# Patient Record
Sex: MALE
[De-identification: names, ages, dates, MRNs, and addresses within clinical notes are randomized per-mention and may not be internally consistent; named-entity substitution may affect disease eponyms.]

---

## 2017-03-22 ENCOUNTER — APPOINTMENT (OUTPATIENT)
Dept: ELECTROPHYSIOLOGY | Facility: CLINIC | Age: 77
End: 2017-03-22

## 2017-06-19 ENCOUNTER — APPOINTMENT (OUTPATIENT)
Dept: ELECTROPHYSIOLOGY | Facility: CLINIC | Age: 77
End: 2017-06-19

## 2017-06-19 VITALS — DIASTOLIC BLOOD PRESSURE: 70 MMHG | SYSTOLIC BLOOD PRESSURE: 154 MMHG

## 2017-09-25 ENCOUNTER — APPOINTMENT (OUTPATIENT)
Dept: ELECTROPHYSIOLOGY | Facility: CLINIC | Age: 77
End: 2017-09-25
Payer: MEDICARE

## 2017-09-25 PROCEDURE — 93284 PRGRMG EVAL IMPLANTABLE DFB: CPT

## 2017-09-25 RX ORDER — METOPROLOL TARTRATE 50 MG/1
50 TABLET, FILM COATED ORAL
Refills: 0 | Status: DISCONTINUED | COMMUNITY
End: 2017-09-25

## 2017-09-25 RX ORDER — FINASTERIDE 5 MG/1
5 TABLET, FILM COATED ORAL DAILY
Refills: 0 | Status: ACTIVE | COMMUNITY

## 2017-09-25 RX ORDER — METOPROLOL SUCCINATE 50 MG/1
50 TABLET, EXTENDED RELEASE ORAL
Refills: 0 | Status: ACTIVE | COMMUNITY

## 2017-11-10 VITALS
SYSTOLIC BLOOD PRESSURE: 105 MMHG | HEART RATE: 52 BPM | OXYGEN SATURATION: 96 % | TEMPERATURE: 98 F | RESPIRATION RATE: 19 BRPM | DIASTOLIC BLOOD PRESSURE: 58 MMHG

## 2017-11-10 RX ORDER — CHLORHEXIDINE GLUCONATE 213 G/1000ML
1 SOLUTION TOPICAL ONCE
Qty: 0 | Refills: 0 | Status: DISCONTINUED | OUTPATIENT
Start: 2017-11-13 | End: 2017-11-13
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
## 2017-11-10 NOTE — H&P ADULT - FAMILY HISTORY
Family history of MI (myocardial infarction)     Sibling  Still living? No  Family history of MI (myocardial infarction), Age at diagnosis: Age Unknown
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
## 2017-11-13 ENCOUNTER — OUTPATIENT (OUTPATIENT)
Dept: OUTPATIENT SERVICES | Facility: HOSPITAL | Age: 77
LOS: 1 days | Discharge: NURSING, REHAB OR LONG-TERM CARE | End: 2017-11-13
Payer: MEDICARE

## 2017-11-13 DIAGNOSIS — Z95.810 PRESENCE OF AUTOMATIC (IMPLANTABLE) CARDIAC DEFIBRILLATOR: Chronic | ICD-10-CM

## 2017-11-13 DIAGNOSIS — Z98.89 OTHER SPECIFIED POSTPROCEDURAL STATES: Chronic | ICD-10-CM

## 2017-11-13 LAB
ALBUMIN SERPL ELPH-MCNC: 4.1 G/DL — SIGNIFICANT CHANGE UP (ref 3.3–5)
ALP SERPL-CCNC: 80 U/L — SIGNIFICANT CHANGE UP (ref 40–120)
ALT FLD-CCNC: 19 U/L — SIGNIFICANT CHANGE UP (ref 10–45)
ANION GAP SERPL CALC-SCNC: 13 MMOL/L — SIGNIFICANT CHANGE UP (ref 5–17)
APTT BLD: 30.7 SEC — SIGNIFICANT CHANGE UP (ref 27.5–37.4)
AST SERPL-CCNC: 18 U/L — SIGNIFICANT CHANGE UP (ref 10–40)
BASOPHILS NFR BLD AUTO: 0.4 % — SIGNIFICANT CHANGE UP (ref 0–2)
BILIRUB DIRECT SERPL-MCNC: <0.2 MG/DL — SIGNIFICANT CHANGE UP (ref 0–0.2)
BILIRUB INDIRECT FLD-MCNC: >0.4 MG/DL — SIGNIFICANT CHANGE UP (ref 0.2–1)
BILIRUB SERPL-MCNC: 0.6 MG/DL — SIGNIFICANT CHANGE UP (ref 0.2–1.2)
BUN SERPL-MCNC: 17 MG/DL — SIGNIFICANT CHANGE UP (ref 7–23)
CALCIUM SERPL-MCNC: 10.6 MG/DL — HIGH (ref 8.4–10.5)
CHLORIDE SERPL-SCNC: 97 MMOL/L — SIGNIFICANT CHANGE UP (ref 96–108)
CHOLEST SERPL-MCNC: 201 MG/DL — HIGH (ref 10–199)
CK MB CFR SERPL CALC: 2.7 NG/ML — SIGNIFICANT CHANGE UP (ref 0–6.7)
CK SERPL-CCNC: 91 U/L — SIGNIFICANT CHANGE UP (ref 30–200)
CO2 SERPL-SCNC: 25 MMOL/L — SIGNIFICANT CHANGE UP (ref 22–31)
CREAT SERPL-MCNC: 1.21 MG/DL — SIGNIFICANT CHANGE UP (ref 0.5–1.3)
CRP SERPL-MCNC: 0.2 MG/DL — SIGNIFICANT CHANGE UP (ref 0–0.4)
EOSINOPHIL NFR BLD AUTO: 0.8 % — SIGNIFICANT CHANGE UP (ref 0–6)
GLUCOSE SERPL-MCNC: 146 MG/DL — HIGH (ref 70–99)
HBA1C BLD-MCNC: 5.8 % — HIGH (ref 4–5.6)
HCT VFR BLD CALC: 45.4 % — SIGNIFICANT CHANGE UP (ref 39–50)
HDLC SERPL-MCNC: 34 MG/DL — LOW (ref 40–125)
HGB BLD-MCNC: 16.1 G/DL — SIGNIFICANT CHANGE UP (ref 13–17)
INR BLD: 1.07 — SIGNIFICANT CHANGE UP (ref 0.88–1.16)
LIPID PNL WITH DIRECT LDL SERPL: 143 MG/DL — HIGH
LYMPHOCYTES # BLD AUTO: 33.9 % — SIGNIFICANT CHANGE UP (ref 13–44)
MCHC RBC-ENTMCNC: 32.5 PG — SIGNIFICANT CHANGE UP (ref 27–34)
MCHC RBC-ENTMCNC: 35.5 G/DL — SIGNIFICANT CHANGE UP (ref 32–36)
MCV RBC AUTO: 91.7 FL — SIGNIFICANT CHANGE UP (ref 80–100)
MONOCYTES NFR BLD AUTO: 9.8 % — SIGNIFICANT CHANGE UP (ref 2–14)
NEUTROPHILS NFR BLD AUTO: 55.1 % — SIGNIFICANT CHANGE UP (ref 43–77)
PLATELET # BLD AUTO: 319 K/UL — SIGNIFICANT CHANGE UP (ref 150–400)
POTASSIUM SERPL-MCNC: 4.4 MMOL/L — SIGNIFICANT CHANGE UP (ref 3.5–5.3)
POTASSIUM SERPL-SCNC: 4.4 MMOL/L — SIGNIFICANT CHANGE UP (ref 3.5–5.3)
PROT SERPL-MCNC: 7.6 G/DL — SIGNIFICANT CHANGE UP (ref 6–8.3)
PROTHROM AB SERPL-ACNC: 11.9 SEC — SIGNIFICANT CHANGE UP (ref 9.8–12.7)
RBC # BLD: 4.95 M/UL — SIGNIFICANT CHANGE UP (ref 4.2–5.8)
RBC # FLD: 13.2 % — SIGNIFICANT CHANGE UP (ref 10.3–16.9)
SODIUM SERPL-SCNC: 135 MMOL/L — SIGNIFICANT CHANGE UP (ref 135–145)
TOTAL CHOLESTEROL/HDL RATIO MEASUREMENT: 5.9 RATIO — SIGNIFICANT CHANGE UP (ref 3.4–9.6)
TRIGL SERPL-MCNC: 118 MG/DL — SIGNIFICANT CHANGE UP (ref 10–149)
WBC # BLD: 9.9 K/UL — SIGNIFICANT CHANGE UP (ref 3.8–10.5)
WBC # FLD AUTO: 9.9 K/UL — SIGNIFICANT CHANGE UP (ref 3.8–10.5)

## 2017-11-13 PROCEDURE — 82553 CREATINE MB FRACTION: CPT

## 2017-11-13 PROCEDURE — 93005 ELECTROCARDIOGRAM TRACING: CPT

## 2017-11-13 PROCEDURE — 93459 L HRT ART/GRFT ANGIO: CPT

## 2017-11-13 PROCEDURE — 93010 ELECTROCARDIOGRAM REPORT: CPT

## 2017-11-13 PROCEDURE — C1887: CPT

## 2017-11-13 PROCEDURE — C1894: CPT

## 2017-11-13 PROCEDURE — 85025 COMPLETE CBC W/AUTO DIFF WBC: CPT

## 2017-11-13 PROCEDURE — 80076 HEPATIC FUNCTION PANEL: CPT

## 2017-11-13 PROCEDURE — 85610 PROTHROMBIN TIME: CPT

## 2017-11-13 PROCEDURE — C1769: CPT

## 2017-11-13 PROCEDURE — C1889: CPT

## 2017-11-13 PROCEDURE — 86140 C-REACTIVE PROTEIN: CPT

## 2017-11-13 PROCEDURE — 82550 ASSAY OF CK (CPK): CPT

## 2017-11-13 PROCEDURE — 83036 HEMOGLOBIN GLYCOSYLATED A1C: CPT

## 2017-11-13 PROCEDURE — 85730 THROMBOPLASTIN TIME PARTIAL: CPT

## 2017-11-13 PROCEDURE — 80061 LIPID PANEL: CPT

## 2017-11-13 PROCEDURE — 80048 BASIC METABOLIC PNL TOTAL CA: CPT

## 2017-11-13 PROCEDURE — 93459 L HRT ART/GRFT ANGIO: CPT | Mod: 26

## 2017-11-13 RX ORDER — CLOPIDOGREL BISULFATE 75 MG/1
600 TABLET, FILM COATED ORAL ONCE
Qty: 0 | Refills: 0 | Status: COMPLETED | OUTPATIENT
Start: 2017-11-13 | End: 2017-11-13

## 2017-11-13 RX ORDER — SODIUM CHLORIDE 9 MG/ML
1000 INJECTION, SOLUTION INTRAVENOUS
Qty: 0 | Refills: 0 | Status: DISCONTINUED | OUTPATIENT
Start: 2017-11-13 | End: 2017-11-13

## 2017-11-13 RX ORDER — ATROPINE SULFATE 0.1 MG/ML
0.6 SYRINGE (ML) INJECTION ONCE
Qty: 0 | Refills: 0 | Status: COMPLETED | OUTPATIENT
Start: 2017-11-13 | End: 2017-11-13

## 2017-11-13 RX ADMIN — CLOPIDOGREL BISULFATE 600 MILLIGRAM(S): 75 TABLET, FILM COATED ORAL at 12:26

## 2017-11-13 RX ADMIN — Medication 0.6 MILLIGRAM(S): at 14:00
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
## 2018-01-08 ENCOUNTER — APPOINTMENT (OUTPATIENT)
Dept: ELECTROPHYSIOLOGY | Facility: CLINIC | Age: 78
End: 2018-01-08

## 2018-04-17 ENCOUNTER — APPOINTMENT (OUTPATIENT)
Dept: ELECTROPHYSIOLOGY | Facility: CLINIC | Age: 78
End: 2018-04-17

## 2018-04-30 ENCOUNTER — APPOINTMENT (OUTPATIENT)
Dept: ELECTROPHYSIOLOGY | Facility: CLINIC | Age: 78
End: 2018-04-30
Payer: MEDICARE

## 2018-04-30 PROCEDURE — 93284 PRGRMG EVAL IMPLANTABLE DFB: CPT

## 2018-04-30 RX ORDER — HYDROCHLOROTHIAZIDE 12.5 MG/1
12.5 TABLET ORAL DAILY
Refills: 0 | Status: DISCONTINUED | COMMUNITY
End: 2018-04-30

## 2018-04-30 RX ORDER — FUROSEMIDE 40 MG/1
40 TABLET ORAL
Refills: 0 | Status: ACTIVE | COMMUNITY

## 2018-04-30 RX ORDER — ISOSORBIDE MONONITRATE 60 MG/1
60 TABLET, EXTENDED RELEASE ORAL
Refills: 0 | Status: ACTIVE | COMMUNITY

## 2018-08-06 ENCOUNTER — APPOINTMENT (OUTPATIENT)
Dept: ELECTROPHYSIOLOGY | Facility: CLINIC | Age: 78
End: 2018-08-06
Payer: MEDICARE

## 2018-08-06 DIAGNOSIS — I50.22 CHRONIC SYSTOLIC (CONGESTIVE) HEART FAILURE: ICD-10-CM

## 2018-08-06 PROCEDURE — 93284 PRGRMG EVAL IMPLANTABLE DFB: CPT

## 2018-10-26 ENCOUNTER — INPATIENT (INPATIENT)
Facility: HOSPITAL | Age: 78
LOS: 1 days | Discharge: HOME | End: 2018-10-28
Attending: HOSPITALIST | Admitting: HOSPITALIST
Payer: MEDICARE

## 2018-10-26 VITALS
RESPIRATION RATE: 16 BRPM | OXYGEN SATURATION: 100 % | TEMPERATURE: 98 F | DIASTOLIC BLOOD PRESSURE: 80 MMHG | HEART RATE: 93 BPM | SYSTOLIC BLOOD PRESSURE: 156 MMHG

## 2018-10-26 DIAGNOSIS — Z29.9 ENCOUNTER FOR PROPHYLACTIC MEASURES, UNSPECIFIED: ICD-10-CM

## 2018-10-26 DIAGNOSIS — N18.3 CHRONIC KIDNEY DISEASE, STAGE 3 (MODERATE): ICD-10-CM

## 2018-10-26 DIAGNOSIS — Z95.810 PRESENCE OF AUTOMATIC (IMPLANTABLE) CARDIAC DEFIBRILLATOR: Chronic | ICD-10-CM

## 2018-10-26 DIAGNOSIS — M54.5 LOW BACK PAIN: ICD-10-CM

## 2018-10-26 DIAGNOSIS — I10 ESSENTIAL (PRIMARY) HYPERTENSION: ICD-10-CM

## 2018-10-26 DIAGNOSIS — I50.9 HEART FAILURE, UNSPECIFIED: ICD-10-CM

## 2018-10-26 DIAGNOSIS — N39.0 URINARY TRACT INFECTION, SITE NOT SPECIFIED: ICD-10-CM

## 2018-10-26 DIAGNOSIS — E83.52 HYPERCALCEMIA: ICD-10-CM

## 2018-10-26 DIAGNOSIS — Z98.89 OTHER SPECIFIED POSTPROCEDURAL STATES: Chronic | ICD-10-CM

## 2018-10-26 DIAGNOSIS — N21.0 CALCULUS IN BLADDER: ICD-10-CM

## 2018-10-26 DIAGNOSIS — N40.0 BENIGN PROSTATIC HYPERPLASIA WITHOUT LOWER URINARY TRACT SYMPTOMS: ICD-10-CM

## 2018-10-26 LAB
ALBUMIN SERPL ELPH-MCNC: 3.2 G/DL — LOW (ref 3.3–5)
ALP SERPL-CCNC: 78 U/L — SIGNIFICANT CHANGE UP (ref 40–120)
ALT FLD-CCNC: 13 U/L — SIGNIFICANT CHANGE UP (ref 4–41)
APPEARANCE UR: SIGNIFICANT CHANGE UP
APTT BLD: 29.9 SEC — SIGNIFICANT CHANGE UP (ref 27.5–37.4)
AST SERPL-CCNC: 14 U/L — SIGNIFICANT CHANGE UP (ref 4–40)
BACTERIA # UR AUTO: HIGH
BASE EXCESS BLDV CALC-SCNC: 4.3 MMOL/L — SIGNIFICANT CHANGE UP
BASOPHILS # BLD AUTO: 0.03 K/UL — SIGNIFICANT CHANGE UP (ref 0–0.2)
BASOPHILS NFR BLD AUTO: 0.3 % — SIGNIFICANT CHANGE UP (ref 0–2)
BILIRUB SERPL-MCNC: 0.5 MG/DL — SIGNIFICANT CHANGE UP (ref 0.2–1.2)
BILIRUB UR-MCNC: NEGATIVE — SIGNIFICANT CHANGE UP
BLOOD GAS VENOUS - CREATININE: 0.88 MG/DL — SIGNIFICANT CHANGE UP (ref 0.5–1.3)
BLOOD UR QL VISUAL: HIGH
BUN SERPL-MCNC: 16 MG/DL — SIGNIFICANT CHANGE UP (ref 7–23)
CALCIUM SERPL-MCNC: 10.6 MG/DL — HIGH (ref 8.4–10.5)
CHLORIDE BLDV-SCNC: 103 MMOL/L — SIGNIFICANT CHANGE UP (ref 96–108)
CHLORIDE SERPL-SCNC: 98 MMOL/L — SIGNIFICANT CHANGE UP (ref 98–107)
CO2 SERPL-SCNC: 25 MMOL/L — SIGNIFICANT CHANGE UP (ref 22–31)
COLOR SPEC: YELLOW — SIGNIFICANT CHANGE UP
CREAT SERPL-MCNC: 1.11 MG/DL — SIGNIFICANT CHANGE UP (ref 0.5–1.3)
EOSINOPHIL # BLD AUTO: 0.03 K/UL — SIGNIFICANT CHANGE UP (ref 0–0.5)
EOSINOPHIL NFR BLD AUTO: 0.3 % — SIGNIFICANT CHANGE UP (ref 0–6)
GAS PNL BLDV: 133 MMOL/L — LOW (ref 136–146)
GLUCOSE BLDV-MCNC: 131 — HIGH (ref 70–99)
GLUCOSE SERPL-MCNC: 128 MG/DL — HIGH (ref 70–99)
GLUCOSE UR-MCNC: NEGATIVE — SIGNIFICANT CHANGE UP
HCO3 BLDV-SCNC: 26 MMOL/L — SIGNIFICANT CHANGE UP (ref 20–27)
HCT VFR BLD CALC: 41.6 % — SIGNIFICANT CHANGE UP (ref 39–50)
HCT VFR BLDV CALC: 44.1 % — SIGNIFICANT CHANGE UP (ref 39–51)
HGB BLD-MCNC: 13.9 G/DL — SIGNIFICANT CHANGE UP (ref 13–17)
HGB BLDV-MCNC: 14.4 G/DL — SIGNIFICANT CHANGE UP (ref 13–17)
HYALINE CASTS # UR AUTO: NEGATIVE — SIGNIFICANT CHANGE UP
IMM GRANULOCYTES # BLD AUTO: 0.05 # — SIGNIFICANT CHANGE UP
IMM GRANULOCYTES NFR BLD AUTO: 0.5 % — SIGNIFICANT CHANGE UP (ref 0–1.5)
INR BLD: 1.2 — HIGH (ref 0.88–1.17)
KETONES UR-MCNC: NEGATIVE — SIGNIFICANT CHANGE UP
LACTATE BLDV-MCNC: 1.1 MMOL/L — SIGNIFICANT CHANGE UP (ref 0.5–2)
LEUKOCYTE ESTERASE UR-ACNC: SIGNIFICANT CHANGE UP
LYMPHOCYTES # BLD AUTO: 18.6 % — SIGNIFICANT CHANGE UP (ref 13–44)
LYMPHOCYTES # BLD AUTO: 2.01 K/UL — SIGNIFICANT CHANGE UP (ref 1–3.3)
MCHC RBC-ENTMCNC: 31.3 PG — SIGNIFICANT CHANGE UP (ref 27–34)
MCHC RBC-ENTMCNC: 33.4 % — SIGNIFICANT CHANGE UP (ref 32–36)
MCV RBC AUTO: 93.7 FL — SIGNIFICANT CHANGE UP (ref 80–100)
MONOCYTES # BLD AUTO: 0.98 K/UL — HIGH (ref 0–0.9)
MONOCYTES NFR BLD AUTO: 9.1 % — SIGNIFICANT CHANGE UP (ref 2–14)
NEUTROPHILS # BLD AUTO: 7.7 K/UL — HIGH (ref 1.8–7.4)
NEUTROPHILS NFR BLD AUTO: 71.2 % — SIGNIFICANT CHANGE UP (ref 43–77)
NITRITE UR-MCNC: NEGATIVE — SIGNIFICANT CHANGE UP
NRBC # FLD: 0 — SIGNIFICANT CHANGE UP
PCO2 BLDV: 48 MMHG — SIGNIFICANT CHANGE UP (ref 41–51)
PH BLDV: 7.4 PH — SIGNIFICANT CHANGE UP (ref 7.32–7.43)
PH UR: 7 — SIGNIFICANT CHANGE UP (ref 5–8)
PLATELET # BLD AUTO: 499 K/UL — HIGH (ref 150–400)
PMV BLD: 9.2 FL — SIGNIFICANT CHANGE UP (ref 7–13)
PO2 BLDV: 22 MMHG — LOW (ref 35–40)
POTASSIUM BLDV-SCNC: 4.6 MMOL/L — HIGH (ref 3.4–4.5)
POTASSIUM SERPL-MCNC: 4.5 MMOL/L — SIGNIFICANT CHANGE UP (ref 3.5–5.3)
POTASSIUM SERPL-SCNC: 4.5 MMOL/L — SIGNIFICANT CHANGE UP (ref 3.5–5.3)
PROT SERPL-MCNC: 7.5 G/DL — SIGNIFICANT CHANGE UP (ref 6–8.3)
PROT UR-MCNC: 50 — SIGNIFICANT CHANGE UP
PROTHROM AB SERPL-ACNC: 13.4 SEC — HIGH (ref 9.8–13.1)
RBC # BLD: 4.44 M/UL — SIGNIFICANT CHANGE UP (ref 4.2–5.8)
RBC # FLD: 12.3 % — SIGNIFICANT CHANGE UP (ref 10.3–14.5)
RBC CASTS # UR COMP ASSIST: >50 — HIGH (ref 0–?)
SAO2 % BLDV: 31.6 % — LOW (ref 60–85)
SODIUM SERPL-SCNC: 135 MMOL/L — SIGNIFICANT CHANGE UP (ref 135–145)
SP GR SPEC: 1.02 — SIGNIFICANT CHANGE UP (ref 1–1.04)
SQUAMOUS # UR AUTO: SIGNIFICANT CHANGE UP
UROBILINOGEN FLD QL: SIGNIFICANT CHANGE UP
WBC # BLD: 10.8 K/UL — HIGH (ref 3.8–10.5)
WBC # FLD AUTO: 10.8 K/UL — HIGH (ref 3.8–10.5)
WBC UR QL: >50 — HIGH (ref 0–?)

## 2018-10-26 PROCEDURE — 74177 CT ABD & PELVIS W/CONTRAST: CPT | Mod: 26

## 2018-10-26 PROCEDURE — 99223 1ST HOSP IP/OBS HIGH 75: CPT

## 2018-10-26 RX ORDER — TAMSULOSIN HYDROCHLORIDE 0.4 MG/1
0.4 CAPSULE ORAL AT BEDTIME
Qty: 0 | Refills: 0 | Status: DISCONTINUED | OUTPATIENT
Start: 2018-10-26 | End: 2018-10-28

## 2018-10-26 RX ORDER — ENOXAPARIN SODIUM 100 MG/ML
40 INJECTION SUBCUTANEOUS DAILY
Qty: 0 | Refills: 0 | Status: DISCONTINUED | OUTPATIENT
Start: 2018-10-26 | End: 2018-10-27

## 2018-10-26 RX ORDER — FENTANYL CITRATE 50 UG/ML
25 INJECTION INTRAVENOUS ONCE
Qty: 0 | Refills: 0 | Status: DISCONTINUED | OUTPATIENT
Start: 2018-10-26 | End: 2018-10-26

## 2018-10-26 RX ORDER — ACETAMINOPHEN 500 MG
650 TABLET ORAL EVERY 6 HOURS
Qty: 0 | Refills: 0 | Status: DISCONTINUED | OUTPATIENT
Start: 2018-10-26 | End: 2018-10-28

## 2018-10-26 RX ORDER — METOPROLOL TARTRATE 50 MG
50 TABLET ORAL DAILY
Qty: 0 | Refills: 0 | Status: DISCONTINUED | OUTPATIENT
Start: 2018-10-27 | End: 2018-10-28

## 2018-10-26 RX ORDER — SENNA PLUS 8.6 MG/1
2 TABLET ORAL AT BEDTIME
Qty: 0 | Refills: 0 | Status: DISCONTINUED | OUTPATIENT
Start: 2018-10-26 | End: 2018-10-28

## 2018-10-26 RX ORDER — LIDOCAINE 4 G/100G
1 CREAM TOPICAL DAILY
Qty: 0 | Refills: 0 | Status: DISCONTINUED | OUTPATIENT
Start: 2018-10-26 | End: 2018-10-28

## 2018-10-26 RX ORDER — ZOLPIDEM TARTRATE 10 MG/1
1 TABLET ORAL
Qty: 0 | Refills: 0 | COMMUNITY

## 2018-10-26 RX ORDER — FUROSEMIDE 40 MG
40 TABLET ORAL DAILY
Qty: 0 | Refills: 0 | Status: DISCONTINUED | OUTPATIENT
Start: 2018-10-27 | End: 2018-10-27

## 2018-10-26 RX ORDER — ISOSORBIDE MONONITRATE 60 MG/1
60 TABLET, EXTENDED RELEASE ORAL DAILY
Qty: 0 | Refills: 0 | Status: DISCONTINUED | OUTPATIENT
Start: 2018-10-26 | End: 2018-10-28

## 2018-10-26 RX ORDER — LISINOPRIL 2.5 MG/1
5 TABLET ORAL DAILY
Qty: 0 | Refills: 0 | Status: DISCONTINUED | OUTPATIENT
Start: 2018-10-27 | End: 2018-10-27

## 2018-10-26 RX ORDER — TRAMADOL HYDROCHLORIDE 50 MG/1
50 TABLET ORAL EVERY 6 HOURS
Qty: 0 | Refills: 0 | Status: DISCONTINUED | OUTPATIENT
Start: 2018-10-26 | End: 2018-10-28

## 2018-10-26 RX ORDER — ACETAMINOPHEN 500 MG
1000 TABLET ORAL ONCE
Qty: 0 | Refills: 0 | Status: COMPLETED | OUTPATIENT
Start: 2018-10-26 | End: 2018-10-26

## 2018-10-26 RX ORDER — CEFTRIAXONE 500 MG/1
1 INJECTION, POWDER, FOR SOLUTION INTRAMUSCULAR; INTRAVENOUS ONCE
Qty: 0 | Refills: 0 | Status: COMPLETED | OUTPATIENT
Start: 2018-10-26 | End: 2018-10-26

## 2018-10-26 RX ORDER — ASPIRIN/CALCIUM CARB/MAGNESIUM 324 MG
81 TABLET ORAL DAILY
Qty: 0 | Refills: 0 | Status: DISCONTINUED | OUTPATIENT
Start: 2018-10-26 | End: 2018-10-28

## 2018-10-26 RX ORDER — METOPROLOL TARTRATE 50 MG
25 TABLET ORAL ONCE
Qty: 0 | Refills: 0 | Status: COMPLETED | OUTPATIENT
Start: 2018-10-26 | End: 2018-10-26

## 2018-10-26 RX ORDER — TRAMADOL HYDROCHLORIDE 50 MG/1
25 TABLET ORAL ONCE
Qty: 0 | Refills: 0 | Status: DISCONTINUED | OUTPATIENT
Start: 2018-10-26 | End: 2018-10-26

## 2018-10-26 RX ORDER — ATORVASTATIN CALCIUM 80 MG/1
80 TABLET, FILM COATED ORAL AT BEDTIME
Qty: 0 | Refills: 0 | Status: DISCONTINUED | OUTPATIENT
Start: 2018-10-26 | End: 2018-10-28

## 2018-10-26 RX ORDER — CEFTRIAXONE 500 MG/1
1 INJECTION, POWDER, FOR SOLUTION INTRAMUSCULAR; INTRAVENOUS EVERY 24 HOURS
Qty: 0 | Refills: 0 | Status: DISCONTINUED | OUTPATIENT
Start: 2018-10-27 | End: 2018-10-27

## 2018-10-26 RX ORDER — ESCITALOPRAM OXALATE 10 MG/1
10 TABLET, FILM COATED ORAL DAILY
Qty: 0 | Refills: 0 | Status: DISCONTINUED | OUTPATIENT
Start: 2018-10-26 | End: 2018-10-28

## 2018-10-26 RX ADMIN — CEFTRIAXONE 100 GRAM(S): 500 INJECTION, POWDER, FOR SOLUTION INTRAMUSCULAR; INTRAVENOUS at 14:56

## 2018-10-26 RX ADMIN — ATORVASTATIN CALCIUM 80 MILLIGRAM(S): 80 TABLET, FILM COATED ORAL at 21:23

## 2018-10-26 RX ADMIN — TAMSULOSIN HYDROCHLORIDE 0.4 MILLIGRAM(S): 0.4 CAPSULE ORAL at 22:24

## 2018-10-26 RX ADMIN — FENTANYL CITRATE 25 MICROGRAM(S): 50 INJECTION INTRAVENOUS at 17:24

## 2018-10-26 RX ADMIN — TRAMADOL HYDROCHLORIDE 25 MILLIGRAM(S): 50 TABLET ORAL at 21:23

## 2018-10-26 RX ADMIN — LIDOCAINE 1 PATCH: 4 CREAM TOPICAL at 22:22

## 2018-10-26 RX ADMIN — FENTANYL CITRATE 25 MICROGRAM(S): 50 INJECTION INTRAVENOUS at 17:39

## 2018-10-26 RX ADMIN — Medication 25 MILLIGRAM(S): at 21:24

## 2018-10-26 RX ADMIN — Medication 400 MILLIGRAM(S): at 14:43

## 2018-10-26 RX ADMIN — TRAMADOL HYDROCHLORIDE 25 MILLIGRAM(S): 50 TABLET ORAL at 22:15

## 2018-10-26 RX ADMIN — ESCITALOPRAM OXALATE 10 MILLIGRAM(S): 10 TABLET, FILM COATED ORAL at 21:24

## 2018-10-26 NOTE — H&P ADULT - NSHPREVIEWOFSYSTEMS_GEN_ALL_CORE
REVIEW OF SYSTEMS:  Constitutional: [C ] negative [ ] fevers [ ] chills [ ] weight loss [ ] weight gain  HEENT: [ C] negative [ ] dry eyes [ ] eye irritation [ ] postnasal drip [ ] nasal congestion  CV: [C ] negative  [ ] chest pain [ ] orthopnea [ ] palpitations [ ] murmur  Resp: [ C] negative [ ] cough [ ] shortness of breath [ ] dyspnea [ ] wheezing [ ] sputum [ ] hemoptysis  GI: [C ] negative [ ] nausea [ ] vomiting [ ] diarrhea [ ] constipation [ ] abd pain [ ] dysphagia   : [ ] negative [ ] dysuria [X ] nocturia [ X] hematuria [ ] increased urinary frequency  Musculoskeletal: [ ] negative [ x] back pain [ ] myalgias [ ] arthralgias [ ] fracture  Skin: [x ] negative [ ] rash [ ] itch  Neurological: [ x] negative [ ] headache [ ] dizziness [ ] syncope [ ] weakness [ ] numbness  Psychiatric: [x ] negative [ ] anxiety [ ] depression  Endocrine: [x ] negative [ ] diabetes [ ] thyroid problem  Hematologic/Lymphatic: [x ] negative [ ] anemia [ ] bleeding problem  Allergic/Immunologic: [x ] negative [ ] itchy eyes [ ] nasal discharge [ ] hives [ ] angioedema  [ ] All other systems negative  [ ] Unable to assess ROS because ________ Constitutional: No weakness, fevers, chills, or weight loss  Eyes: No visual changes, double vision, or eye pain  Ears, Nose, Mouth, Throat: No runny nose, sinus pain, ear pain, tinnitus, sore throat, dysphagia, or odynophagia  Cardiovascular: No chest pain, palpitations, or LE edema  Respiratory: No cough, wheezing, hemoptysis, or shortness of breath  Gastrointestinal: No abdominal pain, dysphagia, anorexia, nausea/vomiting, diarrhea/constipation, hematemesis, BRBPR, or melena  Genitourinary: +Nocturia. +Hematuria. No dysuria.  Musculoskeletal: +Back pain. No joint pain, joint swelling, or decreased ROM.  Skin: No pruritus, rashes, lesions, or wounds  Neurologic:  No seizures, headache, paresthesias, numbness, or limb weakness  Psychiatric: No depression, anxiety, difficulty concentrating, anhedonia, or lack of energy  Endocrine: No heat/cold intolerance, mood swings, sweats, polydipsia, or polyuria  Hematologic/lymphatic: No purpura, petechia, or prolonged or excessive bleeding after dental extraction / injury  Allergic/Immunologic: No anaphylaxis or allergic response to materials, foods, animals    Positives and pertinent negatives noted and all other systems negative.
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
## 2018-10-26 NOTE — H&P ADULT - PROBLEM SELECTOR PLAN 7
Likely due to CHF/HTN  -bmp qd -Continue home medications furosemide, isosorbid, lisinopril, metoprolol succ 50 (given 25mg tonight given missed dose in AM) -Continue home medications furosemide, isosorbide, lisinopril, metoprolol succ 50 (given 25mg tonight given missed dose in AM)

## 2018-10-26 NOTE — ED ADULT NURSE REASSESSMENT NOTE - NS ED NURSE REASSESS COMMENT FT1
No acute distress at present. Respirations are even and unlabored on room air. Pt. is admitted to Medicine, awaiting bed assignment. Report given to [facility] CHEYENNE Contreras pt. is being transported to [facility] by ED tech.

## 2018-10-26 NOTE — H&P ADULT - PROBLEM SELECTOR PLAN 2
Likely cause of hematuria and spikes of back pain.  -plan as above Likely cause of hematuria and spikes of back pain. Hematuria resolving.  -plan as above  -monitor H/H

## 2018-10-26 NOTE — H&P ADULT - PROBLEM SELECTOR PLAN 8
DVT: enoxaparin  Diet: DASH  Dispo: infection/pain control Likely due to CHF/HTN  -bmp qd Likely due to CHF/HTN  -bmp qd  -avoid nephrotoxins  -renally dose meds

## 2018-10-26 NOTE — H&P ADULT - NSHPSOCIALHISTORY_GEN_ALL_CORE
tobacco quit in 1989 w/ 20 pack year. No alcohol use. no illicit drugs. Lives with wife. Tobacco quit in 1989 w/ 20 pack year. No alcohol use. No illicit drug use. Lives with wife.
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
## 2018-10-26 NOTE — H&P ADULT - PROBLEM SELECTOR PLAN 4
Spikes of back pain likely due to bladder calculus/ UTI, however PE more consistent with musckuloskeletal pain. No signs of fracture/ infiltration on CT scan. Failed ibuprofen/cyclobenzaprine OP.  -will trial acetaminophen q6h mod, tramadol 25mg severe (will increase if not affective), lidocaine patch  -monitor for improvement  -PT eval Spikes of back pain likely due to bladder calculus/ UTI, however PE more consistent with musckuloskeletal pain. No signs of fracture/ infiltration on CT scan. Failed ibuprofen/cyclobenzaprine OP.  -will trial acetaminophen q6h mod, tramadol 50mg severe (will increase if not affective), lidocaine patch  -monitor for improvement  -PT eval BPH likely etiology of bladder stone  -will start patient on tamsulosin 0.4 mg qhs

## 2018-10-26 NOTE — ED PROVIDER NOTE - PROGRESS NOTE DETAILS
Attending Note (Mojgan): signed out pending admission for pyleo and ct abd/pelvis r/o sbo.  ct without sbo.  continue iv antibiotics and admission. matt kirk: pt accepted by hospitalist, CT showing bladder stones, inpatient team states will reach out to uro- pt stable, text paged MAR.

## 2018-10-26 NOTE — H&P ADULT - PROBLEM SELECTOR PLAN 5
Ischemic cardiomyopathy w/ EF of 27% in 2016. No sign of acute HF.   -continue home medications furosemide, isosorbid, ASA, lisinopril, rosuvastatin, metoprolol succ 50 (given 25mg tonight given missed dose in AM)  -daily weights  -I+Os Spikes of back pain likely due to bladder calculus/ UTI, however PE more consistent with musckuloskeletal pain. No signs of fracture/ infiltration on CT scan. Failed ibuprofen/cyclobenzaprine OP.  -will trial acetaminophen q6h mod, tramadol 50mg severe (will increase if not affective), lidocaine patch  -monitor for improvement  -PT eval Spikes of back pain likely due to bladder calculus/ UTI, however PE more consistent with musculoskeletal pain given positive straight leg raise findings. No signs of fracture/ infiltration on CT scan. Failed ibuprofen/cyclobenzaprine OP.  -will trial acetaminophen q6h for moderate, tramadol 50mg q6h for severe (will increase if not affective), lidocaine patch  -monitor for improvement  -PT eval
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
## 2018-10-26 NOTE — ED ADULT NURSE NOTE - CHIEF COMPLAINT QUOTE
pt monica from urgent care, sent over for diffuse lower back pain x 3 weeks, developed hematuria 3 days ago, no fevers, no sob, no cp. back pain causes trouble with ambulation, no bowel or bladder incontinence, no recent falls or trauma. pt on plavix

## 2018-10-26 NOTE — H&P ADULT - NSHPLABSRESULTS_GEN_ALL_CORE
LABS:                        13.9   10.80 )-----------( 499      ( 26 Oct 2018 13:28 )             41.6     10-26    135  |  98  |  16  ----------------------------<  128<H>  4.5   |  25  |  1.11    Ca    10.6<H>      26 Oct 2018 13:28    TPro  7.5  /  Alb  3.2<L>  /  TBili  0.5  /  DBili  x   /  AST  14  /  ALT  13  /  AlkPhos  78  10-26    PT/INR - ( 26 Oct 2018 13:28 )   PT: 13.4 SEC;   INR: 1.20          PTT - ( 26 Oct 2018 13:28 )  PTT:29.9 SEC      Urinalysis Basic - ( 26 Oct 2018 13:28 )    Color: YELLOW / Appearance: Lt TURBID / S.017 / pH: 7.0  Gluc: NEGATIVE / Ketone: NEGATIVE  / Bili: NEGATIVE / Urobili: TRACE   Blood: MODERATE / Protein: 50 / Nitrite: NEGATIVE   Leuk Esterase: LARGE / RBC: >50 / WBC >50   Sq Epi: OCC / Non Sq Epi: x / Bacteria: MODERATE    < from: CT Abdomen and Pelvis w/ Oral Cont and w/ IV Cont (10.26.18 @ 15:40) >    FINDINGS:    LOWER CHEST: AICD. Median sternotomy.    LIVER: Within normal limits.  BILE DUCTS: Normal caliber.  GALLBLADDER: Contracted.  SPLEEN: Scarring from prior infarct.  PANCREAS: Within normal limits.  ADRENALS: Within normal limits.  KIDNEYS/URETERS: Within normal limits.    BLADDER: Multiple bladder calculi measuring up to 2.4 cm.  REPRODUCTIVE ORGANS: Prostate within normal limits.    BOWEL: No bowel obstruction. Sigmoid anastomosis. Scattered   diverticulosis without diverticulitis. The appendix is normal.     PERITONEUM: No ascites.  VESSELS:  Atherosclerotic changes.  RETROPERITONEUM: No lymphadenopathy.    ABDOMINAL WALL: Within normal limits.  BONES: Degenerative changes.    IMPRESSION:   No acute pathology.    Other chronic findings as above.    < end of copied text > LABS:                        13.9   10.80 )-----------( 499      ( 26 Oct 2018 13:28 )             41.6     10-26    135  |  98  |  16  ----------------------------<  128<H>  4.5   |  25  |  1.11    Ca    10.6<H>      26 Oct 2018 13:28    TPro  7.5  /  Alb  3.2<L>  /  TBili  0.5  /  DBili  x   /  AST  14  /  ALT  13  /  AlkPhos  78  10-26    PT/INR - ( 26 Oct 2018 13:28 )   PT: 13.4 SEC;   INR: 1.20          PTT - ( 26 Oct 2018 13:28 )  PTT:29.9 SEC    Urinalysis Basic - ( 26 Oct 2018 13:28 )    Color: YELLOW / Appearance: Lt TURBID / S.017 / pH: 7.0  Gluc: NEGATIVE / Ketone: NEGATIVE  / Bili: NEGATIVE / Urobili: TRACE   Blood: MODERATE / Protein: 50 / Nitrite: NEGATIVE   Leuk Esterase: LARGE / RBC: >50 / WBC >50   Sq Epi: OCC / Non Sq Epi: x / Bacteria: MODERATE    Imaging personally reviewed.   < from: CT Abdomen and Pelvis w/ Oral Cont and w/ IV Cont (10.26.18 @ 15:40) >    FINDINGS:    LOWER CHEST: AICD. Median sternotomy.    LIVER: Within normal limits.  BILE DUCTS: Normal caliber.  GALLBLADDER: Contracted.  SPLEEN: Scarring from prior infarct.  PANCREAS: Within normal limits.  ADRENALS: Within normal limits.  KIDNEYS/URETERS: Within normal limits.    BLADDER: Multiple bladder calculi measuring up to 2.4 cm.  REPRODUCTIVE ORGANS: Prostate within normal limits.    BOWEL: No bowel obstruction. Sigmoid anastomosis. Scattered   diverticulosis without diverticulitis. The appendix is normal.     PERITONEUM: No ascites.  VESSELS:  Atherosclerotic changes.  RETROPERITONEUM: No lymphadenopathy.    ABDOMINAL WALL: Within normal limits.  BONES: Degenerative changes.    IMPRESSION:   No acute pathology.    Other chronic findings as above.    < end of copied text >

## 2018-10-26 NOTE — H&P ADULT - PROBLEM SELECTOR PLAN 3
BPH likely etiology of bladder stone  -will start patient on tamsulosin 0.4 mg qhs Patient with several years of hypercal with calculi and musckuloskel pain, protein gap, however no anemia, lytic lesions seen on CT, weight loss. Could consider MM w/u.   -will send ionized Ca and PTH in AM for w/u of primary Hyperparathyroid. assistive person Patient with several years of hypercalcemia with calculi and musculoskeletal pain, protein gap, however no anemia, lytic lesions seen on CT, weight loss. Could consider MM w/u.   -will send ionized Ca and PTH in AM for w/u of primary Hyperparathyroid.
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
## 2018-10-26 NOTE — H&P ADULT - PROBLEM SELECTOR PLAN 1
Patient found to have mild leukocytosis w/ +UA w/ bladder calculus and BPH.  -f/u bcx, ucx  -continue on ceftriaxone 1mg qd 1/5 days adjust with culture data  -discussed with urology resident on call no intervention w/ 2.4cm calculus and to f/u w/ urology OP [phone] Patient found to have mild leukocytosis w/ +UA w/ bladder calculus and BPH. Unlikely pyelonephritis  -f/u bcx, ucx  -continue on ceftriaxone 1mg qd 1/5 days adjust with culture data  -discussed with urology resident on call no intervention w/ 2.4cm calculus and to f/u w/ urology OP [id number] Patient found to have mild leukocytosis w/ +UA w/ bladder calculus and BPH. Unlikely pyelonephritis  -f/u bcx, ucx  -continue on ceftriaxone 1mg qd, adjust with culture data  -discussed with urology resident on call no intervention w/ 2.4cm calculus and to f/u w/ urology OP [id number]

## 2018-10-26 NOTE — H&P ADULT - PROBLEM SELECTOR PLAN 9
DVT: enoxaparin  Diet: DASH  Dispo: infection/pain control DVT: HSQ given elevation in Cr on AM labs  Diet: DASH  Dispo: infection/pain control DVT: HSQ given elevation in Cr on AM labs, Hgb currently stable despite hematuria, which is resolving  Diet: DASH  Dispo: infection/pain control

## 2018-10-26 NOTE — H&P ADULT - NSHPPHYSICALEXAM_GEN_ALL_CORE
Vital Signs Last 24 Hrs  T(C): 36.7 (26 Oct 2018 19:53), Max: 36.8 (26 Oct 2018 12:23)  T(F): 98.1 (26 Oct 2018 19:53), Max: 98.2 (26 Oct 2018 12:23)  HR: 107 (26 Oct 2018 19:53) (90 - 107)  BP: 118/84 (26 Oct 2018 19:53) (118/84 - 180/75)  BP(mean): --  RR: 18 (26 Oct 2018 19:53) (16 - 18)  SpO2: 100% (26 Oct 2018 19:53) (99% - 100%)  CAPILLARY BLOOD GLUCOSE        I&O's Summary      PHYSICAL EXAM:  GENERAL: appears uncomfortable, obese  HEAD:  Atraumatic, Normocephalic  ENT: poor dentition  EYES: EOMI, PERRLA, conjunctiva and sclera clear  NECK: Supple, No JVD  CHEST/LUNG: Clear to auscultation bilaterally; No wheeze  HEART: tachycardic to 100s, regular rhythm; No murmurs, rubs, or gallops  ABDOMEN: TTP suprapubic without rebound, large surgical abdominal scar, lower back pain central more than flanks. Soft, Nondistended; Bowel sounds present  EXTREMITIES:  2+ Peripheral Pulses, No clubbing, cyanosis, or edema  PSYCH: AAOx3  NEUROLOGY: non-focal  SKIN: No rashes or lesions Vital Signs Last 24 Hrs  T(C): 36.7 (26 Oct 2018 19:53), Max: 36.8 (26 Oct 2018 12:23)  T(F): 98.1 (26 Oct 2018 19:53), Max: 98.2 (26 Oct 2018 12:23)  HR: 107 (26 Oct 2018 19:53) (90 - 107)  BP: 118/84 (26 Oct 2018 19:53) (118/84 - 180/75)  BP(mean): --  RR: 18 (26 Oct 2018 19:53) (16 - 18)  SpO2: 100% (26 Oct 2018 19:53) (99% - 100%)  CAPILLARY BLOOD GLUCOSE      PHYSICAL EXAM:  GENERAL: appears uncomfortable, obese  HEAD: Atraumatic, Normocephalic  Mouth: poor dentition, moist MM, no oropharyngeal exudates  EYES: EOMI, PERRL, conjunctiva and sclera clear  NECK: Supple, Full ROM, No JVD  CHEST/LUNG: Good inspiratory effort; Clear to auscultation bilaterally; No wheeze  HEART: tachycardic to 100s, regular rhythm; Normal S1 and S2; No murmurs, rubs, or gallops; No LE edema b/l.  ABDOMEN: TTP in suprapubic area without rebound, large surgical abdominal scar, lower back pain central more than flanks. Soft, Nondistended; Bowel sounds present  MUSCULOSKELETAL: Positive straight leg raise test at 30 degrees b/l. No joint pain or swelling.   EXTREMITIES:  2+ Peripheral Pulses, No clubbing or cyanosis.  NEUROLOGY: AAOx3, 5/5 motor strength and intact tactile sensation in all extremities, non-focal  SKIN: No rashes or lesions

## 2018-10-26 NOTE — ED ADULT NURSE NOTE - NSIMPLEMENTINTERV_GEN_ALL_ED
Implemented All Universal Safety Interventions:  [place] to call system. Call bell, personal items and telephone within reach. Instruct patient to call for assistance. Room bathroom lighting operational. Non-slip footwear when patient is off stretcher. Physically safe environment: no spills, clutter or unnecessary equipment. Stretcher in lowest position, wheels locked, appropriate side rails in place.

## 2018-10-26 NOTE — H&P ADULT - PROBLEM SELECTOR PLAN 6
-Continue home medications furosemide, isosorbid, lisinopril, metoprolol succ 50 (given 25mg tonight given missed dose in AM) Ischemic cardiomyopathy w/ EF of 27% in 2016. No sign of acute HF.   -continue home medications furosemide, isosorbid, ASA, lisinopril, rosuvastatin, metoprolol succ 50 (given 25mg tonight given missed dose in AM)  -daily weights  -I+Os

## 2018-10-26 NOTE — ED ADULT NURSE NOTE - OBJECTIVE STATEMENT
pt received to room #22 w c/o RLQ pain and blood in inure. urine noted to be foul smelling and cloudy. states he has not had a normal BM in approx a week. 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 denies CP, sob. IV placed, labs drawn and sent, wife at bedside, will cont to monitor.

## 2018-10-27 ENCOUNTER — TRANSCRIPTION ENCOUNTER (OUTPATIENT)
Age: 78
End: 2018-10-27

## 2018-10-27 DIAGNOSIS — R31.9 HEMATURIA, UNSPECIFIED: ICD-10-CM

## 2018-10-27 LAB
ALBUMIN SERPL ELPH-MCNC: 3.3 G/DL — SIGNIFICANT CHANGE UP (ref 3.3–5)
ALP SERPL-CCNC: 78 U/L — SIGNIFICANT CHANGE UP (ref 40–120)
ALT FLD-CCNC: 10 U/L — SIGNIFICANT CHANGE UP (ref 4–41)
AST SERPL-CCNC: 14 U/L — SIGNIFICANT CHANGE UP (ref 4–40)
BACTERIA UR CULT: SIGNIFICANT CHANGE UP
BASOPHILS # BLD AUTO: 0.05 K/UL — SIGNIFICANT CHANGE UP (ref 0–0.2)
BASOPHILS NFR BLD AUTO: 0.4 % — SIGNIFICANT CHANGE UP (ref 0–2)
BILIRUB SERPL-MCNC: 0.5 MG/DL — SIGNIFICANT CHANGE UP (ref 0.2–1.2)
BUN SERPL-MCNC: 22 MG/DL — SIGNIFICANT CHANGE UP (ref 7–23)
CA-I BLD-SCNC: 1.32 MMOL/L — HIGH (ref 1.03–1.23)
CALCIUM SERPL-MCNC: 10.5 MG/DL — SIGNIFICANT CHANGE UP (ref 8.4–10.5)
CHLORIDE SERPL-SCNC: 98 MMOL/L — SIGNIFICANT CHANGE UP (ref 98–107)
CO2 SERPL-SCNC: 22 MMOL/L — SIGNIFICANT CHANGE UP (ref 22–31)
CREAT SERPL-MCNC: 1.31 MG/DL — HIGH (ref 0.5–1.3)
EOSINOPHIL # BLD AUTO: 0.01 K/UL — SIGNIFICANT CHANGE UP (ref 0–0.5)
EOSINOPHIL NFR BLD AUTO: 0.1 % — SIGNIFICANT CHANGE UP (ref 0–6)
GLUCOSE SERPL-MCNC: 146 MG/DL — HIGH (ref 70–99)
HCT VFR BLD CALC: 39.5 % — SIGNIFICANT CHANGE UP (ref 39–50)
HGB BLD-MCNC: 13.5 G/DL — SIGNIFICANT CHANGE UP (ref 13–17)
IMM GRANULOCYTES # BLD AUTO: 0.04 # — SIGNIFICANT CHANGE UP
IMM GRANULOCYTES NFR BLD AUTO: 0.3 % — SIGNIFICANT CHANGE UP (ref 0–1.5)
LYMPHOCYTES # BLD AUTO: 2.39 K/UL — SIGNIFICANT CHANGE UP (ref 1–3.3)
LYMPHOCYTES # BLD AUTO: 20.4 % — SIGNIFICANT CHANGE UP (ref 13–44)
MAGNESIUM SERPL-MCNC: 2.1 MG/DL — SIGNIFICANT CHANGE UP (ref 1.6–2.6)
MCHC RBC-ENTMCNC: 31.7 PG — SIGNIFICANT CHANGE UP (ref 27–34)
MCHC RBC-ENTMCNC: 34.2 % — SIGNIFICANT CHANGE UP (ref 32–36)
MCV RBC AUTO: 92.7 FL — SIGNIFICANT CHANGE UP (ref 80–100)
MONOCYTES # BLD AUTO: 1.26 K/UL — HIGH (ref 0–0.9)
MONOCYTES NFR BLD AUTO: 10.7 % — SIGNIFICANT CHANGE UP (ref 2–14)
NEUTROPHILS # BLD AUTO: 7.98 K/UL — HIGH (ref 1.8–7.4)
NEUTROPHILS NFR BLD AUTO: 68.1 % — SIGNIFICANT CHANGE UP (ref 43–77)
NRBC # FLD: 0 — SIGNIFICANT CHANGE UP
PHOSPHATE SERPL-MCNC: 3 MG/DL — SIGNIFICANT CHANGE UP (ref 2.5–4.5)
PLATELET # BLD AUTO: 477 K/UL — HIGH (ref 150–400)
PMV BLD: 9.3 FL — SIGNIFICANT CHANGE UP (ref 7–13)
POTASSIUM SERPL-MCNC: 5.4 MMOL/L — HIGH (ref 3.5–5.3)
POTASSIUM SERPL-SCNC: 5.4 MMOL/L — HIGH (ref 3.5–5.3)
PROT SERPL-MCNC: 6.4 G/DL — SIGNIFICANT CHANGE UP (ref 6–8.3)
PTH-INTACT SERPL-MCNC: 86.88 PG/ML — HIGH (ref 15–65)
RBC # BLD: 4.26 M/UL — SIGNIFICANT CHANGE UP (ref 4.2–5.8)
RBC # FLD: 12.3 % — SIGNIFICANT CHANGE UP (ref 10.3–14.5)
SODIUM SERPL-SCNC: 133 MMOL/L — LOW (ref 135–145)
SPECIMEN SOURCE: SIGNIFICANT CHANGE UP
WBC # BLD: 11.73 K/UL — HIGH (ref 3.8–10.5)
WBC # FLD AUTO: 11.73 K/UL — HIGH (ref 3.8–10.5)

## 2018-10-27 PROCEDURE — 99233 SBSQ HOSP IP/OBS HIGH 50: CPT | Mod: GC

## 2018-10-27 RX ORDER — HEPARIN SODIUM 5000 [USP'U]/ML
5000 INJECTION INTRAVENOUS; SUBCUTANEOUS EVERY 8 HOURS
Qty: 0 | Refills: 0 | Status: DISCONTINUED | OUTPATIENT
Start: 2018-10-27 | End: 2018-10-28

## 2018-10-27 RX ORDER — INFLUENZA VIRUS VACCINE 15; 15; 15; 15 UG/.5ML; UG/.5ML; UG/.5ML; UG/.5ML
0.5 SUSPENSION INTRAMUSCULAR ONCE
Qty: 0 | Refills: 0 | Status: DISCONTINUED | OUTPATIENT
Start: 2018-10-27 | End: 2018-10-28

## 2018-10-27 RX ORDER — LISINOPRIL 2.5 MG/1
5 TABLET ORAL DAILY
Qty: 0 | Refills: 0 | Status: DISCONTINUED | OUTPATIENT
Start: 2018-10-27 | End: 2018-10-28

## 2018-10-27 RX ORDER — SODIUM CHLORIDE 9 MG/ML
1000 INJECTION INTRAMUSCULAR; INTRAVENOUS; SUBCUTANEOUS
Qty: 0 | Refills: 0 | Status: DISCONTINUED | OUTPATIENT
Start: 2018-10-27 | End: 2018-10-28

## 2018-10-27 RX ADMIN — ISOSORBIDE MONONITRATE 60 MILLIGRAM(S): 60 TABLET, EXTENDED RELEASE ORAL at 13:07

## 2018-10-27 RX ADMIN — ESCITALOPRAM OXALATE 10 MILLIGRAM(S): 10 TABLET, FILM COATED ORAL at 13:08

## 2018-10-27 RX ADMIN — HEPARIN SODIUM 5000 UNIT(S): 5000 INJECTION INTRAVENOUS; SUBCUTANEOUS at 21:42

## 2018-10-27 RX ADMIN — Medication 40 MILLIGRAM(S): at 06:50

## 2018-10-27 RX ADMIN — ATORVASTATIN CALCIUM 80 MILLIGRAM(S): 80 TABLET, FILM COATED ORAL at 21:42

## 2018-10-27 RX ADMIN — Medication 50 MILLIGRAM(S): at 06:51

## 2018-10-27 RX ADMIN — Medication 81 MILLIGRAM(S): at 13:08

## 2018-10-27 RX ADMIN — TAMSULOSIN HYDROCHLORIDE 0.4 MILLIGRAM(S): 0.4 CAPSULE ORAL at 21:34

## 2018-10-27 RX ADMIN — LIDOCAINE 1 PATCH: 4 CREAM TOPICAL at 07:21

## 2018-10-27 RX ADMIN — SODIUM CHLORIDE 100 MILLILITER(S): 9 INJECTION INTRAMUSCULAR; INTRAVENOUS; SUBCUTANEOUS at 15:40

## 2018-10-27 RX ADMIN — Medication 1 TABLET(S): at 16:13

## 2018-10-27 RX ADMIN — LIDOCAINE 1 PATCH: 4 CREAM TOPICAL at 13:09

## 2018-10-27 RX ADMIN — CEFTRIAXONE 100 GRAM(S): 500 INJECTION, POWDER, FOR SOLUTION INTRAMUSCULAR; INTRAVENOUS at 06:50

## 2018-10-27 RX ADMIN — HEPARIN SODIUM 5000 UNIT(S): 5000 INJECTION INTRAVENOUS; SUBCUTANEOUS at 13:08

## 2018-10-27 RX ADMIN — LISINOPRIL 5 MILLIGRAM(S): 2.5 TABLET ORAL at 06:51

## 2018-10-27 RX ADMIN — LIDOCAINE 1 PATCH: 4 CREAM TOPICAL at 12:49

## 2018-10-27 NOTE — PROGRESS NOTE ADULT - PROBLEM SELECTOR PLAN 4
- BPH likely etiology of bladder stone  -will start patient on tamsulosin 0.4 mg qhs - BPH likely etiology of bladder stone  -c/w patient on tamsulosin 0.4 mg qhs

## 2018-10-27 NOTE — PROGRESS NOTE ADULT - PROBLEM SELECTOR PLAN 3
- Patient with long hx of hypercalcemia c/b calculi and musckuloskel pain, protein gap, however no anemia,   - No lytic lesions seen on CT  - Ionized calcium elevated, PTH elevated   - Will consider further workup for MM given above symptoms - Unknown etiology at this time   - TTP in lumbosacral region, paraspinal, no midline tenderness  - c/w acetaminophen q6h moderate pain, tramadol 50mg severe, lidocaine patch  -monitor for improvement  - No imaging at this time given no reyna midline tenderness, no urinary or bowel incontinence, no fever or chills. Will recommend outpatient followup within 4 weeks if pain does not improve on tylenol or ibuprofen - Unknown etiology at this time   - TTP in lumbosacral region, paraspinal, no midline tenderness  - c/w acetaminophen q6h moderate pain, tramadol 50mg severe, lidocaine patch  -monitor for improvement  - No imaging at this time given no reyna midline tenderness, no urinary or bowel incontinence, no fever or chills. Will recommend outpatient followup within 4 weeks if pain does not improve on tylenol or ibuprofen  - Conservative management at this point.

## 2018-10-27 NOTE — PHYSICAL THERAPY INITIAL EVALUATION ADULT - PERTINENT HX OF CURRENT PROBLEM, REHAB EVAL
Pt. is a 77 year old male admitted to [facility] secondary to UTI. PMH: CVA, CHF, CAD, MI, HTN.

## 2018-10-27 NOTE — DISCHARGE NOTE ADULT - PATIENT PORTAL LINK FT
You can access the [facility], offered by [facility], by registering with the following website: [email or web address]

## 2018-10-27 NOTE — PROGRESS NOTE ADULT - PROBLEM SELECTOR PLAN 5
- likely due to bladder calculus/ UTI, however given degenerative changes on CT may be more chronic picture   - Failed ibuprofen/cyclobenzaprine OP.  - will trial acetaminophen q6h moderate pain, tramadol 50mg severe (will increase if not affective), lidocaine patch  -monitor for improvement  -PT eval Ischemic cardiomyopathy w/ EF of 27% in 2016. No sign of acute decompensated HF.   -continue home medications furosemide, isosorbid, ASA, lisinopril, rosuvastatin, metoprolol succ 50 (given 25mg tonight given missed dose in AM)  -daily weights  -I+Os Ischemic cardiomyopathy w/ EF of 27% in 2016. No sign of acute decompensated HF.   -continue home medications furosemide, isosorbid, ASA, lisinopril, rosuvastatin, metoprolol succ 50 (given 25mg tonight given missed dose in AM)  -daily weights  -I+Os  - On IVF NS at 100cc/hr for bladder calculi, monitor for signs of overload

## 2018-10-27 NOTE — PROGRESS NOTE ADULT - PROBLEM SELECTOR PLAN 2
- Likely causing hematuria and spikes of back pain. Hx of BPH.  -CT abdomen/pelvis with bladder calculi up to 2.4cm, no other acute pathology  - plan as above - Ionized calcium elevated, corrected calcium 11.1, PTH elevated consistent with primary hyperparathyroidism.  - Patient with long hx of hypercalcemia c/b calculi and musculoskeletal pain, protein gap, however no anemia,   - No lytic lesions seen on CT
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
## 2018-10-27 NOTE — PHYSICAL THERAPY INITIAL EVALUATION ADULT - PATIENT PROFILE REVIEW, REHAB EVAL
Pt. profile reviewed, consulted with CHEYENNE FLORES prior to initial PT evaluation and tx, as per RN, Pt. is OK to participate in skilled therapy session, current activity orders; increase as tolerated/yes

## 2018-10-27 NOTE — DISCHARGE NOTE ADULT - SECONDARY DIAGNOSIS.
Acute low back pain, unspecified back pain laterality, with sciatica presence unspecified Hypercalcemia

## 2018-10-27 NOTE — PROGRESS NOTE ADULT - PROBLEM SELECTOR PLAN 1
- Patient found to have mild leukocytosis w/ +UA w/ bladder calculus and BPH. - Unlikely pyelonephritis given no fever and clinical status  - f/u bcx, ucx  -continue on ceftriaxone 1mg qd 1/5 days adjust with culture data  -discussed with urology - no intervention w/ 2.4cm calculus and to f/u w/ urology as outpatient [phone]  - CT abdomen/pelvis with bladder calculi up to 2.4cm, no other acute pathology likely 2/2 UTI vs. bladder calculi  Patient found to have mild leukocytosis w/ +UA w/ bladder calculus and BPH. - Unlikely pyelonephritis given no fever and clinical status  - f/u bcx, ucx  -continue on ceftriaxone 1mg qd 1/5 days adjust with culture data  -discussed with urology - no intervention w/ 2.4cm calculus and to f/u w/ urology as outpatient [phone]  - CT abdomen/pelvis with bladder calculi up to 2.4cm, no other acute pathology likely 2/2 UTI vs. bladder calculi  - c/w IVF NS at 100cc/hr, monitor fluid status given hx of HRrEF 27%  - Patient found to have mild leukocytosis w/ +UA w/ bladder calculus and BPH. - Unlikely pyelonephritis given no fever and clinical status  - f/u bcx, ucx  -continue on ceftriaxone 1mg qd 1/5 days adjust with culture data  -discussed with urology - no intervention w/ 2.4cm calculus and to f/u w/ urology as outpatient [phone]  - CT abdomen/pelvis with bladder calculi up to 2.4cm, no other acute pathology
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
## 2018-10-27 NOTE — DISCHARGE NOTE ADULT - CARE PLAN
Principal Discharge DX:	Urinary tract infection with hematuria, site unspecified  Goal:	Follow up with your primary care doctor in 1-2 weeks of discharge  Assessment and plan of treatment:	You were found to have a urinary tract infection. Treated with an antibiotic called Amoxicillin. You will take this medication for 7 days total.  Secondary Diagnosis:	Acute low back pain, unspecified back pain laterality, with sciatica presence unspecified  Goal:	Follow up with your primary care doctor in 1-2 weeks of discharge  Assessment and plan of treatment:	You were evaluated for lower back pain. Given the new onset of the back pain, we would recommend taking tylenol or ibuprofen for pain and inflammation. If the back pain continues, you can follow up with your primary care physician.  Secondary Diagnosis:	Hypercalcemia  Goal:	Follow up with your primary care doctor in 1-2 weeks of discharge  Assessment and plan of treatment:	You were found to have high calcium in your blood. We determined that you have primary hyperparathyroidism, which is when the parathyroid gland releases too much hormone and causes your body to retain calcium. You can follow up with your primary care doctor for further workup. Principal Discharge DX:	Urinary tract infection with hematuria, site unspecified  Goal:	Follow up with your primary care doctor in 1-2 weeks of discharge  Assessment and plan of treatment:	You were found to have a urinary tract infection. Treated with an antibiotic called Amoxicillin. You will take this medication for 7 days total - you received 3 days of the antibiotics already, you have 4 more days to complete it (last day Nov 1)  Secondary Diagnosis:	Acute low back pain, unspecified back pain laterality, with sciatica presence unspecified  Goal:	Follow up with your primary care doctor in 1-2 weeks of discharge  Assessment and plan of treatment:	You were evaluated for lower back pain. Given the new onset of the back pain, we would recommend taking tylenol or ibuprofen for pain and inflammation. If the back pain continues, you can follow up with your primary care physician.  Secondary Diagnosis:	Hypercalcemia  Goal:	Follow up with your primary care doctor in 1-2 weeks of discharge  Assessment and plan of treatment:	You were found to have high calcium in your blood. We determined that you have primary hyperparathyroidism, which is when the parathyroid gland releases too much hormone and causes your body to retain calcium. Your throid/parathyroid ultrasound was unrevealing. You still need a scan called Sestamibi scan of the parathyroid gland which can you can schedule an appointment for as outpatient. Please talk to your primary care doctor to obtain a referral. Once your scan is completed, please follow up with an ENT doctor for appointment (you may make this appointment ahead of time, number is listed below). In the meantime, please avoid foods high in calcium such as milk based products. Drink sufficient water during the day. Avoid staying in bed for prolonged periods at a time and stay active.

## 2018-10-27 NOTE — PHYSICAL THERAPY INITIAL EVALUATION ADULT - DISCHARGE DISPOSITION, PT EVAL
home w/ home PT/anticipated D/C to home with home PT services to address current functional limitations to optimize safety within the home environment with a rolling walker

## 2018-10-27 NOTE — DISCHARGE NOTE ADULT - ADDITIONAL INSTRUCTIONS
Please follow up with your PMD within 7-10 days of discharge to ensure your infection is cleared and to repeat your calcium levels and to check your vitamin d levels.   Please obtain a Sestamibi nuclear medicine parathyroid gland scan. Your primary care doctor Dr. Poly Valle can refer you for this.   Please follow up with ENT for surgical evaluation. You may Call [phone] and schedule an appointment with Trinity Martinez Kamdar, or Lucio

## 2018-10-27 NOTE — DISCHARGE NOTE ADULT - OTHER SIGNIFICANT FINDINGS
CT ABDOMEN/PELVIS:  FINDINGS:    LOWER CHEST: AICD. Median sternotomy.    LIVER: Within normal limits.  BILE DUCTS: Normal caliber.  GALLBLADDER: Contracted.  SPLEEN: Scarring from prior infarct.  PANCREAS: Within normal limits.  ADRENALS: Within normal limits.  KIDNEYS/URETERS: Within normal limits.    BLADDER: Multiple bladder calculi measuring up to 2.4 cm.  REPRODUCTIVE ORGANS: Prostate within normal limits.    BOWEL: No bowel obstruction. Sigmoid anastomosis. Scattered   diverticulosis without diverticulitis. The appendix is normal.     PERITONEUM: No ascites.  VESSELS:  Atherosclerotic changes.  RETROPERITONEUM: No lymphadenopathy.    ABDOMINAL WALL: Within normal limits.  BONES: Degenerative changes.    IMPRESSION:   No acute pathology.    Other chronic findings as above. CT ABDOMEN/PELVIS:  FINDINGS:    LOWER CHEST: AICD. Median sternotomy.    LIVER: Within normal limits.  BILE DUCTS: Normal caliber.  GALLBLADDER: Contracted.  SPLEEN: Scarring from prior infarct.  PANCREAS: Within normal limits.  ADRENALS: Within normal limits.  KIDNEYS/URETERS: Within normal limits.    BLADDER: Multiple bladder calculi measuring up to 2.4 cm.  REPRODUCTIVE ORGANS: Prostate within normal limits.    BOWEL: No bowel obstruction. Sigmoid anastomosis. Scattered   diverticulosis without diverticulitis. The appendix is normal.     PERITONEUM: No ascites.  VESSELS:  Atherosclerotic changes.  RETROPERITONEUM: No lymphadenopathy.    ABDOMINAL WALL: Within normal limits.  < from: US Thyroid + Parathyroid (10.28.18 @ 12:21) >  FINDINGS:    Right Lobe: 3.9 x 1.8 x 2.1 cm. The visualized thyroid gland is   unremarkable. No nodule is shown posterior to the thyroid gland.    Left Lobe: 3.3 x 1.1 x  2.3 cm. The visualized thyroid gland is   unremarkable. No nodule is shown posterior to the thyroid gland.    Isthmus: 3 mm. The isthmus is sonographically unremarkable.    Cervical Lymph Nodes: No enlarged or abnormal morphology cervical nodes.    IMPRESSION:     1.  Normal thyroid ultrasound.  2.  If there is concern to evaluate for a parathyroid adenoma, either a   nuclear medicine scintigraphy scan or alternatively a CT neck without and   with using a parathyroid protocol could be helpful.    < end of copied text >  BONES: Degenerative changes.    IMPRESSION:   No acute pathology.    Other chronic findings as above.

## 2018-10-27 NOTE — DISCHARGE NOTE ADULT - MEDICATION SUMMARY - MEDICATIONS TO TAKE
I will START or STAY ON the medications listed below when I get home from the hospital:    Aspir 81 oral delayed release tablet  -- 1 tab(s) by mouth once a day  -- Indication: For CHF (congestive heart failure), NYHA class III    acetaminophen 325 mg oral tablet  -- 2 tab(s) by mouth every 6 hours, As needed, Moderate Pain (4 - 6)  -- Indication: For pain management    lisinopril 5 mg oral tablet  -- 1 tab(s) by mouth once a day  -- Indication: For Hypertension    tamsulosin 0.4 mg oral capsule  -- 1 cap(s) by mouth once a day (at bedtime)  -- Indication: For BPH (benign prostatic hyperplasia)    Imdur 60 mg oral tablet, extended release  -- 1 tab(s) by mouth once a day (in the morning)  -- Indication: For Hypertension    Lexapro 10 mg oral tablet  -- 1 tab(s) by mouth once a day  -- Indication: For depression    Crestor 40 mg oral tablet  -- 1 tab(s) by mouth once a day (at bedtime)  -- Indication: For Hyperlipidemia    Toprol-XL 50 mg oral tablet, extended release  -- 1 tab(s) by mouth once a day  -- Indication: For CHF (congestive heart failure), NYHA class III    Lasix 40 mg oral tablet  -- 1 tab(s) by mouth once a day  -- Indication: For CHF (congestive heart failure), NYHA class III    Flexeril 5 mg oral tablet  -- 1 tab(s) by mouth 2 times a day  -- Indication: For muscle relaxant    amoxicillin-clavulanate 500 mg-125 mg oral tablet  -- 1 tab(s) by mouth every 12 hours  -- Indication: For Urinary tract infection

## 2018-10-27 NOTE — PROGRESS NOTE ADULT - SUBJECTIVE AND OBJECTIVE BOX
=========================================  CONTACT TRACEY Wu M.D., PGY-1  Pager: NS- [phone], [facility]- [phone]    Mon-Fri: pager covered by day team 7am-7pm;   ***Academic conferences 12pm-1pm- page ONLY if URGENT or if Consultant  /Soumya: see chart, primary physician assigned available 7am-12pm  Sat/Camilo Cross Coverage 12pm-7pm: NS- page [phone] for Team1-4, [facility]- pager forwarded to covering Resident  For Night coverage 7pm-7am: NS- page [phone] Team1-3, page [phone] Team4 & Care Model; [facility]- page [phone] for Red, [phone] for Blue  =========================================    Patient is a 77y old  Male who presents with a chief complaint of dysuria, back pain (26 Oct 2018 20:30)      SUBJECTIVE / OVERNIGHT EVENTS:  Patient seen and examined at bedside.    Other Review of Systems Negative.    MEDICATIONS  (STANDING):  aspirin enteric coated 81 milliGRAM(s) Oral daily  atorvastatin 80 milliGRAM(s) Oral at bedtime  cefTRIAXone   IVPB 1 Gram(s) IV Intermittent every 24 hours  enoxaparin Injectable 40 milliGRAM(s) SubCutaneous daily  escitalopram 10 milliGRAM(s) Oral daily  furosemide    Tablet 40 milliGRAM(s) Oral daily  influenza   Vaccine 0.5 milliLiter(s) IntraMuscular once  isosorbide   mononitrate ER Tablet (IMDUR) 60 milliGRAM(s) Oral daily  lidocaine   Patch 1 Patch Transdermal daily  lisinopril 5 milliGRAM(s) Oral daily  metoprolol succinate ER 50 milliGRAM(s) Oral daily  tamsulosin 0.4 milliGRAM(s) Oral at bedtime    MEDICATIONS  (PRN):  acetaminophen   Tablet .. 650 milliGRAM(s) Oral every 6 hours PRN Moderate Pain (4 - 6)  senna 2 Tablet(s) Oral at bedtime PRN Constipation  traMADol 50 milliGRAM(s) Oral every 6 hours PRN Severe Pain (7 - 10)      OBJECTIVE:    Vital Signs Last 24 Hrs  T(C): 36.9 (27 Oct 2018 05:42), Max: 37.1 (26 Oct 2018 22:37)  T(F): 98.5 (27 Oct 2018 05:42), Max: 98.8 (26 Oct 2018 22:37)  HR: 94 (27 Oct 2018 05:42) (81 - 107)  BP: 124/62 (27 Oct 2018 05:42) (118/84 - 185/81)  BP(mean): --  RR: 16 (27 Oct 2018 05:42) (16 - 18)  SpO2: 96% (27 Oct 2018 05:42) (96% - 100%)    CAPILLARY BLOOD GLUCOSE        I&O's Summary      PHYSICAL EXAM:  GENERAL: NAD, well-developed  HEAD:  Atraumatic, Normocephalic  EYES: EOMI, PERRLA, conjunctiva and sclera clear  NECK: Supple, No JVD  CHEST/LUNG: Clear to auscultation bilaterally; No wheeze  HEART: Regular rate and rhythm; No murmurs, rubs, or gallops  ABDOMEN: Soft, Nontender, Nondistended; Bowel sounds present  EXTREMITIES:  2+ Peripheral Pulses, No clubbing, cyanosis, or edema  PSYCH: AAOx3  NEUROLOGY: non-focal  SKIN: No rashes or lesions    LABS:                        13.5   11.73 )-----------( 477      ( 27 Oct 2018 06:00 )             39.5     Auto Eosinophil # 0.01  / Auto Eosinophil % 0.1   / Auto Neutrophil # 7.98  / Auto Neutrophil % 68.1  / BANDS % x                            13.9   10.80 )-----------( 499      ( 26 Oct 2018 13:28 )             41.6     Auto Eosinophil # 0.03  / Auto Eosinophil % 0.3   / Auto Neutrophil # 7.70  / Auto Neutrophil % 71.2  / BANDS % x        10-27    133<L>  |  98  |  22  ----------------------------<  146<H>  5.4<H>   |  22  |  1.31<H>  10-    135  |  98  |  16  ----------------------------<  128<H>  4.5   |  25  |  1.11    Ca    10.5      27 Oct 2018 06:00  Mg     2.1     10-27  Phos  3.0     10-27  TPro  6.4  /  Alb  3.3  /  TBili  0.5  /  DBili  x   /  AST  14  /  ALT  10  /  AlkPhos  78  10-27  TPro  7.5  /  Alb  3.2<L>  /  TBili  0.5  /  DBili  x   /  AST  14  /  ALT  13  /  AlkPhos  78  10-26    PT/INR - ( 26 Oct 2018 13:28 )   PT: 13.4 SEC;   INR: 1.20          PTT - ( 26 Oct 2018 13:28 )  PTT:29.9 SEC      Urinalysis Basic - ( 26 Oct 2018 13:28 )    Color: YELLOW / Appearance: Lt TURBID / S.017 / pH: 7.0  Gluc: NEGATIVE / Ketone: NEGATIVE  / Bili: NEGATIVE / Urobili: TRACE   Blood: MODERATE / Protein: 50 / Nitrite: NEGATIVE   Leuk Esterase: LARGE / RBC: >50 / WBC >50   Sq Epi: OCC / Non Sq Epi: x / Bacteria: MODERATE      VBG: ( 14:17 ) - VBG - pH: 7.40  | pCO2: 48    | pO2: 22    | Lactate: 1.1          Care Discussed with Consultants/Other Providers:    RADIOLOGY & ADDITIONAL TESTS:   CT ABDOMEN/PELVIS w/ oral and IV Contrast:    FINDINGS:    LOWER CHEST: AICD. Median sternotomy.    LIVER: Within normal limits.  BILE DUCTS: Normal caliber.  GALLBLADDER: Contracted.  SPLEEN: Scarring from prior infarct.  PANCREAS: Within normal limits.  ADRENALS: Within normal limits.  KIDNEYS/URETERS: Within normal limits.    BLADDER: Multiple bladder calculi measuring up to 2.4 cm.  REPRODUCTIVE ORGANS: Prostate within normal limits.    BOWEL: No bowel obstruction. Sigmoid anastomosis. Scattered   diverticulosis without diverticulitis. The appendix is normal.     PERITONEUM: No ascites.  VESSELS:  Atherosclerotic changes.  RETROPERITONEUM: No lymphadenopathy.    ABDOMINAL WALL: Within normal limits.  BONES: Degenerative changes.    IMPRESSION:   No acute pathology.    Other chronic findings as above. =========================================  CONTACT TRACEY Wu M.D., PGY-1  Pager: NS- [phone], [facility]- [phone]    Mon-Fri: pager covered by day team 7am-7pm;   ***Academic conferences 12pm-1pm- page ONLY if URGENT or if Consultant  /Soumya: see chart, primary physician assigned available 7am-12pm  Sat/Camiol Cross Coverage 12pm-7pm: NS- page [phone] for Team1-4, [facility]- pager forwarded to covering Resident  For Night coverage 7pm-7am: NS- page [phone] Team1-3, page [phone] Team4 & Care Model; [facility]- page [phone] for Red, [phone] for Blue  =========================================    Patient is a 77y old  Male who presents with a chief complaint of dysuria, back pain (26 Oct 2018 20:30)      SUBJECTIVE / OVERNIGHT EVENTS:  Patient seen and examined at bedside. Complaining of back pain with movement and LLQ pain. No fever, chills, n/v/d.     Other Review of Systems Negative.    MEDICATIONS  (STANDING):  aspirin enteric coated 81 milliGRAM(s) Oral daily  atorvastatin 80 milliGRAM(s) Oral at bedtime  cefTRIAXone   IVPB 1 Gram(s) IV Intermittent every 24 hours  enoxaparin Injectable 40 milliGRAM(s) SubCutaneous daily  escitalopram 10 milliGRAM(s) Oral daily  furosemide    Tablet 40 milliGRAM(s) Oral daily  influenza   Vaccine 0.5 milliLiter(s) IntraMuscular once  isosorbide   mononitrate ER Tablet (IMDUR) 60 milliGRAM(s) Oral daily  lidocaine   Patch 1 Patch Transdermal daily  lisinopril 5 milliGRAM(s) Oral daily  metoprolol succinate ER 50 milliGRAM(s) Oral daily  tamsulosin 0.4 milliGRAM(s) Oral at bedtime    MEDICATIONS  (PRN):  acetaminophen   Tablet .. 650 milliGRAM(s) Oral every 6 hours PRN Moderate Pain (4 - 6)  senna 2 Tablet(s) Oral at bedtime PRN Constipation  traMADol 50 milliGRAM(s) Oral every 6 hours PRN Severe Pain (7 - 10)      OBJECTIVE:    Vital Signs Last 24 Hrs  T(C): 36.9 (27 Oct 2018 05:42), Max: 37.1 (26 Oct 2018 22:37)  T(F): 98.5 (27 Oct 2018 05:42), Max: 98.8 (26 Oct 2018 22:37)  HR: 94 (27 Oct 2018 05:42) (81 - 107)  BP: 124/62 (27 Oct 2018 05:42) (118/84 - 185/81)  BP(mean): --  RR: 16 (27 Oct 2018 05:42) (16 - 18)  SpO2: 96% (27 Oct 2018 05:42) (96% - 100%)    CAPILLARY BLOOD GLUCOSE        I&O's Summary      PHYSICAL EXAM:  GENERAL: NAD, well-developed  HEAD:  Atraumatic, Normocephalic  EYES: EOMI, PERRLA, conjunctiva and sclera clear  NECK: Supple, No JVD  CHEST/LUNG: Clear to auscultation bilaterally; No wheeze  HEART: Regular rate and rhythm; No murmurs, rubs, or gallops  ABDOMEN: Soft, mild TTP in LLQ, no rebound or guarding, Nondistended; Bowel sounds present  Back: No midline tenderness. Moderate paraspinal tenderness at lumbosacral region. No CVA tenderness.   EXTREMITIES:  2+ Peripheral Pulses, No clubbing, cyanosis, or edema  PSYCH: AAOx3  NEUROLOGY: non-focal  SKIN: No rashes or lesions    LABS:                        13.5   11.73 )-----------( 477      ( 27 Oct 2018 06:00 )             39.5     Auto Eosinophil # 0.01  / Auto Eosinophil % 0.1   / Auto Neutrophil # 7.98  / Auto Neutrophil % 68.1  / BANDS % x                            13.9   10.80 )-----------( 499      ( 26 Oct 2018 13:28 )             41.6     Auto Eosinophil # 0.03  / Auto Eosinophil % 0.3   / Auto Neutrophil # 7.70  / Auto Neutrophil % 71.2  / BANDS % x        10-27    133<L>  |  98  |  22  ----------------------------<  146<H>  5.4<H>   |  22  |  1.31<H>  10-26    135  |  98  |  16  ----------------------------<  128<H>  4.5   |  25  |  1.11    Ca    10.5      27 Oct 2018 06:00  Mg     2.1     10-27  Phos  3.0     10-27  TPro  6.4  /  Alb  3.3  /  TBili  0.5  /  DBili  x   /  AST  14  /  ALT  10  /  AlkPhos  78  10-27  TPro  7.5  /  Alb  3.2<L>  /  TBili  0.5  /  DBili  x   /  AST  14  /  ALT  13  /  AlkPhos  78  10-26    PT/INR - ( 26 Oct 2018 13:28 )   PT: 13.4 SEC;   INR: 1.20          PTT - ( 26 Oct 2018 13:28 )  PTT:29.9 SEC      Urinalysis Basic - ( 26 Oct 2018 13:28 )    Color: YELLOW / Appearance: Lt TURBID / S.017 / pH: 7.0  Gluc: NEGATIVE / Ketone: NEGATIVE  / Bili: NEGATIVE / Urobili: TRACE   Blood: MODERATE / Protein: 50 / Nitrite: NEGATIVE   Leuk Esterase: LARGE / RBC: >50 / WBC >50   Sq Epi: OCC / Non Sq Epi: x / Bacteria: MODERATE      VBG: ( 14:17 ) - VBG - pH: 7.40  | pCO2: 48    | pO2: 22    | Lactate: 1.1          Care Discussed with Consultants/Other Providers:    RADIOLOGY & ADDITIONAL TESTS:   CT ABDOMEN/PELVIS w/ oral and IV Contrast:    FINDINGS:    LOWER CHEST: AICD. Median sternotomy.    LIVER: Within normal limits.  BILE DUCTS: Normal caliber.  GALLBLADDER: Contracted.  SPLEEN: Scarring from prior infarct.  PANCREAS: Within normal limits.  ADRENALS: Within normal limits.  KIDNEYS/URETERS: Within normal limits.    BLADDER: Multiple bladder calculi measuring up to 2.4 cm.  REPRODUCTIVE ORGANS: Prostate within normal limits.    BOWEL: No bowel obstruction. Sigmoid anastomosis. Scattered   diverticulosis without diverticulitis. The appendix is normal.     PERITONEUM: No ascites.  VESSELS:  Atherosclerotic changes.  RETROPERITONEUM: No lymphadenopathy.    ABDOMINAL WALL: Within normal limits.  BONES: Degenerative changes.    IMPRESSION:   No acute pathology.    Other chronic findings as above.

## 2018-10-27 NOTE — PROGRESS NOTE ADULT - PROBLEM SELECTOR PLAN 7
-Continue home medications furosemide, isosorbid, lisinopril, metoprolol succ 50 (given 25mg tonight given missed dose in AM) Likely due to CHF/HTN  -bmp qd

## 2018-10-27 NOTE — PROGRESS NOTE ADULT - PROBLEM SELECTOR PLAN 6
Ischemic cardiomyopathy w/ EF of 27% in 2016. No sign of acute decompensated HF.   -continue home medications furosemide, isosorbid, ASA, lisinopril, rosuvastatin, metoprolol succ 50 (given 25mg tonight given missed dose in AM)  -daily weights  -I+Os -Continue home medications furosemide, isosorbid, lisinopril, metoprolol succ 50 (given 25mg tonight given missed dose in AM)

## 2018-10-28 VITALS
SYSTOLIC BLOOD PRESSURE: 150 MMHG | RESPIRATION RATE: 18 BRPM | DIASTOLIC BLOOD PRESSURE: 79 MMHG | HEART RATE: 86 BPM | OXYGEN SATURATION: 97 % | TEMPERATURE: 98 F

## 2018-10-28 LAB
ALBUMIN SERPL ELPH-MCNC: 3.1 G/DL — LOW (ref 3.3–5)
ALP SERPL-CCNC: 70 U/L — SIGNIFICANT CHANGE UP (ref 40–120)
ALT FLD-CCNC: 10 U/L — SIGNIFICANT CHANGE UP (ref 4–41)
AST SERPL-CCNC: 10 U/L — SIGNIFICANT CHANGE UP (ref 4–40)
BASOPHILS # BLD AUTO: 0.04 K/UL — SIGNIFICANT CHANGE UP (ref 0–0.2)
BASOPHILS NFR BLD AUTO: 0.4 % — SIGNIFICANT CHANGE UP (ref 0–2)
BILIRUB SERPL-MCNC: 0.4 MG/DL — SIGNIFICANT CHANGE UP (ref 0.2–1.2)
BUN SERPL-MCNC: 26 MG/DL — HIGH (ref 7–23)
CALCIUM SERPL-MCNC: 10.1 MG/DL — SIGNIFICANT CHANGE UP (ref 8.4–10.5)
CHLORIDE SERPL-SCNC: 98 MMOL/L — SIGNIFICANT CHANGE UP (ref 98–107)
CO2 SERPL-SCNC: 23 MMOL/L — SIGNIFICANT CHANGE UP (ref 22–31)
CREAT SERPL-MCNC: 1.21 MG/DL — SIGNIFICANT CHANGE UP (ref 0.5–1.3)
EOSINOPHIL # BLD AUTO: 0.03 K/UL — SIGNIFICANT CHANGE UP (ref 0–0.5)
EOSINOPHIL NFR BLD AUTO: 0.3 % — SIGNIFICANT CHANGE UP (ref 0–6)
GLUCOSE SERPL-MCNC: 130 MG/DL — HIGH (ref 70–99)
HCT VFR BLD CALC: 35.6 % — LOW (ref 39–50)
HGB BLD-MCNC: 12.2 G/DL — LOW (ref 13–17)
IMM GRANULOCYTES # BLD AUTO: 0.02 # — SIGNIFICANT CHANGE UP
IMM GRANULOCYTES NFR BLD AUTO: 0.2 % — SIGNIFICANT CHANGE UP (ref 0–1.5)
LYMPHOCYTES # BLD AUTO: 3.72 K/UL — HIGH (ref 1–3.3)
LYMPHOCYTES # BLD AUTO: 40.4 % — SIGNIFICANT CHANGE UP (ref 13–44)
MAGNESIUM SERPL-MCNC: 2.1 MG/DL — SIGNIFICANT CHANGE UP (ref 1.6–2.6)
MCHC RBC-ENTMCNC: 31.2 PG — SIGNIFICANT CHANGE UP (ref 27–34)
MCHC RBC-ENTMCNC: 34.3 % — SIGNIFICANT CHANGE UP (ref 32–36)
MCV RBC AUTO: 91 FL — SIGNIFICANT CHANGE UP (ref 80–100)
MONOCYTES # BLD AUTO: 1.04 K/UL — HIGH (ref 0–0.9)
MONOCYTES NFR BLD AUTO: 11.3 % — SIGNIFICANT CHANGE UP (ref 2–14)
NEUTROPHILS # BLD AUTO: 4.35 K/UL — SIGNIFICANT CHANGE UP (ref 1.8–7.4)
NEUTROPHILS NFR BLD AUTO: 47.4 % — SIGNIFICANT CHANGE UP (ref 43–77)
NRBC # FLD: 0 — SIGNIFICANT CHANGE UP
PHOSPHATE SERPL-MCNC: 3.1 MG/DL — SIGNIFICANT CHANGE UP (ref 2.5–4.5)
PLATELET # BLD AUTO: 446 K/UL — HIGH (ref 150–400)
PMV BLD: 9.3 FL — SIGNIFICANT CHANGE UP (ref 7–13)
POTASSIUM SERPL-MCNC: 4.3 MMOL/L — SIGNIFICANT CHANGE UP (ref 3.5–5.3)
POTASSIUM SERPL-SCNC: 4.3 MMOL/L — SIGNIFICANT CHANGE UP (ref 3.5–5.3)
PROT SERPL-MCNC: 6.6 G/DL — SIGNIFICANT CHANGE UP (ref 6–8.3)
RBC # BLD: 3.91 M/UL — LOW (ref 4.2–5.8)
RBC # FLD: 12.5 % — SIGNIFICANT CHANGE UP (ref 10.3–14.5)
SODIUM SERPL-SCNC: 134 MMOL/L — LOW (ref 135–145)
WBC # BLD: 9.2 K/UL — SIGNIFICANT CHANGE UP (ref 3.8–10.5)
WBC # FLD AUTO: 9.2 K/UL — SIGNIFICANT CHANGE UP (ref 3.8–10.5)

## 2018-10-28 PROCEDURE — 76536 US EXAM OF HEAD AND NECK: CPT | Mod: 26

## 2018-10-28 PROCEDURE — 99239 HOSP IP/OBS DSCHRG MGMT >30: CPT

## 2018-10-28 RX ORDER — TAMSULOSIN HYDROCHLORIDE 0.4 MG/1
1 CAPSULE ORAL
Qty: 30 | Refills: 0
Start: 2018-10-28 | End: 2018-11-26

## 2018-10-28 RX ORDER — ACETAMINOPHEN 500 MG
2 TABLET ORAL
Qty: 0 | Refills: 0 | DISCHARGE
Start: 2018-10-28

## 2018-10-28 RX ORDER — IBUPROFEN 200 MG
1 TABLET ORAL
Qty: 0 | Refills: 0 | COMMUNITY

## 2018-10-28 RX ORDER — TAMSULOSIN HYDROCHLORIDE 0.4 MG/1
1 CAPSULE ORAL
Qty: 30 | Refills: 0 | OUTPATIENT
Start: 2018-10-28 | End: 2018-11-26

## 2018-10-28 RX ADMIN — ESCITALOPRAM OXALATE 10 MILLIGRAM(S): 10 TABLET, FILM COATED ORAL at 13:02

## 2018-10-28 RX ADMIN — LISINOPRIL 5 MILLIGRAM(S): 2.5 TABLET ORAL at 06:07

## 2018-10-28 RX ADMIN — Medication 50 MILLIGRAM(S): at 06:07

## 2018-10-28 RX ADMIN — TRAMADOL HYDROCHLORIDE 50 MILLIGRAM(S): 50 TABLET ORAL at 00:49

## 2018-10-28 RX ADMIN — Medication 1 TABLET(S): at 16:47

## 2018-10-28 RX ADMIN — TRAMADOL HYDROCHLORIDE 50 MILLIGRAM(S): 50 TABLET ORAL at 13:02

## 2018-10-28 RX ADMIN — ISOSORBIDE MONONITRATE 60 MILLIGRAM(S): 60 TABLET, EXTENDED RELEASE ORAL at 13:02

## 2018-10-28 RX ADMIN — LIDOCAINE 1 PATCH: 4 CREAM TOPICAL at 01:10

## 2018-10-28 RX ADMIN — HEPARIN SODIUM 5000 UNIT(S): 5000 INJECTION INTRAVENOUS; SUBCUTANEOUS at 15:04

## 2018-10-28 RX ADMIN — TRAMADOL HYDROCHLORIDE 50 MILLIGRAM(S): 50 TABLET ORAL at 01:20

## 2018-10-28 RX ADMIN — Medication 1 TABLET(S): at 06:06

## 2018-10-28 RX ADMIN — Medication 81 MILLIGRAM(S): at 13:02

## 2018-10-28 RX ADMIN — HEPARIN SODIUM 5000 UNIT(S): 5000 INJECTION INTRAVENOUS; SUBCUTANEOUS at 06:07

## 2018-10-28 RX ADMIN — TRAMADOL HYDROCHLORIDE 50 MILLIGRAM(S): 50 TABLET ORAL at 13:32

## 2018-10-28 RX ADMIN — LIDOCAINE 1 PATCH: 4 CREAM TOPICAL at 13:02

## 2018-10-28 NOTE — PROGRESS NOTE ADULT - PROBLEM SELECTOR PLAN 1
likely 2/2 UTI vs. bladder calculi  - c/w IVF NS at 100cc/hr, monitor fluid status given hx of HRrEF 27%  - Patient found to have mild leukocytosis w/ +UA w/ bladder calculus and BPH. - Unlikely pyelonephritis given no fever and clinical status  - f/u bcx, ucx  -continue on augmentin   - Urology consult appreciated, no intervention w/ 2.4cm calculus and to f/u w/ urology as outpatient [phone]  - CT abdomen/pelvis with bladder calculi up to 2.4cm, no other acute pathology

## 2018-10-28 NOTE — PROGRESS NOTE ADULT - SUBJECTIVE AND OBJECTIVE BOX
Earline Castro, PGY3  Pager [phone] /[phone]    Patient is a 77y old  Male who presents with a chief complaint of dysuria, back pain (27 Oct 2018 15:18)    SUBJECTIVE / OVERNIGHT EVENTS:  Pt seen and examined at bedside. He feels really well and states his pain is resolved and he has no urinary symptoms, nausea, vomiting or abdominal pain.     MEDICATIONS  (STANDING):  amoxicillin  500 milliGRAM(s)/clavulanate 1 Tablet(s) Oral every 12 hours  aspirin enteric coated 81 milliGRAM(s) Oral daily  atorvastatin 80 milliGRAM(s) Oral at bedtime  escitalopram 10 milliGRAM(s) Oral daily  heparin  Injectable 5000 Unit(s) SubCutaneous every 8 hours  influenza   Vaccine 0.5 milliLiter(s) IntraMuscular once  isosorbide   mononitrate ER Tablet (IMDUR) 60 milliGRAM(s) Oral daily  lidocaine   Patch 1 Patch Transdermal daily  lisinopril 5 milliGRAM(s) Oral daily  metoprolol succinate ER 50 milliGRAM(s) Oral daily  tamsulosin 0.4 milliGRAM(s) Oral at bedtime    MEDICATIONS  (PRN):  acetaminophen   Tablet .. 650 milliGRAM(s) Oral every 6 hours PRN Moderate Pain (4 - 6)  senna 2 Tablet(s) Oral at bedtime PRN Constipation  traMADol 50 milliGRAM(s) Oral every 6 hours PRN Severe Pain (7 - 10)      CAPILLARY BLOOD GLUCOSE        I&O's Summary    27 Oct 2018 07:01  -  28 Oct 2018 07:00  --------------------------------------------------------  IN: 0 mL / OUT: 500 mL / NET: -500 mL        PHYSICAL EXAM:  Vital Signs Last 24 Hrs  T(C): 36.7 (10-28-18 @ 13:01), Max: 37.2 (10-28-18 @ 06:00)  T(F): 98.1 (10-28-18 @ 13:01), Max: 98.9 (10-28-18 @ 06:00)  HR: 88 (10-28-18 @ 13:01) (68 - 88)  BP: 161/77 (10-28-18 @ 13:01) (111/55 - 161/77)  BP(mean): --  RR: 18 (10-28-18 @ 13:01) (17 - 20)  SpO2: 98% (10-28-18 @ 13:01) (96% - 100%)    GENERAL: NAD  HEENT: Head atraumatic, normocephalic, EOMI, PERRL, conjunctiva and sclera clear, neck supple, no JVD, no erythema or exudates in the oropharynx   CHEST/LUNG: Clear to auscultation bilaterally, no crackles, rhonchi or wheezing   HEART: Regular rate and rhythm, no murmurs, rubs, or gallops  ABDOMEN: Soft, nontender, nondistended, normal bowel sounds   EXTREMITIES:  2+ Peripheral pulses, no clubbing, cyanosis, or edema  PSYCH: AAOx3  NEUROLOGY: No focal deficits   SKIN: No rashes or lesions    LABS:                        12.2   9.20  )-----------( 446      ( 28 Oct 2018 05:40 )             35.6     10-28    134<L>  |  98  |  26<H>  ----------------------------<  130<H>  4.3   |  23  |  1.21    Ca    10.1      28 Oct 2018 05:40  Phos  3.1     10-28  Mg     2.1     10-28    TPro  6.6  /  Alb  3.1<L>  /  TBili  0.4  /  DBili  x   /  AST  10  /  ALT  10  /  AlkPhos  70  10-28

## 2018-10-28 NOTE — PROGRESS NOTE ADULT - PROBLEM SELECTOR PLAN 3
- Unknown etiology at this time   - TTP in lumbosacral region, paraspinal, no midline tenderness  - c/w acetaminophen q6h moderate pain, tramadol 50mg severe, lidocaine patch  -monitor for improvement  - No imaging at this time given no reyna midline tenderness, no urinary or bowel incontinence, no fever or chills. Will recommend outpatient followup within 4 weeks if pain does not improve on tylenol or ibuprofen  - Conservative management at this point.
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
## 2018-10-28 NOTE — PROGRESS NOTE ADULT - PROBLEM SELECTOR PLAN 5
Ischemic cardiomyopathy w/ EF of 27% in 2016. No sign of acute decompensated HF.   -continue home medications furosemide, isosorbid, ASA, lisinopril, rosuvastatin, metoprolol succ 50 (given 25mg tonight given missed dose in AM)  -daily weights  -I+Os

## 2018-10-28 NOTE — PROGRESS NOTE ADULT - PROBLEM SELECTOR PLAN 2
- Ionized calcium elevated, corrected calcium 11.1, PTH elevated consistent with primary hyperparathyroidism.  - Patient with long hx of hypercalcemia c/b calculi and musculoskeletal pain, protein gap, however no anemia,   - No lytic lesions seen on CT  - ENT consulted for surgical eval, US parathyroid and sestamibi scan ordered as per request
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
## 2018-10-30 ENCOUNTER — INBOUND DOCUMENT (OUTPATIENT)
Age: 78
End: 2018-10-30

## 2018-11-08 ENCOUNTER — APPOINTMENT (OUTPATIENT)
Dept: ELECTROPHYSIOLOGY | Facility: CLINIC | Age: 78
End: 2018-11-08

## 2019-02-04 ENCOUNTER — APPOINTMENT (OUTPATIENT)
Dept: ELECTROPHYSIOLOGY | Facility: CLINIC | Age: 79
End: 2019-02-04

## 2021-01-01 ENCOUNTER — RESULT REVIEW (OUTPATIENT)
Age: 81
End: 2021-01-01

## 2021-01-01 ENCOUNTER — LABORATORY RESULT (OUTPATIENT)
Age: 81
End: 2021-01-01

## 2021-01-01 ENCOUNTER — NON-APPOINTMENT (OUTPATIENT)
Age: 81
End: 2021-01-01

## 2021-01-01 ENCOUNTER — TRANSCRIPTION ENCOUNTER (OUTPATIENT)
Age: 81
End: 2021-01-01

## 2021-01-01 ENCOUNTER — APPOINTMENT (OUTPATIENT)
Dept: INFUSION THERAPY | Facility: HOSPITAL | Age: 81
End: 2021-01-01

## 2021-01-01 ENCOUNTER — APPOINTMENT (OUTPATIENT)
Dept: HEMATOLOGY ONCOLOGY | Facility: CLINIC | Age: 81
End: 2021-01-01
Payer: MEDICARE

## 2021-01-01 ENCOUNTER — RX RENEWAL (OUTPATIENT)
Age: 81
End: 2021-01-01

## 2021-01-01 ENCOUNTER — APPOINTMENT (OUTPATIENT)
Dept: NEPHROLOGY | Facility: CLINIC | Age: 81
End: 2021-01-01

## 2021-01-01 ENCOUNTER — OUTPATIENT (OUTPATIENT)
Dept: OUTPATIENT SERVICES | Facility: HOSPITAL | Age: 81
LOS: 1 days | Discharge: HOME | End: 2021-01-01

## 2021-01-01 ENCOUNTER — OUTPATIENT (OUTPATIENT)
Dept: OUTPATIENT SERVICES | Facility: HOSPITAL | Age: 81
LOS: 1 days | End: 2021-01-01

## 2021-01-01 ENCOUNTER — APPOINTMENT (OUTPATIENT)
Dept: HEMATOLOGY ONCOLOGY | Facility: CLINIC | Age: 81
End: 2021-01-01

## 2021-01-01 ENCOUNTER — OUTPATIENT (OUTPATIENT)
Dept: OUTPATIENT SERVICES | Facility: HOSPITAL | Age: 81
LOS: 1 days | End: 2021-01-01
Payer: MEDICARE

## 2021-01-01 ENCOUNTER — INPATIENT (INPATIENT)
Facility: HOSPITAL | Age: 81
LOS: 48 days | Discharge: HOME HEALTH | DRG: 834 | End: 2021-07-30
Attending: INTERNAL MEDICINE | Admitting: INTERNAL MEDICINE
Payer: MEDICARE

## 2021-01-01 ENCOUNTER — APPOINTMENT (OUTPATIENT)
Dept: NEPHROLOGY | Facility: CLINIC | Age: 81
End: 2021-01-01
Payer: MEDICARE

## 2021-01-01 ENCOUNTER — INPATIENT (INPATIENT)
Facility: HOSPITAL | Age: 81
LOS: 6 days | Discharge: HOME | DRG: 291 | End: 2021-09-03
Attending: INTERNAL MEDICINE | Admitting: HOSPITALIST
Payer: MEDICARE

## 2021-01-01 VITALS
HEART RATE: 60 BPM | WEIGHT: 157.85 LBS | TEMPERATURE: 96.7 F | DIASTOLIC BLOOD PRESSURE: 64 MMHG | BODY MASS INDEX: 26.27 KG/M2 | RESPIRATION RATE: 16 BRPM | SYSTOLIC BLOOD PRESSURE: 156 MMHG | OXYGEN SATURATION: 99 %

## 2021-01-01 VITALS
WEIGHT: 153.22 LBS | DIASTOLIC BLOOD PRESSURE: 81 MMHG | HEIGHT: 65 IN | BODY MASS INDEX: 25.53 KG/M2 | SYSTOLIC BLOOD PRESSURE: 187 MMHG | RESPIRATION RATE: 16 BRPM | TEMPERATURE: 97.1 F

## 2021-01-01 VITALS
OXYGEN SATURATION: 98 % | SYSTOLIC BLOOD PRESSURE: 164 MMHG | HEIGHT: 66 IN | RESPIRATION RATE: 24 BRPM | DIASTOLIC BLOOD PRESSURE: 89 MMHG | HEART RATE: 100 BPM | WEIGHT: 164.91 LBS | TEMPERATURE: 98 F

## 2021-01-01 VITALS
TEMPERATURE: 97.7 F | DIASTOLIC BLOOD PRESSURE: 58 MMHG | HEART RATE: 83 BPM | SYSTOLIC BLOOD PRESSURE: 130 MMHG | OXYGEN SATURATION: 100 % | BODY MASS INDEX: 26.3 KG/M2 | RESPIRATION RATE: 16 BRPM | HEIGHT: 65 IN | WEIGHT: 157.85 LBS

## 2021-01-01 VITALS
HEIGHT: 66 IN | DIASTOLIC BLOOD PRESSURE: 65 MMHG | TEMPERATURE: 98 F | RESPIRATION RATE: 22 BRPM | WEIGHT: 163.36 LBS | SYSTOLIC BLOOD PRESSURE: 140 MMHG | HEART RATE: 90 BPM | OXYGEN SATURATION: 97 %

## 2021-01-01 VITALS
RESPIRATION RATE: 18 BRPM | TEMPERATURE: 97 F | OXYGEN SATURATION: 96 % | SYSTOLIC BLOOD PRESSURE: 151 MMHG | DIASTOLIC BLOOD PRESSURE: 65 MMHG | HEART RATE: 71 BPM

## 2021-01-01 VITALS
SYSTOLIC BLOOD PRESSURE: 110 MMHG | RESPIRATION RATE: 18 BRPM | OXYGEN SATURATION: 98 % | DIASTOLIC BLOOD PRESSURE: 55 MMHG | HEART RATE: 72 BPM | TEMPERATURE: 98 F

## 2021-01-01 VITALS
DIASTOLIC BLOOD PRESSURE: 66 MMHG | BODY MASS INDEX: 25.99 KG/M2 | HEIGHT: 65 IN | SYSTOLIC BLOOD PRESSURE: 146 MMHG | TEMPERATURE: 97.3 F | OXYGEN SATURATION: 96 % | HEART RATE: 70 BPM | WEIGHT: 156 LBS

## 2021-01-01 VITALS
HEART RATE: 59 BPM | RESPIRATION RATE: 16 BRPM | DIASTOLIC BLOOD PRESSURE: 76 MMHG | WEIGHT: 152.12 LBS | SYSTOLIC BLOOD PRESSURE: 134 MMHG | BODY MASS INDEX: 25.31 KG/M2 | OXYGEN SATURATION: 99 % | TEMPERATURE: 97.6 F

## 2021-01-01 DIAGNOSIS — K81.9 CHOLECYSTITIS, UNSPECIFIED: ICD-10-CM

## 2021-01-01 DIAGNOSIS — Z29.9 ENCOUNTER FOR PROPHYLACTIC MEASURES, UNSPECIFIED: ICD-10-CM

## 2021-01-01 DIAGNOSIS — D69.6 THROMBOCYTOPENIA, UNSPECIFIED: ICD-10-CM

## 2021-01-01 DIAGNOSIS — I50.9 HEART FAILURE, UNSPECIFIED: ICD-10-CM

## 2021-01-01 DIAGNOSIS — R11.2 NAUSEA WITH VOMITING, UNSPECIFIED: ICD-10-CM

## 2021-01-01 DIAGNOSIS — E83.52 HYPERCALCEMIA: ICD-10-CM

## 2021-01-01 DIAGNOSIS — R53.81 OTHER MALAISE: ICD-10-CM

## 2021-01-01 DIAGNOSIS — R73.03 PREDIABETES: ICD-10-CM

## 2021-01-01 DIAGNOSIS — E87.1 HYPO-OSMOLALITY AND HYPONATREMIA: ICD-10-CM

## 2021-01-01 DIAGNOSIS — C95.00 ACUTE LEUKEMIA OF UNSPECIFIED CELL TYPE NOT HAVING ACHIEVED REMISSION: ICD-10-CM

## 2021-01-01 DIAGNOSIS — N18.9 CHRONIC KIDNEY DISEASE, UNSPECIFIED: ICD-10-CM

## 2021-01-01 DIAGNOSIS — C91.00 ACUTE LYMPHOBLASTIC LEUKEMIA NOT HAVING ACHIEVED REMISSION: ICD-10-CM

## 2021-01-01 DIAGNOSIS — E88.3 TUMOR LYSIS SYNDROME: ICD-10-CM

## 2021-01-01 DIAGNOSIS — Z95.810 PRESENCE OF AUTOMATIC (IMPLANTABLE) CARDIAC DEFIBRILLATOR: Chronic | ICD-10-CM

## 2021-01-01 DIAGNOSIS — R09.89 OTHER SPECIFIED SYMPTOMS AND SIGNS INVOLVING THE CIRCULATORY AND RESPIRATORY SYSTEMS: ICD-10-CM

## 2021-01-01 DIAGNOSIS — Z98.89 OTHER SPECIFIED POSTPROCEDURAL STATES: Chronic | ICD-10-CM

## 2021-01-01 DIAGNOSIS — I10 ESSENTIAL (PRIMARY) HYPERTENSION: ICD-10-CM

## 2021-01-01 DIAGNOSIS — N40.0 BENIGN PROSTATIC HYPERPLASIA WITHOUT LOWER URINARY TRACT SYMPTOMS: ICD-10-CM

## 2021-01-01 DIAGNOSIS — Z51.11 ENCOUNTER FOR ANTINEOPLASTIC CHEMOTHERAPY: ICD-10-CM

## 2021-01-01 DIAGNOSIS — E78.5 HYPERLIPIDEMIA, UNSPECIFIED: ICD-10-CM

## 2021-01-01 DIAGNOSIS — M79.89 OTHER SPECIFIED SOFT TISSUE DISORDERS: ICD-10-CM

## 2021-01-01 DIAGNOSIS — B99.9 UNSPECIFIED INFECTIOUS DISEASE: ICD-10-CM

## 2021-01-01 DIAGNOSIS — D64.9 ANEMIA, UNSPECIFIED: ICD-10-CM

## 2021-01-01 DIAGNOSIS — N17.9 ACUTE KIDNEY FAILURE, UNSPECIFIED: ICD-10-CM

## 2021-01-01 DIAGNOSIS — I25.10 ATHEROSCLEROTIC HEART DISEASE OF NATIVE CORONARY ARTERY WITHOUT ANGINA PECTORIS: ICD-10-CM

## 2021-01-01 DIAGNOSIS — C92.00 ACUTE MYELOBLASTIC LEUKEMIA, NOT HAVING ACHIEVED REMISSION: ICD-10-CM

## 2021-01-01 DIAGNOSIS — C90.00 MULTIPLE MYELOMA NOT HAVING ACHIEVED REMISSION: ICD-10-CM

## 2021-01-01 DIAGNOSIS — Z51.5 ENCOUNTER FOR PALLIATIVE CARE: ICD-10-CM

## 2021-01-01 DIAGNOSIS — D72.829 ELEVATED WHITE BLOOD CELL COUNT, UNSPECIFIED: ICD-10-CM

## 2021-01-01 DIAGNOSIS — R41.82 ALTERED MENTAL STATUS, UNSPECIFIED: ICD-10-CM

## 2021-01-01 DIAGNOSIS — E11.9 TYPE 2 DIABETES MELLITUS WITHOUT COMPLICATIONS: ICD-10-CM

## 2021-01-01 DIAGNOSIS — E87.5 HYPERKALEMIA: ICD-10-CM

## 2021-01-01 DIAGNOSIS — N18.4 CHRONIC KIDNEY DISEASE, STAGE 4 (SEVERE): ICD-10-CM

## 2021-01-01 DIAGNOSIS — R06.00 DYSPNEA, UNSPECIFIED: ICD-10-CM

## 2021-01-01 DIAGNOSIS — E21.3 HYPERPARATHYROIDISM, UNSPECIFIED: ICD-10-CM

## 2021-01-01 DIAGNOSIS — R10.9 UNSPECIFIED ABDOMINAL PAIN: ICD-10-CM

## 2021-01-01 LAB
-  AMPICILLIN/SULBACTAM: SIGNIFICANT CHANGE UP
-  AMPICILLIN/SULBACTAM: SIGNIFICANT CHANGE UP
-  CEFAZOLIN: SIGNIFICANT CHANGE UP
-  CEFAZOLIN: SIGNIFICANT CHANGE UP
-  CLINDAMYCIN: SIGNIFICANT CHANGE UP
-  CLINDAMYCIN: SIGNIFICANT CHANGE UP
-  COAGULASE NEGATIVE STAPHYLOCOCCUS: SIGNIFICANT CHANGE UP
-  ERYTHROMYCIN: SIGNIFICANT CHANGE UP
-  ERYTHROMYCIN: SIGNIFICANT CHANGE UP
-  GENTAMICIN: SIGNIFICANT CHANGE UP
-  GENTAMICIN: SIGNIFICANT CHANGE UP
-  OXACILLIN: SIGNIFICANT CHANGE UP
-  OXACILLIN: SIGNIFICANT CHANGE UP
-  PENICILLIN: SIGNIFICANT CHANGE UP
-  RIFAMPIN: SIGNIFICANT CHANGE UP
-  RIFAMPIN: SIGNIFICANT CHANGE UP
-  TETRACYCLINE: SIGNIFICANT CHANGE UP
-  TETRACYCLINE: SIGNIFICANT CHANGE UP
-  TRIMETHOPRIM/SULFAMETHOXAZOLE: SIGNIFICANT CHANGE UP
-  TRIMETHOPRIM/SULFAMETHOXAZOLE: SIGNIFICANT CHANGE UP
-  VANCOMYCIN: SIGNIFICANT CHANGE UP
-  VANCOMYCIN: SIGNIFICANT CHANGE UP
24R-OH-CALCIDIOL SERPL-MCNC: 21.6 NG/ML — LOW (ref 30–80)
A1C WITH ESTIMATED AVERAGE GLUCOSE RESULT: 6.4 % — HIGH (ref 4–5.6)
ACANTHOCYTES BLD QL SMEAR: SLIGHT — SIGNIFICANT CHANGE UP
ALBUMIN SERPL ELPH-MCNC: 2.3 G/DL — LOW (ref 3.3–5)
ALBUMIN SERPL ELPH-MCNC: 2.3 G/DL — LOW (ref 3.3–5)
ALBUMIN SERPL ELPH-MCNC: 2.4 G/DL — LOW (ref 3.3–5)
ALBUMIN SERPL ELPH-MCNC: 2.5 G/DL — LOW (ref 3.3–5)
ALBUMIN SERPL ELPH-MCNC: 2.6 G/DL — LOW (ref 3.3–5)
ALBUMIN SERPL ELPH-MCNC: 2.7 G/DL — LOW (ref 3.3–5)
ALBUMIN SERPL ELPH-MCNC: 2.8 G/DL — LOW (ref 3.3–5)
ALBUMIN SERPL ELPH-MCNC: 2.9 G/DL — LOW (ref 3.3–5)
ALBUMIN SERPL ELPH-MCNC: 3 G/DL — LOW (ref 3.3–5)
ALBUMIN SERPL ELPH-MCNC: 3.1 G/DL — LOW (ref 3.3–5)
ALBUMIN SERPL ELPH-MCNC: 3.2 G/DL — LOW (ref 3.3–5)
ALBUMIN SERPL ELPH-MCNC: 3.5 G/DL
ALBUMIN SERPL ELPH-MCNC: 3.5 G/DL
ALP BLD-CCNC: 108 U/L
ALP SERPL-CCNC: 101 U/L — SIGNIFICANT CHANGE UP (ref 40–120)
ALP SERPL-CCNC: 101 U/L — SIGNIFICANT CHANGE UP (ref 40–120)
ALP SERPL-CCNC: 103 U/L — SIGNIFICANT CHANGE UP (ref 40–120)
ALP SERPL-CCNC: 105 U/L — SIGNIFICANT CHANGE UP (ref 40–120)
ALP SERPL-CCNC: 129 U/L — HIGH (ref 40–120)
ALP SERPL-CCNC: 169 U/L — HIGH (ref 40–120)
ALP SERPL-CCNC: 256 U/L — HIGH (ref 40–120)
ALP SERPL-CCNC: 54 U/L — SIGNIFICANT CHANGE UP (ref 40–120)
ALP SERPL-CCNC: 55 U/L — SIGNIFICANT CHANGE UP (ref 40–120)
ALP SERPL-CCNC: 57 U/L — SIGNIFICANT CHANGE UP (ref 40–120)
ALP SERPL-CCNC: 58 U/L — SIGNIFICANT CHANGE UP (ref 40–120)
ALP SERPL-CCNC: 59 U/L — SIGNIFICANT CHANGE UP (ref 40–120)
ALP SERPL-CCNC: 61 U/L — SIGNIFICANT CHANGE UP (ref 40–120)
ALP SERPL-CCNC: 62 U/L — SIGNIFICANT CHANGE UP (ref 40–120)
ALP SERPL-CCNC: 63 U/L — SIGNIFICANT CHANGE UP (ref 40–120)
ALP SERPL-CCNC: 69 U/L — SIGNIFICANT CHANGE UP (ref 40–120)
ALP SERPL-CCNC: 71 U/L — SIGNIFICANT CHANGE UP (ref 40–120)
ALP SERPL-CCNC: 73 U/L — SIGNIFICANT CHANGE UP (ref 40–120)
ALP SERPL-CCNC: 74 U/L — SIGNIFICANT CHANGE UP (ref 40–120)
ALP SERPL-CCNC: 78 U/L — SIGNIFICANT CHANGE UP (ref 40–120)
ALP SERPL-CCNC: 79 U/L — SIGNIFICANT CHANGE UP (ref 40–120)
ALP SERPL-CCNC: 79 U/L — SIGNIFICANT CHANGE UP (ref 40–120)
ALP SERPL-CCNC: 80 U/L — SIGNIFICANT CHANGE UP (ref 40–120)
ALP SERPL-CCNC: 81 U/L — SIGNIFICANT CHANGE UP (ref 40–120)
ALP SERPL-CCNC: 82 U/L — SIGNIFICANT CHANGE UP (ref 40–120)
ALP SERPL-CCNC: 83 U/L — SIGNIFICANT CHANGE UP (ref 40–120)
ALP SERPL-CCNC: 84 U/L — SIGNIFICANT CHANGE UP (ref 40–120)
ALP SERPL-CCNC: 85 U/L — SIGNIFICANT CHANGE UP (ref 40–120)
ALP SERPL-CCNC: 86 U/L — SIGNIFICANT CHANGE UP (ref 40–120)
ALP SERPL-CCNC: 87 U/L — SIGNIFICANT CHANGE UP (ref 40–120)
ALP SERPL-CCNC: 88 U/L — SIGNIFICANT CHANGE UP (ref 40–120)
ALP SERPL-CCNC: 89 U/L — SIGNIFICANT CHANGE UP (ref 40–120)
ALP SERPL-CCNC: 90 U/L — SIGNIFICANT CHANGE UP (ref 40–120)
ALP SERPL-CCNC: 90 U/L — SIGNIFICANT CHANGE UP (ref 40–120)
ALP SERPL-CCNC: 91 U/L — SIGNIFICANT CHANGE UP (ref 40–120)
ALP SERPL-CCNC: 92 U/L — SIGNIFICANT CHANGE UP (ref 40–120)
ALP SERPL-CCNC: 93 U/L — SIGNIFICANT CHANGE UP (ref 40–120)
ALP SERPL-CCNC: 94 U/L — SIGNIFICANT CHANGE UP (ref 40–120)
ALP SERPL-CCNC: 95 U/L — SIGNIFICANT CHANGE UP (ref 40–120)
ALP SERPL-CCNC: 96 U/L — SIGNIFICANT CHANGE UP (ref 40–120)
ALP SERPL-CCNC: 96 U/L — SIGNIFICANT CHANGE UP (ref 40–120)
ALP SERPL-CCNC: 98 U/L — SIGNIFICANT CHANGE UP (ref 40–120)
ALP SERPL-CCNC: 98 U/L — SIGNIFICANT CHANGE UP (ref 40–120)
ALT FLD-CCNC: 10 U/L — SIGNIFICANT CHANGE UP (ref 10–45)
ALT FLD-CCNC: 10 U/L — SIGNIFICANT CHANGE UP (ref 10–45)
ALT FLD-CCNC: 13 U/L — SIGNIFICANT CHANGE UP (ref 10–45)
ALT FLD-CCNC: 16 U/L — SIGNIFICANT CHANGE UP (ref 10–45)
ALT FLD-CCNC: 19 U/L — SIGNIFICANT CHANGE UP (ref 10–45)
ALT FLD-CCNC: 20 U/L — SIGNIFICANT CHANGE UP (ref 10–45)
ALT FLD-CCNC: 23 U/L — SIGNIFICANT CHANGE UP (ref 10–45)
ALT FLD-CCNC: 29 U/L — SIGNIFICANT CHANGE UP (ref 10–45)
ALT FLD-CCNC: 32 U/L — SIGNIFICANT CHANGE UP (ref 10–45)
ALT FLD-CCNC: 35 U/L — SIGNIFICANT CHANGE UP (ref 10–45)
ALT FLD-CCNC: 5 U/L — LOW (ref 10–45)
ALT FLD-CCNC: 6 U/L — LOW (ref 10–45)
ALT FLD-CCNC: 7 U/L — LOW (ref 10–45)
ALT FLD-CCNC: 8 U/L — LOW (ref 10–45)
ALT FLD-CCNC: 9 U/L — LOW (ref 10–45)
ALT FLD-CCNC: <5 U/L — LOW (ref 10–45)
ALT SERPL-CCNC: 8 U/L
ANA PAT FLD IF-IMP: ABNORMAL
ANA PAT FLD IF-IMP: ABNORMAL
ANA SER IF-ACNC: ABNORMAL
ANA TITR SER: ABNORMAL
ANION GAP SERPL CALC-SCNC: 10 MMOL/L
ANION GAP SERPL CALC-SCNC: 10 MMOL/L — SIGNIFICANT CHANGE UP (ref 5–17)
ANION GAP SERPL CALC-SCNC: 11 MMOL/L — SIGNIFICANT CHANGE UP (ref 5–17)
ANION GAP SERPL CALC-SCNC: 12 MMOL/L — SIGNIFICANT CHANGE UP (ref 5–17)
ANION GAP SERPL CALC-SCNC: 13 MMOL/L — SIGNIFICANT CHANGE UP (ref 5–17)
ANION GAP SERPL CALC-SCNC: 14 MMOL/L — SIGNIFICANT CHANGE UP (ref 5–17)
ANION GAP SERPL CALC-SCNC: 15 MMOL/L — SIGNIFICANT CHANGE UP (ref 5–17)
ANION GAP SERPL CALC-SCNC: 16 MMOL/L — SIGNIFICANT CHANGE UP (ref 5–17)
ANION GAP SERPL CALC-SCNC: 16 MMOL/L — SIGNIFICANT CHANGE UP (ref 5–17)
ANION GAP SERPL CALC-SCNC: 18 MMOL/L — HIGH (ref 5–17)
ANION GAP SERPL CALC-SCNC: 18 MMOL/L — HIGH (ref 5–17)
ANION GAP SERPL CALC-SCNC: 8 MMOL/L — SIGNIFICANT CHANGE UP (ref 5–17)
ANION GAP SERPL CALC-SCNC: 8 MMOL/L — SIGNIFICANT CHANGE UP (ref 5–17)
ANION GAP SERPL CALC-SCNC: 9 MMOL/L
ANION GAP SERPL CALC-SCNC: 9 MMOL/L — SIGNIFICANT CHANGE UP (ref 5–17)
ANISOCYTOSIS BLD QL: SIGNIFICANT CHANGE UP
ANISOCYTOSIS BLD QL: SLIGHT — SIGNIFICANT CHANGE UP
APPEARANCE UR: ABNORMAL
APPEARANCE UR: CLEAR — SIGNIFICANT CHANGE UP
APPEARANCE: CLEAR
APTT BLD: 16.6 SEC — LOW (ref 27.5–35.5)
APTT BLD: 18.3 SEC — LOW (ref 27.5–35.5)
APTT BLD: 26.9 SEC — LOW (ref 27.5–35.5)
AST SERPL-CCNC: 10 U/L — SIGNIFICANT CHANGE UP (ref 10–40)
AST SERPL-CCNC: 11 U/L — SIGNIFICANT CHANGE UP (ref 10–40)
AST SERPL-CCNC: 12 U/L
AST SERPL-CCNC: 12 U/L — SIGNIFICANT CHANGE UP (ref 10–40)
AST SERPL-CCNC: 12 U/L — SIGNIFICANT CHANGE UP (ref 10–40)
AST SERPL-CCNC: 13 U/L — SIGNIFICANT CHANGE UP (ref 10–40)
AST SERPL-CCNC: 16 U/L — SIGNIFICANT CHANGE UP (ref 10–40)
AST SERPL-CCNC: 17 U/L — SIGNIFICANT CHANGE UP (ref 10–40)
AST SERPL-CCNC: 17 U/L — SIGNIFICANT CHANGE UP (ref 10–40)
AST SERPL-CCNC: 18 U/L — SIGNIFICANT CHANGE UP (ref 10–40)
AST SERPL-CCNC: 18 U/L — SIGNIFICANT CHANGE UP (ref 10–40)
AST SERPL-CCNC: 19 U/L — SIGNIFICANT CHANGE UP (ref 10–40)
AST SERPL-CCNC: 25 U/L — SIGNIFICANT CHANGE UP (ref 10–40)
AST SERPL-CCNC: 29 U/L — SIGNIFICANT CHANGE UP (ref 10–40)
AST SERPL-CCNC: 38 U/L — SIGNIFICANT CHANGE UP (ref 10–40)
AST SERPL-CCNC: 5 U/L — LOW (ref 10–40)
AST SERPL-CCNC: 5 U/L — LOW (ref 10–40)
AST SERPL-CCNC: 6 U/L — LOW (ref 10–40)
AST SERPL-CCNC: 6 U/L — LOW (ref 10–40)
AST SERPL-CCNC: 7 U/L — LOW (ref 10–40)
AST SERPL-CCNC: 71 U/L — HIGH (ref 10–40)
AST SERPL-CCNC: 8 U/L — LOW (ref 10–40)
AST SERPL-CCNC: 9 U/L — LOW (ref 10–40)
AUER BODIES BLD QL SMEAR: PRESENT — SIGNIFICANT CHANGE UP
AUTO DIFF PNL BLD: NEGATIVE — SIGNIFICANT CHANGE UP
B19V DNA FLD QL NAA+PROBE: SIGNIFICANT CHANGE UP IU/ML
BACTERIA # UR AUTO: NEGATIVE — SIGNIFICANT CHANGE UP
BACTERIA: NEGATIVE
BASE EXCESS BLDV CALC-SCNC: 0.2 MMOL/L — SIGNIFICANT CHANGE UP (ref -2–2)
BASE EXCESS BLDV CALC-SCNC: 0.4 MMOL/L — SIGNIFICANT CHANGE UP (ref -2–2)
BASOPHILS # BLD AUTO: 0 K/UL — SIGNIFICANT CHANGE UP (ref 0–0.2)
BASOPHILS # BLD AUTO: 0.03 K/UL — SIGNIFICANT CHANGE UP (ref 0–0.2)
BASOPHILS # BLD AUTO: 0.04 K/UL — SIGNIFICANT CHANGE UP (ref 0–0.2)
BASOPHILS # BLD AUTO: 0.04 K/UL — SIGNIFICANT CHANGE UP (ref 0–0.2)
BASOPHILS # BLD AUTO: 0.05 K/UL
BASOPHILS # BLD AUTO: 0.05 K/UL — SIGNIFICANT CHANGE UP (ref 0–0.2)
BASOPHILS # BLD AUTO: 0.06 K/UL — SIGNIFICANT CHANGE UP (ref 0–0.2)
BASOPHILS # BLD AUTO: 0.06 K/UL — SIGNIFICANT CHANGE UP (ref 0–0.2)
BASOPHILS # BLD AUTO: 0.07 K/UL — SIGNIFICANT CHANGE UP (ref 0–0.2)
BASOPHILS # BLD AUTO: 0.07 K/UL — SIGNIFICANT CHANGE UP (ref 0–0.2)
BASOPHILS # BLD AUTO: 0.08 K/UL — SIGNIFICANT CHANGE UP (ref 0–0.2)
BASOPHILS # BLD AUTO: 0.15 K/UL — SIGNIFICANT CHANGE UP (ref 0–0.2)
BASOPHILS # BLD AUTO: 0.17 K/UL — SIGNIFICANT CHANGE UP (ref 0–0.2)
BASOPHILS NFR BLD AUTO: 0 % — SIGNIFICANT CHANGE UP (ref 0–2)
BASOPHILS NFR BLD AUTO: 0.5 % — SIGNIFICANT CHANGE UP (ref 0–2)
BASOPHILS NFR BLD AUTO: 0.6 % — SIGNIFICANT CHANGE UP (ref 0–2)
BASOPHILS NFR BLD AUTO: 0.8 % — SIGNIFICANT CHANGE UP (ref 0–2)
BASOPHILS NFR BLD AUTO: 0.9 % — SIGNIFICANT CHANGE UP (ref 0–2)
BASOPHILS NFR BLD AUTO: 0.9 % — SIGNIFICANT CHANGE UP (ref 0–2)
BASOPHILS NFR BLD AUTO: 1 %
BASOPHILS NFR BLD AUTO: 1 % — SIGNIFICANT CHANGE UP (ref 0–2)
BASOPHILS NFR BLD AUTO: 1.1 % — SIGNIFICANT CHANGE UP (ref 0–2)
BASOPHILS NFR BLD AUTO: 1.3 % — SIGNIFICANT CHANGE UP (ref 0–2)
BASOPHILS NFR BLD AUTO: 1.3 % — SIGNIFICANT CHANGE UP (ref 0–2)
BASOPHILS NFR BLD AUTO: 2 % — SIGNIFICANT CHANGE UP (ref 0–2)
BASOPHILS NFR BLD AUTO: 2.7 % — HIGH (ref 0–2)
BILIRUB SERPL-MCNC: 0.2 MG/DL
BILIRUB SERPL-MCNC: 0.6 MG/DL — SIGNIFICANT CHANGE UP (ref 0.2–1.2)
BILIRUB SERPL-MCNC: 0.7 MG/DL — SIGNIFICANT CHANGE UP (ref 0.2–1.2)
BILIRUB SERPL-MCNC: 0.8 MG/DL — SIGNIFICANT CHANGE UP (ref 0.2–1.2)
BILIRUB SERPL-MCNC: 0.9 MG/DL — SIGNIFICANT CHANGE UP (ref 0.2–1.2)
BILIRUB SERPL-MCNC: 1 MG/DL — SIGNIFICANT CHANGE UP (ref 0.2–1.2)
BILIRUB SERPL-MCNC: 1.1 MG/DL — SIGNIFICANT CHANGE UP (ref 0.2–1.2)
BILIRUB SERPL-MCNC: 1.2 MG/DL — SIGNIFICANT CHANGE UP (ref 0.2–1.2)
BILIRUB SERPL-MCNC: 1.3 MG/DL — HIGH (ref 0.2–1.2)
BILIRUB SERPL-MCNC: 1.4 MG/DL — HIGH (ref 0.2–1.2)
BILIRUB SERPL-MCNC: 1.5 MG/DL — HIGH (ref 0.2–1.2)
BILIRUB SERPL-MCNC: 3 MG/DL — HIGH (ref 0.2–1.2)
BILIRUB UR-MCNC: NEGATIVE — SIGNIFICANT CHANGE UP
BILIRUB UR-MCNC: NEGATIVE — SIGNIFICANT CHANGE UP
BILIRUBIN URINE: NEGATIVE
BLASTS # FLD: 0.9 % — HIGH (ref 0–0)
BLASTS # FLD: 1.2 % — HIGH (ref 0–0)
BLASTS # FLD: 10.7 % — HIGH (ref 0–0)
BLASTS # FLD: 11.3 % — HIGH (ref 0–0)
BLASTS # FLD: 12.9 % — HIGH (ref 0–0)
BLASTS # FLD: 17.7 % — HIGH (ref 0–0)
BLASTS # FLD: 2.5 % — HIGH (ref 0–0)
BLASTS # FLD: 22.8 % — HIGH (ref 0–0)
BLASTS # FLD: 4.2 % — HIGH (ref 0–0)
BLASTS # FLD: 5.3 % — HIGH (ref 0–0)
BLASTS # FLD: 5.4 % — HIGH (ref 0–0)
BLASTS # FLD: 5.4 % — HIGH (ref 0–0)
BLASTS # FLD: 8.6 % — HIGH (ref 0–0)
BLASTS # FLD: 9.6 % — HIGH (ref 0–0)
BLD GP AB SCN SERPL QL: NEGATIVE — SIGNIFICANT CHANGE UP
BLOOD URINE: NEGATIVE
BLUEBERRY IGG RAST: (no result)
BROCCOLI IGG RAST: SIGNIFICANT CHANGE UP
BUN SERPL-MCNC: 17 MG/DL — SIGNIFICANT CHANGE UP (ref 7–23)
BUN SERPL-MCNC: 19 MG/DL — SIGNIFICANT CHANGE UP (ref 7–23)
BUN SERPL-MCNC: 21 MG/DL — SIGNIFICANT CHANGE UP (ref 7–23)
BUN SERPL-MCNC: 22 MG/DL — SIGNIFICANT CHANGE UP (ref 7–23)
BUN SERPL-MCNC: 22 MG/DL — SIGNIFICANT CHANGE UP (ref 7–23)
BUN SERPL-MCNC: 24 MG/DL — HIGH (ref 7–23)
BUN SERPL-MCNC: 25 MG/DL — HIGH (ref 7–23)
BUN SERPL-MCNC: 26 MG/DL
BUN SERPL-MCNC: 26 MG/DL — HIGH (ref 7–23)
BUN SERPL-MCNC: 26 MG/DL — HIGH (ref 7–23)
BUN SERPL-MCNC: 27 MG/DL — HIGH (ref 7–23)
BUN SERPL-MCNC: 28 MG/DL — HIGH (ref 7–23)
BUN SERPL-MCNC: 28 MG/DL — HIGH (ref 7–23)
BUN SERPL-MCNC: 29 MG/DL — HIGH (ref 7–23)
BUN SERPL-MCNC: 30 MG/DL — HIGH (ref 7–23)
BUN SERPL-MCNC: 30 MG/DL — HIGH (ref 7–23)
BUN SERPL-MCNC: 31 MG/DL
BUN SERPL-MCNC: 31 MG/DL — HIGH (ref 7–23)
BUN SERPL-MCNC: 31 MG/DL — HIGH (ref 7–23)
BUN SERPL-MCNC: 32 MG/DL — HIGH (ref 7–23)
BUN SERPL-MCNC: 33 MG/DL — HIGH (ref 7–23)
BUN SERPL-MCNC: 34 MG/DL — HIGH (ref 7–23)
BUN SERPL-MCNC: 34 MG/DL — HIGH (ref 7–23)
BUN SERPL-MCNC: 35 MG/DL — HIGH (ref 7–23)
BUN SERPL-MCNC: 36 MG/DL — HIGH (ref 7–23)
BUN SERPL-MCNC: 37 MG/DL — HIGH (ref 7–23)
BUN SERPL-MCNC: 38 MG/DL — HIGH (ref 7–23)
BUN SERPL-MCNC: 39 MG/DL — HIGH (ref 7–23)
BUN SERPL-MCNC: 40 MG/DL — HIGH (ref 7–23)
BUN SERPL-MCNC: 40 MG/DL — HIGH (ref 7–23)
BUN SERPL-MCNC: 41 MG/DL — HIGH (ref 7–23)
BUN SERPL-MCNC: 42 MG/DL — HIGH (ref 7–23)
BUN SERPL-MCNC: 43 MG/DL — HIGH (ref 7–23)
BUN SERPL-MCNC: 44 MG/DL — HIGH (ref 7–23)
BUN SERPL-MCNC: 45 MG/DL — HIGH (ref 7–23)
BUN SERPL-MCNC: 48 MG/DL — HIGH (ref 7–23)
BUN SERPL-MCNC: 49 MG/DL — HIGH (ref 7–23)
BUN SERPL-MCNC: 52 MG/DL — HIGH (ref 7–23)
BUN SERPL-MCNC: 53 MG/DL — HIGH (ref 7–23)
BUN SERPL-MCNC: 54 MG/DL — HIGH (ref 7–23)
BUN SERPL-MCNC: 55 MG/DL — HIGH (ref 7–23)
BUN SERPL-MCNC: 55 MG/DL — HIGH (ref 7–23)
BUN SERPL-MCNC: 58 MG/DL — HIGH (ref 7–23)
BUN SERPL-MCNC: 58 MG/DL — HIGH (ref 7–23)
BURR CELLS BLD QL SMEAR: PRESENT — SIGNIFICANT CHANGE UP
C-ANCA SER-ACNC: NEGATIVE — SIGNIFICANT CHANGE UP
C3 SERPL-MCNC: 137 MG/DL — SIGNIFICANT CHANGE UP (ref 81–157)
C4 SERPL-MCNC: 37 MG/DL — SIGNIFICANT CHANGE UP (ref 13–39)
CA-I BLD-SCNC: 1.08 MMOL/L — LOW (ref 1.12–1.3)
CA-I BLD-SCNC: 1.1 MMOL/L — LOW (ref 1.12–1.3)
CA-I BLD-SCNC: 1.11 MMOL/L — LOW (ref 1.12–1.3)
CA-I BLD-SCNC: 1.11 MMOL/L — LOW (ref 1.12–1.3)
CA-I BLD-SCNC: 1.12 MMOL/L — SIGNIFICANT CHANGE UP (ref 1.12–1.3)
CA-I BLD-SCNC: 1.12 MMOL/L — SIGNIFICANT CHANGE UP (ref 1.12–1.3)
CA-I BLD-SCNC: 1.13 MMOL/L — SIGNIFICANT CHANGE UP (ref 1.12–1.3)
CA-I BLD-SCNC: 1.13 MMOL/L — SIGNIFICANT CHANGE UP (ref 1.12–1.3)
CA-I BLD-SCNC: 1.14 MMOL/L — SIGNIFICANT CHANGE UP (ref 1.12–1.3)
CA-I BLD-SCNC: 1.14 MMOL/L — SIGNIFICANT CHANGE UP (ref 1.12–1.3)
CA-I BLD-SCNC: 1.17 MMOL/L — SIGNIFICANT CHANGE UP (ref 1.12–1.3)
CA-I BLD-SCNC: 1.17 MMOL/L — SIGNIFICANT CHANGE UP (ref 1.12–1.3)
CA-I BLD-SCNC: 1.18 MMOL/L — SIGNIFICANT CHANGE UP (ref 1.12–1.3)
CA-I BLD-SCNC: 1.19 MMOL/L — SIGNIFICANT CHANGE UP (ref 1.12–1.3)
CA-I BLD-SCNC: 1.2 MMOL/L — SIGNIFICANT CHANGE UP (ref 1.12–1.3)
CA-I BLD-SCNC: 1.22 MMOL/L — SIGNIFICANT CHANGE UP (ref 1.12–1.3)
CA-I BLD-SCNC: 1.22 MMOL/L — SIGNIFICANT CHANGE UP (ref 1.12–1.3)
CA-I BLD-SCNC: 1.23 MMOL/L — SIGNIFICANT CHANGE UP (ref 1.12–1.3)
CA-I BLD-SCNC: 1.24 MMOL/L — SIGNIFICANT CHANGE UP (ref 1.12–1.3)
CA-I BLD-SCNC: 1.25 MMOL/L — SIGNIFICANT CHANGE UP (ref 1.12–1.3)
CA-I BLD-SCNC: 1.26 MMOL/L — SIGNIFICANT CHANGE UP (ref 1.12–1.3)
CA-I BLD-SCNC: 1.27 MMOL/L — SIGNIFICANT CHANGE UP (ref 1.12–1.3)
CA-I BLD-SCNC: 1.28 MMOL/L — SIGNIFICANT CHANGE UP (ref 1.12–1.3)
CA-I BLD-SCNC: 1.3 MMOL/L — SIGNIFICANT CHANGE UP (ref 1.12–1.3)
CA-I BLD-SCNC: 1.31 MMOL/L — HIGH (ref 1.12–1.3)
CA-I BLD-SCNC: 1.31 MMOL/L — HIGH (ref 1.12–1.3)
CA-I BLD-SCNC: 1.32 MMOL/L — HIGH (ref 1.12–1.3)
CA-I BLD-SCNC: 1.34 MMOL/L — HIGH (ref 1.12–1.3)
CA-I BLD-SCNC: 1.34 MMOL/L — HIGH (ref 1.12–1.3)
CA-I BLD-SCNC: 1.36 MMOL/L — HIGH (ref 1.12–1.3)
CA-I BLD-SCNC: 1.37 MMOL/L — HIGH (ref 1.12–1.3)
CA-I BLD-SCNC: 1.39 MMOL/L — HIGH (ref 1.12–1.3)
CA-I BLD-SCNC: 1.41 MMOL/L — HIGH (ref 1.12–1.3)
CA-I BLD-SCNC: 1.46 MMOL/L — HIGH (ref 1.12–1.3)
CA-I BLD-SCNC: SIGNIFICANT CHANGE UP MMOL/L (ref 1.12–1.3)
CA-I SERPL-SCNC: 1.37 MMOL/L — HIGH (ref 1.15–1.33)
CA-I SERPL-SCNC: 1.37 MMOL/L — HIGH (ref 1.15–1.33)
CALCIUM SERPL-MCNC: 10 MG/DL — SIGNIFICANT CHANGE UP (ref 8.4–10.5)
CALCIUM SERPL-MCNC: 10 MG/DL — SIGNIFICANT CHANGE UP (ref 8.4–10.5)
CALCIUM SERPL-MCNC: 10.1 MG/DL — SIGNIFICANT CHANGE UP (ref 8.4–10.5)
CALCIUM SERPL-MCNC: 10.1 MG/DL — SIGNIFICANT CHANGE UP (ref 8.4–10.5)
CALCIUM SERPL-MCNC: 10.2 MG/DL — SIGNIFICANT CHANGE UP (ref 8.4–10.5)
CALCIUM SERPL-MCNC: 10.2 MG/DL — SIGNIFICANT CHANGE UP (ref 8.4–10.5)
CALCIUM SERPL-MCNC: 10.3 MG/DL
CALCIUM SERPL-MCNC: 10.3 MG/DL — SIGNIFICANT CHANGE UP (ref 8.4–10.5)
CALCIUM SERPL-MCNC: 10.4 MG/DL
CALCIUM SERPL-MCNC: 10.6 MG/DL — HIGH (ref 8.4–10.5)
CALCIUM SERPL-MCNC: 7.9 MG/DL — LOW (ref 8.4–10.5)
CALCIUM SERPL-MCNC: 8 MG/DL — LOW (ref 8.4–10.5)
CALCIUM SERPL-MCNC: 8 MG/DL — LOW (ref 8.4–10.5)
CALCIUM SERPL-MCNC: 8.1 MG/DL — LOW (ref 8.4–10.5)
CALCIUM SERPL-MCNC: 8.2 MG/DL — LOW (ref 8.4–10.5)
CALCIUM SERPL-MCNC: 8.3 MG/DL — LOW (ref 8.4–10.5)
CALCIUM SERPL-MCNC: 8.3 MG/DL — LOW (ref 8.4–10.5)
CALCIUM SERPL-MCNC: 8.6 MG/DL — SIGNIFICANT CHANGE UP (ref 8.4–10.5)
CALCIUM SERPL-MCNC: 8.7 MG/DL — SIGNIFICANT CHANGE UP (ref 8.4–10.5)
CALCIUM SERPL-MCNC: 8.8 MG/DL — SIGNIFICANT CHANGE UP (ref 8.4–10.5)
CALCIUM SERPL-MCNC: 8.9 MG/DL — SIGNIFICANT CHANGE UP (ref 8.4–10.5)
CALCIUM SERPL-MCNC: 9 MG/DL — SIGNIFICANT CHANGE UP (ref 8.4–10.5)
CALCIUM SERPL-MCNC: 9 MG/DL — SIGNIFICANT CHANGE UP (ref 8.4–10.5)
CALCIUM SERPL-MCNC: 9.1 MG/DL — SIGNIFICANT CHANGE UP (ref 8.4–10.5)
CALCIUM SERPL-MCNC: 9.2 MG/DL — SIGNIFICANT CHANGE UP (ref 8.4–10.5)
CALCIUM SERPL-MCNC: 9.2 MG/DL — SIGNIFICANT CHANGE UP (ref 8.4–10.5)
CALCIUM SERPL-MCNC: 9.3 MG/DL — SIGNIFICANT CHANGE UP (ref 8.4–10.5)
CALCIUM SERPL-MCNC: 9.3 MG/DL — SIGNIFICANT CHANGE UP (ref 8.4–10.5)
CALCIUM SERPL-MCNC: 9.4 MG/DL — SIGNIFICANT CHANGE UP (ref 8.4–10.5)
CALCIUM SERPL-MCNC: 9.4 MG/DL — SIGNIFICANT CHANGE UP (ref 8.4–10.5)
CALCIUM SERPL-MCNC: 9.5 MG/DL — SIGNIFICANT CHANGE UP (ref 8.4–10.5)
CALCIUM SERPL-MCNC: 9.6 MG/DL — SIGNIFICANT CHANGE UP (ref 8.4–10.5)
CALCIUM SERPL-MCNC: 9.7 MG/DL — SIGNIFICANT CHANGE UP (ref 8.4–10.5)
CALCIUM SERPL-MCNC: 9.8 MG/DL — SIGNIFICANT CHANGE UP (ref 8.4–10.5)
CALCIUM SERPL-MCNC: 9.9 MG/DL — SIGNIFICANT CHANGE UP (ref 8.4–10.5)
CARDAMON IGE RAST: SIGNIFICANT CHANGE UP
CARP IGE RAST: SIGNIFICANT CHANGE UP
CARROT IGG RAST: SIGNIFICANT CHANGE UP
CAULIFLOWER IGG RAST: SIGNIFICANT CHANGE UP
CHLORIDE BLDV-SCNC: 107 MMOL/L — SIGNIFICANT CHANGE UP (ref 96–108)
CHLORIDE BLDV-SCNC: 108 MMOL/L — SIGNIFICANT CHANGE UP (ref 96–108)
CHLORIDE SERPL-SCNC: 100 MMOL/L — SIGNIFICANT CHANGE UP (ref 96–108)
CHLORIDE SERPL-SCNC: 101 MMOL/L — SIGNIFICANT CHANGE UP (ref 96–108)
CHLORIDE SERPL-SCNC: 102 MMOL/L — SIGNIFICANT CHANGE UP (ref 96–108)
CHLORIDE SERPL-SCNC: 103 MMOL/L — SIGNIFICANT CHANGE UP (ref 96–108)
CHLORIDE SERPL-SCNC: 104 MMOL/L
CHLORIDE SERPL-SCNC: 104 MMOL/L — SIGNIFICANT CHANGE UP (ref 96–108)
CHLORIDE SERPL-SCNC: 105 MMOL/L
CHLORIDE SERPL-SCNC: 105 MMOL/L — SIGNIFICANT CHANGE UP (ref 96–108)
CHLORIDE SERPL-SCNC: 106 MMOL/L — SIGNIFICANT CHANGE UP (ref 96–108)
CHLORIDE SERPL-SCNC: 107 MMOL/L — SIGNIFICANT CHANGE UP (ref 96–108)
CHLORIDE SERPL-SCNC: 109 MMOL/L — HIGH (ref 96–108)
CHLORIDE SERPL-SCNC: 97 MMOL/L — SIGNIFICANT CHANGE UP (ref 96–108)
CHLORIDE SERPL-SCNC: 98 MMOL/L — SIGNIFICANT CHANGE UP (ref 96–108)
CHLORIDE SERPL-SCNC: 99 MMOL/L — SIGNIFICANT CHANGE UP (ref 96–108)
CHLORIDE UR-SCNC: 81 MMOL/L — SIGNIFICANT CHANGE UP
CHROM ANALY INTERPHASE BLD FISH-IMP: SIGNIFICANT CHANGE UP
CHROM ANALY INTERPHASE BLD FISH-IMP: SIGNIFICANT CHANGE UP
CHROM ANALY OVERALL INTERP SPEC-IMP: SIGNIFICANT CHANGE UP
CK MB CFR SERPL CALC: 2.3 NG/ML — SIGNIFICANT CHANGE UP (ref 0–6.7)
CK SERPL-CCNC: 32 U/L — SIGNIFICANT CHANGE UP (ref 30–200)
CO2 BLDV-SCNC: 27 MMOL/L — HIGH (ref 22–26)
CO2 BLDV-SCNC: 28 MMOL/L — HIGH (ref 22–26)
CO2 SERPL-SCNC: 16 MMOL/L — LOW (ref 22–31)
CO2 SERPL-SCNC: 16 MMOL/L — LOW (ref 22–31)
CO2 SERPL-SCNC: 17 MMOL/L — LOW (ref 22–31)
CO2 SERPL-SCNC: 18 MMOL/L — LOW (ref 22–31)
CO2 SERPL-SCNC: 19 MMOL/L — LOW (ref 22–31)
CO2 SERPL-SCNC: 20 MMOL/L — LOW (ref 22–31)
CO2 SERPL-SCNC: 21 MMOL/L — LOW (ref 22–31)
CO2 SERPL-SCNC: 22 MMOL/L — SIGNIFICANT CHANGE UP (ref 22–31)
CO2 SERPL-SCNC: 23 MMOL/L
CO2 SERPL-SCNC: 23 MMOL/L — SIGNIFICANT CHANGE UP (ref 22–31)
CO2 SERPL-SCNC: 24 MMOL/L — SIGNIFICANT CHANGE UP (ref 22–31)
CO2 SERPL-SCNC: 25 MMOL/L
CO2 SERPL-SCNC: 25 MMOL/L — SIGNIFICANT CHANGE UP (ref 22–31)
COLOR SPEC: COLORLESS — SIGNIFICANT CHANGE UP
COLOR SPEC: SIGNIFICANT CHANGE UP
COLOR: YELLOW
COVID-19 SPIKE DOMAIN AB INTERP: POSITIVE
COVID-19 SPIKE DOMAIN ANTIBODY RESULT: >250 U/ML — HIGH
CREAT ?TM UR-MCNC: 29 MG/DL — SIGNIFICANT CHANGE UP
CREAT ?TM UR-MCNC: 31 MG/DL — SIGNIFICANT CHANGE UP
CREAT ?TM UR-MCNC: 78 MG/DL — SIGNIFICANT CHANGE UP
CREAT SERPL-MCNC: 1.73 MG/DL — HIGH (ref 0.5–1.3)
CREAT SERPL-MCNC: 1.81 MG/DL — HIGH (ref 0.5–1.3)
CREAT SERPL-MCNC: 1.84 MG/DL — HIGH (ref 0.5–1.3)
CREAT SERPL-MCNC: 1.86 MG/DL — HIGH (ref 0.5–1.3)
CREAT SERPL-MCNC: 1.87 MG/DL — HIGH (ref 0.5–1.3)
CREAT SERPL-MCNC: 1.88 MG/DL — HIGH (ref 0.5–1.3)
CREAT SERPL-MCNC: 1.88 MG/DL — HIGH (ref 0.5–1.3)
CREAT SERPL-MCNC: 1.9 MG/DL — HIGH (ref 0.5–1.3)
CREAT SERPL-MCNC: 1.92 MG/DL — HIGH (ref 0.5–1.3)
CREAT SERPL-MCNC: 1.95 MG/DL — HIGH (ref 0.5–1.3)
CREAT SERPL-MCNC: 1.98 MG/DL — HIGH (ref 0.5–1.3)
CREAT SERPL-MCNC: 2.03 MG/DL — HIGH (ref 0.5–1.3)
CREAT SERPL-MCNC: 2.03 MG/DL — HIGH (ref 0.5–1.3)
CREAT SERPL-MCNC: 2.04 MG/DL — HIGH (ref 0.5–1.3)
CREAT SERPL-MCNC: 2.06 MG/DL — HIGH (ref 0.5–1.3)
CREAT SERPL-MCNC: 2.07 MG/DL
CREAT SERPL-MCNC: 2.07 MG/DL — HIGH (ref 0.5–1.3)
CREAT SERPL-MCNC: 2.12 MG/DL — HIGH (ref 0.5–1.3)
CREAT SERPL-MCNC: 2.12 MG/DL — HIGH (ref 0.5–1.3)
CREAT SERPL-MCNC: 2.14 MG/DL — HIGH (ref 0.5–1.3)
CREAT SERPL-MCNC: 2.15 MG/DL — HIGH (ref 0.5–1.3)
CREAT SERPL-MCNC: 2.16 MG/DL — HIGH (ref 0.5–1.3)
CREAT SERPL-MCNC: 2.18 MG/DL — HIGH (ref 0.5–1.3)
CREAT SERPL-MCNC: 2.19 MG/DL — HIGH (ref 0.5–1.3)
CREAT SERPL-MCNC: 2.23 MG/DL — HIGH (ref 0.5–1.3)
CREAT SERPL-MCNC: 2.24 MG/DL — HIGH (ref 0.5–1.3)
CREAT SERPL-MCNC: 2.24 MG/DL — HIGH (ref 0.5–1.3)
CREAT SERPL-MCNC: 2.25 MG/DL — HIGH (ref 0.5–1.3)
CREAT SERPL-MCNC: 2.28 MG/DL — HIGH (ref 0.5–1.3)
CREAT SERPL-MCNC: 2.29 MG/DL — HIGH (ref 0.5–1.3)
CREAT SERPL-MCNC: 2.29 MG/DL — HIGH (ref 0.5–1.3)
CREAT SERPL-MCNC: 2.32 MG/DL — HIGH (ref 0.5–1.3)
CREAT SERPL-MCNC: 2.33 MG/DL — HIGH (ref 0.5–1.3)
CREAT SERPL-MCNC: 2.36 MG/DL — HIGH (ref 0.5–1.3)
CREAT SERPL-MCNC: 2.37 MG/DL — HIGH (ref 0.5–1.3)
CREAT SERPL-MCNC: 2.37 MG/DL — HIGH (ref 0.5–1.3)
CREAT SERPL-MCNC: 2.42 MG/DL — HIGH (ref 0.5–1.3)
CREAT SERPL-MCNC: 2.42 MG/DL — HIGH (ref 0.5–1.3)
CREAT SERPL-MCNC: 2.43 MG/DL — HIGH (ref 0.5–1.3)
CREAT SERPL-MCNC: 2.45 MG/DL
CREAT SERPL-MCNC: 2.5 MG/DL — HIGH (ref 0.5–1.3)
CREAT SERPL-MCNC: 2.53 MG/DL — HIGH (ref 0.5–1.3)
CREAT SERPL-MCNC: 2.62 MG/DL — HIGH (ref 0.5–1.3)
CREAT SERPL-MCNC: 2.64 MG/DL — HIGH (ref 0.5–1.3)
CREAT SERPL-MCNC: 2.66 MG/DL — HIGH (ref 0.5–1.3)
CREAT SERPL-MCNC: 2.66 MG/DL — HIGH (ref 0.5–1.3)
CREAT SERPL-MCNC: 2.69 MG/DL — HIGH (ref 0.5–1.3)
CREAT SERPL-MCNC: 2.73 MG/DL — HIGH (ref 0.5–1.3)
CREAT SERPL-MCNC: 2.77 MG/DL — HIGH (ref 0.5–1.3)
CREAT SERPL-MCNC: 2.77 MG/DL — HIGH (ref 0.5–1.3)
CREAT SERPL-MCNC: 2.84 MG/DL — HIGH (ref 0.5–1.3)
CREAT SERPL-MCNC: 2.95 MG/DL — HIGH (ref 0.5–1.3)
CREAT SERPL-MCNC: 3 MG/DL — HIGH (ref 0.5–1.3)
CREAT SERPL-MCNC: 3.01 MG/DL — HIGH (ref 0.5–1.3)
CREAT SERPL-MCNC: 3.19 MG/DL — HIGH (ref 0.5–1.3)
CREAT SERPL-MCNC: 3.19 MG/DL — HIGH (ref 0.5–1.3)
CREAT SERPL-MCNC: 3.24 MG/DL — HIGH (ref 0.5–1.3)
CREAT SERPL-MCNC: 3.24 MG/DL — HIGH (ref 0.5–1.3)
CREAT SERPL-MCNC: 3.27 MG/DL — HIGH (ref 0.5–1.3)
CREAT SERPL-MCNC: 3.32 MG/DL — HIGH (ref 0.5–1.3)
CREAT SERPL-MCNC: 3.39 MG/DL — HIGH (ref 0.5–1.3)
CREAT SERPL-MCNC: 3.45 MG/DL — HIGH (ref 0.5–1.3)
CREAT SERPL-MCNC: 3.62 MG/DL — HIGH (ref 0.5–1.3)
CULTURE RESULTS: SIGNIFICANT CHANGE UP
D DIMER BLD IA.RAPID-MCNC: 2752 NG/ML DDU — HIGH
DACRYOCYTES BLD QL SMEAR: SLIGHT — SIGNIFICANT CHANGE UP
DEPRECATED KAPPA LC FREE/LAMBDA SER: 1.11 RATIO
DIFF PNL FLD: ABNORMAL
DIFF PNL FLD: NEGATIVE — SIGNIFICANT CHANGE UP
ELLIPTOCYTES BLD QL SMEAR: SIGNIFICANT CHANGE UP
ELLIPTOCYTES BLD QL SMEAR: SIGNIFICANT CHANGE UP
ELLIPTOCYTES BLD QL SMEAR: SLIGHT — SIGNIFICANT CHANGE UP
EOSINOPHIL # BLD AUTO: 0 K/UL — SIGNIFICANT CHANGE UP (ref 0–0.5)
EOSINOPHIL # BLD AUTO: 0.01 K/UL — SIGNIFICANT CHANGE UP (ref 0–0.5)
EOSINOPHIL # BLD AUTO: 0.02 K/UL — SIGNIFICANT CHANGE UP (ref 0–0.5)
EOSINOPHIL # BLD AUTO: 0.03 K/UL — SIGNIFICANT CHANGE UP (ref 0–0.5)
EOSINOPHIL # BLD AUTO: 0.05 K/UL — SIGNIFICANT CHANGE UP (ref 0–0.5)
EOSINOPHIL # BLD AUTO: 0.05 K/UL — SIGNIFICANT CHANGE UP (ref 0–0.5)
EOSINOPHIL # BLD AUTO: 0.1 K/UL — SIGNIFICANT CHANGE UP (ref 0–0.5)
EOSINOPHIL # BLD AUTO: 0.1 K/UL — SIGNIFICANT CHANGE UP (ref 0–0.5)
EOSINOPHIL # BLD AUTO: 0.11 K/UL — SIGNIFICANT CHANGE UP (ref 0–0.5)
EOSINOPHIL # BLD AUTO: 0.12 K/UL — SIGNIFICANT CHANGE UP (ref 0–0.5)
EOSINOPHIL # BLD AUTO: 0.15 K/UL
EOSINOPHIL # BLD AUTO: 0.15 K/UL — SIGNIFICANT CHANGE UP (ref 0–0.5)
EOSINOPHIL # BLD AUTO: 0.17 K/UL — SIGNIFICANT CHANGE UP (ref 0–0.5)
EOSINOPHIL # BLD AUTO: 0.18 K/UL — SIGNIFICANT CHANGE UP (ref 0–0.5)
EOSINOPHIL # BLD AUTO: 0.2 K/UL — SIGNIFICANT CHANGE UP (ref 0–0.5)
EOSINOPHIL # BLD AUTO: 0.2 K/UL — SIGNIFICANT CHANGE UP (ref 0–0.5)
EOSINOPHIL # BLD AUTO: 0.23 K/UL — SIGNIFICANT CHANGE UP (ref 0–0.5)
EOSINOPHIL # BLD AUTO: 0.25 K/UL — SIGNIFICANT CHANGE UP (ref 0–0.5)
EOSINOPHIL # BLD AUTO: 0.3 K/UL — SIGNIFICANT CHANGE UP (ref 0–0.5)
EOSINOPHIL # BLD AUTO: 0.31 K/UL — SIGNIFICANT CHANGE UP (ref 0–0.5)
EOSINOPHIL # BLD AUTO: 0.42 K/UL — SIGNIFICANT CHANGE UP (ref 0–0.5)
EOSINOPHIL # BLD AUTO: 0.49 K/UL — SIGNIFICANT CHANGE UP (ref 0–0.5)
EOSINOPHIL NFR BLD AUTO: 0 % — SIGNIFICANT CHANGE UP (ref 0–6)
EOSINOPHIL NFR BLD AUTO: 0.2 % — SIGNIFICANT CHANGE UP (ref 0–6)
EOSINOPHIL NFR BLD AUTO: 0.8 % — SIGNIFICANT CHANGE UP (ref 0–6)
EOSINOPHIL NFR BLD AUTO: 0.9 % — SIGNIFICANT CHANGE UP (ref 0–6)
EOSINOPHIL NFR BLD AUTO: 1.2 % — SIGNIFICANT CHANGE UP (ref 0–6)
EOSINOPHIL NFR BLD AUTO: 1.3 % — SIGNIFICANT CHANGE UP (ref 0–6)
EOSINOPHIL NFR BLD AUTO: 1.3 % — SIGNIFICANT CHANGE UP (ref 0–6)
EOSINOPHIL NFR BLD AUTO: 1.4 % — SIGNIFICANT CHANGE UP (ref 0–6)
EOSINOPHIL NFR BLD AUTO: 2 % — SIGNIFICANT CHANGE UP (ref 0–6)
EOSINOPHIL NFR BLD AUTO: 2.5 % — SIGNIFICANT CHANGE UP (ref 0–6)
EOSINOPHIL NFR BLD AUTO: 2.8 % — SIGNIFICANT CHANGE UP (ref 0–6)
EOSINOPHIL NFR BLD AUTO: 3 % — SIGNIFICANT CHANGE UP (ref 0–6)
EOSINOPHIL NFR BLD AUTO: 3.1 %
EOSINOPHIL NFR BLD AUTO: 3.2 % — SIGNIFICANT CHANGE UP (ref 0–6)
EOSINOPHIL NFR BLD AUTO: 3.5 % — SIGNIFICANT CHANGE UP (ref 0–6)
EOSINOPHIL NFR BLD AUTO: 3.6 % — SIGNIFICANT CHANGE UP (ref 0–6)
EOSINOPHIL NFR BLD AUTO: 4 % — SIGNIFICANT CHANGE UP (ref 0–6)
EOSINOPHIL NFR BLD AUTO: 4.2 % — SIGNIFICANT CHANGE UP (ref 0–6)
EOSINOPHIL NFR BLD AUTO: 4.3 % — SIGNIFICANT CHANGE UP (ref 0–6)
EOSINOPHIL NFR BLD AUTO: 5 % — SIGNIFICANT CHANGE UP (ref 0–6)
EOSINOPHIL NFR BLD AUTO: 5.5 % — SIGNIFICANT CHANGE UP (ref 0–6)
EOSINOPHIL NFR BLD AUTO: 6.9 % — HIGH (ref 0–6)
EOSINOPHIL NFR BLD AUTO: 7.7 % — HIGH (ref 0–6)
EPI CELLS # UR: 4 /HPF — SIGNIFICANT CHANGE UP
ESTIMATED AVERAGE GLUCOSE: 137 MG/DL — HIGH (ref 68–114)
FERRITIN SERPL-MCNC: 1625 NG/ML — HIGH (ref 30–400)
FERRITIN SERPL-MCNC: 827 NG/ML — HIGH (ref 30–400)
FIBRINOGEN PPP-MCNC: 446 MG/DL — SIGNIFICANT CHANGE UP (ref 290–520)
FIBRINOGEN PPP-MCNC: 631 MG/DL — HIGH (ref 290–520)
FIBRINOGEN PPP-MCNC: 816 MG/DL — HIGH (ref 290–520)
G6PD RBC-CCNC: 22.6 U/G HGB — HIGH (ref 7–20.5)
G6PD RBC-CCNC: 29.9 U/G HGB — HIGH (ref 7–20.5)
GAS PNL BLDA: SIGNIFICANT CHANGE UP
GAS PNL BLDV: 138 MMOL/L — SIGNIFICANT CHANGE UP (ref 136–145)
GAS PNL BLDV: 139 MMOL/L — SIGNIFICANT CHANGE UP (ref 136–145)
GAS PNL BLDV: SIGNIFICANT CHANGE UP
GBM IGG SER-ACNC: 2 — SIGNIFICANT CHANGE UP (ref 0–20)
GIANT PLATELETS BLD QL SMEAR: PRESENT — SIGNIFICANT CHANGE UP
GIANT PLATELETS BLD QL SMEAR: PRESENT — SIGNIFICANT CHANGE UP
GLUCOSE BLDC GLUCOMTR-MCNC: 100 MG/DL — HIGH (ref 70–99)
GLUCOSE BLDC GLUCOMTR-MCNC: 102 MG/DL — HIGH (ref 70–99)
GLUCOSE BLDC GLUCOMTR-MCNC: 103 MG/DL — HIGH (ref 70–99)
GLUCOSE BLDC GLUCOMTR-MCNC: 103 MG/DL — HIGH (ref 70–99)
GLUCOSE BLDC GLUCOMTR-MCNC: 105 MG/DL — HIGH (ref 70–99)
GLUCOSE BLDC GLUCOMTR-MCNC: 105 MG/DL — HIGH (ref 70–99)
GLUCOSE BLDC GLUCOMTR-MCNC: 106 MG/DL — HIGH (ref 70–99)
GLUCOSE BLDC GLUCOMTR-MCNC: 107 MG/DL — HIGH (ref 70–99)
GLUCOSE BLDC GLUCOMTR-MCNC: 107 MG/DL — HIGH (ref 70–99)
GLUCOSE BLDC GLUCOMTR-MCNC: 108 MG/DL — HIGH (ref 70–99)
GLUCOSE BLDC GLUCOMTR-MCNC: 108 MG/DL — HIGH (ref 70–99)
GLUCOSE BLDC GLUCOMTR-MCNC: 109 MG/DL — HIGH (ref 70–99)
GLUCOSE BLDC GLUCOMTR-MCNC: 110 MG/DL — HIGH (ref 70–99)
GLUCOSE BLDC GLUCOMTR-MCNC: 111 MG/DL — HIGH (ref 70–99)
GLUCOSE BLDC GLUCOMTR-MCNC: 113 MG/DL — HIGH (ref 70–99)
GLUCOSE BLDC GLUCOMTR-MCNC: 114 MG/DL — HIGH (ref 70–99)
GLUCOSE BLDC GLUCOMTR-MCNC: 115 MG/DL — HIGH (ref 70–99)
GLUCOSE BLDC GLUCOMTR-MCNC: 116 MG/DL — HIGH (ref 70–99)
GLUCOSE BLDC GLUCOMTR-MCNC: 117 MG/DL — HIGH (ref 70–99)
GLUCOSE BLDC GLUCOMTR-MCNC: 118 MG/DL — HIGH (ref 70–99)
GLUCOSE BLDC GLUCOMTR-MCNC: 119 MG/DL — HIGH (ref 70–99)
GLUCOSE BLDC GLUCOMTR-MCNC: 120 MG/DL — HIGH (ref 70–99)
GLUCOSE BLDC GLUCOMTR-MCNC: 121 MG/DL — HIGH (ref 70–99)
GLUCOSE BLDC GLUCOMTR-MCNC: 122 MG/DL — HIGH (ref 70–99)
GLUCOSE BLDC GLUCOMTR-MCNC: 123 MG/DL — HIGH (ref 70–99)
GLUCOSE BLDC GLUCOMTR-MCNC: 124 MG/DL — HIGH (ref 70–99)
GLUCOSE BLDC GLUCOMTR-MCNC: 126 MG/DL — HIGH (ref 70–99)
GLUCOSE BLDC GLUCOMTR-MCNC: 127 MG/DL — HIGH (ref 70–99)
GLUCOSE BLDC GLUCOMTR-MCNC: 128 MG/DL — HIGH (ref 70–99)
GLUCOSE BLDC GLUCOMTR-MCNC: 129 MG/DL — HIGH (ref 70–99)
GLUCOSE BLDC GLUCOMTR-MCNC: 130 MG/DL — HIGH (ref 70–99)
GLUCOSE BLDC GLUCOMTR-MCNC: 131 MG/DL — HIGH (ref 70–99)
GLUCOSE BLDC GLUCOMTR-MCNC: 132 MG/DL — HIGH (ref 70–99)
GLUCOSE BLDC GLUCOMTR-MCNC: 132 MG/DL — HIGH (ref 70–99)
GLUCOSE BLDC GLUCOMTR-MCNC: 135 MG/DL — HIGH (ref 70–99)
GLUCOSE BLDC GLUCOMTR-MCNC: 135 MG/DL — HIGH (ref 70–99)
GLUCOSE BLDC GLUCOMTR-MCNC: 136 MG/DL — HIGH (ref 70–99)
GLUCOSE BLDC GLUCOMTR-MCNC: 137 MG/DL — HIGH (ref 70–99)
GLUCOSE BLDC GLUCOMTR-MCNC: 137 MG/DL — HIGH (ref 70–99)
GLUCOSE BLDC GLUCOMTR-MCNC: 138 MG/DL — HIGH (ref 70–99)
GLUCOSE BLDC GLUCOMTR-MCNC: 139 MG/DL — HIGH (ref 70–99)
GLUCOSE BLDC GLUCOMTR-MCNC: 140 MG/DL — HIGH (ref 70–99)
GLUCOSE BLDC GLUCOMTR-MCNC: 140 MG/DL — HIGH (ref 70–99)
GLUCOSE BLDC GLUCOMTR-MCNC: 142 MG/DL — HIGH (ref 70–99)
GLUCOSE BLDC GLUCOMTR-MCNC: 143 MG/DL — HIGH (ref 70–99)
GLUCOSE BLDC GLUCOMTR-MCNC: 144 MG/DL — HIGH (ref 70–99)
GLUCOSE BLDC GLUCOMTR-MCNC: 145 MG/DL — HIGH (ref 70–99)
GLUCOSE BLDC GLUCOMTR-MCNC: 148 MG/DL — HIGH (ref 70–99)
GLUCOSE BLDC GLUCOMTR-MCNC: 149 MG/DL — HIGH (ref 70–99)
GLUCOSE BLDC GLUCOMTR-MCNC: 152 MG/DL — HIGH (ref 70–99)
GLUCOSE BLDC GLUCOMTR-MCNC: 155 MG/DL — HIGH (ref 70–99)
GLUCOSE BLDC GLUCOMTR-MCNC: 156 MG/DL — HIGH (ref 70–99)
GLUCOSE BLDC GLUCOMTR-MCNC: 162 MG/DL — HIGH (ref 70–99)
GLUCOSE BLDC GLUCOMTR-MCNC: 164 MG/DL — HIGH (ref 70–99)
GLUCOSE BLDC GLUCOMTR-MCNC: 168 MG/DL — HIGH (ref 70–99)
GLUCOSE BLDC GLUCOMTR-MCNC: 170 MG/DL — HIGH (ref 70–99)
GLUCOSE BLDC GLUCOMTR-MCNC: 173 MG/DL — HIGH (ref 70–99)
GLUCOSE BLDC GLUCOMTR-MCNC: 175 MG/DL — HIGH (ref 70–99)
GLUCOSE BLDC GLUCOMTR-MCNC: 177 MG/DL — HIGH (ref 70–99)
GLUCOSE BLDC GLUCOMTR-MCNC: 95 MG/DL — SIGNIFICANT CHANGE UP (ref 70–99)
GLUCOSE BLDV-MCNC: 114 MG/DL — HIGH (ref 70–99)
GLUCOSE BLDV-MCNC: 90 MG/DL — SIGNIFICANT CHANGE UP (ref 70–99)
GLUCOSE QUALITATIVE U: NEGATIVE
GLUCOSE SERPL-MCNC: 100 MG/DL — HIGH (ref 70–99)
GLUCOSE SERPL-MCNC: 102 MG/DL — HIGH (ref 70–99)
GLUCOSE SERPL-MCNC: 102 MG/DL — HIGH (ref 70–99)
GLUCOSE SERPL-MCNC: 104 MG/DL — HIGH (ref 70–99)
GLUCOSE SERPL-MCNC: 105 MG/DL — HIGH (ref 70–99)
GLUCOSE SERPL-MCNC: 106 MG/DL — HIGH (ref 70–99)
GLUCOSE SERPL-MCNC: 107 MG/DL — HIGH (ref 70–99)
GLUCOSE SERPL-MCNC: 108 MG/DL — HIGH (ref 70–99)
GLUCOSE SERPL-MCNC: 108 MG/DL — HIGH (ref 70–99)
GLUCOSE SERPL-MCNC: 110 MG/DL — HIGH (ref 70–99)
GLUCOSE SERPL-MCNC: 111 MG/DL — HIGH (ref 70–99)
GLUCOSE SERPL-MCNC: 112 MG/DL — HIGH (ref 70–99)
GLUCOSE SERPL-MCNC: 113 MG/DL — HIGH (ref 70–99)
GLUCOSE SERPL-MCNC: 115 MG/DL — HIGH (ref 70–99)
GLUCOSE SERPL-MCNC: 116 MG/DL — HIGH (ref 70–99)
GLUCOSE SERPL-MCNC: 118 MG/DL — HIGH (ref 70–99)
GLUCOSE SERPL-MCNC: 119 MG/DL — HIGH (ref 70–99)
GLUCOSE SERPL-MCNC: 121 MG/DL — HIGH (ref 70–99)
GLUCOSE SERPL-MCNC: 121 MG/DL — HIGH (ref 70–99)
GLUCOSE SERPL-MCNC: 122 MG/DL — HIGH (ref 70–99)
GLUCOSE SERPL-MCNC: 122 MG/DL — HIGH (ref 70–99)
GLUCOSE SERPL-MCNC: 123 MG/DL — HIGH (ref 70–99)
GLUCOSE SERPL-MCNC: 123 MG/DL — HIGH (ref 70–99)
GLUCOSE SERPL-MCNC: 125 MG/DL — HIGH (ref 70–99)
GLUCOSE SERPL-MCNC: 126 MG/DL — HIGH (ref 70–99)
GLUCOSE SERPL-MCNC: 127 MG/DL — HIGH (ref 70–99)
GLUCOSE SERPL-MCNC: 130 MG/DL — HIGH (ref 70–99)
GLUCOSE SERPL-MCNC: 132 MG/DL — HIGH (ref 70–99)
GLUCOSE SERPL-MCNC: 136 MG/DL — HIGH (ref 70–99)
GLUCOSE SERPL-MCNC: 138 MG/DL — HIGH (ref 70–99)
GLUCOSE SERPL-MCNC: 139 MG/DL — HIGH (ref 70–99)
GLUCOSE SERPL-MCNC: 139 MG/DL — HIGH (ref 70–99)
GLUCOSE SERPL-MCNC: 140 MG/DL — HIGH (ref 70–99)
GLUCOSE SERPL-MCNC: 141 MG/DL — HIGH (ref 70–99)
GLUCOSE SERPL-MCNC: 141 MG/DL — HIGH (ref 70–99)
GLUCOSE SERPL-MCNC: 146 MG/DL — HIGH (ref 70–99)
GLUCOSE SERPL-MCNC: 149 MG/DL — HIGH (ref 70–99)
GLUCOSE SERPL-MCNC: 150 MG/DL — HIGH (ref 70–99)
GLUCOSE SERPL-MCNC: 153 MG/DL — HIGH (ref 70–99)
GLUCOSE SERPL-MCNC: 165 MG/DL — HIGH (ref 70–99)
GLUCOSE SERPL-MCNC: 174 MG/DL — HIGH (ref 70–99)
GLUCOSE SERPL-MCNC: 68 MG/DL
GLUCOSE SERPL-MCNC: 87 MG/DL — SIGNIFICANT CHANGE UP (ref 70–99)
GLUCOSE SERPL-MCNC: 88 MG/DL — SIGNIFICANT CHANGE UP (ref 70–99)
GLUCOSE SERPL-MCNC: 89 MG/DL — SIGNIFICANT CHANGE UP (ref 70–99)
GLUCOSE SERPL-MCNC: 91 MG/DL — SIGNIFICANT CHANGE UP (ref 70–99)
GLUCOSE SERPL-MCNC: 91 MG/DL — SIGNIFICANT CHANGE UP (ref 70–99)
GLUCOSE SERPL-MCNC: 92 MG/DL — SIGNIFICANT CHANGE UP (ref 70–99)
GLUCOSE SERPL-MCNC: 93 MG/DL
GLUCOSE SERPL-MCNC: 93 MG/DL — SIGNIFICANT CHANGE UP (ref 70–99)
GLUCOSE SERPL-MCNC: 93 MG/DL — SIGNIFICANT CHANGE UP (ref 70–99)
GLUCOSE SERPL-MCNC: 95 MG/DL — SIGNIFICANT CHANGE UP (ref 70–99)
GLUCOSE SERPL-MCNC: 96 MG/DL — SIGNIFICANT CHANGE UP (ref 70–99)
GLUCOSE SERPL-MCNC: 97 MG/DL — SIGNIFICANT CHANGE UP (ref 70–99)
GLUCOSE SERPL-MCNC: 98 MG/DL — SIGNIFICANT CHANGE UP (ref 70–99)
GLUCOSE SERPL-MCNC: 99 MG/DL — SIGNIFICANT CHANGE UP (ref 70–99)
GLUCOSE SERPL-MCNC: 99 MG/DL — SIGNIFICANT CHANGE UP (ref 70–99)
GLUCOSE UR QL: NEGATIVE — SIGNIFICANT CHANGE UP
GLUCOSE UR QL: NEGATIVE — SIGNIFICANT CHANGE UP
GRAM STN FLD: SIGNIFICANT CHANGE UP
GRAM STN FLD: SIGNIFICANT CHANGE UP
GRAN CASTS # UR COMP ASSIST: ABNORMAL /LPF
HAPTOGLOB SERPL-MCNC: 212 MG/DL — HIGH (ref 34–200)
HAPTOGLOB SERPL-MCNC: 227 MG/DL — HIGH (ref 34–200)
HAPTOGLOB SERPL-MCNC: 227 MG/DL — HIGH (ref 34–200)
HAPTOGLOB SERPL-MCNC: 229 MG/DL — HIGH (ref 34–200)
HAPTOGLOB SERPL-MCNC: 239 MG/DL — HIGH (ref 34–200)
HAPTOGLOB SERPL-MCNC: 241 MG/DL — HIGH (ref 34–200)
HAPTOGLOB SERPL-MCNC: 241 MG/DL — HIGH (ref 34–200)
HAPTOGLOB SERPL-MCNC: 250 MG/DL — HIGH (ref 34–200)
HAPTOGLOB SERPL-MCNC: 254 MG/DL — HIGH (ref 34–200)
HAPTOGLOB SERPL-MCNC: 261 MG/DL — HIGH (ref 34–200)
HAPTOGLOB SERPL-MCNC: 263 MG/DL — HIGH (ref 34–200)
HAPTOGLOB SERPL-MCNC: 267 MG/DL — HIGH (ref 34–200)
HAV IGM SER-ACNC: SIGNIFICANT CHANGE UP
HAV IGM SER-ACNC: SIGNIFICANT CHANGE UP
HBV CORE AB SER-ACNC: SIGNIFICANT CHANGE UP
HBV CORE IGM SER-ACNC: SIGNIFICANT CHANGE UP
HBV SURFACE AG SER-ACNC: SIGNIFICANT CHANGE UP
HBV SURFACE AG SER-ACNC: SIGNIFICANT CHANGE UP
HCO3 BLDV-SCNC: 26 MMOL/L — SIGNIFICANT CHANGE UP (ref 22–29)
HCO3 BLDV-SCNC: 26 MMOL/L — SIGNIFICANT CHANGE UP (ref 22–29)
HCT VFR BLD CALC: 18.8 % — CRITICAL LOW (ref 39–50)
HCT VFR BLD CALC: 19.7 % — CRITICAL LOW (ref 39–50)
HCT VFR BLD CALC: 19.7 % — CRITICAL LOW (ref 39–50)
HCT VFR BLD CALC: 20.1 % — CRITICAL LOW (ref 39–50)
HCT VFR BLD CALC: 20.2 % — CRITICAL LOW (ref 39–50)
HCT VFR BLD CALC: 20.4 % — CRITICAL LOW (ref 39–50)
HCT VFR BLD CALC: 20.5 % — CRITICAL LOW (ref 39–50)
HCT VFR BLD CALC: 20.6 % — CRITICAL LOW (ref 39–50)
HCT VFR BLD CALC: 20.9 % — CRITICAL LOW (ref 39–50)
HCT VFR BLD CALC: 21 % — CRITICAL LOW (ref 39–50)
HCT VFR BLD CALC: 21 % — CRITICAL LOW (ref 39–50)
HCT VFR BLD CALC: 21.2 % — LOW (ref 39–50)
HCT VFR BLD CALC: 21.4 % — LOW (ref 39–50)
HCT VFR BLD CALC: 21.6 % — LOW (ref 39–50)
HCT VFR BLD CALC: 21.6 % — LOW (ref 39–50)
HCT VFR BLD CALC: 21.7 % — LOW (ref 39–50)
HCT VFR BLD CALC: 21.8 % — LOW (ref 39–50)
HCT VFR BLD CALC: 22 % — LOW (ref 39–50)
HCT VFR BLD CALC: 22 % — LOW (ref 39–50)
HCT VFR BLD CALC: 22.1 % — LOW (ref 39–50)
HCT VFR BLD CALC: 22.1 % — LOW (ref 39–50)
HCT VFR BLD CALC: 22.2 % — LOW (ref 39–50)
HCT VFR BLD CALC: 22.3 % — LOW (ref 39–50)
HCT VFR BLD CALC: 22.4 % — LOW (ref 39–50)
HCT VFR BLD CALC: 22.5 % — LOW (ref 39–50)
HCT VFR BLD CALC: 22.6 % — LOW (ref 39–50)
HCT VFR BLD CALC: 22.7 % — LOW (ref 39–50)
HCT VFR BLD CALC: 22.8 % — LOW (ref 39–50)
HCT VFR BLD CALC: 22.9 % — LOW (ref 39–50)
HCT VFR BLD CALC: 23 % — LOW (ref 39–50)
HCT VFR BLD CALC: 23.1 % — LOW (ref 39–50)
HCT VFR BLD CALC: 23.2 % — LOW (ref 39–50)
HCT VFR BLD CALC: 23.3 % — LOW (ref 39–50)
HCT VFR BLD CALC: 23.3 % — LOW (ref 39–50)
HCT VFR BLD CALC: 23.6 % — LOW (ref 39–50)
HCT VFR BLD CALC: 23.7 % — LOW (ref 39–50)
HCT VFR BLD CALC: 23.8 % — LOW (ref 39–50)
HCT VFR BLD CALC: 24 % — LOW (ref 39–50)
HCT VFR BLD CALC: 24.2 % — LOW (ref 39–50)
HCT VFR BLD CALC: 24.3 % — LOW (ref 39–50)
HCT VFR BLD CALC: 24.5 % — LOW (ref 39–50)
HCT VFR BLD CALC: 24.5 % — LOW (ref 39–50)
HCT VFR BLD CALC: 24.6 % — LOW (ref 39–50)
HCT VFR BLD CALC: 24.7 % — LOW (ref 39–50)
HCT VFR BLD CALC: 24.8 % — LOW (ref 39–50)
HCT VFR BLD CALC: 24.9 % — LOW (ref 39–50)
HCT VFR BLD CALC: 25 % — LOW (ref 39–50)
HCT VFR BLD CALC: 25.1 % — LOW (ref 39–50)
HCT VFR BLD CALC: 25.2 % — LOW (ref 39–50)
HCT VFR BLD CALC: 25.3 % — LOW (ref 39–50)
HCT VFR BLD CALC: 25.4 % — LOW (ref 39–50)
HCT VFR BLD CALC: 25.5 % — LOW (ref 39–50)
HCT VFR BLD CALC: 25.5 % — LOW (ref 39–50)
HCT VFR BLD CALC: 25.8 % — LOW (ref 39–50)
HCT VFR BLD CALC: 25.9 % — LOW (ref 39–50)
HCT VFR BLD CALC: 26.2 % — LOW (ref 39–50)
HCT VFR BLD CALC: 26.3 % — LOW (ref 39–50)
HCT VFR BLD CALC: 26.5 % — LOW (ref 39–50)
HCT VFR BLD CALC: 26.6 % — LOW (ref 39–50)
HCT VFR BLD CALC: 26.7 % — LOW (ref 39–50)
HCT VFR BLD CALC: 26.9 % — LOW (ref 39–50)
HCT VFR BLD CALC: 27 % — LOW (ref 39–50)
HCT VFR BLD CALC: 27.2 % — LOW (ref 39–50)
HCT VFR BLD CALC: 27.3 % — LOW (ref 39–50)
HCT VFR BLD CALC: 27.4 % — LOW (ref 39–50)
HCT VFR BLD CALC: 27.7 % — LOW (ref 39–50)
HCT VFR BLD CALC: 27.7 % — LOW (ref 39–50)
HCT VFR BLD CALC: 28.1 % — LOW (ref 39–50)
HCT VFR BLD CALC: 28.2 % — LOW (ref 39–50)
HCT VFR BLD CALC: 28.3 % — LOW (ref 39–50)
HCT VFR BLD CALC: 29.1 % — LOW (ref 39–50)
HCT VFR BLD CALC: 29.4 % — LOW (ref 39–50)
HCT VFR BLD CALC: 29.6 % — LOW (ref 39–50)
HCT VFR BLD CALC: 30.5 % — LOW (ref 39–50)
HCT VFR BLD CALC: 30.5 % — LOW (ref 39–50)
HCT VFR BLD CALC: 30.7 % — LOW (ref 39–50)
HCT VFR BLD CALC: 31 % — LOW (ref 39–50)
HCT VFR BLD CALC: 31.2 % — LOW (ref 39–50)
HCT VFR BLD CALC: 31.9 % — LOW (ref 39–50)
HCT VFR BLD CALC: 32.3 %
HCT VFR BLDA CALC: 22 % — LOW (ref 39–51)
HCT VFR BLDA CALC: 25 % — LOW (ref 39–51)
HCV AB S/CO SERPL IA: 0.17 S/CO — SIGNIFICANT CHANGE UP (ref 0–0.99)
HCV AB S/CO SERPL IA: 0.17 S/CO — SIGNIFICANT CHANGE UP (ref 0–0.99)
HCV AB SERPL-IMP: SIGNIFICANT CHANGE UP
HCV AB SERPL-IMP: SIGNIFICANT CHANGE UP
HEMATOPATHOLOGY REPORT: SIGNIFICANT CHANGE UP
HGB BLD CALC-MCNC: 7.3 G/DL — LOW (ref 12.6–17.4)
HGB BLD CALC-MCNC: 8.4 G/DL — LOW (ref 12.6–17.4)
HGB BLD-MCNC: 10.2 G/DL — LOW (ref 13–17)
HGB BLD-MCNC: 10.4 G/DL
HGB BLD-MCNC: 10.6 G/DL — LOW (ref 13–17)
HGB BLD-MCNC: 10.7 G/DL — LOW (ref 13–17)
HGB BLD-MCNC: 5.7 G/DL — CRITICAL LOW (ref 13–17)
HGB BLD-MCNC: 6.2 G/DL — CRITICAL LOW (ref 13–17)
HGB BLD-MCNC: 6.6 G/DL — CRITICAL LOW (ref 13–17)
HGB BLD-MCNC: 6.7 G/DL — CRITICAL LOW (ref 13–17)
HGB BLD-MCNC: 6.8 G/DL — CRITICAL LOW (ref 13–17)
HGB BLD-MCNC: 6.8 G/DL — CRITICAL LOW (ref 13–17)
HGB BLD-MCNC: 6.9 G/DL — CRITICAL LOW (ref 13–17)
HGB BLD-MCNC: 7 G/DL — CRITICAL LOW (ref 13–17)
HGB BLD-MCNC: 7.1 G/DL — LOW (ref 13–17)
HGB BLD-MCNC: 7.2 G/DL — LOW (ref 13–17)
HGB BLD-MCNC: 7.3 G/DL — LOW (ref 13–17)
HGB BLD-MCNC: 7.3 G/DL — LOW (ref 13–17)
HGB BLD-MCNC: 7.4 G/DL — LOW (ref 13–17)
HGB BLD-MCNC: 7.5 G/DL — LOW (ref 13–17)
HGB BLD-MCNC: 7.6 G/DL — LOW (ref 13–17)
HGB BLD-MCNC: 7.7 G/DL — LOW (ref 13–17)
HGB BLD-MCNC: 7.8 G/DL — LOW (ref 13–17)
HGB BLD-MCNC: 7.9 G/DL — LOW (ref 13–17)
HGB BLD-MCNC: 8 G/DL — LOW (ref 13–17)
HGB BLD-MCNC: 8.1 G/DL — LOW (ref 13–17)
HGB BLD-MCNC: 8.2 G/DL — LOW (ref 13–17)
HGB BLD-MCNC: 8.3 G/DL — LOW (ref 13–17)
HGB BLD-MCNC: 8.3 G/DL — LOW (ref 13–17)
HGB BLD-MCNC: 8.4 G/DL — LOW (ref 13–17)
HGB BLD-MCNC: 8.5 G/DL — LOW (ref 13–17)
HGB BLD-MCNC: 8.6 G/DL — LOW (ref 13–17)
HGB BLD-MCNC: 8.6 G/DL — LOW (ref 13–17)
HGB BLD-MCNC: 8.7 G/DL — LOW (ref 13–17)
HGB BLD-MCNC: 8.7 G/DL — LOW (ref 13–17)
HGB BLD-MCNC: 8.9 G/DL — LOW (ref 13–17)
HGB BLD-MCNC: 8.9 G/DL — LOW (ref 13–17)
HGB BLD-MCNC: 9 G/DL — LOW (ref 13–17)
HGB BLD-MCNC: 9 G/DL — LOW (ref 13–17)
HGB BLD-MCNC: 9.2 G/DL — LOW (ref 13–17)
HGB BLD-MCNC: 9.3 G/DL — LOW (ref 13–17)
HGB BLD-MCNC: 9.3 G/DL — LOW (ref 13–17)
HGB BLD-MCNC: 9.4 G/DL — LOW (ref 13–17)
HGB BLD-MCNC: 9.4 G/DL — LOW (ref 13–17)
HGB BLD-MCNC: 9.5 G/DL — LOW (ref 13–17)
HGB BLD-MCNC: 9.6 G/DL — LOW (ref 13–17)
HGB BLD-MCNC: 9.8 G/DL — LOW (ref 13–17)
HIV 1+2 AB+HIV1 P24 AG SERPL QL IA: SIGNIFICANT CHANGE UP
HLX FLT3 FINAL REPORT: SIGNIFICANT CHANGE UP
HLX FLT3 FINAL REPORT: SIGNIFICANT CHANGE UP
HYALINE CASTS # UR AUTO: 7 /LPF — HIGH (ref 0–2)
HYALINE CASTS: 1 /LPF
HYPOCHROMIA BLD QL: SLIGHT — SIGNIFICANT CHANGE UP
IMM GRANULOCYTES NFR BLD AUTO: 0.2 % — SIGNIFICANT CHANGE UP (ref 0–1.5)
IMM GRANULOCYTES NFR BLD AUTO: 0.3 % — SIGNIFICANT CHANGE UP (ref 0–1.5)
IMM GRANULOCYTES NFR BLD AUTO: 0.4 %
IMM GRANULOCYTES NFR BLD AUTO: 0.4 % — SIGNIFICANT CHANGE UP (ref 0–1.5)
IMM GRANULOCYTES NFR BLD AUTO: 0.5 % — SIGNIFICANT CHANGE UP (ref 0–1.5)
IMM GRANULOCYTES NFR BLD AUTO: 0.5 % — SIGNIFICANT CHANGE UP (ref 0–1.5)
IMM GRANULOCYTES NFR BLD AUTO: 0.7 % — SIGNIFICANT CHANGE UP (ref 0–1.5)
IMM GRANULOCYTES NFR BLD AUTO: 0.8 % — SIGNIFICANT CHANGE UP (ref 0–1.5)
IMM GRANULOCYTES NFR BLD AUTO: 1 % — SIGNIFICANT CHANGE UP (ref 0–1.5)
IMM GRANULOCYTES NFR BLD AUTO: 1 % — SIGNIFICANT CHANGE UP (ref 0–1.5)
IMM GRANULOCYTES NFR BLD AUTO: 1.2 % — SIGNIFICANT CHANGE UP (ref 0–1.5)
IMM GRANULOCYTES NFR BLD AUTO: 1.8 % — HIGH (ref 0–1.5)
IMM GRANULOCYTES NFR BLD AUTO: 2.2 % — HIGH (ref 0–1.5)
IMM GRANULOCYTES NFR BLD AUTO: 2.5 % — HIGH (ref 0–1.5)
IMM GRANULOCYTES NFR BLD AUTO: 4.2 % — HIGH (ref 0–1.5)
IMM GRANULOCYTES NFR BLD AUTO: 7 % — HIGH (ref 0–1.5)
IMM GRANULOCYTES NFR BLD AUTO: 9.8 % — HIGH (ref 0–1.5)
IMM GRANULOCYTES NFR BLD AUTO: SIGNIFICANT CHANGE UP % (ref 0–1.5)
INR BLD: 1.18 RATIO — HIGH (ref 0.88–1.16)
INR BLD: 1.23 RATIO — HIGH (ref 0.88–1.16)
INR BLD: 1.26 RATIO — HIGH (ref 0.88–1.16)
INR BLD: 1.28 RATIO — HIGH (ref 0.88–1.16)
INR BLD: 1.3 RATIO — HIGH (ref 0.88–1.16)
INR BLD: 1.3 RATIO — HIGH (ref 0.88–1.16)
INR BLD: 1.31 RATIO — HIGH (ref 0.88–1.16)
INR BLD: 1.33 RATIO — HIGH (ref 0.88–1.16)
INR BLD: 1.37 RATIO — HIGH (ref 0.88–1.16)
INR BLD: 1.37 RATIO — HIGH (ref 0.88–1.16)
INTERPRETATION SERPL IFE-IMP: SIGNIFICANT CHANGE UP
IRON SATN MFR SERPL: 155 UG/DL — SIGNIFICANT CHANGE UP (ref 45–165)
IRON SATN MFR SERPL: 34 % — SIGNIFICANT CHANGE UP (ref 16–55)
IRON SATN MFR SERPL: 57 UG/DL — SIGNIFICANT CHANGE UP (ref 45–165)
IRON SATN MFR SERPL: 65 % — HIGH (ref 16–55)
KAPPA LC CSF-MCNC: 10.43 MG/DL
KAPPA LC SERPL-MCNC: 11.59 MG/DL
KETONES UR-MCNC: NEGATIVE — SIGNIFICANT CHANGE UP
KETONES UR-MCNC: NEGATIVE — SIGNIFICANT CHANGE UP
KETONES URINE: NEGATIVE
LACTATE BLDV-MCNC: 1 MMOL/L — SIGNIFICANT CHANGE UP (ref 0.7–2)
LACTATE BLDV-MCNC: 1.1 MMOL/L — SIGNIFICANT CHANGE UP (ref 0.7–2)
LDH SERPL L TO P-CCNC: 105 U/L — SIGNIFICANT CHANGE UP (ref 50–242)
LDH SERPL L TO P-CCNC: 108 U/L — SIGNIFICANT CHANGE UP (ref 50–242)
LDH SERPL L TO P-CCNC: 109 U/L — SIGNIFICANT CHANGE UP (ref 50–242)
LDH SERPL L TO P-CCNC: 109 U/L — SIGNIFICANT CHANGE UP (ref 50–242)
LDH SERPL L TO P-CCNC: 111 U/L — SIGNIFICANT CHANGE UP (ref 50–242)
LDH SERPL L TO P-CCNC: 111 U/L — SIGNIFICANT CHANGE UP (ref 50–242)
LDH SERPL L TO P-CCNC: 113 U/L — SIGNIFICANT CHANGE UP (ref 50–242)
LDH SERPL L TO P-CCNC: 114 U/L — SIGNIFICANT CHANGE UP (ref 50–242)
LDH SERPL L TO P-CCNC: 115 U/L — SIGNIFICANT CHANGE UP (ref 50–242)
LDH SERPL L TO P-CCNC: 116 U/L — SIGNIFICANT CHANGE UP (ref 50–242)
LDH SERPL L TO P-CCNC: 116 U/L — SIGNIFICANT CHANGE UP (ref 50–242)
LDH SERPL L TO P-CCNC: 117 U/L — SIGNIFICANT CHANGE UP (ref 50–242)
LDH SERPL L TO P-CCNC: 118 U/L — SIGNIFICANT CHANGE UP (ref 50–242)
LDH SERPL L TO P-CCNC: 119 U/L — SIGNIFICANT CHANGE UP (ref 50–242)
LDH SERPL L TO P-CCNC: 120 U/L — SIGNIFICANT CHANGE UP (ref 50–242)
LDH SERPL L TO P-CCNC: 122 U/L — SIGNIFICANT CHANGE UP (ref 50–242)
LDH SERPL L TO P-CCNC: 122 U/L — SIGNIFICANT CHANGE UP (ref 50–242)
LDH SERPL L TO P-CCNC: 124 U/L — SIGNIFICANT CHANGE UP (ref 50–242)
LDH SERPL L TO P-CCNC: 124 U/L — SIGNIFICANT CHANGE UP (ref 50–242)
LDH SERPL L TO P-CCNC: 126 U/L — SIGNIFICANT CHANGE UP (ref 50–242)
LDH SERPL L TO P-CCNC: 133 U/L — SIGNIFICANT CHANGE UP (ref 50–242)
LDH SERPL L TO P-CCNC: 141 U/L — SIGNIFICANT CHANGE UP (ref 50–242)
LDH SERPL L TO P-CCNC: 141 U/L — SIGNIFICANT CHANGE UP (ref 50–242)
LDH SERPL L TO P-CCNC: 143 U/L — SIGNIFICANT CHANGE UP (ref 50–242)
LDH SERPL L TO P-CCNC: 148 U/L — SIGNIFICANT CHANGE UP (ref 50–242)
LDH SERPL L TO P-CCNC: 151 U/L — SIGNIFICANT CHANGE UP (ref 50–242)
LDH SERPL L TO P-CCNC: 164 U/L — SIGNIFICANT CHANGE UP (ref 50–242)
LDH SERPL L TO P-CCNC: 165 U/L — SIGNIFICANT CHANGE UP (ref 50–242)
LDH SERPL L TO P-CCNC: 165 U/L — SIGNIFICANT CHANGE UP (ref 50–242)
LDH SERPL L TO P-CCNC: 174 U/L — SIGNIFICANT CHANGE UP (ref 50–242)
LDH SERPL L TO P-CCNC: 180 U/L — SIGNIFICANT CHANGE UP (ref 50–242)
LDH SERPL L TO P-CCNC: 181 U/L — SIGNIFICANT CHANGE UP (ref 50–242)
LDH SERPL L TO P-CCNC: 206 U/L — SIGNIFICANT CHANGE UP (ref 50–242)
LDH SERPL L TO P-CCNC: 209 U/L — SIGNIFICANT CHANGE UP (ref 50–242)
LDH SERPL L TO P-CCNC: 210 U/L — SIGNIFICANT CHANGE UP (ref 50–242)
LDH SERPL L TO P-CCNC: 220 U/L — SIGNIFICANT CHANGE UP (ref 50–242)
LDH SERPL L TO P-CCNC: 230 U/L — SIGNIFICANT CHANGE UP (ref 50–242)
LDH SERPL L TO P-CCNC: 255 U/L — HIGH (ref 50–242)
LDH SERPL L TO P-CCNC: 258 U/L — HIGH (ref 50–242)
LDH SERPL L TO P-CCNC: 259 U/L — HIGH (ref 50–242)
LDH SERPL L TO P-CCNC: 260 U/L — HIGH (ref 50–242)
LDH SERPL L TO P-CCNC: 263 U/L — HIGH (ref 50–242)
LDH SERPL L TO P-CCNC: 272 U/L — HIGH (ref 50–242)
LDH SERPL L TO P-CCNC: 274 U/L — HIGH (ref 50–242)
LDH SERPL L TO P-CCNC: 283 U/L — HIGH (ref 50–242)
LDH SERPL L TO P-CCNC: 289 U/L — HIGH (ref 50–242)
LDH SERPL L TO P-CCNC: 305 U/L — HIGH (ref 50–242)
LDH SERPL L TO P-CCNC: 315 U/L — HIGH (ref 50–242)
LDH SERPL L TO P-CCNC: 323 U/L — HIGH (ref 50–242)
LDH SERPL L TO P-CCNC: 369 U/L — HIGH (ref 50–242)
LDH SERPL L TO P-CCNC: 383 U/L — HIGH (ref 50–242)
LDH SERPL L TO P-CCNC: 384 U/L — HIGH (ref 50–242)
LDH SERPL L TO P-CCNC: 389 U/L — HIGH (ref 50–242)
LDH SERPL L TO P-CCNC: 414 U/L — HIGH (ref 50–242)
LDH SERPL L TO P-CCNC: 417 U/L — HIGH (ref 50–242)
LDH SERPL L TO P-CCNC: 421 U/L — HIGH (ref 50–242)
LDH SERPL L TO P-CCNC: 448 U/L — HIGH (ref 50–242)
LDH SERPL L TO P-CCNC: 567 U/L — HIGH (ref 50–242)
LEUKOCYTE ESTERASE UR-ACNC: NEGATIVE — SIGNIFICANT CHANGE UP
LEUKOCYTE ESTERASE UR-ACNC: NEGATIVE — SIGNIFICANT CHANGE UP
LEUKOCYTE ESTERASE URINE: NEGATIVE
LIDOCAIN IGE QN: 22 U/L — SIGNIFICANT CHANGE UP (ref 7–60)
LYMPHOCYTES # BLD AUTO: 0.28 K/UL — LOW (ref 1–3.3)
LYMPHOCYTES # BLD AUTO: 0.35 K/UL — LOW (ref 1–3.3)
LYMPHOCYTES # BLD AUTO: 0.35 K/UL — LOW (ref 1–3.3)
LYMPHOCYTES # BLD AUTO: 0.38 K/UL — LOW (ref 1–3.3)
LYMPHOCYTES # BLD AUTO: 0.38 K/UL — LOW (ref 1–3.3)
LYMPHOCYTES # BLD AUTO: 0.42 K/UL — LOW (ref 1–3.3)
LYMPHOCYTES # BLD AUTO: 0.46 K/UL — LOW (ref 1–3.3)
LYMPHOCYTES # BLD AUTO: 0.47 K/UL — LOW (ref 1–3.3)
LYMPHOCYTES # BLD AUTO: 0.5 K/UL — LOW (ref 1–3.3)
LYMPHOCYTES # BLD AUTO: 0.52 K/UL — LOW (ref 1–3.3)
LYMPHOCYTES # BLD AUTO: 0.53 K/UL — LOW (ref 1–3.3)
LYMPHOCYTES # BLD AUTO: 0.57 K/UL — LOW (ref 1–3.3)
LYMPHOCYTES # BLD AUTO: 0.58 K/UL — LOW (ref 1–3.3)
LYMPHOCYTES # BLD AUTO: 0.6 K/UL — LOW (ref 1–3.3)
LYMPHOCYTES # BLD AUTO: 0.63 K/UL — LOW (ref 1–3.3)
LYMPHOCYTES # BLD AUTO: 0.71 K/UL — LOW (ref 1–3.3)
LYMPHOCYTES # BLD AUTO: 0.72 K/UL — LOW (ref 1–3.3)
LYMPHOCYTES # BLD AUTO: 0.78 K/UL — LOW (ref 1–3.3)
LYMPHOCYTES # BLD AUTO: 0.78 K/UL — LOW (ref 1–3.3)
LYMPHOCYTES # BLD AUTO: 0.79 K/UL — LOW (ref 1–3.3)
LYMPHOCYTES # BLD AUTO: 0.79 K/UL — LOW (ref 1–3.3)
LYMPHOCYTES # BLD AUTO: 0.8 K/UL — LOW (ref 1–3.3)
LYMPHOCYTES # BLD AUTO: 0.8 K/UL — LOW (ref 1–3.3)
LYMPHOCYTES # BLD AUTO: 0.84 K/UL — LOW (ref 1–3.3)
LYMPHOCYTES # BLD AUTO: 0.84 K/UL — LOW (ref 1–3.3)
LYMPHOCYTES # BLD AUTO: 0.86 K/UL — LOW (ref 1–3.3)
LYMPHOCYTES # BLD AUTO: 0.86 K/UL — LOW (ref 1–3.3)
LYMPHOCYTES # BLD AUTO: 0.87 K/UL — LOW (ref 1–3.3)
LYMPHOCYTES # BLD AUTO: 0.9 K/UL — LOW (ref 1–3.3)
LYMPHOCYTES # BLD AUTO: 0.91 K/UL — LOW (ref 1–3.3)
LYMPHOCYTES # BLD AUTO: 0.92 K/UL — LOW (ref 1–3.3)
LYMPHOCYTES # BLD AUTO: 0.92 K/UL — LOW (ref 1–3.3)
LYMPHOCYTES # BLD AUTO: 0.94 K/UL — LOW (ref 1–3.3)
LYMPHOCYTES # BLD AUTO: 0.95 K/UL — LOW (ref 1–3.3)
LYMPHOCYTES # BLD AUTO: 0.97 K/UL — LOW (ref 1–3.3)
LYMPHOCYTES # BLD AUTO: 0.99 K/UL — LOW (ref 1–3.3)
LYMPHOCYTES # BLD AUTO: 1 K/UL — SIGNIFICANT CHANGE UP (ref 1–3.3)
LYMPHOCYTES # BLD AUTO: 1 K/UL — SIGNIFICANT CHANGE UP (ref 1–3.3)
LYMPHOCYTES # BLD AUTO: 1.01 K/UL — SIGNIFICANT CHANGE UP (ref 1–3.3)
LYMPHOCYTES # BLD AUTO: 1.01 K/UL — SIGNIFICANT CHANGE UP (ref 1–3.3)
LYMPHOCYTES # BLD AUTO: 1.04 K/UL — SIGNIFICANT CHANGE UP (ref 1–3.3)
LYMPHOCYTES # BLD AUTO: 1.07 K/UL — SIGNIFICANT CHANGE UP (ref 1–3.3)
LYMPHOCYTES # BLD AUTO: 1.13 K/UL — SIGNIFICANT CHANGE UP (ref 1–3.3)
LYMPHOCYTES # BLD AUTO: 1.16 K/UL — SIGNIFICANT CHANGE UP (ref 1–3.3)
LYMPHOCYTES # BLD AUTO: 1.2 K/UL — SIGNIFICANT CHANGE UP (ref 1–3.3)
LYMPHOCYTES # BLD AUTO: 1.24 K/UL — SIGNIFICANT CHANGE UP (ref 1–3.3)
LYMPHOCYTES # BLD AUTO: 1.26 K/UL — SIGNIFICANT CHANGE UP (ref 1–3.3)
LYMPHOCYTES # BLD AUTO: 1.26 K/UL — SIGNIFICANT CHANGE UP (ref 1–3.3)
LYMPHOCYTES # BLD AUTO: 1.27 K/UL — SIGNIFICANT CHANGE UP (ref 1–3.3)
LYMPHOCYTES # BLD AUTO: 1.35 K/UL — SIGNIFICANT CHANGE UP (ref 1–3.3)
LYMPHOCYTES # BLD AUTO: 1.36 K/UL — SIGNIFICANT CHANGE UP (ref 1–3.3)
LYMPHOCYTES # BLD AUTO: 1.39 K/UL — SIGNIFICANT CHANGE UP (ref 1–3.3)
LYMPHOCYTES # BLD AUTO: 1.41 K/UL — SIGNIFICANT CHANGE UP (ref 1–3.3)
LYMPHOCYTES # BLD AUTO: 1.45 K/UL
LYMPHOCYTES # BLD AUTO: 1.46 K/UL — SIGNIFICANT CHANGE UP (ref 1–3.3)
LYMPHOCYTES # BLD AUTO: 1.51 K/UL — SIGNIFICANT CHANGE UP (ref 1–3.3)
LYMPHOCYTES # BLD AUTO: 1.55 K/UL — SIGNIFICANT CHANGE UP (ref 1–3.3)
LYMPHOCYTES # BLD AUTO: 1.63 K/UL — SIGNIFICANT CHANGE UP (ref 1–3.3)
LYMPHOCYTES # BLD AUTO: 1.7 K/UL — SIGNIFICANT CHANGE UP (ref 1–3.3)
LYMPHOCYTES # BLD AUTO: 1.79 K/UL — SIGNIFICANT CHANGE UP (ref 1–3.3)
LYMPHOCYTES # BLD AUTO: 10.4 % — LOW (ref 13–44)
LYMPHOCYTES # BLD AUTO: 10.7 % — LOW (ref 13–44)
LYMPHOCYTES # BLD AUTO: 12.4 % — LOW (ref 13–44)
LYMPHOCYTES # BLD AUTO: 12.9 % — LOW (ref 13–44)
LYMPHOCYTES # BLD AUTO: 13 % — SIGNIFICANT CHANGE UP (ref 13–44)
LYMPHOCYTES # BLD AUTO: 14.5 % — SIGNIFICANT CHANGE UP (ref 13–44)
LYMPHOCYTES # BLD AUTO: 16.2 % — SIGNIFICANT CHANGE UP (ref 13–44)
LYMPHOCYTES # BLD AUTO: 16.2 % — SIGNIFICANT CHANGE UP (ref 13–44)
LYMPHOCYTES # BLD AUTO: 17.9 % — SIGNIFICANT CHANGE UP (ref 13–44)
LYMPHOCYTES # BLD AUTO: 17.9 % — SIGNIFICANT CHANGE UP (ref 13–44)
LYMPHOCYTES # BLD AUTO: 19.3 % — SIGNIFICANT CHANGE UP (ref 13–44)
LYMPHOCYTES # BLD AUTO: 19.3 % — SIGNIFICANT CHANGE UP (ref 13–44)
LYMPHOCYTES # BLD AUTO: 19.5 % — SIGNIFICANT CHANGE UP (ref 13–44)
LYMPHOCYTES # BLD AUTO: 2.03 K/UL — SIGNIFICANT CHANGE UP (ref 1–3.3)
LYMPHOCYTES # BLD AUTO: 2.13 K/UL — SIGNIFICANT CHANGE UP (ref 1–3.3)
LYMPHOCYTES # BLD AUTO: 2.8 K/UL — SIGNIFICANT CHANGE UP (ref 1–3.3)
LYMPHOCYTES # BLD AUTO: 2.97 K/UL — SIGNIFICANT CHANGE UP (ref 1–3.3)
LYMPHOCYTES # BLD AUTO: 2.99 K/UL — SIGNIFICANT CHANGE UP (ref 1–3.3)
LYMPHOCYTES # BLD AUTO: 20 % — SIGNIFICANT CHANGE UP (ref 13–44)
LYMPHOCYTES # BLD AUTO: 20.1 % — SIGNIFICANT CHANGE UP (ref 13–44)
LYMPHOCYTES # BLD AUTO: 21 % — SIGNIFICANT CHANGE UP (ref 13–44)
LYMPHOCYTES # BLD AUTO: 21.3 % — SIGNIFICANT CHANGE UP (ref 13–44)
LYMPHOCYTES # BLD AUTO: 22.6 % — SIGNIFICANT CHANGE UP (ref 13–44)
LYMPHOCYTES # BLD AUTO: 22.9 % — SIGNIFICANT CHANGE UP (ref 13–44)
LYMPHOCYTES # BLD AUTO: 23.7 % — SIGNIFICANT CHANGE UP (ref 13–44)
LYMPHOCYTES # BLD AUTO: 23.9 % — SIGNIFICANT CHANGE UP (ref 13–44)
LYMPHOCYTES # BLD AUTO: 26.3 % — SIGNIFICANT CHANGE UP (ref 13–44)
LYMPHOCYTES # BLD AUTO: 26.8 % — SIGNIFICANT CHANGE UP (ref 13–44)
LYMPHOCYTES # BLD AUTO: 27.5 % — SIGNIFICANT CHANGE UP (ref 13–44)
LYMPHOCYTES # BLD AUTO: 28.7 % — SIGNIFICANT CHANGE UP (ref 13–44)
LYMPHOCYTES # BLD AUTO: 28.9 % — SIGNIFICANT CHANGE UP (ref 13–44)
LYMPHOCYTES # BLD AUTO: 29.5 % — SIGNIFICANT CHANGE UP (ref 13–44)
LYMPHOCYTES # BLD AUTO: 3.49 K/UL — HIGH (ref 1–3.3)
LYMPHOCYTES # BLD AUTO: 32.2 % — SIGNIFICANT CHANGE UP (ref 13–44)
LYMPHOCYTES # BLD AUTO: 32.6 % — SIGNIFICANT CHANGE UP (ref 13–44)
LYMPHOCYTES # BLD AUTO: 33.3 % — SIGNIFICANT CHANGE UP (ref 13–44)
LYMPHOCYTES # BLD AUTO: 35.1 % — SIGNIFICANT CHANGE UP (ref 13–44)
LYMPHOCYTES # BLD AUTO: 35.4 % — SIGNIFICANT CHANGE UP (ref 13–44)
LYMPHOCYTES # BLD AUTO: 35.7 % — SIGNIFICANT CHANGE UP (ref 13–44)
LYMPHOCYTES # BLD AUTO: 36.8 % — SIGNIFICANT CHANGE UP (ref 13–44)
LYMPHOCYTES # BLD AUTO: 4.11 K/UL — HIGH (ref 1–3.3)
LYMPHOCYTES # BLD AUTO: 4.44 K/UL — HIGH (ref 1–3.3)
LYMPHOCYTES # BLD AUTO: 43 % — SIGNIFICANT CHANGE UP (ref 13–44)
LYMPHOCYTES # BLD AUTO: 44 % — SIGNIFICANT CHANGE UP (ref 13–44)
LYMPHOCYTES # BLD AUTO: 48.2 % — HIGH (ref 13–44)
LYMPHOCYTES # BLD AUTO: 5.39 K/UL — HIGH (ref 1–3.3)
LYMPHOCYTES # BLD AUTO: 50 % — HIGH (ref 13–44)
LYMPHOCYTES # BLD AUTO: 55.2 % — HIGH (ref 13–44)
LYMPHOCYTES # BLD AUTO: 55.7 % — HIGH (ref 13–44)
LYMPHOCYTES # BLD AUTO: 56.8 % — HIGH (ref 13–44)
LYMPHOCYTES # BLD AUTO: 56.8 % — HIGH (ref 13–44)
LYMPHOCYTES # BLD AUTO: 58 % — HIGH (ref 13–44)
LYMPHOCYTES # BLD AUTO: 6.83 K/UL — HIGH (ref 1–3.3)
LYMPHOCYTES # BLD AUTO: 61.6 % — HIGH (ref 13–44)
LYMPHOCYTES # BLD AUTO: 63.7 % — HIGH (ref 13–44)
LYMPHOCYTES # BLD AUTO: 64.2 % — HIGH (ref 13–44)
LYMPHOCYTES # BLD AUTO: 66.1 % — HIGH (ref 13–44)
LYMPHOCYTES # BLD AUTO: 66.2 % — HIGH (ref 13–44)
LYMPHOCYTES # BLD AUTO: 66.4 % — HIGH (ref 13–44)
LYMPHOCYTES # BLD AUTO: 69.3 % — HIGH (ref 13–44)
LYMPHOCYTES # BLD AUTO: 7 % — LOW (ref 13–44)
LYMPHOCYTES # BLD AUTO: 7.7 % — LOW (ref 13–44)
LYMPHOCYTES # BLD AUTO: 70.4 % — HIGH (ref 13–44)
LYMPHOCYTES # BLD AUTO: 70.5 % — HIGH (ref 13–44)
LYMPHOCYTES # BLD AUTO: 73.4 % — HIGH (ref 13–44)
LYMPHOCYTES # BLD AUTO: 74 % — HIGH (ref 13–44)
LYMPHOCYTES # BLD AUTO: 74.8 % — HIGH (ref 13–44)
LYMPHOCYTES # BLD AUTO: 74.8 % — HIGH (ref 13–44)
LYMPHOCYTES # BLD AUTO: 75.7 % — HIGH (ref 13–44)
LYMPHOCYTES # BLD AUTO: 76.1 % — HIGH (ref 13–44)
LYMPHOCYTES # BLD AUTO: 76.5 % — HIGH (ref 13–44)
LYMPHOCYTES # BLD AUTO: 76.8 % — HIGH (ref 13–44)
LYMPHOCYTES # BLD AUTO: 77 % — HIGH (ref 13–44)
LYMPHOCYTES # BLD AUTO: 78 % — HIGH (ref 13–44)
LYMPHOCYTES # BLD AUTO: 78.1 % — HIGH (ref 13–44)
LYMPHOCYTES # BLD AUTO: 79.1 % — HIGH (ref 13–44)
LYMPHOCYTES # BLD AUTO: 81.3 % — HIGH (ref 13–44)
LYMPHOCYTES # BLD AUTO: 82.5 % — HIGH (ref 13–44)
LYMPHOCYTES # BLD AUTO: 82.6 % — HIGH (ref 13–44)
LYMPHOCYTES # BLD AUTO: 90 % — HIGH (ref 13–44)
LYMPHOCYTES # BLD AUTO: 96 % — HIGH (ref 13–44)
LYMPHOCYTES # BLD AUTO: 97 % — HIGH (ref 13–44)
LYMPHOCYTES # BLD AUTO: 97.3 % — HIGH (ref 13–44)
LYMPHOCYTES NFR BLD AUTO: 30 %
M PROTEIN SPEC IFE-MCNC: NORMAL
MACROCYTES BLD QL: SLIGHT — SIGNIFICANT CHANGE UP
MAGNESIUM SERPL-MCNC: 1.6 MG/DL — SIGNIFICANT CHANGE UP (ref 1.6–2.6)
MAGNESIUM SERPL-MCNC: 1.7 MG/DL — SIGNIFICANT CHANGE UP (ref 1.6–2.6)
MAGNESIUM SERPL-MCNC: 1.8 MG/DL — SIGNIFICANT CHANGE UP (ref 1.6–2.6)
MAGNESIUM SERPL-MCNC: 1.9 MG/DL — SIGNIFICANT CHANGE UP (ref 1.6–2.6)
MAGNESIUM SERPL-MCNC: 2 MG/DL — SIGNIFICANT CHANGE UP (ref 1.6–2.6)
MAGNESIUM SERPL-MCNC: 2.1 MG/DL — SIGNIFICANT CHANGE UP (ref 1.6–2.6)
MAGNESIUM SERPL-MCNC: 2.2 MG/DL — SIGNIFICANT CHANGE UP (ref 1.6–2.6)
MAGNESIUM SERPL-MCNC: 2.4 MG/DL — SIGNIFICANT CHANGE UP (ref 1.6–2.6)
MAN DIFF?: NORMAL
MANUAL DIF COMMENT BLD-IMP: SIGNIFICANT CHANGE UP
MANUAL SMEAR VERIFICATION: SIGNIFICANT CHANGE UP
MCHC RBC-ENTMCNC: 28.3 PG — SIGNIFICANT CHANGE UP (ref 27–34)
MCHC RBC-ENTMCNC: 28.3 PG — SIGNIFICANT CHANGE UP (ref 27–34)
MCHC RBC-ENTMCNC: 28.4 PG — SIGNIFICANT CHANGE UP (ref 27–34)
MCHC RBC-ENTMCNC: 28.5 PG — SIGNIFICANT CHANGE UP (ref 27–34)
MCHC RBC-ENTMCNC: 28.6 PG — SIGNIFICANT CHANGE UP (ref 27–34)
MCHC RBC-ENTMCNC: 28.7 PG — SIGNIFICANT CHANGE UP (ref 27–34)
MCHC RBC-ENTMCNC: 28.8 PG — SIGNIFICANT CHANGE UP (ref 27–34)
MCHC RBC-ENTMCNC: 28.9 PG — SIGNIFICANT CHANGE UP (ref 27–34)
MCHC RBC-ENTMCNC: 28.9 PG — SIGNIFICANT CHANGE UP (ref 27–34)
MCHC RBC-ENTMCNC: 29 PG — SIGNIFICANT CHANGE UP (ref 27–34)
MCHC RBC-ENTMCNC: 29.1 PG — SIGNIFICANT CHANGE UP (ref 27–34)
MCHC RBC-ENTMCNC: 29.2 PG — SIGNIFICANT CHANGE UP (ref 27–34)
MCHC RBC-ENTMCNC: 29.3 PG — SIGNIFICANT CHANGE UP (ref 27–34)
MCHC RBC-ENTMCNC: 29.4 PG — SIGNIFICANT CHANGE UP (ref 27–34)
MCHC RBC-ENTMCNC: 29.5 GM/DL — LOW (ref 32–36)
MCHC RBC-ENTMCNC: 29.5 GM/DL — LOW (ref 32–36)
MCHC RBC-ENTMCNC: 29.5 PG — SIGNIFICANT CHANGE UP (ref 27–34)
MCHC RBC-ENTMCNC: 29.6 PG — SIGNIFICANT CHANGE UP (ref 27–34)
MCHC RBC-ENTMCNC: 29.7 PG — SIGNIFICANT CHANGE UP (ref 27–34)
MCHC RBC-ENTMCNC: 29.8 PG — SIGNIFICANT CHANGE UP (ref 27–34)
MCHC RBC-ENTMCNC: 29.9 PG — SIGNIFICANT CHANGE UP (ref 27–34)
MCHC RBC-ENTMCNC: 30 PG — SIGNIFICANT CHANGE UP (ref 27–34)
MCHC RBC-ENTMCNC: 30.1 GM/DL — LOW (ref 32–36)
MCHC RBC-ENTMCNC: 30.1 PG — SIGNIFICANT CHANGE UP (ref 27–34)
MCHC RBC-ENTMCNC: 30.2 GM/DL — LOW (ref 32–36)
MCHC RBC-ENTMCNC: 30.3 GM/DL — LOW (ref 32–36)
MCHC RBC-ENTMCNC: 30.3 GM/DL — LOW (ref 32–36)
MCHC RBC-ENTMCNC: 30.4 GM/DL — LOW (ref 32–36)
MCHC RBC-ENTMCNC: 30.4 PG — SIGNIFICANT CHANGE UP (ref 27–34)
MCHC RBC-ENTMCNC: 30.5 PG — SIGNIFICANT CHANGE UP (ref 27–34)
MCHC RBC-ENTMCNC: 30.5 PG — SIGNIFICANT CHANGE UP (ref 27–34)
MCHC RBC-ENTMCNC: 30.6 PG — SIGNIFICANT CHANGE UP (ref 27–34)
MCHC RBC-ENTMCNC: 30.9 GM/DL — LOW (ref 32–36)
MCHC RBC-ENTMCNC: 31 GM/DL — LOW (ref 32–36)
MCHC RBC-ENTMCNC: 31 PG — SIGNIFICANT CHANGE UP (ref 27–34)
MCHC RBC-ENTMCNC: 31 PG — SIGNIFICANT CHANGE UP (ref 27–34)
MCHC RBC-ENTMCNC: 31.1 GM/DL — LOW (ref 32–36)
MCHC RBC-ENTMCNC: 31.2 PG — SIGNIFICANT CHANGE UP (ref 27–34)
MCHC RBC-ENTMCNC: 31.4 GM/DL — LOW (ref 32–36)
MCHC RBC-ENTMCNC: 31.5 GM/DL — LOW (ref 32–36)
MCHC RBC-ENTMCNC: 31.6 GM/DL — LOW (ref 32–36)
MCHC RBC-ENTMCNC: 31.7 GM/DL — LOW (ref 32–36)
MCHC RBC-ENTMCNC: 31.7 GM/DL — LOW (ref 32–36)
MCHC RBC-ENTMCNC: 31.8 GM/DL — LOW (ref 32–36)
MCHC RBC-ENTMCNC: 31.8 GM/DL — LOW (ref 32–36)
MCHC RBC-ENTMCNC: 31.9 GM/DL — LOW (ref 32–36)
MCHC RBC-ENTMCNC: 31.9 PG — SIGNIFICANT CHANGE UP (ref 27–34)
MCHC RBC-ENTMCNC: 32 GM/DL — SIGNIFICANT CHANGE UP (ref 32–36)
MCHC RBC-ENTMCNC: 32.1 GM/DL — SIGNIFICANT CHANGE UP (ref 32–36)
MCHC RBC-ENTMCNC: 32.2 GM/DL
MCHC RBC-ENTMCNC: 32.2 GM/DL — SIGNIFICANT CHANGE UP (ref 32–36)
MCHC RBC-ENTMCNC: 32.3 GM/DL — SIGNIFICANT CHANGE UP (ref 32–36)
MCHC RBC-ENTMCNC: 32.4 GM/DL — SIGNIFICANT CHANGE UP (ref 32–36)
MCHC RBC-ENTMCNC: 32.5 GM/DL — SIGNIFICANT CHANGE UP (ref 32–36)
MCHC RBC-ENTMCNC: 32.5 GM/DL — SIGNIFICANT CHANGE UP (ref 32–36)
MCHC RBC-ENTMCNC: 32.6 G/DL — SIGNIFICANT CHANGE UP (ref 32–36)
MCHC RBC-ENTMCNC: 32.6 GM/DL — SIGNIFICANT CHANGE UP (ref 32–36)
MCHC RBC-ENTMCNC: 32.6 GM/DL — SIGNIFICANT CHANGE UP (ref 32–36)
MCHC RBC-ENTMCNC: 32.6 PG — SIGNIFICANT CHANGE UP (ref 27–34)
MCHC RBC-ENTMCNC: 32.7 G/DL — SIGNIFICANT CHANGE UP (ref 32–36)
MCHC RBC-ENTMCNC: 32.7 GM/DL — SIGNIFICANT CHANGE UP (ref 32–36)
MCHC RBC-ENTMCNC: 32.8 G/DL — SIGNIFICANT CHANGE UP (ref 32–36)
MCHC RBC-ENTMCNC: 32.8 GM/DL — SIGNIFICANT CHANGE UP (ref 32–36)
MCHC RBC-ENTMCNC: 32.8 GM/DL — SIGNIFICANT CHANGE UP (ref 32–36)
MCHC RBC-ENTMCNC: 32.8 PG — SIGNIFICANT CHANGE UP (ref 27–34)
MCHC RBC-ENTMCNC: 32.8 PG — SIGNIFICANT CHANGE UP (ref 27–34)
MCHC RBC-ENTMCNC: 32.9 GM/DL — SIGNIFICANT CHANGE UP (ref 32–36)
MCHC RBC-ENTMCNC: 33 G/DL — SIGNIFICANT CHANGE UP (ref 32–36)
MCHC RBC-ENTMCNC: 33 GM/DL — SIGNIFICANT CHANGE UP (ref 32–36)
MCHC RBC-ENTMCNC: 33 PG — SIGNIFICANT CHANGE UP (ref 27–34)
MCHC RBC-ENTMCNC: 33.1 G/DL — SIGNIFICANT CHANGE UP (ref 32–36)
MCHC RBC-ENTMCNC: 33.1 GM/DL — SIGNIFICANT CHANGE UP (ref 32–36)
MCHC RBC-ENTMCNC: 33.1 PG — SIGNIFICANT CHANGE UP (ref 27–34)
MCHC RBC-ENTMCNC: 33.2 G/DL — SIGNIFICANT CHANGE UP (ref 32–36)
MCHC RBC-ENTMCNC: 33.2 GM/DL — SIGNIFICANT CHANGE UP (ref 32–36)
MCHC RBC-ENTMCNC: 33.2 PG
MCHC RBC-ENTMCNC: 33.3 G/DL — SIGNIFICANT CHANGE UP (ref 32–36)
MCHC RBC-ENTMCNC: 33.3 GM/DL — SIGNIFICANT CHANGE UP (ref 32–36)
MCHC RBC-ENTMCNC: 33.4 G/DL — SIGNIFICANT CHANGE UP (ref 32–36)
MCHC RBC-ENTMCNC: 33.5 GM/DL — SIGNIFICANT CHANGE UP (ref 32–36)
MCHC RBC-ENTMCNC: 33.6 GM/DL — SIGNIFICANT CHANGE UP (ref 32–36)
MCHC RBC-ENTMCNC: 33.7 G/DL — SIGNIFICANT CHANGE UP (ref 32–36)
MCHC RBC-ENTMCNC: 33.7 PG — SIGNIFICANT CHANGE UP (ref 27–34)
MCHC RBC-ENTMCNC: 33.7 PG — SIGNIFICANT CHANGE UP (ref 27–34)
MCHC RBC-ENTMCNC: 33.8 G/DL — SIGNIFICANT CHANGE UP (ref 32–36)
MCHC RBC-ENTMCNC: 33.8 G/DL — SIGNIFICANT CHANGE UP (ref 32–36)
MCHC RBC-ENTMCNC: 33.8 GM/DL — SIGNIFICANT CHANGE UP (ref 32–36)
MCHC RBC-ENTMCNC: 33.9 G/DL — SIGNIFICANT CHANGE UP (ref 32–36)
MCHC RBC-ENTMCNC: 33.9 GM/DL — SIGNIFICANT CHANGE UP (ref 32–36)
MCHC RBC-ENTMCNC: 33.9 PG — SIGNIFICANT CHANGE UP (ref 27–34)
MCHC RBC-ENTMCNC: 33.9 PG — SIGNIFICANT CHANGE UP (ref 27–34)
MCHC RBC-ENTMCNC: 34 GM/DL — SIGNIFICANT CHANGE UP (ref 32–36)
MCHC RBC-ENTMCNC: 34 GM/DL — SIGNIFICANT CHANGE UP (ref 32–36)
MCHC RBC-ENTMCNC: 34.1 GM/DL — SIGNIFICANT CHANGE UP (ref 32–36)
MCHC RBC-ENTMCNC: 34.1 GM/DL — SIGNIFICANT CHANGE UP (ref 32–36)
MCHC RBC-ENTMCNC: 34.2 GM/DL — SIGNIFICANT CHANGE UP (ref 32–36)
MCHC RBC-ENTMCNC: 34.3 G/DL — SIGNIFICANT CHANGE UP (ref 32–36)
MCHC RBC-ENTMCNC: 34.3 GM/DL — SIGNIFICANT CHANGE UP (ref 32–36)
MCHC RBC-ENTMCNC: 34.3 GM/DL — SIGNIFICANT CHANGE UP (ref 32–36)
MCHC RBC-ENTMCNC: 34.4 PG — HIGH (ref 27–34)
MCHC RBC-ENTMCNC: 34.5 G/DL — SIGNIFICANT CHANGE UP (ref 32–36)
MCHC RBC-ENTMCNC: 34.6 G/DL — SIGNIFICANT CHANGE UP (ref 32–36)
MCHC RBC-ENTMCNC: 34.7 G/DL — SIGNIFICANT CHANGE UP (ref 32–36)
MCHC RBC-ENTMCNC: 34.7 G/DL — SIGNIFICANT CHANGE UP (ref 32–36)
MCHC RBC-ENTMCNC: 34.7 PG — HIGH (ref 27–34)
MCHC RBC-ENTMCNC: 34.9 G/DL — SIGNIFICANT CHANGE UP (ref 32–36)
MCHC RBC-ENTMCNC: 34.9 G/DL — SIGNIFICANT CHANGE UP (ref 32–36)
MCHC RBC-ENTMCNC: 35.2 PG — HIGH (ref 27–34)
MCHC RBC-ENTMCNC: 35.3 G/DL — SIGNIFICANT CHANGE UP (ref 32–36)
MCHC RBC-ENTMCNC: 35.3 PG — HIGH (ref 27–34)
MCHC RBC-ENTMCNC: 35.6 PG — HIGH (ref 27–34)
MCV RBC AUTO: 100.4 FL — HIGH (ref 80–100)
MCV RBC AUTO: 100.4 FL — HIGH (ref 80–100)
MCV RBC AUTO: 101.3 FL — HIGH (ref 80–100)
MCV RBC AUTO: 101.8 FL — HIGH (ref 80–100)
MCV RBC AUTO: 102.6 FL — HIGH (ref 80–100)
MCV RBC AUTO: 102.7 FL — HIGH (ref 80–100)
MCV RBC AUTO: 103.2 FL
MCV RBC AUTO: 105.2 FL — HIGH (ref 80–100)
MCV RBC AUTO: 84.4 FL — SIGNIFICANT CHANGE UP (ref 80–100)
MCV RBC AUTO: 84.6 FL — SIGNIFICANT CHANGE UP (ref 80–100)
MCV RBC AUTO: 84.7 FL — SIGNIFICANT CHANGE UP (ref 80–100)
MCV RBC AUTO: 85.2 FL — SIGNIFICANT CHANGE UP (ref 80–100)
MCV RBC AUTO: 85.3 FL — SIGNIFICANT CHANGE UP (ref 80–100)
MCV RBC AUTO: 85.4 FL — SIGNIFICANT CHANGE UP (ref 80–100)
MCV RBC AUTO: 85.9 FL — SIGNIFICANT CHANGE UP (ref 80–100)
MCV RBC AUTO: 86.1 FL — SIGNIFICANT CHANGE UP (ref 80–100)
MCV RBC AUTO: 86.2 FL — SIGNIFICANT CHANGE UP (ref 80–100)
MCV RBC AUTO: 86.2 FL — SIGNIFICANT CHANGE UP (ref 80–100)
MCV RBC AUTO: 86.6 FL — SIGNIFICANT CHANGE UP (ref 80–100)
MCV RBC AUTO: 86.6 FL — SIGNIFICANT CHANGE UP (ref 80–100)
MCV RBC AUTO: 86.8 FL — SIGNIFICANT CHANGE UP (ref 80–100)
MCV RBC AUTO: 86.9 FL — SIGNIFICANT CHANGE UP (ref 80–100)
MCV RBC AUTO: 87 FL — SIGNIFICANT CHANGE UP (ref 80–100)
MCV RBC AUTO: 87.1 FL — SIGNIFICANT CHANGE UP (ref 80–100)
MCV RBC AUTO: 87.2 FL — SIGNIFICANT CHANGE UP (ref 80–100)
MCV RBC AUTO: 87.3 FL — SIGNIFICANT CHANGE UP (ref 80–100)
MCV RBC AUTO: 87.5 FL — SIGNIFICANT CHANGE UP (ref 80–100)
MCV RBC AUTO: 87.7 FL — SIGNIFICANT CHANGE UP (ref 80–100)
MCV RBC AUTO: 87.8 FL — SIGNIFICANT CHANGE UP (ref 80–100)
MCV RBC AUTO: 87.9 FL — SIGNIFICANT CHANGE UP (ref 80–100)
MCV RBC AUTO: 88 FL — SIGNIFICANT CHANGE UP (ref 80–100)
MCV RBC AUTO: 88.1 FL — SIGNIFICANT CHANGE UP (ref 80–100)
MCV RBC AUTO: 88.1 FL — SIGNIFICANT CHANGE UP (ref 80–100)
MCV RBC AUTO: 88.3 FL — SIGNIFICANT CHANGE UP (ref 80–100)
MCV RBC AUTO: 88.4 FL — SIGNIFICANT CHANGE UP (ref 80–100)
MCV RBC AUTO: 88.5 FL — SIGNIFICANT CHANGE UP (ref 80–100)
MCV RBC AUTO: 88.5 FL — SIGNIFICANT CHANGE UP (ref 80–100)
MCV RBC AUTO: 88.8 FL — SIGNIFICANT CHANGE UP (ref 80–100)
MCV RBC AUTO: 89.1 FL — SIGNIFICANT CHANGE UP (ref 80–100)
MCV RBC AUTO: 89.1 FL — SIGNIFICANT CHANGE UP (ref 80–100)
MCV RBC AUTO: 89.3 FL — SIGNIFICANT CHANGE UP (ref 80–100)
MCV RBC AUTO: 89.4 FL — SIGNIFICANT CHANGE UP (ref 80–100)
MCV RBC AUTO: 89.6 FL — SIGNIFICANT CHANGE UP (ref 80–100)
MCV RBC AUTO: 89.8 FL — SIGNIFICANT CHANGE UP (ref 80–100)
MCV RBC AUTO: 89.8 FL — SIGNIFICANT CHANGE UP (ref 80–100)
MCV RBC AUTO: 90 FL — SIGNIFICANT CHANGE UP (ref 80–100)
MCV RBC AUTO: 90.3 FL — SIGNIFICANT CHANGE UP (ref 80–100)
MCV RBC AUTO: 90.4 FL — SIGNIFICANT CHANGE UP (ref 80–100)
MCV RBC AUTO: 90.8 FL — SIGNIFICANT CHANGE UP (ref 80–100)
MCV RBC AUTO: 91.5 FL — SIGNIFICANT CHANGE UP (ref 80–100)
MCV RBC AUTO: 91.8 FL — SIGNIFICANT CHANGE UP (ref 80–100)
MCV RBC AUTO: 92.5 FL — SIGNIFICANT CHANGE UP (ref 80–100)
MCV RBC AUTO: 92.8 FL — SIGNIFICANT CHANGE UP (ref 80–100)
MCV RBC AUTO: 93 FL — SIGNIFICANT CHANGE UP (ref 80–100)
MCV RBC AUTO: 93.2 FL — SIGNIFICANT CHANGE UP (ref 80–100)
MCV RBC AUTO: 93.4 FL — SIGNIFICANT CHANGE UP (ref 80–100)
MCV RBC AUTO: 93.5 FL — SIGNIFICANT CHANGE UP (ref 80–100)
MCV RBC AUTO: 93.6 FL — SIGNIFICANT CHANGE UP (ref 80–100)
MCV RBC AUTO: 93.8 FL — SIGNIFICANT CHANGE UP (ref 80–100)
MCV RBC AUTO: 94.3 FL — SIGNIFICANT CHANGE UP (ref 80–100)
MCV RBC AUTO: 94.3 FL — SIGNIFICANT CHANGE UP (ref 80–100)
MCV RBC AUTO: 94.5 FL — SIGNIFICANT CHANGE UP (ref 80–100)
MCV RBC AUTO: 94.7 FL — SIGNIFICANT CHANGE UP (ref 80–100)
MCV RBC AUTO: 94.8 FL — SIGNIFICANT CHANGE UP (ref 80–100)
MCV RBC AUTO: 95 FL — SIGNIFICANT CHANGE UP (ref 80–100)
MCV RBC AUTO: 95.3 FL — SIGNIFICANT CHANGE UP (ref 80–100)
MCV RBC AUTO: 95.4 FL — SIGNIFICANT CHANGE UP (ref 80–100)
MCV RBC AUTO: 95.7 FL — SIGNIFICANT CHANGE UP (ref 80–100)
MCV RBC AUTO: 95.8 FL — SIGNIFICANT CHANGE UP (ref 80–100)
MCV RBC AUTO: 96.6 FL — SIGNIFICANT CHANGE UP (ref 80–100)
MCV RBC AUTO: 97.8 FL — SIGNIFICANT CHANGE UP (ref 80–100)
MCV RBC AUTO: 98.1 FL — SIGNIFICANT CHANGE UP (ref 80–100)
MCV RBC AUTO: 98.1 FL — SIGNIFICANT CHANGE UP (ref 80–100)
MCV RBC AUTO: 98.3 FL — SIGNIFICANT CHANGE UP (ref 80–100)
MCV RBC AUTO: 98.7 FL — SIGNIFICANT CHANGE UP (ref 80–100)
MCV RBC AUTO: 98.7 FL — SIGNIFICANT CHANGE UP (ref 80–100)
MCV RBC AUTO: 98.9 FL — SIGNIFICANT CHANGE UP (ref 80–100)
MCV RBC AUTO: 99.7 FL — SIGNIFICANT CHANGE UP (ref 80–100)
METAMYELOCYTES # FLD: 0.9 % — HIGH (ref 0–0)
METAMYELOCYTES # FLD: 1.7 % — HIGH (ref 0–0)
METAMYELOCYTES # FLD: 3.5 % — HIGH (ref 0–0)
METHOD TYPE: SIGNIFICANT CHANGE UP
MICROCYTES BLD QL: SLIGHT — SIGNIFICANT CHANGE UP
MICROSCOPIC-UA: NORMAL
MONOCYTES # BLD AUTO: 0 K/UL — SIGNIFICANT CHANGE UP (ref 0–0.9)
MONOCYTES # BLD AUTO: 0.01 K/UL — SIGNIFICANT CHANGE UP (ref 0–0.9)
MONOCYTES # BLD AUTO: 0.01 K/UL — SIGNIFICANT CHANGE UP (ref 0–0.9)
MONOCYTES # BLD AUTO: 0.02 K/UL — SIGNIFICANT CHANGE UP (ref 0–0.9)
MONOCYTES # BLD AUTO: 0.03 K/UL — SIGNIFICANT CHANGE UP (ref 0–0.9)
MONOCYTES # BLD AUTO: 0.04 K/UL — SIGNIFICANT CHANGE UP (ref 0–0.9)
MONOCYTES # BLD AUTO: 0.05 K/UL — SIGNIFICANT CHANGE UP (ref 0–0.9)
MONOCYTES # BLD AUTO: 0.07 K/UL — SIGNIFICANT CHANGE UP (ref 0–0.9)
MONOCYTES # BLD AUTO: 0.09 K/UL — SIGNIFICANT CHANGE UP (ref 0–0.9)
MONOCYTES # BLD AUTO: 0.09 K/UL — SIGNIFICANT CHANGE UP (ref 0–0.9)
MONOCYTES # BLD AUTO: 0.1 K/UL — SIGNIFICANT CHANGE UP (ref 0–0.9)
MONOCYTES # BLD AUTO: 0.11 K/UL — SIGNIFICANT CHANGE UP (ref 0–0.9)
MONOCYTES # BLD AUTO: 0.13 K/UL — SIGNIFICANT CHANGE UP (ref 0–0.9)
MONOCYTES # BLD AUTO: 0.14 K/UL — SIGNIFICANT CHANGE UP (ref 0–0.9)
MONOCYTES # BLD AUTO: 0.15 K/UL — SIGNIFICANT CHANGE UP (ref 0–0.9)
MONOCYTES # BLD AUTO: 0.15 K/UL — SIGNIFICANT CHANGE UP (ref 0–0.9)
MONOCYTES # BLD AUTO: 0.16 K/UL — SIGNIFICANT CHANGE UP (ref 0–0.9)
MONOCYTES # BLD AUTO: 0.2 K/UL — SIGNIFICANT CHANGE UP (ref 0–0.9)
MONOCYTES # BLD AUTO: 0.22 K/UL — SIGNIFICANT CHANGE UP (ref 0–0.9)
MONOCYTES # BLD AUTO: 0.24 K/UL — SIGNIFICANT CHANGE UP (ref 0–0.9)
MONOCYTES # BLD AUTO: 0.25 K/UL — SIGNIFICANT CHANGE UP (ref 0–0.9)
MONOCYTES # BLD AUTO: 0.26 K/UL
MONOCYTES # BLD AUTO: 0.26 K/UL — SIGNIFICANT CHANGE UP (ref 0–0.9)
MONOCYTES # BLD AUTO: 0.3 K/UL — SIGNIFICANT CHANGE UP (ref 0–0.9)
MONOCYTES # BLD AUTO: 0.3 K/UL — SIGNIFICANT CHANGE UP (ref 0–0.9)
MONOCYTES # BLD AUTO: 0.32 K/UL — SIGNIFICANT CHANGE UP (ref 0–0.9)
MONOCYTES # BLD AUTO: 0.33 K/UL — SIGNIFICANT CHANGE UP (ref 0–0.9)
MONOCYTES # BLD AUTO: 0.33 K/UL — SIGNIFICANT CHANGE UP (ref 0–0.9)
MONOCYTES # BLD AUTO: 0.35 K/UL — SIGNIFICANT CHANGE UP (ref 0–0.9)
MONOCYTES # BLD AUTO: 0.37 K/UL — SIGNIFICANT CHANGE UP (ref 0–0.9)
MONOCYTES # BLD AUTO: 0.39 K/UL — SIGNIFICANT CHANGE UP (ref 0–0.9)
MONOCYTES # BLD AUTO: 0.46 K/UL — SIGNIFICANT CHANGE UP (ref 0–0.9)
MONOCYTES # BLD AUTO: 0.46 K/UL — SIGNIFICANT CHANGE UP (ref 0–0.9)
MONOCYTES # BLD AUTO: 0.48 K/UL — SIGNIFICANT CHANGE UP (ref 0–0.9)
MONOCYTES # BLD AUTO: 0.48 K/UL — SIGNIFICANT CHANGE UP (ref 0–0.9)
MONOCYTES # BLD AUTO: 0.51 K/UL — SIGNIFICANT CHANGE UP (ref 0–0.9)
MONOCYTES # BLD AUTO: 0.56 K/UL — SIGNIFICANT CHANGE UP (ref 0–0.9)
MONOCYTES # BLD AUTO: 0.59 K/UL — SIGNIFICANT CHANGE UP (ref 0–0.9)
MONOCYTES # BLD AUTO: 0.62 K/UL — SIGNIFICANT CHANGE UP (ref 0–0.9)
MONOCYTES # BLD AUTO: 0.76 K/UL — SIGNIFICANT CHANGE UP (ref 0–0.9)
MONOCYTES # BLD AUTO: 1.46 K/UL — HIGH (ref 0–0.9)
MONOCYTES # BLD AUTO: 2.04 K/UL — HIGH (ref 0–0.9)
MONOCYTES # BLD AUTO: 3.23 K/UL — HIGH (ref 0–0.9)
MONOCYTES # BLD AUTO: 3.73 K/UL — HIGH (ref 0–0.9)
MONOCYTES # BLD AUTO: 4.48 K/UL — HIGH (ref 0–0.9)
MONOCYTES # BLD AUTO: 4.67 K/UL — HIGH (ref 0–0.9)
MONOCYTES # BLD AUTO: 4.79 K/UL — HIGH (ref 0–0.9)
MONOCYTES # BLD AUTO: 4.89 K/UL — HIGH (ref 0–0.9)
MONOCYTES # BLD AUTO: 6.09 K/UL — HIGH (ref 0–0.9)
MONOCYTES # BLD AUTO: 7.43 K/UL — HIGH (ref 0–0.9)
MONOCYTES # BLD AUTO: 7.99 K/UL — HIGH (ref 0–0.9)
MONOCYTES NFR BLD AUTO: 0 % — LOW (ref 2–14)
MONOCYTES NFR BLD AUTO: 0.9 % — LOW (ref 2–14)
MONOCYTES NFR BLD AUTO: 1 % — LOW (ref 2–14)
MONOCYTES NFR BLD AUTO: 1 % — LOW (ref 2–14)
MONOCYTES NFR BLD AUTO: 10 % — SIGNIFICANT CHANGE UP (ref 2–14)
MONOCYTES NFR BLD AUTO: 10.1 % — SIGNIFICANT CHANGE UP (ref 2–14)
MONOCYTES NFR BLD AUTO: 10.6 % — SIGNIFICANT CHANGE UP (ref 2–14)
MONOCYTES NFR BLD AUTO: 11 % — SIGNIFICANT CHANGE UP (ref 2–14)
MONOCYTES NFR BLD AUTO: 11.8 % — SIGNIFICANT CHANGE UP (ref 2–14)
MONOCYTES NFR BLD AUTO: 11.9 % — SIGNIFICANT CHANGE UP (ref 2–14)
MONOCYTES NFR BLD AUTO: 12.1 % — SIGNIFICANT CHANGE UP (ref 2–14)
MONOCYTES NFR BLD AUTO: 12.3 % — SIGNIFICANT CHANGE UP (ref 2–14)
MONOCYTES NFR BLD AUTO: 12.4 % — SIGNIFICANT CHANGE UP (ref 2–14)
MONOCYTES NFR BLD AUTO: 13 % — SIGNIFICANT CHANGE UP (ref 2–14)
MONOCYTES NFR BLD AUTO: 13 % — SIGNIFICANT CHANGE UP (ref 2–14)
MONOCYTES NFR BLD AUTO: 13.4 % — SIGNIFICANT CHANGE UP (ref 2–14)
MONOCYTES NFR BLD AUTO: 13.4 % — SIGNIFICANT CHANGE UP (ref 2–14)
MONOCYTES NFR BLD AUTO: 17.2 % — HIGH (ref 2–14)
MONOCYTES NFR BLD AUTO: 19 % — HIGH (ref 2–14)
MONOCYTES NFR BLD AUTO: 2 % — SIGNIFICANT CHANGE UP (ref 2–14)
MONOCYTES NFR BLD AUTO: 2.3 % — SIGNIFICANT CHANGE UP (ref 2–14)
MONOCYTES NFR BLD AUTO: 2.6 % — SIGNIFICANT CHANGE UP (ref 2–14)
MONOCYTES NFR BLD AUTO: 2.6 % — SIGNIFICANT CHANGE UP (ref 2–14)
MONOCYTES NFR BLD AUTO: 2.7 % — SIGNIFICANT CHANGE UP (ref 2–14)
MONOCYTES NFR BLD AUTO: 21 % — HIGH (ref 2–14)
MONOCYTES NFR BLD AUTO: 22.1 % — HIGH (ref 2–14)
MONOCYTES NFR BLD AUTO: 22.8 % — HIGH (ref 2–14)
MONOCYTES NFR BLD AUTO: 23.9 % — HIGH (ref 2–14)
MONOCYTES NFR BLD AUTO: 24.3 % — HIGH (ref 2–14)
MONOCYTES NFR BLD AUTO: 24.4 % — HIGH (ref 2–14)
MONOCYTES NFR BLD AUTO: 3.2 % — SIGNIFICANT CHANGE UP (ref 2–14)
MONOCYTES NFR BLD AUTO: 3.5 % — SIGNIFICANT CHANGE UP (ref 2–14)
MONOCYTES NFR BLD AUTO: 3.5 % — SIGNIFICANT CHANGE UP (ref 2–14)
MONOCYTES NFR BLD AUTO: 3.7 % — SIGNIFICANT CHANGE UP (ref 2–14)
MONOCYTES NFR BLD AUTO: 32.1 % — HIGH (ref 2–14)
MONOCYTES NFR BLD AUTO: 34.8 % — HIGH (ref 2–14)
MONOCYTES NFR BLD AUTO: 4 % — SIGNIFICANT CHANGE UP (ref 2–14)
MONOCYTES NFR BLD AUTO: 4.3 % — SIGNIFICANT CHANGE UP (ref 2–14)
MONOCYTES NFR BLD AUTO: 4.4 % — SIGNIFICANT CHANGE UP (ref 2–14)
MONOCYTES NFR BLD AUTO: 4.5 % — SIGNIFICANT CHANGE UP (ref 2–14)
MONOCYTES NFR BLD AUTO: 4.9 % — SIGNIFICANT CHANGE UP (ref 2–14)
MONOCYTES NFR BLD AUTO: 5.1 % — SIGNIFICANT CHANGE UP (ref 2–14)
MONOCYTES NFR BLD AUTO: 5.3 % — SIGNIFICANT CHANGE UP (ref 2–14)
MONOCYTES NFR BLD AUTO: 5.4 %
MONOCYTES NFR BLD AUTO: 5.7 % — SIGNIFICANT CHANGE UP (ref 2–14)
MONOCYTES NFR BLD AUTO: 5.9 % — SIGNIFICANT CHANGE UP (ref 2–14)
MONOCYTES NFR BLD AUTO: 6 % — SIGNIFICANT CHANGE UP (ref 2–14)
MONOCYTES NFR BLD AUTO: 6 % — SIGNIFICANT CHANGE UP (ref 2–14)
MONOCYTES NFR BLD AUTO: 6.1 % — SIGNIFICANT CHANGE UP (ref 2–14)
MONOCYTES NFR BLD AUTO: 6.1 % — SIGNIFICANT CHANGE UP (ref 2–14)
MONOCYTES NFR BLD AUTO: 6.2 % — SIGNIFICANT CHANGE UP (ref 2–14)
MONOCYTES NFR BLD AUTO: 6.3 % — SIGNIFICANT CHANGE UP (ref 2–14)
MONOCYTES NFR BLD AUTO: 6.5 % — SIGNIFICANT CHANGE UP (ref 2–14)
MONOCYTES NFR BLD AUTO: 6.8 % — SIGNIFICANT CHANGE UP (ref 2–14)
MONOCYTES NFR BLD AUTO: 7.2 % — SIGNIFICANT CHANGE UP (ref 2–14)
MONOCYTES NFR BLD AUTO: 7.6 % — SIGNIFICANT CHANGE UP (ref 2–14)
MONOCYTES NFR BLD AUTO: 8 % — SIGNIFICANT CHANGE UP (ref 2–14)
MONOCYTES NFR BLD AUTO: 8 % — SIGNIFICANT CHANGE UP (ref 2–14)
MONOCYTES NFR BLD AUTO: 8.5 % — SIGNIFICANT CHANGE UP (ref 2–14)
MONOCYTES NFR BLD AUTO: 8.5 % — SIGNIFICANT CHANGE UP (ref 2–14)
MONOCYTES NFR BLD AUTO: 8.7 % — SIGNIFICANT CHANGE UP (ref 2–14)
MONOCYTES NFR BLD AUTO: 8.9 % — SIGNIFICANT CHANGE UP (ref 2–14)
MONOCYTES NFR BLD AUTO: 9 % — SIGNIFICANT CHANGE UP (ref 2–14)
MONOCYTES NFR BLD AUTO: 9.3 % — SIGNIFICANT CHANGE UP (ref 2–14)
MONOCYTES NFR BLD AUTO: 9.7 % — SIGNIFICANT CHANGE UP (ref 2–14)
MONOCYTES NFR BLD AUTO: 9.8 % — SIGNIFICANT CHANGE UP (ref 2–14)
MPO AB + PR3 PNL SER: NORMAL
MPO AB + PR3 PNL SER: SIGNIFICANT CHANGE UP
MYELOCYTES NFR BLD: 0.9 % — HIGH (ref 0–0)
MYELOCYTES NFR BLD: 1.7 % — HIGH (ref 0–0)
MYELOCYTES NFR BLD: 1.8 % — HIGH (ref 0–0)
MYELOCYTES NFR BLD: 3.4 % — HIGH (ref 0–0)
NEUTROPHILS # BLD AUTO: 0.01 K/UL — LOW (ref 1.8–7.4)
NEUTROPHILS # BLD AUTO: 0.02 K/UL — LOW (ref 1.8–7.4)
NEUTROPHILS # BLD AUTO: 0.04 K/UL — LOW (ref 1.8–7.4)
NEUTROPHILS # BLD AUTO: 0.04 K/UL — LOW (ref 1.8–7.4)
NEUTROPHILS # BLD AUTO: 0.09 K/UL — LOW (ref 1.8–7.4)
NEUTROPHILS # BLD AUTO: 0.11 K/UL — LOW (ref 1.8–7.4)
NEUTROPHILS # BLD AUTO: 0.11 K/UL — LOW (ref 1.8–7.4)
NEUTROPHILS # BLD AUTO: 0.13 K/UL — LOW (ref 1.8–7.4)
NEUTROPHILS # BLD AUTO: 0.14 K/UL — LOW (ref 1.8–7.4)
NEUTROPHILS # BLD AUTO: 0.16 K/UL — LOW (ref 1.8–7.4)
NEUTROPHILS # BLD AUTO: 0.16 K/UL — LOW (ref 1.8–7.4)
NEUTROPHILS # BLD AUTO: 0.17 K/UL — LOW (ref 1.8–7.4)
NEUTROPHILS # BLD AUTO: 0.18 K/UL — LOW (ref 1.8–7.4)
NEUTROPHILS # BLD AUTO: 0.18 K/UL — LOW (ref 1.8–7.4)
NEUTROPHILS # BLD AUTO: 0.19 K/UL — LOW (ref 1.8–7.4)
NEUTROPHILS # BLD AUTO: 0.2 K/UL — LOW (ref 1.8–7.4)
NEUTROPHILS # BLD AUTO: 0.21 K/UL — LOW (ref 1.8–7.4)
NEUTROPHILS # BLD AUTO: 0.21 K/UL — LOW (ref 1.8–7.4)
NEUTROPHILS # BLD AUTO: 0.22 K/UL — LOW (ref 1.8–7.4)
NEUTROPHILS # BLD AUTO: 0.23 K/UL — LOW (ref 1.8–7.4)
NEUTROPHILS # BLD AUTO: 0.25 K/UL — LOW (ref 1.8–7.4)
NEUTROPHILS # BLD AUTO: 0.25 K/UL — LOW (ref 1.8–7.4)
NEUTROPHILS # BLD AUTO: 0.32 K/UL — LOW (ref 1.8–7.4)
NEUTROPHILS # BLD AUTO: 0.37 K/UL — LOW (ref 1.8–7.4)
NEUTROPHILS # BLD AUTO: 0.39 K/UL — LOW (ref 1.8–7.4)
NEUTROPHILS # BLD AUTO: 0.4 K/UL — LOW (ref 1.8–7.4)
NEUTROPHILS # BLD AUTO: 0.41 K/UL — LOW (ref 1.8–7.4)
NEUTROPHILS # BLD AUTO: 0.42 K/UL — LOW (ref 1.8–7.4)
NEUTROPHILS # BLD AUTO: 0.48 K/UL — LOW (ref 1.8–7.4)
NEUTROPHILS # BLD AUTO: 0.56 K/UL — LOW (ref 1.8–7.4)
NEUTROPHILS # BLD AUTO: 0.63 K/UL — LOW (ref 1.8–7.4)
NEUTROPHILS # BLD AUTO: 0.68 K/UL — LOW (ref 1.8–7.4)
NEUTROPHILS # BLD AUTO: 1.04 K/UL — LOW (ref 1.8–7.4)
NEUTROPHILS # BLD AUTO: 1.12 K/UL — LOW (ref 1.8–7.4)
NEUTROPHILS # BLD AUTO: 1.16 K/UL — LOW (ref 1.8–7.4)
NEUTROPHILS # BLD AUTO: 1.5 K/UL — LOW (ref 1.8–7.4)
NEUTROPHILS # BLD AUTO: 1.88 K/UL — SIGNIFICANT CHANGE UP (ref 1.8–7.4)
NEUTROPHILS # BLD AUTO: 1.9 K/UL — SIGNIFICANT CHANGE UP (ref 1.8–7.4)
NEUTROPHILS # BLD AUTO: 1.91 K/UL — SIGNIFICANT CHANGE UP (ref 1.8–7.4)
NEUTROPHILS # BLD AUTO: 1.95 K/UL — SIGNIFICANT CHANGE UP (ref 1.8–7.4)
NEUTROPHILS # BLD AUTO: 10.1 K/UL — HIGH (ref 1.8–7.4)
NEUTROPHILS # BLD AUTO: 10.15 K/UL — HIGH (ref 1.8–7.4)
NEUTROPHILS # BLD AUTO: 11.97 K/UL — HIGH (ref 1.8–7.4)
NEUTROPHILS # BLD AUTO: 2.27 K/UL — SIGNIFICANT CHANGE UP (ref 1.8–7.4)
NEUTROPHILS # BLD AUTO: 2.36 K/UL — SIGNIFICANT CHANGE UP (ref 1.8–7.4)
NEUTROPHILS # BLD AUTO: 2.44 K/UL — SIGNIFICANT CHANGE UP (ref 1.8–7.4)
NEUTROPHILS # BLD AUTO: 2.52 K/UL — SIGNIFICANT CHANGE UP (ref 1.8–7.4)
NEUTROPHILS # BLD AUTO: 2.7 K/UL — SIGNIFICANT CHANGE UP (ref 1.8–7.4)
NEUTROPHILS # BLD AUTO: 2.75 K/UL — SIGNIFICANT CHANGE UP (ref 1.8–7.4)
NEUTROPHILS # BLD AUTO: 2.81 K/UL — SIGNIFICANT CHANGE UP (ref 1.8–7.4)
NEUTROPHILS # BLD AUTO: 2.83 K/UL — SIGNIFICANT CHANGE UP (ref 1.8–7.4)
NEUTROPHILS # BLD AUTO: 2.85 K/UL — SIGNIFICANT CHANGE UP (ref 1.8–7.4)
NEUTROPHILS # BLD AUTO: 2.9 K/UL
NEUTROPHILS # BLD AUTO: 2.97 K/UL — SIGNIFICANT CHANGE UP (ref 1.8–7.4)
NEUTROPHILS # BLD AUTO: 3.08 K/UL — SIGNIFICANT CHANGE UP (ref 1.8–7.4)
NEUTROPHILS # BLD AUTO: 3.2 K/UL — SIGNIFICANT CHANGE UP (ref 1.8–7.4)
NEUTROPHILS # BLD AUTO: 3.25 K/UL — SIGNIFICANT CHANGE UP (ref 1.8–7.4)
NEUTROPHILS # BLD AUTO: 3.46 K/UL — SIGNIFICANT CHANGE UP (ref 1.8–7.4)
NEUTROPHILS # BLD AUTO: 3.6 K/UL — SIGNIFICANT CHANGE UP (ref 1.8–7.4)
NEUTROPHILS # BLD AUTO: 3.66 K/UL — SIGNIFICANT CHANGE UP (ref 1.8–7.4)
NEUTROPHILS # BLD AUTO: 3.76 K/UL — SIGNIFICANT CHANGE UP (ref 1.8–7.4)
NEUTROPHILS # BLD AUTO: 4 K/UL — SIGNIFICANT CHANGE UP (ref 1.8–7.4)
NEUTROPHILS # BLD AUTO: 4.91 K/UL — SIGNIFICANT CHANGE UP (ref 1.8–7.4)
NEUTROPHILS # BLD AUTO: 5.04 K/UL — SIGNIFICANT CHANGE UP (ref 1.8–7.4)
NEUTROPHILS # BLD AUTO: 6.1 K/UL — SIGNIFICANT CHANGE UP (ref 1.8–7.4)
NEUTROPHILS # BLD AUTO: 7.54 K/UL — HIGH (ref 1.8–7.4)
NEUTROPHILS # BLD AUTO: 7.73 K/UL — HIGH (ref 1.8–7.4)
NEUTROPHILS # BLD AUTO: 7.86 K/UL — HIGH (ref 1.8–7.4)
NEUTROPHILS # BLD AUTO: 8.2 K/UL — HIGH (ref 1.8–7.4)
NEUTROPHILS # BLD AUTO: 8.71 K/UL — HIGH (ref 1.8–7.4)
NEUTROPHILS # BLD AUTO: 9.31 K/UL — HIGH (ref 1.8–7.4)
NEUTROPHILS # BLD AUTO: 9.48 K/UL — HIGH (ref 1.8–7.4)
NEUTROPHILS NFR BLD AUTO: 10.4 % — LOW (ref 43–77)
NEUTROPHILS NFR BLD AUTO: 11.2 % — LOW (ref 43–77)
NEUTROPHILS NFR BLD AUTO: 12.2 % — LOW (ref 43–77)
NEUTROPHILS NFR BLD AUTO: 12.4 % — LOW (ref 43–77)
NEUTROPHILS NFR BLD AUTO: 15.9 % — LOW (ref 43–77)
NEUTROPHILS NFR BLD AUTO: 16 % — LOW (ref 43–77)
NEUTROPHILS NFR BLD AUTO: 17.1 % — LOW (ref 43–77)
NEUTROPHILS NFR BLD AUTO: 17.5 % — LOW (ref 43–77)
NEUTROPHILS NFR BLD AUTO: 18 % — LOW (ref 43–77)
NEUTROPHILS NFR BLD AUTO: 18 % — LOW (ref 43–77)
NEUTROPHILS NFR BLD AUTO: 18.3 % — LOW (ref 43–77)
NEUTROPHILS NFR BLD AUTO: 18.8 % — LOW (ref 43–77)
NEUTROPHILS NFR BLD AUTO: 19 % — LOW (ref 43–77)
NEUTROPHILS NFR BLD AUTO: 2 % — LOW (ref 43–77)
NEUTROPHILS NFR BLD AUTO: 2.7 % — LOW (ref 43–77)
NEUTROPHILS NFR BLD AUTO: 21.6 % — LOW (ref 43–77)
NEUTROPHILS NFR BLD AUTO: 24.6 % — LOW (ref 43–77)
NEUTROPHILS NFR BLD AUTO: 24.6 % — LOW (ref 43–77)
NEUTROPHILS NFR BLD AUTO: 27.3 % — LOW (ref 43–77)
NEUTROPHILS NFR BLD AUTO: 29.3 % — LOW (ref 43–77)
NEUTROPHILS NFR BLD AUTO: 30.7 % — LOW (ref 43–77)
NEUTROPHILS NFR BLD AUTO: 32.1 % — LOW (ref 43–77)
NEUTROPHILS NFR BLD AUTO: 32.8 % — LOW (ref 43–77)
NEUTROPHILS NFR BLD AUTO: 33.8 % — LOW (ref 43–77)
NEUTROPHILS NFR BLD AUTO: 34 % — LOW (ref 43–77)
NEUTROPHILS NFR BLD AUTO: 34.2 % — LOW (ref 43–77)
NEUTROPHILS NFR BLD AUTO: 34.5 % — LOW (ref 43–77)
NEUTROPHILS NFR BLD AUTO: 34.8 % — LOW (ref 43–77)
NEUTROPHILS NFR BLD AUTO: 35.4 % — LOW (ref 43–77)
NEUTROPHILS NFR BLD AUTO: 35.7 % — LOW (ref 43–77)
NEUTROPHILS NFR BLD AUTO: 36.8 % — LOW (ref 43–77)
NEUTROPHILS NFR BLD AUTO: 36.9 % — LOW (ref 43–77)
NEUTROPHILS NFR BLD AUTO: 39.5 % — LOW (ref 43–77)
NEUTROPHILS NFR BLD AUTO: 40 % — LOW (ref 43–77)
NEUTROPHILS NFR BLD AUTO: 45 % — SIGNIFICANT CHANGE UP (ref 43–77)
NEUTROPHILS NFR BLD AUTO: 46.5 % — SIGNIFICANT CHANGE UP (ref 43–77)
NEUTROPHILS NFR BLD AUTO: 49.1 % — SIGNIFICANT CHANGE UP (ref 43–77)
NEUTROPHILS NFR BLD AUTO: 49.2 % — SIGNIFICANT CHANGE UP (ref 43–77)
NEUTROPHILS NFR BLD AUTO: 49.6 % — SIGNIFICANT CHANGE UP (ref 43–77)
NEUTROPHILS NFR BLD AUTO: 50.1 % — SIGNIFICANT CHANGE UP (ref 43–77)
NEUTROPHILS NFR BLD AUTO: 50.9 % — SIGNIFICANT CHANGE UP (ref 43–77)
NEUTROPHILS NFR BLD AUTO: 51.2 % — SIGNIFICANT CHANGE UP (ref 43–77)
NEUTROPHILS NFR BLD AUTO: 51.4 % — SIGNIFICANT CHANGE UP (ref 43–77)
NEUTROPHILS NFR BLD AUTO: 52 % — SIGNIFICANT CHANGE UP (ref 43–77)
NEUTROPHILS NFR BLD AUTO: 52.4 % — SIGNIFICANT CHANGE UP (ref 43–77)
NEUTROPHILS NFR BLD AUTO: 55.2 % — SIGNIFICANT CHANGE UP (ref 43–77)
NEUTROPHILS NFR BLD AUTO: 56.6 % — SIGNIFICANT CHANGE UP (ref 43–77)
NEUTROPHILS NFR BLD AUTO: 60 % — SIGNIFICANT CHANGE UP (ref 43–77)
NEUTROPHILS NFR BLD AUTO: 60.1 %
NEUTROPHILS NFR BLD AUTO: 60.2 % — SIGNIFICANT CHANGE UP (ref 43–77)
NEUTROPHILS NFR BLD AUTO: 60.6 % — SIGNIFICANT CHANGE UP (ref 43–77)
NEUTROPHILS NFR BLD AUTO: 60.8 % — SIGNIFICANT CHANGE UP (ref 43–77)
NEUTROPHILS NFR BLD AUTO: 61 % — SIGNIFICANT CHANGE UP (ref 43–77)
NEUTROPHILS NFR BLD AUTO: 61.7 % — SIGNIFICANT CHANGE UP (ref 43–77)
NEUTROPHILS NFR BLD AUTO: 63 % — SIGNIFICANT CHANGE UP (ref 43–77)
NEUTROPHILS NFR BLD AUTO: 63.5 % — SIGNIFICANT CHANGE UP (ref 43–77)
NEUTROPHILS NFR BLD AUTO: 64.4 % — SIGNIFICANT CHANGE UP (ref 43–77)
NEUTROPHILS NFR BLD AUTO: 65.1 % — SIGNIFICANT CHANGE UP (ref 43–77)
NEUTROPHILS NFR BLD AUTO: 65.5 % — SIGNIFICANT CHANGE UP (ref 43–77)
NEUTROPHILS NFR BLD AUTO: 65.6 % — SIGNIFICANT CHANGE UP (ref 43–77)
NEUTROPHILS NFR BLD AUTO: 66.7 % — SIGNIFICANT CHANGE UP (ref 43–77)
NEUTROPHILS NFR BLD AUTO: 66.8 % — SIGNIFICANT CHANGE UP (ref 43–77)
NEUTROPHILS NFR BLD AUTO: 68.1 % — SIGNIFICANT CHANGE UP (ref 43–77)
NEUTROPHILS NFR BLD AUTO: 68.7 % — SIGNIFICANT CHANGE UP (ref 43–77)
NEUTROPHILS NFR BLD AUTO: 69.7 % — SIGNIFICANT CHANGE UP (ref 43–77)
NEUTROPHILS NFR BLD AUTO: 7 % — LOW (ref 43–77)
NEUTROPHILS NFR BLD AUTO: 70.5 % — SIGNIFICANT CHANGE UP (ref 43–77)
NEUTROPHILS NFR BLD AUTO: 79 % — HIGH (ref 43–77)
NEUTROPHILS NFR BLD AUTO: 8.7 % — LOW (ref 43–77)
NEUTROPHILS NFR BLD AUTO: 82.9 % — HIGH (ref 43–77)
NEUTROPHILS NFR BLD AUTO: 85 % — HIGH (ref 43–77)
NEUTROPHILS NFR BLD AUTO: 9.8 % — LOW (ref 43–77)
NEUTS BAND # BLD: 0.8 % — SIGNIFICANT CHANGE UP (ref 0–8)
NEUTS BAND # BLD: 0.9 % — SIGNIFICANT CHANGE UP (ref 0–8)
NEUTS BAND # BLD: 1 % — SIGNIFICANT CHANGE UP (ref 0–8)
NITRITE UR-MCNC: NEGATIVE — SIGNIFICANT CHANGE UP
NITRITE UR-MCNC: NEGATIVE — SIGNIFICANT CHANGE UP
NITRITE URINE: NEGATIVE
NRBC # BLD: 0 /100 WBCS — SIGNIFICANT CHANGE UP (ref 0–0)
NRBC # BLD: 0 /100 — SIGNIFICANT CHANGE UP (ref 0–0)
NRBC # BLD: 1 /100 WBCS — HIGH (ref 0–0)
NRBC # BLD: 1 /100 — HIGH (ref 0–0)
NRBC # BLD: 10 /100 WBCS — HIGH (ref 0–0)
NRBC # BLD: 12 /100 WBCS — HIGH (ref 0–0)
NRBC # BLD: 12 /100 — HIGH (ref 0–0)
NRBC # BLD: 12 /100 — HIGH (ref 0–0)
NRBC # BLD: 13 /100 WBCS — HIGH (ref 0–0)
NRBC # BLD: 2 /100 WBCS — HIGH (ref 0–0)
NRBC # BLD: 2 /100 — HIGH (ref 0–0)
NRBC # BLD: 3 /100 WBCS — HIGH (ref 0–0)
NRBC # BLD: 3 /100 — HIGH (ref 0–0)
NRBC # BLD: 4 /100 — HIGH (ref 0–0)
NRBC # BLD: 4 /100 — HIGH (ref 0–0)
NRBC # BLD: 5 /100 — HIGH (ref 0–0)
NRBC # BLD: 5 /100 — HIGH (ref 0–0)
NRBC # BLD: 6 /100 WBCS — HIGH (ref 0–0)
NRBC # BLD: 6 /100 — HIGH (ref 0–0)
NRBC # BLD: 9 /100 WBCS — HIGH (ref 0–0)
NRBC # BLD: SIGNIFICANT CHANGE UP /100 WBCS (ref 0–0)
NT-PROBNP SERPL-SCNC: HIGH PG/ML (ref 0–300)
ONKOSIGHT MYELOID SEQUENCE: (no result)
ORGANISM # SPEC MICROSCOPIC CNT: SIGNIFICANT CHANGE UP
OVALOCYTES BLD QL SMEAR: SLIGHT — SIGNIFICANT CHANGE UP
OVALOCYTES BLD QL SMEAR: SLIGHT — SIGNIFICANT CHANGE UP
P-ANCA SER-ACNC: NEGATIVE — SIGNIFICANT CHANGE UP
PCO2 BLDV: 46 MMHG — SIGNIFICANT CHANGE UP (ref 42–55)
PCO2 BLDV: 50 MMHG — SIGNIFICANT CHANGE UP (ref 42–55)
PH BLDV: 7.33 — SIGNIFICANT CHANGE UP (ref 7.32–7.43)
PH BLDV: 7.36 — SIGNIFICANT CHANGE UP (ref 7.32–7.43)
PH UR: 6 — SIGNIFICANT CHANGE UP (ref 5–8)
PH UR: 7 — SIGNIFICANT CHANGE UP (ref 5–8)
PH URINE: 7
PHOSPHATE SERPL-MCNC: 2.6 MG/DL — SIGNIFICANT CHANGE UP (ref 2.5–4.5)
PHOSPHATE SERPL-MCNC: 2.8 MG/DL — SIGNIFICANT CHANGE UP (ref 2.5–4.5)
PHOSPHATE SERPL-MCNC: 2.9 MG/DL — SIGNIFICANT CHANGE UP (ref 2.5–4.5)
PHOSPHATE SERPL-MCNC: 3 MG/DL — SIGNIFICANT CHANGE UP (ref 2.5–4.5)
PHOSPHATE SERPL-MCNC: 3.1 MG/DL — SIGNIFICANT CHANGE UP (ref 2.5–4.5)
PHOSPHATE SERPL-MCNC: 3.2 MG/DL — SIGNIFICANT CHANGE UP (ref 2.5–4.5)
PHOSPHATE SERPL-MCNC: 3.3 MG/DL
PHOSPHATE SERPL-MCNC: 3.3 MG/DL — SIGNIFICANT CHANGE UP (ref 2.5–4.5)
PHOSPHATE SERPL-MCNC: 3.4 MG/DL — SIGNIFICANT CHANGE UP (ref 2.5–4.5)
PHOSPHATE SERPL-MCNC: 3.5 MG/DL — SIGNIFICANT CHANGE UP (ref 2.5–4.5)
PHOSPHATE SERPL-MCNC: 3.6 MG/DL — SIGNIFICANT CHANGE UP (ref 2.5–4.5)
PHOSPHATE SERPL-MCNC: 3.7 MG/DL — SIGNIFICANT CHANGE UP (ref 2.5–4.5)
PHOSPHATE SERPL-MCNC: 3.9 MG/DL — SIGNIFICANT CHANGE UP (ref 2.5–4.5)
PHOSPHATE SERPL-MCNC: 4 MG/DL — SIGNIFICANT CHANGE UP (ref 2.5–4.5)
PHOSPHATE SERPL-MCNC: 4 MG/DL — SIGNIFICANT CHANGE UP (ref 2.5–4.5)
PHOSPHATE SERPL-MCNC: 4.1 MG/DL — SIGNIFICANT CHANGE UP (ref 2.5–4.5)
PHOSPHATE SERPL-MCNC: 4.2 MG/DL — SIGNIFICANT CHANGE UP (ref 2.5–4.5)
PHOSPHATE SERPL-MCNC: 4.3 MG/DL — SIGNIFICANT CHANGE UP (ref 2.5–4.5)
PHOSPHATE SERPL-MCNC: 4.3 MG/DL — SIGNIFICANT CHANGE UP (ref 2.5–4.5)
PHOSPHATE SERPL-MCNC: 4.5 MG/DL — SIGNIFICANT CHANGE UP (ref 2.5–4.5)
PHOSPHATE SERPL-MCNC: 4.5 MG/DL — SIGNIFICANT CHANGE UP (ref 2.5–4.5)
PHOSPHATE SERPL-MCNC: 4.6 MG/DL — HIGH (ref 2.5–4.5)
PHOSPHATE SERPL-MCNC: 5 MG/DL — HIGH (ref 2.5–4.5)
PHOSPHOLIPASE A2 RECEPTOR ELISA: <1.8 RU/ML — SIGNIFICANT CHANGE UP (ref 0–19.9)
PLAT MORPH BLD: NORMAL — SIGNIFICANT CHANGE UP
PLATELET # BLD AUTO: 10 K/UL — CRITICAL LOW (ref 150–400)
PLATELET # BLD AUTO: 11 K/UL — CRITICAL LOW (ref 150–400)
PLATELET # BLD AUTO: 12 K/UL — CRITICAL LOW (ref 150–400)
PLATELET # BLD AUTO: 121 K/UL — LOW (ref 150–400)
PLATELET # BLD AUTO: 14 K/UL — CRITICAL LOW (ref 150–400)
PLATELET # BLD AUTO: 15 K/UL — CRITICAL LOW (ref 150–400)
PLATELET # BLD AUTO: 16 K/UL — CRITICAL LOW (ref 150–400)
PLATELET # BLD AUTO: 164 K/UL — SIGNIFICANT CHANGE UP (ref 150–400)
PLATELET # BLD AUTO: 17 K/UL — CRITICAL LOW (ref 150–400)
PLATELET # BLD AUTO: 17 K/UL — CRITICAL LOW (ref 150–400)
PLATELET # BLD AUTO: 18 K/UL — CRITICAL LOW (ref 150–400)
PLATELET # BLD AUTO: 18 K/UL — CRITICAL LOW (ref 150–400)
PLATELET # BLD AUTO: 19 K/UL — CRITICAL LOW (ref 150–400)
PLATELET # BLD AUTO: 19 K/UL — CRITICAL LOW (ref 150–400)
PLATELET # BLD AUTO: 20 K/UL — CRITICAL LOW (ref 150–400)
PLATELET # BLD AUTO: 201 K/UL — SIGNIFICANT CHANGE UP (ref 150–400)
PLATELET # BLD AUTO: 21 K/UL — LOW (ref 150–400)
PLATELET # BLD AUTO: 21 K/UL — LOW (ref 150–400)
PLATELET # BLD AUTO: 212 K/UL — SIGNIFICANT CHANGE UP (ref 150–400)
PLATELET # BLD AUTO: 219 K/UL — SIGNIFICANT CHANGE UP (ref 150–400)
PLATELET # BLD AUTO: 22 K/UL — LOW (ref 150–400)
PLATELET # BLD AUTO: 232 K/UL — SIGNIFICANT CHANGE UP (ref 150–400)
PLATELET # BLD AUTO: 232 K/UL — SIGNIFICANT CHANGE UP (ref 150–400)
PLATELET # BLD AUTO: 24 K/UL — LOW (ref 150–400)
PLATELET # BLD AUTO: 249 K/UL — SIGNIFICANT CHANGE UP (ref 150–400)
PLATELET # BLD AUTO: 251 K/UL — SIGNIFICANT CHANGE UP (ref 150–400)
PLATELET # BLD AUTO: 255 K/UL — SIGNIFICANT CHANGE UP (ref 150–400)
PLATELET # BLD AUTO: 26 K/UL — LOW (ref 150–400)
PLATELET # BLD AUTO: 266 K/UL
PLATELET # BLD AUTO: 267 K/UL — SIGNIFICANT CHANGE UP (ref 150–400)
PLATELET # BLD AUTO: 27 K/UL — LOW (ref 150–400)
PLATELET # BLD AUTO: 27 K/UL — LOW (ref 150–400)
PLATELET # BLD AUTO: 284 K/UL — SIGNIFICANT CHANGE UP (ref 150–400)
PLATELET # BLD AUTO: 29 K/UL — LOW (ref 150–400)
PLATELET # BLD AUTO: 30 K/UL — LOW (ref 150–400)
PLATELET # BLD AUTO: 31 K/UL — LOW (ref 150–400)
PLATELET # BLD AUTO: 321 K/UL — SIGNIFICANT CHANGE UP (ref 150–400)
PLATELET # BLD AUTO: 325 K/UL — SIGNIFICANT CHANGE UP (ref 150–400)
PLATELET # BLD AUTO: 329 K/UL — SIGNIFICANT CHANGE UP (ref 150–400)
PLATELET # BLD AUTO: 331 K/UL — SIGNIFICANT CHANGE UP (ref 150–400)
PLATELET # BLD AUTO: 335 K/UL — SIGNIFICANT CHANGE UP (ref 150–400)
PLATELET # BLD AUTO: 336 K/UL — SIGNIFICANT CHANGE UP (ref 150–400)
PLATELET # BLD AUTO: 344 K/UL — SIGNIFICANT CHANGE UP (ref 150–400)
PLATELET # BLD AUTO: 349 K/UL — SIGNIFICANT CHANGE UP (ref 150–400)
PLATELET # BLD AUTO: 366 K/UL — SIGNIFICANT CHANGE UP (ref 150–400)
PLATELET # BLD AUTO: 37 K/UL — LOW (ref 150–400)
PLATELET # BLD AUTO: 375 K/UL — SIGNIFICANT CHANGE UP (ref 150–400)
PLATELET # BLD AUTO: 379 K/UL — SIGNIFICANT CHANGE UP (ref 150–400)
PLATELET # BLD AUTO: 388 K/UL — SIGNIFICANT CHANGE UP (ref 150–400)
PLATELET # BLD AUTO: 428 K/UL — HIGH (ref 150–400)
PLATELET # BLD AUTO: 43 K/UL — LOW (ref 150–400)
PLATELET # BLD AUTO: 43 K/UL — LOW (ref 150–400)
PLATELET # BLD AUTO: 47 K/UL — LOW (ref 150–400)
PLATELET # BLD AUTO: 60 K/UL — LOW (ref 150–400)
PLATELET # BLD AUTO: 61 K/UL — LOW (ref 150–400)
PLATELET # BLD AUTO: 7 K/UL — CRITICAL LOW (ref 150–400)
PLATELET # BLD AUTO: 75 K/UL — LOW (ref 150–400)
PLATELET # BLD AUTO: 8 K/UL — CRITICAL LOW (ref 150–400)
PLATELET # BLD AUTO: 9 K/UL — CRITICAL LOW (ref 150–400)
PLATELET COUNT - ESTIMATE: ABNORMAL
PMLR FINAL DIAGNOSIS: SIGNIFICANT CHANGE UP
PMLR SPECIMEN TYPE: SIGNIFICANT CHANGE UP
PO2 BLDV: 37 MMHG — SIGNIFICANT CHANGE UP (ref 25–45)
PO2 BLDV: 42 MMHG — SIGNIFICANT CHANGE UP (ref 25–45)
POIKILOCYTOSIS BLD QL AUTO: SIGNIFICANT CHANGE UP
POIKILOCYTOSIS BLD QL AUTO: SLIGHT — SIGNIFICANT CHANGE UP
POLYCHROMASIA BLD QL SMEAR: SLIGHT — SIGNIFICANT CHANGE UP
POTASSIUM BLDV-SCNC: 4.2 MMOL/L — SIGNIFICANT CHANGE UP (ref 3.5–5.1)
POTASSIUM BLDV-SCNC: 4.3 MMOL/L — SIGNIFICANT CHANGE UP (ref 3.5–5.1)
POTASSIUM SERPL-MCNC: 3.3 MMOL/L — LOW (ref 3.5–5.3)
POTASSIUM SERPL-MCNC: 3.5 MMOL/L — SIGNIFICANT CHANGE UP (ref 3.5–5.3)
POTASSIUM SERPL-MCNC: 3.6 MMOL/L — SIGNIFICANT CHANGE UP (ref 3.5–5.3)
POTASSIUM SERPL-MCNC: 3.7 MMOL/L — SIGNIFICANT CHANGE UP (ref 3.5–5.3)
POTASSIUM SERPL-MCNC: 3.8 MMOL/L — SIGNIFICANT CHANGE UP (ref 3.5–5.3)
POTASSIUM SERPL-MCNC: 3.9 MMOL/L — SIGNIFICANT CHANGE UP (ref 3.5–5.3)
POTASSIUM SERPL-MCNC: 4 MMOL/L — SIGNIFICANT CHANGE UP (ref 3.5–5.3)
POTASSIUM SERPL-MCNC: 4.1 MMOL/L — SIGNIFICANT CHANGE UP (ref 3.5–5.3)
POTASSIUM SERPL-MCNC: 4.2 MMOL/L — SIGNIFICANT CHANGE UP (ref 3.5–5.3)
POTASSIUM SERPL-MCNC: 4.3 MMOL/L — SIGNIFICANT CHANGE UP (ref 3.5–5.3)
POTASSIUM SERPL-MCNC: 4.4 MMOL/L — SIGNIFICANT CHANGE UP (ref 3.5–5.3)
POTASSIUM SERPL-MCNC: 4.4 MMOL/L — SIGNIFICANT CHANGE UP (ref 3.5–5.3)
POTASSIUM SERPL-MCNC: 4.5 MMOL/L — SIGNIFICANT CHANGE UP (ref 3.5–5.3)
POTASSIUM SERPL-MCNC: 4.6 MMOL/L — SIGNIFICANT CHANGE UP (ref 3.5–5.3)
POTASSIUM SERPL-MCNC: 4.7 MMOL/L — SIGNIFICANT CHANGE UP (ref 3.5–5.3)
POTASSIUM SERPL-MCNC: 4.8 MMOL/L — SIGNIFICANT CHANGE UP (ref 3.5–5.3)
POTASSIUM SERPL-MCNC: 4.8 MMOL/L — SIGNIFICANT CHANGE UP (ref 3.5–5.3)
POTASSIUM SERPL-MCNC: 5.4 MMOL/L — HIGH (ref 3.5–5.3)
POTASSIUM SERPL-SCNC: 3.3 MMOL/L — LOW (ref 3.5–5.3)
POTASSIUM SERPL-SCNC: 3.5 MMOL/L — SIGNIFICANT CHANGE UP (ref 3.5–5.3)
POTASSIUM SERPL-SCNC: 3.6 MMOL/L — SIGNIFICANT CHANGE UP (ref 3.5–5.3)
POTASSIUM SERPL-SCNC: 3.7 MMOL/L — SIGNIFICANT CHANGE UP (ref 3.5–5.3)
POTASSIUM SERPL-SCNC: 3.8 MMOL/L — SIGNIFICANT CHANGE UP (ref 3.5–5.3)
POTASSIUM SERPL-SCNC: 3.9 MMOL/L — SIGNIFICANT CHANGE UP (ref 3.5–5.3)
POTASSIUM SERPL-SCNC: 4 MMOL/L — SIGNIFICANT CHANGE UP (ref 3.5–5.3)
POTASSIUM SERPL-SCNC: 4.1 MMOL/L — SIGNIFICANT CHANGE UP (ref 3.5–5.3)
POTASSIUM SERPL-SCNC: 4.2 MMOL/L — SIGNIFICANT CHANGE UP (ref 3.5–5.3)
POTASSIUM SERPL-SCNC: 4.3 MMOL/L — SIGNIFICANT CHANGE UP (ref 3.5–5.3)
POTASSIUM SERPL-SCNC: 4.4 MMOL/L — SIGNIFICANT CHANGE UP (ref 3.5–5.3)
POTASSIUM SERPL-SCNC: 4.4 MMOL/L — SIGNIFICANT CHANGE UP (ref 3.5–5.3)
POTASSIUM SERPL-SCNC: 4.5 MMOL/L — SIGNIFICANT CHANGE UP (ref 3.5–5.3)
POTASSIUM SERPL-SCNC: 4.6 MMOL/L — SIGNIFICANT CHANGE UP (ref 3.5–5.3)
POTASSIUM SERPL-SCNC: 4.7 MMOL/L — SIGNIFICANT CHANGE UP (ref 3.5–5.3)
POTASSIUM SERPL-SCNC: 4.8 MMOL/L — SIGNIFICANT CHANGE UP (ref 3.5–5.3)
POTASSIUM SERPL-SCNC: 4.8 MMOL/L — SIGNIFICANT CHANGE UP (ref 3.5–5.3)
POTASSIUM SERPL-SCNC: 5.4 MMOL/L — HIGH (ref 3.5–5.3)
POTASSIUM SERPL-SCNC: 5.7 MMOL/L
POTASSIUM SERPL-SCNC: 5.8 MMOL/L
PROCALCITONIN SERPL-MCNC: 1.13 NG/ML — HIGH (ref 0.02–0.1)
PROMYELOCYTES # FLD: 0.9 % — HIGH (ref 0–0)
PROMYELOCYTES # FLD: 1.8 % — HIGH (ref 0–0)
PROMYELOCYTES # FLD: 7 % — HIGH (ref 0–0)
PROT ?TM UR-MCNC: 8 MG/DL — SIGNIFICANT CHANGE UP (ref 0–12)
PROT SERPL-MCNC: 6.1 G/DL — SIGNIFICANT CHANGE UP (ref 6–8.3)
PROT SERPL-MCNC: 6.3 G/DL — SIGNIFICANT CHANGE UP (ref 6–8.3)
PROT SERPL-MCNC: 6.4 G/DL
PROT SERPL-MCNC: 6.4 G/DL — SIGNIFICANT CHANGE UP (ref 6–8.3)
PROT SERPL-MCNC: 6.5 G/DL — SIGNIFICANT CHANGE UP (ref 6–8.3)
PROT SERPL-MCNC: 6.6 G/DL — SIGNIFICANT CHANGE UP (ref 6–8.3)
PROT SERPL-MCNC: 6.7 G/DL — SIGNIFICANT CHANGE UP (ref 6–8.3)
PROT SERPL-MCNC: 6.8 G/DL — SIGNIFICANT CHANGE UP (ref 6–8.3)
PROT SERPL-MCNC: 6.9 G/DL — SIGNIFICANT CHANGE UP (ref 6–8.3)
PROT SERPL-MCNC: 7 G/DL — SIGNIFICANT CHANGE UP (ref 6–8.3)
PROT SERPL-MCNC: 7.1 G/DL — SIGNIFICANT CHANGE UP (ref 6–8.3)
PROT SERPL-MCNC: 7.1 G/DL — SIGNIFICANT CHANGE UP (ref 6–8.3)
PROT SERPL-MCNC: 7.2 G/DL — SIGNIFICANT CHANGE UP (ref 6–8.3)
PROT SERPL-MCNC: 7.2 G/DL — SIGNIFICANT CHANGE UP (ref 6–8.3)
PROT UR-MCNC: ABNORMAL
PROT UR-MCNC: NEGATIVE — SIGNIFICANT CHANGE UP
PROT/CREAT UR-RTO: 0.3 RATIO — HIGH (ref 0–0.2)
PROTEIN URINE: ABNORMAL
PROTHROM AB SERPL-ACNC: 14.1 SEC — HIGH (ref 10.6–13.6)
PROTHROM AB SERPL-ACNC: 14.6 SEC — HIGH (ref 10.6–13.6)
PROTHROM AB SERPL-ACNC: 14.9 SEC — HIGH (ref 10.6–13.6)
PROTHROM AB SERPL-ACNC: 15.2 SEC — HIGH (ref 10.6–13.6)
PROTHROM AB SERPL-ACNC: 15.4 SEC — HIGH (ref 10.6–13.6)
PROTHROM AB SERPL-ACNC: 15.4 SEC — HIGH (ref 10.6–13.6)
PROTHROM AB SERPL-ACNC: 15.5 SEC — HIGH (ref 10.6–13.6)
PROTHROM AB SERPL-ACNC: 15.7 SEC — HIGH (ref 10.6–13.6)
PROTHROM AB SERPL-ACNC: 16.2 SEC — HIGH (ref 10.6–13.6)
PROTHROM AB SERPL-ACNC: 16.2 SEC — HIGH (ref 10.6–13.6)
PTH-INTACT FLD-MCNC: 84 PG/ML — HIGH (ref 15–65)
RAPID RVP RESULT: SIGNIFICANT CHANGE UP
RBC # BLD: 1.9 M/UL — LOW (ref 4.2–5.8)
RBC # BLD: 2.11 M/UL — LOW (ref 4.2–5.8)
RBC # BLD: 2.23 M/UL — LOW (ref 4.2–5.8)
RBC # BLD: 2.31 M/UL — LOW (ref 4.2–5.8)
RBC # BLD: 2.31 M/UL — LOW (ref 4.2–5.8)
RBC # BLD: 2.32 M/UL — LOW (ref 4.2–5.8)
RBC # BLD: 2.36 M/UL — LOW (ref 4.2–5.8)
RBC # BLD: 2.36 M/UL — LOW (ref 4.2–5.8)
RBC # BLD: 2.38 M/UL — LOW (ref 4.2–5.8)
RBC # BLD: 2.39 M/UL — LOW (ref 4.2–5.8)
RBC # BLD: 2.41 M/UL — LOW (ref 4.2–5.8)
RBC # BLD: 2.42 M/UL — LOW (ref 4.2–5.8)
RBC # BLD: 2.43 M/UL — LOW (ref 4.2–5.8)
RBC # BLD: 2.43 M/UL — LOW (ref 4.2–5.8)
RBC # BLD: 2.44 M/UL — LOW (ref 4.2–5.8)
RBC # BLD: 2.46 M/UL — LOW (ref 4.2–5.8)
RBC # BLD: 2.46 M/UL — LOW (ref 4.2–5.8)
RBC # BLD: 2.47 M/UL — LOW (ref 4.2–5.8)
RBC # BLD: 2.48 M/UL — LOW (ref 4.2–5.8)
RBC # BLD: 2.49 M/UL — LOW (ref 4.2–5.8)
RBC # BLD: 2.5 M/UL — LOW (ref 4.2–5.8)
RBC # BLD: 2.52 M/UL — LOW (ref 4.2–5.8)
RBC # BLD: 2.55 M/UL — LOW (ref 4.2–5.8)
RBC # BLD: 2.55 M/UL — LOW (ref 4.2–5.8)
RBC # BLD: 2.56 M/UL — LOW (ref 4.2–5.8)
RBC # BLD: 2.56 M/UL — LOW (ref 4.2–5.8)
RBC # BLD: 2.57 M/UL — LOW (ref 4.2–5.8)
RBC # BLD: 2.58 M/UL — LOW (ref 4.2–5.8)
RBC # BLD: 2.58 M/UL — LOW (ref 4.2–5.8)
RBC # BLD: 2.6 M/UL — LOW (ref 4.2–5.8)
RBC # BLD: 2.61 M/UL — LOW (ref 4.2–5.8)
RBC # BLD: 2.62 M/UL — LOW (ref 4.2–5.8)
RBC # BLD: 2.63 M/UL — LOW (ref 4.2–5.8)
RBC # BLD: 2.64 M/UL — LOW (ref 4.2–5.8)
RBC # BLD: 2.64 M/UL — LOW (ref 4.2–5.8)
RBC # BLD: 2.65 M/UL — LOW (ref 4.2–5.8)
RBC # BLD: 2.65 M/UL — LOW (ref 4.2–5.8)
RBC # BLD: 2.66 M/UL — LOW (ref 4.2–5.8)
RBC # BLD: 2.67 M/UL — LOW (ref 4.2–5.8)
RBC # BLD: 2.68 M/UL — LOW (ref 4.2–5.8)
RBC # BLD: 2.69 M/UL — LOW (ref 4.2–5.8)
RBC # BLD: 2.7 M/UL — LOW (ref 4.2–5.8)
RBC # BLD: 2.72 M/UL — LOW (ref 4.2–5.8)
RBC # BLD: 2.73 M/UL — LOW (ref 4.2–5.8)
RBC # BLD: 2.75 M/UL — LOW (ref 4.2–5.8)
RBC # BLD: 2.76 M/UL — LOW (ref 4.2–5.8)
RBC # BLD: 2.78 M/UL — LOW (ref 4.2–5.8)
RBC # BLD: 2.81 M/UL — LOW (ref 4.2–5.8)
RBC # BLD: 2.81 M/UL — LOW (ref 4.2–5.8)
RBC # BLD: 2.82 M/UL — LOW (ref 4.2–5.8)
RBC # BLD: 2.82 M/UL — LOW (ref 4.2–5.8)
RBC # BLD: 2.88 M/UL — LOW (ref 4.2–5.8)
RBC # BLD: 2.89 M/UL — LOW (ref 4.2–5.8)
RBC # BLD: 2.9 M/UL — LOW (ref 4.2–5.8)
RBC # BLD: 2.91 M/UL — LOW (ref 4.2–5.8)
RBC # BLD: 2.91 M/UL — LOW (ref 4.2–5.8)
RBC # BLD: 2.93 M/UL — LOW (ref 4.2–5.8)
RBC # BLD: 2.93 M/UL — LOW (ref 4.2–5.8)
RBC # BLD: 2.96 M/UL — LOW (ref 4.2–5.8)
RBC # BLD: 2.97 M/UL — LOW (ref 4.2–5.8)
RBC # BLD: 2.97 M/UL — LOW (ref 4.2–5.8)
RBC # BLD: 2.98 M/UL — LOW (ref 4.2–5.8)
RBC # BLD: 3.1 M/UL — LOW (ref 4.2–5.8)
RBC # BLD: 3.11 M/UL — LOW (ref 4.2–5.8)
RBC # BLD: 3.12 M/UL — LOW (ref 4.2–5.8)
RBC # BLD: 3.13 M/UL
RBC # BLD: 3.15 M/UL — LOW (ref 4.2–5.8)
RBC # BLD: 3.16 M/UL — LOW (ref 4.2–5.8)
RBC # BLD: 3.16 M/UL — LOW (ref 4.2–5.8)
RBC # BLD: 3.19 M/UL — LOW (ref 4.2–5.8)
RBC # FLD: 13.5 % — SIGNIFICANT CHANGE UP (ref 10.3–14.5)
RBC # FLD: 13.7 % — SIGNIFICANT CHANGE UP (ref 10.3–14.5)
RBC # FLD: 13.7 % — SIGNIFICANT CHANGE UP (ref 10.3–14.5)
RBC # FLD: 13.8 % — SIGNIFICANT CHANGE UP (ref 10.3–14.5)
RBC # FLD: 13.9 % — SIGNIFICANT CHANGE UP (ref 10.3–14.5)
RBC # FLD: 14 % — SIGNIFICANT CHANGE UP (ref 10.3–14.5)
RBC # FLD: 14.1 % — SIGNIFICANT CHANGE UP (ref 10.3–14.5)
RBC # FLD: 14.2 % — SIGNIFICANT CHANGE UP (ref 10.3–14.5)
RBC # FLD: 14.3 % — SIGNIFICANT CHANGE UP (ref 10.3–14.5)
RBC # FLD: 14.4 % — SIGNIFICANT CHANGE UP (ref 10.3–14.5)
RBC # FLD: 14.5 % — SIGNIFICANT CHANGE UP (ref 10.3–14.5)
RBC # FLD: 14.6 % — HIGH (ref 10.3–14.5)
RBC # FLD: 14.7 % — HIGH (ref 10.3–14.5)
RBC # FLD: 14.8 % — HIGH (ref 10.3–14.5)
RBC # FLD: 15.2 % — HIGH (ref 10.3–14.5)
RBC # FLD: 15.2 % — HIGH (ref 10.3–14.5)
RBC # FLD: 15.5 % — HIGH (ref 10.3–14.5)
RBC # FLD: 15.9 % — HIGH (ref 10.3–14.5)
RBC # FLD: 16 % — HIGH (ref 10.3–14.5)
RBC # FLD: 16 % — HIGH (ref 10.3–14.5)
RBC # FLD: 16.1 % — HIGH (ref 10.3–14.5)
RBC # FLD: 16.1 % — HIGH (ref 10.3–14.5)
RBC # FLD: 16.2 % — HIGH (ref 10.3–14.5)
RBC # FLD: 16.3 % — HIGH (ref 10.3–14.5)
RBC # FLD: 16.5 % — HIGH (ref 10.3–14.5)
RBC # FLD: 16.5 % — HIGH (ref 10.3–14.5)
RBC # FLD: 16.6 % — HIGH (ref 10.3–14.5)
RBC # FLD: 16.9 % — HIGH (ref 10.3–14.5)
RBC # FLD: 17 % — HIGH (ref 10.3–14.5)
RBC # FLD: 17.1 % — HIGH (ref 10.3–14.5)
RBC # FLD: 17.1 % — HIGH (ref 10.3–14.5)
RBC # FLD: 17.4 %
RBC # FLD: 17.7 % — HIGH (ref 10.3–14.5)
RBC # FLD: 17.8 % — HIGH (ref 10.3–14.5)
RBC # FLD: 18 % — HIGH (ref 10.3–14.5)
RBC # FLD: 18.1 % — HIGH (ref 10.3–14.5)
RBC # FLD: 18.2 % — HIGH (ref 10.3–14.5)
RBC # FLD: 18.3 % — HIGH (ref 10.3–14.5)
RBC # FLD: 18.3 % — HIGH (ref 10.3–14.5)
RBC # FLD: 18.4 % — HIGH (ref 10.3–14.5)
RBC # FLD: 18.4 % — HIGH (ref 10.3–14.5)
RBC # FLD: 18.8 % — HIGH (ref 10.3–14.5)
RBC # FLD: 18.8 % — HIGH (ref 10.3–14.5)
RBC # FLD: 18.9 % — HIGH (ref 10.3–14.5)
RBC # FLD: 19 % — HIGH (ref 10.3–14.5)
RBC # FLD: 19 % — HIGH (ref 10.3–14.5)
RBC # FLD: 19.3 % — HIGH (ref 10.3–14.5)
RBC # FLD: 19.3 % — HIGH (ref 10.3–14.5)
RBC # FLD: 19.4 % — HIGH (ref 10.3–14.5)
RBC # FLD: 19.5 % — HIGH (ref 10.3–14.5)
RBC # FLD: 19.6 % — HIGH (ref 10.3–14.5)
RBC # FLD: 19.7 % — HIGH (ref 10.3–14.5)
RBC # FLD: 19.7 % — HIGH (ref 10.3–14.5)
RBC # FLD: 19.8 % — HIGH (ref 10.3–14.5)
RBC # FLD: 19.9 % — HIGH (ref 10.3–14.5)
RBC # FLD: 20 % — HIGH (ref 10.3–14.5)
RBC # FLD: 20.1 % — HIGH (ref 10.3–14.5)
RBC # FLD: 20.1 % — HIGH (ref 10.3–14.5)
RBC # FLD: 20.2 % — HIGH (ref 10.3–14.5)
RBC # FLD: 20.2 % — HIGH (ref 10.3–14.5)
RBC # FLD: 20.3 % — HIGH (ref 10.3–14.5)
RBC # FLD: 20.4 % — HIGH (ref 10.3–14.5)
RBC # FLD: 20.5 % — HIGH (ref 10.3–14.5)
RBC # FLD: 20.5 % — HIGH (ref 10.3–14.5)
RBC # FLD: 20.6 % — HIGH (ref 10.3–14.5)
RBC # FLD: 20.7 % — HIGH (ref 10.3–14.5)
RBC BLD AUTO: ABNORMAL
RBC BLD AUTO: SIGNIFICANT CHANGE UP
RBC CASTS # UR COMP ASSIST: 1 /HPF — SIGNIFICANT CHANGE UP (ref 0–4)
RED BLOOD CELLS URINE: 0 /HPF
RH IG SCN BLD-IMP: POSITIVE — SIGNIFICANT CHANGE UP
SAO2 % BLDV: 68.8 % — SIGNIFICANT CHANGE UP (ref 67–88)
SAO2 % BLDV: 73.3 % — SIGNIFICANT CHANGE UP (ref 67–88)
SARS-COV-2 IGG+IGM SERPL QL IA: >250 U/ML — HIGH
SARS-COV-2 IGG+IGM SERPL QL IA: POSITIVE
SARS-COV-2 RNA SPEC QL NAA+PROBE: SIGNIFICANT CHANGE UP
SCHISTOCYTES BLD QL AUTO: SLIGHT — SIGNIFICANT CHANGE UP
SMUDGE CELLS # BLD: PRESENT — SIGNIFICANT CHANGE UP
SODIUM SERPL-SCNC: 128 MMOL/L — LOW (ref 135–145)
SODIUM SERPL-SCNC: 128 MMOL/L — LOW (ref 135–145)
SODIUM SERPL-SCNC: 129 MMOL/L — LOW (ref 135–145)
SODIUM SERPL-SCNC: 129 MMOL/L — LOW (ref 135–145)
SODIUM SERPL-SCNC: 130 MMOL/L — LOW (ref 135–145)
SODIUM SERPL-SCNC: 131 MMOL/L — LOW (ref 135–145)
SODIUM SERPL-SCNC: 132 MMOL/L — LOW (ref 135–145)
SODIUM SERPL-SCNC: 133 MMOL/L — LOW (ref 135–145)
SODIUM SERPL-SCNC: 134 MMOL/L — LOW (ref 135–145)
SODIUM SERPL-SCNC: 135 MMOL/L — SIGNIFICANT CHANGE UP (ref 135–145)
SODIUM SERPL-SCNC: 136 MMOL/L — SIGNIFICANT CHANGE UP (ref 135–145)
SODIUM SERPL-SCNC: 137 MMOL/L — SIGNIFICANT CHANGE UP (ref 135–145)
SODIUM SERPL-SCNC: 138 MMOL/L
SODIUM SERPL-SCNC: 138 MMOL/L
SODIUM SERPL-SCNC: 138 MMOL/L — SIGNIFICANT CHANGE UP (ref 135–145)
SODIUM SERPL-SCNC: 139 MMOL/L — SIGNIFICANT CHANGE UP (ref 135–145)
SODIUM SERPL-SCNC: 146 MMOL/L — HIGH (ref 135–145)
SODIUM UR-SCNC: 95 MMOL/L — SIGNIFICANT CHANGE UP
SODIUM UR-SCNC: 96 MMOL/L — SIGNIFICANT CHANGE UP
SP GR SPEC: 1.01 — SIGNIFICANT CHANGE UP (ref 1.01–1.02)
SP GR SPEC: 1.01 — SIGNIFICANT CHANGE UP (ref 1.01–1.02)
SPECIFIC GRAVITY URINE: 1.02
SPECIMEN SOURCE: SIGNIFICANT CHANGE UP
SPHEROCYTES BLD QL SMEAR: SLIGHT — SIGNIFICANT CHANGE UP
SQUAMOUS EPITHELIAL CELLS: 3 /HPF
TIBC SERPL-MCNC: 170 UG/DL — LOW (ref 220–430)
TIBC SERPL-MCNC: 237 UG/DL — SIGNIFICANT CHANGE UP (ref 220–430)
TM INTERPRETATION: SIGNIFICANT CHANGE UP
TROPONIN T, HIGH SENSITIVITY RESULT: 103 NG/L — HIGH (ref 0–51)
TROPONIN T, HIGH SENSITIVITY RESULT: 104 NG/L — HIGH (ref 0–51)
TROPONIN T, HIGH SENSITIVITY RESULT: 70 NG/L — HIGH (ref 0–51)
TROPONIN T, HIGH SENSITIVITY RESULT: 80 NG/L — HIGH (ref 0–51)
TROPONIN T, HIGH SENSITIVITY RESULT: 82 NG/L — HIGH (ref 0–51)
TROPONIN T, HIGH SENSITIVITY RESULT: 93 NG/L — HIGH (ref 0–51)
UIBC SERPL-MCNC: 113 UG/DL — SIGNIFICANT CHANGE UP (ref 110–370)
UIBC SERPL-MCNC: 82 UG/DL — LOW (ref 110–370)
URATE SERPL-MCNC: 13.4 MG/DL — HIGH (ref 3.4–8.8)
URATE SERPL-MCNC: 4 MG/DL — SIGNIFICANT CHANGE UP (ref 3.4–8.8)
URATE SERPL-MCNC: 4 MG/DL — SIGNIFICANT CHANGE UP (ref 3.4–8.8)
URATE SERPL-MCNC: 4.2 MG/DL — SIGNIFICANT CHANGE UP (ref 3.4–8.8)
URATE SERPL-MCNC: 4.2 MG/DL — SIGNIFICANT CHANGE UP (ref 3.4–8.8)
URATE SERPL-MCNC: 4.3 MG/DL — SIGNIFICANT CHANGE UP (ref 3.4–8.8)
URATE SERPL-MCNC: 4.6 MG/DL — SIGNIFICANT CHANGE UP (ref 3.4–8.8)
URATE SERPL-MCNC: 4.7 MG/DL — SIGNIFICANT CHANGE UP (ref 3.4–8.8)
URATE SERPL-MCNC: 4.7 MG/DL — SIGNIFICANT CHANGE UP (ref 3.4–8.8)
URATE SERPL-MCNC: 4.8 MG/DL — SIGNIFICANT CHANGE UP (ref 3.4–8.8)
URATE SERPL-MCNC: 4.9 MG/DL — SIGNIFICANT CHANGE UP (ref 3.4–8.8)
URATE SERPL-MCNC: 5 MG/DL — SIGNIFICANT CHANGE UP (ref 3.4–8.8)
URATE SERPL-MCNC: 5.2 MG/DL — SIGNIFICANT CHANGE UP (ref 3.4–8.8)
URATE SERPL-MCNC: 5.3 MG/DL — SIGNIFICANT CHANGE UP (ref 3.4–8.8)
URATE SERPL-MCNC: 5.5 MG/DL — SIGNIFICANT CHANGE UP (ref 3.4–8.8)
URATE SERPL-MCNC: 5.6 MG/DL — SIGNIFICANT CHANGE UP (ref 3.4–8.8)
URATE SERPL-MCNC: 5.7 MG/DL — SIGNIFICANT CHANGE UP (ref 3.4–8.8)
URATE SERPL-MCNC: 5.7 MG/DL — SIGNIFICANT CHANGE UP (ref 3.4–8.8)
URATE SERPL-MCNC: 6 MG/DL — SIGNIFICANT CHANGE UP (ref 3.4–8.8)
URATE SERPL-MCNC: 6.2 MG/DL — SIGNIFICANT CHANGE UP (ref 3.4–8.8)
URATE SERPL-MCNC: 6.2 MG/DL — SIGNIFICANT CHANGE UP (ref 3.4–8.8)
URATE SERPL-MCNC: 6.3 MG/DL — SIGNIFICANT CHANGE UP (ref 3.4–8.8)
URATE SERPL-MCNC: 6.4 MG/DL — SIGNIFICANT CHANGE UP (ref 3.4–8.8)
URATE SERPL-MCNC: 6.5 MG/DL — SIGNIFICANT CHANGE UP (ref 3.4–8.8)
URATE SERPL-MCNC: 7.5 MG/DL — SIGNIFICANT CHANGE UP (ref 3.4–8.8)
URATE SERPL-MCNC: 8.8 MG/DL — SIGNIFICANT CHANGE UP (ref 3.4–8.8)
URATE UR-MCNC: 10.5 MG/DL — SIGNIFICANT CHANGE UP
URATE UR-MCNC: 38.1 MG/DL — SIGNIFICANT CHANGE UP
UROBILINOGEN FLD QL: NEGATIVE — SIGNIFICANT CHANGE UP
UROBILINOGEN FLD QL: NEGATIVE — SIGNIFICANT CHANGE UP
UROBILINOGEN URINE: NORMAL
UUN UR-MCNC: 310 MG/DL — SIGNIFICANT CHANGE UP
UUN UR-MCNC: 341 MG/DL — SIGNIFICANT CHANGE UP
VANCOMYCIN TROUGH SERPL-MCNC: 10.1 UG/ML — SIGNIFICANT CHANGE UP (ref 10–20)
VANCOMYCIN TROUGH SERPL-MCNC: 11 UG/ML — SIGNIFICANT CHANGE UP (ref 10–20)
VANCOMYCIN TROUGH SERPL-MCNC: 14.3 UG/ML — SIGNIFICANT CHANGE UP (ref 10–20)
VANCOMYCIN TROUGH SERPL-MCNC: 20 UG/ML — SIGNIFICANT CHANGE UP (ref 10–20)
VANCOMYCIN TROUGH SERPL-MCNC: 8.9 UG/ML — LOW (ref 10–20)
VARIANT LYMPHS # BLD: 0.9 % — SIGNIFICANT CHANGE UP (ref 0–6)
VARIANT LYMPHS # BLD: 1.7 % — SIGNIFICANT CHANGE UP (ref 0–6)
VARIANT LYMPHS # BLD: 1.8 % — SIGNIFICANT CHANGE UP (ref 0–6)
VARIANT LYMPHS # BLD: 10 % — HIGH (ref 0–6)
VARIANT LYMPHS # BLD: 2.5 % — SIGNIFICANT CHANGE UP (ref 0–6)
VARIANT LYMPHS # BLD: 2.7 % — SIGNIFICANT CHANGE UP (ref 0–6)
VARIANT LYMPHS # BLD: 6.3 % — HIGH (ref 0–6)
VIT D25+D1,25 OH+D1,25 PNL SERPL-MCNC: 15.5 PG/ML — LOW (ref 19.9–79.3)
WBC # BLD: 0.46 K/UL — CRITICAL LOW (ref 3.8–10.5)
WBC # BLD: 0.46 K/UL — CRITICAL LOW (ref 3.8–10.5)
WBC # BLD: 0.57 K/UL — CRITICAL LOW (ref 3.8–10.5)
WBC # BLD: 0.57 K/UL — CRITICAL LOW (ref 3.8–10.5)
WBC # BLD: 0.58 K/UL — CRITICAL LOW (ref 3.8–10.5)
WBC # BLD: 0.6 K/UL — CRITICAL LOW (ref 3.8–10.5)
WBC # BLD: 0.64 K/UL — CRITICAL LOW (ref 3.8–10.5)
WBC # BLD: 0.66 K/UL — CRITICAL LOW (ref 3.8–10.5)
WBC # BLD: 0.67 K/UL — CRITICAL LOW (ref 3.8–10.5)
WBC # BLD: 0.68 K/UL — CRITICAL LOW (ref 3.8–10.5)
WBC # BLD: 0.8 K/UL — CRITICAL LOW (ref 3.8–10.5)
WBC # BLD: 0.87 K/UL — CRITICAL LOW (ref 3.8–10.5)
WBC # BLD: 0.89 K/UL — CRITICAL LOW (ref 3.8–10.5)
WBC # BLD: 1.01 K/UL — CRITICAL LOW (ref 3.8–10.5)
WBC # BLD: 1.03 K/UL — CRITICAL LOW (ref 3.8–10.5)
WBC # BLD: 1.04 K/UL — LOW (ref 3.8–10.5)
WBC # BLD: 1.05 K/UL — CRITICAL LOW (ref 3.8–10.5)
WBC # BLD: 1.07 K/UL — LOW (ref 3.8–10.5)
WBC # BLD: 1.12 K/UL — LOW (ref 3.8–10.5)
WBC # BLD: 1.15 K/UL — LOW (ref 3.8–10.5)
WBC # BLD: 1.16 K/UL — LOW (ref 3.8–10.5)
WBC # BLD: 1.18 K/UL — LOW (ref 3.8–10.5)
WBC # BLD: 1.2 K/UL — LOW (ref 3.8–10.5)
WBC # BLD: 1.21 K/UL — LOW (ref 3.8–10.5)
WBC # BLD: 1.22 K/UL — LOW (ref 3.8–10.5)
WBC # BLD: 1.22 K/UL — LOW (ref 3.8–10.5)
WBC # BLD: 1.23 K/UL — LOW (ref 3.8–10.5)
WBC # BLD: 1.23 K/UL — LOW (ref 3.8–10.5)
WBC # BLD: 1.29 K/UL — LOW (ref 3.8–10.5)
WBC # BLD: 1.33 K/UL — LOW (ref 3.8–10.5)
WBC # BLD: 1.34 K/UL — LOW (ref 3.8–10.5)
WBC # BLD: 1.64 K/UL — LOW (ref 3.8–10.5)
WBC # BLD: 1.69 K/UL — LOW (ref 3.8–10.5)
WBC # BLD: 1.91 K/UL — LOW (ref 3.8–10.5)
WBC # BLD: 1.95 K/UL — LOW (ref 3.8–10.5)
WBC # BLD: 1.99 K/UL — LOW (ref 3.8–10.5)
WBC # BLD: 11.8 K/UL — HIGH (ref 3.8–10.5)
WBC # BLD: 14.61 K/UL — HIGH (ref 3.8–10.5)
WBC # BLD: 15.68 K/UL — HIGH (ref 3.8–10.5)
WBC # BLD: 18.01 K/UL — HIGH (ref 3.8–10.5)
WBC # BLD: 18.44 K/UL — HIGH (ref 3.8–10.5)
WBC # BLD: 18.61 K/UL — HIGH (ref 3.8–10.5)
WBC # BLD: 18.61 K/UL — HIGH (ref 3.8–10.5)
WBC # BLD: 18.96 K/UL — HIGH (ref 3.8–10.5)
WBC # BLD: 18.98 K/UL — HIGH (ref 3.8–10.5)
WBC # BLD: 19.03 K/UL — HIGH (ref 3.8–10.5)
WBC # BLD: 19.65 K/UL — HIGH (ref 3.8–10.5)
WBC # BLD: 2.21 K/UL — LOW (ref 3.8–10.5)
WBC # BLD: 2.88 K/UL — LOW (ref 3.8–10.5)
WBC # BLD: 20.46 K/UL — HIGH (ref 3.8–10.5)
WBC # BLD: 20.49 K/UL — HIGH (ref 3.8–10.5)
WBC # BLD: 20.58 K/UL — HIGH (ref 3.8–10.5)
WBC # BLD: 20.99 K/UL — HIGH (ref 3.8–10.5)
WBC # BLD: 21.68 K/UL — HIGH (ref 3.8–10.5)
WBC # BLD: 22.97 K/UL — HIGH (ref 3.8–10.5)
WBC # BLD: 22.97 K/UL — HIGH (ref 3.8–10.5)
WBC # BLD: 24.51 K/UL — HIGH (ref 3.8–10.5)
WBC # BLD: 26.43 K/UL — HIGH (ref 3.8–10.5)
WBC # BLD: 27.04 K/UL — HIGH (ref 3.8–10.5)
WBC # BLD: 28.32 K/UL — HIGH (ref 3.8–10.5)
WBC # BLD: 29.74 K/UL — HIGH (ref 3.8–10.5)
WBC # BLD: 3.01 K/UL — LOW (ref 3.8–10.5)
WBC # BLD: 3.09 K/UL — LOW (ref 3.8–10.5)
WBC # BLD: 3.4 K/UL — LOW (ref 3.8–10.5)
WBC # BLD: 3.42 K/UL — LOW (ref 3.8–10.5)
WBC # BLD: 3.67 K/UL — LOW (ref 3.8–10.5)
WBC # BLD: 3.89 K/UL — SIGNIFICANT CHANGE UP (ref 3.8–10.5)
WBC # BLD: 3.93 K/UL — SIGNIFICANT CHANGE UP (ref 3.8–10.5)
WBC # BLD: 31.78 K/UL — HIGH (ref 3.8–10.5)
WBC # BLD: 35.39 K/UL — HIGH (ref 3.8–10.5)
WBC # BLD: 4 K/UL — SIGNIFICANT CHANGE UP (ref 3.8–10.5)
WBC # BLD: 4 K/UL — SIGNIFICANT CHANGE UP (ref 3.8–10.5)
WBC # BLD: 4.07 K/UL — SIGNIFICANT CHANGE UP (ref 3.8–10.5)
WBC # BLD: 4.16 K/UL — SIGNIFICANT CHANGE UP (ref 3.8–10.5)
WBC # BLD: 4.27 K/UL — SIGNIFICANT CHANGE UP (ref 3.8–10.5)
WBC # BLD: 4.44 K/UL — SIGNIFICANT CHANGE UP (ref 3.8–10.5)
WBC # BLD: 4.53 K/UL — SIGNIFICANT CHANGE UP (ref 3.8–10.5)
WBC # BLD: 4.62 K/UL — SIGNIFICANT CHANGE UP (ref 3.8–10.5)
WBC # BLD: 4.8 K/UL — SIGNIFICANT CHANGE UP (ref 3.8–10.5)
WBC # BLD: 4.94 K/UL — SIGNIFICANT CHANGE UP (ref 3.8–10.5)
WBC # BLD: 5.19 K/UL — SIGNIFICANT CHANGE UP (ref 3.8–10.5)
WBC # BLD: 5.26 K/UL — SIGNIFICANT CHANGE UP (ref 3.8–10.5)
WBC # BLD: 5.36 K/UL — SIGNIFICANT CHANGE UP (ref 3.8–10.5)
WBC # BLD: 5.49 K/UL — SIGNIFICANT CHANGE UP (ref 3.8–10.5)
WBC # BLD: 6.06 K/UL — SIGNIFICANT CHANGE UP (ref 3.8–10.5)
WBC # BLD: 6.15 K/UL — SIGNIFICANT CHANGE UP (ref 3.8–10.5)
WBC # BLD: 6.35 K/UL — SIGNIFICANT CHANGE UP (ref 3.8–10.5)
WBC # BLD: 6.38 K/UL — SIGNIFICANT CHANGE UP (ref 3.8–10.5)
WBC # BLD: 6.38 K/UL — SIGNIFICANT CHANGE UP (ref 3.8–10.5)
WBC # BLD: 7.05 K/UL — SIGNIFICANT CHANGE UP (ref 3.8–10.5)
WBC # BLD: 7.72 K/UL — SIGNIFICANT CHANGE UP (ref 3.8–10.5)
WBC # FLD AUTO: 0.46 K/UL — CRITICAL LOW (ref 3.8–10.5)
WBC # FLD AUTO: 0.46 K/UL — CRITICAL LOW (ref 3.8–10.5)
WBC # FLD AUTO: 0.57 K/UL — CRITICAL LOW (ref 3.8–10.5)
WBC # FLD AUTO: 0.57 K/UL — CRITICAL LOW (ref 3.8–10.5)
WBC # FLD AUTO: 0.58 K/UL — CRITICAL LOW (ref 3.8–10.5)
WBC # FLD AUTO: 0.6 K/UL — CRITICAL LOW (ref 3.8–10.5)
WBC # FLD AUTO: 0.64 K/UL — CRITICAL LOW (ref 3.8–10.5)
WBC # FLD AUTO: 0.66 K/UL — CRITICAL LOW (ref 3.8–10.5)
WBC # FLD AUTO: 0.67 K/UL — CRITICAL LOW (ref 3.8–10.5)
WBC # FLD AUTO: 0.68 K/UL — CRITICAL LOW (ref 3.8–10.5)
WBC # FLD AUTO: 0.8 K/UL — CRITICAL LOW (ref 3.8–10.5)
WBC # FLD AUTO: 0.87 K/UL — CRITICAL LOW (ref 3.8–10.5)
WBC # FLD AUTO: 0.89 K/UL — CRITICAL LOW (ref 3.8–10.5)
WBC # FLD AUTO: 1.01 K/UL — CRITICAL LOW (ref 3.8–10.5)
WBC # FLD AUTO: 1.03 K/UL — CRITICAL LOW (ref 3.8–10.5)
WBC # FLD AUTO: 1.04 K/UL — LOW (ref 3.8–10.5)
WBC # FLD AUTO: 1.05 K/UL — CRITICAL LOW (ref 3.8–10.5)
WBC # FLD AUTO: 1.07 K/UL — LOW (ref 3.8–10.5)
WBC # FLD AUTO: 1.12 K/UL — LOW (ref 3.8–10.5)
WBC # FLD AUTO: 1.15 K/UL — LOW (ref 3.8–10.5)
WBC # FLD AUTO: 1.16 K/UL — LOW (ref 3.8–10.5)
WBC # FLD AUTO: 1.18 K/UL — LOW (ref 3.8–10.5)
WBC # FLD AUTO: 1.2 K/UL — LOW (ref 3.8–10.5)
WBC # FLD AUTO: 1.21 K/UL — LOW (ref 3.8–10.5)
WBC # FLD AUTO: 1.22 K/UL — LOW (ref 3.8–10.5)
WBC # FLD AUTO: 1.22 K/UL — LOW (ref 3.8–10.5)
WBC # FLD AUTO: 1.23 K/UL — LOW (ref 3.8–10.5)
WBC # FLD AUTO: 1.23 K/UL — LOW (ref 3.8–10.5)
WBC # FLD AUTO: 1.29 K/UL — LOW (ref 3.8–10.5)
WBC # FLD AUTO: 1.33 K/UL — LOW (ref 3.8–10.5)
WBC # FLD AUTO: 1.34 K/UL — LOW (ref 3.8–10.5)
WBC # FLD AUTO: 1.64 K/UL — LOW (ref 3.8–10.5)
WBC # FLD AUTO: 1.69 K/UL — LOW (ref 3.8–10.5)
WBC # FLD AUTO: 1.91 K/UL — LOW (ref 3.8–10.5)
WBC # FLD AUTO: 1.95 K/UL — LOW (ref 3.8–10.5)
WBC # FLD AUTO: 1.99 K/UL — LOW (ref 3.8–10.5)
WBC # FLD AUTO: 11.8 K/UL — HIGH (ref 3.8–10.5)
WBC # FLD AUTO: 14.61 K/UL — HIGH (ref 3.8–10.5)
WBC # FLD AUTO: 15.68 K/UL — HIGH (ref 3.8–10.5)
WBC # FLD AUTO: 18.01 K/UL — HIGH (ref 3.8–10.5)
WBC # FLD AUTO: 18.44 K/UL — HIGH (ref 3.8–10.5)
WBC # FLD AUTO: 18.61 K/UL — HIGH (ref 3.8–10.5)
WBC # FLD AUTO: 18.61 K/UL — HIGH (ref 3.8–10.5)
WBC # FLD AUTO: 18.96 K/UL — HIGH (ref 3.8–10.5)
WBC # FLD AUTO: 18.98 K/UL — HIGH (ref 3.8–10.5)
WBC # FLD AUTO: 19.03 K/UL — HIGH (ref 3.8–10.5)
WBC # FLD AUTO: 19.65 K/UL — HIGH (ref 3.8–10.5)
WBC # FLD AUTO: 2.21 K/UL — LOW (ref 3.8–10.5)
WBC # FLD AUTO: 2.88 K/UL — LOW (ref 3.8–10.5)
WBC # FLD AUTO: 20.46 K/UL — HIGH (ref 3.8–10.5)
WBC # FLD AUTO: 20.49 K/UL — HIGH (ref 3.8–10.5)
WBC # FLD AUTO: 20.58 K/UL — HIGH (ref 3.8–10.5)
WBC # FLD AUTO: 20.99 K/UL — HIGH (ref 3.8–10.5)
WBC # FLD AUTO: 21.68 K/UL — HIGH (ref 3.8–10.5)
WBC # FLD AUTO: 22.97 K/UL — HIGH (ref 3.8–10.5)
WBC # FLD AUTO: 22.97 K/UL — HIGH (ref 3.8–10.5)
WBC # FLD AUTO: 24.51 K/UL — HIGH (ref 3.8–10.5)
WBC # FLD AUTO: 26.43 K/UL — HIGH (ref 3.8–10.5)
WBC # FLD AUTO: 27.04 K/UL — HIGH (ref 3.8–10.5)
WBC # FLD AUTO: 28.32 K/UL — HIGH (ref 3.8–10.5)
WBC # FLD AUTO: 29.74 K/UL — HIGH (ref 3.8–10.5)
WBC # FLD AUTO: 3.01 K/UL — LOW (ref 3.8–10.5)
WBC # FLD AUTO: 3.09 K/UL — LOW (ref 3.8–10.5)
WBC # FLD AUTO: 3.4 K/UL — LOW (ref 3.8–10.5)
WBC # FLD AUTO: 3.42 K/UL — LOW (ref 3.8–10.5)
WBC # FLD AUTO: 3.67 K/UL — LOW (ref 3.8–10.5)
WBC # FLD AUTO: 3.89 K/UL — SIGNIFICANT CHANGE UP (ref 3.8–10.5)
WBC # FLD AUTO: 3.93 K/UL — SIGNIFICANT CHANGE UP (ref 3.8–10.5)
WBC # FLD AUTO: 31.78 K/UL — HIGH (ref 3.8–10.5)
WBC # FLD AUTO: 35.39 K/UL — HIGH (ref 3.8–10.5)
WBC # FLD AUTO: 4 K/UL — SIGNIFICANT CHANGE UP (ref 3.8–10.5)
WBC # FLD AUTO: 4 K/UL — SIGNIFICANT CHANGE UP (ref 3.8–10.5)
WBC # FLD AUTO: 4.07 K/UL — SIGNIFICANT CHANGE UP (ref 3.8–10.5)
WBC # FLD AUTO: 4.16 K/UL — SIGNIFICANT CHANGE UP (ref 3.8–10.5)
WBC # FLD AUTO: 4.27 K/UL — SIGNIFICANT CHANGE UP (ref 3.8–10.5)
WBC # FLD AUTO: 4.44 K/UL — SIGNIFICANT CHANGE UP (ref 3.8–10.5)
WBC # FLD AUTO: 4.53 K/UL — SIGNIFICANT CHANGE UP (ref 3.8–10.5)
WBC # FLD AUTO: 4.62 K/UL — SIGNIFICANT CHANGE UP (ref 3.8–10.5)
WBC # FLD AUTO: 4.8 K/UL — SIGNIFICANT CHANGE UP (ref 3.8–10.5)
WBC # FLD AUTO: 4.83 K/UL
WBC # FLD AUTO: 4.94 K/UL — SIGNIFICANT CHANGE UP (ref 3.8–10.5)
WBC # FLD AUTO: 5.19 K/UL — SIGNIFICANT CHANGE UP (ref 3.8–10.5)
WBC # FLD AUTO: 5.26 K/UL — SIGNIFICANT CHANGE UP (ref 3.8–10.5)
WBC # FLD AUTO: 5.36 K/UL — SIGNIFICANT CHANGE UP (ref 3.8–10.5)
WBC # FLD AUTO: 5.49 K/UL — SIGNIFICANT CHANGE UP (ref 3.8–10.5)
WBC # FLD AUTO: 6.06 K/UL — SIGNIFICANT CHANGE UP (ref 3.8–10.5)
WBC # FLD AUTO: 6.15 K/UL — SIGNIFICANT CHANGE UP (ref 3.8–10.5)
WBC # FLD AUTO: 6.35 K/UL — SIGNIFICANT CHANGE UP (ref 3.8–10.5)
WBC # FLD AUTO: 6.38 K/UL — SIGNIFICANT CHANGE UP (ref 3.8–10.5)
WBC # FLD AUTO: 6.38 K/UL — SIGNIFICANT CHANGE UP (ref 3.8–10.5)
WBC # FLD AUTO: 7.05 K/UL — SIGNIFICANT CHANGE UP (ref 3.8–10.5)
WBC # FLD AUTO: 7.72 K/UL — SIGNIFICANT CHANGE UP (ref 3.8–10.5)
WBC UR QL: 5 /HPF — SIGNIFICANT CHANGE UP (ref 0–5)
WHITE BLOOD CELLS URINE: 1 /HPF

## 2021-01-01 PROCEDURE — 86850 RBC ANTIBODY SCREEN: CPT

## 2021-01-01 PROCEDURE — 99232 SBSQ HOSP IP/OBS MODERATE 35: CPT

## 2021-01-01 PROCEDURE — 83880 ASSAY OF NATRIURETIC PEPTIDE: CPT

## 2021-01-01 PROCEDURE — 74177 CT ABD & PELVIS W/CONTRAST: CPT | Mod: MA

## 2021-01-01 PROCEDURE — 85610 PROTHROMBIN TIME: CPT

## 2021-01-01 PROCEDURE — 88184 FLOWCYTOMETRY/ TC 1 MARKER: CPT

## 2021-01-01 PROCEDURE — 87150 DNA/RNA AMPLIFIED PROBE: CPT

## 2021-01-01 PROCEDURE — 86255 FLUORESCENT ANTIBODY SCREEN: CPT

## 2021-01-01 PROCEDURE — 93010 ELECTROCARDIOGRAM REPORT: CPT

## 2021-01-01 PROCEDURE — 99233 SBSQ HOSP IP/OBS HIGH 50: CPT

## 2021-01-01 PROCEDURE — 71250 CT THORAX DX C-: CPT | Mod: 26

## 2021-01-01 PROCEDURE — 99233 SBSQ HOSP IP/OBS HIGH 50: CPT | Mod: GC

## 2021-01-01 PROCEDURE — 81001 URINALYSIS AUTO W/SCOPE: CPT

## 2021-01-01 PROCEDURE — 71045 X-RAY EXAM CHEST 1 VIEW: CPT | Mod: 26

## 2021-01-01 PROCEDURE — 84560 ASSAY OF URINE/URIC ACID: CPT

## 2021-01-01 PROCEDURE — 99232 SBSQ HOSP IP/OBS MODERATE 35: CPT | Mod: GC

## 2021-01-01 PROCEDURE — 87205 SMEAR GRAM STAIN: CPT

## 2021-01-01 PROCEDURE — 83615 LACTATE (LD) (LDH) ENZYME: CPT

## 2021-01-01 PROCEDURE — 86900 BLOOD TYPING SEROLOGIC ABO: CPT

## 2021-01-01 PROCEDURE — 82947 ASSAY GLUCOSE BLOOD QUANT: CPT

## 2021-01-01 PROCEDURE — 83605 ASSAY OF LACTIC ACID: CPT

## 2021-01-01 PROCEDURE — 81450 HL NEO GSAP 5-50DNA/DNA&RNA: CPT

## 2021-01-01 PROCEDURE — 83735 ASSAY OF MAGNESIUM: CPT

## 2021-01-01 PROCEDURE — 87077 CULTURE AEROBIC IDENTIFY: CPT

## 2021-01-01 PROCEDURE — 99214 OFFICE O/P EST MOD 30 MIN: CPT

## 2021-01-01 PROCEDURE — 99239 HOSP IP/OBS DSCHRG MGMT >30: CPT

## 2021-01-01 PROCEDURE — 88342 IMHCHEM/IMCYTCHM 1ST ANTB: CPT | Mod: 26,59

## 2021-01-01 PROCEDURE — 88291 CYTO/MOLECULAR REPORT: CPT

## 2021-01-01 PROCEDURE — 99285 EMERGENCY DEPT VISIT HI MDM: CPT

## 2021-01-01 PROCEDURE — 84295 ASSAY OF SERUM SODIUM: CPT

## 2021-01-01 PROCEDURE — 88185 FLOWCYTOMETRY/TC ADD-ON: CPT

## 2021-01-01 PROCEDURE — 99223 1ST HOSP IP/OBS HIGH 75: CPT | Mod: GC

## 2021-01-01 PROCEDURE — 80053 COMPREHEN METABOLIC PANEL: CPT

## 2021-01-01 PROCEDURE — 86705 HEP B CORE ANTIBODY IGM: CPT

## 2021-01-01 PROCEDURE — 71046 X-RAY EXAM CHEST 2 VIEWS: CPT | Mod: 26

## 2021-01-01 PROCEDURE — 99231 SBSQ HOSP IP/OBS SF/LOW 25: CPT

## 2021-01-01 PROCEDURE — 88341 IMHCHEM/IMCYTCHM EA ADD ANTB: CPT | Mod: 26,59

## 2021-01-01 PROCEDURE — 85018 HEMOGLOBIN: CPT

## 2021-01-01 PROCEDURE — 76700 US EXAM ABDOM COMPLETE: CPT

## 2021-01-01 PROCEDURE — 99221 1ST HOSP IP/OBS SF/LOW 40: CPT

## 2021-01-01 PROCEDURE — 82728 ASSAY OF FERRITIN: CPT

## 2021-01-01 PROCEDURE — 86038 ANTINUCLEAR ANTIBODIES: CPT

## 2021-01-01 PROCEDURE — 82310 ASSAY OF CALCIUM: CPT

## 2021-01-01 PROCEDURE — 99497 ADVNCD CARE PLAN 30 MIN: CPT | Mod: 25

## 2021-01-01 PROCEDURE — 88189 FLOWCYTOMETRY/READ 16 & >: CPT

## 2021-01-01 PROCEDURE — 85027 COMPLETE CBC AUTOMATED: CPT

## 2021-01-01 PROCEDURE — 82962 GLUCOSE BLOOD TEST: CPT

## 2021-01-01 PROCEDURE — 82565 ASSAY OF CREATININE: CPT

## 2021-01-01 PROCEDURE — 71275 CT ANGIOGRAPHY CHEST: CPT | Mod: 26,MA

## 2021-01-01 PROCEDURE — 99222 1ST HOSP IP/OBS MODERATE 55: CPT | Mod: GC

## 2021-01-01 PROCEDURE — 93971 EXTREMITY STUDY: CPT | Mod: 26,RT

## 2021-01-01 PROCEDURE — 81245 FLT3 GENE: CPT

## 2021-01-01 PROCEDURE — 78226 HEPATOBILIARY SYSTEM IMAGING: CPT | Mod: 26

## 2021-01-01 PROCEDURE — 80074 ACUTE HEPATITIS PANEL: CPT

## 2021-01-01 PROCEDURE — P9011: CPT

## 2021-01-01 PROCEDURE — 78226 HEPATOBILIARY SYSTEM IMAGING: CPT

## 2021-01-01 PROCEDURE — 82553 CREATINE MB FRACTION: CPT

## 2021-01-01 PROCEDURE — 70450 CT HEAD/BRAIN W/O DYE: CPT

## 2021-01-01 PROCEDURE — 86334 IMMUNOFIX E-PHORESIS SERUM: CPT

## 2021-01-01 PROCEDURE — 97116 GAIT TRAINING THERAPY: CPT

## 2021-01-01 PROCEDURE — 99285 EMERGENCY DEPT VISIT HI MDM: CPT | Mod: 25

## 2021-01-01 PROCEDURE — 83550 IRON BINDING TEST: CPT

## 2021-01-01 PROCEDURE — 86901 BLOOD TYPING SEROLOGIC RH(D): CPT

## 2021-01-01 PROCEDURE — 86036 ANCA SCREEN EACH ANTIBODY: CPT

## 2021-01-01 PROCEDURE — 82570 ASSAY OF URINE CREATININE: CPT

## 2021-01-01 PROCEDURE — 82955 ASSAY OF G6PD ENZYME: CPT

## 2021-01-01 PROCEDURE — 88305 TISSUE EXAM BY PATHOLOGIST: CPT | Mod: 26

## 2021-01-01 PROCEDURE — 97110 THERAPEUTIC EXERCISES: CPT

## 2021-01-01 PROCEDURE — 82803 BLOOD GASES ANY COMBINATION: CPT

## 2021-01-01 PROCEDURE — 88341 IMHCHEM/IMCYTCHM EA ADD ANTB: CPT

## 2021-01-01 PROCEDURE — 97162 PT EVAL MOD COMPLEX 30 MIN: CPT

## 2021-01-01 PROCEDURE — U0005: CPT

## 2021-01-01 PROCEDURE — 71046 X-RAY EXAM CHEST 2 VIEWS: CPT

## 2021-01-01 PROCEDURE — U0003: CPT

## 2021-01-01 PROCEDURE — 93005 ELECTROCARDIOGRAM TRACING: CPT

## 2021-01-01 PROCEDURE — 84540 ASSAY OF URINE/UREA-N: CPT

## 2021-01-01 PROCEDURE — 93970 EXTREMITY STUDY: CPT

## 2021-01-01 PROCEDURE — 83540 ASSAY OF IRON: CPT

## 2021-01-01 PROCEDURE — 0225U NFCT DS DNA&RNA 21 SARSCOV2: CPT

## 2021-01-01 PROCEDURE — 84300 ASSAY OF URINE SODIUM: CPT

## 2021-01-01 PROCEDURE — 71045 X-RAY EXAM CHEST 1 VIEW: CPT

## 2021-01-01 PROCEDURE — 86078 PHYS BLOOD BANK SERV REACTJ: CPT

## 2021-01-01 PROCEDURE — 76770 US EXAM ABDO BACK WALL COMP: CPT

## 2021-01-01 PROCEDURE — 93971 EXTREMITY STUDY: CPT

## 2021-01-01 PROCEDURE — 36573 INSJ PICC RS&I 5 YR+: CPT

## 2021-01-01 PROCEDURE — C1769: CPT

## 2021-01-01 PROCEDURE — 88313 SPECIAL STAINS GROUP 2: CPT | Mod: 26

## 2021-01-01 PROCEDURE — 88275 CYTOGENETICS 100-300: CPT

## 2021-01-01 PROCEDURE — 84484 ASSAY OF TROPONIN QUANT: CPT

## 2021-01-01 PROCEDURE — 81003 URINALYSIS AUTO W/O SCOPE: CPT

## 2021-01-01 PROCEDURE — 84550 ASSAY OF BLOOD/URIC ACID: CPT

## 2021-01-01 PROCEDURE — 81315 PML/RARALPHA COM BREAKPOINTS: CPT

## 2021-01-01 PROCEDURE — 76770 US EXAM ABDO BACK WALL COMP: CPT | Mod: 26

## 2021-01-01 PROCEDURE — 88305 TISSUE EXAM BY PATHOLOGIST: CPT

## 2021-01-01 PROCEDURE — 93970 EXTREMITY STUDY: CPT | Mod: 26

## 2021-01-01 PROCEDURE — 85097 BONE MARROW INTERPRETATION: CPT

## 2021-01-01 PROCEDURE — 85384 FIBRINOGEN ACTIVITY: CPT

## 2021-01-01 PROCEDURE — 86923 COMPATIBILITY TEST ELECTRIC: CPT

## 2021-01-01 PROCEDURE — 76700 US EXAM ABDOM COMPLETE: CPT | Mod: 26

## 2021-01-01 PROCEDURE — C8929: CPT

## 2021-01-01 PROCEDURE — 97530 THERAPEUTIC ACTIVITIES: CPT

## 2021-01-01 PROCEDURE — 82330 ASSAY OF CALCIUM: CPT

## 2021-01-01 PROCEDURE — 88342 IMHCHEM/IMCYTCHM 1ST ANTB: CPT

## 2021-01-01 PROCEDURE — 85025 COMPLETE CBC W/AUTO DIFF WBC: CPT

## 2021-01-01 PROCEDURE — 84145 PROCALCITONIN (PCT): CPT

## 2021-01-01 PROCEDURE — 82435 ASSAY OF BLOOD CHLORIDE: CPT

## 2021-01-01 PROCEDURE — 71275 CT ANGIOGRAPHY CHEST: CPT | Mod: MA

## 2021-01-01 PROCEDURE — 76705 ECHO EXAM OF ABDOMEN: CPT

## 2021-01-01 PROCEDURE — 85014 HEMATOCRIT: CPT

## 2021-01-01 PROCEDURE — 99291 CRITICAL CARE FIRST HOUR: CPT | Mod: 25

## 2021-01-01 PROCEDURE — 82306 VITAMIN D 25 HYDROXY: CPT

## 2021-01-01 PROCEDURE — 87186 SC STD MICRODIL/AGAR DIL: CPT

## 2021-01-01 PROCEDURE — P9040: CPT

## 2021-01-01 PROCEDURE — 99215 OFFICE O/P EST HI 40 MIN: CPT

## 2021-01-01 PROCEDURE — 85379 FIBRIN DEGRADATION QUANT: CPT

## 2021-01-01 PROCEDURE — 82550 ASSAY OF CK (CPK): CPT

## 2021-01-01 PROCEDURE — 93306 TTE W/DOPPLER COMPLETE: CPT | Mod: 26

## 2021-01-01 PROCEDURE — 76705 ECHO EXAM OF ABDOMEN: CPT | Mod: 26,RT

## 2021-01-01 PROCEDURE — 36430 TRANSFUSION BLD/BLD COMPNT: CPT

## 2021-01-01 PROCEDURE — 83970 ASSAY OF PARATHORMONE: CPT

## 2021-01-01 PROCEDURE — 70450 CT HEAD/BRAIN W/O DYE: CPT | Mod: 26

## 2021-01-01 PROCEDURE — 87389 HIV-1 AG W/HIV-1&-2 AB AG IA: CPT

## 2021-01-01 PROCEDURE — 83010 ASSAY OF HAPTOGLOBIN QUANT: CPT

## 2021-01-01 PROCEDURE — 74176 CT ABD & PELVIS W/O CONTRAST: CPT | Mod: 26

## 2021-01-01 PROCEDURE — 88313 SPECIAL STAINS GROUP 2: CPT

## 2021-01-01 PROCEDURE — 71250 CT THORAX DX C-: CPT

## 2021-01-01 PROCEDURE — 80048 BASIC METABOLIC PNL TOTAL CA: CPT

## 2021-01-01 PROCEDURE — 94660 CPAP INITIATION&MGMT: CPT

## 2021-01-01 PROCEDURE — P9037: CPT

## 2021-01-01 PROCEDURE — 84156 ASSAY OF PROTEIN URINE: CPT

## 2021-01-01 PROCEDURE — 38222 DX BONE MARROW BX & ASPIR: CPT | Mod: AS

## 2021-01-01 PROCEDURE — 86160 COMPLEMENT ANTIGEN: CPT

## 2021-01-01 PROCEDURE — 84132 ASSAY OF SERUM POTASSIUM: CPT

## 2021-01-01 PROCEDURE — 86769 SARS-COV-2 COVID-19 ANTIBODY: CPT

## 2021-01-01 PROCEDURE — 87799 DETECT AGENT NOS DNA QUANT: CPT

## 2021-01-01 PROCEDURE — 99291 CRITICAL CARE FIRST HOUR: CPT

## 2021-01-01 PROCEDURE — 86704 HEP B CORE ANTIBODY TOTAL: CPT

## 2021-01-01 PROCEDURE — 82436 ASSAY OF URINE CHLORIDE: CPT

## 2021-01-01 PROCEDURE — 88280 CHROMOSOME KARYOTYPE STUDY: CPT

## 2021-01-01 PROCEDURE — 99223 1ST HOSP IP/OBS HIGH 75: CPT

## 2021-01-01 PROCEDURE — 97161 PT EVAL LOW COMPLEX 20 MIN: CPT

## 2021-01-01 PROCEDURE — C1751: CPT

## 2021-01-01 PROCEDURE — 99213 OFFICE O/P EST LOW 20 MIN: CPT

## 2021-01-01 PROCEDURE — 94640 AIRWAY INHALATION TREATMENT: CPT

## 2021-01-01 PROCEDURE — 80202 ASSAY OF VANCOMYCIN: CPT

## 2021-01-01 PROCEDURE — 84100 ASSAY OF PHOSPHORUS: CPT

## 2021-01-01 PROCEDURE — 97112 NEUROMUSCULAR REEDUCATION: CPT

## 2021-01-01 PROCEDURE — 82652 VIT D 1 25-DIHYDROXY: CPT

## 2021-01-01 PROCEDURE — 83036 HEMOGLOBIN GLYCOSYLATED A1C: CPT

## 2021-01-01 PROCEDURE — A9537: CPT

## 2021-01-01 PROCEDURE — 74177 CT ABD & PELVIS W/CONTRAST: CPT | Mod: 26,MA

## 2021-01-01 PROCEDURE — 83690 ASSAY OF LIPASE: CPT

## 2021-01-01 PROCEDURE — 74176 CT ABD & PELVIS W/O CONTRAST: CPT

## 2021-01-01 PROCEDURE — 88271 CYTOGENETICS DNA PROBE: CPT

## 2021-01-01 PROCEDURE — 83516 IMMUNOASSAY NONANTIBODY: CPT

## 2021-01-01 PROCEDURE — 88237 TISSUE CULTURE BONE MARROW: CPT

## 2021-01-01 PROCEDURE — 85730 THROMBOPLASTIN TIME PARTIAL: CPT

## 2021-01-01 PROCEDURE — 87040 BLOOD CULTURE FOR BACTERIA: CPT

## 2021-01-01 PROCEDURE — 88264 CHROMOSOME ANALYSIS 20-25: CPT

## 2021-01-01 PROCEDURE — 36415 COLL VENOUS BLD VENIPUNCTURE: CPT

## 2021-01-01 RX ORDER — ENOXAPARIN SODIUM 100 MG/ML
30 INJECTION SUBCUTANEOUS DAILY
Refills: 0 | Status: DISCONTINUED | OUTPATIENT
Start: 2021-01-01 | End: 2021-01-01

## 2021-01-01 RX ORDER — VANCOMYCIN HCL 1 G
500 VIAL (EA) INTRAVENOUS ONCE
Refills: 0 | Status: COMPLETED | OUTPATIENT
Start: 2021-01-01 | End: 2021-01-01

## 2021-01-01 RX ORDER — ISOSORBIDE MONONITRATE 60 MG/1
60 TABLET, EXTENDED RELEASE ORAL DAILY
Refills: 0 | Status: DISCONTINUED | OUTPATIENT
Start: 2021-01-01 | End: 2021-01-01

## 2021-01-01 RX ORDER — LIDOCAINE HYDROCHLORIDE AND EPINEPHRINE 10; 10 MG/ML; UG/ML
5 INJECTION, SOLUTION INFILTRATION; PERINEURAL ONCE
Refills: 0 | Status: DISCONTINUED | OUTPATIENT
Start: 2021-01-01 | End: 2021-01-01

## 2021-01-01 RX ORDER — FUROSEMIDE 40 MG
40 TABLET ORAL DAILY
Refills: 0 | Status: DISCONTINUED | OUTPATIENT
Start: 2021-01-01 | End: 2021-01-01

## 2021-01-01 RX ORDER — PIPERACILLIN AND TAZOBACTAM 4; .5 G/20ML; G/20ML
3.38 INJECTION, POWDER, LYOPHILIZED, FOR SOLUTION INTRAVENOUS ONCE
Refills: 0 | Status: COMPLETED | OUTPATIENT
Start: 2021-01-01 | End: 2021-01-01

## 2021-01-01 RX ORDER — FINASTERIDE 5 MG/1
5 TABLET, FILM COATED ORAL DAILY
Refills: 0 | Status: DISCONTINUED | OUTPATIENT
Start: 2021-01-01 | End: 2021-01-01

## 2021-01-01 RX ORDER — CEFEPIME 1 G/1
INJECTION, POWDER, FOR SOLUTION INTRAMUSCULAR; INTRAVENOUS
Refills: 0 | Status: DISCONTINUED | OUTPATIENT
Start: 2021-01-01 | End: 2021-01-01

## 2021-01-01 RX ORDER — SUCRALFATE 1 G
10 TABLET ORAL
Qty: 0 | Refills: 0 | DISCHARGE
Start: 2021-01-01

## 2021-01-01 RX ORDER — MAGNESIUM SULFATE 500 MG/ML
2 VIAL (ML) INJECTION ONCE
Refills: 0 | Status: COMPLETED | OUTPATIENT
Start: 2021-01-01 | End: 2021-01-01

## 2021-01-01 RX ORDER — CHOLECALCIFEROL (VITAMIN D3) 125 MCG
2000 CAPSULE ORAL
Qty: 0 | Refills: 0 | DISCHARGE
Start: 2021-01-01

## 2021-01-01 RX ORDER — SODIUM BICARBONATE 1 MEQ/ML
1 SYRINGE (ML) INTRAVENOUS
Qty: 90 | Refills: 0
Start: 2021-01-01 | End: 2021-01-01

## 2021-01-01 RX ORDER — POTASSIUM CHLORIDE 20 MEQ
20 PACKET (EA) ORAL
Refills: 0 | Status: COMPLETED | OUTPATIENT
Start: 2021-01-01 | End: 2021-01-01

## 2021-01-01 RX ORDER — HEPARIN SODIUM 5000 [USP'U]/ML
5000 INJECTION INTRAVENOUS; SUBCUTANEOUS EVERY 12 HOURS
Refills: 0 | Status: DISCONTINUED | OUTPATIENT
Start: 2021-01-01 | End: 2021-01-01

## 2021-01-01 RX ORDER — PHYTONADIONE (VIT K1) 5 MG
10 TABLET ORAL DAILY
Refills: 0 | Status: COMPLETED | OUTPATIENT
Start: 2021-01-01 | End: 2021-01-01

## 2021-01-01 RX ORDER — SODIUM CHLORIDE 9 MG/ML
1000 INJECTION INTRAMUSCULAR; INTRAVENOUS; SUBCUTANEOUS
Refills: 0 | Status: DISCONTINUED | OUTPATIENT
Start: 2021-01-01 | End: 2021-01-01

## 2021-01-01 RX ORDER — HEPARIN SODIUM 5000 [USP'U]/ML
5000 INJECTION INTRAVENOUS; SUBCUTANEOUS ONCE
Refills: 0 | Status: DISCONTINUED | OUTPATIENT
Start: 2021-01-01 | End: 2021-01-01

## 2021-01-01 RX ORDER — CEFEPIME 1 G/1
1000 INJECTION, POWDER, FOR SOLUTION INTRAMUSCULAR; INTRAVENOUS EVERY 12 HOURS
Refills: 0 | Status: DISCONTINUED | OUTPATIENT
Start: 2021-01-01 | End: 2021-01-01

## 2021-01-01 RX ORDER — ASPIRIN/CALCIUM CARB/MAGNESIUM 324 MG
1 TABLET ORAL
Qty: 0 | Refills: 0 | DISCHARGE

## 2021-01-01 RX ORDER — CHOLECALCIFEROL (VITAMIN D3) 125 MCG
1 CAPSULE ORAL
Qty: 30 | Refills: 0
Start: 2021-01-01 | End: 2021-01-01

## 2021-01-01 RX ORDER — VANCOMYCIN HCL 1 G
750 VIAL (EA) INTRAVENOUS ONCE
Refills: 0 | Status: COMPLETED | OUTPATIENT
Start: 2021-01-01 | End: 2021-01-01

## 2021-01-01 RX ORDER — ASPIRIN/CALCIUM CARB/MAGNESIUM 324 MG
1 TABLET ORAL
Qty: 30 | Refills: 0
Start: 2021-01-01 | End: 2021-01-01

## 2021-01-01 RX ORDER — ALLOPURINOL 300 MG
200 TABLET ORAL DAILY
Refills: 0 | Status: DISCONTINUED | OUTPATIENT
Start: 2021-01-01 | End: 2021-01-01

## 2021-01-01 RX ORDER — ESCITALOPRAM OXALATE 10 MG/1
5 TABLET, FILM COATED ORAL DAILY
Refills: 0 | Status: DISCONTINUED | OUTPATIENT
Start: 2021-01-01 | End: 2021-01-01

## 2021-01-01 RX ORDER — HYDRALAZINE HCL 50 MG
25 TABLET ORAL THREE TIMES A DAY
Refills: 0 | Status: DISCONTINUED | OUTPATIENT
Start: 2021-01-01 | End: 2021-01-01

## 2021-01-01 RX ORDER — SENNA PLUS 8.6 MG/1
2 TABLET ORAL AT BEDTIME
Refills: 0 | Status: DISCONTINUED | OUTPATIENT
Start: 2021-01-01 | End: 2021-01-01

## 2021-01-01 RX ORDER — PHYTONADIONE (VIT K1) 5 MG
5 TABLET ORAL DAILY
Refills: 0 | Status: COMPLETED | OUTPATIENT
Start: 2021-01-01 | End: 2021-01-01

## 2021-01-01 RX ORDER — SODIUM BICARBONATE 1 MEQ/ML
1 SYRINGE (ML) INTRAVENOUS
Qty: 0 | Refills: 0 | DISCHARGE
Start: 2021-01-01

## 2021-01-01 RX ORDER — IPRATROPIUM/ALBUTEROL SULFATE 18-103MCG
3 AEROSOL WITH ADAPTER (GRAM) INHALATION EVERY 6 HOURS
Refills: 0 | Status: DISCONTINUED | OUTPATIENT
Start: 2021-01-01 | End: 2021-01-01

## 2021-01-01 RX ORDER — POTASSIUM CHLORIDE 20 MEQ
40 PACKET (EA) ORAL ONCE
Refills: 0 | Status: COMPLETED | OUTPATIENT
Start: 2021-01-01 | End: 2021-01-01

## 2021-01-01 RX ORDER — POTASSIUM CHLORIDE 20 MEQ
20 PACKET (EA) ORAL ONCE
Refills: 0 | Status: COMPLETED | OUTPATIENT
Start: 2021-01-01 | End: 2021-01-01

## 2021-01-01 RX ORDER — VENETOCLAX 100 MG/1
20 TABLET, FILM COATED ORAL ONCE
Refills: 0 | Status: COMPLETED | OUTPATIENT
Start: 2021-01-01 | End: 2021-01-01

## 2021-01-01 RX ORDER — VENETOCLAX 100 MG/1
100 TABLET, FILM COATED ORAL ONCE
Refills: 0 | Status: COMPLETED | OUTPATIENT
Start: 2021-01-01 | End: 2021-01-01

## 2021-01-01 RX ORDER — CHOLECALCIFEROL (VITAMIN D3) 125 MCG
1000 CAPSULE ORAL DAILY
Refills: 0 | Status: DISCONTINUED | OUTPATIENT
Start: 2021-01-01 | End: 2021-01-01

## 2021-01-01 RX ORDER — CLOPIDOGREL BISULFATE 75 MG/1
1 TABLET, FILM COATED ORAL
Qty: 0 | Refills: 0 | DISCHARGE

## 2021-01-01 RX ORDER — POSACONAZOLE 100 MG/1
300 TABLET, DELAYED RELEASE ORAL DAILY
Refills: 0 | Status: DISCONTINUED | OUTPATIENT
Start: 2021-01-01 | End: 2021-01-01

## 2021-01-01 RX ORDER — ROSUVASTATIN CALCIUM 40 MG/1
40 TABLET, FILM COATED ORAL
Refills: 0 | Status: DISCONTINUED | COMMUNITY
End: 2021-01-01

## 2021-01-01 RX ORDER — UREA 15 G
1 POWDER IN PACKET (EA) ORAL
Qty: 0 | Refills: 0 | DISCHARGE
Start: 2021-01-01

## 2021-01-01 RX ORDER — POSACONAZOLE 100 MG/1
100 TABLET, DELAYED RELEASE ORAL EVERY 8 HOURS
Refills: 0 | Status: DISCONTINUED | OUTPATIENT
Start: 2021-01-01 | End: 2021-01-01

## 2021-01-01 RX ORDER — CHOLECALCIFEROL (VITAMIN D3) 125 MCG
2000 CAPSULE ORAL DAILY
Refills: 0 | Status: DISCONTINUED | OUTPATIENT
Start: 2021-01-01 | End: 2021-01-01

## 2021-01-01 RX ORDER — HYDRALAZINE HCL 50 MG
25 TABLET ORAL ONCE
Refills: 0 | Status: COMPLETED | OUTPATIENT
Start: 2021-01-01 | End: 2021-01-01

## 2021-01-01 RX ORDER — LIDOCAINE 4 G/100G
0 CREAM TOPICAL
Qty: 0 | Refills: 0 | DISCHARGE
Start: 2021-01-01

## 2021-01-01 RX ORDER — ASPIRIN/CALCIUM CARB/MAGNESIUM 324 MG
81 TABLET ORAL DAILY
Refills: 0 | Status: DISCONTINUED | OUTPATIENT
Start: 2021-01-01 | End: 2021-01-01

## 2021-01-01 RX ORDER — CHLORHEXIDINE GLUCONATE 213 G/1000ML
1 SOLUTION TOPICAL
Refills: 0 | Status: DISCONTINUED | OUTPATIENT
Start: 2021-01-01 | End: 2021-01-01

## 2021-01-01 RX ORDER — CEFEPIME 1 G/1
1000 INJECTION, POWDER, FOR SOLUTION INTRAMUSCULAR; INTRAVENOUS ONCE
Refills: 0 | Status: COMPLETED | OUTPATIENT
Start: 2021-01-01 | End: 2021-01-01

## 2021-01-01 RX ORDER — SODIUM CHLORIDE 0.65 %
1 AEROSOL, SPRAY (ML) NASAL
Refills: 0 | Status: DISCONTINUED | OUTPATIENT
Start: 2021-01-01 | End: 2021-01-01

## 2021-01-01 RX ORDER — METOPROLOL TARTRATE 50 MG
50 TABLET ORAL DAILY
Refills: 0 | Status: DISCONTINUED | OUTPATIENT
Start: 2021-01-01 | End: 2021-01-01

## 2021-01-01 RX ORDER — ONDANSETRON 8 MG/1
1 TABLET, FILM COATED ORAL
Qty: 45 | Refills: 0
Start: 2021-01-01 | End: 2021-01-01

## 2021-01-01 RX ORDER — SODIUM CHLORIDE 9 MG/ML
10 INJECTION INTRAMUSCULAR; INTRAVENOUS; SUBCUTANEOUS
Refills: 0 | Status: DISCONTINUED | OUTPATIENT
Start: 2021-01-01 | End: 2021-01-01

## 2021-01-01 RX ORDER — CALCIUM CARBONATE 500(1250)
1 TABLET ORAL EVERY 4 HOURS
Refills: 0 | Status: DISCONTINUED | OUTPATIENT
Start: 2021-01-01 | End: 2021-01-01

## 2021-01-01 RX ORDER — POSACONAZOLE 100 MG/1
100 TABLET, DELAYED RELEASE ORAL
Qty: 90 | Refills: 1 | Status: ACTIVE | COMMUNITY
Start: 2021-01-01 | End: 1900-01-01

## 2021-01-01 RX ORDER — HYDRALAZINE HCL 50 MG
75 TABLET ORAL THREE TIMES A DAY
Refills: 0 | Status: DISCONTINUED | OUTPATIENT
Start: 2021-01-01 | End: 2021-01-01

## 2021-01-01 RX ORDER — CALCIUM CARBONATE 500(1250)
2 TABLET ORAL
Qty: 0 | Refills: 0 | DISCHARGE
Start: 2021-01-01

## 2021-01-01 RX ORDER — FUROSEMIDE 40 MG
1 TABLET ORAL
Qty: 0 | Refills: 0 | DISCHARGE

## 2021-01-01 RX ORDER — SENNA PLUS 8.6 MG/1
2 TABLET ORAL
Qty: 0 | Refills: 0 | DISCHARGE
Start: 2021-01-01

## 2021-01-01 RX ORDER — ONDANSETRON 8 MG/1
8 TABLET, FILM COATED ORAL EVERY 24 HOURS
Refills: 0 | Status: COMPLETED | OUTPATIENT
Start: 2021-01-01 | End: 2021-01-01

## 2021-01-01 RX ORDER — FUROSEMIDE 40 MG
40 TABLET ORAL ONCE
Refills: 0 | Status: COMPLETED | OUTPATIENT
Start: 2021-01-01 | End: 2021-01-01

## 2021-01-01 RX ORDER — FUROSEMIDE 40 MG
20 TABLET ORAL DAILY
Refills: 0 | Status: DISCONTINUED | OUTPATIENT
Start: 2021-01-01 | End: 2021-01-01

## 2021-01-01 RX ORDER — FUROSEMIDE 40 MG
80 TABLET ORAL DAILY
Refills: 0 | Status: DISCONTINUED | OUTPATIENT
Start: 2021-01-01 | End: 2021-01-01

## 2021-01-01 RX ORDER — SUCRALFATE 1 G
1 TABLET ORAL EVERY 6 HOURS
Refills: 0 | Status: DISCONTINUED | OUTPATIENT
Start: 2021-01-01 | End: 2021-01-01

## 2021-01-01 RX ORDER — ZOLPIDEM TARTRATE 5 MG/1
5 TABLET, FILM COATED ORAL
Refills: 0 | Status: DISCONTINUED | COMMUNITY
End: 2021-01-01

## 2021-01-01 RX ORDER — ALTEPLASE 100 MG
2 KIT INTRAVENOUS ONCE
Refills: 0 | Status: DISCONTINUED | OUTPATIENT
Start: 2021-01-01 | End: 2021-01-01

## 2021-01-01 RX ORDER — ACETAMINOPHEN 500 MG
650 TABLET ORAL EVERY 6 HOURS
Refills: 0 | Status: DISCONTINUED | OUTPATIENT
Start: 2021-01-01 | End: 2021-01-01

## 2021-01-01 RX ORDER — DECITABINE 50 MG/20ML
37 INJECTION, POWDER, LYOPHILIZED, FOR SOLUTION INTRAVENOUS EVERY 24 HOURS
Refills: 0 | Status: COMPLETED | OUTPATIENT
Start: 2021-01-01 | End: 2021-01-01

## 2021-01-01 RX ORDER — LIDOCAINE 4 G/100G
1 CREAM TOPICAL DAILY
Refills: 0 | Status: DISCONTINUED | OUTPATIENT
Start: 2021-01-01 | End: 2021-01-01

## 2021-01-01 RX ORDER — ALTEPLASE 100 MG
2 KIT INTRAVENOUS ONCE
Refills: 0 | Status: COMPLETED | OUTPATIENT
Start: 2021-01-01 | End: 2021-01-01

## 2021-01-01 RX ORDER — POLYETHYLENE GLYCOL 3350 17 G/17G
17 POWDER, FOR SOLUTION ORAL DAILY
Refills: 0 | Status: DISCONTINUED | OUTPATIENT
Start: 2021-01-01 | End: 2021-01-01

## 2021-01-01 RX ORDER — ESCITALOPRAM OXALATE 10 MG/1
1 TABLET, FILM COATED ORAL
Qty: 0 | Refills: 0 | DISCHARGE

## 2021-01-01 RX ORDER — ENOXAPARIN SODIUM 100 MG/ML
40 INJECTION SUBCUTANEOUS DAILY
Refills: 0 | Status: DISCONTINUED | OUTPATIENT
Start: 2021-01-01 | End: 2021-01-01

## 2021-01-01 RX ORDER — ONDANSETRON 8 MG/1
1 TABLET, FILM COATED ORAL
Qty: 15 | Refills: 0
Start: 2021-01-01 | End: 2021-01-01

## 2021-01-01 RX ORDER — HYDRALAZINE HCL 50 MG
3 TABLET ORAL
Qty: 270 | Refills: 0
Start: 2021-01-01 | End: 2021-01-01

## 2021-01-01 RX ORDER — FUROSEMIDE 40 MG
20 TABLET ORAL ONCE
Refills: 0 | Status: COMPLETED | OUTPATIENT
Start: 2021-01-01 | End: 2021-01-01

## 2021-01-01 RX ORDER — RASBURICASE 7.5 MG
3 KIT INTRAVENOUS ONCE
Refills: 0 | Status: COMPLETED | OUTPATIENT
Start: 2021-01-01 | End: 2021-01-01

## 2021-01-01 RX ORDER — PAMIDRONATE DISODIUM 9 MG/ML
60 INJECTION, SOLUTION INTRAVENOUS ONCE
Refills: 0 | Status: DISCONTINUED | OUTPATIENT
Start: 2021-01-01 | End: 2021-01-01

## 2021-01-01 RX ORDER — FUROSEMIDE 40 MG
1 TABLET ORAL
Qty: 0 | Refills: 0 | DISCHARGE
Start: 2021-01-01

## 2021-01-01 RX ORDER — FOLIC ACID 0.8 MG
1 TABLET ORAL DAILY
Refills: 0 | Status: DISCONTINUED | OUTPATIENT
Start: 2021-01-01 | End: 2021-01-01

## 2021-01-01 RX ORDER — LEVOFLOXACIN 500 MG/1
500 TABLET, FILM COATED ORAL DAILY
Qty: 30 | Refills: 2 | Status: ACTIVE | COMMUNITY
Start: 2021-01-01 | End: 1900-01-01

## 2021-01-01 RX ORDER — UREA 15 G
15 POWDER IN PACKET (EA) ORAL EVERY 12 HOURS
Refills: 0 | Status: DISCONTINUED | OUTPATIENT
Start: 2021-01-01 | End: 2021-01-01

## 2021-01-01 RX ORDER — CINACALCET 30 MG/1
30 TABLET, FILM COATED ORAL DAILY
Refills: 0 | Status: DISCONTINUED | OUTPATIENT
Start: 2021-01-01 | End: 2021-01-01

## 2021-01-01 RX ORDER — FUROSEMIDE 40 MG
1 TABLET ORAL
Qty: 30 | Refills: 0
Start: 2021-01-01 | End: 2021-01-01

## 2021-01-01 RX ORDER — ESCITALOPRAM OXALATE 10 MG/1
10 TABLET, FILM COATED ORAL DAILY
Refills: 0 | Status: DISCONTINUED | COMMUNITY
End: 2021-01-01

## 2021-01-01 RX ORDER — POSACONAZOLE 100 MG/1
200 TABLET, DELAYED RELEASE ORAL EVERY 8 HOURS
Refills: 0 | Status: DISCONTINUED | OUTPATIENT
Start: 2021-01-01 | End: 2021-01-01

## 2021-01-01 RX ORDER — VENETOCLAX 100 MG/1
100 TABLET, FILM COATED ORAL
Refills: 0 | Status: DISCONTINUED | OUTPATIENT
Start: 2021-01-01 | End: 2021-01-01

## 2021-01-01 RX ORDER — HYDRALAZINE HCL 50 MG
5 TABLET ORAL ONCE
Refills: 0 | Status: COMPLETED | OUTPATIENT
Start: 2021-01-01 | End: 2021-01-01

## 2021-01-01 RX ORDER — METOPROLOL TARTRATE 50 MG
1 TABLET ORAL
Qty: 0 | Refills: 0 | DISCHARGE

## 2021-01-01 RX ORDER — FUROSEMIDE 40 MG
20 TABLET ORAL ONCE
Refills: 0 | Status: DISCONTINUED | OUTPATIENT
Start: 2021-01-01 | End: 2021-01-01

## 2021-01-01 RX ORDER — HYDRALAZINE HCL 50 MG
25 TABLET ORAL ONCE
Refills: 0 | Status: DISCONTINUED | OUTPATIENT
Start: 2021-01-01 | End: 2021-01-01

## 2021-01-01 RX ORDER — LIDOCAINE HCL 20 MG/ML
20 VIAL (ML) INJECTION ONCE
Refills: 0 | Status: COMPLETED | OUTPATIENT
Start: 2021-01-01 | End: 2021-01-01

## 2021-01-01 RX ORDER — TAMSULOSIN HYDROCHLORIDE 0.4 MG/1
0.4 CAPSULE ORAL AT BEDTIME
Refills: 0 | Status: DISCONTINUED | OUTPATIENT
Start: 2021-01-01 | End: 2021-01-01

## 2021-01-01 RX ORDER — VANCOMYCIN HCL 1 G
1000 VIAL (EA) INTRAVENOUS ONCE
Refills: 0 | Status: COMPLETED | OUTPATIENT
Start: 2021-01-01 | End: 2021-01-01

## 2021-01-01 RX ORDER — ONDANSETRON 8 MG/1
4 TABLET, FILM COATED ORAL EVERY 8 HOURS
Refills: 0 | Status: DISCONTINUED | OUTPATIENT
Start: 2021-01-01 | End: 2021-01-01

## 2021-01-01 RX ORDER — VENETOCLAX 100 MG/1
200 TABLET, FILM COATED ORAL ONCE
Refills: 0 | Status: COMPLETED | OUTPATIENT
Start: 2021-01-01 | End: 2021-01-01

## 2021-01-01 RX ORDER — ROSUVASTATIN CALCIUM 5 MG/1
1 TABLET ORAL
Qty: 0 | Refills: 0 | DISCHARGE

## 2021-01-01 RX ORDER — PIPERACILLIN AND TAZOBACTAM 4; .5 G/20ML; G/20ML
3.38 INJECTION, POWDER, LYOPHILIZED, FOR SOLUTION INTRAVENOUS EVERY 12 HOURS
Refills: 0 | Status: DISCONTINUED | OUTPATIENT
Start: 2021-01-01 | End: 2021-01-01

## 2021-01-01 RX ORDER — PIPERACILLIN AND TAZOBACTAM 4; .5 G/20ML; G/20ML
3.38 INJECTION, POWDER, LYOPHILIZED, FOR SOLUTION INTRAVENOUS EVERY 8 HOURS
Refills: 0 | Status: COMPLETED | OUTPATIENT
Start: 2021-01-01 | End: 2021-01-01

## 2021-01-01 RX ORDER — INSULIN LISPRO 100/ML
VIAL (ML) SUBCUTANEOUS AT BEDTIME
Refills: 0 | Status: DISCONTINUED | OUTPATIENT
Start: 2021-01-01 | End: 2021-01-01

## 2021-01-01 RX ORDER — LIDOCAINE 4 G/100G
1 CREAM TOPICAL
Qty: 30 | Refills: 0
Start: 2021-01-01 | End: 2021-01-01

## 2021-01-01 RX ORDER — LANOLIN ALCOHOL/MO/W.PET/CERES
3 CREAM (GRAM) TOPICAL AT BEDTIME
Refills: 0 | Status: DISCONTINUED | OUTPATIENT
Start: 2021-01-01 | End: 2021-01-01

## 2021-01-01 RX ORDER — VENETOCLAX 100 MG/1
50 TABLET, FILM COATED ORAL ONCE
Refills: 0 | Status: COMPLETED | OUTPATIENT
Start: 2021-01-01 | End: 2021-01-01

## 2021-01-01 RX ORDER — SODIUM BICARBONATE 1 MEQ/ML
650 SYRINGE (ML) INTRAVENOUS THREE TIMES A DAY
Refills: 0 | Status: DISCONTINUED | OUTPATIENT
Start: 2021-01-01 | End: 2021-01-01

## 2021-01-01 RX ORDER — POSACONAZOLE 100 MG/1
5 TABLET, DELAYED RELEASE ORAL
Qty: 450 | Refills: 0
Start: 2021-01-01 | End: 2021-01-01

## 2021-01-01 RX ORDER — OXYMETAZOLINE HYDROCHLORIDE 0.5 MG/ML
1 SPRAY NASAL
Refills: 0 | Status: COMPLETED | OUTPATIENT
Start: 2021-01-01 | End: 2021-01-01

## 2021-01-01 RX ORDER — DECITABINE 50 MG/20ML
35 INJECTION, POWDER, LYOPHILIZED, FOR SOLUTION INTRAVENOUS EVERY 24 HOURS
Refills: 0 | Status: COMPLETED | OUTPATIENT
Start: 2021-01-01 | End: 2021-01-01

## 2021-01-01 RX ORDER — HYDRALAZINE HCL 50 MG
50 TABLET ORAL THREE TIMES A DAY
Refills: 0 | Status: DISCONTINUED | OUTPATIENT
Start: 2021-01-01 | End: 2021-01-01

## 2021-01-01 RX ORDER — ACETAMINOPHEN 500 MG
1000 TABLET ORAL ONCE
Refills: 0 | Status: COMPLETED | OUTPATIENT
Start: 2021-01-01 | End: 2021-01-01

## 2021-01-01 RX ORDER — ONDANSETRON 8 MG/1
8 TABLET, FILM COATED ORAL EVERY 8 HOURS
Refills: 0 | Status: DISCONTINUED | OUTPATIENT
Start: 2021-01-01 | End: 2021-01-01

## 2021-01-01 RX ORDER — VENETOCLAX 100 MG/1
400 TABLET, FILM COATED ORAL
Refills: 0 | Status: DISCONTINUED | OUTPATIENT
Start: 2021-01-01 | End: 2021-01-01

## 2021-01-01 RX ORDER — LANOLIN ALCOHOL/MO/W.PET/CERES
1 CREAM (GRAM) TOPICAL
Qty: 5 | Refills: 0
Start: 2021-01-01 | End: 2021-01-01

## 2021-01-01 RX ORDER — SALIVA SUBSTITUTE COMB NO.11 351 MG
5 POWDER IN PACKET (EA) MUCOUS MEMBRANE
Refills: 0 | Status: DISCONTINUED | OUTPATIENT
Start: 2021-01-01 | End: 2021-01-01

## 2021-01-01 RX ORDER — CALCIUM GLUCONATE 100 MG/ML
2 VIAL (ML) INTRAVENOUS ONCE
Refills: 0 | Status: COMPLETED | OUTPATIENT
Start: 2021-01-01 | End: 2021-01-01

## 2021-01-01 RX ORDER — POLYETHYLENE GLYCOL 3350 17 G/17G
17 POWDER, FOR SOLUTION ORAL
Qty: 0 | Refills: 0 | DISCHARGE
Start: 2021-01-01

## 2021-01-01 RX ORDER — VENETOCLAX 100 MG/1
1 TABLET, FILM COATED ORAL
Qty: 30 | Refills: 3
Start: 2021-01-01 | End: 2021-01-01

## 2021-01-01 RX ORDER — POSACONAZOLE 100 MG/1
3 TABLET, DELAYED RELEASE ORAL
Qty: 90 | Refills: 0
Start: 2021-01-01 | End: 2021-01-01

## 2021-01-01 RX ORDER — DECITABINE 50 MG/20ML
37 INJECTION, POWDER, LYOPHILIZED, FOR SOLUTION INTRAVENOUS EVERY 24 HOURS
Refills: 0 | Status: DISCONTINUED | OUTPATIENT
Start: 2021-01-01 | End: 2021-01-01

## 2021-01-01 RX ORDER — INSULIN LISPRO 100/ML
VIAL (ML) SUBCUTANEOUS
Refills: 0 | Status: DISCONTINUED | OUTPATIENT
Start: 2021-01-01 | End: 2021-01-01

## 2021-01-01 RX ORDER — HEPARIN SODIUM 5000 [USP'U]/ML
5000 INJECTION INTRAVENOUS; SUBCUTANEOUS ONCE
Refills: 0 | Status: COMPLETED | OUTPATIENT
Start: 2021-01-01 | End: 2021-01-01

## 2021-01-01 RX ORDER — CYCLOBENZAPRINE HYDROCHLORIDE 10 MG/1
1 TABLET, FILM COATED ORAL
Qty: 0 | Refills: 0 | DISCHARGE

## 2021-01-01 RX ORDER — CALCIUM CARBONATE 500(1250)
2 TABLET ORAL DAILY
Refills: 0 | Status: DISCONTINUED | OUTPATIENT
Start: 2021-01-01 | End: 2021-01-01

## 2021-01-01 RX ORDER — VENETOCLAX 100 MG/1
200 TABLET, FILM COATED ORAL
Refills: 0 | Status: DISCONTINUED | OUTPATIENT
Start: 2021-01-01 | End: 2021-01-01

## 2021-01-01 RX ADMIN — LIDOCAINE 1 PATCH: 4 CREAM TOPICAL at 11:46

## 2021-01-01 RX ADMIN — TAMSULOSIN HYDROCHLORIDE 0.4 MILLIGRAM(S): 0.4 CAPSULE ORAL at 21:38

## 2021-01-01 RX ADMIN — Medication 5 MILLILITER(S): at 12:27

## 2021-01-01 RX ADMIN — Medication 650 MILLIGRAM(S): at 08:32

## 2021-01-01 RX ADMIN — Medication 75 MILLIGRAM(S): at 23:01

## 2021-01-01 RX ADMIN — Medication 650 MILLIGRAM(S): at 23:51

## 2021-01-01 RX ADMIN — Medication 5 MILLILITER(S): at 05:20

## 2021-01-01 RX ADMIN — Medication 5 MILLILITER(S): at 12:38

## 2021-01-01 RX ADMIN — Medication 2000 UNIT(S): at 12:27

## 2021-01-01 RX ADMIN — ESCITALOPRAM OXALATE 5 MILLIGRAM(S): 10 TABLET, FILM COATED ORAL at 05:16

## 2021-01-01 RX ADMIN — Medication 200 MILLIGRAM(S): at 12:45

## 2021-01-01 RX ADMIN — Medication 1 MILLIGRAM(S): at 11:51

## 2021-01-01 RX ADMIN — Medication 5 MILLILITER(S): at 05:41

## 2021-01-01 RX ADMIN — Medication 50 MILLIGRAM(S): at 06:26

## 2021-01-01 RX ADMIN — Medication 15 GRAM(S): at 19:06

## 2021-01-01 RX ADMIN — Medication 40 MILLIGRAM(S): at 05:42

## 2021-01-01 RX ADMIN — Medication 5 MILLILITER(S): at 12:21

## 2021-01-01 RX ADMIN — Medication 40 MILLIGRAM(S): at 06:26

## 2021-01-01 RX ADMIN — Medication 15 GRAM(S): at 06:19

## 2021-01-01 RX ADMIN — Medication 50 MILLIGRAM(S): at 06:01

## 2021-01-01 RX ADMIN — FINASTERIDE 5 MILLIGRAM(S): 5 TABLET, FILM COATED ORAL at 06:28

## 2021-01-01 RX ADMIN — ISOSORBIDE MONONITRATE 60 MILLIGRAM(S): 60 TABLET, EXTENDED RELEASE ORAL at 12:13

## 2021-01-01 RX ADMIN — ESCITALOPRAM OXALATE 5 MILLIGRAM(S): 10 TABLET, FILM COATED ORAL at 05:23

## 2021-01-01 RX ADMIN — POLYETHYLENE GLYCOL 3350 17 GRAM(S): 17 POWDER, FOR SOLUTION ORAL at 17:35

## 2021-01-01 RX ADMIN — CINACALCET 30 MILLIGRAM(S): 30 TABLET, FILM COATED ORAL at 12:18

## 2021-01-01 RX ADMIN — ESCITALOPRAM OXALATE 5 MILLIGRAM(S): 10 TABLET, FILM COATED ORAL at 05:57

## 2021-01-01 RX ADMIN — HEPARIN SODIUM 5000 UNIT(S): 5000 INJECTION INTRAVENOUS; SUBCUTANEOUS at 05:23

## 2021-01-01 RX ADMIN — Medication 5 MILLILITER(S): at 17:12

## 2021-01-01 RX ADMIN — LIDOCAINE 1 PATCH: 4 CREAM TOPICAL at 19:28

## 2021-01-01 RX ADMIN — ISOSORBIDE MONONITRATE 60 MILLIGRAM(S): 60 TABLET, EXTENDED RELEASE ORAL at 11:24

## 2021-01-01 RX ADMIN — Medication 1 MILLIGRAM(S): at 12:18

## 2021-01-01 RX ADMIN — Medication 50 MILLIGRAM(S): at 06:05

## 2021-01-01 RX ADMIN — Medication 1 SPRAY(S): at 06:07

## 2021-01-01 RX ADMIN — Medication 5 MILLILITER(S): at 05:23

## 2021-01-01 RX ADMIN — Medication 650 MILLIGRAM(S): at 14:43

## 2021-01-01 RX ADMIN — Medication 650 MILLIGRAM(S): at 05:15

## 2021-01-01 RX ADMIN — PIPERACILLIN AND TAZOBACTAM 25 GRAM(S): 4; .5 INJECTION, POWDER, LYOPHILIZED, FOR SOLUTION INTRAVENOUS at 17:48

## 2021-01-01 RX ADMIN — FINASTERIDE 5 MILLIGRAM(S): 5 TABLET, FILM COATED ORAL at 12:23

## 2021-01-01 RX ADMIN — SODIUM CHLORIDE 10 MILLILITER(S): 9 INJECTION INTRAMUSCULAR; INTRAVENOUS; SUBCUTANEOUS at 11:11

## 2021-01-01 RX ADMIN — Medication 50 MILLIGRAM(S): at 06:16

## 2021-01-01 RX ADMIN — LIDOCAINE 1 PATCH: 4 CREAM TOPICAL at 00:25

## 2021-01-01 RX ADMIN — PIPERACILLIN AND TAZOBACTAM 25 GRAM(S): 4; .5 INJECTION, POWDER, LYOPHILIZED, FOR SOLUTION INTRAVENOUS at 00:05

## 2021-01-01 RX ADMIN — Medication 5 MILLILITER(S): at 17:04

## 2021-01-01 RX ADMIN — Medication 200 MILLIGRAM(S): at 12:33

## 2021-01-01 RX ADMIN — OXYMETAZOLINE HYDROCHLORIDE 1 SPRAY(S): 0.5 SPRAY NASAL at 17:06

## 2021-01-01 RX ADMIN — TAMSULOSIN HYDROCHLORIDE 0.4 MILLIGRAM(S): 0.4 CAPSULE ORAL at 21:42

## 2021-01-01 RX ADMIN — VENETOCLAX 100 MILLIGRAM(S): 100 TABLET, FILM COATED ORAL at 12:13

## 2021-01-01 RX ADMIN — Medication 1000 MILLIGRAM(S): at 23:01

## 2021-01-01 RX ADMIN — Medication 5 MILLILITER(S): at 17:40

## 2021-01-01 RX ADMIN — Medication 650 MILLIGRAM(S): at 12:30

## 2021-01-01 RX ADMIN — ESCITALOPRAM OXALATE 5 MILLIGRAM(S): 10 TABLET, FILM COATED ORAL at 05:24

## 2021-01-01 RX ADMIN — SODIUM CHLORIDE 10 MILLILITER(S): 9 INJECTION INTRAMUSCULAR; INTRAVENOUS; SUBCUTANEOUS at 08:04

## 2021-01-01 RX ADMIN — ENOXAPARIN SODIUM 30 MILLIGRAM(S): 100 INJECTION SUBCUTANEOUS at 13:00

## 2021-01-01 RX ADMIN — POSACONAZOLE 200 MILLIGRAM(S): 100 TABLET, DELAYED RELEASE ORAL at 17:22

## 2021-01-01 RX ADMIN — ESCITALOPRAM OXALATE 5 MILLIGRAM(S): 10 TABLET, FILM COATED ORAL at 05:32

## 2021-01-01 RX ADMIN — SODIUM CHLORIDE 10 MILLILITER(S): 9 INJECTION INTRAMUSCULAR; INTRAVENOUS; SUBCUTANEOUS at 20:00

## 2021-01-01 RX ADMIN — Medication 3 MILLILITER(S): at 05:13

## 2021-01-01 RX ADMIN — Medication 50 MILLIGRAM(S): at 05:23

## 2021-01-01 RX ADMIN — FINASTERIDE 5 MILLIGRAM(S): 5 TABLET, FILM COATED ORAL at 05:40

## 2021-01-01 RX ADMIN — ESCITALOPRAM OXALATE 5 MILLIGRAM(S): 10 TABLET, FILM COATED ORAL at 06:21

## 2021-01-01 RX ADMIN — ISOSORBIDE MONONITRATE 60 MILLIGRAM(S): 60 TABLET, EXTENDED RELEASE ORAL at 12:09

## 2021-01-01 RX ADMIN — Medication 2000 UNIT(S): at 11:20

## 2021-01-01 RX ADMIN — Medication 2 TABLET(S): at 12:28

## 2021-01-01 RX ADMIN — Medication 5 MILLILITER(S): at 17:43

## 2021-01-01 RX ADMIN — VENETOCLAX 400 MILLIGRAM(S): 100 TABLET, FILM COATED ORAL at 12:56

## 2021-01-01 RX ADMIN — Medication 15 GRAM(S): at 17:33

## 2021-01-01 RX ADMIN — Medication 50 MILLIGRAM(S): at 05:20

## 2021-01-01 RX ADMIN — Medication 5 MILLILITER(S): at 11:32

## 2021-01-01 RX ADMIN — Medication 5 MILLILITER(S): at 11:46

## 2021-01-01 RX ADMIN — Medication 1: at 12:03

## 2021-01-01 RX ADMIN — ISOSORBIDE MONONITRATE 60 MILLIGRAM(S): 60 TABLET, EXTENDED RELEASE ORAL at 13:24

## 2021-01-01 RX ADMIN — Medication 50 MILLIGRAM(S): at 05:34

## 2021-01-01 RX ADMIN — ESCITALOPRAM OXALATE 5 MILLIGRAM(S): 10 TABLET, FILM COATED ORAL at 06:04

## 2021-01-01 RX ADMIN — Medication 3 MILLILITER(S): at 06:11

## 2021-01-01 RX ADMIN — Medication 5 MILLILITER(S): at 12:55

## 2021-01-01 RX ADMIN — CHLORHEXIDINE GLUCONATE 1 APPLICATION(S): 213 SOLUTION TOPICAL at 05:20

## 2021-01-01 RX ADMIN — Medication 50 MILLIGRAM(S): at 05:37

## 2021-01-01 RX ADMIN — ESCITALOPRAM OXALATE 5 MILLIGRAM(S): 10 TABLET, FILM COATED ORAL at 06:07

## 2021-01-01 RX ADMIN — TAMSULOSIN HYDROCHLORIDE 0.4 MILLIGRAM(S): 0.4 CAPSULE ORAL at 21:24

## 2021-01-01 RX ADMIN — LIDOCAINE 1 PATCH: 4 CREAM TOPICAL at 19:31

## 2021-01-01 RX ADMIN — CHLORHEXIDINE GLUCONATE 1 APPLICATION(S): 213 SOLUTION TOPICAL at 05:16

## 2021-01-01 RX ADMIN — POSACONAZOLE 200 MILLIGRAM(S): 100 TABLET, DELAYED RELEASE ORAL at 13:13

## 2021-01-01 RX ADMIN — Medication 40 MILLIGRAM(S): at 14:12

## 2021-01-01 RX ADMIN — LIDOCAINE 1 PATCH: 4 CREAM TOPICAL at 00:00

## 2021-01-01 RX ADMIN — Medication 40 MILLIGRAM(S): at 06:01

## 2021-01-01 RX ADMIN — FINASTERIDE 5 MILLIGRAM(S): 5 TABLET, FILM COATED ORAL at 06:10

## 2021-01-01 RX ADMIN — Medication 5 MILLIGRAM(S): at 11:25

## 2021-01-01 RX ADMIN — Medication 1 MILLIGRAM(S): at 11:33

## 2021-01-01 RX ADMIN — Medication 200 MILLIGRAM(S): at 13:36

## 2021-01-01 RX ADMIN — FINASTERIDE 5 MILLIGRAM(S): 5 TABLET, FILM COATED ORAL at 05:38

## 2021-01-01 RX ADMIN — Medication 1 SPRAY(S): at 23:36

## 2021-01-01 RX ADMIN — Medication 5 MILLILITER(S): at 17:14

## 2021-01-01 RX ADMIN — FINASTERIDE 5 MILLIGRAM(S): 5 TABLET, FILM COATED ORAL at 05:19

## 2021-01-01 RX ADMIN — Medication 1 SPRAY(S): at 11:59

## 2021-01-01 RX ADMIN — Medication 1 MILLIGRAM(S): at 12:52

## 2021-01-01 RX ADMIN — PIPERACILLIN AND TAZOBACTAM 25 GRAM(S): 4; .5 INJECTION, POWDER, LYOPHILIZED, FOR SOLUTION INTRAVENOUS at 17:00

## 2021-01-01 RX ADMIN — HEPARIN SODIUM 5000 UNIT(S): 5000 INJECTION INTRAVENOUS; SUBCUTANEOUS at 05:50

## 2021-01-01 RX ADMIN — Medication 50 GRAM(S): at 09:27

## 2021-01-01 RX ADMIN — Medication 5 MILLILITER(S): at 17:38

## 2021-01-01 RX ADMIN — ONDANSETRON 8 MILLIGRAM(S): 8 TABLET, FILM COATED ORAL at 14:55

## 2021-01-01 RX ADMIN — TAMSULOSIN HYDROCHLORIDE 0.4 MILLIGRAM(S): 0.4 CAPSULE ORAL at 21:40

## 2021-01-01 RX ADMIN — Medication 5 MILLIGRAM(S): at 14:49

## 2021-01-01 RX ADMIN — Medication 5 MILLILITER(S): at 00:39

## 2021-01-01 RX ADMIN — PIPERACILLIN AND TAZOBACTAM 200 GRAM(S): 4; .5 INJECTION, POWDER, LYOPHILIZED, FOR SOLUTION INTRAVENOUS at 18:31

## 2021-01-01 RX ADMIN — Medication 650 MILLIGRAM(S): at 22:50

## 2021-01-01 RX ADMIN — Medication 200 MILLIGRAM(S): at 12:17

## 2021-01-01 RX ADMIN — ESCITALOPRAM OXALATE 5 MILLIGRAM(S): 10 TABLET, FILM COATED ORAL at 06:37

## 2021-01-01 RX ADMIN — Medication 5 MILLILITER(S): at 11:59

## 2021-01-01 RX ADMIN — POLYETHYLENE GLYCOL 3350 17 GRAM(S): 17 POWDER, FOR SOLUTION ORAL at 11:58

## 2021-01-01 RX ADMIN — Medication 1 MILLIGRAM(S): at 11:58

## 2021-01-01 RX ADMIN — VENETOCLAX 400 MILLIGRAM(S): 100 TABLET, FILM COATED ORAL at 12:52

## 2021-01-01 RX ADMIN — VENETOCLAX 50 MILLIGRAM(S): 100 TABLET, FILM COATED ORAL at 12:21

## 2021-01-01 RX ADMIN — Medication 650 MILLIGRAM(S): at 07:32

## 2021-01-01 RX ADMIN — LIDOCAINE 1 PATCH: 4 CREAM TOPICAL at 00:58

## 2021-01-01 RX ADMIN — POSACONAZOLE 200 MILLIGRAM(S): 100 TABLET, DELAYED RELEASE ORAL at 13:07

## 2021-01-01 RX ADMIN — Medication 20 MILLIEQUIVALENT(S): at 09:27

## 2021-01-01 RX ADMIN — Medication 50 MILLIGRAM(S): at 05:49

## 2021-01-01 RX ADMIN — Medication 650 MILLIGRAM(S): at 06:54

## 2021-01-01 RX ADMIN — SODIUM CHLORIDE 10 MILLILITER(S): 9 INJECTION INTRAMUSCULAR; INTRAVENOUS; SUBCUTANEOUS at 06:34

## 2021-01-01 RX ADMIN — Medication 10 MILLIGRAM(S): at 12:29

## 2021-01-01 RX ADMIN — ISOSORBIDE MONONITRATE 60 MILLIGRAM(S): 60 TABLET, EXTENDED RELEASE ORAL at 12:14

## 2021-01-01 RX ADMIN — TAMSULOSIN HYDROCHLORIDE 0.4 MILLIGRAM(S): 0.4 CAPSULE ORAL at 21:09

## 2021-01-01 RX ADMIN — LIDOCAINE 1 PATCH: 4 CREAM TOPICAL at 22:03

## 2021-01-01 RX ADMIN — Medication 50 MILLIGRAM(S): at 06:54

## 2021-01-01 RX ADMIN — SODIUM CHLORIDE 20 MILLILITER(S): 9 INJECTION INTRAMUSCULAR; INTRAVENOUS; SUBCUTANEOUS at 06:05

## 2021-01-01 RX ADMIN — CINACALCET 30 MILLIGRAM(S): 30 TABLET, FILM COATED ORAL at 12:45

## 2021-01-01 RX ADMIN — CHLORHEXIDINE GLUCONATE 1 APPLICATION(S): 213 SOLUTION TOPICAL at 05:59

## 2021-01-01 RX ADMIN — POSACONAZOLE 200 MILLIGRAM(S): 100 TABLET, DELAYED RELEASE ORAL at 02:23

## 2021-01-01 RX ADMIN — Medication 40 MILLIGRAM(S): at 06:06

## 2021-01-01 RX ADMIN — Medication 5 MILLILITER(S): at 17:32

## 2021-01-01 RX ADMIN — Medication 50 MILLIGRAM(S): at 05:19

## 2021-01-01 RX ADMIN — POSACONAZOLE 200 MILLIGRAM(S): 100 TABLET, DELAYED RELEASE ORAL at 10:21

## 2021-01-01 RX ADMIN — Medication 50 MILLIGRAM(S): at 06:04

## 2021-01-01 RX ADMIN — TAMSULOSIN HYDROCHLORIDE 0.4 MILLIGRAM(S): 0.4 CAPSULE ORAL at 22:18

## 2021-01-01 RX ADMIN — Medication 650 MILLIGRAM(S): at 14:41

## 2021-01-01 RX ADMIN — Medication 1 MILLIGRAM(S): at 12:40

## 2021-01-01 RX ADMIN — Medication 3 MILLILITER(S): at 00:27

## 2021-01-01 RX ADMIN — LIDOCAINE 1 PATCH: 4 CREAM TOPICAL at 12:01

## 2021-01-01 RX ADMIN — LIDOCAINE 1 PATCH: 4 CREAM TOPICAL at 12:47

## 2021-01-01 RX ADMIN — Medication 15 GRAM(S): at 17:42

## 2021-01-01 RX ADMIN — POSACONAZOLE 200 MILLIGRAM(S): 100 TABLET, DELAYED RELEASE ORAL at 22:27

## 2021-01-01 RX ADMIN — ISOSORBIDE MONONITRATE 60 MILLIGRAM(S): 60 TABLET, EXTENDED RELEASE ORAL at 12:52

## 2021-01-01 RX ADMIN — FINASTERIDE 5 MILLIGRAM(S): 5 TABLET, FILM COATED ORAL at 05:14

## 2021-01-01 RX ADMIN — SODIUM CHLORIDE 10 MILLILITER(S): 9 INJECTION INTRAMUSCULAR; INTRAVENOUS; SUBCUTANEOUS at 14:14

## 2021-01-01 RX ADMIN — CHLORHEXIDINE GLUCONATE 1 APPLICATION(S): 213 SOLUTION TOPICAL at 08:43

## 2021-01-01 RX ADMIN — ISOSORBIDE MONONITRATE 60 MILLIGRAM(S): 60 TABLET, EXTENDED RELEASE ORAL at 11:35

## 2021-01-01 RX ADMIN — Medication 2000 UNIT(S): at 12:02

## 2021-01-01 RX ADMIN — Medication 81 MILLIGRAM(S): at 12:23

## 2021-01-01 RX ADMIN — POSACONAZOLE 300 MILLIGRAM(S): 100 TABLET, DELAYED RELEASE ORAL at 11:34

## 2021-01-01 RX ADMIN — Medication 1 SPRAY(S): at 12:18

## 2021-01-01 RX ADMIN — TAMSULOSIN HYDROCHLORIDE 0.4 MILLIGRAM(S): 0.4 CAPSULE ORAL at 22:12

## 2021-01-01 RX ADMIN — TAMSULOSIN HYDROCHLORIDE 0.4 MILLIGRAM(S): 0.4 CAPSULE ORAL at 22:06

## 2021-01-01 RX ADMIN — Medication 1 MILLIGRAM(S): at 14:13

## 2021-01-01 RX ADMIN — Medication 200 MILLIGRAM(S): at 12:55

## 2021-01-01 RX ADMIN — Medication 50 MILLIGRAM(S): at 06:07

## 2021-01-01 RX ADMIN — Medication 50 MILLIGRAM(S): at 06:50

## 2021-01-01 RX ADMIN — ISOSORBIDE MONONITRATE 60 MILLIGRAM(S): 60 TABLET, EXTENDED RELEASE ORAL at 11:13

## 2021-01-01 RX ADMIN — ISOSORBIDE MONONITRATE 60 MILLIGRAM(S): 60 TABLET, EXTENDED RELEASE ORAL at 12:07

## 2021-01-01 RX ADMIN — Medication 1 MILLIGRAM(S): at 12:30

## 2021-01-01 RX ADMIN — Medication 2000 UNIT(S): at 12:16

## 2021-01-01 RX ADMIN — Medication 5 MILLILITER(S): at 13:35

## 2021-01-01 RX ADMIN — TAMSULOSIN HYDROCHLORIDE 0.4 MILLIGRAM(S): 0.4 CAPSULE ORAL at 22:58

## 2021-01-01 RX ADMIN — POLYETHYLENE GLYCOL 3350 17 GRAM(S): 17 POWDER, FOR SOLUTION ORAL at 11:45

## 2021-01-01 RX ADMIN — SODIUM CHLORIDE 20 MILLILITER(S): 9 INJECTION INTRAMUSCULAR; INTRAVENOUS; SUBCUTANEOUS at 21:54

## 2021-01-01 RX ADMIN — Medication 5 MILLILITER(S): at 05:45

## 2021-01-01 RX ADMIN — ISOSORBIDE MONONITRATE 60 MILLIGRAM(S): 60 TABLET, EXTENDED RELEASE ORAL at 12:22

## 2021-01-01 RX ADMIN — Medication 30 MILLILITER(S): at 15:08

## 2021-01-01 RX ADMIN — Medication 40 MILLIGRAM(S): at 05:19

## 2021-01-01 RX ADMIN — LIDOCAINE 1 PATCH: 4 CREAM TOPICAL at 17:51

## 2021-01-01 RX ADMIN — ESCITALOPRAM OXALATE 5 MILLIGRAM(S): 10 TABLET, FILM COATED ORAL at 05:13

## 2021-01-01 RX ADMIN — Medication 20 MILLIGRAM(S): at 16:29

## 2021-01-01 RX ADMIN — TAMSULOSIN HYDROCHLORIDE 0.4 MILLIGRAM(S): 0.4 CAPSULE ORAL at 21:15

## 2021-01-01 RX ADMIN — VENETOCLAX 100 MILLIGRAM(S): 100 TABLET, FILM COATED ORAL at 12:46

## 2021-01-01 RX ADMIN — Medication 50 MILLIGRAM(S): at 13:43

## 2021-01-01 RX ADMIN — CHLORHEXIDINE GLUCONATE 1 APPLICATION(S): 213 SOLUTION TOPICAL at 11:53

## 2021-01-01 RX ADMIN — Medication 5 MILLILITER(S): at 23:15

## 2021-01-01 RX ADMIN — LIDOCAINE 1 PATCH: 4 CREAM TOPICAL at 19:20

## 2021-01-01 RX ADMIN — POSACONAZOLE 200 MILLIGRAM(S): 100 TABLET, DELAYED RELEASE ORAL at 12:38

## 2021-01-01 RX ADMIN — Medication 5 MILLILITER(S): at 21:20

## 2021-01-01 RX ADMIN — Medication 50 MILLIGRAM(S): at 05:58

## 2021-01-01 RX ADMIN — CHLORHEXIDINE GLUCONATE 1 APPLICATION(S): 213 SOLUTION TOPICAL at 17:43

## 2021-01-01 RX ADMIN — VENETOCLAX 400 MILLIGRAM(S): 100 TABLET, FILM COATED ORAL at 12:32

## 2021-01-01 RX ADMIN — CINACALCET 30 MILLIGRAM(S): 30 TABLET, FILM COATED ORAL at 11:18

## 2021-01-01 RX ADMIN — Medication 5 MILLILITER(S): at 18:20

## 2021-01-01 RX ADMIN — ESCITALOPRAM OXALATE 5 MILLIGRAM(S): 10 TABLET, FILM COATED ORAL at 05:58

## 2021-01-01 RX ADMIN — Medication 50 MILLIGRAM(S): at 06:19

## 2021-01-01 RX ADMIN — FINASTERIDE 5 MILLIGRAM(S): 5 TABLET, FILM COATED ORAL at 05:54

## 2021-01-01 RX ADMIN — POLYETHYLENE GLYCOL 3350 17 GRAM(S): 17 POWDER, FOR SOLUTION ORAL at 12:29

## 2021-01-01 RX ADMIN — Medication 200 MILLIGRAM(S): at 11:23

## 2021-01-01 RX ADMIN — ISOSORBIDE MONONITRATE 60 MILLIGRAM(S): 60 TABLET, EXTENDED RELEASE ORAL at 12:43

## 2021-01-01 RX ADMIN — TAMSULOSIN HYDROCHLORIDE 0.4 MILLIGRAM(S): 0.4 CAPSULE ORAL at 21:48

## 2021-01-01 RX ADMIN — TAMSULOSIN HYDROCHLORIDE 0.4 MILLIGRAM(S): 0.4 CAPSULE ORAL at 21:16

## 2021-01-01 RX ADMIN — Medication 1 MILLIGRAM(S): at 12:07

## 2021-01-01 RX ADMIN — POSACONAZOLE 300 MILLIGRAM(S): 100 TABLET, DELAYED RELEASE ORAL at 12:22

## 2021-01-01 RX ADMIN — Medication 2000 UNIT(S): at 13:41

## 2021-01-01 RX ADMIN — Medication 2000 UNIT(S): at 11:35

## 2021-01-01 RX ADMIN — Medication 5 MILLILITER(S): at 11:12

## 2021-01-01 RX ADMIN — FINASTERIDE 5 MILLIGRAM(S): 5 TABLET, FILM COATED ORAL at 06:06

## 2021-01-01 RX ADMIN — Medication 2000 UNIT(S): at 14:13

## 2021-01-01 RX ADMIN — POLYETHYLENE GLYCOL 3350 17 GRAM(S): 17 POWDER, FOR SOLUTION ORAL at 13:13

## 2021-01-01 RX ADMIN — POSACONAZOLE 200 MILLIGRAM(S): 100 TABLET, DELAYED RELEASE ORAL at 05:28

## 2021-01-01 RX ADMIN — Medication 50 MILLIEQUIVALENT(S): at 09:21

## 2021-01-01 RX ADMIN — CINACALCET 30 MILLIGRAM(S): 30 TABLET, FILM COATED ORAL at 11:24

## 2021-01-01 RX ADMIN — FINASTERIDE 5 MILLIGRAM(S): 5 TABLET, FILM COATED ORAL at 06:17

## 2021-01-01 RX ADMIN — VENETOCLAX 100 MILLIGRAM(S): 100 TABLET, FILM COATED ORAL at 12:14

## 2021-01-01 RX ADMIN — SODIUM CHLORIDE 10 MILLILITER(S): 9 INJECTION INTRAMUSCULAR; INTRAVENOUS; SUBCUTANEOUS at 05:35

## 2021-01-01 RX ADMIN — Medication 10 MILLIGRAM(S): at 11:51

## 2021-01-01 RX ADMIN — Medication 250 MILLIGRAM(S): at 12:56

## 2021-01-01 RX ADMIN — CINACALCET 30 MILLIGRAM(S): 30 TABLET, FILM COATED ORAL at 12:41

## 2021-01-01 RX ADMIN — Medication 2000 UNIT(S): at 12:19

## 2021-01-01 RX ADMIN — LIDOCAINE 1 PATCH: 4 CREAM TOPICAL at 19:24

## 2021-01-01 RX ADMIN — CINACALCET 30 MILLIGRAM(S): 30 TABLET, FILM COATED ORAL at 12:14

## 2021-01-01 RX ADMIN — LIDOCAINE 1 PATCH: 4 CREAM TOPICAL at 12:22

## 2021-01-01 RX ADMIN — Medication 40 MILLIGRAM(S): at 06:05

## 2021-01-01 RX ADMIN — Medication 5 MILLILITER(S): at 05:10

## 2021-01-01 RX ADMIN — Medication 5 MILLILITER(S): at 17:44

## 2021-01-01 RX ADMIN — ESCITALOPRAM OXALATE 5 MILLIGRAM(S): 10 TABLET, FILM COATED ORAL at 05:28

## 2021-01-01 RX ADMIN — TAMSULOSIN HYDROCHLORIDE 0.4 MILLIGRAM(S): 0.4 CAPSULE ORAL at 22:04

## 2021-01-01 RX ADMIN — ISOSORBIDE MONONITRATE 60 MILLIGRAM(S): 60 TABLET, EXTENDED RELEASE ORAL at 12:17

## 2021-01-01 RX ADMIN — Medication 650 MILLIGRAM(S): at 21:18

## 2021-01-01 RX ADMIN — VENETOCLAX 100 MILLIGRAM(S): 100 TABLET, FILM COATED ORAL at 12:49

## 2021-01-01 RX ADMIN — CINACALCET 30 MILLIGRAM(S): 30 TABLET, FILM COATED ORAL at 13:44

## 2021-01-01 RX ADMIN — PIPERACILLIN AND TAZOBACTAM 25 GRAM(S): 4; .5 INJECTION, POWDER, LYOPHILIZED, FOR SOLUTION INTRAVENOUS at 21:42

## 2021-01-01 RX ADMIN — FINASTERIDE 5 MILLIGRAM(S): 5 TABLET, FILM COATED ORAL at 05:15

## 2021-01-01 RX ADMIN — Medication 50 MILLIGRAM(S): at 05:07

## 2021-01-01 RX ADMIN — TAMSULOSIN HYDROCHLORIDE 0.4 MILLIGRAM(S): 0.4 CAPSULE ORAL at 21:53

## 2021-01-01 RX ADMIN — Medication 650 MILLIGRAM(S): at 21:48

## 2021-01-01 RX ADMIN — Medication 1 MILLIGRAM(S): at 13:11

## 2021-01-01 RX ADMIN — RASBURICASE 104 MILLIGRAM(S): KIT at 02:47

## 2021-01-01 RX ADMIN — Medication 5 MILLILITER(S): at 17:19

## 2021-01-01 RX ADMIN — FINASTERIDE 5 MILLIGRAM(S): 5 TABLET, FILM COATED ORAL at 06:26

## 2021-01-01 RX ADMIN — Medication 650 MILLIGRAM(S): at 05:58

## 2021-01-01 RX ADMIN — SODIUM CHLORIDE 50 MILLILITER(S): 9 INJECTION INTRAMUSCULAR; INTRAVENOUS; SUBCUTANEOUS at 05:31

## 2021-01-01 RX ADMIN — Medication 1 MILLIGRAM(S): at 11:37

## 2021-01-01 RX ADMIN — Medication 2 TABLET(S): at 12:17

## 2021-01-01 RX ADMIN — Medication 5 MILLILITER(S): at 05:08

## 2021-01-01 RX ADMIN — Medication 1 MILLIGRAM(S): at 12:59

## 2021-01-01 RX ADMIN — Medication 5 MILLILITER(S): at 05:55

## 2021-01-01 RX ADMIN — Medication 650 MILLIGRAM(S): at 21:39

## 2021-01-01 RX ADMIN — Medication 2000 UNIT(S): at 12:43

## 2021-01-01 RX ADMIN — POLYETHYLENE GLYCOL 3350 17 GRAM(S): 17 POWDER, FOR SOLUTION ORAL at 12:27

## 2021-01-01 RX ADMIN — LIDOCAINE 1 PATCH: 4 CREAM TOPICAL at 00:01

## 2021-01-01 RX ADMIN — FINASTERIDE 5 MILLIGRAM(S): 5 TABLET, FILM COATED ORAL at 06:50

## 2021-01-01 RX ADMIN — Medication 40 MILLIGRAM(S): at 05:57

## 2021-01-01 RX ADMIN — Medication 2000 UNIT(S): at 11:44

## 2021-01-01 RX ADMIN — LIDOCAINE 1 PATCH: 4 CREAM TOPICAL at 18:50

## 2021-01-01 RX ADMIN — Medication 40 MILLIGRAM(S): at 05:34

## 2021-01-01 RX ADMIN — Medication 81 MILLIGRAM(S): at 13:08

## 2021-01-01 RX ADMIN — Medication 1 MILLIGRAM(S): at 11:59

## 2021-01-01 RX ADMIN — TAMSULOSIN HYDROCHLORIDE 0.4 MILLIGRAM(S): 0.4 CAPSULE ORAL at 21:19

## 2021-01-01 RX ADMIN — Medication 200 MILLIGRAM(S): at 12:32

## 2021-01-01 RX ADMIN — POSACONAZOLE 300 MILLIGRAM(S): 100 TABLET, DELAYED RELEASE ORAL at 12:21

## 2021-01-01 RX ADMIN — Medication 5 MILLILITER(S): at 06:20

## 2021-01-01 RX ADMIN — ISOSORBIDE MONONITRATE 60 MILLIGRAM(S): 60 TABLET, EXTENDED RELEASE ORAL at 11:58

## 2021-01-01 RX ADMIN — POSACONAZOLE 200 MILLIGRAM(S): 100 TABLET, DELAYED RELEASE ORAL at 22:06

## 2021-01-01 RX ADMIN — Medication 1 SPRAY(S): at 06:17

## 2021-01-01 RX ADMIN — POSACONAZOLE 300 MILLIGRAM(S): 100 TABLET, DELAYED RELEASE ORAL at 12:01

## 2021-01-01 RX ADMIN — SODIUM CHLORIDE 10 MILLILITER(S): 9 INJECTION INTRAMUSCULAR; INTRAVENOUS; SUBCUTANEOUS at 17:05

## 2021-01-01 RX ADMIN — FINASTERIDE 5 MILLIGRAM(S): 5 TABLET, FILM COATED ORAL at 05:57

## 2021-01-01 RX ADMIN — Medication 40 MILLIGRAM(S): at 05:20

## 2021-01-01 RX ADMIN — FINASTERIDE 5 MILLIGRAM(S): 5 TABLET, FILM COATED ORAL at 05:32

## 2021-01-01 RX ADMIN — LIDOCAINE 1 PATCH: 4 CREAM TOPICAL at 01:00

## 2021-01-01 RX ADMIN — ESCITALOPRAM OXALATE 5 MILLIGRAM(S): 10 TABLET, FILM COATED ORAL at 06:53

## 2021-01-01 RX ADMIN — Medication 40 MILLIGRAM(S): at 06:17

## 2021-01-01 RX ADMIN — POSACONAZOLE 300 MILLIGRAM(S): 100 TABLET, DELAYED RELEASE ORAL at 12:20

## 2021-01-01 RX ADMIN — VENETOCLAX 400 MILLIGRAM(S): 100 TABLET, FILM COATED ORAL at 11:52

## 2021-01-01 RX ADMIN — Medication 1 MILLIGRAM(S): at 12:21

## 2021-01-01 RX ADMIN — SODIUM CHLORIDE 10 MILLILITER(S): 9 INJECTION INTRAMUSCULAR; INTRAVENOUS; SUBCUTANEOUS at 18:51

## 2021-01-01 RX ADMIN — DECITABINE 57.4 MILLIGRAM(S): 50 INJECTION, POWDER, LYOPHILIZED, FOR SOLUTION INTRAVENOUS at 15:23

## 2021-01-01 RX ADMIN — Medication 10 MILLIGRAM(S): at 12:17

## 2021-01-01 RX ADMIN — TAMSULOSIN HYDROCHLORIDE 0.4 MILLIGRAM(S): 0.4 CAPSULE ORAL at 22:02

## 2021-01-01 RX ADMIN — CHLORHEXIDINE GLUCONATE 1 APPLICATION(S): 213 SOLUTION TOPICAL at 05:19

## 2021-01-01 RX ADMIN — Medication 5 MILLILITER(S): at 11:52

## 2021-01-01 RX ADMIN — ONDANSETRON 8 MILLIGRAM(S): 8 TABLET, FILM COATED ORAL at 14:57

## 2021-01-01 RX ADMIN — Medication 5 MILLILITER(S): at 06:18

## 2021-01-01 RX ADMIN — Medication 25 MILLIGRAM(S): at 14:15

## 2021-01-01 RX ADMIN — Medication 1 MILLIGRAM(S): at 12:13

## 2021-01-01 RX ADMIN — Medication 650 MILLIGRAM(S): at 14:13

## 2021-01-01 RX ADMIN — ESCITALOPRAM OXALATE 5 MILLIGRAM(S): 10 TABLET, FILM COATED ORAL at 05:37

## 2021-01-01 RX ADMIN — Medication 15 GRAM(S): at 17:11

## 2021-01-01 RX ADMIN — Medication 5 MILLILITER(S): at 12:45

## 2021-01-01 RX ADMIN — Medication 5 MILLILITER(S): at 06:07

## 2021-01-01 RX ADMIN — POLYETHYLENE GLYCOL 3350 17 GRAM(S): 17 POWDER, FOR SOLUTION ORAL at 12:21

## 2021-01-01 RX ADMIN — POLYETHYLENE GLYCOL 3350 17 GRAM(S): 17 POWDER, FOR SOLUTION ORAL at 11:36

## 2021-01-01 RX ADMIN — Medication 5 MILLILITER(S): at 12:19

## 2021-01-01 RX ADMIN — Medication 81 MILLIGRAM(S): at 12:59

## 2021-01-01 RX ADMIN — Medication 1: at 12:08

## 2021-01-01 RX ADMIN — ONDANSETRON 8 MILLIGRAM(S): 8 TABLET, FILM COATED ORAL at 15:05

## 2021-01-01 RX ADMIN — VENETOCLAX 400 MILLIGRAM(S): 100 TABLET, FILM COATED ORAL at 12:34

## 2021-01-01 RX ADMIN — Medication 1 SPRAY(S): at 17:29

## 2021-01-01 RX ADMIN — Medication 5 MILLILITER(S): at 23:53

## 2021-01-01 RX ADMIN — Medication 5 MILLILITER(S): at 06:17

## 2021-01-01 RX ADMIN — Medication 650 MILLIGRAM(S): at 18:23

## 2021-01-01 RX ADMIN — Medication 650 MILLIGRAM(S): at 22:58

## 2021-01-01 RX ADMIN — Medication 1 MILLIGRAM(S): at 12:23

## 2021-01-01 RX ADMIN — Medication 50 MILLIGRAM(S): at 05:28

## 2021-01-01 RX ADMIN — OXYMETAZOLINE HYDROCHLORIDE 1 SPRAY(S): 0.5 SPRAY NASAL at 06:27

## 2021-01-01 RX ADMIN — ESCITALOPRAM OXALATE 5 MILLIGRAM(S): 10 TABLET, FILM COATED ORAL at 05:20

## 2021-01-01 RX ADMIN — Medication 200 MILLIGRAM(S): at 11:13

## 2021-01-01 RX ADMIN — Medication 5 MILLILITER(S): at 12:16

## 2021-01-01 RX ADMIN — ISOSORBIDE MONONITRATE 60 MILLIGRAM(S): 60 TABLET, EXTENDED RELEASE ORAL at 14:32

## 2021-01-01 RX ADMIN — Medication 2000 UNIT(S): at 12:33

## 2021-01-01 RX ADMIN — CHLORHEXIDINE GLUCONATE 1 APPLICATION(S): 213 SOLUTION TOPICAL at 05:03

## 2021-01-01 RX ADMIN — ONDANSETRON 8 MILLIGRAM(S): 8 TABLET, FILM COATED ORAL at 15:47

## 2021-01-01 RX ADMIN — Medication 650 MILLIGRAM(S): at 09:20

## 2021-01-01 RX ADMIN — LIDOCAINE 1 PATCH: 4 CREAM TOPICAL at 12:30

## 2021-01-01 RX ADMIN — LIDOCAINE 1 PATCH: 4 CREAM TOPICAL at 02:33

## 2021-01-01 RX ADMIN — ISOSORBIDE MONONITRATE 60 MILLIGRAM(S): 60 TABLET, EXTENDED RELEASE ORAL at 12:34

## 2021-01-01 RX ADMIN — Medication 20 MILLIGRAM(S): at 17:40

## 2021-01-01 RX ADMIN — LIDOCAINE 1 PATCH: 4 CREAM TOPICAL at 20:18

## 2021-01-01 RX ADMIN — VENETOCLAX 100 MILLIGRAM(S): 100 TABLET, FILM COATED ORAL at 13:06

## 2021-01-01 RX ADMIN — Medication 1 SPRAY(S): at 21:42

## 2021-01-01 RX ADMIN — POSACONAZOLE 200 MILLIGRAM(S): 100 TABLET, DELAYED RELEASE ORAL at 01:45

## 2021-01-01 RX ADMIN — Medication 1 SPRAY(S): at 06:27

## 2021-01-01 RX ADMIN — Medication 20 MILLIGRAM(S): at 05:32

## 2021-01-01 RX ADMIN — FINASTERIDE 5 MILLIGRAM(S): 5 TABLET, FILM COATED ORAL at 05:49

## 2021-01-01 RX ADMIN — CHLORHEXIDINE GLUCONATE 1 APPLICATION(S): 213 SOLUTION TOPICAL at 12:39

## 2021-01-01 RX ADMIN — ISOSORBIDE MONONITRATE 60 MILLIGRAM(S): 60 TABLET, EXTENDED RELEASE ORAL at 13:12

## 2021-01-01 RX ADMIN — ESCITALOPRAM OXALATE 5 MILLIGRAM(S): 10 TABLET, FILM COATED ORAL at 05:38

## 2021-01-01 RX ADMIN — Medication 40 MILLIGRAM(S): at 05:23

## 2021-01-01 RX ADMIN — Medication 2000 UNIT(S): at 11:18

## 2021-01-01 RX ADMIN — Medication 650 MILLIGRAM(S): at 11:44

## 2021-01-01 RX ADMIN — FINASTERIDE 5 MILLIGRAM(S): 5 TABLET, FILM COATED ORAL at 05:59

## 2021-01-01 RX ADMIN — FINASTERIDE 5 MILLIGRAM(S): 5 TABLET, FILM COATED ORAL at 05:37

## 2021-01-01 RX ADMIN — Medication 25 MILLIGRAM(S): at 05:34

## 2021-01-01 RX ADMIN — HEPARIN SODIUM 5000 UNIT(S): 5000 INJECTION INTRAVENOUS; SUBCUTANEOUS at 17:05

## 2021-01-01 RX ADMIN — PIPERACILLIN AND TAZOBACTAM 25 GRAM(S): 4; .5 INJECTION, POWDER, LYOPHILIZED, FOR SOLUTION INTRAVENOUS at 09:14

## 2021-01-01 RX ADMIN — Medication 40 MILLIGRAM(S): at 05:59

## 2021-01-01 RX ADMIN — Medication 5 MILLILITER(S): at 06:16

## 2021-01-01 RX ADMIN — SODIUM CHLORIDE 10 MILLILITER(S): 9 INJECTION INTRAMUSCULAR; INTRAVENOUS; SUBCUTANEOUS at 17:57

## 2021-01-01 RX ADMIN — LIDOCAINE 1 PATCH: 4 CREAM TOPICAL at 11:57

## 2021-01-01 RX ADMIN — Medication 1 SPRAY(S): at 05:20

## 2021-01-01 RX ADMIN — Medication 50 MILLIGRAM(S): at 05:22

## 2021-01-01 RX ADMIN — SODIUM CHLORIDE 10 MILLILITER(S): 9 INJECTION INTRAMUSCULAR; INTRAVENOUS; SUBCUTANEOUS at 11:45

## 2021-01-01 RX ADMIN — LIDOCAINE 1 PATCH: 4 CREAM TOPICAL at 19:56

## 2021-01-01 RX ADMIN — Medication 40 MILLIGRAM(S): at 18:50

## 2021-01-01 RX ADMIN — Medication 20 MILLIGRAM(S): at 06:53

## 2021-01-01 RX ADMIN — POSACONAZOLE 200 MILLIGRAM(S): 100 TABLET, DELAYED RELEASE ORAL at 21:55

## 2021-01-01 RX ADMIN — SODIUM CHLORIDE 50 MILLILITER(S): 9 INJECTION INTRAMUSCULAR; INTRAVENOUS; SUBCUTANEOUS at 05:59

## 2021-01-01 RX ADMIN — Medication 1 MILLIGRAM(S): at 13:34

## 2021-01-01 RX ADMIN — Medication 40 MILLIGRAM(S): at 06:50

## 2021-01-01 RX ADMIN — FINASTERIDE 5 MILLIGRAM(S): 5 TABLET, FILM COATED ORAL at 06:04

## 2021-01-01 RX ADMIN — VENETOCLAX 100 MILLIGRAM(S): 100 TABLET, FILM COATED ORAL at 11:38

## 2021-01-01 RX ADMIN — Medication 650 MILLIGRAM(S): at 08:47

## 2021-01-01 RX ADMIN — Medication 20 MILLIGRAM(S): at 05:15

## 2021-01-01 RX ADMIN — TAMSULOSIN HYDROCHLORIDE 0.4 MILLIGRAM(S): 0.4 CAPSULE ORAL at 21:10

## 2021-01-01 RX ADMIN — CHLORHEXIDINE GLUCONATE 1 APPLICATION(S): 213 SOLUTION TOPICAL at 05:43

## 2021-01-01 RX ADMIN — POSACONAZOLE 200 MILLIGRAM(S): 100 TABLET, DELAYED RELEASE ORAL at 06:26

## 2021-01-01 RX ADMIN — ISOSORBIDE MONONITRATE 60 MILLIGRAM(S): 60 TABLET, EXTENDED RELEASE ORAL at 11:45

## 2021-01-01 RX ADMIN — CHLORHEXIDINE GLUCONATE 1 APPLICATION(S): 213 SOLUTION TOPICAL at 08:48

## 2021-01-01 RX ADMIN — TAMSULOSIN HYDROCHLORIDE 0.4 MILLIGRAM(S): 0.4 CAPSULE ORAL at 21:28

## 2021-01-01 RX ADMIN — Medication 1 SPRAY(S): at 06:00

## 2021-01-01 RX ADMIN — ISOSORBIDE MONONITRATE 60 MILLIGRAM(S): 60 TABLET, EXTENDED RELEASE ORAL at 11:47

## 2021-01-01 RX ADMIN — POSACONAZOLE 200 MILLIGRAM(S): 100 TABLET, DELAYED RELEASE ORAL at 14:30

## 2021-01-01 RX ADMIN — VENETOCLAX 100 MILLIGRAM(S): 100 TABLET, FILM COATED ORAL at 12:20

## 2021-01-01 RX ADMIN — Medication 5 MILLILITER(S): at 05:35

## 2021-01-01 RX ADMIN — POSACONAZOLE 200 MILLIGRAM(S): 100 TABLET, DELAYED RELEASE ORAL at 13:44

## 2021-01-01 RX ADMIN — FINASTERIDE 5 MILLIGRAM(S): 5 TABLET, FILM COATED ORAL at 06:53

## 2021-01-01 RX ADMIN — SODIUM CHLORIDE 10 MILLILITER(S): 9 INJECTION INTRAMUSCULAR; INTRAVENOUS; SUBCUTANEOUS at 05:45

## 2021-01-01 RX ADMIN — FINASTERIDE 5 MILLIGRAM(S): 5 TABLET, FILM COATED ORAL at 11:41

## 2021-01-01 RX ADMIN — TAMSULOSIN HYDROCHLORIDE 0.4 MILLIGRAM(S): 0.4 CAPSULE ORAL at 21:14

## 2021-01-01 RX ADMIN — LIDOCAINE 1 PATCH: 4 CREAM TOPICAL at 23:29

## 2021-01-01 RX ADMIN — DECITABINE 57.4 MILLIGRAM(S): 50 INJECTION, POWDER, LYOPHILIZED, FOR SOLUTION INTRAVENOUS at 12:01

## 2021-01-01 RX ADMIN — Medication 1 MILLIGRAM(S): at 12:56

## 2021-01-01 RX ADMIN — POSACONAZOLE 200 MILLIGRAM(S): 100 TABLET, DELAYED RELEASE ORAL at 17:07

## 2021-01-01 RX ADMIN — CHLORHEXIDINE GLUCONATE 1 APPLICATION(S): 213 SOLUTION TOPICAL at 08:03

## 2021-01-01 RX ADMIN — Medication 5 MILLILITER(S): at 17:30

## 2021-01-01 RX ADMIN — POLYETHYLENE GLYCOL 3350 17 GRAM(S): 17 POWDER, FOR SOLUTION ORAL at 12:01

## 2021-01-01 RX ADMIN — Medication 650 MILLIGRAM(S): at 21:09

## 2021-01-01 RX ADMIN — Medication 5 MILLILITER(S): at 21:37

## 2021-01-01 RX ADMIN — CINACALCET 30 MILLIGRAM(S): 30 TABLET, FILM COATED ORAL at 12:32

## 2021-01-01 RX ADMIN — Medication 5 MILLILITER(S): at 05:57

## 2021-01-01 RX ADMIN — HEPARIN SODIUM 5000 UNIT(S): 5000 INJECTION INTRAVENOUS; SUBCUTANEOUS at 14:00

## 2021-01-01 RX ADMIN — Medication 5 MILLILITER(S): at 00:51

## 2021-01-01 RX ADMIN — DECITABINE 57.4 MILLIGRAM(S): 50 INJECTION, POWDER, LYOPHILIZED, FOR SOLUTION INTRAVENOUS at 15:44

## 2021-01-01 RX ADMIN — Medication 40 MILLIGRAM(S): at 05:13

## 2021-01-01 RX ADMIN — Medication 2 TABLET(S): at 12:21

## 2021-01-01 RX ADMIN — Medication 40 MILLIGRAM(S): at 06:21

## 2021-01-01 RX ADMIN — Medication 650 MILLIGRAM(S): at 13:07

## 2021-01-01 RX ADMIN — POSACONAZOLE 200 MILLIGRAM(S): 100 TABLET, DELAYED RELEASE ORAL at 14:20

## 2021-01-01 RX ADMIN — Medication 5 MILLILITER(S): at 12:59

## 2021-01-01 RX ADMIN — Medication 650 MILLIGRAM(S): at 14:57

## 2021-01-01 RX ADMIN — Medication 5 MILLILITER(S): at 17:06

## 2021-01-01 RX ADMIN — FINASTERIDE 5 MILLIGRAM(S): 5 TABLET, FILM COATED ORAL at 06:36

## 2021-01-01 RX ADMIN — LIDOCAINE 1 PATCH: 4 CREAM TOPICAL at 18:58

## 2021-01-01 RX ADMIN — Medication 1 MILLIGRAM(S): at 11:13

## 2021-01-01 RX ADMIN — Medication 650 MILLIGRAM(S): at 05:32

## 2021-01-01 RX ADMIN — Medication 2000 UNIT(S): at 18:32

## 2021-01-01 RX ADMIN — ONDANSETRON 8 MILLIGRAM(S): 8 TABLET, FILM COATED ORAL at 14:38

## 2021-01-01 RX ADMIN — Medication 40 MILLIGRAM(S): at 05:50

## 2021-01-01 RX ADMIN — LIDOCAINE 1 PATCH: 4 CREAM TOPICAL at 19:40

## 2021-01-01 RX ADMIN — PIPERACILLIN AND TAZOBACTAM 25 GRAM(S): 4; .5 INJECTION, POWDER, LYOPHILIZED, FOR SOLUTION INTRAVENOUS at 17:03

## 2021-01-01 RX ADMIN — ESCITALOPRAM OXALATE 5 MILLIGRAM(S): 10 TABLET, FILM COATED ORAL at 05:06

## 2021-01-01 RX ADMIN — Medication 2000 UNIT(S): at 12:15

## 2021-01-01 RX ADMIN — Medication 5 MILLILITER(S): at 12:32

## 2021-01-01 RX ADMIN — CHLORHEXIDINE GLUCONATE 1 APPLICATION(S): 213 SOLUTION TOPICAL at 05:41

## 2021-01-01 RX ADMIN — Medication 75 MILLIGRAM(S): at 06:27

## 2021-01-01 RX ADMIN — TAMSULOSIN HYDROCHLORIDE 0.4 MILLIGRAM(S): 0.4 CAPSULE ORAL at 21:51

## 2021-01-01 RX ADMIN — ISOSORBIDE MONONITRATE 60 MILLIGRAM(S): 60 TABLET, EXTENDED RELEASE ORAL at 12:12

## 2021-01-01 RX ADMIN — Medication 5 MILLILITER(S): at 17:20

## 2021-01-01 RX ADMIN — Medication 650 MILLIGRAM(S): at 21:40

## 2021-01-01 RX ADMIN — CINACALCET 30 MILLIGRAM(S): 30 TABLET, FILM COATED ORAL at 13:05

## 2021-01-01 RX ADMIN — Medication 650 MILLIGRAM(S): at 22:06

## 2021-01-01 RX ADMIN — POLYETHYLENE GLYCOL 3350 17 GRAM(S): 17 POWDER, FOR SOLUTION ORAL at 13:38

## 2021-01-01 RX ADMIN — Medication 25 MILLIGRAM(S): at 05:33

## 2021-01-01 RX ADMIN — FINASTERIDE 5 MILLIGRAM(S): 5 TABLET, FILM COATED ORAL at 05:24

## 2021-01-01 RX ADMIN — ESCITALOPRAM OXALATE 5 MILLIGRAM(S): 10 TABLET, FILM COATED ORAL at 06:06

## 2021-01-01 RX ADMIN — Medication 650 MILLIGRAM(S): at 05:07

## 2021-01-01 RX ADMIN — ALTEPLASE 2 MILLIGRAM(S): KIT at 14:47

## 2021-01-01 RX ADMIN — Medication 1 MILLIGRAM(S): at 11:19

## 2021-01-01 RX ADMIN — Medication 5 MILLILITER(S): at 17:46

## 2021-01-01 RX ADMIN — Medication 20 MILLIGRAM(S): at 16:50

## 2021-01-01 RX ADMIN — VENETOCLAX 100 MILLIGRAM(S): 100 TABLET, FILM COATED ORAL at 06:35

## 2021-01-01 RX ADMIN — SODIUM CHLORIDE 10 MILLILITER(S): 9 INJECTION INTRAMUSCULAR; INTRAVENOUS; SUBCUTANEOUS at 17:27

## 2021-01-01 RX ADMIN — Medication 40 MILLIGRAM(S): at 05:28

## 2021-01-01 RX ADMIN — Medication 50 MILLIGRAM(S): at 05:10

## 2021-01-01 RX ADMIN — FINASTERIDE 5 MILLIGRAM(S): 5 TABLET, FILM COATED ORAL at 05:41

## 2021-01-01 RX ADMIN — Medication 5 MILLILITER(S): at 12:03

## 2021-01-01 RX ADMIN — Medication 1 MILLIGRAM(S): at 11:57

## 2021-01-01 RX ADMIN — ISOSORBIDE MONONITRATE 60 MILLIGRAM(S): 60 TABLET, EXTENDED RELEASE ORAL at 13:36

## 2021-01-01 RX ADMIN — Medication 5 MILLILITER(S): at 12:15

## 2021-01-01 RX ADMIN — Medication 5 MILLILITER(S): at 11:43

## 2021-01-01 RX ADMIN — Medication 5 MILLILITER(S): at 06:05

## 2021-01-01 RX ADMIN — Medication 20 MILLIGRAM(S): at 06:04

## 2021-01-01 RX ADMIN — VENETOCLAX 100 MILLIGRAM(S): 100 TABLET, FILM COATED ORAL at 12:21

## 2021-01-01 RX ADMIN — POLYETHYLENE GLYCOL 3350 17 GRAM(S): 17 POWDER, FOR SOLUTION ORAL at 11:13

## 2021-01-01 RX ADMIN — ISOSORBIDE MONONITRATE 60 MILLIGRAM(S): 60 TABLET, EXTENDED RELEASE ORAL at 11:36

## 2021-01-01 RX ADMIN — ISOSORBIDE MONONITRATE 60 MILLIGRAM(S): 60 TABLET, EXTENDED RELEASE ORAL at 12:02

## 2021-01-01 RX ADMIN — FINASTERIDE 5 MILLIGRAM(S): 5 TABLET, FILM COATED ORAL at 05:09

## 2021-01-01 RX ADMIN — Medication 50 MILLIGRAM(S): at 06:10

## 2021-01-01 RX ADMIN — Medication 5 MILLILITER(S): at 23:29

## 2021-01-01 RX ADMIN — LIDOCAINE 1 PATCH: 4 CREAM TOPICAL at 23:38

## 2021-01-01 RX ADMIN — Medication 1: at 13:28

## 2021-01-01 RX ADMIN — CINACALCET 30 MILLIGRAM(S): 30 TABLET, FILM COATED ORAL at 12:33

## 2021-01-01 RX ADMIN — CHLORHEXIDINE GLUCONATE 1 APPLICATION(S): 213 SOLUTION TOPICAL at 08:17

## 2021-01-01 RX ADMIN — VENETOCLAX 100 MILLIGRAM(S): 100 TABLET, FILM COATED ORAL at 12:38

## 2021-01-01 RX ADMIN — CHLORHEXIDINE GLUCONATE 1 APPLICATION(S): 213 SOLUTION TOPICAL at 12:21

## 2021-01-01 RX ADMIN — Medication 10 MILLIGRAM(S): at 15:03

## 2021-01-01 RX ADMIN — Medication 1 MILLIGRAM(S): at 11:23

## 2021-01-01 RX ADMIN — Medication 20 MILLIGRAM(S): at 05:07

## 2021-01-01 RX ADMIN — ESCITALOPRAM OXALATE 5 MILLIGRAM(S): 10 TABLET, FILM COATED ORAL at 05:53

## 2021-01-01 RX ADMIN — CEFEPIME 100 MILLIGRAM(S): 1 INJECTION, POWDER, FOR SOLUTION INTRAMUSCULAR; INTRAVENOUS at 11:59

## 2021-01-01 RX ADMIN — Medication 40 MILLIGRAM(S): at 18:05

## 2021-01-01 RX ADMIN — ESCITALOPRAM OXALATE 5 MILLIGRAM(S): 10 TABLET, FILM COATED ORAL at 05:42

## 2021-01-01 RX ADMIN — ISOSORBIDE MONONITRATE 60 MILLIGRAM(S): 60 TABLET, EXTENDED RELEASE ORAL at 11:52

## 2021-01-01 RX ADMIN — FINASTERIDE 5 MILLIGRAM(S): 5 TABLET, FILM COATED ORAL at 06:21

## 2021-01-01 RX ADMIN — ENOXAPARIN SODIUM 40 MILLIGRAM(S): 100 INJECTION SUBCUTANEOUS at 12:22

## 2021-01-01 RX ADMIN — Medication 10 MILLIGRAM(S): at 11:59

## 2021-01-01 RX ADMIN — Medication 3 MILLILITER(S): at 17:06

## 2021-01-01 RX ADMIN — Medication 1 MILLIGRAM(S): at 12:27

## 2021-01-01 RX ADMIN — Medication 50 MILLIGRAM(S): at 06:00

## 2021-01-01 RX ADMIN — LIDOCAINE 1 PATCH: 4 CREAM TOPICAL at 12:55

## 2021-01-01 RX ADMIN — CHLORHEXIDINE GLUCONATE 1 APPLICATION(S): 213 SOLUTION TOPICAL at 08:36

## 2021-01-01 RX ADMIN — Medication 5 MILLILITER(S): at 17:54

## 2021-01-01 RX ADMIN — ESCITALOPRAM OXALATE 5 MILLIGRAM(S): 10 TABLET, FILM COATED ORAL at 06:01

## 2021-01-01 RX ADMIN — ESCITALOPRAM OXALATE 5 MILLIGRAM(S): 10 TABLET, FILM COATED ORAL at 06:17

## 2021-01-01 RX ADMIN — Medication 5 MILLILITER(S): at 23:54

## 2021-01-01 RX ADMIN — Medication 1 SPRAY(S): at 17:04

## 2021-01-01 RX ADMIN — CINACALCET 30 MILLIGRAM(S): 30 TABLET, FILM COATED ORAL at 13:39

## 2021-01-01 RX ADMIN — POLYETHYLENE GLYCOL 3350 17 GRAM(S): 17 POWDER, FOR SOLUTION ORAL at 11:48

## 2021-01-01 RX ADMIN — Medication 2 TABLET(S): at 14:01

## 2021-01-01 RX ADMIN — Medication 5 MILLILITER(S): at 13:40

## 2021-01-01 RX ADMIN — FINASTERIDE 5 MILLIGRAM(S): 5 TABLET, FILM COATED ORAL at 06:01

## 2021-01-01 RX ADMIN — CHLORHEXIDINE GLUCONATE 1 APPLICATION(S): 213 SOLUTION TOPICAL at 18:39

## 2021-01-01 RX ADMIN — LIDOCAINE 1 PATCH: 4 CREAM TOPICAL at 14:33

## 2021-01-01 RX ADMIN — TAMSULOSIN HYDROCHLORIDE 0.4 MILLIGRAM(S): 0.4 CAPSULE ORAL at 21:37

## 2021-01-01 RX ADMIN — Medication 50 MILLIGRAM(S): at 06:21

## 2021-01-01 RX ADMIN — ISOSORBIDE MONONITRATE 60 MILLIGRAM(S): 60 TABLET, EXTENDED RELEASE ORAL at 12:59

## 2021-01-01 RX ADMIN — Medication 1: at 17:54

## 2021-01-01 RX ADMIN — Medication 5 MILLIGRAM(S): at 11:38

## 2021-01-01 RX ADMIN — ISOSORBIDE MONONITRATE 60 MILLIGRAM(S): 60 TABLET, EXTENDED RELEASE ORAL at 12:40

## 2021-01-01 RX ADMIN — LIDOCAINE 1 PATCH: 4 CREAM TOPICAL at 12:10

## 2021-01-01 RX ADMIN — ISOSORBIDE MONONITRATE 60 MILLIGRAM(S): 60 TABLET, EXTENDED RELEASE ORAL at 11:41

## 2021-01-01 RX ADMIN — Medication 1 SPRAY(S): at 11:52

## 2021-01-01 RX ADMIN — SODIUM CHLORIDE 50 MILLILITER(S): 9 INJECTION INTRAMUSCULAR; INTRAVENOUS; SUBCUTANEOUS at 17:55

## 2021-01-01 RX ADMIN — TAMSULOSIN HYDROCHLORIDE 0.4 MILLIGRAM(S): 0.4 CAPSULE ORAL at 21:35

## 2021-01-01 RX ADMIN — CINACALCET 30 MILLIGRAM(S): 30 TABLET, FILM COATED ORAL at 12:27

## 2021-01-01 RX ADMIN — Medication 1 SPRAY(S): at 17:56

## 2021-01-01 RX ADMIN — SENNA PLUS 2 TABLET(S): 8.6 TABLET ORAL at 06:18

## 2021-01-01 RX ADMIN — SODIUM CHLORIDE 10 MILLILITER(S): 9 INJECTION INTRAMUSCULAR; INTRAVENOUS; SUBCUTANEOUS at 05:25

## 2021-01-01 RX ADMIN — Medication 650 MILLIGRAM(S): at 21:20

## 2021-01-01 RX ADMIN — POSACONAZOLE 200 MILLIGRAM(S): 100 TABLET, DELAYED RELEASE ORAL at 05:51

## 2021-01-01 RX ADMIN — Medication 1 MILLIGRAM(S): at 12:32

## 2021-01-01 RX ADMIN — FINASTERIDE 5 MILLIGRAM(S): 5 TABLET, FILM COATED ORAL at 05:34

## 2021-01-01 RX ADMIN — SODIUM CHLORIDE 20 MILLILITER(S): 9 INJECTION INTRAMUSCULAR; INTRAVENOUS; SUBCUTANEOUS at 13:49

## 2021-01-01 RX ADMIN — ESCITALOPRAM OXALATE 5 MILLIGRAM(S): 10 TABLET, FILM COATED ORAL at 05:34

## 2021-01-01 RX ADMIN — Medication 1 MILLIGRAM(S): at 12:55

## 2021-01-01 RX ADMIN — Medication 2000 UNIT(S): at 11:58

## 2021-01-01 RX ADMIN — POSACONAZOLE 200 MILLIGRAM(S): 100 TABLET, DELAYED RELEASE ORAL at 05:23

## 2021-01-01 RX ADMIN — Medication 40 MILLIGRAM(S): at 06:33

## 2021-01-01 RX ADMIN — ESCITALOPRAM OXALATE 5 MILLIGRAM(S): 10 TABLET, FILM COATED ORAL at 05:43

## 2021-01-01 RX ADMIN — TAMSULOSIN HYDROCHLORIDE 0.4 MILLIGRAM(S): 0.4 CAPSULE ORAL at 21:06

## 2021-01-01 RX ADMIN — Medication 40 MILLIGRAM(S): at 06:07

## 2021-01-01 RX ADMIN — Medication 50 MILLIGRAM(S): at 05:40

## 2021-01-01 RX ADMIN — CHLORHEXIDINE GLUCONATE 1 APPLICATION(S): 213 SOLUTION TOPICAL at 05:07

## 2021-01-01 RX ADMIN — ONDANSETRON 8 MILLIGRAM(S): 8 TABLET, FILM COATED ORAL at 16:16

## 2021-01-01 RX ADMIN — Medication 50 MILLIGRAM(S): at 05:32

## 2021-01-01 RX ADMIN — Medication 50 MILLIGRAM(S): at 05:15

## 2021-01-01 RX ADMIN — Medication 1 MILLIGRAM(S): at 12:44

## 2021-01-01 RX ADMIN — ESCITALOPRAM OXALATE 5 MILLIGRAM(S): 10 TABLET, FILM COATED ORAL at 05:59

## 2021-01-01 RX ADMIN — VENETOCLAX 100 MILLIGRAM(S): 100 TABLET, FILM COATED ORAL at 12:01

## 2021-01-01 RX ADMIN — CINACALCET 30 MILLIGRAM(S): 30 TABLET, FILM COATED ORAL at 11:54

## 2021-01-01 RX ADMIN — ESCITALOPRAM OXALATE 5 MILLIGRAM(S): 10 TABLET, FILM COATED ORAL at 06:18

## 2021-01-01 RX ADMIN — Medication 5 MILLILITER(S): at 11:23

## 2021-01-01 RX ADMIN — Medication 81 MILLIGRAM(S): at 11:41

## 2021-01-01 RX ADMIN — Medication 5 MILLILITER(S): at 12:00

## 2021-01-01 RX ADMIN — FINASTERIDE 5 MILLIGRAM(S): 5 TABLET, FILM COATED ORAL at 05:42

## 2021-01-01 RX ADMIN — Medication 5 MILLILITER(S): at 05:50

## 2021-01-01 RX ADMIN — Medication 1 MILLIGRAM(S): at 11:18

## 2021-01-01 RX ADMIN — SODIUM CHLORIDE 20 MILLILITER(S): 9 INJECTION INTRAMUSCULAR; INTRAVENOUS; SUBCUTANEOUS at 05:54

## 2021-01-01 RX ADMIN — PIPERACILLIN AND TAZOBACTAM 25 GRAM(S): 4; .5 INJECTION, POWDER, LYOPHILIZED, FOR SOLUTION INTRAVENOUS at 13:00

## 2021-01-01 RX ADMIN — Medication 5 MILLILITER(S): at 05:40

## 2021-01-01 RX ADMIN — CHLORHEXIDINE GLUCONATE 1 APPLICATION(S): 213 SOLUTION TOPICAL at 08:06

## 2021-01-01 RX ADMIN — Medication 5 MILLILITER(S): at 00:09

## 2021-01-01 RX ADMIN — SODIUM CHLORIDE 50 MILLILITER(S): 9 INJECTION INTRAMUSCULAR; INTRAVENOUS; SUBCUTANEOUS at 12:00

## 2021-01-01 RX ADMIN — Medication 5 MILLIGRAM(S): at 12:13

## 2021-01-01 RX ADMIN — Medication 650 MILLIGRAM(S): at 15:35

## 2021-01-01 RX ADMIN — Medication 5 MILLILITER(S): at 16:43

## 2021-01-01 RX ADMIN — SODIUM CHLORIDE 10 MILLILITER(S): 9 INJECTION INTRAMUSCULAR; INTRAVENOUS; SUBCUTANEOUS at 06:21

## 2021-01-01 RX ADMIN — FINASTERIDE 5 MILLIGRAM(S): 5 TABLET, FILM COATED ORAL at 05:02

## 2021-01-01 RX ADMIN — Medication 80 MILLIGRAM(S): at 05:22

## 2021-01-01 RX ADMIN — Medication 1 SPRAY(S): at 06:30

## 2021-01-01 RX ADMIN — Medication 40 MILLIGRAM(S): at 06:28

## 2021-01-01 RX ADMIN — VENETOCLAX 100 MILLIGRAM(S): 100 TABLET, FILM COATED ORAL at 12:55

## 2021-01-01 RX ADMIN — DECITABINE 57 MILLIGRAM(S): 50 INJECTION, POWDER, LYOPHILIZED, FOR SOLUTION INTRAVENOUS at 16:17

## 2021-01-01 RX ADMIN — ISOSORBIDE MONONITRATE 60 MILLIGRAM(S): 60 TABLET, EXTENDED RELEASE ORAL at 12:18

## 2021-01-01 RX ADMIN — Medication 1 SPRAY(S): at 05:02

## 2021-01-01 RX ADMIN — TAMSULOSIN HYDROCHLORIDE 0.4 MILLIGRAM(S): 0.4 CAPSULE ORAL at 21:01

## 2021-01-01 RX ADMIN — LIDOCAINE 1 PATCH: 4 CREAM TOPICAL at 19:17

## 2021-01-01 RX ADMIN — LIDOCAINE 1 PATCH: 4 CREAM TOPICAL at 17:28

## 2021-01-01 RX ADMIN — POSACONAZOLE 300 MILLIGRAM(S): 100 TABLET, DELAYED RELEASE ORAL at 11:39

## 2021-01-01 RX ADMIN — Medication 200 MILLIGRAM(S): at 13:11

## 2021-01-01 RX ADMIN — Medication 75 MILLIGRAM(S): at 05:50

## 2021-01-01 RX ADMIN — ISOSORBIDE MONONITRATE 60 MILLIGRAM(S): 60 TABLET, EXTENDED RELEASE ORAL at 11:18

## 2021-01-01 RX ADMIN — Medication 2000 UNIT(S): at 11:55

## 2021-01-01 RX ADMIN — POSACONAZOLE 200 MILLIGRAM(S): 100 TABLET, DELAYED RELEASE ORAL at 16:40

## 2021-01-01 RX ADMIN — Medication 30 MILLILITER(S): at 12:44

## 2021-01-01 RX ADMIN — Medication 5 MILLILITER(S): at 23:36

## 2021-01-01 RX ADMIN — VENETOCLAX 400 MILLIGRAM(S): 100 TABLET, FILM COATED ORAL at 12:28

## 2021-01-01 RX ADMIN — PIPERACILLIN AND TAZOBACTAM 25 GRAM(S): 4; .5 INJECTION, POWDER, LYOPHILIZED, FOR SOLUTION INTRAVENOUS at 00:14

## 2021-01-01 RX ADMIN — PIPERACILLIN AND TAZOBACTAM 25 GRAM(S): 4; .5 INJECTION, POWDER, LYOPHILIZED, FOR SOLUTION INTRAVENOUS at 17:41

## 2021-01-01 RX ADMIN — ISOSORBIDE MONONITRATE 60 MILLIGRAM(S): 60 TABLET, EXTENDED RELEASE ORAL at 13:34

## 2021-01-01 RX ADMIN — Medication 20 MILLIGRAM(S): at 05:58

## 2021-01-01 RX ADMIN — Medication 75 MILLIGRAM(S): at 13:11

## 2021-01-01 RX ADMIN — FINASTERIDE 5 MILLIGRAM(S): 5 TABLET, FILM COATED ORAL at 05:29

## 2021-01-01 RX ADMIN — CINACALCET 30 MILLIGRAM(S): 30 TABLET, FILM COATED ORAL at 11:58

## 2021-01-01 RX ADMIN — PIPERACILLIN AND TAZOBACTAM 25 GRAM(S): 4; .5 INJECTION, POWDER, LYOPHILIZED, FOR SOLUTION INTRAVENOUS at 08:44

## 2021-01-01 RX ADMIN — Medication 200 MILLIGRAM(S): at 12:51

## 2021-01-01 RX ADMIN — Medication 200 MILLIGRAM(S): at 13:40

## 2021-01-01 RX ADMIN — Medication 20 MILLILITER(S): at 14:00

## 2021-01-01 RX ADMIN — VENETOCLAX 100 MILLIGRAM(S): 100 TABLET, FILM COATED ORAL at 11:24

## 2021-01-01 RX ADMIN — PIPERACILLIN AND TAZOBACTAM 25 GRAM(S): 4; .5 INJECTION, POWDER, LYOPHILIZED, FOR SOLUTION INTRAVENOUS at 06:19

## 2021-01-01 RX ADMIN — Medication 650 MILLIGRAM(S): at 06:04

## 2021-01-01 RX ADMIN — PIPERACILLIN AND TAZOBACTAM 25 GRAM(S): 4; .5 INJECTION, POWDER, LYOPHILIZED, FOR SOLUTION INTRAVENOUS at 08:14

## 2021-01-01 RX ADMIN — Medication 25 MILLIGRAM(S): at 13:00

## 2021-01-01 RX ADMIN — Medication 1 TABLET(S): at 11:34

## 2021-01-01 RX ADMIN — Medication 1 GRAM(S): at 12:39

## 2021-01-01 RX ADMIN — Medication 25 MILLIGRAM(S): at 21:41

## 2021-01-01 RX ADMIN — Medication 1 SPRAY(S): at 14:27

## 2021-01-01 RX ADMIN — Medication 1 MILLIGRAM(S): at 11:45

## 2021-01-01 RX ADMIN — Medication 5 MILLILITER(S): at 05:15

## 2021-01-01 RX ADMIN — Medication 1 SPRAY(S): at 12:45

## 2021-01-01 RX ADMIN — Medication 650 MILLIGRAM(S): at 05:25

## 2021-01-01 RX ADMIN — Medication 5 MILLILITER(S): at 17:35

## 2021-01-01 RX ADMIN — ISOSORBIDE MONONITRATE 60 MILLIGRAM(S): 60 TABLET, EXTENDED RELEASE ORAL at 12:23

## 2021-01-01 RX ADMIN — Medication 5 MILLIGRAM(S): at 12:59

## 2021-01-01 RX ADMIN — Medication 2000 UNIT(S): at 12:32

## 2021-01-01 RX ADMIN — TAMSULOSIN HYDROCHLORIDE 0.4 MILLIGRAM(S): 0.4 CAPSULE ORAL at 22:51

## 2021-01-01 RX ADMIN — Medication 80 MILLIGRAM(S): at 06:36

## 2021-01-01 RX ADMIN — VENETOCLAX 100 MILLIGRAM(S): 100 TABLET, FILM COATED ORAL at 11:50

## 2021-01-01 RX ADMIN — VENETOCLAX 100 MILLIGRAM(S): 100 TABLET, FILM COATED ORAL at 12:03

## 2021-01-01 RX ADMIN — FINASTERIDE 5 MILLIGRAM(S): 5 TABLET, FILM COATED ORAL at 05:20

## 2021-01-01 RX ADMIN — LIDOCAINE 1 PATCH: 4 CREAM TOPICAL at 13:12

## 2021-01-01 RX ADMIN — POSACONAZOLE 200 MILLIGRAM(S): 100 TABLET, DELAYED RELEASE ORAL at 23:01

## 2021-01-01 RX ADMIN — Medication 2000 UNIT(S): at 12:55

## 2021-01-01 RX ADMIN — Medication 5 MILLILITER(S): at 06:00

## 2021-01-01 RX ADMIN — LIDOCAINE 1 PATCH: 4 CREAM TOPICAL at 19:22

## 2021-01-01 RX ADMIN — TAMSULOSIN HYDROCHLORIDE 0.4 MILLIGRAM(S): 0.4 CAPSULE ORAL at 22:34

## 2021-01-01 RX ADMIN — CHLORHEXIDINE GLUCONATE 1 APPLICATION(S): 213 SOLUTION TOPICAL at 05:35

## 2021-01-01 RX ADMIN — CHLORHEXIDINE GLUCONATE 1 APPLICATION(S): 213 SOLUTION TOPICAL at 11:16

## 2021-01-01 RX ADMIN — FINASTERIDE 5 MILLIGRAM(S): 5 TABLET, FILM COATED ORAL at 05:23

## 2021-01-01 RX ADMIN — Medication 1 MILLIGRAM(S): at 12:16

## 2021-01-01 RX ADMIN — VENETOCLAX 100 MILLIGRAM(S): 100 TABLET, FILM COATED ORAL at 11:39

## 2021-01-01 RX ADMIN — ISOSORBIDE MONONITRATE 60 MILLIGRAM(S): 60 TABLET, EXTENDED RELEASE ORAL at 11:23

## 2021-01-01 RX ADMIN — ISOSORBIDE MONONITRATE 60 MILLIGRAM(S): 60 TABLET, EXTENDED RELEASE ORAL at 13:08

## 2021-01-01 RX ADMIN — Medication 200 GRAM(S): at 08:07

## 2021-01-01 RX ADMIN — Medication 1 SPRAY(S): at 11:12

## 2021-01-01 RX ADMIN — Medication 5 MILLILITER(S): at 17:25

## 2021-01-01 RX ADMIN — Medication 5 MILLILITER(S): at 12:01

## 2021-01-01 RX ADMIN — SENNA PLUS 2 TABLET(S): 8.6 TABLET ORAL at 21:01

## 2021-01-01 RX ADMIN — CINACALCET 30 MILLIGRAM(S): 30 TABLET, FILM COATED ORAL at 18:31

## 2021-01-01 RX ADMIN — SODIUM CHLORIDE 10 MILLILITER(S): 9 INJECTION INTRAMUSCULAR; INTRAVENOUS; SUBCUTANEOUS at 18:00

## 2021-01-01 RX ADMIN — Medication 650 MILLIGRAM(S): at 13:31

## 2021-01-01 RX ADMIN — ESCITALOPRAM OXALATE 5 MILLIGRAM(S): 10 TABLET, FILM COATED ORAL at 05:49

## 2021-01-01 RX ADMIN — TAMSULOSIN HYDROCHLORIDE 0.4 MILLIGRAM(S): 0.4 CAPSULE ORAL at 23:38

## 2021-01-01 RX ADMIN — Medication 650 MILLIGRAM(S): at 15:16

## 2021-01-01 RX ADMIN — Medication 1 SPRAY(S): at 21:36

## 2021-01-01 RX ADMIN — Medication 5 MILLILITER(S): at 18:00

## 2021-01-01 RX ADMIN — SODIUM CHLORIDE 10 MILLILITER(S): 9 INJECTION INTRAMUSCULAR; INTRAVENOUS; SUBCUTANEOUS at 21:15

## 2021-01-01 RX ADMIN — Medication 1 MILLIGRAM(S): at 12:31

## 2021-01-01 RX ADMIN — LIDOCAINE 1 PATCH: 4 CREAM TOPICAL at 00:04

## 2021-01-01 RX ADMIN — VENETOCLAX 100 MILLIGRAM(S): 100 TABLET, FILM COATED ORAL at 13:15

## 2021-01-01 RX ADMIN — PIPERACILLIN AND TAZOBACTAM 25 GRAM(S): 4; .5 INJECTION, POWDER, LYOPHILIZED, FOR SOLUTION INTRAVENOUS at 21:36

## 2021-01-01 RX ADMIN — Medication 650 MILLIGRAM(S): at 13:59

## 2021-01-01 RX ADMIN — Medication 40 MILLIGRAM(S): at 06:19

## 2021-01-01 RX ADMIN — Medication 3 MILLILITER(S): at 17:20

## 2021-01-01 RX ADMIN — Medication 5 MILLILITER(S): at 17:29

## 2021-01-01 RX ADMIN — CINACALCET 30 MILLIGRAM(S): 30 TABLET, FILM COATED ORAL at 11:20

## 2021-01-01 RX ADMIN — Medication 5 MILLILITER(S): at 21:51

## 2021-01-01 RX ADMIN — Medication 2000 UNIT(S): at 12:40

## 2021-01-01 RX ADMIN — LIDOCAINE 1 PATCH: 4 CREAM TOPICAL at 00:07

## 2021-01-01 RX ADMIN — TAMSULOSIN HYDROCHLORIDE 0.4 MILLIGRAM(S): 0.4 CAPSULE ORAL at 21:27

## 2021-01-01 RX ADMIN — Medication 200 MILLIGRAM(S): at 11:52

## 2021-01-01 RX ADMIN — ISOSORBIDE MONONITRATE 60 MILLIGRAM(S): 60 TABLET, EXTENDED RELEASE ORAL at 12:31

## 2021-01-01 RX ADMIN — POSACONAZOLE 300 MILLIGRAM(S): 100 TABLET, DELAYED RELEASE ORAL at 11:36

## 2021-01-01 RX ADMIN — LIDOCAINE 1 PATCH: 4 CREAM TOPICAL at 12:00

## 2021-01-01 RX ADMIN — Medication 5 MILLILITER(S): at 06:02

## 2021-01-01 RX ADMIN — ESCITALOPRAM OXALATE 5 MILLIGRAM(S): 10 TABLET, FILM COATED ORAL at 05:14

## 2021-01-01 RX ADMIN — Medication 2000 UNIT(S): at 11:52

## 2021-01-01 RX ADMIN — Medication 5 MILLILITER(S): at 23:34

## 2021-01-01 RX ADMIN — Medication 1 MILLIGRAM(S): at 11:17

## 2021-01-01 RX ADMIN — VENETOCLAX 400 MILLIGRAM(S): 100 TABLET, FILM COATED ORAL at 11:28

## 2021-01-01 RX ADMIN — DECITABINE 57.4 MILLIGRAM(S): 50 INJECTION, POWDER, LYOPHILIZED, FOR SOLUTION INTRAVENOUS at 14:34

## 2021-01-01 RX ADMIN — Medication 25 MILLIGRAM(S): at 05:20

## 2021-01-01 RX ADMIN — ESCITALOPRAM OXALATE 5 MILLIGRAM(S): 10 TABLET, FILM COATED ORAL at 06:27

## 2021-01-01 RX ADMIN — Medication 1 SPRAY(S): at 21:01

## 2021-01-01 RX ADMIN — ISOSORBIDE MONONITRATE 60 MILLIGRAM(S): 60 TABLET, EXTENDED RELEASE ORAL at 12:28

## 2021-01-01 RX ADMIN — FINASTERIDE 5 MILLIGRAM(S): 5 TABLET, FILM COATED ORAL at 13:08

## 2021-01-01 RX ADMIN — POSACONAZOLE 200 MILLIGRAM(S): 100 TABLET, DELAYED RELEASE ORAL at 16:35

## 2021-01-01 RX ADMIN — Medication 1 SPRAY(S): at 05:57

## 2021-01-01 RX ADMIN — HEPARIN SODIUM 5000 UNIT(S): 5000 INJECTION INTRAVENOUS; SUBCUTANEOUS at 18:50

## 2021-01-01 RX ADMIN — POLYETHYLENE GLYCOL 3350 17 GRAM(S): 17 POWDER, FOR SOLUTION ORAL at 11:19

## 2021-01-01 RX ADMIN — Medication 40 MILLIGRAM(S): at 05:02

## 2021-01-01 RX ADMIN — ISOSORBIDE MONONITRATE 60 MILLIGRAM(S): 60 TABLET, EXTENDED RELEASE ORAL at 11:39

## 2021-01-01 RX ADMIN — POLYETHYLENE GLYCOL 3350 17 GRAM(S): 17 POWDER, FOR SOLUTION ORAL at 12:00

## 2021-01-01 RX ADMIN — Medication 650 MILLIGRAM(S): at 22:00

## 2021-01-01 RX ADMIN — TAMSULOSIN HYDROCHLORIDE 0.4 MILLIGRAM(S): 0.4 CAPSULE ORAL at 21:23

## 2021-01-01 RX ADMIN — CHLORHEXIDINE GLUCONATE 1 APPLICATION(S): 213 SOLUTION TOPICAL at 08:55

## 2021-01-01 RX ADMIN — CINACALCET 30 MILLIGRAM(S): 30 TABLET, FILM COATED ORAL at 12:55

## 2021-01-01 RX ADMIN — Medication 5 MILLILITER(S): at 06:06

## 2021-01-01 RX ADMIN — ONDANSETRON 8 MILLIGRAM(S): 8 TABLET, FILM COATED ORAL at 14:11

## 2021-01-01 RX ADMIN — Medication 2 TABLET(S): at 12:02

## 2021-01-01 RX ADMIN — Medication 5 MILLILITER(S): at 18:32

## 2021-01-01 RX ADMIN — Medication 5 MILLILITER(S): at 05:59

## 2021-01-01 RX ADMIN — ONDANSETRON 8 MILLIGRAM(S): 8 TABLET, FILM COATED ORAL at 11:48

## 2021-01-01 RX ADMIN — Medication 2000 UNIT(S): at 12:13

## 2021-01-01 RX ADMIN — VENETOCLAX 20 MILLIGRAM(S): 100 TABLET, FILM COATED ORAL at 12:29

## 2021-01-01 RX ADMIN — Medication 200 MILLIGRAM(S): at 12:30

## 2021-01-01 RX ADMIN — Medication 50 MILLIGRAM(S): at 05:43

## 2021-01-01 RX ADMIN — Medication 1 SPRAY(S): at 00:24

## 2021-01-01 RX ADMIN — Medication 1 SPRAY(S): at 01:10

## 2021-01-01 RX ADMIN — Medication 200 MILLIGRAM(S): at 12:00

## 2021-01-01 RX ADMIN — POSACONAZOLE 200 MILLIGRAM(S): 100 TABLET, DELAYED RELEASE ORAL at 06:37

## 2021-01-01 RX ADMIN — Medication 400 MILLIGRAM(S): at 22:31

## 2021-01-01 RX ADMIN — POSACONAZOLE 300 MILLIGRAM(S): 100 TABLET, DELAYED RELEASE ORAL at 11:44

## 2021-01-01 RX ADMIN — PIPERACILLIN AND TAZOBACTAM 25 GRAM(S): 4; .5 INJECTION, POWDER, LYOPHILIZED, FOR SOLUTION INTRAVENOUS at 17:19

## 2021-01-01 RX ADMIN — POLYETHYLENE GLYCOL 3350 17 GRAM(S): 17 POWDER, FOR SOLUTION ORAL at 12:40

## 2021-01-01 RX ADMIN — Medication 2000 UNIT(S): at 12:20

## 2021-01-01 RX ADMIN — Medication 5 MILLILITER(S): at 21:00

## 2021-01-01 RX ADMIN — Medication 3 MILLILITER(S): at 00:23

## 2021-01-01 RX ADMIN — ISOSORBIDE MONONITRATE 60 MILLIGRAM(S): 60 TABLET, EXTENDED RELEASE ORAL at 13:06

## 2021-01-01 RX ADMIN — Medication 250 MILLIGRAM(S): at 12:32

## 2021-01-01 RX ADMIN — Medication 2000 UNIT(S): at 12:22

## 2021-01-01 RX ADMIN — ESCITALOPRAM OXALATE 5 MILLIGRAM(S): 10 TABLET, FILM COATED ORAL at 05:19

## 2021-01-01 RX ADMIN — Medication 5 MILLIGRAM(S): at 12:45

## 2021-01-01 RX ADMIN — Medication 20 MILLIGRAM(S): at 05:41

## 2021-01-01 RX ADMIN — ESCITALOPRAM OXALATE 5 MILLIGRAM(S): 10 TABLET, FILM COATED ORAL at 05:50

## 2021-01-01 RX ADMIN — TAMSULOSIN HYDROCHLORIDE 0.4 MILLIGRAM(S): 0.4 CAPSULE ORAL at 21:29

## 2021-01-01 RX ADMIN — Medication 10 MILLIGRAM(S): at 12:34

## 2021-01-01 RX ADMIN — Medication 2000 UNIT(S): at 12:39

## 2021-01-01 RX ADMIN — POSACONAZOLE 200 MILLIGRAM(S): 100 TABLET, DELAYED RELEASE ORAL at 12:59

## 2021-01-01 RX ADMIN — TAMSULOSIN HYDROCHLORIDE 0.4 MILLIGRAM(S): 0.4 CAPSULE ORAL at 22:59

## 2021-01-01 RX ADMIN — Medication 650 MILLIGRAM(S): at 05:42

## 2021-01-01 RX ADMIN — ISOSORBIDE MONONITRATE 60 MILLIGRAM(S): 60 TABLET, EXTENDED RELEASE ORAL at 12:37

## 2021-01-01 RX ADMIN — ISOSORBIDE MONONITRATE 60 MILLIGRAM(S): 60 TABLET, EXTENDED RELEASE ORAL at 12:51

## 2021-01-01 RX ADMIN — LIDOCAINE 1 PATCH: 4 CREAM TOPICAL at 12:04

## 2021-01-01 RX ADMIN — Medication 50 MILLIGRAM(S): at 06:28

## 2021-01-01 RX ADMIN — Medication 200 MILLIGRAM(S): at 11:51

## 2021-01-01 RX ADMIN — Medication 50 MILLIGRAM(S): at 05:42

## 2021-01-01 RX ADMIN — PIPERACILLIN AND TAZOBACTAM 25 GRAM(S): 4; .5 INJECTION, POWDER, LYOPHILIZED, FOR SOLUTION INTRAVENOUS at 13:05

## 2021-01-01 RX ADMIN — Medication 1 SPRAY(S): at 21:38

## 2021-01-01 RX ADMIN — VENETOCLAX 400 MILLIGRAM(S): 100 TABLET, FILM COATED ORAL at 13:34

## 2021-01-01 RX ADMIN — Medication 250 MILLIGRAM(S): at 17:03

## 2021-01-01 RX ADMIN — CINACALCET 30 MILLIGRAM(S): 30 TABLET, FILM COATED ORAL at 12:22

## 2021-01-01 RX ADMIN — ESCITALOPRAM OXALATE 5 MILLIGRAM(S): 10 TABLET, FILM COATED ORAL at 05:10

## 2021-01-01 RX ADMIN — CHLORHEXIDINE GLUCONATE 1 APPLICATION(S): 213 SOLUTION TOPICAL at 06:18

## 2021-01-01 RX ADMIN — OXYMETAZOLINE HYDROCHLORIDE 1 SPRAY(S): 0.5 SPRAY NASAL at 17:29

## 2021-01-01 RX ADMIN — Medication 1 SPRAY(S): at 13:38

## 2021-01-01 RX ADMIN — SENNA PLUS 2 TABLET(S): 8.6 TABLET ORAL at 21:09

## 2021-01-01 RX ADMIN — FINASTERIDE 5 MILLIGRAM(S): 5 TABLET, FILM COATED ORAL at 06:19

## 2021-01-01 RX ADMIN — Medication 1 MILLIGRAM(S): at 14:31

## 2021-01-01 RX ADMIN — Medication 40 MILLIGRAM(S): at 05:53

## 2021-01-01 RX ADMIN — FINASTERIDE 5 MILLIGRAM(S): 5 TABLET, FILM COATED ORAL at 05:07

## 2021-01-01 RX ADMIN — POSACONAZOLE 300 MILLIGRAM(S): 100 TABLET, DELAYED RELEASE ORAL at 12:27

## 2021-01-01 RX ADMIN — Medication 3 MILLILITER(S): at 12:55

## 2021-01-01 RX ADMIN — ISOSORBIDE MONONITRATE 60 MILLIGRAM(S): 60 TABLET, EXTENDED RELEASE ORAL at 12:20

## 2021-01-01 RX ADMIN — ESCITALOPRAM OXALATE 5 MILLIGRAM(S): 10 TABLET, FILM COATED ORAL at 06:05

## 2021-01-01 RX ADMIN — FINASTERIDE 5 MILLIGRAM(S): 5 TABLET, FILM COATED ORAL at 05:43

## 2021-01-01 RX ADMIN — Medication 1 MILLIGRAM(S): at 13:12

## 2021-01-01 RX ADMIN — DECITABINE 57.4 MILLIGRAM(S): 50 INJECTION, POWDER, LYOPHILIZED, FOR SOLUTION INTRAVENOUS at 11:52

## 2021-01-01 RX ADMIN — VENETOCLAX 100 MILLIGRAM(S): 100 TABLET, FILM COATED ORAL at 11:57

## 2021-01-01 RX ADMIN — SENNA PLUS 2 TABLET(S): 8.6 TABLET ORAL at 21:24

## 2021-01-01 RX ADMIN — HEPARIN SODIUM 5000 UNIT(S): 5000 INJECTION INTRAVENOUS; SUBCUTANEOUS at 06:26

## 2021-01-01 RX ADMIN — Medication 50 MILLIGRAM(S): at 06:17

## 2021-01-01 RX ADMIN — ESCITALOPRAM OXALATE 5 MILLIGRAM(S): 10 TABLET, FILM COATED ORAL at 05:40

## 2021-01-01 RX ADMIN — Medication 1 MILLIGRAM(S): at 12:01

## 2021-01-01 RX ADMIN — ISOSORBIDE MONONITRATE 60 MILLIGRAM(S): 60 TABLET, EXTENDED RELEASE ORAL at 12:35

## 2021-01-01 RX ADMIN — SODIUM CHLORIDE 10 MILLILITER(S): 9 INJECTION INTRAMUSCULAR; INTRAVENOUS; SUBCUTANEOUS at 06:41

## 2021-01-01 RX ADMIN — SODIUM CHLORIDE 10 MILLILITER(S): 9 INJECTION INTRAMUSCULAR; INTRAVENOUS; SUBCUTANEOUS at 18:21

## 2021-01-01 RX ADMIN — FINASTERIDE 5 MILLIGRAM(S): 5 TABLET, FILM COATED ORAL at 05:16

## 2021-01-01 RX ADMIN — Medication 1 MILLIGRAM(S): at 13:05

## 2021-01-01 RX ADMIN — CINACALCET 30 MILLIGRAM(S): 30 TABLET, FILM COATED ORAL at 14:13

## 2021-01-01 RX ADMIN — LIDOCAINE 1 PATCH: 4 CREAM TOPICAL at 00:37

## 2021-01-01 RX ADMIN — Medication 2000 UNIT(S): at 11:25

## 2021-01-01 RX ADMIN — LIDOCAINE 1 PATCH: 4 CREAM TOPICAL at 12:28

## 2021-01-01 RX ADMIN — Medication 15 GRAM(S): at 17:40

## 2021-01-01 RX ADMIN — CINACALCET 30 MILLIGRAM(S): 30 TABLET, FILM COATED ORAL at 11:50

## 2021-01-01 RX ADMIN — CHLORHEXIDINE GLUCONATE 1 APPLICATION(S): 213 SOLUTION TOPICAL at 06:04

## 2021-01-01 RX ADMIN — CHLORHEXIDINE GLUCONATE 1 APPLICATION(S): 213 SOLUTION TOPICAL at 07:57

## 2021-01-01 RX ADMIN — Medication 2000 UNIT(S): at 11:37

## 2021-01-01 RX ADMIN — CHLORHEXIDINE GLUCONATE 1 APPLICATION(S): 213 SOLUTION TOPICAL at 05:31

## 2021-01-01 RX ADMIN — CHLORHEXIDINE GLUCONATE 1 APPLICATION(S): 213 SOLUTION TOPICAL at 08:13

## 2021-01-01 RX ADMIN — Medication 1 SPRAY(S): at 06:23

## 2021-01-01 RX ADMIN — LIDOCAINE 1 PATCH: 4 CREAM TOPICAL at 05:50

## 2021-01-01 RX ADMIN — Medication 5 MILLILITER(S): at 12:14

## 2021-01-01 RX ADMIN — CHLORHEXIDINE GLUCONATE 1 APPLICATION(S): 213 SOLUTION TOPICAL at 08:50

## 2021-01-01 RX ADMIN — Medication 20 MILLIEQUIVALENT(S): at 09:05

## 2021-01-01 RX ADMIN — Medication 1 MILLIGRAM(S): at 12:03

## 2021-01-01 RX ADMIN — ISOSORBIDE MONONITRATE 60 MILLIGRAM(S): 60 TABLET, EXTENDED RELEASE ORAL at 13:10

## 2021-01-01 RX ADMIN — Medication 2000 UNIT(S): at 13:10

## 2021-01-01 RX ADMIN — LIDOCAINE 1 PATCH: 4 CREAM TOPICAL at 23:07

## 2021-01-01 RX ADMIN — Medication 10 MILLIGRAM(S): at 13:43

## 2021-01-01 RX ADMIN — ONDANSETRON 8 MILLIGRAM(S): 8 TABLET, FILM COATED ORAL at 11:05

## 2021-01-01 RX ADMIN — ESCITALOPRAM OXALATE 5 MILLIGRAM(S): 10 TABLET, FILM COATED ORAL at 06:10

## 2021-01-01 RX ADMIN — CINACALCET 30 MILLIGRAM(S): 30 TABLET, FILM COATED ORAL at 11:52

## 2021-01-01 RX ADMIN — POSACONAZOLE 200 MILLIGRAM(S): 100 TABLET, DELAYED RELEASE ORAL at 13:12

## 2021-01-01 RX ADMIN — CHLORHEXIDINE GLUCONATE 1 APPLICATION(S): 213 SOLUTION TOPICAL at 05:25

## 2021-01-01 RX ADMIN — POSACONAZOLE 200 MILLIGRAM(S): 100 TABLET, DELAYED RELEASE ORAL at 09:49

## 2021-01-01 RX ADMIN — PIPERACILLIN AND TAZOBACTAM 25 GRAM(S): 4; .5 INJECTION, POWDER, LYOPHILIZED, FOR SOLUTION INTRAVENOUS at 21:35

## 2021-01-01 RX ADMIN — Medication 2000 UNIT(S): at 14:32

## 2021-01-01 RX ADMIN — VENETOCLAX 100 MILLIGRAM(S): 100 TABLET, FILM COATED ORAL at 11:44

## 2021-01-01 RX ADMIN — Medication 5 MILLILITER(S): at 17:49

## 2021-01-01 RX ADMIN — CINACALCET 30 MILLIGRAM(S): 30 TABLET, FILM COATED ORAL at 12:44

## 2021-01-01 RX ADMIN — PIPERACILLIN AND TAZOBACTAM 25 GRAM(S): 4; .5 INJECTION, POWDER, LYOPHILIZED, FOR SOLUTION INTRAVENOUS at 00:02

## 2021-01-01 RX ADMIN — SODIUM CHLORIDE 10 MILLILITER(S): 9 INJECTION INTRAMUSCULAR; INTRAVENOUS; SUBCUTANEOUS at 06:09

## 2021-01-01 RX ADMIN — VENETOCLAX 100 MILLIGRAM(S): 100 TABLET, FILM COATED ORAL at 12:27

## 2021-01-01 RX ADMIN — CINACALCET 30 MILLIGRAM(S): 30 TABLET, FILM COATED ORAL at 14:32

## 2021-01-01 RX ADMIN — POSACONAZOLE 300 MILLIGRAM(S): 100 TABLET, DELAYED RELEASE ORAL at 12:00

## 2021-01-01 RX ADMIN — VENETOCLAX 100 MILLIGRAM(S): 100 TABLET, FILM COATED ORAL at 12:23

## 2021-01-01 RX ADMIN — CHLORHEXIDINE GLUCONATE 1 APPLICATION(S): 213 SOLUTION TOPICAL at 12:58

## 2021-01-01 RX ADMIN — Medication 650 MILLIGRAM(S): at 05:49

## 2021-01-01 RX ADMIN — Medication 25 MILLIGRAM(S): at 13:12

## 2021-01-01 RX ADMIN — LIDOCAINE 1 PATCH: 4 CREAM TOPICAL at 00:31

## 2021-01-01 RX ADMIN — Medication 5 MILLILITER(S): at 13:16

## 2021-01-01 RX ADMIN — Medication 1 MILLIGRAM(S): at 12:46

## 2021-01-01 RX ADMIN — CHLORHEXIDINE GLUCONATE 1 APPLICATION(S): 213 SOLUTION TOPICAL at 08:32

## 2021-01-01 RX ADMIN — Medication 5 MILLILITER(S): at 06:36

## 2021-01-01 RX ADMIN — VENETOCLAX 100 MILLIGRAM(S): 100 TABLET, FILM COATED ORAL at 11:36

## 2021-01-01 RX ADMIN — Medication 5 MILLILITER(S): at 13:06

## 2021-01-01 RX ADMIN — Medication 5 MILLILITER(S): at 17:41

## 2021-01-01 RX ADMIN — Medication 1 SPRAY(S): at 12:29

## 2021-01-01 RX ADMIN — Medication 25 MILLIGRAM(S): at 19:44

## 2021-01-01 RX ADMIN — Medication 40 MILLIGRAM(S): at 05:37

## 2021-01-01 RX ADMIN — PIPERACILLIN AND TAZOBACTAM 200 GRAM(S): 4; .5 INJECTION, POWDER, LYOPHILIZED, FOR SOLUTION INTRAVENOUS at 01:47

## 2021-01-01 RX ADMIN — LIDOCAINE 1 PATCH: 4 CREAM TOPICAL at 00:18

## 2021-01-01 RX ADMIN — Medication 250 MILLIGRAM(S): at 17:53

## 2021-01-01 RX ADMIN — Medication 650 MILLIGRAM(S): at 14:30

## 2021-01-01 RX ADMIN — Medication 2000 UNIT(S): at 13:35

## 2021-01-01 RX ADMIN — Medication 5 MILLILITER(S): at 12:54

## 2021-01-01 RX ADMIN — TAMSULOSIN HYDROCHLORIDE 0.4 MILLIGRAM(S): 0.4 CAPSULE ORAL at 21:39

## 2021-01-01 RX ADMIN — Medication 5 MILLILITER(S): at 23:07

## 2021-01-01 RX ADMIN — Medication 5 MILLILITER(S): at 21:23

## 2021-01-01 RX ADMIN — ISOSORBIDE MONONITRATE 60 MILLIGRAM(S): 60 TABLET, EXTENDED RELEASE ORAL at 12:39

## 2021-01-01 RX ADMIN — LIDOCAINE 1 PATCH: 4 CREAM TOPICAL at 23:45

## 2021-01-01 RX ADMIN — CHLORHEXIDINE GLUCONATE 1 APPLICATION(S): 213 SOLUTION TOPICAL at 05:58

## 2021-01-01 RX ADMIN — Medication 650 MILLIGRAM(S): at 13:40

## 2021-01-01 RX ADMIN — Medication 200 MILLIGRAM(S): at 14:33

## 2021-01-01 RX ADMIN — ONDANSETRON 8 MILLIGRAM(S): 8 TABLET, FILM COATED ORAL at 12:00

## 2021-01-01 RX ADMIN — Medication 50 MILLIGRAM(S): at 05:13

## 2021-01-01 RX ADMIN — Medication 2000 UNIT(S): at 11:51

## 2021-01-01 RX ADMIN — Medication 50 MILLIGRAM(S): at 05:41

## 2021-01-01 RX ADMIN — SODIUM CHLORIDE 10 MILLILITER(S): 9 INJECTION INTRAMUSCULAR; INTRAVENOUS; SUBCUTANEOUS at 06:43

## 2021-01-01 RX ADMIN — CINACALCET 30 MILLIGRAM(S): 30 TABLET, FILM COATED ORAL at 11:39

## 2021-01-01 RX ADMIN — Medication 40 MILLIGRAM(S): at 10:11

## 2021-01-01 RX ADMIN — Medication 2000 UNIT(S): at 13:05

## 2021-01-01 RX ADMIN — SODIUM CHLORIDE 10 MILLILITER(S): 9 INJECTION INTRAMUSCULAR; INTRAVENOUS; SUBCUTANEOUS at 05:12

## 2021-01-01 RX ADMIN — CHLORHEXIDINE GLUCONATE 1 APPLICATION(S): 213 SOLUTION TOPICAL at 11:58

## 2021-01-01 RX ADMIN — Medication 50 MILLIGRAM(S): at 05:38

## 2021-01-01 RX ADMIN — SODIUM CHLORIDE 10 MILLILITER(S): 9 INJECTION INTRAMUSCULAR; INTRAVENOUS; SUBCUTANEOUS at 16:38

## 2021-01-01 RX ADMIN — CHLORHEXIDINE GLUCONATE 1 APPLICATION(S): 213 SOLUTION TOPICAL at 14:18

## 2021-01-01 RX ADMIN — FINASTERIDE 5 MILLIGRAM(S): 5 TABLET, FILM COATED ORAL at 13:00

## 2021-01-01 RX ADMIN — Medication 5 MILLILITER(S): at 23:33

## 2021-01-01 RX ADMIN — Medication 50 MILLIGRAM(S): at 06:20

## 2021-01-01 RX ADMIN — ESCITALOPRAM OXALATE 5 MILLIGRAM(S): 10 TABLET, FILM COATED ORAL at 06:50

## 2021-01-01 RX ADMIN — Medication 50 MILLIEQUIVALENT(S): at 11:35

## 2021-01-01 RX ADMIN — Medication 1 MILLIGRAM(S): at 13:36

## 2021-01-01 RX ADMIN — Medication 5 MILLILITER(S): at 11:36

## 2021-01-01 RX ADMIN — LIDOCAINE 1 PATCH: 4 CREAM TOPICAL at 23:30

## 2021-01-01 RX ADMIN — Medication 1 SPRAY(S): at 08:26

## 2021-01-01 RX ADMIN — POSACONAZOLE 200 MILLIGRAM(S): 100 TABLET, DELAYED RELEASE ORAL at 14:15

## 2021-01-01 RX ADMIN — ESCITALOPRAM OXALATE 5 MILLIGRAM(S): 10 TABLET, FILM COATED ORAL at 06:19

## 2021-01-01 RX ADMIN — Medication 5 MILLILITER(S): at 11:25

## 2021-01-01 RX ADMIN — PIPERACILLIN AND TAZOBACTAM 25 GRAM(S): 4; .5 INJECTION, POWDER, LYOPHILIZED, FOR SOLUTION INTRAVENOUS at 05:51

## 2021-01-01 RX ADMIN — LIDOCAINE 1 PATCH: 4 CREAM TOPICAL at 12:16

## 2021-01-01 RX ADMIN — Medication 40 MILLIGRAM(S): at 06:18

## 2021-01-01 RX ADMIN — Medication 25 MILLIGRAM(S): at 21:18

## 2021-01-01 RX ADMIN — Medication 5 MILLILITER(S): at 05:02

## 2021-01-01 RX ADMIN — CINACALCET 30 MILLIGRAM(S): 30 TABLET, FILM COATED ORAL at 12:13

## 2021-01-01 RX ADMIN — Medication 1 MILLIGRAM(S): at 11:25

## 2021-01-01 RX ADMIN — ISOSORBIDE MONONITRATE 60 MILLIGRAM(S): 60 TABLET, EXTENDED RELEASE ORAL at 13:56

## 2021-01-01 RX ADMIN — Medication 650 MILLIGRAM(S): at 19:24

## 2021-01-01 RX ADMIN — Medication 650 MILLIGRAM(S): at 16:00

## 2021-01-01 RX ADMIN — Medication 2000 UNIT(S): at 12:21

## 2021-01-01 RX ADMIN — FINASTERIDE 5 MILLIGRAM(S): 5 TABLET, FILM COATED ORAL at 06:07

## 2021-01-01 RX ADMIN — ENOXAPARIN SODIUM 40 MILLIGRAM(S): 100 INJECTION SUBCUTANEOUS at 13:34

## 2021-01-01 RX ADMIN — Medication 1: at 17:05

## 2021-01-01 RX ADMIN — LIDOCAINE 1 PATCH: 4 CREAM TOPICAL at 12:19

## 2021-01-01 RX ADMIN — Medication 200 MILLIGRAM(S): at 12:44

## 2021-01-01 RX ADMIN — CINACALCET 30 MILLIGRAM(S): 30 TABLET, FILM COATED ORAL at 11:48

## 2021-01-01 RX ADMIN — Medication 3 MILLILITER(S): at 12:54

## 2021-01-01 RX ADMIN — CHLORHEXIDINE GLUCONATE 1 APPLICATION(S): 213 SOLUTION TOPICAL at 13:10

## 2021-01-01 RX ADMIN — Medication 5 MILLILITER(S): at 17:59

## 2021-01-01 RX ADMIN — Medication 100 MILLIGRAM(S): at 21:19

## 2021-01-01 RX ADMIN — SODIUM CHLORIDE 10 MILLILITER(S): 9 INJECTION INTRAMUSCULAR; INTRAVENOUS; SUBCUTANEOUS at 06:11

## 2021-01-01 RX ADMIN — Medication 5 MILLILITER(S): at 00:55

## 2021-01-01 RX ADMIN — DECITABINE 57 MILLIGRAM(S): 50 INJECTION, POWDER, LYOPHILIZED, FOR SOLUTION INTRAVENOUS at 16:14

## 2021-01-01 RX ADMIN — Medication 5 MILLILITER(S): at 05:29

## 2021-01-01 RX ADMIN — Medication 1 SPRAY(S): at 23:07

## 2021-01-01 RX ADMIN — LIDOCAINE 1 PATCH: 4 CREAM TOPICAL at 12:14

## 2021-01-01 RX ADMIN — ESCITALOPRAM OXALATE 5 MILLIGRAM(S): 10 TABLET, FILM COATED ORAL at 05:01

## 2021-01-01 RX ADMIN — Medication 5 MILLILITER(S): at 17:26

## 2021-01-01 RX ADMIN — Medication 5 MILLILITER(S): at 22:58

## 2021-01-01 RX ADMIN — Medication 5 MILLILITER(S): at 12:09

## 2021-01-01 RX ADMIN — FINASTERIDE 5 MILLIGRAM(S): 5 TABLET, FILM COATED ORAL at 13:12

## 2021-01-01 RX ADMIN — Medication 2000 UNIT(S): at 11:47

## 2021-01-01 RX ADMIN — POSACONAZOLE 200 MILLIGRAM(S): 100 TABLET, DELAYED RELEASE ORAL at 17:42

## 2021-01-01 RX ADMIN — POSACONAZOLE 300 MILLIGRAM(S): 100 TABLET, DELAYED RELEASE ORAL at 12:40

## 2021-01-01 RX ADMIN — Medication 1 SPRAY(S): at 18:32

## 2021-01-01 RX ADMIN — LIDOCAINE 1 PATCH: 4 CREAM TOPICAL at 19:43

## 2021-01-01 RX ADMIN — TAMSULOSIN HYDROCHLORIDE 0.4 MILLIGRAM(S): 0.4 CAPSULE ORAL at 21:02

## 2021-01-01 RX ADMIN — Medication 1 MILLIGRAM(S): at 11:39

## 2021-01-01 RX ADMIN — Medication 5 MILLILITER(S): at 00:49

## 2021-01-01 RX ADMIN — Medication 50 MILLIGRAM(S): at 05:17

## 2021-01-01 RX ADMIN — ONDANSETRON 8 MILLIGRAM(S): 8 TABLET, FILM COATED ORAL at 16:11

## 2021-01-01 RX ADMIN — Medication 5 MILLILITER(S): at 21:34

## 2021-01-01 RX ADMIN — TAMSULOSIN HYDROCHLORIDE 0.4 MILLIGRAM(S): 0.4 CAPSULE ORAL at 22:27

## 2021-01-01 RX ADMIN — VENETOCLAX 200 MILLIGRAM(S): 100 TABLET, FILM COATED ORAL at 12:50

## 2021-01-01 RX ADMIN — Medication 1 MILLIGRAM(S): at 13:39

## 2021-01-01 RX ADMIN — Medication 3 MILLILITER(S): at 06:27

## 2021-01-01 RX ADMIN — Medication 5 MILLILITER(S): at 12:42

## 2021-01-01 RX ADMIN — Medication 40 MILLIGRAM(S): at 06:10

## 2021-01-01 RX ADMIN — Medication 200 MILLIGRAM(S): at 12:03

## 2021-01-01 RX ADMIN — Medication 5 MILLILITER(S): at 05:53

## 2021-01-01 RX ADMIN — POLYETHYLENE GLYCOL 3350 17 GRAM(S): 17 POWDER, FOR SOLUTION ORAL at 12:02

## 2021-01-01 RX ADMIN — POSACONAZOLE 300 MILLIGRAM(S): 100 TABLET, DELAYED RELEASE ORAL at 12:41

## 2021-01-01 RX ADMIN — Medication 15 GRAM(S): at 18:38

## 2021-01-01 RX ADMIN — POSACONAZOLE 200 MILLIGRAM(S): 100 TABLET, DELAYED RELEASE ORAL at 12:22

## 2021-01-01 RX ADMIN — Medication 5 MILLILITER(S): at 12:43

## 2021-01-01 RX ADMIN — PIPERACILLIN AND TAZOBACTAM 25 GRAM(S): 4; .5 INJECTION, POWDER, LYOPHILIZED, FOR SOLUTION INTRAVENOUS at 09:48

## 2021-01-01 RX ADMIN — Medication 5 MILLILITER(S): at 05:37

## 2021-01-01 RX ADMIN — POSACONAZOLE 300 MILLIGRAM(S): 100 TABLET, DELAYED RELEASE ORAL at 12:18

## 2021-01-01 RX ADMIN — SODIUM CHLORIDE 10 MILLILITER(S): 9 INJECTION INTRAMUSCULAR; INTRAVENOUS; SUBCUTANEOUS at 06:06

## 2021-01-01 RX ADMIN — CINACALCET 30 MILLIGRAM(S): 30 TABLET, FILM COATED ORAL at 14:36

## 2021-01-01 RX ADMIN — SENNA PLUS 2 TABLET(S): 8.6 TABLET ORAL at 22:27

## 2021-01-01 RX ADMIN — PIPERACILLIN AND TAZOBACTAM 25 GRAM(S): 4; .5 INJECTION, POWDER, LYOPHILIZED, FOR SOLUTION INTRAVENOUS at 05:03

## 2021-01-01 RX ADMIN — Medication 200 MILLIGRAM(S): at 12:24

## 2021-01-01 RX ADMIN — ISOSORBIDE MONONITRATE 60 MILLIGRAM(S): 60 TABLET, EXTENDED RELEASE ORAL at 11:25

## 2021-01-01 RX ADMIN — Medication 1 SPRAY(S): at 12:55

## 2021-01-01 RX ADMIN — ESCITALOPRAM OXALATE 5 MILLIGRAM(S): 10 TABLET, FILM COATED ORAL at 05:15

## 2021-01-01 RX ADMIN — Medication 5 MILLILITER(S): at 21:18

## 2021-01-01 RX ADMIN — PIPERACILLIN AND TAZOBACTAM 25 GRAM(S): 4; .5 INJECTION, POWDER, LYOPHILIZED, FOR SOLUTION INTRAVENOUS at 00:07

## 2021-01-01 RX ADMIN — POSACONAZOLE 200 MILLIGRAM(S): 100 TABLET, DELAYED RELEASE ORAL at 18:37

## 2021-01-01 RX ADMIN — Medication 1 SPRAY(S): at 17:06

## 2021-01-01 RX ADMIN — Medication 2000 UNIT(S): at 11:39

## 2021-01-01 RX ADMIN — SODIUM CHLORIDE 10 MILLILITER(S): 9 INJECTION INTRAMUSCULAR; INTRAVENOUS; SUBCUTANEOUS at 05:56

## 2021-01-01 RX ADMIN — FINASTERIDE 5 MILLIGRAM(S): 5 TABLET, FILM COATED ORAL at 13:34

## 2021-01-01 RX ADMIN — ENOXAPARIN SODIUM 40 MILLIGRAM(S): 100 INJECTION SUBCUTANEOUS at 13:12

## 2021-01-01 RX ADMIN — Medication 50 MILLIGRAM(S): at 05:02

## 2021-01-01 RX ADMIN — Medication 1 MILLIGRAM(S): at 12:51

## 2021-01-01 RX ADMIN — Medication 5 MILLILITER(S): at 12:22

## 2021-01-01 RX ADMIN — Medication 50 MILLIGRAM(S): at 21:28

## 2021-01-01 RX ADMIN — CINACALCET 30 MILLIGRAM(S): 30 TABLET, FILM COATED ORAL at 11:35

## 2021-01-01 RX ADMIN — Medication 81 MILLIGRAM(S): at 11:58

## 2021-01-01 RX ADMIN — Medication 1 MILLIGRAM(S): at 11:41

## 2021-01-01 RX ADMIN — Medication 5 MILLILITER(S): at 05:32

## 2021-01-01 RX ADMIN — Medication 200 MILLIGRAM(S): at 14:12

## 2021-01-01 RX ADMIN — Medication 50 MILLIGRAM(S): at 06:06

## 2021-01-01 RX ADMIN — FINASTERIDE 5 MILLIGRAM(S): 5 TABLET, FILM COATED ORAL at 11:58

## 2021-01-01 RX ADMIN — CHLORHEXIDINE GLUCONATE 1 APPLICATION(S): 213 SOLUTION TOPICAL at 07:38

## 2021-01-01 RX ADMIN — Medication 1 MILLIGRAM(S): at 12:00

## 2021-01-01 RX ADMIN — CINACALCET 30 MILLIGRAM(S): 30 TABLET, FILM COATED ORAL at 13:12

## 2021-01-01 RX ADMIN — POLYETHYLENE GLYCOL 3350 17 GRAM(S): 17 POWDER, FOR SOLUTION ORAL at 12:14

## 2021-01-01 RX ADMIN — LIDOCAINE 1 PATCH: 4 CREAM TOPICAL at 20:07

## 2021-01-01 RX ADMIN — Medication 200 MILLIGRAM(S): at 13:02

## 2021-01-01 RX ADMIN — Medication 650 MILLIGRAM(S): at 05:16

## 2021-01-01 RX ADMIN — TAMSULOSIN HYDROCHLORIDE 0.4 MILLIGRAM(S): 0.4 CAPSULE ORAL at 22:14

## 2021-01-01 RX ADMIN — LIDOCAINE 1 PATCH: 4 CREAM TOPICAL at 12:15

## 2021-01-01 RX ADMIN — Medication 20 MILLIGRAM(S): at 05:43

## 2021-01-01 RX ADMIN — Medication 1 MILLIGRAM(S): at 12:20

## 2021-01-01 RX ADMIN — Medication 50 MILLIGRAM(S): at 05:54

## 2021-01-01 RX ADMIN — Medication 40 MILLIGRAM(S): at 11:16

## 2021-01-01 RX ADMIN — TAMSULOSIN HYDROCHLORIDE 0.4 MILLIGRAM(S): 0.4 CAPSULE ORAL at 21:31

## 2021-01-01 RX ADMIN — Medication 40 MILLIGRAM(S): at 06:36

## 2021-01-01 RX ADMIN — Medication 1: at 17:34

## 2021-01-01 RX ADMIN — Medication 50 MILLIGRAM(S): at 05:50

## 2021-01-01 RX ADMIN — CHLORHEXIDINE GLUCONATE 1 APPLICATION(S): 213 SOLUTION TOPICAL at 09:00

## 2021-01-01 RX ADMIN — Medication 50 MILLIGRAM(S): at 06:37

## 2021-01-01 RX ADMIN — LIDOCAINE 1 PATCH: 4 CREAM TOPICAL at 11:39

## 2021-01-01 RX ADMIN — VENETOCLAX 100 MILLIGRAM(S): 100 TABLET, FILM COATED ORAL at 11:58

## 2021-01-01 RX ADMIN — Medication 200 MILLIGRAM(S): at 13:06

## 2021-01-01 RX ADMIN — POSACONAZOLE 200 MILLIGRAM(S): 100 TABLET, DELAYED RELEASE ORAL at 02:33

## 2021-01-01 RX ADMIN — Medication 200 MILLIGRAM(S): at 11:20

## 2021-01-01 RX ADMIN — ISOSORBIDE MONONITRATE 60 MILLIGRAM(S): 60 TABLET, EXTENDED RELEASE ORAL at 13:42

## 2021-01-01 RX ADMIN — ISOSORBIDE MONONITRATE 60 MILLIGRAM(S): 60 TABLET, EXTENDED RELEASE ORAL at 12:38

## 2021-01-01 RX ADMIN — FINASTERIDE 5 MILLIGRAM(S): 5 TABLET, FILM COATED ORAL at 05:58

## 2021-01-01 RX ADMIN — POSACONAZOLE 200 MILLIGRAM(S): 100 TABLET, DELAYED RELEASE ORAL at 23:05

## 2021-01-01 RX ADMIN — Medication 650 MILLIGRAM(S): at 14:11

## 2021-01-01 RX ADMIN — SODIUM CHLORIDE 10 MILLILITER(S): 9 INJECTION INTRAMUSCULAR; INTRAVENOUS; SUBCUTANEOUS at 17:26

## 2021-01-01 RX ADMIN — Medication 200 MILLIGRAM(S): at 12:07

## 2021-01-01 RX ADMIN — POSACONAZOLE 300 MILLIGRAM(S): 100 TABLET, DELAYED RELEASE ORAL at 11:18

## 2021-01-01 RX ADMIN — POSACONAZOLE 200 MILLIGRAM(S): 100 TABLET, DELAYED RELEASE ORAL at 06:10

## 2021-01-01 RX ADMIN — DECITABINE 57 MILLIGRAM(S): 50 INJECTION, POWDER, LYOPHILIZED, FOR SOLUTION INTRAVENOUS at 15:54

## 2021-01-01 RX ADMIN — Medication 5 MILLILITER(S): at 23:55

## 2021-01-01 RX ADMIN — ISOSORBIDE MONONITRATE 60 MILLIGRAM(S): 60 TABLET, EXTENDED RELEASE ORAL at 14:14

## 2021-01-01 RX ADMIN — Medication 5 MILLILITER(S): at 11:19

## 2021-01-01 RX ADMIN — Medication 2000 UNIT(S): at 12:45

## 2021-01-01 RX ADMIN — DECITABINE 57 MILLIGRAM(S): 50 INJECTION, POWDER, LYOPHILIZED, FOR SOLUTION INTRAVENOUS at 17:00

## 2021-01-01 RX ADMIN — ISOSORBIDE MONONITRATE 60 MILLIGRAM(S): 60 TABLET, EXTENDED RELEASE ORAL at 12:01

## 2021-01-01 RX ADMIN — Medication 40 MILLIEQUIVALENT(S): at 22:45

## 2021-01-01 RX ADMIN — LIDOCAINE 1 PATCH: 4 CREAM TOPICAL at 19:57

## 2021-01-01 RX ADMIN — Medication 2 TABLET(S): at 11:45

## 2021-01-01 RX ADMIN — CHLORHEXIDINE GLUCONATE 1 APPLICATION(S): 213 SOLUTION TOPICAL at 09:48

## 2021-01-01 RX ADMIN — Medication 81 MILLIGRAM(S): at 13:12

## 2021-01-01 RX ADMIN — ESCITALOPRAM OXALATE 5 MILLIGRAM(S): 10 TABLET, FILM COATED ORAL at 06:36

## 2021-01-01 RX ADMIN — POLYETHYLENE GLYCOL 3350 17 GRAM(S): 17 POWDER, FOR SOLUTION ORAL at 11:22

## 2021-01-01 RX ADMIN — Medication 10 MILLIGRAM(S): at 17:35

## 2021-01-01 RX ADMIN — ISOSORBIDE MONONITRATE 60 MILLIGRAM(S): 60 TABLET, EXTENDED RELEASE ORAL at 11:50

## 2021-01-01 RX ADMIN — SODIUM CHLORIDE 10 MILLILITER(S): 9 INJECTION INTRAMUSCULAR; INTRAVENOUS; SUBCUTANEOUS at 06:19

## 2021-01-01 RX ADMIN — ESCITALOPRAM OXALATE 5 MILLIGRAM(S): 10 TABLET, FILM COATED ORAL at 06:16

## 2021-01-01 RX ADMIN — Medication 20 MILLIGRAM(S): at 05:49

## 2021-01-01 RX ADMIN — POLYETHYLENE GLYCOL 3350 17 GRAM(S): 17 POWDER, FOR SOLUTION ORAL at 12:33

## 2021-01-01 RX ADMIN — LIDOCAINE 1 PATCH: 4 CREAM TOPICAL at 23:26

## 2021-01-01 RX ADMIN — Medication 15 GRAM(S): at 05:16

## 2021-01-01 RX ADMIN — FINASTERIDE 5 MILLIGRAM(S): 5 TABLET, FILM COATED ORAL at 05:33

## 2021-01-01 RX ADMIN — LIDOCAINE 1 PATCH: 4 CREAM TOPICAL at 13:07

## 2021-01-01 RX ADMIN — Medication 1 MILLIGRAM(S): at 11:52

## 2021-01-01 RX ADMIN — Medication 75 MILLIGRAM(S): at 13:08

## 2021-01-01 RX ADMIN — Medication 50 MILLIGRAM(S): at 05:57

## 2021-01-01 RX ADMIN — Medication 1 MILLIGRAM(S): at 12:02

## 2021-01-01 RX ADMIN — Medication 650 MILLIGRAM(S): at 05:55

## 2021-01-01 RX ADMIN — Medication 50 MILLIGRAM(S): at 05:59

## 2021-01-01 RX ADMIN — Medication 1 SPRAY(S): at 21:37

## 2021-01-01 RX ADMIN — Medication 81 MILLIGRAM(S): at 13:34

## 2021-01-01 RX ADMIN — Medication 50 MILLIGRAM(S): at 06:34

## 2021-01-01 RX ADMIN — POSACONAZOLE 300 MILLIGRAM(S): 100 TABLET, DELAYED RELEASE ORAL at 12:17

## 2021-01-01 RX ADMIN — Medication 1 MILLIGRAM(S): at 13:07

## 2021-01-01 RX ADMIN — Medication 50 MILLIEQUIVALENT(S): at 09:44

## 2021-01-01 RX ADMIN — Medication 5 MILLILITER(S): at 12:44

## 2021-01-01 RX ADMIN — Medication 50 MILLIGRAM(S): at 06:36

## 2021-01-01 RX ADMIN — SODIUM CHLORIDE 10 MILLILITER(S): 9 INJECTION INTRAMUSCULAR; INTRAVENOUS; SUBCUTANEOUS at 17:04

## 2021-01-01 RX ADMIN — Medication 2000 UNIT(S): at 12:01

## 2021-01-01 RX ADMIN — Medication 650 MILLIGRAM(S): at 16:22

## 2021-01-01 RX ADMIN — Medication 15 GRAM(S): at 06:54

## 2021-01-01 RX ADMIN — SODIUM CHLORIDE 10 MILLILITER(S): 9 INJECTION INTRAMUSCULAR; INTRAVENOUS; SUBCUTANEOUS at 22:35

## 2021-01-01 RX ADMIN — Medication 50 MILLIEQUIVALENT(S): at 13:59

## 2021-01-01 RX ADMIN — FINASTERIDE 5 MILLIGRAM(S): 5 TABLET, FILM COATED ORAL at 06:37

## 2021-01-01 RX ADMIN — Medication 1 MILLIGRAM(S): at 12:17

## 2021-01-01 RX ADMIN — LIDOCAINE 1 PATCH: 4 CREAM TOPICAL at 23:00

## 2021-01-01 RX ADMIN — Medication 5 MILLILITER(S): at 17:21

## 2021-01-01 RX ADMIN — Medication 2000 UNIT(S): at 12:50

## 2021-01-01 RX ADMIN — Medication 20 MILLIGRAM(S): at 05:18

## 2021-01-01 RX ADMIN — LIDOCAINE 1 PATCH: 4 CREAM TOPICAL at 19:30

## 2021-01-01 RX ADMIN — DECITABINE 57 MILLIGRAM(S): 50 INJECTION, POWDER, LYOPHILIZED, FOR SOLUTION INTRAVENOUS at 16:44

## 2021-01-01 RX ADMIN — Medication 3 MILLILITER(S): at 12:08

## 2021-01-01 RX ADMIN — Medication 1 MILLIGRAM(S): at 11:48

## 2021-01-01 RX ADMIN — VENETOCLAX 100 MILLIGRAM(S): 100 TABLET, FILM COATED ORAL at 12:26

## 2021-01-01 RX ADMIN — VENETOCLAX 400 MILLIGRAM(S): 100 TABLET, FILM COATED ORAL at 13:00

## 2021-01-01 RX ADMIN — ISOSORBIDE MONONITRATE 60 MILLIGRAM(S): 60 TABLET, EXTENDED RELEASE ORAL at 12:55

## 2021-01-01 RX ADMIN — Medication 5 MILLILITER(S): at 18:38

## 2021-01-01 RX ADMIN — Medication 650 MILLIGRAM(S): at 06:22

## 2021-01-01 RX ADMIN — Medication 5 MILLILITER(S): at 12:13

## 2021-01-01 RX ADMIN — ISOSORBIDE MONONITRATE 60 MILLIGRAM(S): 60 TABLET, EXTENDED RELEASE ORAL at 11:17

## 2021-06-11 NOTE — H&P ADULT - NSICDXPASTSURGICALHX_GEN_ALL_CORE_FT
PAST SURGICAL HISTORY:  History of colectomy 2009 complicated by CVA & MI During reversal    History of coronary artery bypass graft 5V ( left internal thoracic artery to the anterior descending, sequential reverse saphenous vein to the diagonal and obtuse marginal, sequential reverse saphenous vein to the posterior descending and posterolateral )    History of transurethral resection of prostate     ICD (implantable cardioverter-defibrillator) in place

## 2021-06-11 NOTE — PATIENT PROFILE ADULT - HAVE YOU EXPERIENCED VIOLENCE OR A TRAUMATIC EVENT?
Called patient to have her come in for her CAM boot and get her shoe size to have the boot ready. Patient wears size 9 shoe so probably a medium boot.  ----- Message from Bebe Sumner DPM sent at 12/10/2019  8:51 AM CST -----  Please contact patient to come and  a new CAM boot short. Thanks.     
no

## 2021-06-11 NOTE — PATIENT PROFILE ADULT - NSPROGENBLOODRESTRICT_GEN_A_NUR
Patient: Cherie Aguilar [[id number]]     Procedure: OPEN ACCESS COLONOSCOPY Status: Needs Scheduling   Requested appt date:  Authorizing: Yi Marques MD in [facility]                           Diagnosis: Screening for colon cancer [Z12.11]   History of colon polyps [Z86.010]          none

## 2021-06-11 NOTE — PATIENT PROFILE ADULT - 
ADDITIONAL INFORMATION
Pt states he does not remember which vaccine he received, but received both doses. Last dose was about 3 mos. ago as per pt.

## 2021-06-11 NOTE — CHART NOTE - NSCHARTNOTEFT_GEN_A_CORE
: Carolina DUARTE Alvin     INDICATION: Anemia/ Malignancy     PROCEDURE:  [ x] LIMITED ECHO  [x ] LIMITED CHEST  [ ] LIMITED RETROPERITONEAL  [ ] LIMITED ABDOMINAL  [x] LIMITED DVT  [ ] NEEDLE GUIDANCE VASCULAR  [ ] NEEDLE GUIDANCE THORACENTESIS  [ ] NEEDLE GUIDANCE PARACENTESIS  [ ] NEEDLE GUIDANCE PERICARDIOCENTESIS  [ ] OTHER    FINDINGS:  ECHO   - RV smaller than LV RV with preserved function   - LV with sever disfunction   - (+) sever mitral valve stenosis   - IVC 2.6   - RA and LA enlargement  - No pericardial effusion noted     Chest   - Right anterior chest wall with B line predominance   - Right Pleural Base with moderate pleural effusion some stranding noted with consolidated lung   - Left anterior chest wall predominantly a line with scattered b lines predominant at base   - Left pleural base with small effusion and associated consolidation     VTE study   - Bilateral femoral. bilateral Saphenous,  and bilateral Popliteal veins are all compressible without any DVT              INTERPRETATION:  Enlarged bilateral Atria, LV with severe reduced function sever Mitral valve stenosis IVC enlargement, Bilateral Pleural effusion right larger than left with stranding and associated consolidation.  No DVT noted
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
## 2021-06-11 NOTE — H&P ADULT - NSHPPHYSICALEXAM_GEN_ALL_CORE
Vital Signs Last 24 Hrs  T(C): 36.8 (11 Jun 2021 22:00), Max: 36.8 (11 Jun 2021 22:00)  T(F): 98.2 (11 Jun 2021 22:00), Max: 98.2 (11 Jun 2021 22:00)  HR: 90 (11 Jun 2021 22:00) (90 - 90)  BP: 140/65 (11 Jun 2021 22:00) (140/65 - 140/65)  BP(mean): 93 (11 Jun 2021 22:00) (93 - 93)  RR: 22 (11 Jun 2021 22:00) (22 - 22)  SpO2: 97% (11 Jun 2021 22:00) (97% - 97%)    PHYSICAL EXAM:  GENERAL: pale male lying in bed comfortably  HEENT:  NCAT, supple neck  CHEST/LUNG: Clear to auscultation bilaterally; No rales, rhonchi, wheezing, or rubs. Unlabored respirations  HEART: Regular rate and rhythm; S1, S2 present. No murmurs, rubs, or gallops  ABDOMEN: Bowel sounds present; Soft, Nontender, Nondistended.   EXTREMITIES:  No pedal edema  NERVOUS SYSTEM:  Alert & Oriented X3, speech clear. No deficits   MSK: FROM all 4 extremities, full and equal strength Vital Signs Last 24 Hrs  T(C): 36.8 (11 Jun 2021 22:00), Max: 36.8 (11 Jun 2021 22:00)  T(F): 98.2 (11 Jun 2021 22:00), Max: 98.2 (11 Jun 2021 22:00)  HR: 90 (11 Jun 2021 22:00) (90 - 90)  BP: 140/65 (11 Jun 2021 22:00) (140/65 - 140/65)  BP(mean): 93 (11 Jun 2021 22:00) (93 - 93)  RR: 22 (11 Jun 2021 22:00) (22 - 22)  SpO2: 97% (11 Jun 2021 22:00) (97% - 97%)    PHYSICAL EXAM:  GENERAL: pale male lying in bed comfortably  HEENT:  NCAT, supple neck  CHEST/LUNG: decreased breath sounds bilaterally, crackles R>L bases  HEART: Regular rate and rhythm; S1, S2 present. No murmurs, rubs, or gallops  ABDOMEN: Bowel sounds present; Soft, Nontender, Nondistended.   EXTREMITIES:  No pedal edema  NERVOUS SYSTEM:  Alert & Oriented X2 to place and person, speech clear. No deficits   MSK: FROM all 4 extremities, full and equal strength
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
## 2021-06-11 NOTE — H&P ADULT - NSHPREVIEWOFSYSTEMS_GEN_ALL_CORE
CONSTITUTIONAL: No fever, no chills  EYES: No eye pain, no visual disturbance  Mouth: no pain in mouth, no cuts  RESPIRATORY: No cough, No sob  CARDIOVASCULAR: No CP, no palpitations  GASTROINTESTINAL: no abdominal pain, no n/v/d  GENITOURINARY: No dysuria, no hematuria  NEUROLOGICAL: No headaches, no weakness  SKIN: No itching, no rashes  MUSCULOSKELETAL: No joint pain, no joint swelling  PSYCHIATRY: no insomnia, no irritability CONSTITUTIONAL: No fever, no chills  EYES: No eye pain, no visual disturbance  Mouth: no pain in mouth, no cuts  RESPIRATORY: No cough, +sob  CARDIOVASCULAR: No CP, no palpitations  GASTROINTESTINAL: no abdominal pain, no n/v/d  GENITOURINARY: No dysuria, no hematuria  NEUROLOGICAL: No headaches, no weakness  SKIN: No itching, no rashes  MUSCULOSKELETAL: No joint pain, no joint swelling  PSYCHIATRY: no insomnia, no irritability

## 2021-06-11 NOTE — H&P ADULT - NSICDXFAMILYHX_GEN_ALL_CORE_FT
FAMILY HISTORY:  Family history of MI (myocardial infarction)    Sibling  Still living? No  Family history of MI (myocardial infarction), Age at diagnosis: Age Unknown
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
## 2021-06-11 NOTE — H&P ADULT - NSHPLABSRESULTS_GEN_ALL_CORE
POCUS: Globally reduced EF, B-lines, bilat pleural effusion R>L, large IVC. No DVTs b/l LABS:                        5.7    35.39 )-----------( 30       ( 2021 00:41 )             18.8     Blasts %: 22.8: To be reviewed by pathologist. % (21 @ 00:41)        136  |  102  |  55<H>  ----------------------------<  130<H>  4.5   |  16<L>  |  3.45<H>    Ca    9.7      2021 23:20  Phos  4.6       Mg     2.4             Urinalysis Basic - ( 2021 00:38 )    Color: Light Yellow / Appearance: Slightly Turbid / S.014 / pH: x  Gluc: x / Ketone: Negative  / Bili: Negative / Urobili: Negative   Blood: x / Protein: 30 mg/dL / Nitrite: Negative   Leuk Esterase: Negative / RBC: 1 /hpf / WBC 5 /HPF   Sq Epi: x / Non Sq Epi: 4 /hpf / Bacteria: Negative    Troponin T, High Sensitivity Result: 93    Uric Acid, Serum: 13.4 mg/dL (21 @ 23:20)    Lactate Dehydrogenase, Serum: 567: Mild hemolysis results may be falsely elevated U/L (21 @ 23:20)    Procalcitonin, Serum: 1.13        RADIOLOGY/ADDITIONAL TESTS:    POCUS: Globally reduced EF, B-lines, bilat pleural effusion R>L, large IVC. No DVTs b/l

## 2021-06-12 NOTE — CONSULT NOTE ADULT - ATTENDING COMMENTS
Patient interviewed/examined utilizing video .    Patient denies taking any medications at home and reports not having PCP.    Agree with history, PE, ROS, A/P as above.    C/D/W MICU Team.     Doni Soto MD (Weekend Coverage)  [phone]
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
## 2021-06-12 NOTE — PROGRESS NOTE ADULT - SUBJECTIVE AND OBJECTIVE BOX
Fannie Caban MD  Internal Medicine, PGY2  [phone]    [facility] Progress Note    Interval Events:    Meds administered:  metoprolol succinate ER: 50 milliGRAM(s) Oral (:19)  furosemide    Tablet: 40 milliGRAM(s) Oral (:19)  finasteride: 5 milliGRAM(s) Oral (:19)  escitalopram: 5 milliGRAM(s) Oral (:19)  chlorhexidine 4% Liquid: 1 Application(s) Topical (:19)  rasburicase IVPB: 104 mL/Hr IV Intermittent ( @ 02:47)        REVIEW OF SYSTEMS:  [ ] Unable to assess ROS because ______  CONSTITUTIONAL: No fever, chills, night sweats, or fatigue  EYES: No eye pain, visual disturbances, or discharge  ENMT:  No difficulty hearing, tinnitus, vertigo; No sinus or throat pain  NECK: No pain or stiffness  BREASTS: No pain, masses, or nipple discharge  RESPIRATORY: No cough, wheezing, or hemoptysis; No shortness of breath  CARDIOVASCULAR: No chest pain, palpitations, dizziness, or leg swelling  GASTROINTESTINAL: No abdominal or epigastric pain. No nausea, vomiting, or hematemesis; No diarrhea or constipation. No melena or hematochezia.  GENITOURINARY: No dysuria, frequency, hematuria, or incontinence  NEUROLOGICAL: No headaches, memory loss, loss of strength, numbness, or tremors  SKIN: No itching, burning, rashes, or lesions   LYMPH NODES: No enlarged glands  ENDOCRINE: No heat or cold intolerance; No hair loss  MUSCULOSKELETAL: No joint pain or swelling; No muscle, back, or extremity pain  PSYCHIATRIC: No depression, anxiety, mood swings, or difficulty sleeping  HEME/LYMPH: No easy bruising, or bleeding gums  ALLERGY AND IMMUNOLOGIC: No hives or eczema    OBJECTIVE:  Vents:       @ 07:01  -   @ 07:00  --------------------------------------------------------  IN: 990 mL / OUT: 400 mL / NET: 590 mL      CAPILLARY BLOOD GLUCOSE          Drips:    Lines:  See A/P    VITALS:  T(F): 97.6 (21 @ 04:00), Max: 98.2 (21 @ 22:00)  HR: 86 (21 @ 06:00) (78 - 90)  BP: 141/65 (21 @ 06:00) (113/56 - 141/65)  BP(mean): 94 (21 @ 06:00) (76 - 94)  ABP: --  ABP(mean): --  RR: 20 (21 @ 06:00) (16 - 22)  SpO2: 100% (21 @ 06:00) (97% - 100%)    PHYSICAL EXAM:  GENERAL: NAD, lying in bed comfortably  HEAD:  Atraumatic, Normocephalic  EYES: EOMI, PERRLA, conjunctiva and sclera clear  ENT: Moist mucous membranes  NECK: Supple, No JVD  CHEST/LUNG: Clear to auscultation bilaterally; No rales, rhonchi, wheezing, or rubs. Unlabored respirations  HEART: Regular rate and rhythm; No murmurs, rubs, or gallops  ABDOMEN: Bowel sounds present; Soft, Nontender, Nondistended. No hepatomegaly  EXTREMITIES:  2+ Peripheral Pulses, brisk capillary refill. No clubbing, cyanosis, or edema  NERVOUS SYSTEM:  Alert & Oriented X3, speech clear. No deficits   MSK: FROM all 4 extremities, full and equal strength  SKIN: No rashes or lesions    HOSPITAL MEDICATIONS:  Standing Meds:  allopurinol 200 milliGRAM(s) Oral daily  calcium gluconate IVPB 2 Gram(s) IV Intermittent once  chlorhexidine 4% Liquid 1 Application(s) Topical <User Schedule>  escitalopram 5 milliGRAM(s) Oral daily  finasteride 5 milliGRAM(s) Oral daily  folic acid 1 milliGRAM(s) Oral daily  furosemide    Tablet 40 milliGRAM(s) Oral daily  insulin lispro (ADMELOG) corrective regimen sliding scale   SubCutaneous three times a day before meals  insulin lispro (ADMELOG) corrective regimen sliding scale   SubCutaneous at bedtime  isosorbide   mononitrate ER Tablet (IMDUR) 60 milliGRAM(s) Oral daily  metoprolol succinate ER 50 milliGRAM(s) Oral daily  tamsulosin 0.4 milliGRAM(s) Oral at bedtime      PRN Meds:      LABS:  CBC 21 @ 00:41                        5.7    35.39 )-----------( 30                    18.8       Hgb trend: 5.7 <--   WBC trend: 35.39 <--     CMP 21 @ 23:20    136  |  102  |  55<H>  ----------------------------<  130<H>  4.5   |  16<L>  |  3.45<H>    Ca    9.7      21 @ 23:20  Phos  4.6       Mg     2.4             Serum Cr trend: 3.45 <--   PT/INR - ( 2021 03:19 )   PT: 16.2 sec;   INR: 1.37 ratio         PTT - ( 2021 03:19 )  PTT:16.6 sec  Urinalysis Basic - ( 2021 00:38 )    Color: Light Yellow / Appearance: Slightly Turbid / S.014 / pH: x  Gluc: x / Ketone: Negative  / Bili: Negative / Urobili: Negative   Blood: x / Protein: 30 mg/dL / Nitrite: Negative   Leuk Esterase: Negative / RBC: 1 /hpf / WBC 5 /HPF   Sq Epi: x / Non Sq Epi: 4 /hpf / Bacteria: Negative            MICROBIOLOGY:       RADIOLOGY:  [ ] Reviewed and interpreted by me    EKG:   Fannie Caban MD  Internal Medicine, PGY2  [phone]    [facility] Progress Note    Interval Events: Heme/onc spoke to patient who appears to be unaware of his medical problems or medications.     Meds administered:  metoprolol succinate ER: 50 milliGRAM(s) Oral ( @ 05:19)  furosemide    Tablet: 40 milliGRAM(s) Oral ( 05:19)  finasteride: 5 milliGRAM(s) Oral (:19)  escitalopram: 5 milliGRAM(s) Oral ( 05:19)  chlorhexidine 4% Liquid: 1 Application(s) Topical (:19)  rasburicase IVPB: 104 mL/Hr IV Intermittent ( 02:47)        REVIEW OF SYSTEMS:  [ ] Unable to assess ROS because ______  CONSTITUTIONAL: No fever, chills, night sweats, or fatigue  EYES: No eye pain, visual disturbances, or discharge  ENMT:  No difficulty hearing, tinnitus, vertigo; No sinus or throat pain  NECK: No pain or stiffness  BREASTS: No pain, masses, or nipple discharge  RESPIRATORY: No cough, wheezing, or hemoptysis; No shortness of breath  CARDIOVASCULAR: No chest pain, palpitations, dizziness, or leg swelling  GASTROINTESTINAL: No abdominal or epigastric pain. No nausea, vomiting, or hematemesis; No diarrhea or constipation. No melena or hematochezia.  GENITOURINARY: No dysuria, frequency, hematuria, or incontinence  NEUROLOGICAL: No headaches, memory loss, loss of strength, numbness, or tremors  SKIN: No itching, burning, rashes, or lesions   LYMPH NODES: No enlarged glands  ENDOCRINE: No heat or cold intolerance; No hair loss  MUSCULOSKELETAL: No joint pain or swelling; No muscle, back, or extremity pain  PSYCHIATRIC: No depression, anxiety, mood swings, or difficulty sleeping  HEME/LYMPH: No easy bruising, or bleeding gums  ALLERGY AND IMMUNOLOGIC: No hives or eczema    OBJECTIVE:  Vents:       @ 07:01  -   @ 07:00  --------------------------------------------------------  IN: 990 mL / OUT: 400 mL / NET: 590 mL      CAPILLARY BLOOD GLUCOSE          Drips:    Lines:  See A/P    VITALS:  T(F): 97.6 (21 @ 04:00), Max: 98.2 (21 @ 22:00)  HR: 86 (21 @ 06:00) (78 - 90)  BP: 141/65 (21 @ 06:00) (113/56 - 141/65)  BP(mean): 94 (21 @ 06:00) (76 - 94)  ABP: --  ABP(mean): --  RR: 20 (21 @ 06:00) (16 - 22)  SpO2: 100% (21 @ 06:00) (97% - 100%)    PHYSICAL EXAM:  GENERAL: pale male lying in bed comfortably  HEENT:  NCAT, supple neck  CHEST/LUNG: decreased breath sounds bilaterally, crackles R>L bases  HEART: Regular rate and rhythm; S1, S2 present. No murmurs, rubs, or gallops  ABDOMEN: Bowel sounds present; Soft, Nontender, Nondistended.   EXTREMITIES:  No pedal edema  NERVOUS SYSTEM:  Alert & Oriented X2 to place and person, speech clear. RUE drift  MSK: FROM all 4 extremities, full and equal strength    HOSPITAL MEDICATIONS:  Standing Meds:  allopurinol 200 milliGRAM(s) Oral daily  calcium gluconate IVPB 2 Gram(s) IV Intermittent once  chlorhexidine 4% Liquid 1 Application(s) Topical <User Schedule>  escitalopram 5 milliGRAM(s) Oral daily  finasteride 5 milliGRAM(s) Oral daily  folic acid 1 milliGRAM(s) Oral daily  furosemide    Tablet 40 milliGRAM(s) Oral daily  insulin lispro (ADMELOG) corrective regimen sliding scale   SubCutaneous three times a day before meals  insulin lispro (ADMELOG) corrective regimen sliding scale   SubCutaneous at bedtime  isosorbide   mononitrate ER Tablet (IMDUR) 60 milliGRAM(s) Oral daily  metoprolol succinate ER 50 milliGRAM(s) Oral daily  tamsulosin 0.4 milliGRAM(s) Oral at bedtime      PRN Meds:      LABS:  CBC 21 @ 00:41                        5.7    35.39 )-----------( 30                    18.8       Hgb trend: 5.7 <--   WBC trend: 35.39 <--     CMP 21 @ 23:20    136  |  102  |  55<H>  ----------------------------<  130<H>  4.5   |  16<L>  |  3.45<H>    Ca    9.7      21 @ 23:20  Phos  4.6       Mg     2.4             Serum Cr trend: 3.45 <--   PT/INR - ( 2021 03:19 )   PT: 16.2 sec;   INR: 1.37 ratio         PTT - ( 2021 03:19 )  PTT:16.6 sec  Urinalysis Basic - ( 2021 00:38 )    Color: Light Yellow / Appearance: Slightly Turbid / S.014 / pH: x  Gluc: x / Ketone: Negative  / Bili: Negative / Urobili: Negative   Blood: x / Protein: 30 mg/dL / Nitrite: Negative   Leuk Esterase: Negative / RBC: 1 /hpf / WBC 5 /HPF   Sq Epi: x / Non Sq Epi: 4 /hpf / Bacteria: Negative            MICROBIOLOGY:       RADIOLOGY:  [ ] Reviewed and interpreted by me    EKG:

## 2021-06-12 NOTE — CONSULT NOTE ADULT - ASSESSMENT
80M PMHx CKD, CAD s/p CABG 2012 & 4 stents (2 in 2014, 2 in 2015) with dilated EF of 27% BiV ICD (2013) initially admitted to [facility] cor acute hypoxic respiratory failure, anemia (Hgb 5.7), thrombocytopenia (plt 30) w/ blast cells (22%), lactic acidosis (LA 10) - transfer to [facility] for hematological evaluation for leukemia.  Nephrology consulted for BRISA on CKD.        # BRISA on CKD  Pt with non-oliguric BRISA on CKD unclear etiology possible pre renal in the setting of anemia, possible malignancy related? or ATN or cardiorenal syndrome.  On admission sCr elevated at 3.45 (last known sCr 1.1-1.3 in 2018, recent hx CKD unclear recent baseline).  Urinalysis showed protein with trace blood.  Lab noted for serum uric acid 13.3, , phos 4.7, Echo severe LV systolic fxn, CT diffuse patchy airspace disease ?multifocal pna?.  Last sCr 3.1    Recommendations:  - currently no absolute/urgent indication for dialysis, will continue monitor kidney function hopefully recovery  - cardiology consulted: elevated trop likely demand from anemia, no plan LHC nor DAPT/heparin given anemic, less likely ACS  - hematology consulted, s/p rasburicase, currently being workup for leukemia, currently on allopurinol   - agree with blood transfusion (can give lasix IV 40mg w/ transfusion to avoid fluid overload)  - please check urine electrolytes, urine protein/cr ratio, urine uric acid (assess uric acid nephropathy)  - please obtain official kidney/bladder ultrasound to assess chronicity and reversible etiology  - monitor BMP/tumor lysis markers (Uric acid/LDH/haptoglobin/LFT), strict I/O, avoid nephrotoxic agents (NSAIDs, PPI, contrast), renally dose medications per GFR.    # Hypercalcemia   possibly 2/2 iatrogenic, given IV calcium gluconate prior to labs, or TLS.  Last ionized calcium 1.32  - monitor ionized calcium daily for now, if persistent will do workup including immunofixation serum, SPEP, free light chain, vit D, PTH
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
## 2021-06-13 NOTE — PROGRESS NOTE ADULT - PROBLEM SELECTOR PLAN 4
Uric acid elevated at 13.4 on 6/11, now downtrended to 6.2  - S/p rasburicase 3 mg  - Continue allopurinol 200 daily  - Trend uric acid  - LDH elevated, but haptoglobin also elevated

## 2021-06-13 NOTE — PROGRESS NOTE ADULT - PROBLEM SELECTOR PLAN 7
DVT prophylaxis - Hold given thrombocytopenia  Diet: DASH/TLC/CCH/renal restriction/1L fluid restriction

## 2021-06-13 NOTE — PROGRESS NOTE ADULT - PROBLEM SELECTOR PLAN 3
Hx of CHFrEF (27%) with BiV ICD  - Lasix 40 daily  - Toprol 50 daily  - Imdur 60 daily  - Goal net negative  - Wean to room air as able  - Strict I&Os  - Cardiology following - symptoms likely secondary to anemia, no plan for LHC (hx of CAD as well) given anemia and thrombocytopenia at this time    #CAD s/p CABG (2012)  #Troponemia  Troponin 93 (admission) -> 103 -> 104  - No LHC per Cardiology, hold DAPT given anemia/thrombocytopenia

## 2021-06-13 NOTE — PROGRESS NOTE ADULT - PROBLEM SELECTOR PLAN 1
Concern for new presentation of leukemia with > 20% blasts, thrombocytopenia and anemia  - Trend CBC with diff q8hr  - Trend LDH, haptoglobin q8hr  - Pending bone marrow biopsy with Hematology on 6/14  - Hematology following. Peripheral flow sent    #Thrombocytopenia   - Transfuse if platelets < 10K, or < 20 if fever, and less than < 50 if bleeding  [ ] Transfuse 1 unit platelets today given epistaxis    #Anemia, normocytic  Elevated ferritin, low TIBC, and high percentage saturation consistent with anemia of chronic inflammation  - Trend Hgb/Hct  - Transfuse for Hgb > 8 given CAD history

## 2021-06-13 NOTE — PROVIDER CONTACT NOTE (CRITICAL VALUE NOTIFICATION) - BACKGROUND
Pt was transferred from [facility] for further hemodynamic tx after s/p fall at home. Currently transferred from MICU to . Received total of approx 3-4U PRBC since admission in [place]. Pt was transferred from [facility] for further hemodynamic tx after s/p fall at home. Currently transferred from MICU to . Received total of approx 3-4U PRBC since admission to [place] d/t low H/H.
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
## 2021-06-13 NOTE — PROGRESS NOTE ADULT - SUBJECTIVE AND OBJECTIVE BOX
PROGRESS NOTE:   Authored by Phani Lujan MD  Pager [phone] [facility] | [phone] [facility]    Patient is a 80y old  Male who presents with a chief complaint of anemia (2021 16:59)      SUBJECTIVE / OVERNIGHT EVENTS:    MEDICATIONS  (STANDING):  albuterol/ipratropium for Nebulization 3 milliLiter(s) Nebulizer every 6 hours  allopurinol 200 milliGRAM(s) Oral daily  escitalopram 5 milliGRAM(s) Oral daily  finasteride 5 milliGRAM(s) Oral daily  folic acid 1 milliGRAM(s) Oral daily  furosemide   Injectable 40 milliGRAM(s) IV Push daily  insulin lispro (ADMELOG) corrective regimen sliding scale   SubCutaneous three times a day before meals  insulin lispro (ADMELOG) corrective regimen sliding scale   SubCutaneous at bedtime  isosorbide   mononitrate ER Tablet (IMDUR) 60 milliGRAM(s) Oral daily  metoprolol succinate ER 50 milliGRAM(s) Oral daily  tamsulosin 0.4 milliGRAM(s) Oral at bedtime    MEDICATIONS  (PRN):      I&O's Summary    2021 07:01  -  2021 07:00  --------------------------------------------------------  IN: 1220 mL / OUT: 2900 mL / NET: -1680 mL        PHYSICAL EXAM:  Vital Signs Last 24 Hrs  T(C): 36.9 (2021 05:12), Max: 36.9 (2021 05:12)  T(F): 98.4 (2021 05:12), Max: 98.4 (2021 05:12)  HR: 87 (2021 05:12) (68 - 92)  BP: 139/62 (2021 05:12) (124/53 - 204/83)  BP(mean): 101 (2021 22:00) (82 - 121)  RR: 18 (2021 05:12) (18 - 27)  SpO2: 95% (2021 05:12) (95% - 100%)        LABS:                        8.5    28.32 )-----------( 19       ( 2021 00:53 )             26.2     06-13    133<L>  |  99  |  58<H>  ----------------------------<  125<H>  4.1   |  19<L>  |  3.62<H>    Ca    9.9      2021 00:53  Phos  4.5     -  Mg     2.1     -13    TPro  6.5  /  Alb  3.0<L>  /  TBili  1.1  /  DBili  x   /  AST  17  /  ALT  7<L>  /  AlkPhos  59  06-13    PT/INR - ( 2021 03:19 )   PT: 16.2 sec;   INR: 1.37 ratio         PTT - ( 2021 03:19 )  PTT:16.6 sec  CARDIAC MARKERS ( 2021 15:29 )  x     / x     / 32 U/L / x     / 2.3 ng/mL      Urinalysis Basic - ( 2021 00:38 )    Color: Light Yellow / Appearance: Slightly Turbid / S.014 / pH: x  Gluc: x / Ketone: Negative  / Bili: Negative / Urobili: Negative   Blood: x / Protein: 30 mg/dL / Nitrite: Negative   Leuk Esterase: Negative / RBC: 1 /hpf / WBC 5 /HPF   Sq Epi: x / Non Sq Epi: 4 /hpf / Bacteria: Negative        Culture - Blood (collected 2021 04:20)  Source: .Blood Blood-Venous  Gram Stain (2021 00:48):    Growth in aerobic bottle: Gram positive cocci in pairs  Preliminary Report (2021 00:48):    Growth in aerobic bottle: Gram positive cocci in pairs    ***Blood Panel PCR results on this specimen are available    approximately 3 hours after the Gram stain result.***    Gram stain, PCR, and/or culture results may not always    correspond due to difference in methodologies.    ************************************************************    This PCR assay was performed by multiplex PCR. This    Assay tests for 66 bacterial and resistance gene targets.    Please refer to the [facility] test directory    at [email or web address] for details.  Organism: Blood Culture PCR (2021 05:15)  Organism: Blood Culture PCR (2021 05:15)    Culture - Blood (collected 2021 04:20)  Source: .Blood Blood-Peripheral  Preliminary Report (2021 05:01):    No growth to date.       PROGRESS NOTE:   Authored by Phani Lujan MD  Pager [phone] [facility] | [phone] [facility]    Patient is a 80y old  Male who presents with a chief complaint of anemia (2021 16:59)      SUBJECTIVE / OVERNIGHT EVENTS:  Transferred from MICU overnight. Afebrile. On NC this AM but also with nose bleed. Producing clots and then with soak through of some tissues. Denies bloody or dark stools. Denies history of previous epistaxis.     Pressure held at bedside for 15 continuous minutes. Patient reports breathing well, and with SpO2 >95% on RA.      MEDICATIONS  (STANDING):  albuterol/ipratropium for Nebulization 3 milliLiter(s) Nebulizer every 6 hours  allopurinol 200 milliGRAM(s) Oral daily  escitalopram 5 milliGRAM(s) Oral daily  finasteride 5 milliGRAM(s) Oral daily  folic acid 1 milliGRAM(s) Oral daily  furosemide   Injectable 40 milliGRAM(s) IV Push daily  insulin lispro (ADMELOG) corrective regimen sliding scale   SubCutaneous three times a day before meals  insulin lispro (ADMELOG) corrective regimen sliding scale   SubCutaneous at bedtime  isosorbide   mononitrate ER Tablet (IMDUR) 60 milliGRAM(s) Oral daily  metoprolol succinate ER 50 milliGRAM(s) Oral daily  tamsulosin 0.4 milliGRAM(s) Oral at bedtime    MEDICATIONS  (PRN):      I&O's Summary    2021 07:01  -  2021 07:00  --------------------------------------------------------  IN: 1220 mL / OUT: 2900 mL / NET: -1680 mL        PHYSICAL EXAM:  Vital Signs Last 24 Hrs  T(C): 36.9 (2021 05:12), Max: 36.9 (2021 05:12)  T(F): 98.4 (2021 05:12), Max: 98.4 (2021 05:12)  HR: 87 (2021 05:12) (68 - 92)  BP: 139/62 (2021 05:12) (124/53 - 204/83)  BP(mean): 101 (2021 22:00) (82 - 121)  RR: 18 (2021 05:12) (18 - 27)  SpO2: 95% (2021 05:12) (95% - 100%)    GENERAL: Awake, alert  HEENT:  Normocephalic, left nare with clot and fresh blood  CHEST/LUNG: Mild crackles bilaterally. Symmetric chest rise  HEART: Regular rate and rhythm; S1, S2 present. No murmurs, rubs, or gallops  ABDOMEN: Bowel sounds present; Soft, Nontender, Nondistended.   EXTREMITIES:  No pedal edema  NERVOUS SYSTEM:  Speech clear. No gross deficits.  MSK: Moving all extremities      LABS:                        8.5    28.32 )-----------( 19       ( 2021 00:53 )             26.2     06-13    133<L>  |  99  |  58<H>  ----------------------------<  125<H>  4.1   |  19<L>  |  3.62<H>    Ca    9.9      2021 00:53  Phos  4.5     06-13  Mg     2.1     06-13    TPro  6.5  /  Alb  3.0<L>  /  TBili  1.1  /  DBili  x   /  AST  17  /  ALT  7<L>  /  AlkPhos  59  06-13    PT/INR - ( 2021 03:19 )   PT: 16.2 sec;   INR: 1.37 ratio         PTT - ( 2021 03:19 )  PTT:16.6 sec  CARDIAC MARKERS ( 2021 15:29 )  x     / x     / 32 U/L / x     / 2.3 ng/mL      Urinalysis Basic - ( 2021 00:38 )    Color: Light Yellow / Appearance: Slightly Turbid / S.014 / pH: x  Gluc: x / Ketone: Negative  / Bili: Negative / Urobili: Negative   Blood: x / Protein: 30 mg/dL / Nitrite: Negative   Leuk Esterase: Negative / RBC: 1 /hpf / WBC 5 /HPF   Sq Epi: x / Non Sq Epi: 4 /hpf / Bacteria: Negative        Culture - Blood (collected 2021 04:20)  Source: .Blood Blood-Venous  Gram Stain (2021 00:48):    Growth in aerobic bottle: Gram positive cocci in pairs  Preliminary Report (2021 00:48):    Growth in aerobic bottle: Gram positive cocci in pairs    ***Blood Panel PCR results on this specimen are available    approximately 3 hours after the Gram stain result.***    Gram stain, PCR, and/or culture results may not always    correspond due to difference in methodologies.    ************************************************************    This PCR assay was performed by multiplex PCR. This    Assay tests for 66 bacterial and resistance gene targets.    Please refer to the [facility] test directory    at [email or web address] for details.  Organism: Blood Culture PCR (2021 05:15)  Organism: Blood Culture PCR (2021 05:15)    Culture - Blood (collected 2021 04:20)  Source: .Blood Blood-Peripheral  Preliminary Report (2021 05:01):    No growth to date.

## 2021-06-13 NOTE — PROGRESS NOTE ADULT - PROBLEM SELECTOR PLAN 5
- Continue flomax, finesteride 6/12 A1c - 6.4 consistent with prediabetes  Not on any home diabetes medications  - Monitor BG

## 2021-06-13 NOTE — PROGRESS NOTE ADULT - PROBLEM SELECTOR PLAN 2
Unknown baseline CKD  - Trend Cr - Uptrending (6/13) 3.62 <- 3.24 <- 3.45  - Renally dose medications  - Monitor UOP  [ ] US of kidney/bladder  - Nephrology following baseline was 1.2 in 2018  - Trend Cr - Uptrending (6/13) 3.62 <- 3.24 <- 3.45  - Renally dose medications  - Monitor UOP  [ ] US of kidney/bladder  - Nephrology following

## 2021-06-13 NOTE — PROGRESS NOTE ADULT - ATTENDING COMMENTS
Agree with above note by R1 Dr. Lujan incl findings and plan, edited where appropriate  f/u heme re chemo plan given blasts  renal for BRISA on seemingly normalish, maybe slightly decr baseline from a few years ago  concern for TLS - stable  got PLT for epistaxis this AM - epistaxis resolved, low threshold for ENT, pressure applied to anterior nares by Dr Lujan  Attending Physician Dr. Jermain Jefferson [phone] Agree with above note by Dr. Lujan incl findings and plan, edited where appropriate  f/u heme re chemo plan given blasts  renal for BRISA on seemingly normalish, maybe slightly decr baseline from a few years ago  concern for TLS - stable  got PLT for epistaxis this AM - epistaxis resolved, low threshold for ENT, pressure applied to anterior nares by Dr Lujan  Attending Physician Dr. Jermain Jefferson [phone]

## 2021-06-13 NOTE — PROVIDER CONTACT NOTE (CRITICAL VALUE NOTIFICATION) - BACKGROUND
Pt was transferred from [facility] for further hemodynamic tx after s/p fall at home. Currently transferred from MICU to . Received total of approx 3-4U PRBC since admission to [place] d/t low H/H.
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
## 2021-06-13 NOTE — PROGRESS NOTE ADULT - PROBLEM SELECTOR PLAN 6
DVT prophylaxis - Hold given thrombocytopenia  Diet: DASH/TLC/CCH/renal restriction/1L fluid restriction - Continue flomax, finesteride

## 2021-06-14 NOTE — PROCEDURE NOTE - ADDITIONAL PROCEDURE DETAILS
Please lie supine for 30 min post procedure. Do not get gauze wet for 24 hours. Tylenol ok for pain control.

## 2021-06-14 NOTE — PROGRESS NOTE ADULT - SUBJECTIVE AND OBJECTIVE BOX
PROGRESS NOTE:   Authored by Phani Lujan MD  Pager [phone] [facility] | [phone] [facility]    Patient is a 80y old  Male who presents with a chief complaint of anemia (13 Jun 2021 12:59)      SUBJECTIVE / OVERNIGHT EVENTS:    MEDICATIONS  (STANDING):  albuterol/ipratropium for Nebulization 3 milliLiter(s) Nebulizer every 6 hours  allopurinol 200 milliGRAM(s) Oral daily  escitalopram 5 milliGRAM(s) Oral daily  finasteride 5 milliGRAM(s) Oral daily  folic acid 1 milliGRAM(s) Oral daily  furosemide   Injectable 40 milliGRAM(s) IV Push daily  insulin lispro (ADMELOG) corrective regimen sliding scale   SubCutaneous three times a day before meals  insulin lispro (ADMELOG) corrective regimen sliding scale   SubCutaneous at bedtime  isosorbide   mononitrate ER Tablet (IMDUR) 60 milliGRAM(s) Oral daily  metoprolol succinate ER 50 milliGRAM(s) Oral daily  oxymetazoline 0.05% Nasal Spray 1 Spray(s) Both Nostrils two times a day  sodium chloride 0.65% Nasal 1 Spray(s) Both Nostrils four times a day  tamsulosin 0.4 milliGRAM(s) Oral at bedtime    MEDICATIONS  (PRN):      I&O's Summary    12 Jun 2021 07:01  -  13 Jun 2021 07:00  --------------------------------------------------------  IN: 1220 mL / OUT: 2900 mL / NET: -1680 mL    13 Jun 2021 07:01  -  14 Jun 2021 01:10  --------------------------------------------------------  IN: 980 mL / OUT: 1800 mL / NET: -820 mL        PHYSICAL EXAM:  Vital Signs Last 24 Hrs  T(C): 36.8 (13 Jun 2021 21:47), Max: 37.1 (13 Jun 2021 17:26)  T(F): 98.3 (13 Jun 2021 21:47), Max: 98.7 (13 Jun 2021 17:26)  HR: 94 (13 Jun 2021 21:47) (76 - 94)  BP: 129/61 (13 Jun 2021 21:47) (124/53 - 139/62)  BP(mean): --  RR: 18 (13 Jun 2021 21:47) (17 - 18)  SpO2: 96% (13 Jun 2021 21:47) (94% - 100%)        LABS:                        7.7    24.51 )-----------( 47       ( 14 Jun 2021 00:23 )             23.8     06-14    131<L>  |  97  |  54<H>  ----------------------------<  141<H>  3.7   |  19<L>  |  3.39<H>    Ca    9.6      14 Jun 2021 00:23  Phos  3.5     06-14  Mg     2.0     06-14    TPro  6.6  /  Alb  2.9<L>  /  TBili  1.0  /  DBili  x   /  AST  16  /  ALT  9<L>  /  AlkPhos  58  06-14    PT/INR - ( 13 Jun 2021 09:22 )   PT: 15.2 sec;   INR: 1.28 ratio         PTT - ( 13 Jun 2021 09:22 )  PTT:18.3 sec  CARDIAC MARKERS ( 12 Jun 2021 15:29 )  x     / x     / 32 U/L / x     / 2.3 ng/mL          Culture - Blood (collected 12 Jun 2021 04:20)  Source: .Blood Blood-Venous  Gram Stain (13 Jun 2021 00:48):    Growth in aerobic bottle: Gram positive cocci in pairs  Final Report (13 Jun 2021 22:22):    Growth in aerobic bottle: Staphylococcus capitis    Coag Negative Staphylococcus    Single set isolate, possible contaminant. Contact    Microbiology if susceptibility testing clinically    indicated.    ***Blood Panel PCR results on this specimen are available    approximately 3 hours after the Gram stain result.***    Gram stain, PCR, and/or culture results may not always    correspond due to difference in methodologies.    ************************************************************    This PCR assay was performed by multiplex PCR. This    Assay tests for 66 bacterial and resistance gene targets.    Please refer to the [facility] test directory    at [email or web address] for details.  Organism: Blood Culture PCR (13 Jun 2021 22:22)  Organism: Blood Culture PCR (13 Jun 2021 22:22)    Culture - Blood (collected 12 Jun 2021 04:20)  Source: .Blood Blood-Peripheral  Gram Stain (13 Jun 2021 16:19):    Growth in anaerobic bottle: Gram positive cocci in pairs  Preliminary Report (13 Jun 2021 16:20):    Growth in anaerobic bottle: Gram positive cocci in pairs       PROGRESS NOTE:   Authored by Phani Lujan MD  Pager [phone] [facility] | [phone] [facility]    Patient is a 80y old  Male who presents with a chief complaint of anemia (13 Jun 2021 12:59)      SUBJECTIVE / OVERNIGHT EVENTS:  Afebrile. NAEON. On room air. No epistaxis/bloody BMs. Feeling well. Pending BM biopsy today.    MEDICATIONS  (STANDING):  albuterol/ipratropium for Nebulization 3 milliLiter(s) Nebulizer every 6 hours  allopurinol 200 milliGRAM(s) Oral daily  escitalopram 5 milliGRAM(s) Oral daily  finasteride 5 milliGRAM(s) Oral daily  folic acid 1 milliGRAM(s) Oral daily  furosemide   Injectable 40 milliGRAM(s) IV Push daily  insulin lispro (ADMELOG) corrective regimen sliding scale   SubCutaneous three times a day before meals  insulin lispro (ADMELOG) corrective regimen sliding scale   SubCutaneous at bedtime  isosorbide   mononitrate ER Tablet (IMDUR) 60 milliGRAM(s) Oral daily  metoprolol succinate ER 50 milliGRAM(s) Oral daily  oxymetazoline 0.05% Nasal Spray 1 Spray(s) Both Nostrils two times a day  sodium chloride 0.65% Nasal 1 Spray(s) Both Nostrils four times a day  tamsulosin 0.4 milliGRAM(s) Oral at bedtime    MEDICATIONS  (PRN):      I&O's Summary    12 Jun 2021 07:01  -  13 Jun 2021 07:00  --------------------------------------------------------  IN: 1220 mL / OUT: 2900 mL / NET: -1680 mL    13 Jun 2021 07:01  -  14 Jun 2021 01:10  --------------------------------------------------------  IN: 980 mL / OUT: 1800 mL / NET: -820 mL        PHYSICAL EXAM:  Vital Signs Last 24 Hrs  T(C): 36.8 (13 Jun 2021 21:47), Max: 37.1 (13 Jun 2021 17:26)  T(F): 98.3 (13 Jun 2021 21:47), Max: 98.7 (13 Jun 2021 17:26)  HR: 94 (13 Jun 2021 21:47) (76 - 94)  BP: 129/61 (13 Jun 2021 21:47) (124/53 - 139/62)  BP(mean): --  RR: 18 (13 Jun 2021 21:47) (17 - 18)  SpO2: 96% (13 Jun 2021 21:47) (94% - 100%)    GENERAL: Awake, alert  HEENT:  Normocephalic  CHEST/LUNG: Mild crackles bilaterally. Symmetric chest rise  HEART: Regular rate and rhythm; S1, S2 present. No murmurs, rubs, or gallops  ABDOMEN: Bowel sounds present; Soft, Nontender, Nondistended.   EXTREMITIES:  No pedal edema  NERVOUS SYSTEM:  Speech clear. No gross deficits.  MSK: Moving all extremities      LABS:                        7.7    24.51 )-----------( 47       ( 14 Jun 2021 00:23 )             23.8     06-14    131<L>  |  97  |  54<H>  ----------------------------<  141<H>  3.7   |  19<L>  |  3.39<H>    Ca    9.6      14 Jun 2021 00:23  Phos  3.5     06-14  Mg     2.0     06-14    TPro  6.6  /  Alb  2.9<L>  /  TBili  1.0  /  DBili  x   /  AST  16  /  ALT  9<L>  /  AlkPhos  58  06-14    PT/INR - ( 13 Jun 2021 09:22 )   PT: 15.2 sec;   INR: 1.28 ratio         PTT - ( 13 Jun 2021 09:22 )  PTT:18.3 sec  CARDIAC MARKERS ( 12 Jun 2021 15:29 )  x     / x     / 32 U/L / x     / 2.3 ng/mL          Culture - Blood (collected 12 Jun 2021 04:20)  Source: .Blood Blood-Venous  Gram Stain (13 Jun 2021 00:48):    Growth in aerobic bottle: Gram positive cocci in pairs  Final Report (13 Jun 2021 22:22):    Growth in aerobic bottle: Staphylococcus capitis    Coag Negative Staphylococcus    Single set isolate, possible contaminant. Contact    Microbiology if susceptibility testing clinically    indicated.    ***Blood Panel PCR results on this specimen are available    approximately 3 hours after the Gram stain result.***    Gram stain, PCR, and/or culture results may not always    correspond due to difference in methodologies.    ************************************************************    This PCR assay was performed by multiplex PCR. This    Assay tests for 66 bacterial and resistance gene targets.    Please refer to the [facility] Labs test directory    at [email or web address] for details.  Organism: Blood Culture PCR (13 Jun 2021 22:22)  Organism: Blood Culture PCR (13 Jun 2021 22:22)    Culture - Blood (collected 12 Jun 2021 04:20)  Source: .Blood Blood-Peripheral  Gram Stain (13 Jun 2021 16:19):    Growth in anaerobic bottle: Gram positive cocci in pairs  Preliminary Report (13 Jun 2021 16:20):    Growth in anaerobic bottle: Gram positive cocci in pairs
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
## 2021-06-14 NOTE — PROGRESS NOTE ADULT - ATTENDING COMMENTS
This is a 80 year old male who presented to [facility] for SOB, admitted for acute hypoxic respiratory failure from ADHF, found to be anemia/thrombocytopenic so tx to [facility] for oncology evaluation. Blasts present in cbc, suspected leukemia. s/p BM biopsy today.   Oncology recommendations appreciated.   bcx w/ staph capitis - unclear if contaminant. empiric abx coverage for now, f/u repeat bcx   BRISA? - monitor BMP, cr stable. Nephrology recommendations appreciated. pending renal US   acute on chronic systolic HF - continue w/ lasix, can switch to oral   Rest as above.

## 2021-06-14 NOTE — PROGRESS NOTE ADULT - PROBLEM SELECTOR PLAN 1
Concern for new presentation of leukemia with > 20% blasts, thrombocytopenia and anemia  - Trend CBC with diff q8hr  - Trend LDH, haptoglobin q8hr  - Pending bone marrow biopsy with Hematology on 6/14  - Hematology following. Peripheral flow sent    #Thrombocytopenia   - Transfuse if platelets < 10K, or < 20 if fever, and less than < 50 if bleeding  [ ] Transfuse 1 unit platelets today given epistaxis    #Anemia, normocytic  Elevated ferritin, low TIBC, and high percentage saturation consistent with anemia of chronic inflammation  - Trend Hgb/Hct  - Transfuse for Hgb > 8 given CAD history Concern for new presentation of leukemia with > 20% blasts, thrombocytopenia and anemia  - Trend CBC with diff q12hr  - Trend LDH, haptoglobin q12hr  - Pending bone marrow biopsy with Hematology on 6/14  - Hematology following. 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

## 2021-06-14 NOTE — PROGRESS NOTE ADULT - SUBJECTIVE AND OBJECTIVE BOX
INTERVAL HPI/OVERNIGHT EVENTS:  Patient is awake, alert- no complaints.   Breathing is much improved.     VITAL SIGNS:  T(F): 97.4 (06-14-21 @ 13:14)  HR: 78 (06-14-21 @ 13:14)  BP: 110/68 (06-14-21 @ 13:14)  RR: 18 (06-14-21 @ 13:14)  SpO2: 98% (06-14-21 @ 13:14)  Wt(kg): --    PHYSICAL EXAM:    Constitutional: NAD  Eyes: EOMI  Neck: supple  Respiratory: grossly clear  Cardiovascular: RRR  Gastrointestinal: soft, NTND  Extremities: no c/c/e  Neurological: AAOx3      MEDICATIONS  (STANDING):  allopurinol 200 milliGRAM(s) Oral daily  escitalopram 5 milliGRAM(s) Oral daily  finasteride 5 milliGRAM(s) Oral daily  folic acid 1 milliGRAM(s) Oral daily  insulin lispro (ADMELOG) corrective regimen sliding scale   SubCutaneous three times a day before meals  insulin lispro (ADMELOG) corrective regimen sliding scale   SubCutaneous at bedtime  isosorbide   mononitrate ER Tablet (IMDUR) 60 milliGRAM(s) Oral daily  metoprolol succinate ER 50 milliGRAM(s) Oral daily  oxymetazoline 0.05% Nasal Spray 1 Spray(s) Both Nostrils two times a day  sodium chloride 0.65% Nasal 1 Spray(s) Both Nostrils four times a day  tamsulosin 0.4 milliGRAM(s) Oral at bedtime    MEDICATIONS  (PRN):  albuterol/ipratropium for Nebulization 3 milliLiter(s) Nebulizer every 6 hours PRN Shortness of Breath and/or Wheezing      Allergies    morphine (Unknown)    Intolerances        LABS:                        7.7    22.97 )-----------( 43       ( 14 Jun 2021 08:37 )             24.3     06-14    132<L>  |  99  |  48<H>  ----------------------------<  132<H>  3.5   |  19<L>  |  3.27<H>    Ca    9.6      14 Jun 2021 08:37  Phos  3.7     06-14  Mg     2.0     06-14    TPro  6.6  /  Alb  3.2<L>  /  TBili  1.3<H>  /  DBili  x   /  AST  13  /  ALT  8<L>  /  AlkPhos  57  06-14    PT/INR - ( 13 Jun 2021 09:22 )   PT: 15.2 sec;   INR: 1.28 ratio         PTT - ( 13 Jun 2021 09:22 )  PTT:18.3 sec      RADIOLOGY & ADDITIONAL TESTS:  Studies reviewed.

## 2021-06-14 NOTE — PROGRESS NOTE ADULT - ASSESSMENT
80M PMHx CKD, CAD s/p CABG 2012 & 4 stents (2 in 2014, 2 in 2015) with dilated EF of 27% BiV ICD (2013) initially admitted to [facility] cor acute hypoxic respiratory failure, anemia (Hgb 5.7), thrombocytopenia (plt 30) w/ blast cells (22%), lactic acidosis (LA 10) - transfer to [facility] for hematological evaluation for leukemia.  Nephrology consulted for BRISA on CKD.        # BRISA on CKD  Pt with non-oliguric BRISA on CKD unclear etiology possible pre renal in the setting of anemia, possible malignancy related or ATN or cardiorenal syndrome.  On admission sCr elevated at 3.45 (last known sCr 1.1-1.3 in 2018, recent hx CKD unclear recent baseline).  Urinalysis showed protein with trace blood.  Lab noted for serum uric acid 13.3, , phos 4.7, Echo severe LV systolic fxn, CT diffuse patchy airspace disease ?multifocal pna?. Urine electrolytes with Fe Urea of 82.5% suggestive of intrinsic pathology. Urine uric acid was 10.5. Spot urine TP/CR ratio was 0.25 wnl. Scr plateaued at 3.62 mg/dl on 6/13/21. Last sCr elevated/stable at 3.27 mg/dl today.     Recommendations:  - Obtain official kidney/bladder ultrasound to assess chronicity  - monitor BMP/tumor lysis markers (Uric acid/LDH/haptoglobin/LFT), strict I/O, avoid nephrotoxic agents (NSAIDs, PPI, contrast), renally dose medications per GFR.    # Hypercalcemia   possibly 2/2 iatrogenic, given IV calcium gluconate prior to labs. Last corrected calcium 9.8 mg/dl   - monitor ionized calcium daily    If any questions, please feel free to contact me     Inge Varner  Nephrology Fellow  [facility] Pager: [phone]  [facility] Pager: [phone]

## 2021-06-14 NOTE — PROGRESS NOTE ADULT - PROBLEM SELECTOR PLAN 2
baseline was 1.2 in 2018  - Trend Cr - Uptrending (6/13) 3.62 <- 3.24 <- 3.45  - Renally dose medications  - Monitor UOP  [ ] US of kidney/bladder  - Nephrology following baseline was 1.2 in 2018  - Trend Cr - Now 3.39, Peaked (6/13) 3.62 <- 3.24 <- 3.45  - Renally dose medications  - Monitor UOP  [ ] US of kidney/bladder  - Nephrology following

## 2021-06-14 NOTE — PROGRESS NOTE ADULT - ATTENDING COMMENTS
renal failure, hypercalcemia  1.  ARF on CKD --plateau, non oliguric, no HD.  W/U as outlined.    2.  Hypercalcemia--check ionized    discussed with med team
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
## 2021-06-14 NOTE — PROGRESS NOTE ADULT - SUBJECTIVE AND OBJECTIVE BOX
[facility] DIVISION OF KIDNEY DISEASES AND HYPERTENSION -- FOLLOW UP NOTE  --------------------------------------------------------------------------------    24 hour events/subjective: Patient was seen and examined at bedside. Reported feeling well. Denies CP, SOB, fever, chills, nausea, vomiting, diarrhea, LE edema or dysuria.    PAST HISTORY  --------------------------------------------------------------------------------  No significant changes to PMH, PSH, FHx, SHx, unless otherwise noted    ALLERGIES & MEDICATIONS  --------------------------------------------------------------------------------  Allergies    morphine (Unknown)    Intolerances    Standing Inpatient Medications  albuterol/ipratropium for Nebulization 3 milliLiter(s) Nebulizer every 6 hours  allopurinol 200 milliGRAM(s) Oral daily  escitalopram 5 milliGRAM(s) Oral daily  finasteride 5 milliGRAM(s) Oral daily  folic acid 1 milliGRAM(s) Oral daily  furosemide   Injectable 40 milliGRAM(s) IV Push daily  insulin lispro (ADMELOG) corrective regimen sliding scale   SubCutaneous three times a day before meals  insulin lispro (ADMELOG) corrective regimen sliding scale   SubCutaneous at bedtime  isosorbide   mononitrate ER Tablet (IMDUR) 60 milliGRAM(s) Oral daily  metoprolol succinate ER 50 milliGRAM(s) Oral daily  oxymetazoline 0.05% Nasal Spray 1 Spray(s) Both Nostrils two times a day  sodium chloride 0.65% Nasal 1 Spray(s) Both Nostrils four times a day  tamsulosin 0.4 milliGRAM(s) Oral at bedtime    PRN Inpatient Medications    REVIEW OF SYSTEMS  --------------------------------------------------------------------------------  Gen: No fevers/chills  Respiratory: No dyspnea, cough  CV: No chest pain  GI: No abdominal pain, diarrhea  : No dysuria, hematuria  MSK: No  edema    All other systems were reviewed and are negative, except as noted.    VITALS/PHYSICAL EXAM  --------------------------------------------------------------------------------  T(C): 36.6 (06-14-21 @ 09:05), Max: 37.4 (06-14-21 @ 01:29)  HR: 89 (06-14-21 @ 09:05) (76 - 100)  BP: 115/60 (06-14-21 @ 09:05) (115/60 - 147/61)  RR: 18 (06-14-21 @ 09:05) (18 - 18)  SpO2: 94% (06-14-21 @ 09:05) (94% - 97%)  Wt(kg): --    06-13-21 @ 07:01  -  06-14-21 @ 07:00  --------------------------------------------------------  IN: 980 mL / OUT: 2300 mL / NET: -1320 mL    06-14-21 @ 07:01  -  06-14-21 @ 10:01  --------------------------------------------------------  IN: 160 mL / OUT: 500 mL / NET: -340 mL    Physical Exam:  	Gen: NAD  	HEENT: MMM  	Pulm: CTA B/L, no crackles   	CV: S1S2  	Abd: Soft, +BS   	Ext: No LE edema B/L  	Neuro: Awake  	Skin: Warm and dry    LABS/STUDIES  --------------------------------------------------------------------------------              7.7    22.97 >-----------<  43       [06-14-21 @ 08:37]              24.3     132  |  99  |  48  ----------------------------<  132      [06-14-21 @ 08:37]  3.5   |  19  |  3.27        Ca     9.6     [06-14-21 @ 08:37]      Mg     2.0     [06-14-21 @ 08:37]      Phos  3.7     [06-14-21 @ 08:37]    TPro  6.6  /  Alb  3.2  /  TBili  1.3  /  DBili  x   /  AST  13  /  ALT  8   /  AlkPhos  57  [06-14-21 @ 08:37]    PT/INR: PT 15.2 , INR 1.28       [06-13-21 @ 09:22]  PTT: 18.3       [06-13-21 @ 09:22]    Uric acid 6.3      [06-14-21 @ 08:37]  CK 32      [06-12-21 @ 15:29]        [06-14-21 @ 08:37]    Creatinine Trend:  SCr 3.27 [06-14 @ 08:37]  SCr 3.39 [06-14 @ 00:23]  SCr 3.32 [06-13 @ 09:22]  SCr 3.62 [06-13 @ 00:53]  SCr 3.24 [06-12 @ 19:16]    Urinalysis - [06-12-21 @ 00:38]      Color Light Yellow / Appearance Slightly Turbid / SG 1.014 / pH 6.0      Gluc Negative / Ketone Negative  / Bili Negative / Urobili Negative       Blood Trace / Protein 30 mg/dL / Leuk Est Negative / Nitrite Negative      RBC 1 / WBC 5 / Hyaline 7 / Gran 0-2 / Sq Epi  / Non Sq Epi 4 / Bacteria Negative    Urine Creatinine 31      [06-12-21 @ 22:54]  Urine Protein 8      [06-12-21 @ 22:54]  Urine Sodium 95      [06-12-21 @ 22:54]  Urine Urea Nitrogen 341      [06-13-21 @ 01:34]    Iron 155, TIBC 237, %sat 65      [06-12-21 @ 03:36]  Ferritin 827      [06-12-21 @ 04:44]  HbA1c 5.8      [11-13-17 @ 10:29]    HBsAg Nonreact      [06-13-21 @ 13:38]  HBcAb Nonreact      [06-13-21 @ 13:38]  HCV 0.17, Nonreact      [06-13-21 @ 13:38]  HIV Nonreact      [06-13-21 @ 13:52]

## 2021-06-14 NOTE — PROGRESS NOTE ADULT - PROBLEM SELECTOR PLAN 3
Hx of CHFrEF (27%) with BiV ICD  - Lasix 40 daily  - Toprol 50 daily  - Imdur 60 daily  - Goal net negative  - Wean to room air as able  - Strict I&Os  - Cardiology following - symptoms likely secondary to anemia, no plan for LHC (hx of CAD as well) given anemia and thrombocytopenia at this time    #CAD s/p CABG (2012)  #Troponemia  Troponin 93 (admission) -> 103 -> 104  - No LHC per Cardiology, hold DAPT given anemia/thrombocytopenia Hx of CHFrEF (27%) with BiV ICD  - Lasix 40 daily, switch to PO on 6/15  - Toprol 50 daily  - Imdur 60 daily  - Goal net negative  - Wean to room air as able  - Strict I&Os  - Cardiology following - symptoms likely secondary to anemia, no plan for LHC (hx of CAD as well) given anemia and thrombocytopenia at this time    #CAD s/p CABG (2012)  #Troponemia  Troponin 93 (admission) -> 103 -> 104  - No LHC per Cardiology, hold DAPT given anemia/thrombocytopenia

## 2021-06-15 NOTE — PROGRESS NOTE ADULT - PROBLEM SELECTOR PLAN 3
Nephrology following-No indication for HD at this time.   FU US kidney/bladder.   monitor for TLS. Nephrology following-No indication for HD at this time.   (-) US kidney/bladder.   monitor for TLS.

## 2021-06-15 NOTE — PROGRESS NOTE ADULT - SUBJECTIVE AND OBJECTIVE BOX
Diagnosis: Acute Leukemia    Protocol/Chemo Regimen: TBD    Day: na    Pt endorsed:    Review of Systems:     Pain scale:     Diet:     Allergies    morphine (Unknown)    Intolerances        ANTIMICROBIALS      HEME/ONC MEDICATIONS      STANDING MEDICATIONS  allopurinol 200 milliGRAM(s) Oral daily  escitalopram 5 milliGRAM(s) Oral daily  finasteride 5 milliGRAM(s) Oral daily  folic acid 1 milliGRAM(s) Oral daily  furosemide    Tablet 40 milliGRAM(s) Oral daily  insulin lispro (ADMELOG) corrective regimen sliding scale   SubCutaneous three times a day before meals  insulin lispro (ADMELOG) corrective regimen sliding scale   SubCutaneous at bedtime  isosorbide   mononitrate ER Tablet (IMDUR) 60 milliGRAM(s) Oral daily  metoprolol succinate ER 50 milliGRAM(s) Oral daily  oxymetazoline 0.05% Nasal Spray 1 Spray(s) Both Nostrils two times a day  sodium chloride 0.65% Nasal 1 Spray(s) Both Nostrils four times a day  tamsulosin 0.4 milliGRAM(s) Oral at bedtime      PRN MEDICATIONS  albuterol/ipratropium for Nebulization 3 milliLiter(s) Nebulizer every 6 hours PRN        Vital Signs Last 24 Hrs  T(C): 36.7 (15 Mike 2021 05:34), Max: 37.1 (15 Mike 2021 00:00)  T(F): 98.1 (15 Mike 2021 05:34), Max: 98.8 (15 Mike 2021 00:00)  HR: 90 (15 Mike 2021 05:34) (78 - 90)  BP: 124/60 (15 Mike 2021 05:34) (110/68 - 131/68)  BP(mean): --  RR: 18 (15 Mike 2021 05:34) (18 - 18)  SpO2: 94% (15 Mike 2021 05:34) (94% - 98%)    PHYSICAL EXAM  General: NAD  HEENT: clear oropharynx, anicteric sclera, pink conjunctiva  Neck: supple  CV: (+) S1/S2 RRR  Lungs: positive air movement b/l ant lungs, clear to auscultation, no wheezes, no rales  Abdomen: soft, non-tender, non-distended  Ext: no clubbing cyanosis or edema  Skin: no rashes and no petechiae  Neuro: alert and oriented X 3, no focal deficits  Central Line:     RECENT CULTURES:  06-13 @ 06:44  .Blood Blood-Peripheral  --  --  --    No growth to date.  --  06-12 @ 04:20  .Blood Blood-Peripheral  Blood Culture PCR  Blood Culture PCR  PCR    Growth in anaerobic bottle: Staphylococcus capitis Susceptibility to  follow.  --        LABS:                        7.7    14.61 )-----------( 30       ( 15 Mike 2021 06:42 )             24.5         Mean Cell Volume : 92.8 fl  Mean Cell Hemoglobin : 29.2 pg  Mean Cell Hemoglobin Concentration : 31.4 gm/dL  Auto Neutrophil # : 5.04 K/uL  Auto Lymphocyte # : 3.49 K/uL  Auto Monocyte # : 3.23 K/uL  Auto Eosinophil # : 0.00 K/uL  Auto Basophil # : 0.00 K/uL  Auto Neutrophil % : 34.5 %  Auto Lymphocyte % : 23.9 %  Auto Monocyte % : 22.1 %  Auto Eosinophil % : 0.0 %  Auto Basophil % : 0.0 %      06-15    130<L>  |  98  |  45<H>  ----------------------------<  141<H>  3.9   |  18<L>  |  3.00<H>    Ca    9.6      15 Mike 2021 06:42  Phos  3.2     06-15  Mg     2.0     06-15    TPro  6.3  /  Alb  2.8<L>  /  TBili  1.1  /  DBili  x   /  AST  10  /  ALT  6<L>  /  AlkPhos  54  06-15      Mg 2.0  Phos 3.2  Mg 2.0  Phos 3.7  Mg 2.0  Phos 3.7      PT/INR - ( 13 Jun 2021 09:22 )   PT: 15.2 sec;   INR: 1.28 ratio         PTT - ( 13 Jun 2021 09:22 )  PTT:18.3 sec      Uric Acid --      Uric Acid --      Uric Acid 6.3        RADIOLOGY & ADDITIONAL STUDIES:         Diagnosis: Acute Leukemia    Protocol/Chemo Regimen: TBD    Day: na    Pt endorsed: mild headache    Review of Systems: Denies nausea, vomiting, diarrhea, chest pain, sob    Pain scale: denies    Diet: DASH/consistent carbs    Allergies    morphine (Unknown)    Intolerances        ANTIMICROBIALS      HEME/ONC MEDICATIONS      STANDING MEDICATIONS  allopurinol 200 milliGRAM(s) Oral daily  escitalopram 5 milliGRAM(s) Oral daily  finasteride 5 milliGRAM(s) Oral daily  folic acid 1 milliGRAM(s) Oral daily  furosemide    Tablet 40 milliGRAM(s) Oral daily  insulin lispro (ADMELOG) corrective regimen sliding scale   SubCutaneous three times a day before meals  insulin lispro (ADMELOG) corrective regimen sliding scale   SubCutaneous at bedtime  isosorbide   mononitrate ER Tablet (IMDUR) 60 milliGRAM(s) Oral daily  metoprolol succinate ER 50 milliGRAM(s) Oral daily  oxymetazoline 0.05% Nasal Spray 1 Spray(s) Both Nostrils two times a day  sodium chloride 0.65% Nasal 1 Spray(s) Both Nostrils four times a day  tamsulosin 0.4 milliGRAM(s) Oral at bedtime      PRN MEDICATIONS  albuterol/ipratropium for Nebulization 3 milliLiter(s) Nebulizer every 6 hours PRN        Vital Signs Last 24 Hrs  T(C): 36.7 (15 Mike 2021 05:34), Max: 37.1 (15 Mike 2021 00:00)  T(F): 98.1 (15 Mike 2021 05:34), Max: 98.8 (15 Mike 2021 00:00)  HR: 90 (15 Mike 2021 05:34) (78 - 90)  BP: 124/60 (15 Mike 2021 05:34) (110/68 - 131/68)  BP(mean): --  RR: 18 (15 Mike 2021 05:34) (18 - 18)  SpO2: 94% (15 Mike 2021 05:34) (94% - 98%)    PHYSICAL EXAM  General: NAD  HEENT: clear oropharynx, anicteric sclera, pink conjunctiva  Neck: supple  CV: (+) S1/S2 RRR  Lungs: positive air movement b/l ant lungs, clear to auscultation, no wheezes, no rales  Abdomen: soft, non-tender, non-distended  Ext: no clubbing cyanosis or edema  Skin: no rashes and no petechiae  Neuro: alert and oriented X 3, no focal deficits  Central Line:     RECENT CULTURES:  06-13 @ 06:44  .Blood Blood-Peripheral  --  --  --    No growth to date.  --  06-12 @ 04:20  .Blood Blood-Peripheral  Blood Culture PCR  Blood Culture PCR  PCR    Growth in anaerobic bottle: Staphylococcus capitis Susceptibility to  follow.  --        LABS:                        7.7    14.61 )-----------( 30       ( 15 Mike 2021 06:42 )             24.5         Mean Cell Volume : 92.8 fl  Mean Cell Hemoglobin : 29.2 pg  Mean Cell Hemoglobin Concentration : 31.4 gm/dL  Auto Neutrophil # : 5.04 K/uL  Auto Lymphocyte # : 3.49 K/uL  Auto Monocyte # : 3.23 K/uL  Auto Eosinophil # : 0.00 K/uL  Auto Basophil # : 0.00 K/uL  Auto Neutrophil % : 34.5 %  Auto Lymphocyte % : 23.9 %  Auto Monocyte % : 22.1 %  Auto Eosinophil % : 0.0 %  Auto Basophil % : 0.0 %      06-15    130<L>  |  98  |  45<H>  ----------------------------<  141<H>  3.9   |  18<L>  |  3.00<H>    Ca    9.6      15 Mike 2021 06:42  Phos  3.2     06-15  Mg     2.0     06-15    TPro  6.3  /  Alb  2.8<L>  /  TBili  1.1  /  DBili  x   /  AST  10  /  ALT  6<L>  /  AlkPhos  54  06-15      Mg 2.0  Phos 3.2  Mg 2.0  Phos 3.7  Mg 2.0  Phos 3.7      PT/INR - ( 13 Jun 2021 09:22 )   PT: 15.2 sec;   INR: 1.28 ratio         PTT - ( 13 Jun 2021 09:22 )  PTT:18.3 sec      Uric Acid --      Uric Acid --      Uric Acid 6.3        RADIOLOGY & ADDITIONAL STUDIES:         Diagnosis: Acute Leukemia    Protocol/Chemo Regimen: TBD    Day: na    Pt endorsed: mild headache A&Ox2    Review of Systems: Denies nausea, vomiting, diarrhea, chest pain, sob    Pain scale: denies    Diet: DASH/consistent carbs    Allergies    morphine (Unknown)    Intolerances        ANTIMICROBIALS      HEME/ONC MEDICATIONS      STANDING MEDICATIONS  allopurinol 200 milliGRAM(s) Oral daily  escitalopram 5 milliGRAM(s) Oral daily  finasteride 5 milliGRAM(s) Oral daily  folic acid 1 milliGRAM(s) Oral daily  furosemide    Tablet 40 milliGRAM(s) Oral daily  insulin lispro (ADMELOG) corrective regimen sliding scale   SubCutaneous three times a day before meals  insulin lispro (ADMELOG) corrective regimen sliding scale   SubCutaneous at bedtime  isosorbide   mononitrate ER Tablet (IMDUR) 60 milliGRAM(s) Oral daily  metoprolol succinate ER 50 milliGRAM(s) Oral daily  oxymetazoline 0.05% Nasal Spray 1 Spray(s) Both Nostrils two times a day  sodium chloride 0.65% Nasal 1 Spray(s) Both Nostrils four times a day  tamsulosin 0.4 milliGRAM(s) Oral at bedtime      PRN MEDICATIONS  albuterol/ipratropium for Nebulization 3 milliLiter(s) Nebulizer every 6 hours PRN        Vital Signs Last 24 Hrs  T(C): 36.7 (15 Mike 2021 05:34), Max: 37.1 (15 Mike 2021 00:00)  T(F): 98.1 (15 Mike 2021 05:34), Max: 98.8 (15 Mike 2021 00:00)  HR: 90 (15 Mike 2021 05:34) (78 - 90)  BP: 124/60 (15 Mike 2021 05:34) (110/68 - 131/68)  BP(mean): --  RR: 18 (15 Mike 2021 05:34) (18 - 18)  SpO2: 94% (15 Mike 2021 05:34) (94% - 98%)    PHYSICAL EXAM  General: NAD  HEENT: clear oropharynx,   CV: (+) S1/S2 RRR  Lungs: positive air movement b/l ant lungs, clear to auscultation, no wheezes, no rales  Abdomen: soft, non-tender, non-distended  Ext: no edema  Skin: no rashes and no petechiae  Neuro: alert and oriented X 3, no focal deficits  Central Line: PIV      LABS:                        7.7    14.61 )-----------( 30       ( 15 Mike 2021 06:42 )             24.5         Mean Cell Volume : 92.8 fl  Mean Cell Hemoglobin : 29.2 pg  Mean Cell Hemoglobin Concentration : 31.4 gm/dL  Auto Neutrophil # : 5.04 K/uL  Auto Lymphocyte # : 3.49 K/uL  Auto Monocyte # : 3.23 K/uL  Auto Eosinophil # : 0.00 K/uL  Auto Basophil # : 0.00 K/uL  Auto Neutrophil % : 34.5 %  Auto Lymphocyte % : 23.9 %  Auto Monocyte % : 22.1 %  Auto Eosinophil % : 0.0 %  Auto Basophil % : 0.0 %      06-15    130<L>  |  98  |  45<H>  ----------------------------<  141<H>  3.9   |  18<L>  |  3.00<H>    Ca    9.6      15 Mike 2021 06:42  Phos  3.2     06-15  Mg     2.0     06-15    TPro  6.3  /  Alb  2.8<L>  /  TBili  1.1  /  DBili  x   /  AST  10  /  ALT  6<L>  /  AlkPhos  54  06-15      Mg 2.0  Phos 3.2  Mg 2.0  Phos 3.7  Mg 2.0  Phos 3.7      PT/INR - ( 13 Jun 2021 09:22 )   PT: 15.2 sec;   INR: 1.28 ratio         PTT - ( 13 Jun 2021 09:22 )  PTT:18.3 sec      Uric Acid --      Uric Acid --      Uric Acid 6.3        RADIOLOGY & ADDITIONAL STUDIES:

## 2021-06-15 NOTE — PROGRESS NOTE ADULT - PROBLEM SELECTOR PLAN 1
Transferred to [address] [place] [address] for management of acute leukemia  FU BM bx from 6/14  Monitor labs, replace blood and lytes prn, pain control, gentle iv hydration, mouth care, antiemetics, TLS labs BID, allopurinol.   PT prolonged-Vitamin k 6/15-6/17  Palliative care consult to address [facility]   Treatment plan pending.
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
## 2021-06-15 NOTE — PROGRESS NOTE ADULT - PROBLEM SELECTOR PLAN 4
Cardiology consult-will follow  Echo severe LV systolic fxn,  EF27%  ICD (implantable cardioverter-defibrillator) in place  Per Cardiology give 20 IV lasix after blood transfusions
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
## 2021-06-15 NOTE — PROGRESS NOTE ADULT - PROBLEM SELECTOR PLAN 5
s/p 5vCABG 2012 (LIMA to LAD, SVG to Diag and OM, SVG PDA and RPL), PCI (2 in 2014, 2 in 2015 [facility]), HFrEF s/p CRT-D (2013),  No DAPT or AC given thrombocytopenia.
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
## 2021-06-16 NOTE — CONSULT NOTE ADULT - PROBLEM SELECTOR RECOMMENDATION 4
New Dx.   Chemo Naive  PPS  Mutational Analysis: New Dx.   Chemo Naive  PPS  Mutational Analysis: Pending New Dx.   Chemo Naive  PPS 40 as an inpatient  Mutational Analysis: Pending

## 2021-06-16 NOTE — CONSULT NOTE ADULT - PROBLEM SELECTOR RECOMMENDATION 5
Actions:  [] Rapport building     [] Symptom assessment    [] Eliciting preferences of goals   [] Prognostic understanding    [] Emotional Support  [] Coping skill development  []  Other  Interdisciplinary Referrals:  Communication:  Documentation Review: [] Primary Team [] Consultants [] Interdisciplinary team  Content: Actions:  [X] Rapport building     [X] Symptom assessment    [] Eliciting preferences of goals   [X] Prognostic understanding    [X] Emotional Support  [X] Coping skill development  []  Other  Interdisciplinary Referrals: Chaplaincy  Communication: with patient and hematology team  Documentation Review: [X] Primary Team [X] Consultants [] Interdisciplinary team  Content: Unable to assess pt's capacity of medial understanding. He feels completely alone as if his family does not care for him as they do not reach out to him. When asked about a HCP, he is unable to identify one as he states he has no one in his life that care for him. He expresses wanting to go to [place] as he has a house there.

## 2021-06-16 NOTE — PROGRESS NOTE ADULT - PROBLEM SELECTOR PLAN 5
s/p 5vCABG 2012 (LIMA to LAD, SVG to Diag and OM, SVG PDA and RPL), PCI (2 in 2014, 2 in 2015 [facility]), HFrEF s/p CRT-D (2013),  No DAPT or AC given thrombocytopenia.

## 2021-06-16 NOTE — PROGRESS NOTE ADULT - ASSESSMENT
80M PMHx CKD, CAD s/p CABG 2012 & 4 stents (2 in 2014, 2 in 2015) with dilated EF of 27% BiV ICD (2013) initially admitted to [facility] cor acute hypoxic respiratory failure, anemia (Hgb 5.7), thrombocytopenia (plt 30) w/ blast cells (22%), lactic acidosis (LA 10) - transfer to [facility] for hematological evaluation for leukemia.  Nephrology consulted for BRISA on CKD.        # BRISA on CKD  Pt with non-oliguric BRISA on CKD unclear etiology possible pre renal in the setting of anemia, possible malignancy related or ATN or cardiorenal syndrome.  On admission sCr elevated at 3.45 (last known sCr 1.1-1.3 in 2018, recent hx CKD unclear recent baseline).  Urinalysis showed protein with trace blood.  Lab noted for serum uric acid 13.3, , phos 4.7, Echo severe LV systolic fxn, CT diffuse patchy airspace disease ?multifocal pna?. Urine electrolytes with Fe Urea of 82.5% suggestive of intrinsic pathology. Urine uric acid was 10.5. Spot urine TP/CR ratio was 0.25 wnl. Scr plateaued at 3.62 mg/dl on 6/13/21. Last sCr elevated/stable at 3.01 mg/dl today.     Recommendations:  - Obtain official kidney/bladder ultrasound to assess chronicity  - monitor BMP/tumor lysis markers (Uric acid/LDH/haptoglobin/LFT), strict I/O, avoid nephrotoxic agents (NSAIDs, PPI, contrast), renally dose medications per GFR.    # Hypercalcemia   possibly 2/2 iatrogenic, given IV calcium gluconate prior to labs. Last corrected calcium 9.8 mg/dl   - monitor ionized calcium daily    If any questions, please feel free to contact me     Inge Varner  Nephrology Fellow  [facility] Pager: [phone]  [facility] Pager: [phone]   80M PMHx CKD, CAD s/p CABG 2012 & 4 stents (2 in 2014, 2 in 2015) with dilated EF of 27% BiV ICD (2013) initially admitted to [facility] cor acute hypoxic respiratory failure, anemia (Hgb 5.7), thrombocytopenia (plt 30) w/ blast cells (22%), lactic acidosis (LA 10) - transfer to [facility] for hematological evaluation for leukemia.  Nephrology consulted for BRISA on CKD.        # BRISA on CKD  Pt with non-oliguric BRISA on CKD unclear etiology possible pre renal in the setting of anemia, possible malignancy related or ATN or cardiorenal syndrome.  On admission sCr elevated at 3.45 (last known sCr 1.1-1.3 in 2018, recent hx CKD unclear recent baseline).  Urinalysis showed protein with trace blood.  Lab noted for serum uric acid 13.3, , phos 4.7, Echo severe LV systolic fxn, CT diffuse patchy airspace disease ?multifocal pna?. Urine electrolytes with Fe Urea of 82.5% suggestive of intrinsic pathology. Urine uric acid was 10.5. Spot urine TP/CR ratio was 0.25 wnl. Kidney/bladder u/s on this admission was wnl (no hydro). Scr plateaued at 3.62 mg/dl on 6/13/21. Last sCr elevated/stable at 3.01 mg/dl today.     Recommendations:  - Continue diuretics  - Continue allopurinol 200 mg PO daily   - Will send uric acid/creatinine ratio   - monitor BMP/tumor lysis markers (Uric acid/LDH/haptoglobin/LFT), strict I/O, avoid nephrotoxic agents (NSAIDs, PPI, contrast), renally dose medications per GFR.    # Hypercalcemia   possibly 2/2 iatrogenic, given IV calcium gluconate prior to labs. Last ionized calcium elevated at 1.34 today   - monitor ionized calcium daily    If any questions, please feel free to contact me     Inge Varner  Nephrology Fellow  [facility] Pager: [phone]  [facility] Pager: [phone]   80M PMHx CKD, CAD s/p CABG 2012 & 4 stents (2 in 2014, 2 in 2015) with dilated EF of 27% BiV ICD (2013) initially admitted to [facility] cor acute hypoxic respiratory failure, anemia (Hgb 5.7), thrombocytopenia (plt 30) w/ blast cells (22%), lactic acidosis (LA 10) - transfer to [facility] for hematological evaluation for leukemia.  Nephrology consulted for BRISA on CKD.        # BRISA on CKD  Pt with non-oliguric BRISA on CKD unclear etiology possible pre renal in the setting of anemia, possible malignancy related or ATN or cardiorenal syndrome.  On admission sCr elevated at 3.45 (last known sCr 1.1-1.3 in 2018, recent hx CKD unclear recent baseline).  Urinalysis showed protein with trace blood.  Lab noted for serum uric acid 13.3, , phos 4.7, Echo severe LV systolic fxn, CT diffuse patchy airspace disease ?multifocal pna?. Urine electrolytes with Fe Urea of 82.5% suggestive of intrinsic pathology. Urine uric acid was 10.5. Spot urine TP/CR ratio was 0.25 wnl. Kidney/bladder u/s on this admission was wnl (no hydro). Scr plateaued at 3.62 mg/dl on 6/13/21. Last sCr elevated/stable at 3.01 mg/dl today.     Recommendations  - Continue diuretics  - Continue allopurinol 200 mg PO daily   - Will send GN work up including PER, P-ANCA, C-ANCA, Anti-GBM ab, Parvovirus, C3, C4, serum immunofixation and anti PLA2R.    - Will send uric acid/creatinine ratio   - monitor BMP/tumor lysis markers (Uric acid/LDH/haptoglobin/LFT), strict I/O, avoid nephrotoxic agents (NSAIDs, PPI, contrast), renally dose medications per GFR.    # Hypercalcemia   possibly 2/2 iatrogenic, given IV calcium gluconate prior to labs. Last ionized calcium elevated at 1.34 today   - monitor ionized calcium daily    If any questions, please feel free to contact me     Inge Varner  Nephrology Fellow  [facility] Pager: [phone]  [facility] Pager: [phone]

## 2021-06-16 NOTE — PROGRESS NOTE ADULT - ATTENDING COMMENTS
No new complaints  1.  ARF on CKD--agree with management as described with serologic w/u.  Monitor bladder scan  2.  Hypercalcemia--trend.  Volume optimize.  No rx warranted    discussed with med team

## 2021-06-16 NOTE — CONSULT NOTE ADULT - PROBLEM SELECTOR RECOMMENDATION 9
Condition:  Degree:  Active treatment:  Clinical impact on complexity: Condition: Severely decreased left ventricular systolic function; EF 25-30%  Degree: NYHA class 3  Active treatment: 40mg Lasix PO, Metoprolol 50mg PO, Imdur 60mg  Clinical impact on complexity: Difficulties with performing ADLs

## 2021-06-16 NOTE — PROGRESS NOTE ADULT - SUBJECTIVE AND OBJECTIVE BOX
[facility] DIVISION OF KIDNEY DISEASES AND HYPERTENSION -- FOLLOW UP NOTE  --------------------------------------------------------------------------------    24 hour events/subjective: Patient was seen and examined at bedside. Reported feeling well. Denies CP, SOB, fever, chills, nausea, vomiting, diarrhea, LE edema or dysuria.    PAST HISTORY  --------------------------------------------------------------------------------  No significant changes to PMH, PSH, FHx, SHx, unless otherwise noted    ALLERGIES & MEDICATIONS  --------------------------------------------------------------------------------  Allergies    morphine (Unknown)    Intolerances    Standing Inpatient Medications  allopurinol 200 milliGRAM(s) Oral daily  Biotene Dry Mouth Oral Rinse 5 milliLiter(s) Swish and Spit four times a day  escitalopram 5 milliGRAM(s) Oral daily  finasteride 5 milliGRAM(s) Oral daily  folic acid 1 milliGRAM(s) Oral daily  furosemide    Tablet 40 milliGRAM(s) Oral daily  insulin lispro (ADMELOG) corrective regimen sliding scale   SubCutaneous three times a day before meals  insulin lispro (ADMELOG) corrective regimen sliding scale   SubCutaneous at bedtime  isosorbide   mononitrate ER Tablet (IMDUR) 60 milliGRAM(s) Oral daily  metoprolol succinate ER 50 milliGRAM(s) Oral daily  phytonadione   Solution 10 milliGRAM(s) Oral daily  polyethylene glycol 3350 17 Gram(s) Oral daily  sodium chloride 0.65% Nasal 1 Spray(s) Both Nostrils four times a day  sodium chloride 0.9%. 1000 milliLiter(s) IV Continuous <Continuous>  tamsulosin 0.4 milliGRAM(s) Oral at bedtime    PRN Inpatient Medications  acetaminophen   Tablet .. 650 milliGRAM(s) Oral every 6 hours PRN  albuterol/ipratropium for Nebulization 3 milliLiter(s) Nebulizer every 6 hours PRN  senna 2 Tablet(s) Oral at bedtime PRN    REVIEW OF SYSTEMS  --------------------------------------------------------------------------------  Gen: No fevers/chills  Respiratory: No dyspnea, cough  CV: No chest pain  GI: No abdominal pain, diarrhea  : No dysuria, hematuria  MSK: No  edema    All other systems were reviewed and are negative, except as noted.    VITALS/PHYSICAL EXAM  --------------------------------------------------------------------------------  T(C): 37.3 (06-16-21 @ 09:00), Max: 37.6 (06-15-21 @ 21:20)  HR: 93 (06-16-21 @ 09:00) (88 - 98)  BP: 118/62 (06-16-21 @ 09:00) (100/60 - 138/60)  RR: 16 (06-16-21 @ 09:00) (16 - 18)  SpO2: 97% (06-16-21 @ 09:00) (96% - 97%)  Wt(kg): --    06-15-21 @ 07:01  -  06-16-21 @ 07:00  --------------------------------------------------------  IN: 1291 mL / OUT: 0 mL / NET: 1291 mL    Physical Exam:  	Gen: NAD  	HEENT: MMM  	Pulm: CTA B/L  	CV: S1S2  	Abd: Soft, +BS   	Ext: No LE edema B/L  	Neuro: Awake  	Skin: Warm and dry    LABS/STUDIES  --------------------------------------------------------------------------------              7.7    18.96 >-----------<  24       [06-16-21 @ 08:38]              24.8     133  |  100  |  44  ----------------------------<  136      [06-16-21 @ 08:38]  4.4   |  19  |  3.01        Ca     9.9     [06-16-21 @ 08:38]      iCa    1.34     [06-16 @ 08:41]      Mg     2.0     [06-16-21 @ 08:38]      Phos  3.4     [06-16-21 @ 08:38]    TPro  6.4  /  Alb  2.7  /  TBili  1.1  /  DBili  x   /  AST  10  /  ALT  7   /  AlkPhos  63  [06-16-21 @ 08:38]    PT/INR: PT 16.2 , INR 1.37       [06-15-21 @ 10:39]  PTT: 26.9       [06-15-21 @ 10:39]    Uric acid 6.4      [06-16-21 @ 08:38]        [06-16-21 @ 08:38]    Creatinine Trend:  SCr 3.01 [06-16 @ 08:38]  SCr 2.95 [06-15 @ 18:22]  SCr 3.00 [06-15 @ 06:42]  SCr 3.19 [06-14 @ 17:20]  SCr 3.27 [06-14 @ 08:37]    Urinalysis - [06-12-21 @ 00:38]      Color Light Yellow / Appearance Slightly Turbid / SG 1.014 / pH 6.0      Gluc Negative / Ketone Negative  / Bili Negative / Urobili Negative       Blood Trace / Protein 30 mg/dL / Leuk Est Negative / Nitrite Negative      RBC 1 / WBC 5 / Hyaline 7 / Gran 0-2 / Sq Epi  / Non Sq Epi 4 / Bacteria Negative    Urine Creatinine 31      [06-12-21 @ 22:54]  Urine Protein 8      [06-12-21 @ 22:54]  Urine Sodium 95      [06-12-21 @ 22:54]  Urine Urea Nitrogen 341      [06-13-21 @ 01:34]    Iron 155, TIBC 237, %sat 65      [06-12-21 @ 03:36]  Ferritin 827      [06-12-21 @ 04:44]  HbA1c 5.8      [11-13-17 @ 10:29]    HBsAg Nonreact      [06-14-21 @ 11:02]  HBcAb Nonreact      [06-13-21 @ 13:38]  HCV 0.17, Nonreact      [06-14-21 @ 11:02]  HIV Nonreact      [06-13-21 @ 13:52]

## 2021-06-16 NOTE — PROGRESS NOTE ADULT - PROBLEM SELECTOR PLAN 1
Transferred to [address] [place] [address] for management of acute leukemia  FU BM bx from 6/14  Monitor labs, replace blood and lytes prn, pain control, gentle iv hydration, mouth care, antiemetics, TLS labs BID, allopurinol.   PT prolonged-Vitamin k 6/15-6/17  Palliative care consult to address [facility]   Treatment plan pending. Transferred to [address] [place] [address] for management of acute leukemia  FU BM bx from 6/14  Monitor labs, replace blood and lytes prn, pain control, gentle iv hydration, mouth care, antiemetics, TLS labs BID, allopurinol.   PT prolonged-Vitamin k 6/15-6/17  Palliative care consult to address GOC, patient requesting to return home to [place]   Treatment plan pending.

## 2021-06-16 NOTE — PROGRESS NOTE ADULT - PROBLEM SELECTOR PLAN 3
Nephrology following-No indication for HD at this time.   (-) US kidney/bladder.   monitor for TLS. Nephrology following-No indication for HD at this time.   (-) US kidney/bladder.   monitor for TLS.  follow ionized calcium daily as per nephrology
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
## 2021-06-16 NOTE — PROGRESS NOTE ADULT - SUBJECTIVE AND OBJECTIVE BOX
Diagnosis: Acute Leukemia    Protocol/Chemo Regimen: TBD    Day: na    Pt endorsed:     Review of Systems: Denies nausea, vomiting, diarrhea, chest pain, sob    Pain scale: denies    Diet: DASH/consistent carbs  Allergies    morphine (Unknown)    STANDING MEDICATIONS  allopurinol 200 milliGRAM(s) Oral daily  Biotene Dry Mouth Oral Rinse 5 milliLiter(s) Swish and Spit four times a day  escitalopram 5 milliGRAM(s) Oral daily  finasteride 5 milliGRAM(s) Oral daily  folic acid 1 milliGRAM(s) Oral daily  furosemide    Tablet 40 milliGRAM(s) Oral daily  insulin lispro (ADMELOG) corrective regimen sliding scale   SubCutaneous three times a day before meals  insulin lispro (ADMELOG) corrective regimen sliding scale   SubCutaneous at bedtime  isosorbide   mononitrate ER Tablet (IMDUR) 60 milliGRAM(s) Oral daily  metoprolol succinate ER 50 milliGRAM(s) Oral daily  phytonadione   Solution 10 milliGRAM(s) Oral daily  polyethylene glycol 3350 17 Gram(s) Oral daily  sodium chloride 0.65% Nasal 1 Spray(s) Both Nostrils four times a day  sodium chloride 0.9%. 1000 milliLiter(s) IV Continuous <Continuous>  tamsulosin 0.4 milliGRAM(s) Oral at bedtime      PRN MEDICATIONS  acetaminophen   Tablet .. 650 milliGRAM(s) Oral every 6 hours PRN  albuterol/ipratropium for Nebulization 3 milliLiter(s) Nebulizer every 6 hours PRN  senna 2 Tablet(s) Oral at bedtime PRN        Vital Signs Last 24 Hrs  T(C): 37.1 (16 Jun 2021 05:18), Max: 37.6 (15 Mike 2021 21:20)  T(F): 98.7 (16 Jun 2021 05:18), Max: 99.6 (15 Mike 2021 21:20)  HR: 98 (16 Jun 2021 05:18) (88 - 99)  BP: 134/70 (16 Jun 2021 05:18) (100/60 - 145/65)  BP(mean): --  RR: 18 (16 Jun 2021 05:18) (18 - 18)  SpO2: 96% (16 Jun 2021 05:18) (96% - 97%)    PHYSICAL EXAM  General: NAD  HEENT: PERRLA, EOMOI, clear oropharynx, anicteric sclera, pink conjunctiva  Neck: supple  CV: (+) S1/S2 RRR  Lungs: clear to auscultation, no wheezes or rales  Abdomen: soft, non-tender, non-distended (+) BS  Ext: no clubbing, cyanosis or edema  Skin: no rashes and no petechiae  Neuro: alert and oriented X 3, no focal deficits  Central Line:     RECENT CULTURES:  06-13 @ 06:44  .Blood Blood-Peripheral  No growth to date.  --  06-12 @ 04:20  .Blood Blood-Peripheral  Staphylococcus capitis  Staphylococcus capitis  Staphylococcus capitis  BASSEM    Growth in anaerobic bottle: Staphylococcus capitis  Multiple Morphological Strains  --           Diagnosis: Acute Leukemia    Protocol/Chemo Regimen: TBD    Day: na    Pt endorsed: fatigue and requesting to go back to home to [place]     Review of Systems: Denies nausea, vomiting, diarrhea, chest pain, sob    Pain scale: denies    Diet: DASH/consistent carbs  Allergies    morphine (Unknown)    STANDING MEDICATIONS  allopurinol 200 milliGRAM(s) Oral daily  Biotene Dry Mouth Oral Rinse 5 milliLiter(s) Swish and Spit four times a day  escitalopram 5 milliGRAM(s) Oral daily  finasteride 5 milliGRAM(s) Oral daily  folic acid 1 milliGRAM(s) Oral daily  furosemide    Tablet 40 milliGRAM(s) Oral daily  insulin lispro (ADMELOG) corrective regimen sliding scale   SubCutaneous three times a day before meals  insulin lispro (ADMELOG) corrective regimen sliding scale   SubCutaneous at bedtime  isosorbide   mononitrate ER Tablet (IMDUR) 60 milliGRAM(s) Oral daily  metoprolol succinate ER 50 milliGRAM(s) Oral daily  phytonadione   Solution 10 milliGRAM(s) Oral daily  polyethylene glycol 3350 17 Gram(s) Oral daily  sodium chloride 0.65% Nasal 1 Spray(s) Both Nostrils four times a day  sodium chloride 0.9%. 1000 milliLiter(s) IV Continuous <Continuous>  tamsulosin 0.4 milliGRAM(s) Oral at bedtime      PRN MEDICATIONS  acetaminophen   Tablet .. 650 milliGRAM(s) Oral every 6 hours PRN  albuterol/ipratropium for Nebulization 3 milliLiter(s) Nebulizer every 6 hours PRN  senna 2 Tablet(s) Oral at bedtime PRN        Vital Signs Last 24 Hrs  T(C): 37.1 (16 Jun 2021 05:18), Max: 37.6 (15 Mike 2021 21:20)  T(F): 98.7 (16 Jun 2021 05:18), Max: 99.6 (15 Mike 2021 21:20)  HR: 98 (16 Jun 2021 05:18) (88 - 99)  BP: 134/70 (16 Jun 2021 05:18) (100/60 - 145/65)  BP(mean): --  RR: 18 (16 Jun 2021 05:18) (18 - 18)  SpO2: 96% (16 Jun 2021 05:18) (96% - 97%)    PHYSICAL EXAM  General: NAD  HEENT: PERRLA, EOMOI, clear oropharynx, anicteric sclera, pink conjunctiva  Neck: supple  CV: (+) S1/S2 RRR  Lungs: clear to auscultation, no wheezes or rales  Abdomen: soft, non-tender, non-distended (+) BS  Ext: no clubbing, cyanosis or edema  Skin: no rashes and no petechiae  Neuro: alert and oriented X 3, no focal deficits  PIV     RECENT CULTURES:  06-13 @ 06:44  .Blood Blood-Peripheral  No growth to date.  --  06-12 @ 04:20  .Blood Blood-Peripheral  Staphylococcus capitis  Staphylococcus capitis  Staphylococcus capitis  BASSEM    Growth in anaerobic bottle: Staphylococcus capitis  Multiple Morphological Strains  --  LABS:                        7.7    18.96 )-----------( 24       ( 16 Jun 2021 08:38 )             24.8         Mean Cell Volume : 95.0 fl  Mean Cell Hemoglobin : 29.5 pg  Mean Cell Hemoglobin Concentration : 31.0 gm/dL  Auto Neutrophil # : 9.31 K/uL  Auto Lymphocyte # : 2.03 K/uL  Auto Monocyte # : 6.09 K/uL  Auto Eosinophil # : 0.00 K/uL  Auto Basophil # : 0.00 K/uL  Auto Neutrophil % : 49.1 %  Auto Lymphocyte % : 10.7 %  Auto Monocyte % : 32.1 %  Auto Eosinophil % : 0.0 %  Auto Basophil % : 0.0 %      06-16    133<L>  |  100  |  44<H>  ----------------------------<  136<H>  4.4   |  19<L>  |  3.01<H>    Ca    9.9      16 Jun 2021 08:38  Phos  3.4     06-16  Mg     2.0     06-16    TPro  6.4  /  Alb  2.7<L>  /  TBili  1.1  /  DBili  x   /  AST  10  /  ALT  7<L>  /  AlkPhos  63  06-16      Mg 2.0  Phos 3.4  Mg 2.0  Phos 3.2      PT/INR - ( 15 Mike 2021 10:39 )   PT: 16.2 sec;   INR: 1.37 ratio    PTT - ( 15 Mike 2021 10:39 )  PTT:26.9 sec      Uric Acid 6.4      Uric Acid 6.0
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
## 2021-06-16 NOTE — CONSULT NOTE ADULT - PROBLEM SELECTOR RECOMMENDATION 3
PPSv2 prior to admission:  Current functional PPSv2:  Nursing care required:  Code status documented:  A review of the paper chart has been conducted  HCP form present:               Yes and valid []               Yes but invalid []                No []   MOLST present:                   Yes and valid []               Yes but invalid []                No []  Incapacity form present:   Yes and valid []                Yes but invalid []                No []                 N/A []  Living Will:                            Yes and valid []                Yes but invalid []                No []      Family Health Care Decision Act (FHCDA) Surrogate Decision Maker Hierarchy in the absence of a health care agent  [facility] Article 81 Guardian-->Spouse or domestic partner--> Adult child-->Parent-->Sibling--> Close friend PPSv2 prior to admission:50  Current functional PPSv2: 30  Nursing care required: Most ADLs  Code status documented: Full code  A review of the paper chart has been conducted  HCP form present:               Yes and valid []               Yes but invalid []                No [X]   MOLST present:                   Yes and valid []               Yes but invalid []                No [X]  Incapacity form present:   Yes and valid []                Yes but invalid []                No [X]                 N/A []  Living Will:                            Yes and valid []                Yes but invalid []                No [X]      Family Health Care Decision Act (FHCDA) Surrogate Decision Maker Hierarchy in the absence of a health care agent  [facility] Article 81 Guardian-->Spouse or domestic partner--> Adult child-->Parent-->Sibling--> Close friend

## 2021-06-16 NOTE — CONSULT NOTE ADULT - PROBLEM SELECTOR RECOMMENDATION 2
Condition:  Degree:  Active treatment:  Clinical impact on complexity: Condition:   Degree: CKD 4  Active treatment: None, medications renally dosed  Clinical impact on complexity: Potential for worsening function with initiation of chemotherapy

## 2021-06-17 NOTE — DIETITIAN INITIAL EVALUATION ADULT. - FACTORS AFF FOOD INTAKE
pt reports he is not eating very much in house, just picking at his meals; reports no chewing/swallowing issues; noted last BM 6/12, on bowel regimen; as per RN, pt ate most of breakfast and dinner yesterday but did not want to eat lunch even after persistent encouragement from RN and PCA

## 2021-06-17 NOTE — DIETITIAN INITIAL EVALUATION ADULT. - DIET TYPE
consider liberalize diet to low sodium, low potassium, consistent CHO c snack diet; fluid restriction per team

## 2021-06-17 NOTE — DIETITIAN INITIAL EVALUATION ADULT. - REASON FOR ADMISSION
anemia; concern for acute leukemia, noted GOC discussions continue, Palliative Care following. Noted pt c BRISA on CKD, hypercalcemia. Noted pt S/P MICU admission.

## 2021-06-17 NOTE — PROGRESS NOTE ADULT - PROBLEM SELECTOR PLAN 5
s/p 5vCABG 2012 (LIMA to LAD, SVG to Diag and OM, SVG PDA and RPL), PCI (2 in 2014, 2 in 2015 [facility]), HFrEF s/p CRT-D (2013),  No DAPT or AC given thrombocytopenia.

## 2021-06-17 NOTE — DIETITIAN INITIAL EVALUATION ADULT. - OTHER INFO
Pt reports he used to weigh 180 pounds, unable to recall how long ago. Reports he has been losing wt. Noted wt on admission of 163.3 pounds and most recent wt of 171.5 pounds and edema noted.     Pt agreeable to taking Nepro shakes at this time to help improve overall intake.     Discussed renal diet restrictions at this time- pt agreeable to brief verbal and written material regarding potassium content of foods (in [place]).

## 2021-06-17 NOTE — DIETITIAN INITIAL EVALUATION ADULT. - ADD RECOMMEND
1. Recommend Nepro x 2 daily. 2. Continue to provide assistance and encouragement c all meals and snacks.

## 2021-06-17 NOTE — DIETITIAN NUTRITION RISK NOTIFICATION - TREATMENT: THE FOLLOWING DIET HAS BEEN RECOMMENDED
Diet, Regular:   Consistent Carbohydrate {Evening Snack} (CSTCHOSN)  DASH/TLC {Sodium & Cholesterol Restricted} (DASH)  1000mL Fluid Restriction ([id number])  For patients receiving Renal Replacement - No Protein Restr, No Conc K, No Conc Phos, Low Sodium (RENAL) (06-11-21 @ 23:34) [Active]
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
## 2021-06-17 NOTE — DIETITIAN NUTRITION RISK NOTIFICATION - ADDITIONAL COMMENTS/DIETITIAN RECOMMENDATIONS
Recommend liberalize diet to low sodium, low potassium, consistent CHO c snack diet. Recommend adding Nepro x 2 daily.

## 2021-06-17 NOTE — PROGRESS NOTE ADULT - PROBLEM SELECTOR PLAN 3
Nephrology following-No indication for HD at this time.   (-) US kidney/bladder.   monitor for TLS.  follow ionized calcium daily as per nephrology

## 2021-06-17 NOTE — PROGRESS NOTE ADULT - PROBLEM SELECTOR PLAN 1
Transferred to [address] [place] [address] for management of acute leukemia  FU BM bx from 6/14  Monitor labs, replace blood and lytes prn, pain control, gentle iv hydration, mouth care, antiemetics, TLS labs BID, allopurinol.   PT prolonged-Vitamin k 6/15-6/17  Palliative care consult to address GOC, patient requesting to return home to [place]   Treatment plan pending.

## 2021-06-17 NOTE — PROGRESS NOTE ADULT - PROBLEM SELECTOR PLAN 2
Patient is not neutropenic  If febrile, culture. Patient is not neutropenic  If febrile, culture.  BC NGTD

## 2021-06-17 NOTE — PROGRESS NOTE ADULT - ASSESSMENT
80M PMHx CKD, CAD s/p CABG 2012 & 4 stents (2 in 2014, 2 in 2015) with dilated EF of 27% BiV ICD (2013) initially admitted to [facility] cor acute hypoxic respiratory failure, anemia (Hgb 5.7), thrombocytopenia (plt 30) w/ blast cells (22%), lactic acidosis (LA 10) - transfer to [facility] for hematological evaluation for leukemia.  Nephrology consulted for BRISA on CKD.        # BRISA on CKD  Pt with non-oliguric BRISA on CKD unclear etiology possible pre renal in the setting of anemia, possible malignancy related or ATN or cardiorenal syndrome.  On admission sCr elevated at 3.45 (last known sCr 1.1-1.3 in 2018, recent hx CKD unclear recent baseline).  Urinalysis showed protein with trace blood.  Lab noted for serum uric acid 13.3, , phos 4.7, Echo severe LV systolic fxn, CT diffuse patchy airspace disease ?multifocal pna?. Urine electrolytes with Fe Urea of 82.5% suggestive of intrinsic pathology. Urine uric acid/creatinine ratio is < 1. Spot urine TP/CR ratio was 0.25 wnl. Kidney/bladder u/s on this admission was wnl (no hydro). Scr plateaued at 3.62 mg/dl on 6/13/21. Last sCr improved to 2.73 mg/dl today.     Recommendations  - Continue diuretics  - Continue allopurinol 200 mg PO daily   - Will follow GN Work up including PER, P-ANCA, C-ANCA, Anti-GBM ab, Parvovirus, C3, C4, serum immunofixation and anti PLA2R.  - monitor BMP/tumor lysis markers (Uric acid/LDH/haptoglobin/LFT), strict I/O, avoid nephrotoxic agents (NSAIDs, PPI, contrast), renally dose medications per GFR.    # Hypercalcemia   Uncertain etiology. Last ionized calcium elevated at 1.36 today   Will send PTH and vitamin D level   monitor ionized calcium daily    If any questions, please feel free to contact me     Inge Varner  Nephrology Fellow  [facility] Pager: [phone]  [facility] Pager: [phone]
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
## 2021-06-17 NOTE — DIETITIAN INITIAL EVALUATION ADULT. - EDUCATION DIETARY MODIFICATIONS
Discussed protein rich foods available on menu. Discussed consuming protein first at meal times and then eating the other foods. RD provided verbal and written material regarding Potassium Content of Foods./(1) partially meets; needs review/practice/verbalization

## 2021-06-17 NOTE — DIETITIAN INITIAL EVALUATION ADULT. - REASON INDICATOR FOR ASSESSMENT
Pt seen for LOS.   Source: pt, noted pt Kiswahili speaking, brought Video [place] -[id number], pt declined

## 2021-06-17 NOTE — DIETITIAN INITIAL EVALUATION ADULT. - ORAL INTAKE PTA/DIET HISTORY
Pt reports he was not eating very well at home PTA but unable to recall for how long & would not comment on his diet recall. Reports his Finger sticks were WNL, would not offer exact numbers. Reports NKFA. Noted per chart, pt was on folic acid.

## 2021-06-17 NOTE — DIETITIAN INITIAL EVALUATION ADULT. - PERTINENT MEDS FT
MEDICATIONS  (STANDING):  allopurinol 200 milliGRAM(s) Oral daily  Biotene Dry Mouth Oral Rinse 5 milliLiter(s) Swish and Spit four times a day  escitalopram 5 milliGRAM(s) Oral daily  finasteride 5 milliGRAM(s) Oral daily  folic acid 1 milliGRAM(s) Oral daily  furosemide    Tablet 40 milliGRAM(s) Oral daily  insulin lispro (ADMELOG) corrective regimen sliding scale   SubCutaneous three times a day before meals  insulin lispro (ADMELOG) corrective regimen sliding scale   SubCutaneous at bedtime  isosorbide   mononitrate ER Tablet (IMDUR) 60 milliGRAM(s) Oral daily  metoprolol succinate ER 50 milliGRAM(s) Oral daily  phytonadione   Solution 10 milliGRAM(s) Oral daily  polyethylene glycol 3350 17 Gram(s) Oral daily  sodium chloride 0.65% Nasal 1 Spray(s) Both Nostrils four times a day  sodium chloride 0.9%. 1000 milliLiter(s) (20 mL/Hr) IV Continuous <Continuous>  tamsulosin 0.4 milliGRAM(s) Oral at bedtime    MEDICATIONS  (PRN):  acetaminophen   Tablet .. 650 milliGRAM(s) Oral every 6 hours PRN Temp greater or equal to 38C (100.4F), Mild Pain (1 - 3)  albuterol/ipratropium for Nebulization 3 milliLiter(s) Nebulizer every 6 hours PRN Shortness of Breath and/or Wheezing  senna 2 Tablet(s) Oral at bedtime PRN Constipation

## 2021-06-17 NOTE — PROGRESS NOTE ADULT - SUBJECTIVE AND OBJECTIVE BOX
Diagnosis: Acute Leukemia    Protocol/Chemo Regimen: TBD    Day: na    Pt endorsed: fatigue and requesting to go back to home to [place]     Review of Systems: Denies nausea, vomiting, diarrhea, chest pain, sob    Pain scale: denies    Diet: DASH/consistent carbs    Allergies    morphine (Unknown)    Intolerances        ANTIMICROBIALS      HEME/ONC MEDICATIONS      STANDING MEDICATIONS  allopurinol 200 milliGRAM(s) Oral daily  Biotene Dry Mouth Oral Rinse 5 milliLiter(s) Swish and Spit four times a day  escitalopram 5 milliGRAM(s) Oral daily  finasteride 5 milliGRAM(s) Oral daily  folic acid 1 milliGRAM(s) Oral daily  furosemide    Tablet 40 milliGRAM(s) Oral daily  insulin lispro (ADMELOG) corrective regimen sliding scale   SubCutaneous three times a day before meals  insulin lispro (ADMELOG) corrective regimen sliding scale   SubCutaneous at bedtime  isosorbide   mononitrate ER Tablet (IMDUR) 60 milliGRAM(s) Oral daily  metoprolol succinate ER 50 milliGRAM(s) Oral daily  phytonadione   Solution 10 milliGRAM(s) Oral daily  polyethylene glycol 3350 17 Gram(s) Oral daily  sodium chloride 0.65% Nasal 1 Spray(s) Both Nostrils four times a day  sodium chloride 0.9%. 1000 milliLiter(s) IV Continuous <Continuous>  tamsulosin 0.4 milliGRAM(s) Oral at bedtime      PRN MEDICATIONS  acetaminophen   Tablet .. 650 milliGRAM(s) Oral every 6 hours PRN  albuterol/ipratropium for Nebulization 3 milliLiter(s) Nebulizer every 6 hours PRN  senna 2 Tablet(s) Oral at bedtime PRN        Vital Signs Last 24 Hrs  T(C): 37.3 (17 Jun 2021 05:30), Max: 37.6 (16 Jun 2021 21:22)  T(F): 99.1 (17 Jun 2021 05:30), Max: 99.6 (16 Jun 2021 21:22)  HR: 92 (17 Jun 2021 05:30) (79 - 100)  BP: 142/56 (17 Jun 2021 05:30) (118/62 - 148/69)  BP(mean): --  RR: 18 (17 Jun 2021 05:30) (16 - 18)  SpO2: 98% (17 Jun 2021 05:30) (95% - 98%)    PHYSICAL EXAM  General: NAD  HEENT: PERRLA, EOMOI, clear oropharynx, anicteric sclera, pink conjunctiva  Neck: supple  CV: (+) S1/S2 RRR  Lungs: clear to auscultation, no wheezes or rales  Abdomen: soft, non-tender, non-distended (+) BS  Ext: no clubbing, cyanosis or edema  Skin: no rashes and no petechiae  Neuro: alert and oriented X 3, no focal deficits  PIV     LABS:

## 2021-06-17 NOTE — PROGRESS NOTE ADULT - SUBJECTIVE AND OBJECTIVE BOX
[facility] DIVISION OF KIDNEY DISEASES AND HYPERTENSION -- FOLLOW UP NOTE  --------------------------------------------------------------------------------    24 hour events/subjective: Patient was seen and examined at bedside. Reported feeling well. Denies CP, SOB, fever, chills, nausea, vomiting, diarrhea, LE edema or dysuria.    PAST HISTORY  --------------------------------------------------------------------------------  No significant changes to PMH, PSH, FHx, SHx, unless otherwise noted    ALLERGIES & MEDICATIONS  --------------------------------------------------------------------------------  Allergies    morphine (Unknown)    Intolerances    Standing Inpatient Medications  allopurinol 200 milliGRAM(s) Oral daily  Biotene Dry Mouth Oral Rinse 5 milliLiter(s) Swish and Spit four times a day  escitalopram 5 milliGRAM(s) Oral daily  finasteride 5 milliGRAM(s) Oral daily  folic acid 1 milliGRAM(s) Oral daily  furosemide    Tablet 40 milliGRAM(s) Oral daily  insulin lispro (ADMELOG) corrective regimen sliding scale   SubCutaneous three times a day before meals  insulin lispro (ADMELOG) corrective regimen sliding scale   SubCutaneous at bedtime  isosorbide   mononitrate ER Tablet (IMDUR) 60 milliGRAM(s) Oral daily  metoprolol succinate ER 50 milliGRAM(s) Oral daily  phytonadione   Solution 10 milliGRAM(s) Oral daily  polyethylene glycol 3350 17 Gram(s) Oral daily  sodium chloride 0.65% Nasal 1 Spray(s) Both Nostrils four times a day  sodium chloride 0.9%. 1000 milliLiter(s) IV Continuous <Continuous>  tamsulosin 0.4 milliGRAM(s) Oral at bedtime    PRN Inpatient Medications  acetaminophen   Tablet .. 650 milliGRAM(s) Oral every 6 hours PRN  albuterol/ipratropium for Nebulization 3 milliLiter(s) Nebulizer every 6 hours PRN  senna 2 Tablet(s) Oral at bedtime PRN    REVIEW OF SYSTEMS  --------------------------------------------------------------------------------  Gen: No fevers/chills  Respiratory: No dyspnea, cough  CV: No chest pain  GI: No abdominal pain, diarrhea  : No dysuria, hematuria  MSK: No  edema    All other systems were reviewed and are negative, except as noted.    VITALS/PHYSICAL EXAM  --------------------------------------------------------------------------------  T(C): 37.3 (06-17-21 @ 05:30), Max: 37.6 (06-16-21 @ 21:22)  HR: 92 (06-17-21 @ 05:30) (79 - 100)  BP: 142/56 (06-17-21 @ 05:30) (129/56 - 148/69)  RR: 18 (06-17-21 @ 05:30) (18 - 18)  SpO2: 98% (06-17-21 @ 05:30) (95% - 98%)  Wt(kg): --    06-16-21 @ 07:01  -  06-17-21 @ 07:00  --------------------------------------------------------  IN: 653 mL / OUT: 350 mL / NET: 303 mL    Physical Exam:  	Gen: NAD  	HEENT: MMM  	Pulm: CTA B/L  	CV: S1S2  	Abd: Soft, +BS   	Ext: No LE edema B/L  	Neuro: Awake  	Skin: Warm and dry    LABS/STUDIES  --------------------------------------------------------------------------------              7.7    18.98 >-----------<  22       [06-16-21 @ 18:59]              24.9     132  |  99  |  39  ----------------------------<  125      [06-17-21 @ 08:51]  4.6   |  19  |  2.73        Ca     10.0     [06-17-21 @ 08:51]      iCa    1.36     [06-17 @ 08:55]      Mg     2.0     [06-17-21 @ 08:51]      Phos  3.1     [06-16-21 @ 18:59]    TPro  6.9  /  Alb  2.7  /  TBili  1.0  /  DBili  x   /  AST  10  /  ALT  7   /  AlkPhos  74  [06-17-21 @ 08:51]    PT/INR: PT 15.7 , INR 1.33       [06-17-21 @ 08:51]  PTT: 26.9       [06-15-21 @ 10:39]    Uric acid 5.6      [06-17-21 @ 08:51]        [06-17-21 @ 08:51]    Creatinine Trend:  SCr 2.73 [06-17 @ 08:51]  SCr 2.84 [06-16 @ 18:59]  SCr 3.01 [06-16 @ 08:38]  SCr 2.95 [06-15 @ 18:22]  SCr 3.00 [06-15 @ 06:42]    Urinalysis - [06-12-21 @ 00:38]      Color Light Yellow / Appearance Slightly Turbid / SG 1.014 / pH 6.0      Gluc Negative / Ketone Negative  / Bili Negative / Urobili Negative       Blood Trace / Protein 30 mg/dL / Leuk Est Negative / Nitrite Negative      RBC 1 / WBC 5 / Hyaline 7 / Gran 0-2 / Sq Epi  / Non Sq Epi 4 / Bacteria Negative    Urine Creatinine 78      [06-16-21 @ 22:16]  Urine Protein 8      [06-12-21 @ 22:54]  Urine Sodium 95      [06-12-21 @ 22:54]  Urine Urea Nitrogen 341      [06-13-21 @ 01:34]    Iron 155, TIBC 237, %sat 65      [06-12-21 @ 03:36]  Ferritin 827      [06-12-21 @ 04:44]  HbA1c 5.8      [11-13-17 @ 10:29]    HBsAg Nonreact      [06-14-21 @ 11:02]  HBcAb Nonreact      [06-13-21 @ 13:38]  HCV 0.17, Nonreact      [06-14-21 @ 11:02]  HIV Nonreact      [06-13-21 @ 13:52]

## 2021-06-17 NOTE — PROGRESS NOTE ADULT - ATTENDING COMMENTS
No new complaints  1.  ARF--non oliguric, no HD required.  W/U largely unrevealing but doubt benefit of renal biopsy  2.  Hypercalcemia--agree with w/u    discussed with med team

## 2021-06-17 NOTE — DIETITIAN INITIAL EVALUATION ADULT. - PERTINENT LABORATORY DATA
06-17 @ 08:51: Na 132<L>, BUN 39<H>, Cr 2.73<H>, <H>, K+ 4.6, Phos --, Mg 2.0, Alk Phos 74, ALT/SGPT 7<L>, AST/SGOT 10, HbA1c --  06-16 @ 18:59: Na 131<L>, BUN 42<H>, Cr 2.84<H>, <H>, K+ 5.4<H>, Phos 3.1, Mg 2.1, Alk Phos 71, ALT/SGPT 6<L>, AST/SGOT 11, HbA1c --    6/12: A1C 6.4%    CAPILLARY BLOOD GLUCOSE      POCT Blood Glucose.: 142 mg/dL (17 Jun 2021 11:30)  POCT Blood Glucose.: 126 mg/dL (17 Jun 2021 08:08)  POCT Blood Glucose.: 137 mg/dL (16 Jun 2021 21:25)  POCT Blood Glucose.: 128 mg/dL (16 Jun 2021 17:34)  POCT Blood Glucose.: 140 mg/dL (16 Jun 2021 12:32)

## 2021-06-18 NOTE — DISCHARGE NOTE PROVIDER - NSDCFUSCHEDAPPT_GEN_ALL_CORE_FT
TAMIKO TAYLOR ; 08/11/2021 ; [facility] TAMIKO PEREZ ; 08/11/2021 ; [facility] TAMIKO TAYLOR L ; 08/04/2021 ; [facility]  TAMIKO TAYLOR L ; 08/07/2021 ; [facility]  TAMIKO TAYLOR L ; 08/09/2021 ; [facility]  TAMIKO TAYLOR L ; 08/11/2021 ; [facility]  TAMIKO TAYLOR L ; 08/11/2021 ; [facility]
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
## 2021-06-18 NOTE — PROGRESS NOTE ADULT - SUBJECTIVE AND OBJECTIVE BOX
[facility] DIVISION OF KIDNEY DISEASES AND HYPERTENSION -- FOLLOW UP NOTE  --------------------------------------------------------------------------------    24 hour events/subjective: Patient was seen and examined at bedside. Reported feeling nausea and decrease appetite. Denies CP, SOB, fever, chills, vomiting, diarrhea, LE edema or dysuria.    PAST HISTORY  --------------------------------------------------------------------------------  No significant changes to PMH, PSH, FHx, SHx, unless otherwise noted    ALLERGIES & MEDICATIONS  --------------------------------------------------------------------------------  Allergies    morphine (Unknown)    Intolerances    Standing Inpatient Medications  allopurinol 200 milliGRAM(s) Oral daily  Biotene Dry Mouth Oral Rinse 5 milliLiter(s) Swish and Spit four times a day  escitalopram 5 milliGRAM(s) Oral daily  finasteride 5 milliGRAM(s) Oral daily  folic acid 1 milliGRAM(s) Oral daily  furosemide    Tablet 40 milliGRAM(s) Oral daily  insulin lispro (ADMELOG) corrective regimen sliding scale   SubCutaneous three times a day before meals  insulin lispro (ADMELOG) corrective regimen sliding scale   SubCutaneous at bedtime  isosorbide   mononitrate ER Tablet (IMDUR) 60 milliGRAM(s) Oral daily  metoprolol succinate ER 50 milliGRAM(s) Oral daily  polyethylene glycol 3350 17 Gram(s) Oral daily  sodium chloride 0.65% Nasal 1 Spray(s) Both Nostrils four times a day  sodium chloride 0.9%. 1000 milliLiter(s) IV Continuous <Continuous>  tamsulosin 0.4 milliGRAM(s) Oral at bedtime    PRN Inpatient Medications  acetaminophen   Tablet .. 650 milliGRAM(s) Oral every 6 hours PRN  albuterol/ipratropium for Nebulization 3 milliLiter(s) Nebulizer every 6 hours PRN  senna 2 Tablet(s) Oral at bedtime PRN    REVIEW OF SYSTEMS  --------------------------------------------------------------------------------  Gen: No fevers/chills  Respiratory: No dyspnea, cough  CV: No chest pain  GI: No abdominal pain, diarrhea  : No dysuria, hematuria  MSK: No  edema    All other systems were reviewed and are negative, except as noted.    VITALS/PHYSICAL EXAM  --------------------------------------------------------------------------------  T(C): 37.1 (06-18-21 @ 13:06), Max: 37.3 (06-18-21 @ 01:10)  HR: 87 (06-18-21 @ 13:06) (86 - 96)  BP: 145/64 (06-18-21 @ 13:06) (128/66 - 145/64)  RR: 18 (06-18-21 @ 13:06) (18 - 20)  SpO2: 98% (06-18-21 @ 13:06) (97% - 98%)  Wt(kg): --    06-17-21 @ 07:01  -  06-18-21 @ 07:00  --------------------------------------------------------  IN: 1085 mL / OUT: 1100 mL / NET: -15 mL    06-18-21 @ 07:01  -  06-18-21 @ 16:38  --------------------------------------------------------  IN: 300 mL / OUT: 0 mL / NET: 300 mL    Physical Exam:  	Gen: NAD  	HEENT: MMM  	Pulm: CTA B/L  	CV: S1S2  	Abd: Soft, +BS   	Ext: No LE edema B/L  	Neuro: Awake  	Skin: Warm and dry    LABS/STUDIES  --------------------------------------------------------------------------------              7.8    18.44 >-----------<  20       [06-18-21 @ 09:33]              25.8     131  |  99  |  42  ----------------------------<  139      [06-18-21 @ 09:33]  4.4   |  20  |  2.64        Ca     10.1     [06-18-21 @ 09:33]      iCa    1.37     [06-18 @ 09:33]      Mg     2.1     [06-18-21 @ 09:33]      Phos  3.4     [06-18-21 @ 09:33]    TPro  7.2  /  Alb  2.7  /  TBili  0.8  /  DBili  x   /  AST  10  /  ALT  6   /  AlkPhos  80  [06-18-21 @ 09:33]    PT/INR: PT 15.7 , INR 1.33       [06-17-21 @ 08:51]    Uric acid 5.5      [06-18-21 @ 09:33]        [06-18-21 @ 09:33]    Creatinine Trend:  SCr 2.64 [06-18 @ 09:33]  SCr 2.77 [06-17 @ 18:04]  SCr 2.73 [06-17 @ 08:51]  SCr 2.84 [06-16 @ 18:59]  SCr 3.01 [06-16 @ 08:38]    Urinalysis - [06-12-21 @ 00:38]      Color Light Yellow / Appearance Slightly Turbid / SG 1.014 / pH 6.0      Gluc Negative / Ketone Negative  / Bili Negative / Urobili Negative       Blood Trace / Protein 30 mg/dL / Leuk Est Negative / Nitrite Negative      RBC 1 / WBC 5 / Hyaline 7 / Gran 0-2 / Sq Epi  / Non Sq Epi 4 / Bacteria Negative    Urine Creatinine 78      [06-16-21 @ 22:16]  Urine Protein 8      [06-12-21 @ 22:54]  Urine Sodium 95      [06-12-21 @ 22:54]  Urine Urea Nitrogen 341      [06-13-21 @ 01:34]    Iron 155, TIBC 237, %sat 65      [06-12-21 @ 03:36]  Ferritin 827      [06-12-21 @ 04:44]  PTH -- (Ca 9.8)      [06-18-21 @ 10:41]   84  Vitamin D (25OH) 21.6      [06-18-21 @ 10:41]  HbA1c 5.8      [11-13-17 @ 10:29]    HBsAg Nonreact      [06-14-21 @ 11:02]  HBcAb Nonreact      [06-13-21 @ 13:38]  HCV 0.17, Nonreact      [06-14-21 @ 11:02]  HIV Nonreact      [06-13-21 @ 13:52]    C3 Complement 137      [06-17-21 @ 10:30]  C4 Complement 37      [06-17-21 @ 10:30]

## 2021-06-18 NOTE — CHART NOTE - NSCHARTNOTEFT_GEN_A_CORE
Spoke to patient's spouse Honey. She is currently updated with Kevin's hospital course and understands that the hematologist are waiting for results before making final plans. I discussed the importance of a family meeting between the hematologist and our team to help come up with a plan for Kevin. Honey mentions having slight discussions about advance directives but her and her son want what's best for Kevin. She goes on to say she does not want him to suffer. Honey will be waiting on my call on Monday to come up with a family meeting. Will express this to hematology team to come up with a time.    Please reach out for questions or concerns [phone]    Jermain Garcia M.D.  Palliative Medicine Fellow Spoke to patient's spouse Honey. She is currently updated with Kevin's hospital course and understands that the hematologist are waiting for results before making final plans. I discussed the importance of a family meeting between the hematologist and our team to help come up with a plan for Kevin. Honey mentions having slight discussions about advance directives but her and her son want what's best for Kevin. She goes on to say she does not want him to suffer. Honey will be waiting on my call on Monday to come up with a family meeting. Will express this to hematology team to come up with a time.    Please reach out for questions or concerns [phone]    Jermain Garcia M.D.  Palliative Medicine Fellow    Addendum:    Met with son at bedside along with Dr. Barrera and Dr. Masters as pts BMBx has resulted. Spoke to pt's spouse, Honey, who stated that at this moment she could not come into the hospital and said it is okay to discuss the case with Kevin, the pt's son, including decision making. We informed Kevin, the son, of the results of the BMBx as well his options including light chemotherapy and the option of no further therapy. Kevin feels that he does not want his father to suffer but also inclined to give him a chance thus opting for light chemotherapy. He understand the risks and benefits of chemotherapy and would like to give it a try. We expressed that if his father worsens or is not able to tolerate chemotherapy, we will further discussions and provide other options. All questions and concerns address. He expresses interest in speaking with a SW.     Jermain Garcia M.D.  Palliative Medicine Fellow

## 2021-06-18 NOTE — PROGRESS NOTE ADULT - ASSESSMENT
80M PMHx CKD, CAD s/p CABG 2012 & 4 stents (2 in 2014, 2 in 2015) with dilated EF of 27% BiV ICD (2013) initially admitted to [facility] acute hypoxic respiratory failure, anemia (Hgb 5.7), thrombocytopenia (plt 30) w/ blast cells (22%), lactic acidosis (LA 10) - transfer to [facility] for hematological evaluation for leukemia.  Nephrology consulted for BRISA on CKD.        # BRISA on CKD  Pt with non-oliguric BRISA on CKD unclear etiology possible pre renal in the setting of anemia, possible malignancy related or ATN or cardiorenal syndrome.  On admission sCr elevated at 3.45 (last known sCr 1.1-1.3 in 2018, recent hx CKD unclear recent baseline).  Urinalysis showed protein with trace blood.  Lab noted for serum uric acid 13.3, , phos 4.7, Echo severe LV systolic fxn, CT diffuse patchy airspace disease ?multifocal pna?. Urine electrolytes with Fe Urea of 82.5% suggestive of intrinsic pathology. Urine uric acid/creatinine ratio is < 1. Spot urine TP/CR ratio was 0.25 wnl. Kidney/bladder u/s on this admission was wnl (no hydro). Scr plateaued at 3.62 mg/dl on 6/13/21. Last sCr improved to 2.64 mg/dl today.     Recommendations  - Continue diuretics  - Continue allopurinol 200 mg PO daily   - Will follow GN Work up including PER, P-ANCA, C-ANCA, Anti-GBM ab, Parvovirus, C3, C4, serum immunofixation and anti PLA2R.  - monitor BMP/tumor lysis markers (Uric acid/LDH/haptoglobin/LFT), strict I/O, avoid nephrotoxic agents (NSAIDs, PPI, contrast), renally dose medications per GFR.    # Hypercalcemia   Uncertain etiology. Last ionized calcium elevated at 1.37 today   PTH was 84, not appropriately supressed, with low vit D of 21.6. Could be primary hyperparathyroidism?  monitor ionized calcium daily    If any questions, please feel free to contact me     Inge Varner  Nephrology Fellow  [facility] Pager: [phone]  [facility] Pager: [phone]

## 2021-06-18 NOTE — PROGRESS NOTE ADULT - PROBLEM SELECTOR PLAN 5
s/p 5vCABG 2012 (LIMA to LAD, SVG to Diag and OM, SVG PDA and RPL), PCI (2 in 2014, 2 in 2015 [facility]), HFrEF s/p CRT-D (2013),  No DAPT or AC given thrombocytopenia.

## 2021-06-18 NOTE — DISCHARGE NOTE PROVIDER - CARE PROVIDER_API CALL
Todd Wasserman)  Nephrology  [address] [place] [address], [address] [place] [address]  Phone: [phone]  Fax: [phone]  Follow Up Time: 1 month    Kenyatta Wise  HEMATOLOGY/ONCOLOGY  [address] [place] [id number]  Phone: [phone]  Fax: [phone]  Follow Up Time:

## 2021-06-18 NOTE — DISCHARGE NOTE PROVIDER - NSRESEARCHGRANT_MLMHIDDEN_GEN_A_CORE
GROUP THERAPY PROGRESS NOTE    Afsaneh Shabazz is participating in reflections.      Group time: 30 minutes    Personal goal for participation: participate in their treatment plan    Goal orientation: personal    Group therapy participation: Patient did not attend group this evening    Therapeutic interventions reviewed and discussed: no    Impression of participation: Negative participation this evening yes
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
## 2021-06-18 NOTE — DISCHARGE NOTE PROVIDER - DETAILS OF MALNUTRITION DIAGNOSIS/DIAGNOSES
This patient has been assessed with a concern for Malnutrition and was treated during this hospitalization for the following Nutrition diagnosis/diagnoses:     -  06/17/2021: Severe protein-calorie malnutrition
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
## 2021-06-18 NOTE — PROGRESS NOTE ADULT - SUBJECTIVE AND OBJECTIVE BOX
Diagnosis: Acute Leukemia    Protocol/Chemo Regimen: TBD    Day: na    Pt endorsed: fatigue and requesting to go back to home to [place]     Review of Systems: Denies nausea, vomiting, diarrhea, chest pain, sob    Pain scale: denies    Diet: DASH/consistent carbs    Allergies    morphine (Unknown)    Intolerances        ANTIMICROBIALS      HEME/ONC MEDICATIONS      STANDING MEDICATIONS  allopurinol 200 milliGRAM(s) Oral daily  Biotene Dry Mouth Oral Rinse 5 milliLiter(s) Swish and Spit four times a day  escitalopram 5 milliGRAM(s) Oral daily  finasteride 5 milliGRAM(s) Oral daily  folic acid 1 milliGRAM(s) Oral daily  furosemide    Tablet 40 milliGRAM(s) Oral daily  insulin lispro (ADMELOG) corrective regimen sliding scale   SubCutaneous three times a day before meals  insulin lispro (ADMELOG) corrective regimen sliding scale   SubCutaneous at bedtime  isosorbide   mononitrate ER Tablet (IMDUR) 60 milliGRAM(s) Oral daily  metoprolol succinate ER 50 milliGRAM(s) Oral daily  phytonadione   Solution 10 milliGRAM(s) Oral daily  polyethylene glycol 3350 17 Gram(s) Oral daily  sodium chloride 0.65% Nasal 1 Spray(s) Both Nostrils four times a day  sodium chloride 0.9%. 1000 milliLiter(s) IV Continuous <Continuous>  tamsulosin 0.4 milliGRAM(s) Oral at bedtime      PRN MEDICATIONS  acetaminophen   Tablet .. 650 milliGRAM(s) Oral every 6 hours PRN  albuterol/ipratropium for Nebulization 3 milliLiter(s) Nebulizer every 6 hours PRN  senna 2 Tablet(s) Oral at bedtime PRN        Vital Signs Last 24 Hrs  T(C): 37.1 (18 Jun 2021 06:03), Max: 37.3 (18 Jun 2021 01:10)  T(F): 98.8 (18 Jun 2021 06:03), Max: 99.2 (18 Jun 2021 01:10)  HR: 96 (18 Jun 2021 06:03) (86 - 96)  BP: 138/60 (18 Jun 2021 06:03) (128/66 - 154/68)  BP(mean): --  RR: 18 (18 Jun 2021 06:03) (18 - 20)  SpO2: 97% (18 Jun 2021 06:03) (97% - 98%)    PHYSICAL EXAM  General: NAD  HEENT: PERRLA, EOMOI, clear oropharynx, anicteric sclera, pink conjunctiva  Neck: supple  CV: (+) S1/S2 RRR  Lungs: clear to auscultation, no wheezes or rales  Abdomen: soft, non-tender, non-distended (+) BS  Ext: no clubbing, cyanosis or edema  Skin: no rashes and no petechiae  Neuro: alert and oriented X 3, no focal deficits  PIV         LABS:            Diagnosis: AML    Protocol/Chemo Regimen: Dacogen and Venetoclax     Day: plan for 6/19    Pt endorsed: fatigue and requesting to go back to home to [place]     Review of Systems: Denies nausea, vomiting, diarrhea, chest pain, sob    Pain scale: denies    Diet: DASH/consistent carbs    Allergies    morphine (Unknown)    Intolerances        ANTIMICROBIALS      HEME/ONC MEDICATIONS      STANDING MEDICATIONS  allopurinol 200 milliGRAM(s) Oral daily  Biotene Dry Mouth Oral Rinse 5 milliLiter(s) Swish and Spit four times a day  escitalopram 5 milliGRAM(s) Oral daily  finasteride 5 milliGRAM(s) Oral daily  folic acid 1 milliGRAM(s) Oral daily  furosemide    Tablet 40 milliGRAM(s) Oral daily  insulin lispro (ADMELOG) corrective regimen sliding scale   SubCutaneous three times a day before meals  insulin lispro (ADMELOG) corrective regimen sliding scale   SubCutaneous at bedtime  isosorbide   mononitrate ER Tablet (IMDUR) 60 milliGRAM(s) Oral daily  metoprolol succinate ER 50 milliGRAM(s) Oral daily  phytonadione   Solution 10 milliGRAM(s) Oral daily  polyethylene glycol 3350 17 Gram(s) Oral daily  sodium chloride 0.65% Nasal 1 Spray(s) Both Nostrils four times a day  sodium chloride 0.9%. 1000 milliLiter(s) IV Continuous <Continuous>  tamsulosin 0.4 milliGRAM(s) Oral at bedtime      PRN MEDICATIONS  acetaminophen   Tablet .. 650 milliGRAM(s) Oral every 6 hours PRN  albuterol/ipratropium for Nebulization 3 milliLiter(s) Nebulizer every 6 hours PRN  senna 2 Tablet(s) Oral at bedtime PRN        Vital Signs Last 24 Hrs  T(C): 37.1 (18 Jun 2021 06:03), Max: 37.3 (18 Jun 2021 01:10)  T(F): 98.8 (18 Jun 2021 06:03), Max: 99.2 (18 Jun 2021 01:10)  HR: 96 (18 Jun 2021 06:03) (86 - 96)  BP: 138/60 (18 Jun 2021 06:03) (128/66 - 154/68)  BP(mean): --  RR: 18 (18 Jun 2021 06:03) (18 - 20)  SpO2: 97% (18 Jun 2021 06:03) (97% - 98%)    PHYSICAL EXAM  General: NAD  HEENT: PERRLA, EOMOI, clear oropharynx, anicteric sclera, pink conjunctiva  Neck: supple  CV: (+) S1/S2 RRR  Lungs: clear to auscultation, no wheezes or rales  Abdomen: soft, non-tender, non-distended (+) BS  Ext: no clubbing, cyanosis or edema  Skin: no rashes and no petechiae  Neuro: alert and oriented X 3, no focal deficits  PIV         LABS:

## 2021-06-18 NOTE — CHART NOTE - NSCHARTNOTEFT_GEN_A_CORE
Malnutrition Follow Up     Interim events noted, chart reviewed. Note GOC continues with pt and family.     Diet: Diet, Regular:   Consistent Carbohydrate {Evening Snack} (CSTCHOSN)  DASH/TLC {Sodium & Cholesterol Restricted} (DASH)  1000mL Fluid Restriction ([id number])  For patients receiving Renal Replacement - No Protein Restr, No Conc K, No Conc Phos, Low Sodium (RENAL)  No Concentrated Potassium  Low Sodium  Supplement Feeding Modality:  Oral  Nepro Cans or Servings Per Day:  1       Frequency:  Two Times a day (06-17-21 @ 15:54)    Meds/labs reviewed. Wt reviewed, 6/15: 161.1->6/17: 171.5->6/18: 163.3 pounds, on bed scale      Skin: no pressure injuries   Edema: +1 right arm     Source: pt         Nutrition Diagnosis: severe malnutrition continues, care plan in progress, to be addressed c supplements and acceptance of meals    Plan:  1. Consider liberalize diet to low sodium, low potassium, consistent CHO c snack diet; fluid restriction per team.  2. Continue c Nepro.   3. Continue to provide assistance and encouragement c all meals and snacks.   4. RD remains available for further nutritional interventions as needed.     RD remains available.  Ambar Wiley, MS RD CDN [facility],  #[phone] Malnutrition Follow Up     Interim events noted, chart reviewed. Note GOC continues with pt and family.     Diet: Diet, Regular:   Consistent Carbohydrate {Evening Snack} (CSTCHOSN)  DASH/TLC {Sodium & Cholesterol Restricted} (DASH)  1000mL Fluid Restriction ([id number])  For patients receiving Renal Replacement - No Protein Restr, No Conc K, No Conc Phos, Low Sodium (RENAL)  No Concentrated Potassium  Low Sodium  Supplement Feeding Modality:  Oral  Nepro Cans or Servings Per Day:  1       Frequency:  Two Times a day (06-17-21 @ 15:54)    Meds/labs reviewed. Wt reviewed, 6/15: 161.1->6/17: 171.5->6/18: 163.3 pounds, on bed scale      Skin: no pressure injuries   Edema: +1 right arm     Source: pt, Ukrainian speaking but prefers to speak in English with RD, confused at times, confirmed c RN    Pt reports he ate some breakfast, as per RN, he ate most of his French toast, noted for lunch only had some diced pears and 1/2 slice of bread. RD put Nepro on ice to have people try-pt stated "not bad." Willing to try Nepro in berry flavor, will provide. Noted pt continues c constipation, on bowel regimen.     Nutrition Diagnosis: severe malnutrition continues, care plan in progress, to be addressed c supplements and acceptance of meals    Plan:  1. Consider liberalize diet to low sodium, low potassium, consistent CHO c snack diet; fluid restriction per team.  2. Continue c Nepro.   3. Noted vitamin D levels low, would consider supplementation if not contraindicated at this time.   4. Continue to provide assistance and encouragement c all meals and snacks. Discussed protein rich foods available on menu. Discussed consuming protein first at meal times and then eating the other foods.   5. RD remains available for further nutritional interventions as needed.     RD remains available.  Ambar Wiley, MS RD CDN [facility],  #[phone]

## 2021-06-18 NOTE — DISCHARGE NOTE PROVIDER - NSDCFUADDINST_GEN_ALL_CORE_FT
When you were admitted you were on aspirin and plavix to prevent sticky platelets. Those were stopped and NOT continued because your platelet count is low. Be sure to ask your Leukemia Dr. once your platelet count is >50 if and when those medications should be restarted.

## 2021-06-18 NOTE — DISCHARGE NOTE PROVIDER - NSDCMRMEDTOKEN_GEN_ALL_CORE_FT
clopidogrel 75 mg oral tablet: 1 tab(s) orally once a day  Ecotrin Adult Low Strength 81 mg oral delayed release tablet: 1 tab(s) orally once a day  escitalopram 5 mg oral tablet: 1 tab(s) orally once a day  finasteride 5 mg oral tablet: 1 tab(s) orally once a day  folic acid 1 mg oral tablet: 1 tab(s) orally once a day  Imdur 60 mg oral tablet, extended release: 1 tab(s) orally once a day (in the morning)  Lasix 40 mg oral tablet: 1 tab(s) orally once a day  tamsulosin 0.4 mg oral capsule: 1 cap(s) orally once a day (at bedtime)  Toprol-XL 50 mg oral tablet, extended release: 1 tab(s) orally once a day  venetoclax 100 mg oral tablet: 1 tab(s) orally once a day    clopidogrel 75 mg oral tablet: 1 tab(s) orally once a day  Ecotrin Adult Low Strength 81 mg oral delayed release tablet: 1 tab(s) orally once a day  escitalopram 5 mg oral tablet: 1 tab(s) orally once a day  finasteride 5 mg oral tablet: 1 tab(s) orally once a day  folic acid 1 mg oral tablet: 1 tab(s) orally once a day  Imdur 60 mg oral tablet, extended release: 1 tab(s) orally once a day (in the morning)  Lasix 40 mg oral tablet: 1 tab(s) orally once a day  posaconazole 100 mg oral delayed release tablet: 3 tab(s) orally once a day   tamsulosin 0.4 mg oral capsule: 1 cap(s) orally once a day (at bedtime)  Toprol-XL 50 mg oral tablet, extended release: 1 tab(s) orally once a day  venetoclax 100 mg oral tablet: 1 tab(s) orally once a day    clopidogrel 75 mg oral tablet: 1 tab(s) orally once a day  Ecotrin Adult Low Strength 81 mg oral delayed release tablet: 1 tab(s) orally once a day  escitalopram 5 mg oral tablet: 1 tab(s) orally once a day  finasteride 5 mg oral tablet: 1 tab(s) orally once a day  folic acid 1 mg oral tablet: 1 tab(s) orally once a day  Imdur 60 mg oral tablet, extended release: 1 tab(s) orally once a day (in the morning)  Lasix 40 mg oral tablet: 1 tab(s) orally once a day  posaconazole 40 mg/mL oral suspension: 5 milliliter(s) orally every 8 hours  tamsulosin 0.4 mg oral capsule: 1 cap(s) orally once a day (at bedtime)  Toprol-XL 50 mg oral tablet, extended release: 1 tab(s) orally once a day  venetoclax 100 mg oral tablet: 1 tab(s) orally once a day    calcium carbonate 500 mg (200 mg elemental calcium) oral tablet, chewable: 2 tab(s) orally once a day  cholecalciferol oral tablet: 2000 unit(s) orally once a day  escitalopram 5 mg oral tablet: 1 tab(s) orally once a day  finasteride 5 mg oral tablet: 1 tab(s) orally once a day  folic acid 1 mg oral tablet: 1 tab(s) orally once a day  furosemide 20 mg oral tablet: 1 tab(s) orally once a day  Imdur 60 mg oral tablet, extended release: 1 tab(s) orally once a day (in the morning)  levoFLOXacin 250 mg oral tablet: 1 tab(s) orally every 24 hours  lidocaine 5% topical film:  topically   polyethylene glycol 3350 oral powder for reconstitution: 17 gram(s) orally once a day  posaconazole 40 mg/mL oral suspension: 5 milliliter(s) orally every 8 hours  senna oral tablet: 2 tab(s) orally once a day (at bedtime), As needed, Constipation  sodium bicarbonate 650 mg oral tablet: 1 tab(s) orally 3 times a day  sucralfate 1 g/10 mL oral suspension: 10 milliliter(s) orally every 6 hours, As needed, heart burn  tamsulosin 0.4 mg oral capsule: 1 cap(s) orally once a day (at bedtime)  Toprol-XL 50 mg oral tablet, extended release: 1 tab(s) orally once a day  urea 15 g oral powder for reconstitution: 1 packet(s) orally every 12 hours  venetoclax 100 mg oral tablet: 1 tab(s) orally once a day    3 in 1 commode: 1 application   calcium carbonate 500 mg (200 mg elemental calcium) oral tablet, chewable: 2 tab(s) orally once a day  cholecalciferol oral tablet: 2000 unit(s) orally once a day  escitalopram 5 mg oral tablet: 1 tab(s) orally once a day  finasteride 5 mg oral tablet: 1 tab(s) orally once a day  folic acid 1 mg oral tablet: 1 tab(s) orally once a day  furosemide 20 mg oral tablet: 1 tab(s) orally once a day  Imdur 60 mg oral tablet, extended release: 1 tab(s) orally once a day (in the morning)  levoFLOXacin 250 mg oral tablet: 1 tab(s) orally every 24 hours  lidocaine 5% topical film: Apply topically to affected area once a day, on at 6am, off at 6pm daily   ondansetron 8 mg oral tablet: 1 tab(s) orally every 8 hours, As Needed  -for nausea MDD:24mg   polyethylene glycol 3350 oral powder for reconstitution: 17 gram(s) orally once a day  posaconazole 40 mg/mL oral suspension: 5 milliliter(s) orally every 8 hours  rolling walker :   senna oral tablet: 2 tab(s) orally once a day (at bedtime), As needed, Constipation  sodium bicarbonate 650 mg oral tablet: 1 tab(s) orally 3 times a day  sucralfate 1 g/10 mL oral suspension: 10 milliliter(s) orally every 6 hours, As needed, heart burn  tamsulosin 0.4 mg oral capsule: 1 cap(s) orally once a day (at bedtime)  Toprol-XL 50 mg oral tablet, extended release: 1 tab(s) orally once a day  urea 15 g oral powder for reconstitution: 1 packet(s) orally every 12 hours  venetoclax 100 mg oral tablet: 1 tab(s) orally once a day    3 in 1 commode: 1 application   cholecalciferol oral tablet: 2000 unit(s) orally once a day  escitalopram 5 mg oral tablet: 1 tab(s) orally once a day  finasteride 5 mg oral tablet: 1 tab(s) orally once a day  folic acid 1 mg oral tablet: 1 tab(s) orally once a day  furosemide 20 mg oral tablet: 1 tab(s) orally once a day  Imdur 60 mg oral tablet, extended release: 1 tab(s) orally once a day (in the morning)  levoFLOXacin 250 mg oral tablet: 1 tab(s) orally every 24 hours  lidocaine 5% topical film: Apply topically to affected area once a day, on at 6am, off at 6pm daily   lidocaine 5% topical film: Apply topically to affected area once a day  , on at 6am, off at 6pm daily   ondansetron 8 mg oral tablet: 1 tab(s) orally every 8 hours, As Needed  -for nausea MDD:24mg   polyethylene glycol 3350 oral powder for reconstitution: 17 gram(s) orally once a day  posaconazole 40 mg/mL oral suspension: 5 milliliter(s) orally every 8 hours  rolling walker :   senna oral tablet: 2 tab(s) orally once a day (at bedtime), As needed, Constipation  sodium bicarbonate 650 mg oral tablet: 1 tab(s) orally 3 times a day  tamsulosin 0.4 mg oral capsule: 1 cap(s) orally once a day (at bedtime)  Toprol-XL 50 mg oral tablet, extended release: 1 tab(s) orally once a day  venetoclax 100 mg oral tablet: 1 tab(s) orally once a day    3 in 1 commode: 1 application   cholecalciferol oral tablet: 2000 unit(s) orally once a day  escitalopram 5 mg oral tablet: 1 tab(s) orally once a day  finasteride 5 mg oral tablet: 1 tab(s) orally once a day  folic acid 1 mg oral tablet: 1 tab(s) orally once a day  furosemide 20 mg oral tablet: 1 tab(s) orally once a day  Imdur 60 mg oral tablet, extended release: 1 tab(s) orally once a day (in the morning)  levoFLOXacin 250 mg oral tablet: 1 tab(s) orally every 24 hours  lidocaine 5% topical film: Apply topically to affected area once a day  , on at 6am, off at 6pm daily   ondansetron 8 mg oral tablet: 1 tab(s) orally every 8 hours, As Needed  -for nausea MDD:24mg   polyethylene glycol 3350 oral powder for reconstitution: 17 gram(s) orally once a day  posaconazole 40 mg/mL oral suspension: 5 milliliter(s) orally every 8 hours  rolling walker :   senna oral tablet: 2 tab(s) orally once a day (at bedtime), As needed, Constipation  sodium bicarbonate 650 mg oral tablet: 1 tab(s) orally 3 times a day  tamsulosin 0.4 mg oral capsule: 1 cap(s) orally once a day (at bedtime)  Toprol-XL 50 mg oral tablet, extended release: 1 tab(s) orally once a day  venetoclax 100 mg oral tablet: 1 tab(s) orally once a day    3 in 1 commode: 1 application   cholecalciferol oral tablet: 2000 unit(s) orally once a day  escitalopram 5 mg oral tablet: 1 tab(s) orally once a day  finasteride 5 mg oral tablet: 1 tab(s) orally once a day  folic acid 1 mg oral tablet: 1 tab(s) orally once a day  furosemide 20 mg oral tablet: 1 tab(s) orally once a day  Imdur 60 mg oral tablet, extended release: 1 tab(s) orally once a day (in the morning)  levoFLOXacin 250 mg oral tablet: 1 tab(s) orally every 24 hours  lidocaine 5% topical film: Apply topically to affected area once a day  , on at 6am, off at 6pm daily   ondansetron 8 mg oral tablet: 1 tab(s) orally every 8 hours, As Needed  -for nausea MDD:24mg   polyethylene glycol 3350 oral powder for reconstitution: 17 gram(s) orally once a day  posaconazole 40 mg/mL oral suspension: 5 milliliter(s) orally every 8 hours  power PICC heparin 10 units per ml, give 3 ml daily to each lumen:   power PICC normal saline 10 ml in each lumen weekly:   rolling walker :   senna oral tablet: 2 tab(s) orally once a day (at bedtime), As needed, Constipation  sodium bicarbonate 650 mg oral tablet: 1 tab(s) orally 3 times a day  tamsulosin 0.4 mg oral capsule: 1 cap(s) orally once a day (at bedtime)  Toprol-XL 50 mg oral tablet, extended release: 1 tab(s) orally once a day  venetoclax 100 mg oral tablet: 1 tab(s) orally once a day

## 2021-06-18 NOTE — DISCHARGE NOTE PROVIDER - CARE PROVIDERS DIRECT ADDRESSES
,[email or web address]

## 2021-06-18 NOTE — PROGRESS NOTE ADULT - PROBLEM SELECTOR PLAN 1
Transferred to [address] [place] [address] for management of acute leukemia  FU BM bx from 6/14  Monitor labs, replace blood and lytes prn, pain control, gentle iv hydration, mouth care, antiemetics, TLS labs BID, allopurinol.   PT prolonged-Vitamin k 6/15-6/17  Palliative care consult to address GOC, patient requesting to return home to [place]   Treatment plan pending. BM bx from 6/14 AML  Monitor labs, replace blood and lytes prn, pain control, gentle iv hydration, mouth care, antiemetics, TLS labs BID, allopurinol.   PT prolonged-Vitamin k 6/15-6/17  Palliative care consult to address GOC-patient wishes to be treated and remains full code at this time.   6/19 Treat with Dacogen 20mg/m2 on days 1-5 IV and venetoclax 20mg po daily on d1, 50mg on d2 and 100mg d3 and daily after that.

## 2021-06-18 NOTE — PROGRESS NOTE ADULT - ATTENDING COMMENTS
No new complaints  1.  ARF on CKD--non oliguric.  NO hD.  W/U in progress.  May require renal bx  2.  Hypercalcemia--modest and could reflect mild PRIMARY hyperparathyroidism.  No therapy ie sensipar warranted at this time    discussed with med team

## 2021-06-18 NOTE — DISCHARGE NOTE PROVIDER - NSDCFUADDAPPT_GEN_ALL_CORE_FT
You will need to have a CBC with differential 2x per week and results faxed to Dr. Wise office from rehab. The number is [phone]      You have an appointment with Dr. Wise office on  You have an appointment with Dr. Wise office on     To [facility] on the following dates for blood work and possible Platelet transfusion      Please make an appointment with Dr Wasserman, nephrologist in 1 month  You have an appointment with Dr. Wise office on  Wednesday 8/11 at 3pm      To [facility] on the following dates for blood work and possible Platelet transfusion      Please make an appointment with Dr Wasserman, nephrologist in 1 month  You have an appointment with Dr. Wise office on  Wednesday 8/11 at 3pm      To [facility] on the following dates for blood work and possible Platelet transfusion  Wednesday 8/4 at 4pm  Saturday 8/7 at 1pm  Monday 8/9 at 3:30pm  Wednesday 8/11 at 3:30pm     Please make an appointment with Dr Wasserman, nephrologist in 1 month

## 2021-06-19 NOTE — PROGRESS NOTE ADULT - PROBLEM SELECTOR PLAN 3
Nephrology following-No indication for HD at this time.   (-) US kidney/bladder.   monitor for TLS.  follow ionized calcium daily as per nephrology Nephrology following-No indication for HD at this time.   6/15 Normal renal ultrasound  monitor for TLS.  follow ionized calcium daily as per nephrology Monitor fingersticks closely   Continue ISS   Consistent carbohydrate diet

## 2021-06-19 NOTE — PROGRESS NOTE ADULT - PROBLEM SELECTOR PLAN 4
Cardiology consult-will follow  Echo severe LV systolic fxn,  EF27%  ICD (implantable cardioverter-defibrillator) in place  Per Cardiology give 20 IV lasix after blood transfusions CHF from volume overload   Echo severe LV systolic fxn,  LVEF 27%  ICD (implantable cardioverter-defibrillator) in place  Per Cardiology give 20 IV lasix after blood transfusions  continue on daily diuresis with Lasix 40 mg oral daily Nephrology following-No indication for HD at this time.   6/15 Normal renal ultrasound  monitor for TLS.  follow ionized calcium daily as per nephrology

## 2021-06-19 NOTE — PROGRESS NOTE ADULT - PROBLEM SELECTOR PLAN 1
BM bx from 6/14 AML  Monitor labs, replace blood and lytes prn, pain control, gentle iv hydration, mouth care, antiemetics, TLS labs BID, allopurinol.   PT prolonged-Vitamin k 6/15-6/17  Palliative care consult to address GOC-patient wishes to be treated and remains full code at this time.   6/19 Treat with Dacogen 20mg/m2 on days 1-5 IV and venetoclax 20mg po daily on d1, 50mg on d2 and 100mg d3 and daily after that. BM bx from 6/14 AML  Monitor labs, replace blood and lytes prn, pain control, gentle iv hydration, mouth care, antiemetics, TLS labs BID, allopurinol.   PT prolonged-Vitamin k 6/15-6/17  Palliative care consult to address GOC-patient wishes to be treated and remains full code at this time.   6/19 Treat with Dacogen 20mg/m2 on days 1-5 IVSS   Venetoclax 20mg po daily on d1, 50mg on d2 and 100mg d3 and daily after that. FLT3 ITD (-) AML   6/14 bone marrow biopsy (+) for AML  gentle IV fluid hydration   Monitor daily CBC's and transfuse as needed   Monitor daily CMP and replete electrolytes as needed   strict I's/O's, daily weights   mouth care, anti emetics.  Allopurinol 200 mg oral daily   6/19 started on Dacogen 20 mg/m2 x 5 days + Venetoclax in escalating doses, to top at 100 mg daily from day 3   follow up TLS labs BID

## 2021-06-19 NOTE — ADVANCED PRACTICE NURSE CONSULT - ASSESSMENT
Pt. seen in bed a/ox3 . Chemotherapy teachings done,Pt. verbalized understanding with  Lulu [id number].Pt. has left arm G # 20 dated 6/17/21.Dsg dry and intact.Site with no s/s of redness,swelling or pain. With positive blood return noted and flushing easily with 10 ML NS. Lab values reviewed on rounds by Dr. Mei . Pt. received zofran 8 mg IVP as premedication. Drug verification done by 2 RN.'s. At 1201  Day 1/5 decitabine IVPB (eMAR) {Known as DACOGEN (eMAR)}  37 milliGRAM(s) in sodium chloride 0.9% 50 milliLiter(s), IV Intermittent, every 24 hours, infuse over 1 Hour(s), Stop After 5 Doses  Administration Instructions: CAUTION: HAZARDOUS DRUG. Infusing to secondary line of saline via alaris pump . Primary  RN aware of present treatment.  This is a High Alert Medication.  .
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
## 2021-06-19 NOTE — PROGRESS NOTE ADULT - PROBLEM SELECTOR PLAN 5
s/p 5vCABG 2012 (LIMA to LAD, SVG to Diag and OM, SVG PDA and RPL), PCI (2 in 2014, 2 in 2015 [facility]), HFrEF s/p CRT-D (2013),  No DAPT or AC given thrombocytopenia. CHF from volume overload   Echo severe LV systolic fxn,  LVEF 27%  ICD (implantable cardioverter-defibrillator) in place  Per Cardiology give 20 IV lasix after blood transfusions  continue on daily diuresis with Lasix 40 mg oral daily

## 2021-06-19 NOTE — PROGRESS NOTE ADULT - SUBJECTIVE AND OBJECTIVE BOX
Diagnosis: FLT3 ITD (-) Acute Myeloid Leukemia    Protocol/Chemo Regimen: Decitabine 20mg/m2 IVSS infused over 1 hour x 5 doses + Venetoclax (starting at 20 mg on day 1, 50 mg on day 2 and 100 mg from day 3 onwards)    Day: 1    Pt endorsed: fatigue    Review of Systems: Denies nausea, vomiting, diarrhea, chest pain, sob    Pain scale: denies    Diet: DASH/consistent carbs    Allergies    morphine (Unknown)    Intolerances      ANTIMICROBIALS      HEME/ONC MEDICATIONS  decitabine IVPB (eMAR) 37 milliGRAM(s) IV Intermittent every 24 hours      STANDING MEDICATIONS  allopurinol 200 milliGRAM(s) Oral daily  Biotene Dry Mouth Oral Rinse 5 milliLiter(s) Swish and Spit four times a day  escitalopram 5 milliGRAM(s) Oral daily  finasteride 5 milliGRAM(s) Oral daily  folic acid 1 milliGRAM(s) Oral daily  furosemide    Tablet 40 milliGRAM(s) Oral daily  insulin lispro (ADMELOG) corrective regimen sliding scale   SubCutaneous three times a day before meals  insulin lispro (ADMELOG) corrective regimen sliding scale   SubCutaneous at bedtime  isosorbide   mononitrate ER Tablet (IMDUR) 60 milliGRAM(s) Oral daily  metoprolol succinate ER 50 milliGRAM(s) Oral daily  ondansetron Injectable 8 milliGRAM(s) IV Push every 24 hours  polyethylene glycol 3350 17 Gram(s) Oral daily  sodium chloride 0.65% Nasal 1 Spray(s) Both Nostrils four times a day  sodium chloride 0.9%. 1000 milliLiter(s) IV Continuous <Continuous>  tamsulosin 0.4 milliGRAM(s) Oral at bedtime      PRN MEDICATIONS  acetaminophen   Tablet .. 650 milliGRAM(s) Oral every 6 hours PRN  albuterol/ipratropium for Nebulization 3 milliLiter(s) Nebulizer every 6 hours PRN  senna 2 Tablet(s) Oral at bedtime PRN        Vital Signs Last 24 Hrs  T(C): 36.4 (19 Jun 2021 08:55), Max: 37.6 (19 Jun 2021 00:59)  T(F): 97.6 (19 Jun 2021 08:55), Max: 99.7 (19 Jun 2021 00:59)  HR: 88 (19 Jun 2021 08:55) (80 - 109)  BP: 125/52 (19 Jun 2021 08:55) (116/54 - 152/65)  BP(mean): --  RR: 18 (19 Jun 2021 08:55) (16 - 18)  SpO2: 99% (19 Jun 2021 08:55) (96% - 99%)    PHYSICAL EXAM  General: NAD  Oral: no erythema or ulcers  Neck: supple  CV: (+) S1/S2 RRR  Lungs: clear to auscultation, no wheezes or rales  Abdomen: soft, non-tender, non-distended (+) BS  Ext: no edema  Skin: no rash  Neuro: alert and oriented X 3, no focal deficits  Peripheral Line: C/D/I     RECENT CULTURES:  06-13 @ 06:44  .Blood Blood-Peripheral  No Growth Final          LABS:                        8.1    19.65 )-----------( 21       ( 19 Jun 2021 07:15 )             25.8         Mean Cell Volume : 94.5 fl  Mean Cell Hemoglobin : 29.7 pg  Mean Cell Hemoglobin Concentration : 31.4 gm/dL  Auto Neutrophil # : 7.86 K/uL  Auto Lymphocyte # : 4.44 K/uL  Auto Monocyte # : 4.79 K/uL  Auto Eosinophil # : 0.00 K/uL  Auto Basophil # : 0.00 K/uL  Auto Neutrophil % : 40.0 %  Auto Lymphocyte % : 22.6 %  Auto Monocyte % : 24.4 %  Auto Eosinophil % : 0.0 %  Auto Basophil % : 0.0 %      06-19    131<L>  |  99  |  41<H>  ----------------------------<  116<H>  4.0   |  19<L>  |  2.50<H>    Ca    10.3      19 Jun 2021 07:12  Phos  3.2     06-19  Mg     2.2     06-19    TPro  7.1  /  Alb  2.8<L>  /  TBili  0.7  /  DBili  x   /  AST  11  /  ALT  6<L>  /  AlkPhos  81  06-19      Mg 2.2  Phos 3.2  Mg 2.2  Phos 3.2      Uric Acid 5.2      Uric Acid 5.2        RADIOLOGY & ADDITIONAL STUDIES:  CT Chest No Cont (06.12.21 @ 15:50)   1.  Bilateral pleural effusions, moderate on the right and small on the left.  2.  Diffuse patchy airspace disease most prominent at the lung bases, suggestive of multifocal pneumonia. Leukemic infiltrates less likely.  3.  Mediastinal lymphadenopathy, likely reactive in etiology.  4.  Enlarged main pulmonary artery suggestive of pulmonary arterial hypertension.                   Diagnosis: FLT3 ITD (-) Acute Myeloid Leukemia    Protocol/Chemo Regimen: Decitabine 20mg/m2 IVSS infused over 1 hour x 5 doses + Venetoclax (starting at 20 mg on day 1, 50 mg on day 2 and 100 mg from day 3 onwards)    Day: 1    : Long  ID # [id number]    Pt endorsed: No acute complaints, chronic left shoulder pain     Review of Systems: Denies nausea, vomiting, diarrhea, chest pain, sob    Pain scale: 0     Diet: DASH/consistent carbs    Allergies    morphine (Unknown)    Intolerances      ANTIMICROBIALS      HEME/ONC MEDICATIONS  decitabine IVPB (eMAR) 37 milliGRAM(s) IV Intermittent every 24 hours      STANDING MEDICATIONS  allopurinol 200 milliGRAM(s) Oral daily  Biotene Dry Mouth Oral Rinse 5 milliLiter(s) Swish and Spit four times a day  escitalopram 5 milliGRAM(s) Oral daily  finasteride 5 milliGRAM(s) Oral daily  folic acid 1 milliGRAM(s) Oral daily  furosemide    Tablet 40 milliGRAM(s) Oral daily  insulin lispro (ADMELOG) corrective regimen sliding scale   SubCutaneous three times a day before meals  insulin lispro (ADMELOG) corrective regimen sliding scale   SubCutaneous at bedtime  isosorbide   mononitrate ER Tablet (IMDUR) 60 milliGRAM(s) Oral daily  metoprolol succinate ER 50 milliGRAM(s) Oral daily  ondansetron Injectable 8 milliGRAM(s) IV Push every 24 hours  polyethylene glycol 3350 17 Gram(s) Oral daily  sodium chloride 0.65% Nasal 1 Spray(s) Both Nostrils four times a day  sodium chloride 0.9%. 1000 milliLiter(s) IV Continuous <Continuous>  tamsulosin 0.4 milliGRAM(s) Oral at bedtime      PRN MEDICATIONS  acetaminophen   Tablet .. 650 milliGRAM(s) Oral every 6 hours PRN  albuterol/ipratropium for Nebulization 3 milliLiter(s) Nebulizer every 6 hours PRN  senna 2 Tablet(s) Oral at bedtime PRN        Vital Signs Last 24 Hrs  T(C): 36.4 (19 Jun 2021 08:55), Max: 37.6 (19 Jun 2021 00:59)  T(F): 97.6 (19 Jun 2021 08:55), Max: 99.7 (19 Jun 2021 00:59)  HR: 88 (19 Jun 2021 08:55) (80 - 109)  BP: 125/52 (19 Jun 2021 08:55) (116/54 - 152/65)  BP(mean): --  RR: 18 (19 Jun 2021 08:55) (16 - 18)  SpO2: 99% (19 Jun 2021 08:55) (96% - 99%)    PHYSICAL EXAM  General: NAD  Oral: no erythema or ulcers  Neck: supple  CV: (+) S1/S2 RRR  Lungs: clear to auscultation, no wheezes or crackles   Abdomen: soft, non-tender, non-distended (+) BS  Ext: no edema  Skin: no rash  Peripheral Line: C/D/I     RECENT CULTURES:  06-13 @ 06:44  .Blood Blood-Peripheral  No Growth Final          LABS:                        8.1    19.65 )-----------( 21       ( 19 Jun 2021 07:15 )             25.8         Mean Cell Volume : 94.5 fl  Mean Cell Hemoglobin : 29.7 pg  Mean Cell Hemoglobin Concentration : 31.4 gm/dL  Auto Neutrophil # : 7.86 K/uL  Auto Lymphocyte # : 4.44 K/uL  Auto Monocyte # : 4.79 K/uL  Auto Eosinophil # : 0.00 K/uL  Auto Basophil # : 0.00 K/uL  Auto Neutrophil % : 40.0 %  Auto Lymphocyte % : 22.6 %  Auto Monocyte % : 24.4 %  Auto Eosinophil % : 0.0 %  Auto Basophil % : 0.0 %      06-19    131<L>  |  99  |  41<H>  ----------------------------<  116<H>  4.0   |  19<L>  |  2.50<H>    Ca    10.3      19 Jun 2021 07:12  Phos  3.2     06-19  Mg     2.2     06-19    TPro  7.1  /  Alb  2.8<L>  /  TBili  0.7  /  DBili  x   /  AST  11  /  ALT  6<L>  /  AlkPhos  81  06-19      Mg 2.2  Phos 3.2  Mg 2.2  Phos 3.2      Uric Acid 5.2      Uric Acid 5.2        RADIOLOGY & ADDITIONAL STUDIES:  CT Chest No Cont (06.12.21 @ 15:50)   1.  Bilateral pleural effusions, moderate on the right and small on the left.  2.  Diffuse patchy airspace disease most prominent at the lung bases, suggestive of multifocal pneumonia. Leukemic infiltrates less likely.  3.  Mediastinal lymphadenopathy, likely reactive in etiology.  4.  Enlarged main pulmonary artery suggestive of pulmonary arterial hypertension.                   Diagnosis: FLT3 ITD (-) Acute Myeloid Leukemia    Protocol/Chemo Regimen: Decitabine 20mg/m2 IVSS infused over 1 hour x 5 doses + Venetoclax (starting at 20 mg on day 1, 50 mg on day 2 and 100 mg from day 3 onwards)    Day: 1    : Long  ID # [id number]    Pt endorsed: No acute complaints, chronic left shoulder pain     Review of Systems: Denies nausea, vomiting, diarrhea, chest pain or sob     Pain scale: 0     Diet: DASH/consistent carbs    Allergies    morphine (Unknown)    Intolerances      ANTIMICROBIALS      HEME/ONC MEDICATIONS  decitabine IVPB (eMAR) 37 milliGRAM(s) IV Intermittent every 24 hours      STANDING MEDICATIONS  allopurinol 200 milliGRAM(s) Oral daily  Biotene Dry Mouth Oral Rinse 5 milliLiter(s) Swish and Spit four times a day  escitalopram 5 milliGRAM(s) Oral daily  finasteride 5 milliGRAM(s) Oral daily  folic acid 1 milliGRAM(s) Oral daily  furosemide    Tablet 40 milliGRAM(s) Oral daily  insulin lispro (ADMELOG) corrective regimen sliding scale   SubCutaneous three times a day before meals  insulin lispro (ADMELOG) corrective regimen sliding scale   SubCutaneous at bedtime  isosorbide   mononitrate ER Tablet (IMDUR) 60 milliGRAM(s) Oral daily  metoprolol succinate ER 50 milliGRAM(s) Oral daily  ondansetron Injectable 8 milliGRAM(s) IV Push every 24 hours  polyethylene glycol 3350 17 Gram(s) Oral daily  sodium chloride 0.65% Nasal 1 Spray(s) Both Nostrils four times a day  sodium chloride 0.9%. 1000 milliLiter(s) IV Continuous <Continuous>  tamsulosin 0.4 milliGRAM(s) Oral at bedtime      PRN MEDICATIONS  acetaminophen   Tablet .. 650 milliGRAM(s) Oral every 6 hours PRN  albuterol/ipratropium for Nebulization 3 milliLiter(s) Nebulizer every 6 hours PRN  senna 2 Tablet(s) Oral at bedtime PRN        Vital Signs Last 24 Hrs  T(C): 36.4 (19 Jun 2021 08:55), Max: 37.6 (19 Jun 2021 00:59)  T(F): 97.6 (19 Jun 2021 08:55), Max: 99.7 (19 Jun 2021 00:59)  HR: 88 (19 Jun 2021 08:55) (80 - 109)  BP: 125/52 (19 Jun 2021 08:55) (116/54 - 152/65)  BP(mean): --  RR: 18 (19 Jun 2021 08:55) (16 - 18)  SpO2: 99% (19 Jun 2021 08:55) (96% - 99%)    PHYSICAL EXAM  General: NAD  Oral: no erythema or ulcers  Neck: supple  CV: (+) S1/S2 RRR  Lungs: decreased breath sounds in the lower lung bases, no wheezes or crackles  Abdomen: soft, non-tender, non-distended (+) BS  Ext: no edema  Skin: no rash  Peripheral Line: C/D/I     RECENT CULTURES:  06-13 @ 06:44  .Blood Blood-Peripheral  No Growth Final          LABS:                        8.1    19.65 )-----------( 21       ( 19 Jun 2021 07:15 )             25.8         Mean Cell Volume : 94.5 fl  Mean Cell Hemoglobin : 29.7 pg  Mean Cell Hemoglobin Concentration : 31.4 gm/dL  Auto Neutrophil # : 7.86 K/uL  Auto Lymphocyte # : 4.44 K/uL  Auto Monocyte # : 4.79 K/uL  Auto Eosinophil # : 0.00 K/uL  Auto Basophil # : 0.00 K/uL  Auto Neutrophil % : 40.0 %  Auto Lymphocyte % : 22.6 %  Auto Monocyte % : 24.4 %  Auto Eosinophil % : 0.0 %  Auto Basophil % : 0.0 %      06-19    131<L>  |  99  |  41<H>  ----------------------------<  116<H>  4.0   |  19<L>  |  2.50<H>    Ca    10.3      19 Jun 2021 07:12  Phos  3.2     06-19  Mg     2.2     06-19    TPro  7.1  /  Alb  2.8<L>  /  TBili  0.7  /  DBili  x   /  AST  11  /  ALT  6<L>  /  AlkPhos  81  06-19      Mg 2.2  Phos 3.2  Mg 2.2  Phos 3.2      Uric Acid 5.2      Uric Acid 5.2        RADIOLOGY & ADDITIONAL STUDIES:  CT Chest No Cont (06.12.21 @ 15:50)   1.  Bilateral pleural effusions, moderate on the right and small on the left.  2.  Diffuse patchy airspace disease most prominent at the lung bases, suggestive of multifocal pneumonia. Leukemic infiltrates less likely.  3.  Mediastinal lymphadenopathy, likely reactive in etiology.  4.  Enlarged main pulmonary artery suggestive of pulmonary arterial hypertension.

## 2021-06-19 NOTE — PROGRESS NOTE ADULT - PROBLEM SELECTOR PLAN 2
Patient is not neutropenic  If febrile, culture.  BC NGTD Patient is not neutropenic  If febrile, send pan cultures and perform CXR   defer prophylactic antimicrobials Patient is not neutropenic   If febrile, send pan cultures and perform CXR   defer prophylactic antimicrobials

## 2021-06-19 NOTE — PROGRESS NOTE ADULT - PROBLEM SELECTOR PLAN 6
No VTE ppx 2/2 thrombocytopenia. s/p 5vCABG 2012 (LIMA to LAD, SVG to Diag and OM, SVG PDA and RPL), PCI (2 in 2014, 2 in 2015 [facility]), HFrEF s/p CRT-D (2013),  No DAPT or AC given thrombocytopenia.

## 2021-06-20 NOTE — PROGRESS NOTE ADULT - SUBJECTIVE AND OBJECTIVE BOX
Diagnosis: FLT3 ITD (-) Acute Myeloid Leukemia    Protocol/Chemo Regimen: Decitabine 20mg/m2 IVSS infused over 1 hour x 5 doses + Venetoclax (starting at 20 mg on day 1, 50 mg on day 2 and 100 mg from day 3 onwards)    Day: 2    :     Pt endorsed: No acute complaints, chronic left shoulder pain     Review of Systems: Denies nausea, vomiting, diarrhea, chest pain or sob     Pain scale: 0     Diet: DASH/consistent carbs    Allergies    morphine (Unknown)    Intolerances      ANTIMICROBIALS      HEME/ONC MEDICATIONS  decitabine IVPB (eMAR) 37 milliGRAM(s) IV Intermittent every 24 hours  venetoclax 50 milliGRAM(s) Oral once      STANDING MEDICATIONS  allopurinol 200 milliGRAM(s) Oral daily  Biotene Dry Mouth Oral Rinse 5 milliLiter(s) Swish and Spit four times a day  escitalopram 5 milliGRAM(s) Oral daily  finasteride 5 milliGRAM(s) Oral daily  folic acid 1 milliGRAM(s) Oral daily  furosemide    Tablet 40 milliGRAM(s) Oral daily  insulin lispro (ADMELOG) corrective regimen sliding scale   SubCutaneous three times a day before meals  insulin lispro (ADMELOG) corrective regimen sliding scale   SubCutaneous at bedtime  isosorbide   mononitrate ER Tablet (IMDUR) 60 milliGRAM(s) Oral daily  metoprolol succinate ER 50 milliGRAM(s) Oral daily  ondansetron Injectable 8 milliGRAM(s) IV Push every 24 hours  polyethylene glycol 3350 17 Gram(s) Oral daily  sodium chloride 0.65% Nasal 1 Spray(s) Both Nostrils four times a day  sodium chloride 0.9%. 1000 milliLiter(s) IV Continuous <Continuous>  tamsulosin 0.4 milliGRAM(s) Oral at bedtime      PRN MEDICATIONS  acetaminophen   Tablet .. 650 milliGRAM(s) Oral every 6 hours PRN  albuterol/ipratropium for Nebulization 3 milliLiter(s) Nebulizer every 6 hours PRN  senna 2 Tablet(s) Oral at bedtime PRN        Vital Signs Last 24 Hrs  T(C): 36.7 (20 Jun 2021 10:36), Max: 37.7 (20 Jun 2021 00:55)  T(F): 98.1 (20 Jun 2021 10:36), Max: 99.9 (20 Jun 2021 00:55)  HR: 104 (20 Jun 2021 10:36) (65 - 110)  BP: 107/74 (20 Jun 2021 10:36) (107/67 - 145/62)  BP(mean): --  RR: 16 (20 Jun 2021 10:36) (16 - 18)  SpO2: 94% (20 Jun 2021 10:36) (94% - 98%)      PHYSICAL EXAM  General: NAD  Oral: no erythema or ulcers  Neck: supple  CV: (+) S1/S2 RRR  Lungs: decreased breath sounds in the lower lung bases, no wheezes or crackles  Abdomen: soft, non-tender, non-distended (+) BS  Ext: no edema  Skin: no rash  Peripheral Line: C/D/I     RECENT CULTURES:  06-13 @ 06:44  .Blood Blood-Peripheral  No Growth Final    LABS:                        7.7    20.46 )-----------( 18       ( 20 Jun 2021 09:23 )             25.3         Mean Cell Volume : 94.8 fl  Mean Cell Hemoglobin : 28.8 pg  Mean Cell Hemoglobin Concentration : 30.4 gm/dL  Auto Neutrophil # : 10.15 K/uL  Auto Lymphocyte # : 2.97 K/uL  Auto Monocyte # : 4.89 K/uL  Auto Eosinophil # : 0.00 K/uL  Auto Basophil # : 0.00 K/uL  Auto Neutrophil % : 49.6 %  Auto Lymphocyte % : 14.5 %  Auto Monocyte % : 23.9 %  Auto Eosinophil % : 0.0 %  Auto Basophil % : 0.0 %      06-20    131<L>  |  101  |  39<H>  ----------------------------<  146<H>  4.1   |  17<L>  |  2.33<H>    Ca    10.1      20 Jun 2021 09:23  Phos  3.1     06-20  Mg     2.0     06-20    TPro  7.2  /  Alb  2.6<L>  /  TBili  0.8  /  DBili  x   /  AST  10  /  ALT  6<L>  /  AlkPhos  90  06-20      Mg 2.0  Phos 3.1  Mg 2.2  Phos 3.1            Uric Acid 4.9      Uric Acid 5.0        RADIOLOGY & ADDITIONAL STUDIES:    CT Chest No Cont (06.12.21 @ 15:50)   1.  Bilateral pleural effusions, moderate on the right and small on the left.  2.  Diffuse patchy airspace disease most prominent at the lung bases, suggestive of multifocal pneumonia. Leukemic infiltrates less likely.  3.  Mediastinal lymphadenopathy, likely reactive in etiology.  4.  Enlarged main pulmonary artery suggestive of pulmonary arterial hypertension.           Diagnosis: FLT3 ITD (-) Acute Myeloid Leukemia    Protocol/Chemo Regimen: Decitabine 20mg/m2 IVSS infused over 1 hour x 5 doses + Venetoclax (starting at 20 mg on day 1, 50 mg on day 2 and 100 mg from day 3 onwards)    Day: 2    : Jacob Mon     Pt endorsed: No acute complaints, chronic left shoulder pain, body aches this morning relieved with Tylenol.     Review of Systems: Denies nausea, vomiting, diarrhea, chest pain or sob     Pain scale: 0     Diet: DASH/consistent carbs    Allergies    morphine (Unknown)    Intolerances      ANTIMICROBIALS      HEME/ONC MEDICATIONS  decitabine IVPB (eMAR) 37 milliGRAM(s) IV Intermittent every 24 hours  venetoclax 50 milliGRAM(s) Oral once      STANDING MEDICATIONS  allopurinol 200 milliGRAM(s) Oral daily  Biotene Dry Mouth Oral Rinse 5 milliLiter(s) Swish and Spit four times a day  escitalopram 5 milliGRAM(s) Oral daily  finasteride 5 milliGRAM(s) Oral daily  folic acid 1 milliGRAM(s) Oral daily  furosemide    Tablet 40 milliGRAM(s) Oral daily  insulin lispro (ADMELOG) corrective regimen sliding scale   SubCutaneous three times a day before meals  insulin lispro (ADMELOG) corrective regimen sliding scale   SubCutaneous at bedtime  isosorbide   mononitrate ER Tablet (IMDUR) 60 milliGRAM(s) Oral daily  metoprolol succinate ER 50 milliGRAM(s) Oral daily  ondansetron Injectable 8 milliGRAM(s) IV Push every 24 hours  polyethylene glycol 3350 17 Gram(s) Oral daily  sodium chloride 0.65% Nasal 1 Spray(s) Both Nostrils four times a day  sodium chloride 0.9%. 1000 milliLiter(s) IV Continuous <Continuous>  tamsulosin 0.4 milliGRAM(s) Oral at bedtime      PRN MEDICATIONS  acetaminophen   Tablet .. 650 milliGRAM(s) Oral every 6 hours PRN  albuterol/ipratropium for Nebulization 3 milliLiter(s) Nebulizer every 6 hours PRN  senna 2 Tablet(s) Oral at bedtime PRN        Vital Signs Last 24 Hrs  T(C): 36.7 (20 Jun 2021 10:36), Max: 37.7 (20 Jun 2021 00:55)  T(F): 98.1 (20 Jun 2021 10:36), Max: 99.9 (20 Jun 2021 00:55)  HR: 104 (20 Jun 2021 10:36) (65 - 110)  BP: 107/74 (20 Jun 2021 10:36) (107/67 - 145/62)  BP(mean): --  RR: 16 (20 Jun 2021 10:36) (16 - 18)  SpO2: 94% (20 Jun 2021 10:36) (94% - 98%)      PHYSICAL EXAM  General: NAD  Oral: no erythema or ulcers  Neck: supple  CV: (+) S1/S2 RRR  Lungs: decreased breath sounds in the lower lung bases, no wheezes, rales crackles or rhonchi  Abdomen: soft, non-tender, non-distended (+) BS  Ext: no edema, compression stockings in place.   Skin: no rash  Peripheral Line: C/D/I     RECENT CULTURES:  06-13 @ 06:44  .Blood Blood-Peripheral  No Growth Final    LABS:                        7.7    20.46 )-----------( 18       ( 20 Jun 2021 09:23 )             25.3         Mean Cell Volume : 94.8 fl  Mean Cell Hemoglobin : 28.8 pg  Mean Cell Hemoglobin Concentration : 30.4 gm/dL  Auto Neutrophil # : 10.15 K/uL  Auto Lymphocyte # : 2.97 K/uL  Auto Monocyte # : 4.89 K/uL  Auto Eosinophil # : 0.00 K/uL  Auto Basophil # : 0.00 K/uL  Auto Neutrophil % : 49.6 %  Auto Lymphocyte % : 14.5 %  Auto Monocyte % : 23.9 %  Auto Eosinophil % : 0.0 %  Auto Basophil % : 0.0 %      06-20    131<L>  |  101  |  39<H>  ----------------------------<  146<H>  4.1   |  17<L>  |  2.33<H>    Ca    10.1      20 Jun 2021 09:23  Phos  3.1     06-20  Mg     2.0     06-20    TPro  7.2  /  Alb  2.6<L>  /  TBili  0.8  /  DBili  x   /  AST  10  /  ALT  6<L>  /  AlkPhos  90  06-20      Mg 2.0  Phos 3.1  Mg 2.2  Phos 3.1            Uric Acid 4.9      Uric Acid 5.0        RADIOLOGY & ADDITIONAL STUDIES:    CT Chest No Cont (06.12.21 @ 15:50)   1.  Bilateral pleural effusions, moderate on the right and small on the left.  2.  Diffuse patchy airspace disease most prominent at the lung bases, suggestive of multifocal pneumonia. Leukemic infiltrates less likely.  3.  Mediastinal lymphadenopathy, likely reactive in etiology.  4.  Enlarged main pulmonary artery suggestive of pulmonary arterial hypertension.           Diagnosis: FLT3 ITD (-) Acute Myeloid Leukemia    Protocol/Chemo Regimen: Decitabine 20mg/m2 IVSS infused over 1 hour x 5 doses + Venetoclax (starting at 20 mg on day 1, 50 mg on day 2 and 100 mg from day 3 and escalated to   400 mg daily)    Day: 2    : Jacob Mon     Pt endorsed: No acute complaints, chronic left shoulder pain, body aches this morning relieved with Tylenol.     Review of Systems: Denies nausea, vomiting, diarrhea, chest pain or sob     Pain scale: 0     Diet: DASH/consistent carbs    Allergies    morphine (Unknown)    Intolerances      ANTIMICROBIALS      HEME/ONC MEDICATIONS  decitabine IVPB (eMAR) 37 milliGRAM(s) IV Intermittent every 24 hours  venetoclax 50 milliGRAM(s) Oral once      STANDING MEDICATIONS  allopurinol 200 milliGRAM(s) Oral daily  Biotene Dry Mouth Oral Rinse 5 milliLiter(s) Swish and Spit four times a day  escitalopram 5 milliGRAM(s) Oral daily  finasteride 5 milliGRAM(s) Oral daily  folic acid 1 milliGRAM(s) Oral daily  furosemide    Tablet 40 milliGRAM(s) Oral daily  insulin lispro (ADMELOG) corrective regimen sliding scale   SubCutaneous three times a day before meals  insulin lispro (ADMELOG) corrective regimen sliding scale   SubCutaneous at bedtime  isosorbide   mononitrate ER Tablet (IMDUR) 60 milliGRAM(s) Oral daily  metoprolol succinate ER 50 milliGRAM(s) Oral daily  ondansetron Injectable 8 milliGRAM(s) IV Push every 24 hours  polyethylene glycol 3350 17 Gram(s) Oral daily  sodium chloride 0.65% Nasal 1 Spray(s) Both Nostrils four times a day  sodium chloride 0.9%. 1000 milliLiter(s) IV Continuous <Continuous>  tamsulosin 0.4 milliGRAM(s) Oral at bedtime      PRN MEDICATIONS  acetaminophen   Tablet .. 650 milliGRAM(s) Oral every 6 hours PRN  albuterol/ipratropium for Nebulization 3 milliLiter(s) Nebulizer every 6 hours PRN  senna 2 Tablet(s) Oral at bedtime PRN        Vital Signs Last 24 Hrs  T(C): 36.7 (20 Jun 2021 10:36), Max: 37.7 (20 Jun 2021 00:55)  T(F): 98.1 (20 Jun 2021 10:36), Max: 99.9 (20 Jun 2021 00:55)  HR: 104 (20 Jun 2021 10:36) (65 - 110)  BP: 107/74 (20 Jun 2021 10:36) (107/67 - 145/62)  BP(mean): --  RR: 16 (20 Jun 2021 10:36) (16 - 18)  SpO2: 94% (20 Jun 2021 10:36) (94% - 98%)      PHYSICAL EXAM  General: NAD  Oral: no erythema or ulcers  Neck: supple  CV: (+) S1/S2 RRR  Lungs: decreased breath sounds in the lower lung bases, no wheezes, rales crackles or rhonchi  Abdomen: soft, non-tender, non-distended (+) BS  Ext: no edema, compression stockings in place.   Skin: no rash  Peripheral Line: C/D/I     RECENT CULTURES:  06-13 @ 06:44  .Blood Blood-Peripheral  No Growth Final    LABS:                        7.7    20.46 )-----------( 18       ( 20 Jun 2021 09:23 )             25.3         Mean Cell Volume : 94.8 fl  Mean Cell Hemoglobin : 28.8 pg  Mean Cell Hemoglobin Concentration : 30.4 gm/dL  Auto Neutrophil # : 10.15 K/uL  Auto Lymphocyte # : 2.97 K/uL  Auto Monocyte # : 4.89 K/uL  Auto Eosinophil # : 0.00 K/uL  Auto Basophil # : 0.00 K/uL  Auto Neutrophil % : 49.6 %  Auto Lymphocyte % : 14.5 %  Auto Monocyte % : 23.9 %  Auto Eosinophil % : 0.0 %  Auto Basophil % : 0.0 %      06-20    131<L>  |  101  |  39<H>  ----------------------------<  146<H>  4.1   |  17<L>  |  2.33<H>    Ca    10.1      20 Jun 2021 09:23  Phos  3.1     06-20  Mg     2.0     06-20    TPro  7.2  /  Alb  2.6<L>  /  TBili  0.8  /  DBili  x   /  AST  10  /  ALT  6<L>  /  AlkPhos  90  06-20      Mg 2.0  Phos 3.1  Mg 2.2  Phos 3.1            Uric Acid 4.9      Uric Acid 5.0        RADIOLOGY & ADDITIONAL STUDIES:    CT Chest No Cont (06.12.21 @ 15:50)   1.  Bilateral pleural effusions, moderate on the right and small on the left.  2.  Diffuse patchy airspace disease most prominent at the lung bases, suggestive of multifocal pneumonia. Leukemic infiltrates less likely.  3.  Mediastinal lymphadenopathy, likely reactive in etiology.  4.  Enlarged main pulmonary artery suggestive of pulmonary arterial hypertension.           Diagnosis: FLT3 ITD (-) Acute Myeloid Leukemia    Protocol/Chemo Regimen: Decitabine 20mg/m2 IVSS infused over 1 hour x 5 doses + Venetoclax (starting at 20 mg on day 1, 50 mg on day 2 and 100 mg from day 3 and escalated to   400 mg daily)    Day: 2    : Jacob Mon     Pt endorsed: No acute complaints, chronic left shoulder pain, body aches this morning relieved with Tylenol.     Review of Systems: Denies nausea, vomiting, diarrhea, chest pain or sob     Pain scale: 0     Diet: DASH/consistent carbs    Allergies    morphine (Unknown)    Intolerances      ANTIMICROBIALS      HEME/ONC MEDICATIONS  decitabine IVPB (eMAR) 37 milliGRAM(s) IV Intermittent every 24 hours  venetoclax 50 milliGRAM(s) Oral once      STANDING MEDICATIONS  allopurinol 200 milliGRAM(s) Oral daily  Biotene Dry Mouth Oral Rinse 5 milliLiter(s) Swish and Spit four times a day  escitalopram 5 milliGRAM(s) Oral daily  finasteride 5 milliGRAM(s) Oral daily  folic acid 1 milliGRAM(s) Oral daily  furosemide    Tablet 40 milliGRAM(s) Oral daily  insulin lispro (ADMELOG) corrective regimen sliding scale   SubCutaneous three times a day before meals  insulin lispro (ADMELOG) corrective regimen sliding scale   SubCutaneous at bedtime  isosorbide   mononitrate ER Tablet (IMDUR) 60 milliGRAM(s) Oral daily  metoprolol succinate ER 50 milliGRAM(s) Oral daily  ondansetron Injectable 8 milliGRAM(s) IV Push every 24 hours  polyethylene glycol 3350 17 Gram(s) Oral daily  sodium chloride 0.65% Nasal 1 Spray(s) Both Nostrils four times a day  sodium chloride 0.9%. 1000 milliLiter(s) IV Continuous <Continuous>  tamsulosin 0.4 milliGRAM(s) Oral at bedtime      PRN MEDICATIONS  acetaminophen   Tablet .. 650 milliGRAM(s) Oral every 6 hours PRN  albuterol/ipratropium for Nebulization 3 milliLiter(s) Nebulizer every 6 hours PRN  senna 2 Tablet(s) Oral at bedtime PRN        Vital Signs Last 24 Hrs  T(C): 36.7 (20 Jun 2021 10:36), Max: 37.7 (20 Jun 2021 00:55)  T(F): 98.1 (20 Jun 2021 10:36), Max: 99.9 (20 Jun 2021 00:55)  HR: 104 (20 Jun 2021 10:36) (65 - 110)  BP: 107/74 (20 Jun 2021 10:36) (107/67 - 145/62)  BP(mean): --  RR: 16 (20 Jun 2021 10:36) (16 - 18)  SpO2: 94% (20 Jun 2021 10:36) (94% - 98%)      PHYSICAL EXAM  General: NAD  Oral: no erythema or ulcers  CV: (+) S1/S2 RRR  Lungs: decreased breath sounds in the lower lung bases, no wheezes, rales or rhonchi  Abdomen: soft, non-tender, non-distended (+) BS  Ext: no edema, compression stockings in place.   Skin: no rash  Peripheral Line: C/D/I     RECENT CULTURES:  06-13 @ 06:44  .Blood Blood-Peripheral  No Growth Final    LABS:                        7.7    20.46 )-----------( 18       ( 20 Jun 2021 09:23 )             25.3         Mean Cell Volume : 94.8 fl  Mean Cell Hemoglobin : 28.8 pg  Mean Cell Hemoglobin Concentration : 30.4 gm/dL  Auto Neutrophil # : 10.15 K/uL  Auto Lymphocyte # : 2.97 K/uL  Auto Monocyte # : 4.89 K/uL  Auto Eosinophil # : 0.00 K/uL  Auto Basophil # : 0.00 K/uL  Auto Neutrophil % : 49.6 %  Auto Lymphocyte % : 14.5 %  Auto Monocyte % : 23.9 %  Auto Eosinophil % : 0.0 %  Auto Basophil % : 0.0 %      06-20    131<L>  |  101  |  39<H>  ----------------------------<  146<H>  4.1   |  17<L>  |  2.33<H>    Ca    10.1      20 Jun 2021 09:23  Phos  3.1     06-20  Mg     2.0     06-20    TPro  7.2  /  Alb  2.6<L>  /  TBili  0.8  /  DBili  x   /  AST  10  /  ALT  6<L>  /  AlkPhos  90  06-20      Mg 2.0  Phos 3.1  Mg 2.2  Phos 3.1            Uric Acid 4.9      Uric Acid 5.0        RADIOLOGY & ADDITIONAL STUDIES:    CT Chest No Cont (06.12.21 @ 15:50)   1.  Bilateral pleural effusions, moderate on the right and small on the left.  2.  Diffuse patchy airspace disease most prominent at the lung bases, suggestive of multifocal pneumonia. Leukemic infiltrates less likely.  3.  Mediastinal lymphadenopathy, likely reactive in etiology.  4.  Enlarged main pulmonary artery suggestive of pulmonary arterial hypertension.
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
## 2021-06-20 NOTE — PROGRESS NOTE ADULT - PROBLEM SELECTOR PLAN 4
Nephrology following-No indication for HD at this time.   6/15 Normal renal ultrasound  monitor for TLS.  follow ionized calcium daily as per nephrology

## 2021-06-20 NOTE — ADVANCED PRACTICE NURSE CONSULT - ASSESSMENT
Pt. seen in bed a/ox3 . Chemotherapy teachings done, Pt. verbalized understanding, RN speaks Amharic. .Pt. has left arm G # 20. Dsg dry and intact .Site with no s/s of redness, swelling or pain. With positive blood return noted and flushing easily with 10 ML NS. Lab values reviewed on rounds by Dr. Mei . Pt. received zofran 8 mg IVP as premedication. Drug verification done by 2 RN.'s. At 11:52  Day 2/5 decitabine 37 milliGRAM(s) started, Infusing  over 1 hr as a  secondary  via alaris pump . Primary  RN aware of present treatment.  Safety maintained.   .

## 2021-06-20 NOTE — PROGRESS NOTE ADULT - PROBLEM SELECTOR PLAN 5
CHF from volume overload   Echo severe LV systolic fxn,  LVEF 27%  ICD (implantable cardioverter-defibrillator) in place  Per Cardiology give 20 IV lasix after blood transfusions  continue on daily diuresis with Lasix 40 mg oral daily

## 2021-06-20 NOTE — PROGRESS NOTE ADULT - PROBLEM SELECTOR PLAN 1
FLT3 ITD (-) AML   6/14 bone marrow biopsy (+) for AML  gentle IV fluid hydration   Monitor daily CBC's and transfuse as needed   Monitor daily CMP and replete electrolytes as needed   strict I's/O's, daily weights   mouth care, anti emetics.  Allopurinol 200 mg oral daily   6/19 started on Dacogen 20 mg/m2 x 5 days + Venetoclax in escalating doses, to top at 100 mg daily from day 3   follow up TLS labs BID FLT3 ITD (-) AML   6/14 bone marrow biopsy (+) for AML  gentle IV fluid hydration   Monitor daily CBC's and transfuse as needed   Monitor daily CMP and replete electrolytes as needed   strict I's/O's, daily weights   mouth care, anti emetics.  Allopurinol 200 mg oral daily   6/19 started on Dacogen 20 mg/m2 x 5 days + Venetoclax in escalating doses, to top at 400 mg oral daily   follow up TLS labs BID FLT3 ITD (-) AML   6/14 bone marrow biopsy (+) for AML  Monitor daily CBC's and transfuse as needed   Monitor daily CMP and replete electrolytes as needed   strict I's/O's, daily weights   mouth care, anti emetics.  Allopurinol 200 mg oral daily   6/19 started on Dacogen 20 mg/m2 x 5 days + Venetoclax in escalating doses, to top at 400 mg oral daily   follow up TLS labs BID  Patient placed on gentle IV fluid hydration @ 50cc/hr for duration of Dacogen - re

## 2021-06-20 NOTE — PROGRESS NOTE ADULT - PROBLEM SELECTOR PLAN 2
Patient is not neutropenic   If febrile, send pan cultures and perform CXR   defer prophylactic antimicrobials

## 2021-06-20 NOTE — PROGRESS NOTE ADULT - PROBLEM SELECTOR PLAN 6
s/p 5vCABG 2012 (LIMA to LAD, SVG to Diag and OM, SVG PDA and RPL), PCI (2 in 2014, 2 in 2015 [facility]), HFrEF s/p CRT-D (2013),  No DAPT or AC given thrombocytopenia.

## 2021-06-21 NOTE — PROGRESS NOTE ADULT - PROBLEM SELECTOR PLAN 2
Patient is not neutropenic   If febrile, send pan cultures and perform CXR   defer prophylactic antimicrobials Patient is not neutropenic   Vanco dosed daily by level. Zosyn added for ppx 6/21 (in lieu of cefepime given increased confusion/lethargy).   If febrile, send pan cultures and   6/21 CT head shows sinusitis-monitor  6/21 CXR mid and lower right lung-pna can not be excluded.

## 2021-06-21 NOTE — PROGRESS NOTE ADULT - ATTENDING COMMENTS
No new complaints  1.  ARF--no major improvement despite volume, other optimization.  Renal biopsy in setting of proteinuria may be worthwhile  2.  Hypercalcemia--PTH elevated ( if SECONDARY ionized Ca is LOW),  Agree with trial of sensipar    discussed with team No new complaints  1.  ARF--no major improvement despite volume, other optimization.  Renal biopsy in setting of proteinuria may be worthwhile depending on remaining w/u  2.  Hypercalcemia--PTH elevated ( if SECONDARY ionized Ca is LOW),  Agree with trial of sensipar    discussed with team
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
## 2021-06-21 NOTE — PROGRESS NOTE ADULT - SUBJECTIVE AND OBJECTIVE BOX
[facility] DIVISION OF KIDNEY DISEASES AND HYPERTENSION -- FOLLOW UP NOTE  --------------------------------------------------------------------------------    24 hour events/subjective: Patient was seen and examined at bedside. Reported feeling well.     PAST HISTORY  --------------------------------------------------------------------------------  No significant changes to PMH, PSH, FHx, SHx, unless otherwise noted    ALLERGIES & MEDICATIONS  --------------------------------------------------------------------------------  Allergies    morphine (Unknown)    Intolerances    Standing Inpatient Medications  allopurinol 200 milliGRAM(s) Oral daily  Biotene Dry Mouth Oral Rinse 5 milliLiter(s) Swish and Spit four times a day  cefepime   IVPB      cefepime   IVPB 1000 milliGRAM(s) IV Intermittent every 12 hours  decitabine IVPB (eMAR) 37 milliGRAM(s) IV Intermittent every 24 hours  escitalopram 5 milliGRAM(s) Oral daily  finasteride 5 milliGRAM(s) Oral daily  folic acid 1 milliGRAM(s) Oral daily  furosemide    Tablet 40 milliGRAM(s) Oral daily  insulin lispro (ADMELOG) corrective regimen sliding scale   SubCutaneous at bedtime  insulin lispro (ADMELOG) corrective regimen sliding scale   SubCutaneous three times a day before meals  isosorbide   mononitrate ER Tablet (IMDUR) 60 milliGRAM(s) Oral daily  metoprolol succinate ER 50 milliGRAM(s) Oral daily  ondansetron Injectable 8 milliGRAM(s) IV Push every 24 hours  polyethylene glycol 3350 17 Gram(s) Oral daily  sodium chloride 0.65% Nasal 1 Spray(s) Both Nostrils four times a day  sodium chloride 0.9%. 1000 milliLiter(s) IV Continuous <Continuous>  tamsulosin 0.4 milliGRAM(s) Oral at bedtime  venetoclax 100 milliGRAM(s) Oral <User Schedule>    PRN Inpatient Medications  acetaminophen   Tablet .. 650 milliGRAM(s) Oral every 6 hours PRN  albuterol/ipratropium for Nebulization 3 milliLiter(s) Nebulizer every 6 hours PRN  senna 2 Tablet(s) Oral at bedtime PRN    REVIEW OF SYSTEMS  --------------------------------------------------------------------------------  Gen: No fevers/chills   Respiratory: No dyspnea, cough  CV: No chest pain  GI: No abdominal pain, diarrhea  : No dysuria, hematuria  MSK: No  edema    All other systems were reviewed and are negative, except as noted.    VITALS/PHYSICAL EXAM  --------------------------------------------------------------------------------  T(C): 37.3 (06-21-21 @ 13:22), Max: 37.8 (06-20-21 @ 20:44)  HR: 99 (06-21-21 @ 13:22) (95 - 106)  BP: 128/67 (06-21-21 @ 13:22) (114/56 - 159/66)  RR: 18 (06-21-21 @ 13:22) (18 - 18)  SpO2: 97% (06-21-21 @ 13:22) (93% - 97%)  Wt(kg): --    06-20-21 @ 07:01  -  06-21-21 @ 07:00  --------------------------------------------------------  IN: 1708 mL / OUT: 1450 mL / NET: 258 mL    Physical Exam:  	Gen: NAD  	HEENT: MMM  	Pulm: CTA B/L  	CV: S1S2  	Abd: Soft, +BS   	Ext: No LE edema B/L  	Neuro: Awake  	Skin: Warm and dry      LABS/STUDIES  --------------------------------------------------------------------------------              7.0    21.68 >-----------<  15       [06-21-21 @ 09:43]              23.2     130  |  99  |  38  ----------------------------<  125      [06-21-21 @ 09:43]  4.1   |  18  |  2.42        Ca     10.2     [06-21-21 @ 09:43]      iCa    1.46     [06-21 @ 09:50]      Mg     2.0     [06-21-21 @ 09:43]      Phos  3.6     [06-21-21 @ 09:43]    TPro  6.7  /  Alb  2.5  /  TBili  0.8  /  DBili  x   /  AST  12  /  ALT  7   /  AlkPhos  86  [06-21-21 @ 09:43]    PT/INR: PT 15.5 , INR 1.31       [06-21-21 @ 09:43]    Uric acid 4.8      [06-21-21 @ 09:43]        [06-21-21 @ 09:43]    Creatinine Trend:  SCr 2.42 [06-21 @ 09:43]  SCr 2.50 [06-20 @ 21:12]  SCr 2.33 [06-20 @ 09:23]  SCr 2.53 [06-19 @ 18:00]  SCr 2.50 [06-19 @ 07:12]    Urinalysis - [06-12-21 @ 00:38]      Color Light Yellow / Appearance Slightly Turbid / SG 1.014 / pH 6.0      Gluc Negative / Ketone Negative  / Bili Negative / Urobili Negative       Blood Trace / Protein 30 mg/dL / Leuk Est Negative / Nitrite Negative      RBC 1 / WBC 5 / Hyaline 7 / Gran 0-2 / Sq Epi  / Non Sq Epi 4 / Bacteria Negative    Urine Creatinine 78      [06-16-21 @ 22:16]    Iron 155, TIBC 237, %sat 65      [06-12-21 @ 03:36]  Ferritin 827      [06-12-21 @ 04:44]  PTH -- (Ca 9.8)      [06-18-21 @ 10:41]   84  Vitamin D (25OH) 21.6      [06-18-21 @ 10:41]  HbA1c 5.8      [11-13-17 @ 10:29]    HBsAg Nonreact      [06-14-21 @ 11:02]  HBcAb Nonreact      [06-13-21 @ 13:38]  HCV 0.17, Nonreact      [06-14-21 @ 11:02]  HIV Nonreact      [06-13-21 @ 13:52]    PER: titer 1:320, pattern Speckled      [06-17-21 @ 10:30]  C3 Complement 137      [06-17-21 @ 10:30]  C4 Complement 37      [06-17-21 @ 10:30]  anti-GBM 2      [06-17-21 @ 13:12]  Immunofixation Serum:   No Monoclonal Band Identified    Reference Range: None Detected      [06-17-21 @ 10:30]

## 2021-06-21 NOTE — PROGRESS NOTE ADULT - SUBJECTIVE AND OBJECTIVE BOX
Diagnosis: FLT3 ITD (-) Acute Myeloid Leukemia    Protocol/Chemo Regimen: Decitabine 20mg/m2 IVSS infused over 1 hour x 5 doses + Venetoclax (starting at 20 mg on day 1, 50 mg on day 2 and 100 mg from day 3 and escalated to   400 mg daily)    Day: 3    : Jacob Mon     Pt endorsed: No acute complaints, chronic left shoulder pain, body aches this morning relieved with Tylenol.     Review of Systems: Denies nausea, vomiting, diarrhea, chest pain or sob     Pain scale: 0     Diet: DASH/consistent carbs    Allergies    morphine (Unknown)    Intolerances        ANTIMICROBIALS      HEME/ONC MEDICATIONS  decitabine IVPB (eMAR) 37 milliGRAM(s) IV Intermittent every 24 hours  venetoclax 100 milliGRAM(s) Oral <User Schedule>      STANDING MEDICATIONS  allopurinol 200 milliGRAM(s) Oral daily  Biotene Dry Mouth Oral Rinse 5 milliLiter(s) Swish and Spit four times a day  escitalopram 5 milliGRAM(s) Oral daily  finasteride 5 milliGRAM(s) Oral daily  folic acid 1 milliGRAM(s) Oral daily  furosemide    Tablet 40 milliGRAM(s) Oral daily  insulin lispro (ADMELOG) corrective regimen sliding scale   SubCutaneous three times a day before meals  insulin lispro (ADMELOG) corrective regimen sliding scale   SubCutaneous at bedtime  isosorbide   mononitrate ER Tablet (IMDUR) 60 milliGRAM(s) Oral daily  metoprolol succinate ER 50 milliGRAM(s) Oral daily  ondansetron Injectable 8 milliGRAM(s) IV Push every 24 hours  polyethylene glycol 3350 17 Gram(s) Oral daily  sodium chloride 0.65% Nasal 1 Spray(s) Both Nostrils four times a day  sodium chloride 0.9%. 1000 milliLiter(s) IV Continuous <Continuous>  tamsulosin 0.4 milliGRAM(s) Oral at bedtime      PRN MEDICATIONS  acetaminophen   Tablet .. 650 milliGRAM(s) Oral every 6 hours PRN  albuterol/ipratropium for Nebulization 3 milliLiter(s) Nebulizer every 6 hours PRN  senna 2 Tablet(s) Oral at bedtime PRN        Vital Signs Last 24 Hrs  T(C): 37 (21 Jun 2021 06:10), Max: 37.8 (20 Jun 2021 14:00)  T(F): 98.6 (21 Jun 2021 06:10), Max: 100 (20 Jun 2021 14:00)  HR: 106 (21 Jun 2021 06:10) (65 - 106)  BP: 159/66 (21 Jun 2021 06:10) (107/67 - 159/66)  BP(mean): --  RR: 18 (21 Jun 2021 06:10) (16 - 18)  SpO2: 97% (21 Jun 2021 06:10) (94% - 98%)    PHYSICAL EXAM  General: NAD  Oral: no erythema or ulcers  CV: (+) S1/S2 RRR  Lungs: decreased breath sounds in the lower lung bases, no wheezes, rales or rhonchi  Abdomen: soft, non-tender, non-distended (+) BS  Ext: no edema, compression stockings in place.   Skin: no rash  Peripheral Line: C/D/I     RECENT CULTURES:        LABS:                        Diagnosis: FLT3 ITD (-) Acute Myeloid Leukemia    Protocol/Chemo Regimen: Decitabine 20mg/m2 IVSS infused over 1 hour x 5 doses + Venetoclax (starting at 20 mg on day 1, 50 mg on day 2 and 100 mg from day 3 and escalated to   400 mg daily)    Day: 3       Pt endorsed: More agitated and lethargic, stating he is tired and to let him alone.    Review of Systems: Denies nausea, vomiting, diarrhea, chest pain or sob     Pain scale: 0     Diet: DASH/consistent carbs    Allergies    morphine (Unknown)    Intolerances        ANTIMICROBIALS      HEME/ONC MEDICATIONS  decitabine IVPB (eMAR) 37 milliGRAM(s) IV Intermittent every 24 hours  venetoclax 100 milliGRAM(s) Oral <User Schedule>      STANDING MEDICATIONS  allopurinol 200 milliGRAM(s) Oral daily  Biotene Dry Mouth Oral Rinse 5 milliLiter(s) Swish and Spit four times a day  escitalopram 5 milliGRAM(s) Oral daily  finasteride 5 milliGRAM(s) Oral daily  folic acid 1 milliGRAM(s) Oral daily  furosemide    Tablet 40 milliGRAM(s) Oral daily  insulin lispro (ADMELOG) corrective regimen sliding scale   SubCutaneous three times a day before meals  insulin lispro (ADMELOG) corrective regimen sliding scale   SubCutaneous at bedtime  isosorbide   mononitrate ER Tablet (IMDUR) 60 milliGRAM(s) Oral daily  metoprolol succinate ER 50 milliGRAM(s) Oral daily  ondansetron Injectable 8 milliGRAM(s) IV Push every 24 hours  polyethylene glycol 3350 17 Gram(s) Oral daily  sodium chloride 0.65% Nasal 1 Spray(s) Both Nostrils four times a day  sodium chloride 0.9%. 1000 milliLiter(s) IV Continuous <Continuous>  tamsulosin 0.4 milliGRAM(s) Oral at bedtime      PRN MEDICATIONS  acetaminophen   Tablet .. 650 milliGRAM(s) Oral every 6 hours PRN  albuterol/ipratropium for Nebulization 3 milliLiter(s) Nebulizer every 6 hours PRN  senna 2 Tablet(s) Oral at bedtime PRN        Vital Signs Last 24 Hrs  T(C): 37 (21 Jun 2021 06:10), Max: 37.8 (20 Jun 2021 14:00)  T(F): 98.6 (21 Jun 2021 06:10), Max: 100 (20 Jun 2021 14:00)  HR: 106 (21 Jun 2021 06:10) (65 - 106)  BP: 159/66 (21 Jun 2021 06:10) (107/67 - 159/66)  BP(mean): --  RR: 18 (21 Jun 2021 06:10) (16 - 18)  SpO2: 97% (21 Jun 2021 06:10) (94% - 98%)    PHYSICAL EXAM  General: NAD  Oral: no erythema or ulcers  CV: (+) S1/S2 RRR  Lungs: decreased breath sounds in the lower lung bases, no wheezes, rales or rhonchi  Abdomen: soft, non-tender, non-distended (+) BS  Ext: no edema, compression stockings in place.   Skin: no rash  Peripheral Line: C/D/I     LABS:    Blood Cultures:                           7.0    21.68 )-----------( 15       ( 21 Jun 2021 09:43 )             23.2         Mean Cell Volume : 94.3 fl  Mean Cell Hemoglobin : 28.5 pg  Mean Cell Hemoglobin Concentration : 30.2 gm/dL  Auto Neutrophil # : 11.97 K/uL  Auto Lymphocyte # : 2.80 K/uL  Auto Monocyte # : 3.73 K/uL  Auto Eosinophil # : 0.20 K/uL  Auto Basophil # : 0.00 K/uL  Auto Neutrophil % : 55.2 %  Auto Lymphocyte % : 12.9 %  Auto Monocyte % : 17.2 %  Auto Eosinophil % : 0.9 %  Auto Basophil % : 0.0 %      06-21    130<L>  |  99  |  38<H>  ----------------------------<  125<H>  4.1   |  18<L>  |  2.42<H>    Ca    10.2      21 Jun 2021 09:43  Phos  3.6     06-21  Mg     2.0     06-21    TPro  6.7  /  Alb  2.5<L>  /  TBili  0.8  /  DBili  x   /  AST  12  /  ALT  7<L>  /  AlkPhos  86  06-21      Mg 2.0  Phos 3.6  Mg 2.0  Phos 3.3      PT/INR - ( 21 Jun 2021 09:43 )   PT: 15.5 sec;   INR: 1.31 ratio               Uric Acid 4.8      Uric Acid 4.7                           Diagnosis: FLT3 ITD (-) Acute Myeloid Leukemia    Protocol/Chemo Regimen: Decitabine 20mg/m2 IVSS infused over 1 hour x 5 doses + Venetoclax (starting at 20 mg on day 1, 50 mg on day 2 and 100 mg from day 3 and escalated to   400 mg daily)    Day: 3       Pt endorsed: More agitated and lethargic, stating he is tired and to let him alone.    Review of Systems: Denies nausea, vomiting, diarrhea, chest pain or sob     Pain scale: 0     Diet: DASH/consistent carbs    Allergies    morphine (Unknown)    Intolerances        ANTIMICROBIALS      HEME/ONC MEDICATIONS  decitabine IVPB (eMAR) 37 milliGRAM(s) IV Intermittent every 24 hours  venetoclax 100 milliGRAM(s) Oral <User Schedule>      STANDING MEDICATIONS  allopurinol 200 milliGRAM(s) Oral daily  Biotene Dry Mouth Oral Rinse 5 milliLiter(s) Swish and Spit four times a day  escitalopram 5 milliGRAM(s) Oral daily  finasteride 5 milliGRAM(s) Oral daily  folic acid 1 milliGRAM(s) Oral daily  furosemide    Tablet 40 milliGRAM(s) Oral daily  insulin lispro (ADMELOG) corrective regimen sliding scale   SubCutaneous three times a day before meals  insulin lispro (ADMELOG) corrective regimen sliding scale   SubCutaneous at bedtime  isosorbide   mononitrate ER Tablet (IMDUR) 60 milliGRAM(s) Oral daily  metoprolol succinate ER 50 milliGRAM(s) Oral daily  ondansetron Injectable 8 milliGRAM(s) IV Push every 24 hours  polyethylene glycol 3350 17 Gram(s) Oral daily  sodium chloride 0.65% Nasal 1 Spray(s) Both Nostrils four times a day  sodium chloride 0.9%. 1000 milliLiter(s) IV Continuous <Continuous>  tamsulosin 0.4 milliGRAM(s) Oral at bedtime      PRN MEDICATIONS  acetaminophen   Tablet .. 650 milliGRAM(s) Oral every 6 hours PRN  albuterol/ipratropium for Nebulization 3 milliLiter(s) Nebulizer every 6 hours PRN  senna 2 Tablet(s) Oral at bedtime PRN        Vital Signs Last 24 Hrs  T(C): 37 (21 Jun 2021 06:10), Max: 37.8 (20 Jun 2021 14:00)  T(F): 98.6 (21 Jun 2021 06:10), Max: 100 (20 Jun 2021 14:00)  HR: 106 (21 Jun 2021 06:10) (65 - 106)  BP: 159/66 (21 Jun 2021 06:10) (107/67 - 159/66)  BP(mean): --  RR: 18 (21 Jun 2021 06:10) (16 - 18)  SpO2: 97% (21 Jun 2021 06:10) (94% - 98%)    PHYSICAL EXAM  General: NAD  Oral: no erythema or ulcers  CV: (+) S1/S2 RRR  Lungs: decreased breath sounds in the lower lung bases, no wheezes, rales or rhonchi  Abdomen: soft, non-tender, non-distended (+) BS  Ext: no edema, compression stockings in place.   Skin: no rash  Peripheral Line: C/D/I     LABS:                            7.0    21.68 )-----------( 15       ( 21 Jun 2021 09:43 )             23.2         Mean Cell Volume : 94.3 fl  Mean Cell Hemoglobin : 28.5 pg  Mean Cell Hemoglobin Concentration : 30.2 gm/dL  Auto Neutrophil # : 11.97 K/uL  Auto Lymphocyte # : 2.80 K/uL  Auto Monocyte # : 3.73 K/uL  Auto Eosinophil # : 0.20 K/uL  Auto Basophil # : 0.00 K/uL  Auto Neutrophil % : 55.2 %  Auto Lymphocyte % : 12.9 %  Auto Monocyte % : 17.2 %  Auto Eosinophil % : 0.9 %  Auto Basophil % : 0.0 %      06-21    130<L>  |  99  |  38<H>  ----------------------------<  125<H>  4.1   |  18<L>  |  2.42<H>    Ca    10.2      21 Jun 2021 09:43  Phos  3.6     06-21  Mg     2.0     06-21    TPro  6.7  /  Alb  2.5<L>  /  TBili  0.8  /  DBili  x   /  AST  12  /  ALT  7<L>  /  AlkPhos  86  06-21      Mg 2.0  Phos 3.6  Mg 2.0  Phos 3.3      PT/INR - ( 21 Jun 2021 09:43 )   PT: 15.5 sec;   INR: 1.31 ratio               Uric Acid 4.8      Uric Acid 4.7                           Diagnosis: FLT3 ITD (-) Acute Myeloid Leukemia    Protocol/Chemo Regimen: Decitabine 20mg/m2 IVSS infused over 1 hour x 5 doses + Venetoclax (starting at 20 mg on day 1, 50 mg on day 2 and 100 mg from day 3 and continue at 100mg for now.     Day: 3 (Dacogen held today)       Pt endorsed: More agitated and lethargic, stating he is tired and to let him alone.    Review of Systems: Denies nausea, vomiting, diarrhea, chest pain or sob     Pain scale: 0     Diet: DASH/consistent carbs    Allergies    morphine (Unknown)    Intolerances        ANTIMICROBIALS      HEME/ONC MEDICATIONS  decitabine IVPB (eMAR) 37 milliGRAM(s) IV Intermittent every 24 hours  venetoclax 100 milliGRAM(s) Oral <User Schedule>      STANDING MEDICATIONS  allopurinol 200 milliGRAM(s) Oral daily  Biotene Dry Mouth Oral Rinse 5 milliLiter(s) Swish and Spit four times a day  escitalopram 5 milliGRAM(s) Oral daily  finasteride 5 milliGRAM(s) Oral daily  folic acid 1 milliGRAM(s) Oral daily  furosemide    Tablet 40 milliGRAM(s) Oral daily  insulin lispro (ADMELOG) corrective regimen sliding scale   SubCutaneous three times a day before meals  insulin lispro (ADMELOG) corrective regimen sliding scale   SubCutaneous at bedtime  isosorbide   mononitrate ER Tablet (IMDUR) 60 milliGRAM(s) Oral daily  metoprolol succinate ER 50 milliGRAM(s) Oral daily  ondansetron Injectable 8 milliGRAM(s) IV Push every 24 hours  polyethylene glycol 3350 17 Gram(s) Oral daily  sodium chloride 0.65% Nasal 1 Spray(s) Both Nostrils four times a day  sodium chloride 0.9%. 1000 milliLiter(s) IV Continuous <Continuous>  tamsulosin 0.4 milliGRAM(s) Oral at bedtime      PRN MEDICATIONS  acetaminophen   Tablet .. 650 milliGRAM(s) Oral every 6 hours PRN  albuterol/ipratropium for Nebulization 3 milliLiter(s) Nebulizer every 6 hours PRN  senna 2 Tablet(s) Oral at bedtime PRN        Vital Signs Last 24 Hrs  T(C): 37 (21 Jun 2021 06:10), Max: 37.8 (20 Jun 2021 14:00)  T(F): 98.6 (21 Jun 2021 06:10), Max: 100 (20 Jun 2021 14:00)  HR: 106 (21 Jun 2021 06:10) (65 - 106)  BP: 159/66 (21 Jun 2021 06:10) (107/67 - 159/66)  BP(mean): --  RR: 18 (21 Jun 2021 06:10) (16 - 18)  SpO2: 97% (21 Jun 2021 06:10) (94% - 98%)    PHYSICAL EXAM  General: NAD  Oral: no erythema or ulcers  CV: (+) S1/S2 RRR  Lungs: decreased breath sounds in the lower lung bases, no wheezes, rales or rhonchi  Abdomen: soft, non-tender, non-distended (+) BS  Ext: no edema,   Skin: no rash  Peripheral Line: C/D/I     LABS:                            7.0    21.68 )-----------( 15       ( 21 Jun 2021 09:43 )             23.2         Mean Cell Volume : 94.3 fl  Mean Cell Hemoglobin : 28.5 pg  Mean Cell Hemoglobin Concentration : 30.2 gm/dL  Auto Neutrophil # : 11.97 K/uL  Auto Lymphocyte # : 2.80 K/uL  Auto Monocyte # : 3.73 K/uL  Auto Eosinophil # : 0.20 K/uL  Auto Basophil # : 0.00 K/uL  Auto Neutrophil % : 55.2 %  Auto Lymphocyte % : 12.9 %  Auto Monocyte % : 17.2 %  Auto Eosinophil % : 0.9 %  Auto Basophil % : 0.0 %      06-21    130<L>  |  99  |  38<H>  ----------------------------<  125<H>  4.1   |  18<L>  |  2.42<H>    Ca    10.2      21 Jun 2021 09:43  Phos  3.6     06-21  Mg     2.0     06-21    TPro  6.7  /  Alb  2.5<L>  /  TBili  0.8  /  DBili  x   /  AST  12  /  ALT  7<L>  /  AlkPhos  86  06-21      Mg 2.0  Phos 3.6  Mg 2.0  Phos 3.3      PT/INR - ( 21 Jun 2021 09:43 )   PT: 15.5 sec;   INR: 1.31 ratio               Uric Acid 4.8      Uric Acid 4.7
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
## 2021-06-21 NOTE — PROGRESS NOTE ADULT - PROBLEM SELECTOR PLAN 4
Nephrology following-No indication for HD at this time.   6/15 Normal renal ultrasound  monitor for TLS.  follow ionized calcium daily as per nephrology Nephrology following-No indication for HD at this time.   6/15 Normal renal ultrasound  monitor for TLS.  follow ionized calcium daily as per nephrology  6/21 Start Sensipar for hyperparathyroidism. Nephrology following-No indication for HD at this time.   6/15 Normal renal ultrasound  monitor for TLS.  follow ionized calcium daily as per nephrology  6/21 Start Sensipar for hyperparathyroidism.  Vitamin D 2000 daily.
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
## 2021-06-21 NOTE — PROGRESS NOTE ADULT - PROBLEM SELECTOR PLAN 3
Monitor fingersticks closely   Continue ISS   Consistent carbohydrate diet Monitor fingersticks closely   Continue ISS   Consistent carbohydrate diet  Endocrine consult for hypercalcemia-no acute intervention 6/21

## 2021-06-21 NOTE — PROGRESS NOTE ADULT - PROBLEM SELECTOR PLAN 1
FLT3 ITD (-) AML   6/14 bone marrow biopsy (+) for AML  Monitor daily CBC's and transfuse as needed   Monitor daily CMP and replete electrolytes as needed   strict I's/O's, daily weights   mouth care, anti emetics.  Allopurinol 200 mg oral daily   6/19 started on Dacogen 20 mg/m2 x 5 days + Venetoclax in escalating doses, to top at 400 mg oral daily   follow up TLS labs BID  Patient placed on gentle IV fluid hydration @ 50cc/hr for duration of Dacogen - re FLT3 ITD (-) AML   6/14 bone marrow biopsy (+) for AML  Monitor daily CBC's and transfuse as needed   Monitor daily CMP and replete electrolytes as needed   strict I's/O's, daily weights   mouth care, anti emetics.  Allopurinol 200 mg oral daily   6/19 started on Dacogen 20 mg/m2 x 5 days + Venetoclax in escalating doses, to top at 400 mg oral daily   follow up TLS labs BID  Patient placed on gentle IV fluid hydration @ 50cc/hr for duration of Dacogen.   Hypercalcemia noted consult endocrine.FU BC 6/21  Right Arm tenderness and swelling- FU RUE US to rule outpt FLT3 ITD (-) AML   6/14 bone marrow biopsy (+) for AML  Monitor daily CBC's and transfuse as needed   Monitor daily CMP and replete electrolytes as needed   strict I's/O's, daily weights   mouth care, anti emetics.  Allopurinol 200 mg oral daily   6/19 started on Dacogen 20 mg/m2 x 5 days + Venetoclax in escalating doses, to top at 400 mg oral daily   follow up TLS labs BID  Patient placed on gentle IV fluid hydration @ 50cc/hr for duration of Dacogen.   Hypercalcemia noted consult endocrine-  FU BC 6/21  Right Arm tenderness and swelling- FU RUE US to rule out FLT3 ITD (-) AML   6/14 bone marrow biopsy (+) for AML  Monitor daily CBC's and transfuse as needed   Monitor daily CMP and replete electrolytes as needed   strict I's/O's, daily weights   mouth care, anti emetics.  Allopurinol 200 mg oral daily   6/19 started on Dacogen 20 mg/m2 x 5 days + Venetoclax in escalating doses, to top at 400 mg oral daily   follow up TLS labs BID  Patient placed on gentle IV fluid hydration @ 50cc/hr for duration of Dacogen.   Hypercalcemia noted consult endocrine-no action at this time. Monitor.   FU BC 6/21  Right Arm tenderness and swelling- FU RUE US to rule out DVT FLT3 ITD (-) AML   6/14 bone marrow biopsy (+) for AML  Monitor daily CBC's and transfuse as needed   Monitor daily CMP and replete electrolytes as needed   strict I's/O's, daily weights   mouth care, anti emetics.  Allopurinol 200 mg oral daily   6/19 started on Dacogen 20 mg/m2 x 5 days + Venetoclax in escalating doses. Remains on 100mg at this time.   follow up TLS labs BID  Patient placed on gentle IV fluid hydration @ 50cc/hr for duration of Dacogen.   Hypercalcemia noted consult endocrine-no action at this time. Monitor.   FU BC 6/21  Right Arm tenderness and swelling- FU RUE US to rule out DVT  6/21 Hold Dacogen today as patient more acutely confused.

## 2021-06-21 NOTE — CONSULT NOTE ADULT - PROBLEM SELECTOR PROBLEM 1
Acute on chronic kidney failure
Hyperparathyroidism
Congestive heart failure, unspecified HF chronicity, unspecified heart failure type

## 2021-06-21 NOTE — PROGRESS NOTE ADULT - PROBLEM SELECTOR PLAN 6
s/p 5vCABG 2012 (LIMA to LAD, SVG to Diag and OM, SVG PDA and RPL), PCI (2 in 2014, 2 in 2015 [facility]), HFrEF s/p CRT-D (2013),  No DAPT or AC given thrombocytopenia.

## 2021-06-21 NOTE — PROGRESS NOTE ADULT - ASSESSMENT
80M PMHx CKD, CAD s/p CABG 2012 & 4 stents (2 in 2014, 2 in 2015) with dilated EF of 27% BiV ICD (2013) initially admitted to [facility] cor acute hypoxic respiratory failure, anemia (Hgb 5.7), thrombocytopenia (plt 30) w/ blast cells (22%), lactic acidosis (LA 10) - transfer to [facility] for hematological evaluation for leukemia.  Nephrology consulted for BRISA on CKD.        # BRISA on CKD  Pt with non-oliguric BRISA on CKD unclear etiology possible pre renal in the setting of anemia, possible malignancy related or ATN or cardiorenal syndrome.  On admission sCr elevated at 3.45 (last known sCr 1.1-1.3 in 2018, recent hx CKD unclear recent baseline).  Urinalysis showed protein with trace blood.  Lab noted for serum uric acid 13.3, , phos 4.7, Echo severe LV systolic fxn, CT diffuse patchy airspace disease ?multifocal pna?. Urine electrolytes with Fe Urea of 82.5% suggestive of intrinsic pathology. Urine uric acid/creatinine ratio is < 1. Spot urine TP/CR ratio was 0.25 wnl. Kidney/bladder u/s on this admission was wnl (no hydro). Scr plateaued at 3.62 mg/dl on 6/13/21. Last sCr improved to 2.42 mg/dl today.     Recommendations  - Continue diuretics  - Continue allopurinol 200 mg PO daily   - PER with 1:320 speckled pattern   - GN work up including c3 and C4 wnl, serum immunofixation with no monoclonality, anti GBM negative. Parvovirus DNA PCR negative.   - Will follow GN Work up including P-ANCA, C-ANCA and anti PLA2R.  - monitor BMP/tumor lysis markers (Uric acid/LDH/haptoglobin/LFT), strict I/O, avoid nephrotoxic agents (NSAIDs, PPI, contrast), renally dose medications per GFR.    # Hypercalcemia   Uncertain etiology. Last ionized calcium elevated at 1.46 today   PTH was 84, not appropriately supressed, with low vit D of 21.6. Could be primary hyperparathyroidism  Suggest to start on Sensipar 30 mg PO daily   monitor ionized calcium daily    If any questions, please feel free to contact me     Inge Varner  Nephrology Fellow  [facility] Pager: [phone]  [facility] Pager: [phone]

## 2021-06-21 NOTE — CONSULT NOTE ADULT - ATTENDING COMMENTS
Patient was somnolent on rounds, but wife and son at bedside. No FH of parathyroid or calcium issues per son. They live in [place] so endocrine f/u in [place] with Dr. Anderson's group would be good. No indication for bisphosphonate or sensipar at this time. Continue gentle hydration, even a KVO at 30cc/hour would be fine.  Discussed with LISA Cullen.  Milka Campbell MD  Endocrinology Attending  Mondays and Tuesdays 9am-6pm: [phone] (pager)  Other days, night and weekend: [phone]

## 2021-06-21 NOTE — PROGRESS NOTE ADULT - PROBLEM SELECTOR PLAN 8
Patient waxes and wanes from lethargic and agitated.   CT head- remote left frontoparietal temporal encephalomalacia, left lateral ventricle ex vacuo enlargement, volume loss, microvascular disease, no hemorrhage or midline shift. Interval increased paranasal sinus mucosal thickening with fluid levels in the frontal sinuses, sinusitis not excluded. If symptoms persist consider follow-up head CT or MR if no contraindications.
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
## 2021-06-22 NOTE — ADVANCED PRACTICE NURSE CONSULT - ASSESSMENT
Patient with left wrist peripheral IV gauge 20 patent. Patient slightly confused but gets oriented x2-3 at times. 2 RNs verification completed prior to start. Lab results seen by Dr. Vásquez. Patient got zofran 8mg IV ATC as antiemetic. At 1544,dacogen 37mg in 60ml NSS started x 1 hour infusion at 60ml/hr as secondary set via NSS l;ine into right lower wrist peripheral IV through Alaris IV pump. Primary RN aware of plan of care. Safety maintained.

## 2021-06-22 NOTE — PROGRESS NOTE ADULT - PROBLEM SELECTOR PLAN 4
Nephrology following-No indication for HD at this time.   6/15 Normal renal ultrasound  monitor for TLS.  follow ionized calcium daily as per nephrology  6/21 Start Sensipar for hyperparathyroidism.  Vitamin D 2000 daily.

## 2021-06-22 NOTE — PROGRESS NOTE ADULT - PROBLEM SELECTOR PLAN 1
FLT3 ITD (-) AML   6/14 bone marrow biopsy (+) for AML  Monitor daily CBC's and transfuse as needed   Monitor daily CMP and replete electrolytes as needed   strict I's/O's, daily weights   mouth care, anti emetics.  Allopurinol 200 mg oral daily   6/19 started on Dacogen 20 mg/m2 x 5 days + Venetoclax in escalating doses, to top at 400 mg oral daily   follow up TLS labs BID  Patient placed on gentle IV fluid hydration @ 50cc/hr for duration of Dacogen.   Hypercalcemia noted consult endocrine-no action at this time. Monitor.   FU BC 6/21  Right Arm tenderness and swelling- FU RUE US to rule out DVT FLT3 ITD (-) AML   Dacogen held on day 3 6/21 due to acute change in mental status now back to baseline. Course will go to day 6 to complete 5 days.   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 200mg 6/22 Plan for 400mg 6/23 and beyond.    follow up TLS labs BID  Patient placed on gentle IV fluid hydration @ 50cc/hr for duration of Dacogen.   Hypercalcemia noted consult endocrine-no action at this time. Monitor.   Right Arm tenderness and swelling- FU RUE US to rule out DVT  prolonged PT-Vitamin k 6/22-6/24 FLT3 ITD (-) AML   Dacogen held on day 3 6/21 due to acute change in mental status now back to baseline. Course will go to day 6 to complete 5 days.   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 200mg 6/22 Plan for 400mg 6/23 and beyond.    follow up TLS labs BID  Patient placed on gentle IV fluid hydration @ 50cc/hr for duration of Dacogen.   Hypercalcemia noted consult endocrine-no action at this time. Monitor.   Right Arm tenderness and swelling (-) DVT  prolonged PT-Vitamin k 6/22-6/24

## 2021-06-22 NOTE — PROGRESS NOTE ADULT - PROBLEM SELECTOR PLAN 3
Monitor fingersticks closely   Continue ISS   Consistent carbohydrate diet  Endocrine consult for hypercalcemia-no acute intervention 6/21

## 2021-06-22 NOTE — PROGRESS NOTE ADULT - PROBLEM SELECTOR PLAN 6
s/p 5vCABG 2012 (LIMA to LAD, SVG to Diag and OM, SVG PDA and RPL), PCI (2 in 2014, 2 in 2015 [facility]), HFrEF s/p CRT-D (2013),  No DAPT or AC given thrombocytopenia.

## 2021-06-22 NOTE — PROGRESS NOTE ADULT - ATTENDING COMMENTS
No new complaints  1.  Hypercalcemia--cinacalcet for primary hyperparathyroidism  2.  ARF--likely ATN with slow but consistent resolution.  Potential renal bx if no further improvement although low grade speckled PER of doubtful significance  3.  Hyperuricemia--allopurinol  Discussed with hemeonc team
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
## 2021-06-22 NOTE — PROGRESS NOTE ADULT - SUBJECTIVE AND OBJECTIVE BOX
Chief Complaint: hypercalcemia     Interval History:  ID: [id number]  Pt reports feeling better. Denies constipation, increased thirst, increased urination. Per nurse, pt more awake/alert today.    MEDICATIONS  (STANDING):  allopurinol 200 milliGRAM(s) Oral daily  Biotene Dry Mouth Oral Rinse 5 milliLiter(s) Swish and Spit four times a day  cholecalciferol 2000 Unit(s) Oral daily  cinacalcet 30 milliGRAM(s) Oral daily  decitabine IVPB (eMAR) 37 milliGRAM(s) IV Intermittent every 24 hours  escitalopram 5 milliGRAM(s) Oral daily  finasteride 5 milliGRAM(s) Oral daily  folic acid 1 milliGRAM(s) Oral daily  furosemide    Tablet 40 milliGRAM(s) Oral daily  insulin lispro (ADMELOG) corrective regimen sliding scale   SubCutaneous three times a day before meals  insulin lispro (ADMELOG) corrective regimen sliding scale   SubCutaneous at bedtime  isosorbide   mononitrate ER Tablet (IMDUR) 60 milliGRAM(s) Oral daily  metoprolol succinate ER 50 milliGRAM(s) Oral daily  ondansetron Injectable 8 milliGRAM(s) IV Push every 24 hours  phytonadione   Solution 10 milliGRAM(s) Oral daily  piperacillin/tazobactam IVPB.. 3.375 Gram(s) IV Intermittent every 8 hours  polyethylene glycol 3350 17 Gram(s) Oral daily  sodium chloride 0.65% Nasal 1 Spray(s) Both Nostrils four times a day  sodium chloride 0.9%. 1000 milliLiter(s) (50 mL/Hr) IV Continuous <Continuous>  tamsulosin 0.4 milliGRAM(s) Oral at bedtime    MEDICATIONS  (PRN):  acetaminophen   Tablet .. 650 milliGRAM(s) Oral every 6 hours PRN Temp greater or equal to 38C (100.4F), Mild Pain (1 - 3)  albuterol/ipratropium for Nebulization 3 milliLiter(s) Nebulizer every 6 hours PRN Shortness of Breath and/or Wheezing  senna 2 Tablet(s) Oral at bedtime PRN Constipation      Allergies    morphine (Unknown)    Intolerances          PHYSICAL EXAM:  VITALS: T(C): 36.5 (06-22-21 @ 09:19)  T(F): 97.7 (06-22-21 @ 09:19), Max: 100 (06-22-21 @ 05:50)  HR: 88 (06-22-21 @ 09:19) (88 - 102)  BP: 126/45 (06-22-21 @ 09:19) (113/56 - 151/62)  RR:  (18 - 20)  SpO2:  (95% - 99%)  Wt(kg): --  GENERAL: NAD  EYES: No proptosis, anicteric  THYROID: Normal size, no palpable nodules  RESPIRATORY: CTA b/l, no wheezes or crackles   CARDIOVASCULAR: S1S2, RRR, no murmurs; no peripheral edema  GI: Soft, nontender, non distended, normal bowel sounds  SKIN: Dry, intact, No rashes or lesions  PSYCH: normal affect, normal mood    POCT Blood Glucose.: 129 mg/dL (06-22-21 @ 12:49)  POCT Blood Glucose.: 127 mg/dL (06-22-21 @ 08:33)  POCT Blood Glucose.: 116 mg/dL (06-21-21 @ 18:49)  POCT Blood Glucose.: 152 mg/dL (06-21-21 @ 17:23)  POCT Blood Glucose.: 149 mg/dL (06-21-21 @ 14:17)  POCT Blood Glucose.: 132 mg/dL (06-20-21 @ 22:08)  POCT Blood Glucose.: 175 mg/dL (06-20-21 @ 17:05)  POCT Blood Glucose.: 168 mg/dL (06-20-21 @ 12:50)  POCT Blood Glucose.: 145 mg/dL (06-20-21 @ 08:47)  POCT Blood Glucose.: 138 mg/dL (06-19-21 @ 21:19)  POCT Blood Glucose.: 139 mg/dL (06-19-21 @ 17:44)      06-22    130<L>  |  99  |  34<H>  ----------------------------<  121<H>  3.9   |  19<L>  |  2.29<H>    EGFR if : 30<L>  EGFR if non : 26<L>    Ca    9.6      06-22  Mg     2.0     06-22  Phos  3.0     06-22    TPro  6.4  /  Alb  2.3<L>  /  TBili  1.1  /  DBili  x   /  AST  11  /  ALT  8<L>  /  AlkPhos  88  06-22          Thyroid Function Tests:                         Chief Complaint: hypercalcemia     Interval History:  ID: [id number]  Pt reports feeling better. Denies constipation, increased thirst, increased urination. Per nurse, pt more awake/alert today.    MEDICATIONS  (STANDING):  allopurinol 200 milliGRAM(s) Oral daily  Biotene Dry Mouth Oral Rinse 5 milliLiter(s) Swish and Spit four times a day  cholecalciferol 2000 Unit(s) Oral daily  cinacalcet 30 milliGRAM(s) Oral daily  decitabine IVPB (eMAR) 37 milliGRAM(s) IV Intermittent every 24 hours  escitalopram 5 milliGRAM(s) Oral daily  finasteride 5 milliGRAM(s) Oral daily  folic acid 1 milliGRAM(s) Oral daily  furosemide    Tablet 40 milliGRAM(s) Oral daily  insulin lispro (ADMELOG) corrective regimen sliding scale   SubCutaneous three times a day before meals  insulin lispro (ADMELOG) corrective regimen sliding scale   SubCutaneous at bedtime  isosorbide   mononitrate ER Tablet (IMDUR) 60 milliGRAM(s) Oral daily  metoprolol succinate ER 50 milliGRAM(s) Oral daily  ondansetron Injectable 8 milliGRAM(s) IV Push every 24 hours  phytonadione   Solution 10 milliGRAM(s) Oral daily  piperacillin/tazobactam IVPB.. 3.375 Gram(s) IV Intermittent every 8 hours  polyethylene glycol 3350 17 Gram(s) Oral daily  sodium chloride 0.65% Nasal 1 Spray(s) Both Nostrils four times a day  sodium chloride 0.9%. 1000 milliLiter(s) (50 mL/Hr) IV Continuous <Continuous>  tamsulosin 0.4 milliGRAM(s) Oral at bedtime    MEDICATIONS  (PRN):  acetaminophen   Tablet .. 650 milliGRAM(s) Oral every 6 hours PRN Temp greater or equal to 38C (100.4F), Mild Pain (1 - 3)  albuterol/ipratropium for Nebulization 3 milliLiter(s) Nebulizer every 6 hours PRN Shortness of Breath and/or Wheezing  senna 2 Tablet(s) Oral at bedtime PRN Constipation      Allergies  morphine (Unknown)        PHYSICAL EXAM:  VITALS: T(C): 36.5 (06-22-21 @ 09:19)  T(F): 97.7 (06-22-21 @ 09:19), Max: 100 (06-22-21 @ 05:50)  HR: 88 (06-22-21 @ 09:19) (88 - 102)  BP: 126/45 (06-22-21 @ 09:19) (113/56 - 151/62)  RR:  (18 - 20)  SpO2:  (95% - 99%)  Wt(kg): --  GENERAL: NAD  EYES: No proptosis, anicteric  RESPIRATORY: CTA b/l, no wheezes or crackles   CARDIOVASCULAR: S1S2, RRR, no murmurs; no peripheral edema  GI: Soft, nontender, non distended, normal bowel sounds      POCT Blood Glucose.: 129 mg/dL (06-22-21 @ 12:49)  POCT Blood Glucose.: 127 mg/dL (06-22-21 @ 08:33)  POCT Blood Glucose.: 116 mg/dL (06-21-21 @ 18:49)  POCT Blood Glucose.: 152 mg/dL (06-21-21 @ 17:23)  POCT Blood Glucose.: 149 mg/dL (06-21-21 @ 14:17)  POCT Blood Glucose.: 132 mg/dL (06-20-21 @ 22:08)  POCT Blood Glucose.: 175 mg/dL (06-20-21 @ 17:05)  POCT Blood Glucose.: 168 mg/dL (06-20-21 @ 12:50)  POCT Blood Glucose.: 145 mg/dL (06-20-21 @ 08:47)  POCT Blood Glucose.: 138 mg/dL (06-19-21 @ 21:19)  POCT Blood Glucose.: 139 mg/dL (06-19-21 @ 17:44)      06-22    130<L>  |  99  |  34<H>  ----------------------------<  121<H>  3.9   |  19<L>  |  2.29<H>    EGFR if : 30<L>  EGFR if non : 26<L>    Ca    9.6      06-22  Mg     2.0     06-22  Phos  3.0     06-22    TPro  6.4  /  Alb  2.3<L>  /  TBili  1.1  /  DBili  x   /  AST  11  /  ALT  8<L>  /  AlkPhos  88  06-22

## 2021-06-22 NOTE — PROGRESS NOTE ADULT - PROBLEM SELECTOR PLAN 2
Patient is not neutropenic   Vanco dosed daily by level. Zosyn added for ppx 6/21 (in lieu of cefepime given increased confusion/lethargy).   If febrile, send pan cultures and   6/21 CT head shows sinusitis-monitor  6/21 CXR mid and lower right lung-pna can not be excluded. Patient is not neutropenic   Vanco dosed daily by level. Zosyn added for ppx 6/21 (in lieu of cefepime given increased confusion/lethargy).   If febrile, send pan cultures.  6/21 CT head shows sinusitis-monitor  6/21 CXR mid and lower right lung-pna can not be excluded-continue with Zosyn.   FU CT CAP (abd pain and concern for pna).   FU BC 6/21

## 2021-06-22 NOTE — PROGRESS NOTE ADULT - ASSESSMENT
80M PMHx CKD, CAD s/p CABG 2012 & 4 stents (2 in 2014, 2 in 2015) with dilated EF of 27% BiV ICD (2013) initially admitted to [facility] cor acute hypoxic respiratory failure, anemia (Hgb 5.7), thrombocytopenia (plt 30) w/ blast cells (22%), lactic acidosis (LA 10) - transfer to [facility] for hematological evaluation for leukemia.  Nephrology consulted for BRISA on CKD.        # BRISA on CKD  Pt with non-oliguric BRISA on CKD unclear etiology possible pre renal in the setting of anemia and ATN.  On admission sCr elevated at 3.45 (last known sCr 1.1-1.3 in 2018, recent hx CKD unclear recent baseline).  Urinalysis showed protein with trace blood.  Lab noted for serum uric acid 13.3, , phos 4.7, Echo severe LV systolic fxn, CT diffuse patchy airspace disease ?multifocal pna?. Urine electrolytes with Fe Urea of 82.5% suggestive of intrinsic pathology. Urine uric acid/creatinine ratio is < 1. Spot urine TP/CR ratio was 0.25 wnl. Kidney/bladder u/s on this admission was wnl (no hydro). Scr plateaued at 3.62 mg/dl on 6/13/21. Last sCr improved to 2.37 mg/dl yesterday evening.     Recommendations  - Continue diuretics  - Continue allopurinol 200 mg PO daily   - PER with 1:320 speckled pattern   - GN work up including c3 and C4 wnl, serum immunofixation with no monoclonality, anti GBM negative. Parvovirus DNA PCR negative. P ANCA and C ANCA negative  - Will follow GN Work up including anti PLA2R.  - monitor BMP/tumor lysis markers (Uric acid/LDH/haptoglobin/LFT), strict I/O, avoid nephrotoxic agents (NSAIDs, PPI, contrast), renally dose medications per GFR.    # Hypercalcemia   Uncertain etiology. Last ionized calcium elevated at 1.46 yesterday   PTH was 84, not appropriately supressed, with low vit D of 21.6. Could be primary hyperparathyroidism  Continue Sensipar 30 mg PO daily   monitor ionized calcium daily    If any questions, please feel free to contact me     Inge Varner  Nephrology Fellow  [facility] Pager: [phone]  [facility] Pager: [phone]

## 2021-06-22 NOTE — PROGRESS NOTE ADULT - ATTENDING COMMENTS
Patient more alert today. Appropriately answered questions in Slovak - good appetite but the hospital food is just ok.   Renal Is managing his parathyroid disease with sensipar so will sign off at this time. From an endocrine standpoint he needs no treatment unless he runs consistently about 12. Discussed with LISA Cullen.  Milka Campbell MD  Endocrinology Attending  Mondays and Tuesdays 9am-6pm: [phone] (pager)  Other days, night and weekend: [phone]
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
## 2021-06-22 NOTE — PROGRESS NOTE ADULT - SUBJECTIVE AND OBJECTIVE BOX
Diagnosis: FLT3 ITD (-) Acute Myeloid Leukemia    Protocol/Chemo Regimen: Decitabine 20mg/m2 IVSS infused over 1 hour x 5 doses + Venetoclax (starting at 20 mg on day 1, 50 mg on day 2 and 100 mg from day 3 and escalated to   400 mg daily)    Day: 4       Pt endorsed: More agitated and lethargic, stating he is tired and to let him alone.    Review of Systems: Denies nausea, vomiting, diarrhea, chest pain or sob     Pain scale: 0     Diet: DASH/consistent carbs    Allergies    morphine (Unknown)    Intolerances        ANTIMICROBIALS  piperacillin/tazobactam IVPB.. 3.375 Gram(s) IV Intermittent every 8 hours      HEME/ONC MEDICATIONS  decitabine IVPB (eMAR) 37 milliGRAM(s) IV Intermittent every 24 hours  venetoclax 100 milliGRAM(s) Oral <User Schedule>      STANDING MEDICATIONS  allopurinol 200 milliGRAM(s) Oral daily  Biotene Dry Mouth Oral Rinse 5 milliLiter(s) Swish and Spit four times a day  cholecalciferol 2000 Unit(s) Oral daily  cinacalcet 30 milliGRAM(s) Oral daily  escitalopram 5 milliGRAM(s) Oral daily  finasteride 5 milliGRAM(s) Oral daily  folic acid 1 milliGRAM(s) Oral daily  furosemide    Tablet 40 milliGRAM(s) Oral daily  insulin lispro (ADMELOG) corrective regimen sliding scale   SubCutaneous three times a day before meals  insulin lispro (ADMELOG) corrective regimen sliding scale   SubCutaneous at bedtime  isosorbide   mononitrate ER Tablet (IMDUR) 60 milliGRAM(s) Oral daily  metoprolol succinate ER 50 milliGRAM(s) Oral daily  ondansetron Injectable 8 milliGRAM(s) IV Push every 24 hours  polyethylene glycol 3350 17 Gram(s) Oral daily  sodium chloride 0.65% Nasal 1 Spray(s) Both Nostrils four times a day  sodium chloride 0.9%. 1000 milliLiter(s) IV Continuous <Continuous>  tamsulosin 0.4 milliGRAM(s) Oral at bedtime      PRN MEDICATIONS  acetaminophen   Tablet .. 650 milliGRAM(s) Oral every 6 hours PRN  albuterol/ipratropium for Nebulization 3 milliLiter(s) Nebulizer every 6 hours PRN  senna 2 Tablet(s) Oral at bedtime PRN        Vital Signs Last 24 Hrs  T(C): 37.8 (22 Jun 2021 05:50), Max: 37.8 (22 Jun 2021 05:50)  T(F): 100 (22 Jun 2021 05:50), Max: 100 (22 Jun 2021 05:50)  HR: 101 (22 Jun 2021 05:50) (88 - 102)  BP: 118/60 (22 Jun 2021 05:50) (113/56 - 151/62)  BP(mean): --  RR: 18 (22 Jun 2021 05:50) (18 - 20)  SpO2: 96% (22 Jun 2021 05:50) (93% - 99%)    PHYSICAL EXAM  General: NAD  Oral: no erythema or ulcers  CV: (+) S1/S2 RRR  Lungs: decreased breath sounds in the lower lung bases, no wheezes, rales or rhonchi  Abdomen: soft, non-tender, non-distended (+) BS  Ext: no edema, compression stockings in place.   Skin: no rash  Peripheral Line: C/D/I          LABS:                     Diagnosis: FLT3 ITD (-) Acute Myeloid Leukemia    Protocol/Chemo Regimen: Decitabine 20mg/m2 IVSS infused over 1 hour x 5 doses (held on day 3)  + Venetoclax (starting at 20 mg on day 1, 50 mg on day 2 and 100 mg from day 3. Escalated to 200mg  day 4    Day: 4       Pt endorsed: Calmer, more alert.     Review of Systems: Denies nausea, vomiting, diarrhea, chest pain or sob     Pain scale: 0     Diet: DASH/consistent carbs    Allergies    morphine (Unknown)    Intolerances        ANTIMICROBIALS  piperacillin/tazobactam IVPB.. 3.375 Gram(s) IV Intermittent every 8 hours      HEME/ONC MEDICATIONS  decitabine IVPB (eMAR) 37 milliGRAM(s) IV Intermittent every 24 hours  venetoclax 100 milliGRAM(s) Oral <User Schedule>      STANDING MEDICATIONS  allopurinol 200 milliGRAM(s) Oral daily  Biotene Dry Mouth Oral Rinse 5 milliLiter(s) Swish and Spit four times a day  cholecalciferol 2000 Unit(s) Oral daily  cinacalcet 30 milliGRAM(s) Oral daily  escitalopram 5 milliGRAM(s) Oral daily  finasteride 5 milliGRAM(s) Oral daily  folic acid 1 milliGRAM(s) Oral daily  furosemide    Tablet 40 milliGRAM(s) Oral daily  insulin lispro (ADMELOG) corrective regimen sliding scale   SubCutaneous three times a day before meals  insulin lispro (ADMELOG) corrective regimen sliding scale   SubCutaneous at bedtime  isosorbide   mononitrate ER Tablet (IMDUR) 60 milliGRAM(s) Oral daily  metoprolol succinate ER 50 milliGRAM(s) Oral daily  ondansetron Injectable 8 milliGRAM(s) IV Push every 24 hours  polyethylene glycol 3350 17 Gram(s) Oral daily  sodium chloride 0.65% Nasal 1 Spray(s) Both Nostrils four times a day  sodium chloride 0.9%. 1000 milliLiter(s) IV Continuous <Continuous>  tamsulosin 0.4 milliGRAM(s) Oral at bedtime      PRN MEDICATIONS  acetaminophen   Tablet .. 650 milliGRAM(s) Oral every 6 hours PRN  albuterol/ipratropium for Nebulization 3 milliLiter(s) Nebulizer every 6 hours PRN  senna 2 Tablet(s) Oral at bedtime PRN        Vital Signs Last 24 Hrs  T(C): 37.8 (22 Jun 2021 05:50), Max: 37.8 (22 Jun 2021 05:50)  T(F): 100 (22 Jun 2021 05:50), Max: 100 (22 Jun 2021 05:50)  HR: 101 (22 Jun 2021 05:50) (88 - 102)  BP: 118/60 (22 Jun 2021 05:50) (113/56 - 151/62)  BP(mean): --  RR: 18 (22 Jun 2021 05:50) (18 - 20)  SpO2: 96% (22 Jun 2021 05:50) (93% - 99%)    PHYSICAL EXAM  General: NAD, pleasantly confused at times.   Oral: no erythema or ulcers  CV: (+) S1/S2 RRR  Lungs: decreased breath sounds in the lower lung bases, no wheezes, rales or rhonchi  Abdomen: soft, non-tender, non-distended (+) BS  Ext: no edema, compression stockings in place.   Skin: no rash  Peripheral Line: C/D/I      LABS:    Blood Cultures:                           7.6    15.68 )-----------( 43       ( 22 Jun 2021 11:14 )             24.0         Mean Cell Volume : 93.8 fl  Mean Cell Hemoglobin : 29.7 pg  Mean Cell Hemoglobin Concentration : 31.7 gm/dL  Auto Neutrophil # : 10.10 K/uL  Auto Lymphocyte # : 1.63 K/uL  Auto Monocyte # : 2.04 K/uL  Auto Eosinophil # : 0.00 K/uL  Auto Basophil # : 0.00 K/uL  Auto Neutrophil % : 64.4 %  Auto Lymphocyte % : 10.4 %  Auto Monocyte % : 13.0 %  Auto Eosinophil % : 0.0 %  Auto Basophil % : 0.0 %      06-22    130<L>  |  99  |  34<H>  ----------------------------<  121<H>  3.9   |  19<L>  |  2.29<H>    Ca    9.6      22 Jun 2021 11:14  Phos  3.0     06-22  Mg     2.0     06-22    TPro  6.4  /  Alb  2.3<L>  /  TBili  1.1  /  DBili  x   /  AST  11  /  ALT  8<L>  /  AlkPhos  88  06-22      Mg 2.0  Phos 3.0  Mg 2.1  Phos 3.5      PT/INR - ( 21 Jun 2021 09:43 )   PT: 15.5 sec;   INR: 1.31 ratio               Uric Acid 4.2      Uric Acid 4.6

## 2021-06-22 NOTE — PROGRESS NOTE ADULT - SUBJECTIVE AND OBJECTIVE BOX
[facility] DIVISION OF KIDNEY DISEASES AND HYPERTENSION -- FOLLOW UP NOTE  --------------------------------------------------------------------------------    24 hour events/subjective: Patient was seen and examined at bedside. Reported feeling well.    PAST HISTORY  --------------------------------------------------------------------------------  No significant changes to PMH, PSH, FHx, SHx, unless otherwise noted    ALLERGIES & MEDICATIONS  --------------------------------------------------------------------------------  Allergies    morphine (Unknown)    Intolerances    Standing Inpatient Medications  allopurinol 200 milliGRAM(s) Oral daily  Biotene Dry Mouth Oral Rinse 5 milliLiter(s) Swish and Spit four times a day  cholecalciferol 2000 Unit(s) Oral daily  cinacalcet 30 milliGRAM(s) Oral daily  decitabine IVPB (eMAR) 37 milliGRAM(s) IV Intermittent every 24 hours  escitalopram 5 milliGRAM(s) Oral daily  finasteride 5 milliGRAM(s) Oral daily  folic acid 1 milliGRAM(s) Oral daily  furosemide    Tablet 40 milliGRAM(s) Oral daily  insulin lispro (ADMELOG) corrective regimen sliding scale   SubCutaneous three times a day before meals  insulin lispro (ADMELOG) corrective regimen sliding scale   SubCutaneous at bedtime  isosorbide   mononitrate ER Tablet (IMDUR) 60 milliGRAM(s) Oral daily  metoprolol succinate ER 50 milliGRAM(s) Oral daily  ondansetron Injectable 8 milliGRAM(s) IV Push every 24 hours  piperacillin/tazobactam IVPB.. 3.375 Gram(s) IV Intermittent every 8 hours  polyethylene glycol 3350 17 Gram(s) Oral daily  sodium chloride 0.65% Nasal 1 Spray(s) Both Nostrils four times a day  sodium chloride 0.9%. 1000 milliLiter(s) IV Continuous <Continuous>  tamsulosin 0.4 milliGRAM(s) Oral at bedtime  venetoclax 100 milliGRAM(s) Oral <User Schedule>    PRN Inpatient Medications  acetaminophen   Tablet .. 650 milliGRAM(s) Oral every 6 hours PRN  albuterol/ipratropium for Nebulization 3 milliLiter(s) Nebulizer every 6 hours PRN  senna 2 Tablet(s) Oral at bedtime PRN      REVIEW OF SYSTEMS  --------------------------------------------------------------------------------  Gen: No fevers/chills  Respiratory: No dyspnea, cough  CV: No chest pain  GI: No abdominal pain, diarrhea  : No dysuria, hematuria  MSK: No  edema    All other systems were reviewed and are negative, except as noted.    VITALS/PHYSICAL EXAM  --------------------------------------------------------------------------------  T(C): 36.5 (06-22-21 @ 09:19), Max: 37.8 (06-22-21 @ 05:50)  HR: 88 (06-22-21 @ 09:19) (88 - 102)  BP: 126/45 (06-22-21 @ 09:19) (113/56 - 151/62)  RR: 18 (06-22-21 @ 09:19) (18 - 20)  SpO2: 99% (06-22-21 @ 09:19) (95% - 99%)  Wt(kg): --    06-21-21 @ 07:01  -  06-22-21 @ 07:00  --------------------------------------------------------  IN: 1208 mL / OUT: 0 mL / NET: 1208 mL    Physical Exam:  	Gen: NAD  	HEENT: MMM  	Pulm: CTA B/L  	CV: S1S2  	Abd: Soft, +BS   	Ext: No LE edema B/L  	Neuro: Awake  	Skin: Warm and dry    LABS/STUDIES  --------------------------------------------------------------------------------              6.2    20.99 >-----------<  61       [06-21-21 @ 19:10]              19.7     131  |  100  |  36  ----------------------------<  121      [06-21-21 @ 19:10]  4.1   |  18  |  2.37        Ca     10.0     [06-21-21 @ 19:10]      iCa    1.46     [06-21 @ 09:50]      Mg     2.1     [06-21-21 @ 19:10]      Phos  3.5     [06-21-21 @ 19:10]    TPro  6.5  /  Alb  2.5  /  TBili  0.9  /  DBili  x   /  AST  13  /  ALT  9   /  AlkPhos  85  [06-21-21 @ 19:10]    PT/INR: PT 15.5 , INR 1.31       [06-21-21 @ 09:43]    Uric acid 4.6      [06-21-21 @ 19:10]        [06-21-21 @ 19:10]    Creatinine Trend:  SCr 2.37 [06-21 @ 19:10]  SCr 2.42 [06-21 @ 09:43]  SCr 2.50 [06-20 @ 21:12]  SCr 2.33 [06-20 @ 09:23]  SCr 2.53 [06-19 @ 18:00]    Urinalysis - [06-12-21 @ 00:38]      Color Light Yellow / Appearance Slightly Turbid / SG 1.014 / pH 6.0      Gluc Negative / Ketone Negative  / Bili Negative / Urobili Negative       Blood Trace / Protein 30 mg/dL / Leuk Est Negative / Nitrite Negative      RBC 1 / WBC 5 / Hyaline 7 / Gran 0-2 / Sq Epi  / Non Sq Epi 4 / Bacteria Negative    Urine Creatinine 78      [06-16-21 @ 22:16]    Iron 155, TIBC 237, %sat 65      [06-12-21 @ 03:36]  Ferritin 827      [06-12-21 @ 04:44]  PTH -- (Ca 9.8)      [06-18-21 @ 10:41]   84  Vitamin D (25OH) 21.6      [06-18-21 @ 10:41]  HbA1c 5.8      [11-13-17 @ 10:29]    HBsAg Nonreact      [06-14-21 @ 11:02]  HBcAb Nonreact      [06-13-21 @ 13:38]  HCV 0.17, Nonreact      [06-14-21 @ 11:02]  HIV Nonreact      [06-13-21 @ 13:52]    PER: titer 1:320, pattern Speckled      [06-17-21 @ 10:30]  C3 Complement 137      [06-17-21 @ 10:30]  C4 Complement 37      [06-17-21 @ 10:30]  ANCA: cANCA Negative, pANCA Negative, atypical ANCA Negative      [06-17-21 @ 10:30]  anti-GBM 2      [06-17-21 @ 13:12]  Immunofixation Serum:   No Monoclonal Band Identified    Reference Range: None Detected      [06-17-21 @ 10:30]

## 2021-06-22 NOTE — PROGRESS NOTE ADULT - PROBLEM SELECTOR PLAN 8
Patient waxes and wanes from lethargic and agitated.   CT head- remote left frontoparietal temporal encephalomalacia, left lateral ventricle ex vacuo enlargement, volume loss, microvascular disease, no hemorrhage or midline shift. Interval increased paranasal sinus mucosal thickening with fluid levels in the frontal sinuses, sinusitis not excluded. If symptoms persist consider follow-up head CT or MR if no contraindications. Patient waxes and wanes from lethargic and agitated-but more consistently confused     CT head- 6/21 no acute changes. Sinusitis possible. MR if worsening concerns.

## 2021-06-22 NOTE — CHART NOTE - NSCHARTNOTEFT_GEN_A_CORE
Medicine NP Episodic Note    Notified by Radiology of PRELIMINARY results of CT Abdomen and Pelvis No Cont (06.22.21 @ 19:40) as follows:  1. Inflamed appearance of the gallbladder which is an indeterminate finding.  Abdominal US can be performed if clinical concern for acute cholecystitis.    Pt. seen and examined at bedside, non-toxic appearing.  Pt. A+Ox3, in NAD.  Denies any specific complaints.    VSS, Afebrile     # Concern for Acute Cholecystitis  > Urgent Abdominal US ordered  > Pt. currently on Zosyn IVPB for PNA - will continue abx    > Surgery consult called    - Plan is to f/u official results of CT A/P in am   - Await abdominal US   > Monitor vital signs  > F/u am labs   > Comfort measures     Will endorse to primary team in am.  Attending to follow.    Taylor Sidhu, SARAH-BC  [phone]

## 2021-06-23 NOTE — PROGRESS NOTE ADULT - PROBLEM SELECTOR PLAN 5
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   6/15 Normal renal ultrasound  monitor for TLS.  follow ionized calcium daily as per nephrology  6/21 Start Sensipar for hyperparathyroidism.  Vitamin D 2000 daily.

## 2021-06-23 NOTE — CHART NOTE - NSCHARTNOTEFT_GEN_A_CORE
Pt seen and assesed.  Some abdominal discomfort.  Will coordinate meeting to discuss code status as discussed with NP on 6/22

## 2021-06-23 NOTE — PROGRESS NOTE ADULT - PROBLEM SELECTOR PLAN 6
s/p 5vCABG 2012 (LIMA to LAD, SVG to Diag and OM, SVG PDA and RPL), PCI (2 in 2014, 2 in 2015 [facility]), HFrEF s/p CRT-D (2013),  No DAPT or AC given thrombocytopenia. CHF from volume overload   Echo severe LV systolic fxn,  LVEF 27%  ICD (implantable cardioverter-defibrillator) in place  Per Cardiology give 20 IV lasix after blood transfusions  continue on daily diuresis with Lasix 40 mg oral daily

## 2021-06-23 NOTE — PROGRESS NOTE ADULT - SUBJECTIVE AND OBJECTIVE BOX
Diagnosis: FLT3 ITD (-) Acute Myeloid Leukemia    Protocol/Chemo Regimen: Decitabine 20mg/m2 IVSS infused over 1 hour x 5 doses (held on day 3)  + Venetoclax (starting at 20 mg on day 1, 50 mg on day 2 and 100 mg from day 3. Escalated to 200mg  day 4    Day: 4     Pt endorsed: Right upper quadrant abdominal pain.     Review of Systems: Denies nausea, vomiting, diarrhea, chest pain or sob     Pain scale: 0     Diet: DASH/consistent carbs    Allergies    morphine (Unknown)    Intolerances      ANTIMICROBIALS  piperacillin/tazobactam IVPB.. 3.375 Gram(s) IV Intermittent every 8 hours      HEME/ONC MEDICATIONS  decitabine IVPB (eMAR) 37 milliGRAM(s) IV Intermittent every 24 hours  venetoclax 400 milliGRAM(s) Oral <User Schedule>      STANDING MEDICATIONS  allopurinol 200 milliGRAM(s) Oral daily  Biotene Dry Mouth Oral Rinse 5 milliLiter(s) Swish and Spit four times a day  cholecalciferol 2000 Unit(s) Oral daily  cinacalcet 30 milliGRAM(s) Oral daily  escitalopram 5 milliGRAM(s) Oral daily  finasteride 5 milliGRAM(s) Oral daily  folic acid 1 milliGRAM(s) Oral daily  furosemide    Tablet 40 milliGRAM(s) Oral daily  insulin lispro (ADMELOG) corrective regimen sliding scale   SubCutaneous three times a day before meals  insulin lispro (ADMELOG) corrective regimen sliding scale   SubCutaneous at bedtime  isosorbide   mononitrate ER Tablet (IMDUR) 60 milliGRAM(s) Oral daily  metoprolol succinate ER 50 milliGRAM(s) Oral daily  ondansetron Injectable 8 milliGRAM(s) IV Push every 24 hours  phytonadione   Solution 10 milliGRAM(s) Oral daily  polyethylene glycol 3350 17 Gram(s) Oral daily  sodium chloride 0.65% Nasal 1 Spray(s) Both Nostrils four times a day  sodium chloride 0.9%. 1000 milliLiter(s) IV Continuous <Continuous>  tamsulosin 0.4 milliGRAM(s) Oral at bedtime      PRN MEDICATIONS  acetaminophen   Tablet .. 650 milliGRAM(s) Oral every 6 hours PRN  albuterol/ipratropium for Nebulization 3 milliLiter(s) Nebulizer every 6 hours PRN  senna 2 Tablet(s) Oral at bedtime PRN        Vital Signs Last 24 Hrs  T(C): 36 (23 Jun 2021 13:12), Max: 37.1 (22 Jun 2021 21:00)  T(F): 96.8 (23 Jun 2021 13:12), Max: 98.8 (22 Jun 2021 21:00)  HR: 81 (23 Jun 2021 13:12) (81 - 94)  BP: 122/52 (23 Jun 2021 13:12) (122/52 - 147/56)  BP(mean): --  RR: 18 (23 Jun 2021 13:12) (18 - 18)  SpO2: 98% (23 Jun 2021 13:12) (96% - 99%)    PHYSICAL EXAM  General: NAD, pleasantly confused at times.   Oral: no erythema or ulcers  CV: (+) S1/S2 RRR  Lungs: decreased breath sounds in the lower lung bases, no wheezes, rales or rhonchi  Abdomen: soft, non-tender, non-distended (+) BS  Ext: no edema, compression stockings in place.   Skin: no rash  Peripheral Line: C/D/I        RECENT CULTURES:  06-21 @ 23:07  .Blood Blood-Peripheral  No growth to date.          LABS:                        7.9    11.80 )-----------( 30       ( 23 Jun 2021 10:26 )             25.0         Mean Cell Volume : 94.3 fl  Mean Cell Hemoglobin : 29.8 pg  Mean Cell Hemoglobin Concentration : 31.6 gm/dL  Auto Neutrophil # : 7.73 K/uL  Auto Lymphocyte # : 1.46 K/uL  Auto Monocyte # : 1.46 K/uL  Auto Eosinophil # : 0.00 K/uL  Auto Basophil # : 0.00 K/uL  Auto Neutrophil % : 65.5 %  Auto Lymphocyte % : 12.4 %  Auto Monocyte % : 12.4 %  Auto Eosinophil % : 0.0 %  Auto Basophil % : 0.0 %      06-23    131<L>  |  99  |  35<H>  ----------------------------<  115<H>  4.2   |  18<L>  |  2.32<H>    Ca    9.4      23 Jun 2021 10:26  Phos  3.9     06-23  Mg     2.1     06-23    TPro  6.7  /  Alb  2.3<L>  /  TBili  1.1  /  DBili  x   /  AST  9<L>  /  ALT  7<L>  /  AlkPhos  79  06-23      Mg 2.1  Phos 3.9            Uric Acid 4.0        RADIOLOGY & ADDITIONAL STUDIES:    CT Abdomen and Pelvis No Cont (06.22.21 @ 19:40)   Limited noncontrast study.  Ill-defined gallbladder with concern for gallbladder wall irregularity versus edema and trace pericholecystic fluid/inflammatory change with trace perihepatic ascites extending down the right paracolic gutter, raising concern for gallbladder pathology. Correlate clinically and consider dedicated gallbladder ultrasound.  A moderate loculated right and small left pleural effusion is unchanged from 6/12/2021. Interval improvement in the pulmonary opacities since 6/12/2021.  Skin thickening of the partially visualized right upper extremity, nonspecific, may be seen in the setting of anasarca versus cellulitis.  The findings relating to the inflamed gallbladder were discussed by Dr. Aiken with LISA Sidhu at 2116 hours on 6/22/2021.

## 2021-06-23 NOTE — ADVANCED PRACTICE NURSE CONSULT - ASSESSMENT
Patient with new left arm ac peripheral IV gauge 22  patent. Patient slightly confused but gets oriented x2-3 at times. 2 RNs verification completed prior to start. Lab results seen by Dr. Vásquez. Patient got zofran 8mg IV ATC as antiemetic. At 1523,dacogen 37mg in 60ml NSS started x 1 hour infusion at 60ml/hr as secondary set via NSS l;ine into left arm ac peripheral Iv through Alaris IV pump. Primary RN aware of plan of care. Safety maintained.

## 2021-06-23 NOTE — PROGRESS NOTE ADULT - PROBLEM SELECTOR PLAN 1
FLT3 ITD (-) AML   Dacogen held on day 3 6/21 due to acute change in mental status now back to baseline. Course will go to day 6 to complete 5 days.   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 200mg 6/22 Plan for 400mg 6/23 and beyond.    follow up TLS labs BID  Patient placed on gentle IV fluid hydration @ 50cc/hr for duration of Dacogen.   Hypercalcemia noted consult endocrine-no action at this time. Monitor.   Right Arm tenderness and swelling (-) DVT  prolonged PT-Vitamin k 6/22-6/24 FLT3 ITD (-) AML   Dacogen held on day 3 6/21 due to acute change in mental status now back to baseline. Course will go to day 6 to complete 5 days.   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 200mg 6/22 Plan for 400mg 6/23 and beyond.    follow up TLS labs BID  Elevated Ionized calcium,  6/23 Elevated ionized calcium - seen by Nephrology, started on Sensipar, improved levels today.    Right Arm tenderness and swelling (-) DVT  prolonged PT-Vitamin k 6/22-6/24 6/22 CT of the chest (+) for pleural effusions in bilateral lungs , (+) bilateral Lower lobe consolidations.   6/22 CT of the abdomen/Pelvis - Gall bladder inflammation, follow up Abd US results.

## 2021-06-23 NOTE — PROGRESS NOTE ADULT - PROBLEM SELECTOR PLAN 8
Patient waxes and wanes from lethargic and agitated-but more consistently confused     CT head- 6/21 no acute changes. Sinusitis possible. MR if worsening concerns. No VTE ppx 2/2 thrombocytopenia.

## 2021-06-23 NOTE — PROGRESS NOTE ADULT - PROBLEM SELECTOR PLAN 3
Monitor fingersticks closely   Continue ISS   Consistent carbohydrate diet  Endocrine consult for hypercalcemia-no acute intervention 6/21 Patient waxes and wanes from lethargic and agitated-but more consistently confused     CT head- 6/21 no acute changes. Sinusitis possible. MR if worsening concerns.
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
## 2021-06-23 NOTE — PROGRESS NOTE ADULT - PROBLEM SELECTOR PLAN 7
No VTE ppx 2/2 thrombocytopenia. s/p 5vCABG 2012 (LIMA to LAD, SVG to Diag and OM, SVG PDA and RPL), PCI (2 in 2014, 2 in 2015 [facility]), HFrEF s/p CRT-D (2013),  No DAPT or AC given thrombocytopenia.
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
## 2021-06-23 NOTE — PROGRESS NOTE ADULT - ATTENDING COMMENTS
No new complaints  1.  ARF--etiology uncertain but non oliguric, not rapidly progressive or HD requiring at present.  Will need to have platelets optimized for renal bx  2.  Hypercalcemia--primary hyperparathyroidism.  On sensipar  3.  Hyperuricemia--allopurinol    discussed with hemeonc team

## 2021-06-23 NOTE — CONSULT NOTE ADULT - TIME BILLING
Evaluation and management of choledocholithiasis and RUQ pain.
hypercalcemia, pre-diabetes, outpatient f/u

## 2021-06-23 NOTE — PROGRESS NOTE ADULT - PROBLEM SELECTOR PLAN 4
Nephrology following-No indication for HD at this time.   6/15 Normal renal ultrasound  monitor for TLS.  follow ionized calcium daily as per nephrology  6/21 Start Sensipar for hyperparathyroidism.  Vitamin D 2000 daily. Monitor fingersticks closely   Continue ISS   Consistent carbohydrate diet  Endocrine consult for hypercalcemia-no acute intervention 6/21

## 2021-06-23 NOTE — CONSULT NOTE ADULT - ATTENDING COMMENTS
79 yo male with complex medical history significant for CAD, EF 27%, DM, CKD, CVA. New diagnosis of AML and initiation of chemotherapy. Equivocal findings in imaging, with perihepatic ascites. Choledocholithiasis with CBD 9mm on US. Abdominal exam with TTP on RUQ, and lower abdomen.  A/P Pain doesn't appear to be related to acute cholecystitis. In a patient like Mr Nair, acalculous cholecystitis is a concern, however these patients usually are very ill, unstable and septic. Recommend HIDA scan to further evaluate the gallbladder. In case surgical intervention necessary, IR with percutaneous cholecystostomy tube might be the best option as he is a poor surgical candidate. 79 yo male with complex medical history significant for CAD, EF 27%, DM, CKD, CVA. New diagnosis of AML and initiation of chemotherapy. Equivocal findings in imaging, with perihepatic ascites. Choledocholithiasis with CBD 9mm on US. Abdominal exam with TTP on RUQ, and lower abdomen.  A/P Pain doesn't appear to be related to acute cholecystitis. In a patient like Mr Nair, acalculous cholecystitis is a concern, however these patients usually are very ill, unstable and septic. He doesn't have a clinical picture of biliary obstruction. Recommend HIDA scan to further evaluate the gallbladder. In case surgical intervention necessary, IR with percutaneous cholecystostomy tube might be the best option as he is a poor surgical candidate.

## 2021-06-23 NOTE — PROGRESS NOTE ADULT - PROBLEM SELECTOR PLAN 2
Patient is not neutropenic   Vanco dosed daily by level. Zosyn added for ppx 6/21 (in lieu of cefepime given increased confusion/lethargy).   If febrile, send pan cultures.  6/21 CT head shows sinusitis-monitor  6/21 CXR mid and lower right lung-pna can not be excluded-continue with Zosyn.   FU CT CAP (abd pain and concern for pna).   FU BC 6/21 Patient is not neutropenic   Vanco dosed daily by level. Zosyn added for ppx 6/21 (in lieu of cefepime given increased confusion/lethargy).   If febrile, send pan cultures.  6/21 CT head shows sinusitis-monitor  6/21 CXR mid and lower right lung-pna can not be excluded-continue with Zosyn.   6/23 Abd US: Cholelithiasis without signs of cholecystitis.   CT chest w IV ctrst (+) inflamed bladder   seen by Surgery   NPO after MN, for HIDA Scan in am.

## 2021-06-23 NOTE — PROGRESS NOTE ADULT - SUBJECTIVE AND OBJECTIVE BOX
[facility] DIVISION OF KIDNEY DISEASES AND HYPERTENSION -- FOLLOW UP NOTE  --------------------------------------------------------------------------------    24 hour events/subjective: Patient was seen and examined at bedside. Reported feeling well. Denies CP, SOB, fever, chills, nausea, vomiting, diarrhea, LE edema or dysuria.    PAST HISTORY  --------------------------------------------------------------------------------  No significant changes to PMH, PSH, FHx, SHx, unless otherwise noted    ALLERGIES & MEDICATIONS  --------------------------------------------------------------------------------  Allergies    morphine (Unknown)    Intolerances    Standing Inpatient Medications  allopurinol 200 milliGRAM(s) Oral daily  Biotene Dry Mouth Oral Rinse 5 milliLiter(s) Swish and Spit four times a day  cholecalciferol 2000 Unit(s) Oral daily  cinacalcet 30 milliGRAM(s) Oral daily  decitabine IVPB (eMAR) 37 milliGRAM(s) IV Intermittent every 24 hours  escitalopram 5 milliGRAM(s) Oral daily  finasteride 5 milliGRAM(s) Oral daily  folic acid 1 milliGRAM(s) Oral daily  furosemide    Tablet 40 milliGRAM(s) Oral daily  insulin lispro (ADMELOG) corrective regimen sliding scale   SubCutaneous three times a day before meals  insulin lispro (ADMELOG) corrective regimen sliding scale   SubCutaneous at bedtime  isosorbide   mononitrate ER Tablet (IMDUR) 60 milliGRAM(s) Oral daily  metoprolol succinate ER 50 milliGRAM(s) Oral daily  ondansetron Injectable 8 milliGRAM(s) IV Push every 24 hours  phytonadione   Solution 10 milliGRAM(s) Oral daily  piperacillin/tazobactam IVPB.. 3.375 Gram(s) IV Intermittent every 8 hours  polyethylene glycol 3350 17 Gram(s) Oral daily  sodium chloride 0.65% Nasal 1 Spray(s) Both Nostrils four times a day  sodium chloride 0.9%. 1000 milliLiter(s) IV Continuous <Continuous>  tamsulosin 0.4 milliGRAM(s) Oral at bedtime  venetoclax 400 milliGRAM(s) Oral <User Schedule>    PRN Inpatient Medications  acetaminophen   Tablet .. 650 milliGRAM(s) Oral every 6 hours PRN  albuterol/ipratropium for Nebulization 3 milliLiter(s) Nebulizer every 6 hours PRN  senna 2 Tablet(s) Oral at bedtime PRN    REVIEW OF SYSTEMS  --------------------------------------------------------------------------------  Gen: No fevers/chills  Respiratory: No dyspnea, cough  CV: No chest pain  GI: No abdominal pain, diarrhea  : No dysuria, hematuria  MSK: No  edema    All other systems were reviewed and are negative, except as noted.    VITALS/PHYSICAL EXAM  --------------------------------------------------------------------------------  T(C): 36.3 (06-23-21 @ 05:28), Max: 37.1 (06-22-21 @ 21:00)  HR: 94 (06-23-21 @ 05:28) (85 - 94)  BP: 147/56 (06-23-21 @ 05:28) (122/58 - 147/56)  RR: 18 (06-23-21 @ 05:28) (18 - 18)  SpO2: 98% (06-23-21 @ 05:28) (96% - 98%)  Wt(kg): --    06-22-21 @ 07:01  -  06-23-21 @ 07:00  --------------------------------------------------------  IN: 1123 mL / OUT: 0 mL / NET: 1123 mL    Physical Exam:  	Gen: NAD  	HEENT: MMM  	Pulm: CTA B/L  	CV: S1S2  	Abd: Soft, +BS   	Ext: No LE edema B/L  	Neuro: Awake  	Skin: Warm and dry    LABS/STUDIES  --------------------------------------------------------------------------------              7.9    11.80 >-----------<  30       [06-23-21 @ 10:26]              25.0     131  |  99  |  35  ----------------------------<  115      [06-23-21 @ 10:26]  4.2   |  18  |  2.32        Ca     9.4     [06-23-21 @ 10:26]      iCa    1.31     [06-23 @ 10:26]      Mg     2.1     [06-23-21 @ 10:26]      Phos  3.9     [06-23-21 @ 10:26]    TPro  6.7  /  Alb  2.3  /  TBili  1.1  /  DBili  x   /  AST  9   /  ALT  7   /  AlkPhos  79  [06-23-21 @ 10:26]        Uric acid 4.0      [06-23-21 @ 10:26]        [06-23-21 @ 10:26]    Creatinine Trend:  SCr 2.32 [06-23 @ 10:26]  SCr 2.29 [06-22 @ 11:14]  SCr 2.37 [06-21 @ 19:10]  SCr 2.42 [06-21 @ 09:43]  SCr 2.50 [06-20 @ 21:12]    Urinalysis - [06-12-21 @ 00:38]      Color Light Yellow / Appearance Slightly Turbid / SG 1.014 / pH 6.0      Gluc Negative / Ketone Negative  / Bili Negative / Urobili Negative       Blood Trace / Protein 30 mg/dL / Leuk Est Negative / Nitrite Negative      RBC 1 / WBC 5 / Hyaline 7 / Gran 0-2 / Sq Epi  / Non Sq Epi 4 / Bacteria Negative    Urine Creatinine 78      [06-16-21 @ 22:16]    Iron 155, TIBC 237, %sat 65      [06-12-21 @ 03:36]  Ferritin 827      [06-12-21 @ 04:44]  PTH -- (Ca 9.8)      [06-18-21 @ 10:41]   84  Vitamin D (25OH) 21.6      [06-18-21 @ 10:41]  HbA1c 5.8      [11-13-17 @ 10:29]    HBsAg Nonreact      [06-14-21 @ 11:02]  HBcAb Nonreact      [06-13-21 @ 13:38]  HCV 0.17, Nonreact      [06-14-21 @ 11:02]  HIV Nonreact      [06-13-21 @ 13:52]    PER: titer 1:320, pattern Speckled      [06-17-21 @ 10:30]  C3 Complement 137      [06-17-21 @ 10:30]  C4 Complement 37      [06-17-21 @ 10:30]  ANCA: cANCA Negative, pANCA Negative, atypical ANCA Negative      [06-17-21 @ 10:30]  anti-GBM 2      [06-17-21 @ 13:12]  PLA2R: OMEGA <1.8, IFA --      [06-17-21 @ 13:12]  Immunofixation Serum:   No Monoclonal Band Identified    Reference Range: None Detected      [06-17-21 @ 10:30]

## 2021-06-23 NOTE — PROGRESS NOTE ADULT - ASSESSMENT
80M PMHx CKD, CAD s/p CABG 2012 & 4 stents (2 in 2014, 2 in 2015) with dilated EF of 27% BiV ICD (2013) initially admitted to [facility] cor acute hypoxic respiratory failure, anemia (Hgb 5.7), thrombocytopenia (plt 30) w/ blast cells (22%), lactic acidosis (LA 10) - transfer to [facility] for hematological evaluation for leukemia.  Nephrology consulted for BRISA on CKD.      # BRISA on CKD  Pt with non-oliguric BRISA on CKD unclear etiology possible pre renal in the setting of anemia and ATN.  On admission sCr elevated at 3.45 (last known sCr 1.1-1.3 in 2018, recent hx CKD unclear recent baseline).  Urinalysis showed protein with trace blood.  Lab noted for serum uric acid 13.3, , phos 4.7, Echo severe LV systolic fxn, CT diffuse patchy airspace disease ?multifocal pna?. Urine electrolytes with Fe Urea of 82.5% suggestive of intrinsic pathology. Urine uric acid/creatinine ratio is < 1. Spot urine TP/CR ratio was 0.25 wnl. Kidney/bladder u/s on this admission was wnl (no hydro). Scr plateaued at 3.62 mg/dl on 6/13/21. Last sCr elevated/stable at 2.32 mg/dl today.     Recommendations  - Continue diuretics  - Continue allopurinol 200 mg PO daily   - PER with 1:320 but speckled pattern   - GN work up including c3 and C4 wnl, serum immunofixation with no monoclonality, anti GBM negative. Parvovirus DNA PCR negative. P ANCA and C ANCA negative, anti PLA2R negative  - Would consider kidney biopsy if his platelets are stable   - monitor BMP/tumor lysis markers (Uric acid/LDH/haptoglobin/LFT), strict I/O, avoid nephrotoxic agents (NSAIDs, PPI, contrast), renally dose medications per GFR.    # Hypercalcemia   Uncertain etiology. Last ionized calcium improved to 1.31 today   PTH was 84, not appropriately suppressed with low vit D of 21.6. Could be primary hyperparathyroidism  Continue Sensipar 30 mg PO daily   monitor ionized calcium daily    If any questions, please feel free to contact me     Inge Varner  Nephrology Fellow  [facility] Pager: [phone]  [facility] Pager: [phone]
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
## 2021-06-24 NOTE — PROGRESS NOTE ADULT - PROBLEM SELECTOR PLAN 3
Patient waxes and wanes from lethargic and agitated-but more consistently confused   CT head- 6/21 no acute changes. Sinusitis possible. MR if worsening concerns.

## 2021-06-24 NOTE — PROGRESS NOTE ADULT - SUBJECTIVE AND OBJECTIVE BOX
Diagnosis: FLT3 ITD (-) Acute Myeloid Leukemia    Protocol/Chemo Regimen: Decitabine 20mg/m2 IVSS infused over 1 hour x 5 doses (held on day 3)  + Venetoclax (starting at 20 mg on day 1, 50 mg on day 2 and 100 mg from day 3. Escalated to 200mg  day 4    Day: 5     Pt endorsed: Right upper quadrant abdominal pain.     Review of Systems: Denies nausea, vomiting, diarrhea, chest pain or sob     Pain scale: 0     Diet: DASH/consistent carbs    Allergies    morphine (Unknown)    Intolerances        ANTIMICROBIALS  piperacillin/tazobactam IVPB.. 3.375 Gram(s) IV Intermittent every 8 hours      HEME/ONC MEDICATIONS  decitabine IVPB (eMAR) 37 milliGRAM(s) IV Intermittent every 24 hours  venetoclax 400 milliGRAM(s) Oral <User Schedule>      STANDING MEDICATIONS  allopurinol 200 milliGRAM(s) Oral daily  Biotene Dry Mouth Oral Rinse 5 milliLiter(s) Swish and Spit four times a day  cholecalciferol 2000 Unit(s) Oral daily  cinacalcet 30 milliGRAM(s) Oral daily  escitalopram 5 milliGRAM(s) Oral daily  finasteride 5 milliGRAM(s) Oral daily  folic acid 1 milliGRAM(s) Oral daily  furosemide    Tablet 40 milliGRAM(s) Oral daily  insulin lispro (ADMELOG) corrective regimen sliding scale   SubCutaneous three times a day before meals  insulin lispro (ADMELOG) corrective regimen sliding scale   SubCutaneous at bedtime  isosorbide   mononitrate ER Tablet (IMDUR) 60 milliGRAM(s) Oral daily  metoprolol succinate ER 50 milliGRAM(s) Oral daily  phytonadione   Solution 10 milliGRAM(s) Oral daily  polyethylene glycol 3350 17 Gram(s) Oral daily  sodium chloride 0.65% Nasal 1 Spray(s) Both Nostrils four times a day  sodium chloride 0.9%. 1000 milliLiter(s) IV Continuous <Continuous>  tamsulosin 0.4 milliGRAM(s) Oral at bedtime      PRN MEDICATIONS  acetaminophen   Tablet .. 650 milliGRAM(s) Oral every 6 hours PRN  albuterol/ipratropium for Nebulization 3 milliLiter(s) Nebulizer every 6 hours PRN  senna 2 Tablet(s) Oral at bedtime PRN        Vital Signs Last 24 Hrs  T(C): 36.2 (24 Jun 2021 11:00), Max: 37.4 (23 Jun 2021 17:00)  T(F): 97.2 (24 Jun 2021 11:00), Max: 99.3 (23 Jun 2021 17:00)  HR: 89 (24 Jun 2021 11:00) (69 - 90)  BP: 165/63 (24 Jun 2021 11:00) (119/52 - 165/63)  BP(mean): --  RR: 18 (24 Jun 2021 11:00) (18 - 18)  SpO2: 100% (24 Jun 2021 11:00) (97% - 100%)      LABS:                            7.9    11.80 )-----------( 30       ( 23 Jun 2021 10:26 )             25.0         Mean Cell Volume : 94.3 fl  Mean Cell Hemoglobin : 29.8 pg  Mean Cell Hemoglobin Concentration : 31.6 gm/dL  Auto Neutrophil # : 7.73 K/uL  Auto Lymphocyte # : 1.46 K/uL  Auto Monocyte # : 1.46 K/uL  Auto Eosinophil # : 0.00 K/uL  Auto Basophil # : 0.00 K/uL  Auto Neutrophil % : 65.5 %  Auto Lymphocyte % : 12.4 %  Auto Monocyte % : 12.4 %  Auto Eosinophil % : 0.0 %  Auto Basophil % : 0.0 %      06-23    131<L>  |  99  |  35<H>  ----------------------------<  115<H>  4.2   |  18<L>  |  2.32<H>    Ca    9.4      23 Jun 2021 10:26  Phos  3.9     06-23  Mg     2.1     06-23    TPro  6.7  /  Alb  2.3<L>  /  TBili  1.1  /  DBili  x   /  AST  9<L>  /  ALT  7<L>  /  AlkPhos  79  06-23                  RADIOLOGY & ADDITIONAL STUDIES:

## 2021-06-24 NOTE — PROGRESS NOTE ADULT - ATTENDING COMMENTS
Patient seen and examined on AM rounds  Chart reviewed  Given extensive medical comorbidities, and relatively asymptomatic, would strongly suggest an attempt at non-operative approach  Will continue antibiotics.  May eventually need VIR placed cholecystostomy tube, but on CT anatomy seems difficult, so will attempt to hold off on this unless clearly needed.  Care discussed with patient and medical team.

## 2021-06-24 NOTE — PROGRESS NOTE ADULT - PROBLEM SELECTOR PLAN 7
s/p 5vCABG 2012 (LIMA to LAD, SVG to Diag and OM, SVG PDA and RPL), PCI (2 in 2014, 2 in 2015 [facility]), HFrEF s/p CRT-D (2013),  No DAPT or AC given thrombocytopenia.

## 2021-06-24 NOTE — CONSULT NOTE ADULT - ASSESSMENT
Impression:  # Dilated CBD: Appropriately dilated based on age. Patient denies abdominal pain and liver enzymes not elevated. Suspect normal variant. No clear evidence of biliary obstruction  # Previously reported abdominal pain: Currently denies any discomfort (but per notes/primary team - patient endorsed pain earlier). HIDA concerning for cholecystitis  # AML on Chemo    Recommendation:  - Management of cholecystitis per Sx/IR  - No GI intervention planned  - Supportive care    Hortensia Dunaway MD  Gastroenterology Fellow  [phone]  Available on [employer] Teams  Please page on call fellow on weekends and after 5pm on weekdays: [phone] Impression:  # Dilated CBD: Appropriately dilated based on age. Patient denies abdominal pain and liver enzymes not elevated. Suspect normal variant. No clear evidence of biliary obstruction  # Previously reported abdominal pain: Currently denies any discomfort (but per notes/primary team - patient endorsed pain earlier). HIDA concerning for cholecystitis  # AML on Chemo  # Thrombocytopenia secondary to AML    Recommendation:  - Management of cholecystitis per Sx/IR  - No GI intervention planned  - Please fractionate bilirubin  - Supportive care    Hortensia Dunaway MD  Gastroenterology Fellow  [phone]  Available on [employer] Teams  Please page on call fellow on weekends and after 5pm on weekdays: [phone]

## 2021-06-24 NOTE — CONSULT NOTE ADULT - CONSULT REQUESTED DATE/TIME
12-Jun-2021 10:56
23-Jun-2021 14:14
16-Jun-2021
21-Jun-2021 13:46
24-Jun-2021
12-Jun-2021 16:20
12-Jun-2021 16:59
24-Jun-2021 14:00
24-Jun-2021 16:39
24-Jun-2021 12:13

## 2021-06-24 NOTE — CONSULT NOTE ADULT - CONSULT REQUESTED BY NAME
PREETI Martínez
[place]
[facility]
Medicine
medicine
Dr Robertson
Dr. Wise
Dr LISA Wise
[place]
Primary team

## 2021-06-24 NOTE — PROGRESS NOTE ADULT - PROBLEM SELECTOR PLAN 5
Nephrology following-No indication for HD at this time.   6/15 Normal renal ultrasound  monitor for TLS.  follow ionized calcium daily as per nephrology  6/24 on Sensipar for hypercalcemia   follow up Ionized calcium levels daily  Vitamin D 2000 daily.

## 2021-06-24 NOTE — PROGRESS NOTE ADULT - PROBLEM SELECTOR PLAN 2
Patient is not neutropenic   Vanco dosed daily by level. Zosyn added for ppx 6/21 (in lieu of cefepime given increased confusion/lethargy).   If febrile, send pan cultures.  6/21 CXR mid and lower right lung-pna can not be excluded-continue with Zosyn.   6/24 Abd US: Cholelithiasis without signs of cholecystitis, persistently dilated CBD  seen by Surgery NPO, for HIDA scan today follow up results. Patient is not neutropenic   Vanco dosed daily by level. Zosyn added for ppx 6/21 (in lieu of cefepime given increased confusion/lethargy).   If febrile, send pan cultures.  6/21 CXR mid and lower right lung-pna can not be excluded-continue with Zosyn.   6/24 Abd US: Cholelithiasis without signs of cholecystitis, persistently dilated CBD  HIDA scan (+) for acute cholecystitis   Seen by Surgery and IR, since patient is asymptomatic and there are no signs of hemodynamic instability   no surgical or IR interventions were recommended.
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
## 2021-06-24 NOTE — CONSULT NOTE ADULT - PROVIDER SPECIALTY LIST ADULT
Intervent Radiology
Intervent Radiology
Gastroenterology
Heme/Onc
Surgery
Cardiology
Endocrinology
Infectious Disease
Nephrology
Palliative Care

## 2021-06-24 NOTE — ADVANCED PRACTICE NURSE CONSULT - ASSESSMENT
Patient in bed A&O X3. Denies N/V or pain. Chemotherapy teaching done, pt. verbalized understanding. Pt has a LAC 22g placed 6/23/21. Site is clean, dry, intact with no pain, redness, or swelling. Positive blood return noted and flushed with 10cc of NS. Premedicated with zofran IVP 8mg for antinausea. At 14:34 Dacogen 20mg/m2=37mg IV given as a secondary line of NS. Infusing well for 1 hour via alaris pump. Pt left comfortable in bed. Primary RN aware of treatment.  Patient in bed A&O X3. Denies N/V or pain. Chemotherapy teaching done, pt. verbalized understanding. Pt has a LAC 22g placed 6/23/21. Site is clean, dry, intact with no pain, redness, or swelling. Positive blood return noted and flushed with 10cc of NS. Labs reviewed by Dr. Vásquze on rounds. Premedicated with zofran IVP 8mg for antinausea. At 14:34 Dacogen 20mg/m2=37mg IV given as a secondary line of NS. Infusing well for 1 hour via alaris pump. Pt left comfortable in bed. Primary RN aware of treatment.
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
## 2021-06-24 NOTE — PROGRESS NOTE ADULT - SUBJECTIVE AND OBJECTIVE BOX
Surgery Progress Note     Subjective/24hour Events:   Patient seen and examined.   Still with some RUQ pain.   Tolerating diet without N/V.     Vital Signs:  Vital Signs Last 24 Hrs  T(C): 36.6 (24 Jun 2021 05:36), Max: 37.4 (23 Jun 2021 17:00)  T(F): 97.8 (24 Jun 2021 05:36), Max: 99.3 (23 Jun 2021 17:00)  HR: 90 (24 Jun 2021 05:36) (69 - 90)  BP: 123/50 (24 Jun 2021 05:36) (119/52 - 142/56)  BP(mean): --  RR: 18 (24 Jun 2021 05:36) (18 - 18)  SpO2: 97% (24 Jun 2021 05:36) (97% - 99%)    CAPILLARY BLOOD GLUCOSE      POCT Blood Glucose.: 115 mg/dL (24 Jun 2021 07:23)  POCT Blood Glucose.: 119 mg/dL (23 Jun 2021 22:06)  POCT Blood Glucose.: 106 mg/dL (23 Jun 2021 16:37)  POCT Blood Glucose.: 119 mg/dL (23 Jun 2021 12:31)  POCT Blood Glucose.: 119 mg/dL (23 Jun 2021 08:05)      I&O's Detail    23 Jun 2021 07:01  -  24 Jun 2021 07:00  --------------------------------------------------------  IN:    IV PiggyBack: 60 mL    IV PiggyBack: 200 mL    Oral Fluid: 300 mL    sodium chloride 0.9%: 365 mL  Total IN: 925 mL    OUT:    Voided (mL): 400 mL  Total OUT: 400 mL    Total NET: 525 mL          MEDICATIONS  (STANDING):  allopurinol 200 milliGRAM(s) Oral daily  Biotene Dry Mouth Oral Rinse 5 milliLiter(s) Swish and Spit four times a day  cholecalciferol 2000 Unit(s) Oral daily  cinacalcet 30 milliGRAM(s) Oral daily  decitabine IVPB (eMAR) 37 milliGRAM(s) IV Intermittent every 24 hours  escitalopram 5 milliGRAM(s) Oral daily  finasteride 5 milliGRAM(s) Oral daily  folic acid 1 milliGRAM(s) Oral daily  furosemide    Tablet 40 milliGRAM(s) Oral daily  insulin lispro (ADMELOG) corrective regimen sliding scale   SubCutaneous three times a day before meals  insulin lispro (ADMELOG) corrective regimen sliding scale   SubCutaneous at bedtime  isosorbide   mononitrate ER Tablet (IMDUR) 60 milliGRAM(s) Oral daily  metoprolol succinate ER 50 milliGRAM(s) Oral daily  phytonadione   Solution 10 milliGRAM(s) Oral daily  piperacillin/tazobactam IVPB.. 3.375 Gram(s) IV Intermittent every 8 hours  polyethylene glycol 3350 17 Gram(s) Oral daily  sodium chloride 0.65% Nasal 1 Spray(s) Both Nostrils four times a day  sodium chloride 0.9%. 1000 milliLiter(s) (10 mL/Hr) IV Continuous <Continuous>  tamsulosin 0.4 milliGRAM(s) Oral at bedtime  venetoclax 400 milliGRAM(s) Oral <User Schedule>    MEDICATIONS  (PRN):  acetaminophen   Tablet .. 650 milliGRAM(s) Oral every 6 hours PRN Temp greater or equal to 38C (100.4F), Mild Pain (1 - 3)  albuterol/ipratropium for Nebulization 3 milliLiter(s) Nebulizer every 6 hours PRN Shortness of Breath and/or Wheezing  senna 2 Tablet(s) Oral at bedtime PRN Constipation      Physical Exam:  Gen: NAD.  Lungs: Non labored breathing.   Ab: Soft, mildly tender in RUQ, nondistended.   Ext: Moves all 4 spontaneously.     Labs:    06-23    131<L>  |  99  |  35<H>  ----------------------------<  115<H>  4.2   |  18<L>  |  2.32<H>    Ca    9.4      23 Jun 2021 10:26  Phos  3.9     06-23  Mg     2.1     06-23    TPro  6.7  /  Alb  2.3<L>  /  TBili  1.1  /  DBili  x   /  AST  9<L>  /  ALT  7<L>  /  AlkPhos  79  06-23    LIVER FUNCTIONS - ( 23 Jun 2021 10:26 )  Alb: 2.3 g/dL / Pro: 6.7 g/dL / ALK PHOS: 79 U/L / ALT: 7 U/L / AST: 9 U/L / GGT: x                                 7.9    11.80 )-----------( 30       ( 23 Jun 2021 10:26 )             25.0

## 2021-06-24 NOTE — PROGRESS NOTE ADULT - PROBLEM SELECTOR PLAN 1
FLT3 ITD (-) AML   Dacogen held on day 3 6/21 due to acute change in mental status now back to baseline. Course will go to day 6 to complete 5 days.   6/14 bone marrow biopsy (+) for AML  Monitor daily CBC's and transfuse as needed , Monitor daily CMP and replete electrolytes as needed   strict I's/O's, daily weights , mouth care, anti emetics., Allopurinol 200 mg oral daily   On Dacogen 20 mg/m2 x 5 days + Venetoclax. (s/p Venetoclax escalation, now on 400 mg oral daily)  follow up TLS labs BID  Elevated Ionized calcium,  6/24Right Arm tenderness and swelling (-) DVT  CT of the chest (+) for pleural effusions in bilateral lungs , (+) bilateral Lower lobe consolidations. (6/22)  CT of the abdomen/Pelvis - Gall bladder inflammation, Abd US (+) for persistently dilated CBD (6/22)  for HIDA scan today, follow up results. Surgery on board following. FLT3 ITD (-) AML   Dacogen held on day 3 6/21 due to acute change in mental status now back to baseline. Course will go to day 6 to complete 5 days.   6/14 bone marrow biopsy (+) for AML  Monitor daily CBC's and transfuse as needed , Monitor daily CMP and replete electrolytes as needed   strict I's/O's, daily weights , mouth care, anti emetics., Allopurinol 200 mg oral daily   On Dacogen 20 mg/m2 x 5 days + Venetoclax. (s/p Venetoclax escalation, now on 400 mg oral daily)  follow up TLS labs BID  Elevated Ionized calcium,  6/24 Right Arm tenderness and swelling (-) DVT  CT of the chest (+) for pleural effusions in bilateral lungs , (+) bilateral Lower lobe consolidations.   CT of the abdomen/Pelvis - Gall bladder inflammation, Abd US (+) for persistently dilated CBD   for HIDA scan today, follow up results. Surgery on board following. FLT3 ITD (-) AML   Dacogen held on day 3 6/21 due to acute change in mental status now back to baseline. Course will go to day 6 to complete 5 days.   6/14 bone marrow biopsy (+) for AML  Monitor daily CBC's and transfuse as needed , Monitor daily CMP and replete electrolytes as needed   strict I's/O's, daily weights , mouth care, anti emetics., Allopurinol 200 mg oral daily   On Dacogen 20 mg/m2 x 5 days + Venetoclax. (s/p Venetoclax escalation, now on 400 mg oral daily)  follow up TLS labs BID  Elevated Ionized calcium,  6/24 Right Arm tenderness and swelling (-) DVT  CT of the chest (+) for pleural effusions in bilateral lungs , (+) bilateral Lower lobe consolidations.   CT of the abdomen/Pelvis - Gall bladder inflammation, Abd US (+) for persistently dilated CBD FLT3 ITD (-) AML   Dacogen held on day 3 6/21 due to acute change in mental status now back to baseline. Course will go to day 6 to complete 5 days.   6/14 bone marrow biopsy (+) for AML  Monitor daily CBC's and transfuse as needed , Monitor daily CMP and replete electrolytes as needed   strict I's/O's, daily weights , mouth care, anti emetics., Allopurinol 200 mg oral daily   On Dacogen 20 mg/m2 x 5 days + Venetoclax. (s/p Venetoclax escalation, now on 400 mg oral daily)  follow up TLS labs BID  Elevated Ionized calcium,  6/24 CT, abdominal US suggestive of CBD dilatation. HIDA scan (+) for acute cholecystitis   seen by IR and Surgery - since patient is asymptomatic, no acute Interventions were suggested.   Continue IV antibiotics FLT3 ITD (-) AML   Dacogen held on day 3 6/21 due to acute change in mental status now back to baseline. Course will go to day 6 to complete 5 days.   6/14 bone marrow biopsy (+) for AML  Monitor daily CBC's and transfuse as needed , Monitor daily CMP and replete electrolytes as needed   strict I's/O's, daily weights , mouth care, anti emetics., Allopurinol 200 mg oral daily   On Dacogen 20 mg/m2 x 5 days + Venetoclax. (s/p Venetoclax escalation, now on 400 mg oral daily)  follow up TLS labs BID  Elevated Ionized calcium,  6/24 CT, abdominal US suggestive of CBD dilatation. HIDA scan (+) for acute cholecystitis   seen by IR and Surgery - since patient is asymptomatic, no acute Interventions were suggested.   Continue IV antibiotics  follow up GI evaluation.
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
## 2021-06-24 NOTE — CONSULT NOTE ADULT - CONSULT REASON
cholecystitis
elevated troponin
Dilated CBD
leukemia eval
r/o Acute Cholecystitis
PICC line placement
hypercalcemia
Acute cholecystitis
BRISA on CKD
GOC, Symptom management/ pt Support
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
## 2021-06-24 NOTE — CONSULT NOTE ADULT - SUBJECTIVE AND OBJECTIVE BOX
Interventional Radiology    Evaluate for Procedure: Percutaneous cholecystostomy    HPI: 80y Male with AML on chemo with HIDA scan positive for acute cholecystitis. IR consulted for further management and evaluation.     Allergies: morphine (Unknown)    Medications (Abx/Cardiac/Anticoagulation/Blood Products)    furosemide    Tablet: 40 milliGRAM(s) Oral (06-24 @ 05:50)  furosemide   Injectable: 40 milliGRAM(s) IV Push (06-23 @ 10:11)  isosorbide   mononitrate ER Tablet (IMDUR): 60 milliGRAM(s) Oral (06-24 @ 11:50)  metoprolol succinate ER: 50 milliGRAM(s) Oral (06-24 @ 05:49)  piperacillin/tazobactam IVPB..: 25 mL/Hr IV Intermittent (06-24 @ 05:51)  tamsulosin: 0.4 milliGRAM(s) Oral (06-23 @ 21:35)  vancomycin  IVPB: 250 mL/Hr IV Intermittent (06-23 @ 17:03)  vancomycin  IVPB: 250 mL/Hr IV Intermittent (06-22 @ 12:32)    Data:    T(C): 36.2  HR: 89  BP: 165/63  RR: 18  SpO2: 100%    -WBC 11.80 / HgB 7.9 / Hct 25.0 / Plt 30  -Na 131 / Cl 99 / BUN 35 / Glucose 115  -K 4.2 / CO2 18 / Cr 2.32  -ALT 7 / Alk Phos 79 / T.Bili 1.1  -INR 1.31 / PTT --      Radiology: reviewed    Assessment/Plan: 79 yo M with acute cholecystitis on abx.     -- No acute IR intervention at this time.   -Patient non toxic, afebrile, and hemodynamically stable at this time.  -c/w Abx  -Reconsult PRN.

## 2021-06-25 NOTE — GOALS OF CARE CONVERSATION - ADVANCED CARE PLANNING - CONVERSATION DETAILS
Met with patient's son and spouse. Family mentions Kevin's clinical status has changed since starting chemotherapy. They felt with chemotherapy sessions, Kevin became weaker and weaker. They even mention that they ere certain he would pass away based on how he looked. Today they mention he looks better since it has been several days since the last chemo session. They also mention that they were unaware of these symptoms develops and they asked us how long these symptoms would last for. They also wonder how many more sessions he has left.    We posed the question to Kevin's family if they have ever discussed code status. They state that their loved one would never want to talk about this. However, they feel he is suffering at the moment because of daily blood draws and the fact that he is in the hospital. Thus, they know that if Kevin's heart were to stop or if his lungs were to fail, he would not want resusitation or intubation as being on machines would cause him suffering.    GOC discussed with primary team. All questions/concerns addressed.

## 2021-06-25 NOTE — PROGRESS NOTE ADULT - ATTENDING COMMENTS
No new complaints  1.  ARF--etiology not clarified.  Renal bx indicated but need hem input on optimal platelet support trena-procedure  2.  Hypercalcemia--responding to initiated cinacalcet    discussed with hemonc team

## 2021-06-25 NOTE — PROGRESS NOTE ADULT - SUBJECTIVE AND OBJECTIVE BOX
9F s/p craniectomy for resection skull lesion  - neurochecks  - advance diet as tolerated  - ambulate as tolerated  - pain control Surgery Progress Note     Subjective/24hour Events:   Patient seen and examined.   No acute events overnight.   Pain controlled.   Has been somewhat tolerating diet.     Vital Signs:  Vital Signs Last 24 Hrs  T(C): 36.8 (25 Jun 2021 09:00), Max: 36.8 (25 Jun 2021 09:00)  T(F): 98.3 (25 Jun 2021 09:00), Max: 98.3 (25 Jun 2021 09:00)  HR: 95 (25 Jun 2021 09:00) (85 - 95)  BP: 121/51 (25 Jun 2021 09:00) (121/51 - 165/63)  BP(mean): --  RR: 18 (25 Jun 2021 09:00) (18 - 18)  SpO2: 98% (25 Jun 2021 09:00) (94% - 100%)    CAPILLARY BLOOD GLUCOSE      POCT Blood Glucose.: 115 mg/dL (25 Jun 2021 07:44)  POCT Blood Glucose.: 122 mg/dL (24 Jun 2021 21:32)  POCT Blood Glucose.: 121 mg/dL (24 Jun 2021 17:02)  POCT Blood Glucose.: 122 mg/dL (24 Jun 2021 11:31)      I&O's Detail    24 Jun 2021 07:01  -  25 Jun 2021 07:00  --------------------------------------------------------  IN:    Oral Fluid: 240 mL    sodium chloride 0.9%: 179 mL  Total IN: 419 mL    OUT:    Voided (mL): 300 mL  Total OUT: 300 mL    Total NET: 119 mL      25 Jun 2021 07:01  -  25 Jun 2021 10:18  --------------------------------------------------------  IN:    Oral Fluid: 180 mL  Total IN: 180 mL    OUT:  Total OUT: 0 mL    Total NET: 180 mL          MEDICATIONS  (STANDING):  allopurinol 200 milliGRAM(s) Oral daily  Biotene Dry Mouth Oral Rinse 5 milliLiter(s) Swish and Spit four times a day  cholecalciferol 2000 Unit(s) Oral daily  cinacalcet 30 milliGRAM(s) Oral daily  escitalopram 5 milliGRAM(s) Oral daily  finasteride 5 milliGRAM(s) Oral daily  folic acid 1 milliGRAM(s) Oral daily  furosemide    Tablet 40 milliGRAM(s) Oral daily  furosemide   Injectable 20 milliGRAM(s) IV Push once  insulin lispro (ADMELOG) corrective regimen sliding scale   SubCutaneous three times a day before meals  insulin lispro (ADMELOG) corrective regimen sliding scale   SubCutaneous at bedtime  isosorbide   mononitrate ER Tablet (IMDUR) 60 milliGRAM(s) Oral daily  metoprolol succinate ER 50 milliGRAM(s) Oral daily  phytonadione   Solution 10 milliGRAM(s) Oral daily  piperacillin/tazobactam IVPB.. 3.375 Gram(s) IV Intermittent every 8 hours  polyethylene glycol 3350 17 Gram(s) Oral daily  sodium chloride 0.65% Nasal 1 Spray(s) Both Nostrils four times a day  sodium chloride 0.9%. 1000 milliLiter(s) (10 mL/Hr) IV Continuous <Continuous>  tamsulosin 0.4 milliGRAM(s) Oral at bedtime  venetoclax 400 milliGRAM(s) Oral <User Schedule>    MEDICATIONS  (PRN):  acetaminophen   Tablet .. 650 milliGRAM(s) Oral every 6 hours PRN Temp greater or equal to 38C (100.4F), Mild Pain (1 - 3)  albuterol/ipratropium for Nebulization 3 milliLiter(s) Nebulizer every 6 hours PRN Shortness of Breath and/or Wheezing  ondansetron Injectable 8 milliGRAM(s) IV Push every 8 hours PRN Nausea and/or Vomiting  senna 2 Tablet(s) Oral at bedtime PRN Constipation      Physical Exam:  Gen: NAD.  Lungs: Non labored breathing.   Ab: Soft, nontender in RUQ, nondistended.   Ext: Moves all 4 spontaneously.     Labs:    06-25    134<L>  |  99  |  44<H>  ----------------------------<  123<H>  3.8   |  20<L>  |  2.69<H>    Ca    9.1      25 Jun 2021 09:20  Phos  5.0     06-25  Mg     2.1     06-25    TPro  6.8  /  Alb  2.6<L>  /  TBili  1.2  /  DBili  x   /  AST  8<L>  /  ALT  6<L>  /  AlkPhos  61  06-25    LIVER FUNCTIONS - ( 25 Jun 2021 09:20 )  Alb: 2.6 g/dL / Pro: 6.8 g/dL / ALK PHOS: 61 U/L / ALT: 6 U/L / AST: 8 U/L / GGT: x                                 6.9    4.16  )-----------( 16       ( 25 Jun 2021 09:20 )             21.6     PT/INR - ( 24 Jun 2021 15:14 )   PT: 14.9 sec;   INR: 1.26 ratio

## 2021-06-25 NOTE — PROGRESS NOTE ADULT - ASSESSMENT
80M PMHx CKD, CAD s/p CABG 2012 & 4 stents (2 in 2014, 2 in 2015) with dilated EF of 27% BiV ICD (2013) initially admitted to [facility] cor acute hypoxic respiratory failure, anemia (Hgb 5.7), thrombocytopenia (plt 30) w/ blast cells (22%), lactic acidosis (LA 10) - transfer to [facility] for hematological evaluation for leukemia.  Nephrology consulted for BRISA on CKD.      # BRISA on CKD  Pt with non-oliguric BRISA on CKD unclear etiology possible pre renal in the setting of anemia and ATN.  On admission sCr elevated at 3.45 (last known sCr 1.1-1.3 in 2018, recent hx CKD unclear recent baseline).  Urinalysis showed protein with trace blood.  Lab noted for serum uric acid 13.3, , phos 4.7, Echo severe LV systolic fxn, CT diffuse patchy airspace disease ?multifocal pna?. Urine electrolytes with Fe Urea of 82.5% suggestive of intrinsic pathology. Urine uric acid/creatinine ratio is < 1. Spot urine TP/CR ratio was 0.25 wnl. Kidney/bladder u/s on this admission was wnl (no hydro). Scr plateaued at 3.62 mg/dl on 6/13/21. Last sCr increased to 2.69 mg/dl today     Recommendations  - New BRISA, could be hemodynamically mediated, patient appears euvolemic, consider holding diuretics today.   - Continue allopurinol 200 mg PO daily   - PER with 1:320 but speckled pattern   - GN work up including c3 and C4 wnl, serum immunofixation with no monoclonality, anti GBM negative. Parvovirus DNA PCR negative. P ANCA and C ANCA negative, anti PLA2R negative  - Would consider kidney biopsy if his platelets are stable   - Obtain urine Na, urine Cl, urine creatinine, urine Urea  - monitor BMP/tumor lysis markers (Uric acid/LDH/haptoglobin/LFT), strict I/O, avoid nephrotoxic agents (NSAIDs, PPI, contrast), renally dose medications per GFR.    # Hypercalcemia   Uncertain etiology. Last ionized calcium improved to 1.28 today   PTH was 84, not appropriately suppressed with low vit D of 21.6. Could be primary hyperparathyroidism  Continue Sensipar 30 mg PO daily   monitor ionized calcium daily    If any questions, please feel free to contact me     Inge Varner  Nephrology Fellow  [facility] Pager: [phone]  [facility] Pager: [phone]

## 2021-06-25 NOTE — PROGRESS NOTE ADULT - PROBLEM SELECTOR PLAN 1
FLT3 ITD (-) AML   Dacogen held on day 3 6/21 due to acute change in mental status now back to baseline. Course will go to day 6 to complete 5 days.   6/14 bone marrow biopsy (+) for AML  Monitor daily CBC's and transfuse as needed , Monitor daily CMP and replete electrolytes as needed   strict I's/O's, daily weights , mouth care, anti emetics., Allopurinol 200 mg oral daily   On Dacogen 20 mg/m2 x 5 days + Venetoclax. (s/p Venetoclax escalation, now on 400 mg oral daily)  follow up TLS labs BID  Elevated Ionized calcium,  6/24 CT, abdominal US suggestive of CBD dilatation. HIDA scan (+) for acute cholecystitis   seen by IR and Surgery - since patient is asymptomatic, no acute Interventions were suggested.   Continue IV antibiotics  follow up GI evaluation FLT3 ITD (-) AML   Dacogen held on day 3 6/21 due to acute change in mental status now back to baseline. Course will go to day 6 to complete 5 days.   6/14 bone marrow biopsy (+) for AML  Monitor daily CBC's and transfuse as needed , Monitor daily CMP and replete electrolytes as needed   strict I's/O's, daily weights , mouth care, anti emetics., Allopurinol 200 mg oral daily   On Dacogen 20 mg/m2 x 5 days + Venetoclax. (s/p Venetoclax escalation, now on 400 mg oral daily)  follow up TLS labs BID  Elevated Ionized calcium,  6/24 CT, abdominal US suggestive of CBD dilatation. HIDA scan (+) for acute cholecystitis   seen by IR and Surgery - since patient is asymptomatic, no acute Interventions were suggested.   Continue IV antibiotics  - Anemia: PRBCs 1 unit today  - Thrombocytopenia: Platelets 1/2 bag today during IR PICC procedure per protocol guidelines

## 2021-06-25 NOTE — CHART NOTE - NSCHARTNOTEFT_GEN_A_CORE
Nutrition Follow Up Note  Patient seen for: Malnutrition Follow Up     Chart reviewed, events noted. Pt c AML, started on Decitabine, noted pt c BRISA on CKD, new cholecystitis, noted plan for antibiotics at this time. Palliative Care meeting with family at this time.     Source: [x] Patient-prefers to speak c RD in English-noted pt confused at times       [x] EMR        [x] RN        [] Family at bedside       [] Other:    -If unable to interview patient: [] Trach/Vent/BiPAP  [] Disoriented/confused/inappropriate to interview    Diet Order:   Diet, Clear Liquid (06-24-21)    - Is current order appropriate/adequate? [] Yes  [x]  No:     - Nutrition-related concerns:      -as per pt, he has been taking liquids and tolerating, denies any abdominal pain at this time       -as per RN, pt has been taking liquids from trays and has not offered any complaints        -noted vitamin D was added on       -pt seen by Endocrine, pt c pre-DM    GI:  Last BM 6/24.   Bowel Regimen? [x] Yes   [] No      Weights: 6/15: 161.1->6/18: 163.3->6/25: 157.4 pounds; wt has decreased further, would monitor    Nutritionally Pertinent MEDICATIONS  (STANDING):  allopurinol  cholecalciferol  cinacalcet  finasteride  folic acid  furosemide    Tablet  furosemide   Injectable  insulin lispro (ADMELOG) corrective regimen sliding scale  insulin lispro (ADMELOG) corrective regimen sliding scale  isosorbide   mononitrate ER Tablet (IMDUR)  metoprolol succinate ER  phytonadione   Solution  piperacillin/tazobactam IVPB..  polyethylene glycol 3350  sodium chloride 0.9%.  tamsulosin  venetoclax    Pertinent Labs: 06-25 @ 09:20: Na 134<L>, BUN 44<H>, Cr 2.69<H>, <H>, K+ 3.8, Phos 5.0<H>, Mg 2.1, Alk Phos 61, ALT/SGPT 6<L>, AST/SGOT 8<L>, HbA1c --  06-24 @ 15:14: Na 130<L>, BUN 40<H>, Cr 2.37<H>, <H>, K+ 3.8, Phos 4.6<H>, Mg 2.1, Alk Phos 69, ALT/SGPT 7<L>, AST/SGOT 8<L>, HbA1c --    A1C with Estimated Average Glucose Result: 6.4 % (06-12-21 @ 06:09)    Finger Sticks:  POCT Blood Glucose.: 130 mg/dL (06-25 @ 12:26)  POCT Blood Glucose.: 115 mg/dL (06-25 @ 07:44)  POCT Blood Glucose.: 122 mg/dL (06-24 @ 21:32)  POCT Blood Glucose.: 121 mg/dL (06-24 @ 17:02)      Skin per nursing documentation: no pressure injuries   Edema: +1 right arm     Estimated Needs:   [x] no change since previous assessment    Previous Nutrition Diagnosis: acute severe malnutrition   Nutrition Diagnosis is: [x] ongoing-to be addressed c progression of PO diet     New Nutrition Diagnosis: [x] Not applicable    Nutrition Care Plan:  [x] In Progress  [] Achieved  [] Not applicable    Nutrition Interventions:     Education Provided:       [] Yes:  [x] No: pt confused, wanted to rest        Recommendations:      1. Advance diet as medically feasible to low fat, low sodium, and addition of low phosphorus as needed.   2. Once diet advanced, would consider addition of Nepro supplement as tolerated.   3. Continue to provide assistance and encouragement c all meals and snacks.   4. Consider addition of Ensure Clear x 2 daily while on clear liquids to provide additional protein.   5. Continue c micronutrient coverage.   6. RD remains available for further nutritional interventions as needed.     Monitoring and Evaluation:   Continue to monitor nutritional intake, tolerance to diet prescription, weights, labs, skin integrity      RD remains available upon request and will follow up per protocol  Ambar Wiley MS RD CDN [facility], Pager #[phone]
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
## 2021-06-25 NOTE — PROCEDURE NOTE - NSPOSTPRCRAD_GEN_A_CORE
Tip in SVC, ok to use/fluoroscopy/line adjusted to depth of insertion/line in appropriate postion/postion of catheter
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
## 2021-06-25 NOTE — PROGRESS NOTE ADULT - SUBJECTIVE AND OBJECTIVE BOX
Diagnosis: FLT3 ITD (-) Acute Myeloid Leukemia    Protocol/Chemo Regimen: Decitabine 20mg/m2 IVSS infused over 1 hour x 5 doses (held on day 3)  + Venetoclax (starting at 20 mg on day 1, 50 mg on day 2 and 100 mg from day 3. Escalated to 200mg  day 4    Day: 6     Pt endorsed: Right upper quadrant abdominal pain    Review of Systems: Denies nausea, vomiting, diarrhea, chest pain, SOB     Pain scale: 0     Diet: DASH/consistent carbs    Allergies: morphine (Unknown)      -------------------           Diagnosis: FLT3 ITD (-) Acute Myeloid Leukemia    Protocol/Chemo Regimen: Decitabine 20mg/m2 IVSS infused over 1 hour x 5 doses (held on day 3)  + Venetoclax (starting at 20 mg on day 1, 50 mg on day 2 and 100 mg from day 3. Escalated to 200mg  day 4    Day: 6     Pt endorsed: anxiety     Review of Systems: Denies nausea, vomiting, diarrhea, chest pain, SOB     : Taylor ID [id number]     Pain scale: 0     Diet: DASH/consistent carbs    Allergies: morphine (Unknown)    ANTIMICROBIALS  piperacillin/tazobactam IVPB.. 3.375 Gram(s) IV Intermittent every 8 hours    HEME/ONC MEDICATIONS  venetoclax 400 milliGRAM(s) Oral <User Schedule>    STANDING MEDICATIONS  allopurinol 200 milliGRAM(s) Oral daily  Biotene Dry Mouth Oral Rinse 5 milliLiter(s) Swish and Spit four times a day  cholecalciferol 2000 Unit(s) Oral daily  cinacalcet 30 milliGRAM(s) Oral daily  escitalopram 5 milliGRAM(s) Oral daily  finasteride 5 milliGRAM(s) Oral daily  folic acid 1 milliGRAM(s) Oral daily  furosemide    Tablet 40 milliGRAM(s) Oral daily  furosemide   Injectable 20 milliGRAM(s) IV Push once  insulin lispro (ADMELOG) corrective regimen sliding scale   SubCutaneous three times a day before meals  insulin lispro (ADMELOG) corrective regimen sliding scale   SubCutaneous at bedtime  isosorbide   mononitrate ER Tablet (IMDUR) 60 milliGRAM(s) Oral daily  metoprolol succinate ER 50 milliGRAM(s) Oral daily  phytonadione   Solution 10 milliGRAM(s) Oral daily  polyethylene glycol 3350 17 Gram(s) Oral daily  sodium chloride 0.65% Nasal 1 Spray(s) Both Nostrils four times a day  sodium chloride 0.9%. 1000 milliLiter(s) IV Continuous <Continuous>  tamsulosin 0.4 milliGRAM(s) Oral at bedtime    PRN MEDICATIONS  acetaminophen   Tablet .. 650 milliGRAM(s) Oral every 6 hours PRN  albuterol/ipratropium for Nebulization 3 milliLiter(s) Nebulizer every 6 hours PRN  ondansetron Injectable 8 milliGRAM(s) IV Push every 8 hours PRN  senna 2 Tablet(s) Oral at bedtime PRN    Vital Signs Last 24 Hrs  T(C): 36.8 (25 Jun 2021 09:00), Max: 36.8 (25 Jun 2021 09:00)  T(F): 98.3 (25 Jun 2021 09:00), Max: 98.3 (25 Jun 2021 09:00)  HR: 95 (25 Jun 2021 09:00) (85 - 95)  BP: 121/51 (25 Jun 2021 09:00) (121/51 - 138/62)  BP(mean): --  RR: 18 (25 Jun 2021 09:00) (18 - 18)  SpO2: 98% (25 Jun 2021 09:00) (94% - 100%)    PHYSICAL EXAM  General: adult in NAD  HEENT: clear oropharynx, no erythema, no ulcers  Neck: supple  CV: normal S1, S2, RRR  Lungs: clear to auscultation, no wheezes, no rales  Abdomen: soft, nontender, nondistended, normal BS  Ext: no edema  Skin: no rash  Neuro: alert and oriented x 3  Central line: normal     LABS:                        6.9    4.16  )-----------( 16       ( 25 Jun 2021 09:20 )             21.6     Mean Cell Volume : 91.5 fl  Mean Cell Hemoglobin : 29.2 pg  Mean Cell Hemoglobin Concentration : 31.9 gm/dL  Auto Neutrophil # : 2.83 K/uL  Auto Lymphocyte # : 0.80 K/uL  Auto Monocyte # : 0.25 K/uL  Auto Eosinophil # : 0.10 K/uL  Auto Basophil # : 0.00 K/uL  Auto Neutrophil % : 68.1 %  Auto Lymphocyte % : 19.3 %  Auto Monocyte % : 5.9 %  Auto Eosinophil % : 2.5 %  Auto Basophil % : 0.0 %    06-25  134<L>  |  99  |  44<H>  ----------------------------<  123<H>  3.8   |  20<L>  |  2.69<H>    Ca    9.1      25 Jun 2021 09:20  Phos  5.0     06-25  Mg     2.1     06-25    TPro  6.8  /  Alb  2.6<L>  /  TBili  1.2  /  DBili  x   /  AST  8<L>  /  ALT  6<L>  /  AlkPhos  61  06-25    Mg 2.1  Phos 5.0  Mg 2.1  Phos 4.6    PT/INR - ( 24 Jun 2021 15:14 )   PT: 14.9 sec;   INR: 1.26 ratio        Uric Acid --    Uric Acid 4.7    RADIOLOGY & ADDITIONAL STUDIES:  < from: [facility] Hepatobiliary Imaging (06.24.21 @ 10:23) >  IMPRESSION: Abnormal hepatobiliary scan.  Findings consistent with acute cholecystitis, in the proper clinical setting.

## 2021-06-25 NOTE — PROGRESS NOTE ADULT - SUBJECTIVE AND OBJECTIVE BOX
Follow Up:  cholecystitis, AML    Interval History/ROS:  denies abd pain, appears in good spirits    Allergies  morphine (Unknown)    ANTIMICROBIALS:  piperacillin/tazobactam IVPB.. 3.375 every 8 hours      OTHER MEDS:  MEDICATIONS  (STANDING):  acetaminophen   Tablet .. 650 every 6 hours PRN  albuterol/ipratropium for Nebulization 3 every 6 hours PRN  allopurinol 200 daily  cinacalcet 30 daily  escitalopram 5 daily  finasteride 5 daily  furosemide    Tablet 40 daily  insulin lispro (ADMELOG) corrective regimen sliding scale  three times a day before meals  insulin lispro (ADMELOG) corrective regimen sliding scale  at bedtime  isosorbide   mononitrate ER Tablet (IMDUR) 60 daily  metoprolol succinate ER 50 daily  ondansetron Injectable 8 every 8 hours PRN  polyethylene glycol 3350 17 daily  senna 2 at bedtime PRN  tamsulosin 0.4 at bedtime  venetoclax 400 <User Schedule>      Vital Signs Last 24 Hrs  T(C): 36.3 (25 Jun 2021 17:35), Max: 36.8 (25 Jun 2021 09:00)  T(F): 97.3 (25 Jun 2021 17:35), Max: 98.3 (25 Jun 2021 09:00)  HR: 65 (25 Jun 2021 17:35) (65 - 95)  BP: 135/62 (25 Jun 2021 17:35) (115/71 - 138/62)  BP(mean): --  RR: 18 (25 Jun 2021 17:35) (18 - 18)  SpO2: 96% (25 Jun 2021 17:35) (94% - 100%)    PHYSICAL EXAM:  General: WN/WD NAD, Non-toxic  Neurology: A&Ox3, nonfocal  Respiratory: Clear to auscultation bilaterally  CV: RRR, S1S2, no murmurs, rubs or gallops  Abdominal: Soft, No RUQ-tenderness, non-distended, normal bowel sounds  Extremities: No edema  Line Sites: Clear  - new LUE PICC line  Skin: No rash               6.9    4.16  )-----------( 16       ( 25 Jun 2021 09:20 )             21.6   WBC Count: 4.16 (06-25 @ 09:20)  WBC Count: 6.38 (06-24 @ 15:14)  WBC Count: 11.80 (06-23 @ 10:26)  WBC Count: 15.68 (06-22 @ 11:14)  WBC Count: 20.99 (06-21 @ 19:10)  WBC Count: 21.68 (06-21 @ 09:43)  WBC Count: 22.97 (06-20 @ 21:12)    06-25    134<L>  |  99  |  44<H>  ----------------------------<  123<H>  3.8   |  20<L>  |  2.69<H>  Creatinine: 2.69 (06-25 @ 09:20)    Creatinine: 2.37 (06-24 @ 15:14)    Creatinine: 2.32 (06-23 @ 10:26)    Creatinine: 2.29 (06-22 @ 11:14)    Creatinine: 2.37 (06-21 @ 19:10)    Creatinine: 2.42 (06-21 @ 09:43)    Creatinine: 2.50 (06-20 @ 21:12)      Ca    9.1      25 Jun 2021 09:20  Phos  5.0     06-25  Mg     2.1     06-25    TPro  6.8  /  Alb  2.6<L>  /  TBili  1.2  /  DBili  x   /  AST  8<L>  /  ALT  6<L>  /  AlkPhos  61  06-25          MICROBIOLOGY:  .Blood Blood-Peripheral  06-21-21   No growth to date.  --  --      .Blood Blood-Peripheral  06-13-21   No Growth Final  --  --      .Blood Blood-Peripheral  06-12-21   Growth in anaerobic bottle: Staphylococcus capitis  Multiple Morphological Strains  --  Staphylococcus capitis  Staphylococcus capitis          v          RADIOLOGY:    Toan Gutierrez MD; Division of Infectious Disease; Pager: [phone]; nights and weekends: [phone]

## 2021-06-25 NOTE — PROGRESS NOTE ADULT - SUBJECTIVE AND OBJECTIVE BOX
[facility] DIVISION OF KIDNEY DISEASES AND HYPERTENSION -- FOLLOW UP NOTE  --------------------------------------------------------------------------------    24 hour events/subjective: Patient was seen and examined at bedside. Reported feeling well. Denies CP, SOB, fever, chills, nausea, vomiting, diarrhea, LE edema or dysuria.    PAST HISTORY  --------------------------------------------------------------------------------  No significant changes to PMH, PSH, FHx, SHx, unless otherwise noted    ALLERGIES & MEDICATIONS  --------------------------------------------------------------------------------  Allergies    morphine (Unknown)    Intolerances    Standing Inpatient Medications  allopurinol 200 milliGRAM(s) Oral daily  Biotene Dry Mouth Oral Rinse 5 milliLiter(s) Swish and Spit four times a day  cholecalciferol 2000 Unit(s) Oral daily  cinacalcet 30 milliGRAM(s) Oral daily  escitalopram 5 milliGRAM(s) Oral daily  finasteride 5 milliGRAM(s) Oral daily  folic acid 1 milliGRAM(s) Oral daily  furosemide    Tablet 40 milliGRAM(s) Oral daily  furosemide   Injectable 20 milliGRAM(s) IV Push once  insulin lispro (ADMELOG) corrective regimen sliding scale   SubCutaneous three times a day before meals  insulin lispro (ADMELOG) corrective regimen sliding scale   SubCutaneous at bedtime  isosorbide   mononitrate ER Tablet (IMDUR) 60 milliGRAM(s) Oral daily  metoprolol succinate ER 50 milliGRAM(s) Oral daily  phytonadione   Solution 10 milliGRAM(s) Oral daily  piperacillin/tazobactam IVPB.. 3.375 Gram(s) IV Intermittent every 8 hours  polyethylene glycol 3350 17 Gram(s) Oral daily  sodium chloride 0.65% Nasal 1 Spray(s) Both Nostrils four times a day  sodium chloride 0.9%. 1000 milliLiter(s) IV Continuous <Continuous>  tamsulosin 0.4 milliGRAM(s) Oral at bedtime  venetoclax 400 milliGRAM(s) Oral <User Schedule>    PRN Inpatient Medications  acetaminophen   Tablet .. 650 milliGRAM(s) Oral every 6 hours PRN  albuterol/ipratropium for Nebulization 3 milliLiter(s) Nebulizer every 6 hours PRN  ondansetron Injectable 8 milliGRAM(s) IV Push every 8 hours PRN  senna 2 Tablet(s) Oral at bedtime PRN    REVIEW OF SYSTEMS  --------------------------------------------------------------------------------  Gen: No fevers/chills  Respiratory: No dyspnea, cough  CV: No chest pain  GI: No abdominal pain, diarrhea  : No dysuria, hematuria  MSK: No  edema    All other systems were reviewed and are negative, except as noted.    VITALS/PHYSICAL EXAM  --------------------------------------------------------------------------------  T(C): 36.8 (06-25-21 @ 09:00), Max: 36.8 (06-25-21 @ 09:00)  HR: 95 (06-25-21 @ 09:00) (85 - 95)  BP: 121/51 (06-25-21 @ 09:00) (121/51 - 165/63)  RR: 18 (06-25-21 @ 09:00) (18 - 18)  SpO2: 98% (06-25-21 @ 09:00) (94% - 100%)  Wt(kg): --    06-24-21 @ 07:01  -  06-25-21 @ 07:00  --------------------------------------------------------  IN: 419 mL / OUT: 300 mL / NET: 119 mL    06-25-21 @ 07:01  -  06-25-21 @ 10:21  --------------------------------------------------------  IN: 180 mL / OUT: 0 mL / NET: 180 mL    Physical Exam:  	Gen: NAD  	HEENT: MMM  	Pulm: CTA B/L  	CV: S1S2  	Abd: Soft, +BS   	Ext: No LE edema B/L  	Neuro: Awake  	Skin: Warm and dry    LABS/STUDIES  --------------------------------------------------------------------------------              6.9    4.16  >-----------<  16       [06-25-21 @ 09:20]              21.6     134  |  99  |  44  ----------------------------<  123      [06-25-21 @ 09:20]  3.8   |  20  |  2.69        Ca     9.1     [06-25-21 @ 09:20]      iCa    1.28     [06-25 @ 09:27]      Mg     2.1     [06-25-21 @ 09:20]      Phos  5.0     [06-25-21 @ 09:20]    TPro  6.8  /  Alb  2.6  /  TBili  1.2  /  DBili  x   /  AST  8   /  ALT  6   /  AlkPhos  61  [06-25-21 @ 09:20]    PT/INR: PT 14.9 , INR 1.26       [06-24-21 @ 15:14]    Uric acid 4.7      [06-24-21 @ 15:14]        [06-25-21 @ 09:20]    Creatinine Trend:  SCr 2.69 [06-25 @ 09:20]  SCr 2.37 [06-24 @ 15:14]  SCr 2.32 [06-23 @ 10:26]  SCr 2.29 [06-22 @ 11:14]  SCr 2.37 [06-21 @ 19:10]    Urinalysis - [06-12-21 @ 00:38]      Color Light Yellow / Appearance Slightly Turbid / SG 1.014 / pH 6.0      Gluc Negative / Ketone Negative  / Bili Negative / Urobili Negative       Blood Trace / Protein 30 mg/dL / Leuk Est Negative / Nitrite Negative      RBC 1 / WBC 5 / Hyaline 7 / Gran 0-2 / Sq Epi  / Non Sq Epi 4 / Bacteria Negative      Iron 155, TIBC 237, %sat 65      [06-12-21 @ 03:36]  Ferritin 827      [06-12-21 @ 04:44]  PTH -- (Ca 9.8)      [06-18-21 @ 10:41]   84  Vitamin D (25OH) 21.6      [06-18-21 @ 10:41]  HbA1c 5.8      [11-13-17 @ 10:29]    HBsAg Nonreact      [06-14-21 @ 11:02]  HBcAb Nonreact      [06-13-21 @ 13:38]  HCV 0.17, Nonreact      [06-14-21 @ 11:02]  HIV Nonreact      [06-13-21 @ 13:52]    PER: titer 1:320, pattern Speckled      [06-17-21 @ 10:30]  C3 Complement 137      [06-17-21 @ 10:30]  C4 Complement 37      [06-17-21 @ 10:30]  ANCA: cANCA Negative, pANCA Negative, atypical ANCA Negative      [06-17-21 @ 10:30]  anti-GBM 2      [06-17-21 @ 13:12]  PLA2R: OMEGA <1.8, IFA --      [06-17-21 @ 13:12]  Immunofixation Serum:   No Monoclonal Band Identified    Reference Range: None Detected      [06-17-21 @ 10:30]

## 2021-06-25 NOTE — PROGRESS NOTE ADULT - PROBLEM SELECTOR PLAN 7
s/p 5vCABG 2012 (LIMA to LAD, SVG to Diag and OM, SVG PDA and RPL), PCI (2 in 2014, 2 in 2015 [facility]), HFrEF s/p CRT-D (2013),  No DAPT or AC given thrombocytopenia.

## 2021-06-25 NOTE — PROGRESS NOTE ADULT - PROBLEM SELECTOR PLAN 2
Patient is not neutropenic   Vanco dosed daily by level. Zosyn added for ppx 6/21 (in lieu of cefepime given increased confusion/lethargy).   If febrile, send pan cultures.  6/21 CXR mid and lower right lung-pna can not be excluded-continue with Zosyn.   6/24 Abd US: Cholelithiasis without signs of cholecystitis, persistently dilated CBD  HIDA scan (+) for acute cholecystitis   Seen by Surgery and IR, since patient is asymptomatic and there are no signs of hemodynamic instability   no surgical or IR interventions were recommended.

## 2021-06-25 NOTE — PROVIDER CONTACT NOTE (CRITICAL VALUE NOTIFICATION) - ACTION/TREATMENT ORDERED:
1 unit of PRBCs and 1/2 unit of platelets(given during IR procedure) transfusion ordered and going to administer
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
## 2021-06-26 NOTE — PROGRESS NOTE ADULT - PROBLEM SELECTOR PLAN 7
s/p 5vCABG 2012 (LIMA to LAD, SVG to Diag and OM, SVG PDA and RPL), PCI (2 in 2014, 2 in 2015 [facility]), HFrEF s/p CRT-D (2013),  No DAPT or AC given thrombocytopenia. CHF from volume overload   Echo severe LV systolic fxn,  LVEF 27%  ICD (implantable cardioverter-defibrillator) in place  Per Cardiology give 20 IV lasix after blood transfusions  continue on daily diuresis with Lasix 40 mg oral daily

## 2021-06-26 NOTE — PROGRESS NOTE ADULT - PROBLEM SELECTOR PLAN 2
Patient is not neutropenic   Vanco dosed daily by level. Zosyn added for ppx 6/21 (in lieu of cefepime given increased confusion/lethargy).   If febrile, send pan cultures.  6/21 CXR mid and lower right lung-pna can not be excluded-continue with Zosyn.   6/24 Abd US: Cholelithiasis without signs of cholecystitis, persistently dilated CBD  HIDA scan (+) for acute cholecystitis   Seen by Surgery and IR, since patient is asymptomatic and there are no signs of hemodynamic instability   no surgical or IR interventions were recommended. Patient is not neutropenic, afebrile   Vanco dosed daily by level. Zosyn added for ppx 6/21 (in lieu of cefepime given increased confusion/lethargy).   If febrile, send pan cultures.  6/21 CXR mid and lower right lung-pna can not be excluded-continue with Zosyn.   6/24 Abd US: Cholelithiasis without signs of cholecystitis, persistently dilated CBD  HIDA scan (+) for acute cholecystitis   Seen by Surgery and IR, since patient is asymptomatic and there are no signs of hemodynamic instability   no surgical or IR interventions were recommended.

## 2021-06-26 NOTE — PROGRESS NOTE ADULT - PROBLEM SELECTOR PLAN 6
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   6/15 Normal renal ultrasound  monitor for TLS.  follow ionized calcium daily as per nephrology  6/24 on Sensipar for hypercalcemia   follow up Ionized calcium levels daily  Vitamin D 2000 daily.

## 2021-06-26 NOTE — PROGRESS NOTE ADULT - SUBJECTIVE AND OBJECTIVE BOX
Diagnosis: FLT3 ITD (-) Acute Myeloid Leukemia    Protocol/Chemo Regimen: Decitabine 20mg/m2 IVSS infused over 1 hour x 5 doses (held on day 3)  + Venetoclax (starting at 20 mg on day 1, 50 mg on day 2 and 100 mg from day 3. Escalated to 200mg  day 4    Day: 7     Pt endorsed: anxiety     Review of Systems: Denies nausea, vomiting, diarrhea, chest pain, SOB     :     Pain scale: 0     Diet: DASH/consistent carb    Allergies: morphine (Unknown)    -----------------------       Diagnosis: FLT3 ITD (-) Acute Myeloid Leukemia    Protocol/Chemo Regimen: Decitabine 20mg/m2 IVSS infused over 1 hour x 5 doses (held on day 3)  + Venetoclax (starting at 20 mg on day 1, 50 mg on day 2 and 100 mg from day 3. Escalated to 200mg  day 4    Day: 7     Pt endorsed: anxiety     Review of Systems: Denies nausea, vomiting, diarrhea, chest pain, SOB     :     Pain scale: 0     Diet: DASH/consistent carb    Allergies: morphine (Unknown)    ANTIMICROBIALS  piperacillin/tazobactam IVPB.. 3.375 Gram(s) IV Intermittent every 8 hours    HEME/ONC MEDICATIONS  venetoclax 400 milliGRAM(s) Oral <User Schedule>    STANDING MEDICATIONS  allopurinol 200 milliGRAM(s) Oral daily  Biotene Dry Mouth Oral Rinse 5 milliLiter(s) Swish and Spit four times a day  chlorhexidine 4% Liquid 1 Application(s) Topical <User Schedule>  cholecalciferol 2000 Unit(s) Oral daily  cinacalcet 30 milliGRAM(s) Oral daily  escitalopram 5 milliGRAM(s) Oral daily  finasteride 5 milliGRAM(s) Oral daily  folic acid 1 milliGRAM(s) Oral daily  furosemide    Tablet 40 milliGRAM(s) Oral daily  insulin lispro (ADMELOG) corrective regimen sliding scale   SubCutaneous three times a day before meals  insulin lispro (ADMELOG) corrective regimen sliding scale   SubCutaneous at bedtime  isosorbide   mononitrate ER Tablet (IMDUR) 60 milliGRAM(s) Oral daily  metoprolol succinate ER 50 milliGRAM(s) Oral daily  polyethylene glycol 3350 17 Gram(s) Oral daily  sodium chloride 0.65% Nasal 1 Spray(s) Both Nostrils four times a day  sodium chloride 0.9%. 1000 milliLiter(s) IV Continuous <Continuous>  tamsulosin 0.4 milliGRAM(s) Oral at bedtime    PRN MEDICATIONS  acetaminophen   Tablet .. 650 milliGRAM(s) Oral every 6 hours PRN  albuterol/ipratropium for Nebulization 3 milliLiter(s) Nebulizer every 6 hours PRN  ondansetron Injectable 8 milliGRAM(s) IV Push every 8 hours PRN  senna 2 Tablet(s) Oral at bedtime PRN  sodium chloride 0.9% lock flush 10 milliLiter(s) IV Push every 1 hour PRN    Vital Signs Last 24 Hrs  T(C): 36.2 (26 Jun 2021 05:00), Max: 36.4 (25 Jun 2021 14:15)  T(F): 97.2 (26 Jun 2021 05:00), Max: 97.5 (25 Jun 2021 14:15)  HR: 80 (26 Jun 2021 05:00) (65 - 81)  BP: 117/53 (26 Jun 2021 05:00) (115/71 - 149/60)  BP(mean): --  RR: 18 (26 Jun 2021 05:00) (18 - 18)  SpO2: 97% (26 Jun 2021 05:00) (96% - 100%)    PHYSICAL EXAM  General: adult in NAD  HEENT: clear oropharynx, no erythema, no ulcers  Neck: supple  CV: normal S1, S2, RRR  Lungs: clear to auscultation, no wheezes, no rales  Abdomen: soft, nontender, nondistended, normal BS  Ext: no edema  Skin: no rash  Neuro: alert and oriented x 2   Central line: normal     LABS:                        7.4    3.42  )-----------( 19       ( 26 Jun 2021 07:08 )             22.9     Mean Cell Volume : 89.8 fl  Mean Cell Hemoglobin : 29.0 pg  Mean Cell Hemoglobin Concentration : 32.3 gm/dL  Auto Neutrophil # : 2.27 K/uL  Auto Lymphocyte # : 0.72 K/uL  Auto Monocyte # : 0.35 K/uL  Auto Eosinophil # : 0.00 K/uL  Auto Basophil # : 0.00 K/uL  Auto Neutrophil % : 65.6 %  Auto Lymphocyte % : 21.0 %  Auto Monocyte % : 10.1 %  Auto Eosinophil % : 0.0 %  Auto Basophil % : 0.0 %    06-26  131<L>  |  97  |  42<H>  ----------------------------<  107<H>  3.5   |  19<L>  |  2.66<H>    Ca    8.7      26 Jun 2021 07:08  Phos  4.5     06-26  Mg     2.1     06-26    TPro  6.4  /  Alb  2.4<L>  /  TBili  1.4<H>  /  DBili  x   /  AST  12  /  ALT  7<L>  /  AlkPhos  57  06-26    Mg 2.1  Phos 4.5    PT/INR - ( 24 Jun 2021 15:14 )   PT: 14.9 sec;   INR: 1.26 ratio        Uric Acid 5.3    RADIOLOGY & ADDITIONAL STUDIES:  < from: [facility] Hepatobiliary Imaging (06.24.21 @ 10:23) >  IMPRESSION: Abnormal hepatobiliary scan.  Findings consistent with acute cholecystitis, in the proper clinical setting.   Diagnosis: FLT3 ITD (-) Acute Myeloid Leukemia    Protocol/Chemo Regimen: Decitabine 20mg/m2 IVSS infused over 1 hour x 5 doses (held on day 3)  + Venetoclax (starting at 20 mg on day 1, 50 mg on day 2 and 100 mg from day 3. Escalated to 200mg  day 4    Day: 7     Pt endorsed: no acute complaints     Review of Systems: Denies nausea, vomiting, diarrhea, chest pain, SOB     : Meena ID [id number]     Pain scale: 0     Diet: DASH/consistent carb    Allergies: morphine (Unknown)    ANTIMICROBIALS  piperacillin/tazobactam IVPB.. 3.375 Gram(s) IV Intermittent every 8 hours    HEME/ONC MEDICATIONS  venetoclax 400 milliGRAM(s) Oral <User Schedule>    STANDING MEDICATIONS  allopurinol 200 milliGRAM(s) Oral daily  Biotene Dry Mouth Oral Rinse 5 milliLiter(s) Swish and Spit four times a day  chlorhexidine 4% Liquid 1 Application(s) Topical <User Schedule>  cholecalciferol 2000 Unit(s) Oral daily  cinacalcet 30 milliGRAM(s) Oral daily  escitalopram 5 milliGRAM(s) Oral daily  finasteride 5 milliGRAM(s) Oral daily  folic acid 1 milliGRAM(s) Oral daily  furosemide    Tablet 40 milliGRAM(s) Oral daily  insulin lispro (ADMELOG) corrective regimen sliding scale   SubCutaneous three times a day before meals  insulin lispro (ADMELOG) corrective regimen sliding scale   SubCutaneous at bedtime  isosorbide   mononitrate ER Tablet (IMDUR) 60 milliGRAM(s) Oral daily  metoprolol succinate ER 50 milliGRAM(s) Oral daily  polyethylene glycol 3350 17 Gram(s) Oral daily  sodium chloride 0.65% Nasal 1 Spray(s) Both Nostrils four times a day  sodium chloride 0.9%. 1000 milliLiter(s) IV Continuous <Continuous>  tamsulosin 0.4 milliGRAM(s) Oral at bedtime    PRN MEDICATIONS  acetaminophen   Tablet .. 650 milliGRAM(s) Oral every 6 hours PRN  albuterol/ipratropium for Nebulization 3 milliLiter(s) Nebulizer every 6 hours PRN  ondansetron Injectable 8 milliGRAM(s) IV Push every 8 hours PRN  senna 2 Tablet(s) Oral at bedtime PRN  sodium chloride 0.9% lock flush 10 milliLiter(s) IV Push every 1 hour PRN    Vital Signs Last 24 Hrs  T(C): 36.2 (26 Jun 2021 05:00), Max: 36.4 (25 Jun 2021 14:15)  T(F): 97.2 (26 Jun 2021 05:00), Max: 97.5 (25 Jun 2021 14:15)  HR: 80 (26 Jun 2021 05:00) (65 - 81)  BP: 117/53 (26 Jun 2021 05:00) (115/71 - 149/60)  BP(mean): --  RR: 18 (26 Jun 2021 05:00) (18 - 18)  SpO2: 97% (26 Jun 2021 05:00) (96% - 100%)    PHYSICAL EXAM  General: adult in NAD  HEENT: clear oropharynx, no erythema, no ulcers  Neck: supple  CV: normal S1, S2, RRR  Lungs: clear to auscultation, no wheezes, no rales  Abdomen: soft, RUQ tenderness, nondistended, normal BS  Ext: no edema  Skin: no rash  Neuro: alert and oriented x 2   Central line: normal     LABS:                        7.4    3.42  )-----------( 19       ( 26 Jun 2021 07:08 )             22.9     Mean Cell Volume : 89.8 fl  Mean Cell Hemoglobin : 29.0 pg  Mean Cell Hemoglobin Concentration : 32.3 gm/dL  Auto Neutrophil # : 2.27 K/uL  Auto Lymphocyte # : 0.72 K/uL  Auto Monocyte # : 0.35 K/uL  Auto Eosinophil # : 0.00 K/uL  Auto Basophil # : 0.00 K/uL  Auto Neutrophil % : 65.6 %  Auto Lymphocyte % : 21.0 %  Auto Monocyte % : 10.1 %  Auto Eosinophil % : 0.0 %  Auto Basophil % : 0.0 %    06-26  131<L>  |  97  |  42<H>  ----------------------------<  107<H>  3.5   |  19<L>  |  2.66<H>    Ca    8.7      26 Jun 2021 07:08  Phos  4.5     06-26  Mg     2.1     06-26    TPro  6.4  /  Alb  2.4<L>  /  TBili  1.4<H>  /  DBili  x   /  AST  12  /  ALT  7<L>  /  AlkPhos  57  06-26    Mg 2.1  Phos 4.5    PT/INR - ( 24 Jun 2021 15:14 )   PT: 14.9 sec;   INR: 1.26 ratio        Uric Acid 5.3    RADIOLOGY & ADDITIONAL STUDIES:  < from: [facility] Hepatobiliary Imaging (06.24.21 @ 10:23) >  IMPRESSION: Abnormal hepatobiliary scan.  Findings consistent with acute cholecystitis, in the proper clinical setting.   Diagnosis: FLT3 ITD (-) Acute Myeloid Leukemia    Protocol/Chemo Regimen: Decitabine 20mg/m2 IVSS infused over 1 hour x 5 doses (held on day 3)  + Venetoclax 400 mg po daily     Day: 7     Pt endorsed: no acute complaints     Review of Systems: Denies nausea, vomiting, diarrhea, chest pain, SOB     : Meena ID [id number]     Pain scale: 0     Diet: DASH/consistent carb    Allergies: morphine (Unknown)    ANTIMICROBIALS  piperacillin/tazobactam IVPB.. 3.375 Gram(s) IV Intermittent every 8 hours    HEME/ONC MEDICATIONS  venetoclax 400 milliGRAM(s) Oral <User Schedule>    STANDING MEDICATIONS  allopurinol 200 milliGRAM(s) Oral daily  Biotene Dry Mouth Oral Rinse 5 milliLiter(s) Swish and Spit four times a day  chlorhexidine 4% Liquid 1 Application(s) Topical <User Schedule>  cholecalciferol 2000 Unit(s) Oral daily  cinacalcet 30 milliGRAM(s) Oral daily  escitalopram 5 milliGRAM(s) Oral daily  finasteride 5 milliGRAM(s) Oral daily  folic acid 1 milliGRAM(s) Oral daily  furosemide    Tablet 40 milliGRAM(s) Oral daily  insulin lispro (ADMELOG) corrective regimen sliding scale   SubCutaneous three times a day before meals  insulin lispro (ADMELOG) corrective regimen sliding scale   SubCutaneous at bedtime  isosorbide   mononitrate ER Tablet (IMDUR) 60 milliGRAM(s) Oral daily  metoprolol succinate ER 50 milliGRAM(s) Oral daily  polyethylene glycol 3350 17 Gram(s) Oral daily  sodium chloride 0.65% Nasal 1 Spray(s) Both Nostrils four times a day  sodium chloride 0.9%. 1000 milliLiter(s) IV Continuous <Continuous>  tamsulosin 0.4 milliGRAM(s) Oral at bedtime    PRN MEDICATIONS  acetaminophen   Tablet .. 650 milliGRAM(s) Oral every 6 hours PRN  albuterol/ipratropium for Nebulization 3 milliLiter(s) Nebulizer every 6 hours PRN  ondansetron Injectable 8 milliGRAM(s) IV Push every 8 hours PRN  senna 2 Tablet(s) Oral at bedtime PRN  sodium chloride 0.9% lock flush 10 milliLiter(s) IV Push every 1 hour PRN    Vital Signs Last 24 Hrs  T(C): 36.2 (26 Jun 2021 05:00), Max: 36.4 (25 Jun 2021 14:15)  T(F): 97.2 (26 Jun 2021 05:00), Max: 97.5 (25 Jun 2021 14:15)  HR: 80 (26 Jun 2021 05:00) (65 - 81)  BP: 117/53 (26 Jun 2021 05:00) (115/71 - 149/60)  BP(mean): --  RR: 18 (26 Jun 2021 05:00) (18 - 18)  SpO2: 97% (26 Jun 2021 05:00) (96% - 100%)    PHYSICAL EXAM  General: adult in NAD  HEENT: clear oropharynx, no erythema, no ulcers  Neck: supple  CV: normal S1, S2, RRR  Lungs: clear to auscultation, no wheezes, no rales  Abdomen: soft, RUQ tenderness, nondistended, normal BS  Ext: no edema  Skin: no rash  Neuro: alert and oriented x 2   Central line: normal     LABS:                        7.4    3.42  )-----------( 19       ( 26 Jun 2021 07:08 )             22.9     Mean Cell Volume : 89.8 fl  Mean Cell Hemoglobin : 29.0 pg  Mean Cell Hemoglobin Concentration : 32.3 gm/dL  Auto Neutrophil # : 2.27 K/uL  Auto Lymphocyte # : 0.72 K/uL  Auto Monocyte # : 0.35 K/uL  Auto Eosinophil # : 0.00 K/uL  Auto Basophil # : 0.00 K/uL  Auto Neutrophil % : 65.6 %  Auto Lymphocyte % : 21.0 %  Auto Monocyte % : 10.1 %  Auto Eosinophil % : 0.0 %  Auto Basophil % : 0.0 %    06-26  131<L>  |  97  |  42<H>  ----------------------------<  107<H>  3.5   |  19<L>  |  2.66<H>    Ca    8.7      26 Jun 2021 07:08  Phos  4.5     06-26  Mg     2.1     06-26    TPro  6.4  /  Alb  2.4<L>  /  TBili  1.4<H>  /  DBili  x   /  AST  12  /  ALT  7<L>  /  AlkPhos  57  06-26    Mg 2.1  Phos 4.5    PT/INR - ( 24 Jun 2021 15:14 )   PT: 14.9 sec;   INR: 1.26 ratio        Uric Acid 5.3    RADIOLOGY & ADDITIONAL STUDIES:  < from: [facility] Hepatobiliary Imaging (06.24.21 @ 10:23) >  IMPRESSION: Abnormal hepatobiliary scan.  Findings consistent with acute cholecystitis, in the proper clinical setting.

## 2021-06-26 NOTE — PROGRESS NOTE ADULT - PROBLEM SELECTOR PLAN 8
No VTE ppx 2/2 thrombocytopenia    Contact information: [phone] s/p 5vCABG 2012 (LIMA to LAD, SVG to Diag and OM, SVG PDA and RPL), PCI (2 in 2014, 2 in 2015 [facility]), HFrEF s/p CRT-D (2013),  No DAPT or AC given thrombocytopenia.
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
## 2021-06-26 NOTE — PROGRESS NOTE ADULT - PROBLEM SELECTOR PLAN 4
Monitor fingersticks closely   Continue ISS   Consistent carbohydrate diet  Endocrine consult for hypercalcemia-no acute intervention 6/21 6/24 CT, abdominal US suggestive of CBD dilatation. HIDA scan (+) for acute cholecystitis   seen by IR and Surgery - since patient is asymptomatic, no acute Interventions were suggested.   Continue IV antibiotics and clear liquid diet

## 2021-06-26 NOTE — PROGRESS NOTE ADULT - PROBLEM SELECTOR PLAN 1
FLT3 ITD (-) AML   Dacogen held on day 3 6/21 due to acute change in mental status now back to baseline. Course will go to day 6 to complete 5 days.   6/14 bone marrow biopsy (+) for AML  Monitor daily CBC's and transfuse as needed , Monitor daily CMP and replete electrolytes as needed   strict I's/O's, daily weights , mouth care, anti emetics., Allopurinol 200 mg oral daily   On Dacogen 20 mg/m2 x 5 days + Venetoclax. (s/p Venetoclax escalation, now on 400 mg oral daily)  follow up TLS labs BID  Elevated Ionized calcium,  6/24 CT, abdominal US suggestive of CBD dilatation. HIDA scan (+) for acute cholecystitis   seen by IR and Surgery - since patient is asymptomatic, no acute Interventions were suggested.   Continue IV antibiotics FLT3 ITD (-) AML   Dacogen held on day 3 6/21 due to acute change in mental status now back to baseline. Course will go to day 6 to complete 5 days.   6/14 bone marrow biopsy (+) for AML  Monitor daily CBC's and transfuse as needed , Monitor daily CMP and replete electrolytes as needed   strict I's/O's, daily weights , mouth care, anti emetics., Allopurinol 200 mg oral daily   On Dacogen 20 mg/m2 x 5 days + Venetoclax. (s/p Venetoclax escalation, now on 400 mg oral daily)  follow up TLS labs BID  Elevated Ionized calcium   6/24 CT, abdominal US suggestive of CBD dilatation. HIDA scan (+) for acute cholecystitis   seen by IR and Surgery - since patient is asymptomatic, no acute Interventions were suggested.   Continue IV antibiotics FLT3 ITD (-) AML   Dacogen held on day 3 6/21 due to acute change in mental status now back to baseline. Course will go to day 6 to complete 5 days.   6/14 bone marrow biopsy (+) for AML  Monitor daily CBC's and transfuse as needed , Monitor daily CMP and replete electrolytes as needed   strict I's/O's, daily weights , mouth care, anti emetics., Allopurinol 200 mg oral daily   On Dacogen 20 mg/m2 x 5 days + Venetoclax. (s/p Venetoclax escalation, now on 400 mg oral daily)
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
## 2021-06-26 NOTE — PROGRESS NOTE ADULT - PROBLEM SELECTOR PLAN 5
Nephrology following-No indication for HD at this time.   6/15 Normal renal ultrasound  monitor for TLS.  follow ionized calcium daily as per nephrology  6/24 on Sensipar for hypercalcemia   follow up Ionized calcium levels daily  Vitamin D 2000 daily. Monitor fingersticks closely   Continue ISS   Consistent carbohydrate diet  Endocrine consult for hypercalcemia-no acute intervention 6/21

## 2021-06-27 NOTE — PROGRESS NOTE ADULT - PROBLEM SELECTOR PLAN 2
Patient is not neutropenic, afebrile   Vanco dosed daily by level. Zosyn added for ppx 6/21 (in lieu of cefepime given increased confusion/lethargy).   If febrile, send pan cultures.  6/21 CXR mid and lower right lung-pna can not be excluded-continue with Zosyn.   6/24 Abd US: Cholelithiasis without signs of cholecystitis, persistently dilated CBD  HIDA scan (+) for acute cholecystitis   Seen by Surgery and IR, since patient is asymptomatic and there are no signs of hemodynamic instability   no surgical or IR interventions were recommended. Patient is not neutropenic, afebrile   Zosyn added for ppx 6/21 (in lieu of cefepime given increased confusion/lethargy).   If febrile, send pan cultures.  6/21 CXR mid and lower right lung-pna can not be excluded   6/24 Abd US: Cholelithiasis without signs of cholecystitis, persistently dilated CBD; HIDA scan (+) for acute cholecystitis   Seen by Surgery and IR, since patient is asymptomatic and there are no signs of hemodynamic instability   no surgical or IR interventions were recommended. Continue clear liquid diet and antibiotics Patient is not neutropenic, afebrile   Zosyn added for ppx 6/21 (in lieu of cefepime given increased confusion/lethargy).   If febrile, send pan cultures.  6/21 CXR mid and lower right lung-pna can not be excluded   6/24 Abd US: Cholelithiasis without signs of cholecystitis, persistently dilated CBD; HIDA scan (+) for acute cholecystitis   Seen by Surgery and IR, since patient is asymptomatic and there are no signs of hemodynamic instability   No surgical or IR interventions were recommended. Continue clear liquid diet and antibiotics

## 2021-06-27 NOTE — PROGRESS NOTE ADULT - PROBLEM SELECTOR PLAN 4
6/24 CT, abdominal US suggestive of CBD dilatation. HIDA scan (+) for acute cholecystitis   seen by IR and Surgery - since patient is asymptomatic, no acute Interventions were suggested.   Continue IV antibiotics and clear liquid diet 6/24 CT, abdominal US suggestive of CBD dilatation. HIDA scan (+) for acute cholecystitis   Seen by IR and Surgery - since patient is asymptomatic, no acute interventions were suggested   Continue IV antibiotics and clear liquid diet
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
## 2021-06-27 NOTE — PROGRESS NOTE ADULT - SUBJECTIVE AND OBJECTIVE BOX
Diagnosis: FLT3 ITD (-) Acute Myeloid Leukemia    Protocol/Chemo Regimen: Decitabine 20mg/m2 IVSS infused over 1 hour x 5 doses (held on day 3)  + Venetoclax 400 mg po daily     Day: 8     Pt endorsed: no acute complaints     Review of Systems: Denies nausea, vomiting, diarrhea, chest pain, SOB     : Meena ARREOLA [id number]     Pain scale: 0     Diet: DASH/consistent carb    Allergies: morphine (Unknown)    ---------------------         Diagnosis: FLT3 ITD (-) Acute Myeloid Leukemia    Protocol/Chemo Regimen: Decitabine 20mg/m2 IVSS infused over 1 hour x 5 doses (held on day 3)  + Venetoclax 400 mg po daily     Day: 8     Pt endorsed: mild abdominal tenderness     Review of Systems: Denies nausea, vomiting, diarrhea, chest pain, SOB     : Elvira ID [id number]     Pain scale: 0     Diet: clear liquid     Allergies: morphine (Unknown)    ANTIMICROBIALS  piperacillin/tazobactam IVPB.. 3.375 Gram(s) IV Intermittent every 8 hours    HEME/ONC MEDICATIONS  venetoclax 400 milliGRAM(s) Oral <User Schedule>    STANDING MEDICATIONS  allopurinol 200 milliGRAM(s) Oral daily  Biotene Dry Mouth Oral Rinse 5 milliLiter(s) Swish and Spit four times a day  chlorhexidine 4% Liquid 1 Application(s) Topical <User Schedule>  cholecalciferol 2000 Unit(s) Oral daily  cinacalcet 30 milliGRAM(s) Oral daily  escitalopram 5 milliGRAM(s) Oral daily  finasteride 5 milliGRAM(s) Oral daily  folic acid 1 milliGRAM(s) Oral daily  furosemide    Tablet 40 milliGRAM(s) Oral daily  insulin lispro (ADMELOG) corrective regimen sliding scale   SubCutaneous three times a day before meals  insulin lispro (ADMELOG) corrective regimen sliding scale   SubCutaneous at bedtime  isosorbide   mononitrate ER Tablet (IMDUR) 60 milliGRAM(s) Oral daily  metoprolol succinate ER 50 milliGRAM(s) Oral daily  polyethylene glycol 3350 17 Gram(s) Oral daily  sodium chloride 0.9%. 1000 milliLiter(s) IV Continuous <Continuous>  tamsulosin 0.4 milliGRAM(s) Oral at bedtime    PRN MEDICATIONS  acetaminophen   Tablet .. 650 milliGRAM(s) Oral every 6 hours PRN  albuterol/ipratropium for Nebulization 3 milliLiter(s) Nebulizer every 6 hours PRN  ondansetron Injectable 8 milliGRAM(s) IV Push every 8 hours PRN  senna 2 Tablet(s) Oral at bedtime PRN  sodium chloride 0.9% lock flush 10 milliLiter(s) IV Push every 1 hour PRN    Vital Signs Last 24 Hrs  T(C): 36.3 (27 Jun 2021 05:11), Max: 36.5 (26 Jun 2021 21:15)  T(F): 97.4 (27 Jun 2021 05:11), Max: 97.7 (26 Jun 2021 21:15)  HR: 94 (27 Jun 2021 05:11) (69 - 94)  BP: 121/62 (27 Jun 2021 05:11) (107/67 - 140/63)  BP(mean): --  RR: 18 (27 Jun 2021 05:11) (18 - 19)  SpO2: 100% (27 Jun 2021 05:11) (97% - 100%)    PHYSICAL EXAM  General: adult in NAD  HEENT: clear oropharynx, no erythema, no ulcers  Neck: supple  CV: normal S1, S2, RRR  Lungs: clear to auscultation, no wheezes, no rales  Abdomen: soft, RUQ tenderness, nondistended, normal BS  Ext: no edema  Skin: no rash  Neuro: alert and oriented x 3  Central line: normal     LABS:                        7.2    3.09  )-----------( 11       ( 27 Jun 2021 07:00 )             22.0     Mean Cell Volume : 89.4 fl  Mean Cell Hemoglobin : 29.3 pg  Mean Cell Hemoglobin Concentration : 32.7 gm/dL  Auto Neutrophil # : 1.91 K/uL  Auto Lymphocyte # : 0.99 K/uL  Auto Monocyte # : 0.13 K/uL  Auto Eosinophil # : 0.03 K/uL  Auto Basophil # : 0.00 K/uL  Auto Neutrophil % : 61.7 %  Auto Lymphocyte % : 32.2 %  Auto Monocyte % : 4.3 %  Auto Eosinophil % : 0.9 %  Auto Basophil % : 0.0 %    06-27  132<L>  |  99  |  42<H>  ----------------------------<  108<H>  3.7   |  19<L>  |  2.62<H>    Ca    8.6      27 Jun 2021 07:00  Phos  4.6     06-27  Mg     2.1     06-27    TPro  6.7  /  Alb  2.6<L>  /  TBili  1.2  /  DBili  x   /  AST  17  /  ALT  10  /  AlkPhos  55  06-27    Mg 2.1  Phos 4.6      Uric Acid 5.2    RADIOLOGY & ADDITIONAL STUDIES:  < from: [facility] Hepatobiliary Imaging (06.24.21 @ 10:23) >  IMPRESSION: Abnormal hepatobiliary scan.  Findings consistent with acute cholecystitis, in the proper clinical setting.   Diagnosis: FLT3 ITD (-) Acute Myeloid Leukemia    Protocol/Chemo Regimen: S/p Cycle 1 Decitabine 20mg/m2 IVSS infused over 1 hour x 5 doses (held on day 3)  + Venetoclax 400 mg po daily     Day: 8     Pt endorsed: mild abdominal tenderness     Review of Systems: Denies nausea, vomiting, diarrhea, chest pain, SOB     : Elvira ID [id number]     Pain scale: 0     Diet: clear liquid     Allergies: morphine (Unknown)    ANTIMICROBIALS  piperacillin/tazobactam IVPB.. 3.375 Gram(s) IV Intermittent every 8 hours    HEME/ONC MEDICATIONS  venetoclax 400 milliGRAM(s) Oral <User Schedule>    STANDING MEDICATIONS  allopurinol 200 milliGRAM(s) Oral daily  Biotene Dry Mouth Oral Rinse 5 milliLiter(s) Swish and Spit four times a day  chlorhexidine 4% Liquid 1 Application(s) Topical <User Schedule>  cholecalciferol 2000 Unit(s) Oral daily  cinacalcet 30 milliGRAM(s) Oral daily  escitalopram 5 milliGRAM(s) Oral daily  finasteride 5 milliGRAM(s) Oral daily  folic acid 1 milliGRAM(s) Oral daily  furosemide    Tablet 40 milliGRAM(s) Oral daily  insulin lispro (ADMELOG) corrective regimen sliding scale   SubCutaneous three times a day before meals  insulin lispro (ADMELOG) corrective regimen sliding scale   SubCutaneous at bedtime  isosorbide   mononitrate ER Tablet (IMDUR) 60 milliGRAM(s) Oral daily  metoprolol succinate ER 50 milliGRAM(s) Oral daily  polyethylene glycol 3350 17 Gram(s) Oral daily  sodium chloride 0.9%. 1000 milliLiter(s) IV Continuous <Continuous>  tamsulosin 0.4 milliGRAM(s) Oral at bedtime    PRN MEDICATIONS  acetaminophen   Tablet .. 650 milliGRAM(s) Oral every 6 hours PRN  albuterol/ipratropium for Nebulization 3 milliLiter(s) Nebulizer every 6 hours PRN  ondansetron Injectable 8 milliGRAM(s) IV Push every 8 hours PRN  senna 2 Tablet(s) Oral at bedtime PRN  sodium chloride 0.9% lock flush 10 milliLiter(s) IV Push every 1 hour PRN    Vital Signs Last 24 Hrs  T(C): 36.3 (27 Jun 2021 05:11), Max: 36.5 (26 Jun 2021 21:15)  T(F): 97.4 (27 Jun 2021 05:11), Max: 97.7 (26 Jun 2021 21:15)  HR: 94 (27 Jun 2021 05:11) (69 - 94)  BP: 121/62 (27 Jun 2021 05:11) (107/67 - 140/63)  BP(mean): --  RR: 18 (27 Jun 2021 05:11) (18 - 19)  SpO2: 100% (27 Jun 2021 05:11) (97% - 100%)    PHYSICAL EXAM  General: adult in NAD  HEENT: clear oropharynx, no erythema, no ulcers  Neck: supple  CV: normal S1, S2, RRR  Lungs: clear to auscultation, no wheezes, no rales  Abdomen: soft, RUQ tenderness, nondistended, normal BS  Ext: no edema  Skin: no rash  Neuro: alert and oriented x 2  Central line: normal     LABS:                        7.2    3.09  )-----------( 11       ( 27 Jun 2021 07:00 )             22.0     Mean Cell Volume : 89.4 fl  Mean Cell Hemoglobin : 29.3 pg  Mean Cell Hemoglobin Concentration : 32.7 gm/dL  Auto Neutrophil # : 1.91 K/uL  Auto Lymphocyte # : 0.99 K/uL  Auto Monocyte # : 0.13 K/uL  Auto Eosinophil # : 0.03 K/uL  Auto Basophil # : 0.00 K/uL  Auto Neutrophil % : 61.7 %  Auto Lymphocyte % : 32.2 %  Auto Monocyte % : 4.3 %  Auto Eosinophil % : 0.9 %  Auto Basophil % : 0.0 %    06-27  132<L>  |  99  |  42<H>  ----------------------------<  108<H>  3.7   |  19<L>  |  2.62<H>    Ca    8.6      27 Jun 2021 07:00  Phos  4.6     06-27  Mg     2.1     06-27    TPro  6.7  /  Alb  2.6<L>  /  TBili  1.2  /  DBili  x   /  AST  17  /  ALT  10  /  AlkPhos  55  06-27    Mg 2.1  Phos 4.6      Uric Acid 5.2    RADIOLOGY & ADDITIONAL STUDIES:  < from: [facility] Hepatobiliary Imaging (06.24.21 @ 10:23) >  IMPRESSION: Abnormal hepatobiliary scan.  Findings consistent with acute cholecystitis, in the proper clinical setting.

## 2021-06-27 NOTE — PROGRESS NOTE ADULT - PROBLEM SELECTOR PLAN 7
CHF from volume overload   Echo severe LV systolic fxn,  LVEF 27%  ICD (implantable cardioverter-defibrillator) in place  Per Cardiology give 20 IV lasix after blood transfusions  continue on daily diuresis with Lasix 40 mg oral daily CHF from volume overload   Echo severe LV systolic fxn,  LVEF 27%  ICD (implantable cardioverter-defibrillator) in place  Per Cardiology give 20 IV lasix after blood transfusions
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
## 2021-06-27 NOTE — PROGRESS NOTE ADULT - PROBLEM SELECTOR PLAN 3
Patient waxes and wanes from lethargic and agitated-but more consistently confused   CT head- 6/21 no acute changes. Sinusitis possible. MR if worsening concerns. Patient waxes and wanes from lethargic and agitated-but more consistently confused  CT head- 6/21 no acute changes. Sinusitis possible. MR if worsening concerns

## 2021-06-27 NOTE — PROGRESS NOTE ADULT - PROBLEM SELECTOR PLAN 8
s/p 5vCABG 2012 (LIMA to LAD, SVG to Diag and OM, SVG PDA and RPL), PCI (2 in 2014, 2 in 2015 [facility]), HFrEF s/p CRT-D (2013),  No DAPT or AC given thrombocytopenia.

## 2021-06-27 NOTE — PROGRESS NOTE ADULT - PROBLEM SELECTOR PLAN 1
FLT3 ITD (-) AML   Dacogen held on day 3 6/21 due to acute change in mental status now back to baseline. Course will go to day 6 to complete 5 days.   6/14 bone marrow biopsy (+) for AML  Monitor daily CBC's and transfuse as needed , Monitor daily CMP and replete electrolytes as needed   strict I's/O's, daily weights , mouth care, anti emetics., Allopurinol 200 mg oral daily   On Dacogen 20 mg/m2 x 5 days + Venetoclax. (s/p Venetoclax escalation, now on 400 mg oral daily) FLT3 ITD (-) AML   Dacogen held on day 3 6/21 due to acute change in mental status now back to baseline. Course will go to day 6 to complete 5 days.   Monitor daily CBC's and transfuse as needed, Monitor daily CMP and replete electrolytes as needed   Strict I's/O's, daily weights , mouth care, anti emetics., Allopurinol 200 mg oral daily   On Dacogen 20 mg/m2 x 5 days + Venetoclax. (s/p Venetoclax escalation, now on 400 mg oral daily) FLT3 ITD (-) AML   Dacogen held on day 3 6/21 due to acute change in mental status now back to baseline. Course will go to day 6 to complete 5 days   Monitor daily CBC's and transfuse as needed, Monitor daily CMP and replete electrolytes as needed   Strict I's/O's, daily weights, mouth care, anti emetics. Allopurinol 200 mg oral daily   On Dacogen 20 mg/m2 x 5 days + Venetoclax. (s/p Venetoclax escalation, now on 400 mg oral daily)
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
## 2021-06-28 NOTE — PROGRESS NOTE ADULT - PROBLEM SELECTOR PLAN 3
Patient waxes and wanes from lethargic and agitated-but more consistently confused  CT head- 6/21 no acute changes. Sinusitis possible. MR if worsening concerns

## 2021-06-28 NOTE — PROGRESS NOTE ADULT - PROBLEM SELECTOR PLAN 7
CHF from volume overload   Echo severe LV systolic fxn,  LVEF 27%  ICD (implantable cardioverter-defibrillator) in place  Per Cardiology give 20 IV lasix after blood transfusions

## 2021-06-28 NOTE — PROGRESS NOTE ADULT - SUBJECTIVE AND OBJECTIVE BOX
[facility] Division of Kidney Diseases & Hypertension  FOLLOW UP NOTE  [phone]--------------------------------------------------------------------------------  Chief Complaint:Acute lymphoblastic leukemia (ALL) not having achieved remission        24 hour events/subjective: Patient seen & examined. Labs & vitals reviewed. Reported feeling tired. Denies CP, SOB, fever, chills, nausea, vomiting, diarrhea, LE edema or dysuria. UO in the last 24 hours  500cc        PAST HISTORY  --------------------------------------------------------------------------------  No significant changes to PMH, PSH, FHx, SHx, unless otherwise noted    ALLERGIES & MEDICATIONS  --------------------------------------------------------------------------------  Allergies    morphine (Unknown)    Intolerances      Standing Inpatient Medications  allopurinol 200 milliGRAM(s) Oral daily  Biotene Dry Mouth Oral Rinse 5 milliLiter(s) Swish and Spit four times a day  chlorhexidine 4% Liquid 1 Application(s) Topical <User Schedule>  cholecalciferol 2000 Unit(s) Oral daily  cinacalcet 30 milliGRAM(s) Oral daily  escitalopram 5 milliGRAM(s) Oral daily  finasteride 5 milliGRAM(s) Oral daily  folic acid 1 milliGRAM(s) Oral daily  furosemide    Tablet 40 milliGRAM(s) Oral daily  insulin lispro (ADMELOG) corrective regimen sliding scale   SubCutaneous three times a day before meals  insulin lispro (ADMELOG) corrective regimen sliding scale   SubCutaneous at bedtime  isosorbide   mononitrate ER Tablet (IMDUR) 60 milliGRAM(s) Oral daily  metoprolol succinate ER 50 milliGRAM(s) Oral daily  piperacillin/tazobactam IVPB.. 3.375 Gram(s) IV Intermittent every 8 hours  polyethylene glycol 3350 17 Gram(s) Oral daily  sodium chloride 0.9%. 1000 milliLiter(s) IV Continuous <Continuous>  tamsulosin 0.4 milliGRAM(s) Oral at bedtime  venetoclax 400 milliGRAM(s) Oral <User Schedule>    PRN Inpatient Medications  acetaminophen   Tablet .. 650 milliGRAM(s) Oral every 6 hours PRN  albuterol/ipratropium for Nebulization 3 milliLiter(s) Nebulizer every 6 hours PRN  ondansetron Injectable 8 milliGRAM(s) IV Push every 8 hours PRN  senna 2 Tablet(s) Oral at bedtime PRN  sodium chloride 0.9% lock flush 10 milliLiter(s) IV Push every 1 hour PRN      REVIEW OF SYSTEMS  --------------------------------------------------------------------------------  Gen: No  fevers/chills  Respiratory: No dyspnea, cough  CV: No chest pain  GI: No abdominal pain, diarrhea,  nausea, vomiting  : No increased frequency, dysuria, hematuria  MSK:  no edema  Neuro: No dizziness/lightheadedness      All other systems were reviewed and are negative, except as noted.    VITALS/PHYSICAL EXAM  --------------------------------------------------------------------------------  T(C): 36.5 (06-28-21 @ 10:28), Max: 36.8 (06-27-21 @ 17:00)  HR: 70 (06-28-21 @ 10:28) (65 - 90)  BP: 129/59 (06-28-21 @ 10:28) (105/54 - 156/60)  RR: 18 (06-28-21 @ 10:28) (18 - 18)  SpO2: 100% (06-28-21 @ 10:28) (97% - 100%)  Wt(kg): --        06-27-21 @ 07:01  -  06-28-21 @ 07:00  --------------------------------------------------------  IN: 626 mL / OUT: 500 mL / NET: 126 mL    06-28-21 @ 07:01  -  06-28-21 @ 11:14  --------------------------------------------------------  IN: 120 mL / OUT: 0 mL / NET: 120 mL      Physical Exam:  	Gen: NAD  	HEENT: MMM  	Pulm: CTA B/L  	CV: S1S2, AICD  	Abd: Soft, +BS, abd scar  	Ext: No LE edema B/L  	Neuro: Awake  	Skin: Warm and dry    LABS/STUDIES  --------------------------------------------------------------------------------              7.0    4.27  >-----------<  7        [06-28-21 @ 06:57]              21.8     135  |  101  |  41  ----------------------------<  153      [06-28-21 @ 06:58]  3.5   |  19  |  2.50        Ca     8.6     [06-28-21 @ 06:58]      iCa    1.18     [06-28 @ 07:15]      Mg     2.1     [06-28-21 @ 06:58]      Phos  4.2     [06-28-21 @ 06:58]    TPro  6.7  /  Alb  2.6  /  TBili  3.0  /  DBili  x   /  AST  71  /  ALT  35  /  AlkPhos  256  [06-28-21 @ 06:58]    PT/INR: PT 14.1 , INR 1.18       [06-28-21 @ 06:59]    Uric acid 4.6      [06-28-21 @ 06:58]        [06-28-21 @ 06:58]    Creatinine Trend:  SCr 2.50 [06-28 @ 06:58]  SCr 2.62 [06-27 @ 07:00]  SCr 2.66 [06-26 @ 07:08]  SCr 2.69 [06-25 @ 09:20]  SCr 2.37 [06-24 @ 15:14]      Urine Creatinine 29      [06-25-21 @ 18:35]  Urine Sodium 96      [06-25-21 @ 18:35]  Urine Urea Nitrogen 310      [06-26-21 @ 02:34]  Urine Chloride 81      [06-25-21 @ 18:35]

## 2021-06-28 NOTE — PROGRESS NOTE ADULT - PROBLEM SELECTOR PLAN 2
Patient is not neutropenic, afebrile   Zosyn added for ppx 6/21 (in lieu of cefepime given increased confusion/lethargy).   If febrile, send pan cultures.  6/21 CXR mid and lower right lung-pna can not be excluded   6/24 Abd US: Cholelithiasis without signs of cholecystitis, persistently dilated CBD; HIDA scan (+) for acute cholecystitis   Seen by Surgery and IR, since patient is asymptomatic and there are no signs of hemodynamic instability   No surgical or IR interventions were recommended. Continue clear liquid diet and antibiotics Patient is not neutropenic, afebrile   Zosyn added for ppx 6/21 (in lieu of cefepime given increased confusion/lethargy).   If febrile, send pan cultures.  6/21 CXR mid and lower right lung-pna can not be excluded   6/24 Abd US: Cholelithiasis without signs of cholecystitis, persistently dilated CBD; HIDA scan (+) for acute cholecystitis   Seen by Surgery and IR, since patient is asymptomatic and there are no signs of hemodynamic instability   No surgical or IR interventions were recommended. Continue clear liquid diet and antibiotics  6/28 Follow up US abdomen

## 2021-06-28 NOTE — PROGRESS NOTE ADULT - SUBJECTIVE AND OBJECTIVE BOX
Allergies    morphine (Unknown)    Intolerances        ANTIMICROBIALS  piperacillin/tazobactam IVPB.. 3.375 Gram(s) IV Intermittent every 8 hours      HEME/ONC MEDICATIONS  venetoclax 400 milliGRAM(s) Oral <User Schedule>      STANDING MEDICATIONS  allopurinol 200 milliGRAM(s) Oral daily  Biotene Dry Mouth Oral Rinse 5 milliLiter(s) Swish and Spit four times a day  chlorhexidine 4% Liquid 1 Application(s) Topical <User Schedule>  cholecalciferol 2000 Unit(s) Oral daily  cinacalcet 30 milliGRAM(s) Oral daily  escitalopram 5 milliGRAM(s) Oral daily  finasteride 5 milliGRAM(s) Oral daily  folic acid 1 milliGRAM(s) Oral daily  furosemide    Tablet 40 milliGRAM(s) Oral daily  insulin lispro (ADMELOG) corrective regimen sliding scale   SubCutaneous three times a day before meals  insulin lispro (ADMELOG) corrective regimen sliding scale   SubCutaneous at bedtime  isosorbide   mononitrate ER Tablet (IMDUR) 60 milliGRAM(s) Oral daily  metoprolol succinate ER 50 milliGRAM(s) Oral daily  polyethylene glycol 3350 17 Gram(s) Oral daily  sodium chloride 0.9%. 1000 milliLiter(s) IV Continuous <Continuous>  tamsulosin 0.4 milliGRAM(s) Oral at bedtime      PRN MEDICATIONS  acetaminophen   Tablet .. 650 milliGRAM(s) Oral every 6 hours PRN  albuterol/ipratropium for Nebulization 3 milliLiter(s) Nebulizer every 6 hours PRN  ondansetron Injectable 8 milliGRAM(s) IV Push every 8 hours PRN  senna 2 Tablet(s) Oral at bedtime PRN  sodium chloride 0.9% lock flush 10 milliLiter(s) IV Push every 1 hour PRN        Vital Signs Last 24 Hrs  T(C): 36.2 (28 Jun 2021 09:43), Max: 36.8 (27 Jun 2021 17:00)  T(F): 97.2 (28 Jun 2021 09:43), Max: 98.2 (27 Jun 2021 17:00)  HR: 76 (28 Jun 2021 09:43) (65 - 90)  BP: 149/49 (28 Jun 2021 09:43) (105/54 - 156/60)  BP(mean): --  RR: 18 (28 Jun 2021 09:43) (18 - 18)  SpO2: 97% (28 Jun 2021 09:43) (97% - 99%)    PHYSICAL EXAM  General: NAD  HEENT: , clear oropharynx, anicteric sclera,  CV: (+) S1/S2 RRR  Lungs: clear to auscultation, no wheezes or rales  Abdomen: soft, non-tender, non-distended (+) BS  Ext: no clubbing, cyanosis or edema  Skin: no rashes and no petechiae  Neuro: alert and oriented X 3, no focal deficits  Central Line: PICC CDI           LABS:                        7.0    4.27  )-----------( 7        ( 28 Jun 2021 06:57 )             21.8         Mean Cell Volume : 89.3 fl  Mean Cell Hemoglobin : 28.7 pg  Mean Cell Hemoglobin Concentration : 32.1 gm/dL  Auto Neutrophil # : 2.85 K/uL  Auto Lymphocyte # : 0.86 K/uL  Auto Monocyte # : 0.37 K/uL  Auto Eosinophil # : 0.01 K/uL  Auto Basophil # : 0.00 K/uL  Auto Neutrophil % : 66.8 %  Auto Lymphocyte % : 20.1 %  Auto Monocyte % : 8.7 %  Auto Eosinophil % : 0.2 %  Auto Basophil % : 0.0 %      06-28    135  |  101  |  41<H>  ----------------------------<  153<H>  3.5   |  19<L>  |  2.50<H>    Ca    8.6      28 Jun 2021 06:58  Phos  4.2     06-28  Mg     2.1     06-28    TPro  6.7  /  Alb  2.6<L>  /  TBili  3.0<H>  /  DBili  x   /  AST  71<H>  /  ALT  35  /  AlkPhos  256<H>  06-28          PT/INR - ( 28 Jun 2021 06:59 )   PT: 14.1 sec;   INR: 1.18 ratio         Uric Acid 4.6    RECENT CULTURES:  06-21 @ 23:07  .Blood Blood-Peripheral      No Growth Final  --      RADIOLOGY & ADDITIONAL STUDIES:         Diagnosis: FLT3 ITD (-) Acute Myeloid Leukemia    Protocol/Chemo Regimen: S/p Cycle 1 Decitabine 20mg/m2 IVSS infused over 1 hour x 5 doses (held on day 3)  + Venetoclax 400 mg po daily     Day: 10      Pt endorsed: Poor appetite     Review of Systems: Denies nausea, vomiting, diarrhea, chest pain, SOB     Pain scale: 0     Diet: clear liquid     Allergies    morphine (Unknown)    Intolerances        ANTIMICROBIALS  piperacillin/tazobactam IVPB.. 3.375 Gram(s) IV Intermittent every 8 hours      HEME/ONC MEDICATIONS  venetoclax 400 milliGRAM(s) Oral <User Schedule>      STANDING MEDICATIONS  allopurinol 200 milliGRAM(s) Oral daily  Biotene Dry Mouth Oral Rinse 5 milliLiter(s) Swish and Spit four times a day  chlorhexidine 4% Liquid 1 Application(s) Topical <User Schedule>  cholecalciferol 2000 Unit(s) Oral daily  cinacalcet 30 milliGRAM(s) Oral daily  escitalopram 5 milliGRAM(s) Oral daily  finasteride 5 milliGRAM(s) Oral daily  folic acid 1 milliGRAM(s) Oral daily  furosemide    Tablet 40 milliGRAM(s) Oral daily  insulin lispro (ADMELOG) corrective regimen sliding scale   SubCutaneous three times a day before meals  insulin lispro (ADMELOG) corrective regimen sliding scale   SubCutaneous at bedtime  isosorbide   mononitrate ER Tablet (IMDUR) 60 milliGRAM(s) Oral daily  metoprolol succinate ER 50 milliGRAM(s) Oral daily  polyethylene glycol 3350 17 Gram(s) Oral daily  sodium chloride 0.9%. 1000 milliLiter(s) IV Continuous <Continuous>  tamsulosin 0.4 milliGRAM(s) Oral at bedtime      PRN MEDICATIONS  acetaminophen   Tablet .. 650 milliGRAM(s) Oral every 6 hours PRN  albuterol/ipratropium for Nebulization 3 milliLiter(s) Nebulizer every 6 hours PRN  ondansetron Injectable 8 milliGRAM(s) IV Push every 8 hours PRN  senna 2 Tablet(s) Oral at bedtime PRN  sodium chloride 0.9% lock flush 10 milliLiter(s) IV Push every 1 hour PRN        Vital Signs Last 24 Hrs  T(C): 36.2 (28 Jun 2021 09:43), Max: 36.8 (27 Jun 2021 17:00)  T(F): 97.2 (28 Jun 2021 09:43), Max: 98.2 (27 Jun 2021 17:00)  HR: 76 (28 Jun 2021 09:43) (65 - 90)  BP: 149/49 (28 Jun 2021 09:43) (105/54 - 156/60)  BP(mean): --  RR: 18 (28 Jun 2021 09:43) (18 - 18)  SpO2: 97% (28 Jun 2021 09:43) (97% - 99%)    PHYSICAL EXAM  General: NAD  HEENT: , clear oropharynx, anicteric sclera,  CV: (+) S1/S2 RRR  Lungs: clear to auscultation, no wheezes or rales  Abdomen: soft, non-tender, non-distended (+) BS  Ext: no  edema  Skin: no rash  Neuro: alert and oriented X 2   Central Line: PICC CDI           LABS:                        7.0    4.27  )-----------( 7        ( 28 Jun 2021 06:57 )             21.8         Mean Cell Volume : 89.3 fl  Mean Cell Hemoglobin : 28.7 pg  Mean Cell Hemoglobin Concentration : 32.1 gm/dL  Auto Neutrophil # : 2.85 K/uL  Auto Lymphocyte # : 0.86 K/uL  Auto Monocyte # : 0.37 K/uL  Auto Eosinophil # : 0.01 K/uL  Auto Basophil # : 0.00 K/uL  Auto Neutrophil % : 66.8 %  Auto Lymphocyte % : 20.1 %  Auto Monocyte % : 8.7 %  Auto Eosinophil % : 0.2 %  Auto Basophil % : 0.0 %      06-28    135  |  101  |  41<H>  ----------------------------<  153<H>  3.5   |  19<L>  |  2.50<H>    Ca    8.6      28 Jun 2021 06:58  Phos  4.2     06-28  Mg     2.1     06-28    TPro  6.7  /  Alb  2.6<L>  /  TBili  3.0<H>  /  DBili  x   /  AST  71<H>  /  ALT  35  /  AlkPhos  256<H>  06-28    PT/INR - ( 28 Jun 2021 06:59 )   PT: 14.1 sec;   INR: 1.18 ratio         Uric Acid 4.6    RECENT CULTURES:  06-21 @ 23:07  .Blood Blood-Peripheral      No Growth Final  --      RADIOLOGY & ADDITIONAL STUDIES:    NM Hepatobiliary Imaging (06.24.21 @ 10:23) >  IMPRESSION: Abnormal hepatobiliary scan.  Findings consistent with acute cholecystitis, in the proper clinical setting.
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
## 2021-06-28 NOTE — PROGRESS NOTE ADULT - PROBLEM SELECTOR PLAN 4
6/24 CT, abdominal US suggestive of CBD dilatation. HIDA scan (+) for acute cholecystitis   Seen by IR and Surgery - since patient is asymptomatic, no acute interventions were suggested   Continue IV antibiotics and clear liquid diet

## 2021-06-28 NOTE — PROGRESS NOTE ADULT - SUBJECTIVE AND OBJECTIVE BOX
Follow Up:  cholecystitis    Interval History/ROS:   sleepy (platelets being transfused)  no abd pain,    Allergies  morphine (Unknown)    ANTIMICROBIALS:  piperacillin/tazobactam IVPB.. 3.375 every 8 hours      OTHER MEDS:  MEDICATIONS  (STANDING):  acetaminophen   Tablet .. 650 every 6 hours PRN  albuterol/ipratropium for Nebulization 3 every 6 hours PRN  allopurinol 200 daily  cinacalcet 30 daily  escitalopram 5 daily  finasteride 5 daily  furosemide    Tablet 40 daily  insulin lispro (ADMELOG) corrective regimen sliding scale  three times a day before meals  insulin lispro (ADMELOG) corrective regimen sliding scale  at bedtime  isosorbide   mononitrate ER Tablet (IMDUR) 60 daily  metoprolol succinate ER 50 daily  ondansetron Injectable 8 every 8 hours PRN  polyethylene glycol 3350 17 daily  senna 2 at bedtime PRN  tamsulosin 0.4 at bedtime  venetoclax 400 <User Schedule>      Vital Signs Last 24 Hrs  T(C): 36.3 (28 Jun 2021 13:18), Max: 36.8 (27 Jun 2021 17:00)  T(F): 97.3 (28 Jun 2021 13:18), Max: 98.2 (27 Jun 2021 17:00)  HR: 76 (28 Jun 2021 13:18) (65 - 90)  BP: 125/54 (28 Jun 2021 13:18) (105/54 - 156/60)  BP(mean): --  RR: 18 (28 Jun 2021 13:18) (18 - 18)  SpO2: 98% (28 Jun 2021 13:18) (97% - 100%)    PHYSICAL EXAM:  General: WN/WD NAD, Non-toxic  Neurology: A&Ox3, nonfocal, fatigued  Respiratory: Clear to auscultation bilaterally  CV: RRR, S1S2, no murmurs, rubs or gallops  Abdominal: Soft, Non-tender, non-distended, normal bowel sounds  Extremities: No edema  Line Sites: Clear  Skin: No rash, ecchymoses UE                        7.0    4.27  )-----------( 7        ( 28 Jun 2021 06:57 )             21.8   WBC Count: 4.27 (06-28 @ 06:57)  WBC Count: 3.09 (06-27 @ 07:00)  WBC Count: 3.42 (06-26 @ 07:08)  WBC Count: 4.16 (06-25 @ 09:20)  WBC Count: 6.38 (06-24 @ 15:14)    06-28    135  |  101  |  41<H>  ----------------------------<  153<H>  3.5   |  19<L>  |  2.50<H>  Creatinine: 2.50 (06-28 @ 06:58)    Creatinine: 2.62 (06-27 @ 07:00)    Creatinine: 2.66 (06-26 @ 07:08)    Creatinine: 2.69 (06-25 @ 09:20)    Creatinine: 2.37 (06-24 @ 15:14)        Ca    8.6      28 Jun 2021 06:58  Phos  4.2     06-28  Mg     2.1     06-28    TPro  6.7  /  Alb  2.6<L>  /  TBili  3.0<H>  /  DBili  x   /  AST  71<H>  /  ALT  35  /  AlkPhos  256<H>  06-28      MICROBIOLOGY:  .Blood Blood-Peripheral  06-21-21   No Growth Final  --  --    .Blood Blood-Peripheral  06-13-21   No Growth Final  --  --      .Blood Blood-Peripheral  06-12-21   Growth in anaerobic bottle: Staphylococcus capitis  Multiple Morphological Strains  --  Staphylococcus capitis  Staphylococcus capitis    RADIOLOGY:  < from: US Abdomen Complete (US Abdomen Complete .) (06.23.21 @ 12:56) >  IMPRESSION:  Cholelithiasis without wall thickening or edema. No positive [place] sign.  Mildly dilated common bile duct (seen previously).  Mild ascites.  Bilateral trace pleural effusions.  Increased echogenicity of bilateral kidneys suggesting renal parenchymal disease.    < end of copied text >      Toan Gutierrez MD; Division of Infectious Disease; Pager: [phone]; nights and weekends: [phone]
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
## 2021-06-28 NOTE — PROGRESS NOTE ADULT - ASSESSMENT
80M PMHx CKD, CAD s/p CABG 2012 & 4 stents (2 in 2014, 2 in 2015) with dilated EF of 27% BiV ICD (2013) initially admitted to [facility] cor acute hypoxic respiratory failure, anemia (Hgb 5.7), thrombocytopenia (plt 30) w/ blast cells (22%), lactic acidosis (LA 10) - transfer to [facility] for hematological evaluation for leukemia.  Nephrology consulted for BRISA on CKD.      # BRISA on CKD  Pt with non-oliguric BRISA on CKD unclear etiology possible pre renal in the setting of anemia and ATN.  On admission sCr elevated at 3.45 (last known sCr 1.1-1.3 in 2018, recent hx CKD unclear recent baseline).  Urinalysis showed protein with trace blood.  Lab noted for serum uric acid 13.3, , phos 4.7, Echo severe LV systolic fxn, CT diffuse patchy airspace disease ?multifocal pna?. Urine electrolytes with Fe Urea of 82.5% suggestive of intrinsic pathology. Urine uric acid/creatinine ratio is < 1. Spot urine TP/CR ratio was 0.25 wnl. Kidney/bladder u/s on this admission was wnl (no hydro). Scr plateaued at 3.62 mg/dl on 6/13/21. Last sCr increased to 2.5 mg/dl today     Recommendations  - New BRISA, could be hemodynamically mediated, patient appears euvolemic, consider holding diuretics today.   - Continue allopurinol 200 mg PO daily   - PER with 1:320 but speckled pattern   - GN work up including c3 and C4 wnl, serum immunofixation with no monoclonality, anti GBM negative. Parvovirus DNA PCR negative. P ANCA and C ANCA negative, anti PLA2R negative  - Would consider kidney biopsy if his platelets are stable   - monitor BMP/tumor lysis markers (Uric acid/LDH/haptoglobin/LFT), strict I/O, avoid nephrotoxic agents (NSAIDs, PPI, contrast), renally dose medications per GFR.    # Hypercalcemia   Uncertain etiology. Last ionized calcium improved to 1.18 today   PTH was 84, not appropriately suppressed with low vit D of 21.6. Could be primary hyperparathyroidism  Continue Sensipar 30 mg PO daily   monitor ionized calcium daily    If you have any questions, please feel free to contact me  Mary Whitfield  Nephrology Fellow  [phone]  (After 5pm or on weekends please page the on-call fellow)

## 2021-06-28 NOTE — PROGRESS NOTE ADULT - PROBLEM SELECTOR PLAN 1
FLT3 ITD (-) AML   Dacogen held on day 3 6/21 due to acute change in mental status now back to baseline. Course will go to day 6 to complete 5 days   Monitor daily CBC's and transfuse as needed, Monitor daily CMP and replete electrolytes as needed   Strict I's/O's, daily weights, mouth care, anti emetics. Allopurinol 200 mg oral daily   On Dacogen 20 mg/m2 x 5 days + Venetoclax. (s/p Venetoclax escalation, now on 400 mg oral daily) FLT3 ITD (-) AML   Dacogen held on day 3 6/21 due to acute change in mental status now back to baseline. Course will go to day 6 to complete 5 days   Monitor daily CBC's and transfuse as needed, Monitor daily CMP and replete electrolytes as needed   Strict I's/O's, daily weights, mouth care, anti emetics. Allopurinol 200 mg oral daily   On Dacogen 20 mg/m2 x 5 days + Venetoclax. (s/p Venetoclax escalation, now on 400 mg oral daily)  Thrombocytopenia - Platelet x I unit FLT3 ITD (-) AML   Dacogen held on day 3 6/21 due to acute change in mental status now back to baseline. Course will go to day 6 to complete 5 days   Monitor daily CBC's and transfuse as needed, Monitor daily CMP and replete electrolytes as needed   Strict I's/O's, daily weights, mouth care, anti emetics. Allopurinol 200 mg oral daily   On Dacogen 20 mg/m2 x 5 days + Venetoclax. (s/p Venetoclax escalation, now on 400 mg oral daily)  Thrombocytopenia - Platelet x I unit  Pt is DNR

## 2021-06-28 NOTE — PROGRESS NOTE ADULT - REASON FOR ADMISSION
[facility]
[address] [place]   [address]                     ELECTROCARDIOGRAM REPORT      
_______________________________________________________________________________
 
Name:       TYRONE GRIGGS           [facility] #:                     [id number]                       Account #:      [id number]  
Admission:  20    Attend Phys:                          
Discharge:  20    Date of Birth:  [birth year]  
                                                          Report #: [id number]
                                                                    [id number]
_______________________________________________________________________________
THIS REPORT FOR:  
 
cc:  Fer Bangura James A. DO Santiago, Patrick MD [facility]                                         ~
THIS REPORT FOR:   //name//                          
 
                         [facility] ED
                                       
Test Date:    2020               Test Time:    17:35:12
Pat Name:     TYRONE GRIGGS             Department:   
Patient ID:   [id number]            Room:          
Gender:       F                        Technician:   ALYSSA
:          1951               Requested By: Issa Carrasco
Order Number: [id number] MD:   Hussein Holder
                                 Measurements
Intervals                              Axis          
Rate:         98                       P:            5
WY:           158                      QRS:          -5
QRSD:         81                       T:            9
QT:           351                                    
QTc:          449                                    
                           Interpretive Statements
Sinus rhythm
Borderline T wave abnormalities
Compared to ECG 2020 19:28:33
T-wave abnormality now present
Ventricular premature complex(es) no longer present
Atrial abnormality no longer present
Electronically Signed On 2020 12:51:28 CST by Hussein Holder
[email or web address]
 
 
 
 
 
 
 
 
 
 
 
 
 
  <ELECTRONICALLY SIGNED>
   By: Hussein Holder MD, FACC    
  20     1251
D: 20 1735                           _____________________________________
T: 20 1735                           Hussein Holder MD, FAC      /EPI anemia

## 2021-06-29 NOTE — PROGRESS NOTE ADULT - SUBJECTIVE AND OBJECTIVE BOX
Follow Up:  cholecystitis    Interval History/ROS: denies abd pain    Allergies  morphine (Unknown)    ANTIMICROBIALS:      OTHER MEDS:  MEDICATIONS  (STANDING):  acetaminophen   Tablet .. 650 every 6 hours PRN  albuterol/ipratropium for Nebulization 3 every 6 hours PRN  allopurinol 200 daily  cinacalcet 30 daily  escitalopram 5 daily  finasteride 5 daily  furosemide    Tablet 40 daily  insulin lispro (ADMELOG) corrective regimen sliding scale  three times a day before meals  insulin lispro (ADMELOG) corrective regimen sliding scale  at bedtime  isosorbide   mononitrate ER Tablet (IMDUR) 60 daily  metoprolol succinate ER 50 daily  ondansetron Injectable 8 every 8 hours PRN  polyethylene glycol 3350 17 daily  senna 2 at bedtime PRN  tamsulosin 0.4 at bedtime  venetoclax 400 <User Schedule>      Vital Signs Last 24 Hrs  T(C): 36.8 (29 Jun 2021 12:24), Max: 37.1 (28 Jun 2021 21:10)  T(F): 98.2 (29 Jun 2021 12:24), Max: 98.8 (28 Jun 2021 21:10)  HR: 73 (29 Jun 2021 12:24) (61 - 95)  BP: 126/58 (29 Jun 2021 12:24) (110/64 - 141/55)  BP(mean): --  RR: 17 (29 Jun 2021 12:24) (17 - 20)  SpO2: 96% (29 Jun 2021 12:24) (96% - 99%)    PHYSICAL EXAM:  General: WN/WD NAD, Non-toxic  Neurology: A&Ox3, nonfocal  Respiratory: Clear to auscultation bilaterally  CV: RRR, S1S2, no murmurs, rubs or gallops  Abdominal: Soft, Non-tender, non-distended,   Extremities: No edema,  Line Sites: Clear  Skin: No rash                        6.9    4.00  )-----------( 31       ( 29 Jun 2021 05:46 )             21.2   WBC Count: 4.00 (06-29 @ 05:46)  WBC Count: 4.27 (06-28 @ 06:57)  WBC Count: 3.09 (06-27 @ 07:00)  WBC Count: 3.42 (06-26 @ 07:08)  WBC Count: 4.16 (06-25 @ 09:20)  WBC Count: 6.38 (06-24 @ 15:14)    06-29    146<H>  |  109<H>  |  37<H>  ----------------------------<  112<H>  3.9   |  19<L>  |  2.43<H>    Ca    8.8      29 Jun 2021 05:46  Phos  4.3     06-29  Mg     2.1     06-29    TPro  7.0  /  Alb  2.8<L>  /  TBili  1.4<H>  /  DBili  x   /  AST  38  /  ALT  32  /  AlkPhos  169<H>  06-29      MICROBIOLOGY:  .Blood Blood-Peripheral  06-21-21   No Growth Final  --  --      .Blood Blood-Peripheral  06-13-21   No Growth Final  --  --      .Blood Blood-Peripheral  06-12-21   Growth in anaerobic bottle: Staphylococcus capitis  Multiple Morphological Strains  --  Staphylococcus capitis  Staphylococcus capitis    RADIOLOGY:  < from: US Abdomen Complete (US Abdomen Complete .) (06.29.21 @ 08:35) >    Liver: Right lobe of liver = 14.2 cm. Normal size and morphology. Smooth surface contour.  Bile ducts: Dilated, unchanged. Common bile duct measures 8.5 mm, previously 9 mm. No intrahepatic ductal dilatation.  Gallbladder: Contracted demonstrating multiple echogenic stones and sludge. No wall thickening or pericholecystic fluid. Negative Kearney's sign.  Pancreas: Pancreatic head is within normal limits. Limited visualization of the body and tail due to overlying bowel gas.  Spleen: 10.6 cm. Dysmorphic heterogeneous region in the mid spleen, likely related to prior splenic infarcts as seen on prior exams.  Right kidney: 10.4 cm. No hydronephrosis. Increased echogenicity with no cortical thinning.  Left kidney: 9.3 cm.  No hydronephrosis. Increased echogenicity with no cortical thinning.  Ascites: Trace perihepatic free fluid is noted as seen on prior.  Aorta and IVC: Visualized portions are within normal limits.  Other: Bilateral pleural effusions, right greater than left, as seen before.    IMPRESSION:    Cholelithiasis with no definite sonographic evidence of acute cholecystitis.    Mildly dilated common bile duct unchanged. No intrahepatic ductal dilatation.    Bilateral pleural effusions, right greater than left.    Increased echogenicity of the bilateral kidneys with no cortical thinning or hydronephrosis consistent with renal parenchymal disease in the setting of elevated creatinine.    < end of copied text >      Toan Gutierrez MD; Division of Infectious Disease; Pager: [phone]; nights and weekends: [phone]

## 2021-06-29 NOTE — PROGRESS NOTE ADULT - SUBJECTIVE AND OBJECTIVE BOX
Diagnosis: FLT3 ITD (-) Acute Myeloid Leukemia    Protocol/Chemo Regimen: S/p Cycle 1 Decitabine 20mg/m2 IVSS infused over 1 hour x 5 doses (held on day 3)  + Venetoclax 400 mg po daily     Day: 11     Pt endorsed: Poor appetite     Review of Systems: Denies nausea, vomiting, diarrhea, chest pain, SOB     Pain scale: 0     Diet: clear liquid     Allergies    morphine (Unknown)    Intolerances        ANTIMICROBIALS  piperacillin/tazobactam IVPB.. 3.375 Gram(s) IV Intermittent every 8 hours      HEME/ONC MEDICATIONS  venetoclax 400 milliGRAM(s) Oral <User Schedule>      STANDING MEDICATIONS  allopurinol 200 milliGRAM(s) Oral daily  Biotene Dry Mouth Oral Rinse 5 milliLiter(s) Swish and Spit four times a day  chlorhexidine 4% Liquid 1 Application(s) Topical <User Schedule>  cholecalciferol 2000 Unit(s) Oral daily  cinacalcet 30 milliGRAM(s) Oral daily  escitalopram 5 milliGRAM(s) Oral daily  finasteride 5 milliGRAM(s) Oral daily  folic acid 1 milliGRAM(s) Oral daily  furosemide    Tablet 40 milliGRAM(s) Oral daily  insulin lispro (ADMELOG) corrective regimen sliding scale   SubCutaneous three times a day before meals  insulin lispro (ADMELOG) corrective regimen sliding scale   SubCutaneous at bedtime  isosorbide   mononitrate ER Tablet (IMDUR) 60 milliGRAM(s) Oral daily  metoprolol succinate ER 50 milliGRAM(s) Oral daily  polyethylene glycol 3350 17 Gram(s) Oral daily  sodium chloride 0.9%. 1000 milliLiter(s) IV Continuous <Continuous>  tamsulosin 0.4 milliGRAM(s) Oral at bedtime      PRN MEDICATIONS  acetaminophen   Tablet .. 650 milliGRAM(s) Oral every 6 hours PRN  albuterol/ipratropium for Nebulization 3 milliLiter(s) Nebulizer every 6 hours PRN  ondansetron Injectable 8 milliGRAM(s) IV Push every 8 hours PRN  senna 2 Tablet(s) Oral at bedtime PRN  sodium chloride 0.9% lock flush 10 milliLiter(s) IV Push every 1 hour PRN        Vital Signs Last 24 Hrs  T(C): 36.6 (29 Jun 2021 06:09), Max: 37.1 (28 Jun 2021 21:10)  T(F): 97.9 (29 Jun 2021 06:09), Max: 98.8 (28 Jun 2021 21:10)  HR: 61 (29 Jun 2021 06:09) (61 - 91)  BP: 110/64 (29 Jun 2021 06:09) (110/64 - 149/59)  BP(mean): --  RR: 18 (29 Jun 2021 06:09) (18 - 20)  SpO2: 96% (29 Jun 2021 06:09) (96% - 100%)    PHYSICAL EXAM  General: NAD  HEENT: , clear oropharynx, anicteric sclera,  CV: (+) S1/S2 RRR  Lungs: clear to auscultation, no wheezes or rales  Abdomen: soft, non-tender, non-distended (+) BS  Ext: no  edema  Skin: no rash  Neuro: alert and oriented X 2   Central Line: PICC CDI     LABS:                          6.9    4.00  )-----------( 31       ( 29 Jun 2021 05:46 )             21.2         Mean Cell Volume : 89.8 fl  Mean Cell Hemoglobin : 29.2 pg  Mean Cell Hemoglobin Concentration : 32.5 gm/dL  Auto Neutrophil # : 2.75 K/uL  Auto Lymphocyte # : 0.95 K/uL  Auto Monocyte # : 0.20 K/uL  Auto Eosinophil # : 0.00 K/uL  Auto Basophil # : 0.00 K/uL  Auto Neutrophil % : 68.7 %  Auto Lymphocyte % : 23.7 %  Auto Monocyte % : 5.1 %  Auto Eosinophil % : 0.0 %  Auto Basophil % : 0.0 %      06-29    146<H>  |  109<H>  |  37<H>  ----------------------------<  112<H>  3.9   |  19<L>  |  2.43<H>    Ca    8.8      29 Jun 2021 05:46  Phos  4.3     06-29  Mg     2.1     06-29    TPro  7.0  /  Alb  2.8<L>  /  TBili  1.4<H>  /  DBili  x   /  AST  38  /  ALT  32  /  AlkPhos  169<H>  06-29          PT/INR - ( 28 Jun 2021 06:59 )   PT: 14.1 sec;   INR: 1.18 ratio               Uric Acid 4.6      RECENT CULTURES:  06-21 @ 23:07  .Blood Blood-Peripheral      No Growth Final  --      RADIOLOGY & ADDITIONAL STUDIES:    NM Hepatobiliary Imaging (06.24.21 @ 10:23) >  IMPRESSION: Abnormal hepatobiliary scan.  Findings consistent with acute cholecystitis, in the proper clinical setting.     Diagnosis: FLT3 ITD (-) Acute Myeloid Leukemia    Protocol/Chemo Regimen: S/p Cycle 1 Decitabine 20mg/m2 IVSS infused over 1 hour x 5 doses (held on day 3)  + Venetoclax 400 mg po daily     Day: 11     Pt endorsed: Poor appetite     Review of Systems: Denies nausea, vomiting, diarrhea, chest pain, SOB     Pain scale: 0     Diet: clear liquid     Allergies    morphine (Unknown)    Intolerances        ANTIMICROBIALS  piperacillin/tazobactam IVPB.. 3.375 Gram(s) IV Intermittent every 8 hours      HEME/ONC MEDICATIONS  venetoclax 400 milliGRAM(s) Oral <User Schedule>      STANDING MEDICATIONS  allopurinol 200 milliGRAM(s) Oral daily  Biotene Dry Mouth Oral Rinse 5 milliLiter(s) Swish and Spit four times a day  chlorhexidine 4% Liquid 1 Application(s) Topical <User Schedule>  cholecalciferol 2000 Unit(s) Oral daily  cinacalcet 30 milliGRAM(s) Oral daily  escitalopram 5 milliGRAM(s) Oral daily  finasteride 5 milliGRAM(s) Oral daily  folic acid 1 milliGRAM(s) Oral daily  furosemide    Tablet 40 milliGRAM(s) Oral daily  insulin lispro (ADMELOG) corrective regimen sliding scale   SubCutaneous three times a day before meals  insulin lispro (ADMELOG) corrective regimen sliding scale   SubCutaneous at bedtime  isosorbide   mononitrate ER Tablet (IMDUR) 60 milliGRAM(s) Oral daily  metoprolol succinate ER 50 milliGRAM(s) Oral daily  polyethylene glycol 3350 17 Gram(s) Oral daily  sodium chloride 0.9%. 1000 milliLiter(s) IV Continuous <Continuous>  tamsulosin 0.4 milliGRAM(s) Oral at bedtime      PRN MEDICATIONS  acetaminophen   Tablet .. 650 milliGRAM(s) Oral every 6 hours PRN  albuterol/ipratropium for Nebulization 3 milliLiter(s) Nebulizer every 6 hours PRN  ondansetron Injectable 8 milliGRAM(s) IV Push every 8 hours PRN  senna 2 Tablet(s) Oral at bedtime PRN  sodium chloride 0.9% lock flush 10 milliLiter(s) IV Push every 1 hour PRN        Vital Signs Last 24 Hrs  T(C): 36.6 (29 Jun 2021 06:09), Max: 37.1 (28 Jun 2021 21:10)  T(F): 97.9 (29 Jun 2021 06:09), Max: 98.8 (28 Jun 2021 21:10)  HR: 61 (29 Jun 2021 06:09) (61 - 91)  BP: 110/64 (29 Jun 2021 06:09) (110/64 - 149/59)  BP(mean): --  RR: 18 (29 Jun 2021 06:09) (18 - 20)  SpO2: 96% (29 Jun 2021 06:09) (96% - 100%)    PHYSICAL EXAM  General: NAD  HEENT: , clear oropharynx, anicteric sclera,  CV: (+) S1/S2 RRR  Lungs: clear to auscultation, no wheezes or rales  Abdomen: soft, non-tender, non-distended (+) BS  Ext: no  edema  Skin: no rash  Neuro: alert and oriented X 2   Central Line: PICC CDI     LABS:                          6.9    4.00  )-----------( 31       ( 29 Jun 2021 05:46 )             21.2         Mean Cell Volume : 89.8 fl  Mean Cell Hemoglobin : 29.2 pg  Mean Cell Hemoglobin Concentration : 32.5 gm/dL  Auto Neutrophil # : 2.75 K/uL  Auto Lymphocyte # : 0.95 K/uL  Auto Monocyte # : 0.20 K/uL  Auto Eosinophil # : 0.00 K/uL  Auto Basophil # : 0.00 K/uL  Auto Neutrophil % : 68.7 %  Auto Lymphocyte % : 23.7 %  Auto Monocyte % : 5.1 %  Auto Eosinophil % : 0.0 %  Auto Basophil % : 0.0 %      06-29    146<H>  |  109<H>  |  37<H>  ----------------------------<  112<H>  3.9   |  19<L>  |  2.43<H>    Ca    8.8      29 Jun 2021 05:46  Phos  4.3     06-29  Mg     2.1     06-29    TPro  7.0  /  Alb  2.8<L>  /  TBili  1.4<H>  /  DBili  x   /  AST  38  /  ALT  32  /  AlkPhos  169<H>  06-29          PT/INR - ( 28 Jun 2021 06:59 )   PT: 14.1 sec;   INR: 1.18 ratio         Uric Acid 4.6      RECENT CULTURES:  06-21 @ 23:07  .Blood Blood-Peripheral      No Growth Final  --      RADIOLOGY & ADDITIONAL STUDIES:  US Abdomen Complete (US Abdomen Complete .) (06.29.21 @ 08:35) >  IMPRESSION:    Cholelithiasis with no definite sonographic evidence of acute cholecystitis.    Mildly dilated common bile duct unchanged. No intrahepatic ductal dilatation.    Bilateral pleural effusions, right greater than left.    Increased echogenicity of the bilateral kidneys with no cortical thinning or hydronephrosis consistent with renal parenchymal disease in the setting of elevated creatinine.          NM Hepatobiliary Imaging (06.24.21 @ 10:23) >  IMPRESSION: Abnormal hepatobiliary scan.  Findings consistent with acute cholecystitis, in the proper clinical setting.
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
## 2021-06-29 NOTE — PROGRESS NOTE ADULT - ATTENDING COMMENTS
no new complaint  1.  ARF on CKD--criteria exist for renal bx to clarify [id number] process  2.  Hypercalcemia--responding to calcimetic

## 2021-06-29 NOTE — CHART NOTE - NSCHARTNOTEFT_GEN_A_CORE
Spoke with son Kevin regarding his father's clinical course. He feels his father is doing much better compared to when he first initiated treatment. He goes on to mention that his father was able to stand yesterday, not for long because he became weak, but he was able to stand on his own.   Kevin feels updated with all information about his father. He is hopeful for a recovery.    Given goals have been established, will sign off for now. Symptoms are controlled as well.     Please call with questions/ concerns/ re-consult [phone]    Jermain Garcia M.D.  Palliative Care Fellow

## 2021-06-29 NOTE — PROGRESS NOTE ADULT - PROBLEM SELECTOR PLAN 1
FLT3 ITD (-) AML   Dacogen held on day 3 6/21 due to acute change in mental status now back to baseline. Course will go to day 6 to complete 5 days   Monitor daily CBC's and transfuse as needed, Monitor daily CMP and replete electrolytes as needed   Strict I's/O's, daily weights, mouth care, anti emetics. Allopurinol 200 mg oral daily   On Dacogen 20 mg/m2 x 5 days + Venetoclax. (s/p Venetoclax escalation, now on 400 mg oral daily)  Anemia - PRBC x I unit  Pt is DNR

## 2021-06-29 NOTE — PROGRESS NOTE ADULT - PROBLEM SELECTOR PLAN 8
s/p 5vCABG 2012 (LIMA to LAD, SVG to Diag and OM, SVG PDA and RPL), PCI (2 in 2014, 2 in 2015 [facility]), HFrEF s/p CRT-D (2013),  No DAPT or AC given thrombocytopenia.

## 2021-06-29 NOTE — PHYSICAL THERAPY INITIAL EVALUATION ADULT - PHYSICAL ASSIST/NONPHYSICAL ASSIST: SUPINE/SIT, REHAB EVAL
GENERAL SURGERY  DAILY PROGRESS NOTE  7/20/2020    Subjective:  H/H dropped yesterday  No overnight issues  Received 2U PRBC    Objective:  BP (!) 118/58   Pulse 82   Temp 97.8 °F (36.6 °C) (Infrared)   Resp 18   Ht 5' 8\" (1.727 m)   Wt 138 lb 11.2 oz (62.9 kg)   SpO2 95%   BMI 21.09 kg/m²     Per COVID PPE conservation guidelines, I did not physically examine patient this am and relied upon RN assessment. Assessment/Plan:  74 y.o. male anemia.     For EGD today  NPO for procedure  Monitor H/H, defer to primary for transfusion needs    Electronically signed by Ignacio Longo DO on 7/20/2020 at 9:55 AM verbal cues/nonverbal cues (demo/gestures)/1 person assist

## 2021-06-29 NOTE — PROGRESS NOTE ADULT - SUBJECTIVE AND OBJECTIVE BOX
[facility] Division of Kidney Diseases & Hypertension  FOLLOW UP NOTE  [phone]--------------------------------------------------------------------------------  Chief Complaint:Acute lymphoblastic leukemia (ALL) not having achieved remission        24 hour events/subjective: Patient seen & examined. Labs & vitals reviewed. Labs & vitals reviewed. Reported feeling tired. Denies CP, SOB, fever, chills, nausea, vomiting, diarrhea, LE edema or dysuria. UO in the last 24 hours  500cc        PAST HISTORY  --------------------------------------------------------------------------------  No significant changes to PMH, PSH, FHx, SHx, unless otherwise noted    ALLERGIES & MEDICATIONS  --------------------------------------------------------------------------------  Allergies    morphine (Unknown)    Intolerances      Standing Inpatient Medications  allopurinol 200 milliGRAM(s) Oral daily  Biotene Dry Mouth Oral Rinse 5 milliLiter(s) Swish and Spit four times a day  chlorhexidine 4% Liquid 1 Application(s) Topical <User Schedule>  cholecalciferol 2000 Unit(s) Oral daily  cinacalcet 30 milliGRAM(s) Oral daily  escitalopram 5 milliGRAM(s) Oral daily  finasteride 5 milliGRAM(s) Oral daily  folic acid 1 milliGRAM(s) Oral daily  furosemide    Tablet 40 milliGRAM(s) Oral daily  insulin lispro (ADMELOG) corrective regimen sliding scale   SubCutaneous three times a day before meals  insulin lispro (ADMELOG) corrective regimen sliding scale   SubCutaneous at bedtime  isosorbide   mononitrate ER Tablet (IMDUR) 60 milliGRAM(s) Oral daily  metoprolol succinate ER 50 milliGRAM(s) Oral daily  polyethylene glycol 3350 17 Gram(s) Oral daily  sodium chloride 0.9%. 1000 milliLiter(s) IV Continuous <Continuous>  tamsulosin 0.4 milliGRAM(s) Oral at bedtime  venetoclax 400 milliGRAM(s) Oral <User Schedule>    PRN Inpatient Medications  acetaminophen   Tablet .. 650 milliGRAM(s) Oral every 6 hours PRN  albuterol/ipratropium for Nebulization 3 milliLiter(s) Nebulizer every 6 hours PRN  ondansetron Injectable 8 milliGRAM(s) IV Push every 8 hours PRN  senna 2 Tablet(s) Oral at bedtime PRN  sodium chloride 0.9% lock flush 10 milliLiter(s) IV Push every 1 hour PRN      REVIEW OF SYSTEMS  --------------------------------------------------------------------------------  Gen: No  fevers/chills, +tired  Respiratory: No dyspnea, cough  CV: No chest pain  GI: No abdominal pain, diarrhea,  nausea, vomiting  : No increased frequency, dysuria, hematuria  MSK:  no edema  Neuro: No dizziness/lightheadedness      All other systems were reviewed and are negative, except as noted.    VITALS/PHYSICAL EXAM  --------------------------------------------------------------------------------  T(C): 36.4 (06-29-21 @ 09:30), Max: 37.1 (06-28-21 @ 21:10)  HR: 95 (06-29-21 @ 09:30) (61 - 95)  BP: 129/57 (06-29-21 @ 09:30) (110/64 - 141/55)  RR: 18 (06-29-21 @ 09:30) (18 - 20)  SpO2: 99% (06-29-21 @ 09:30) (96% - 99%)  Wt(kg): --        06-28-21 @ 07:01  -  06-29-21 @ 07:00  --------------------------------------------------------  IN: 1179 mL / OUT: 500 mL / NET: 679 mL      Physical Exam:  	Gen: NAD  	HEENT: MMM  	Pulm: CTA B/L  	CV: S1S2, AICD  	Abd: Soft, +BS, abd scar  	Ext: No LE edema B/L  	Neuro: Awake  	Skin: Warm and dry    LABS/STUDIES  --------------------------------------------------------------------------------              6.9    4.00  >-----------<  31       [06-29-21 @ 05:46]              21.2     146  |  109  |  37  ----------------------------<  112      [06-29-21 @ 05:46]  3.9   |  19  |  2.43        Ca     8.8     [06-29-21 @ 05:46]      iCa    1.18     [06-29 @ 06:04]      Mg     2.1     [06-29-21 @ 05:46]      Phos  4.3     [06-29-21 @ 05:46]    TPro  7.0  /  Alb  2.8  /  TBili  1.4  /  DBili  x   /  AST  38  /  ALT  32  /  AlkPhos  169  [06-29-21 @ 05:46]    PT/INR: PT 14.1 , INR 1.18       [06-28-21 @ 06:59]    Uric acid 4.6      [06-29-21 @ 05:46]        [06-29-21 @ 05:46]    Creatinine Trend:  SCr 2.43 [06-29 @ 05:46]  SCr 2.50 [06-28 @ 06:58]  SCr 2.62 [06-27 @ 07:00]  SCr 2.66 [06-26 @ 07:08]  SCr 2.69 [06-25 @ 09:20]      Urine Creatinine 29      [06-25-21 @ 18:35]  Urine Sodium 96      [06-25-21 @ 18:35]  Urine Urea Nitrogen 310      [06-26-21 @ 02:34]  Urine Chloride 81      [06-25-21 @ 18:35]

## 2021-06-29 NOTE — PROGRESS NOTE ADULT - PROBLEM SELECTOR PLAN 2
Patient is not neutropenic, afebrile   Zosyn added for ppx 6/21 (in lieu of cefepime given increased confusion/lethargy).   If febrile, send pan cultures.  6/21 CXR mid and lower right lung-pna can not be excluded   6/24 Abd US: Cholelithiasis without signs of cholecystitis, persistently dilated CBD; HIDA scan (+) for acute cholecystitis   Seen by Surgery and IR, since patient is asymptomatic and there are no signs of hemodynamic instability   No surgical or IR interventions were recommended. Continue clear liquid diet and antibiotics  6/28 Follow up US abdomen Patient is not neutropenic, afebrile   Zosyn added for ppx 6/21 (in lieu of cefepime given increased confusion/lethargy).   If febrile, send pan cultures.  6/21 CXR mid and lower right lung-pna can not be excluded   6/24 Abd US: Cholelithiasis without signs of cholecystitis, persistently dilated CBD; HIDA scan (+) for acute cholecystitis   Seen by Surgery and IR, since patient is asymptomatic and there are no signs of hemodynamic instability   No surgical or IR interventions were recommended. Continue clear liquid diet and antibiotics  6/28  US abdomen Cholelithiasis with no definite sonographic evidence of acute cholecystitis.

## 2021-06-29 NOTE — PROGRESS NOTE ADULT - ASSESSMENT
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  Nephrology consulted for BRISA on CKD.      # BRISA on CKD  Pt with non-oliguric BRISA on CKD unclear etiology possible pre renal in the setting of anemia and ATN.  On admission sCr elevated at 3.45 (last known sCr 1.1-1.3 in 2018, recent hx CKD unclear recent baseline).  Urinalysis showed protein with trace blood.  Lab noted for serum uric acid 13.3, , phos 4.7, Echo severe LV systolic fxn, CT diffuse patchy airspace disease ?multifocal pna?. Urine electrolytes with Fe Urea of 82.5% suggestive of intrinsic pathology. Urine uric acid/creatinine ratio is < 1. Spot urine TP/CR ratio was 0.25 wnl. Kidney/bladder u/s on this admission was wnl (no hydro). Scr plateaued at 3.62 mg/dl on 6/13/21. Last sCr increased to 2.43 mg/dl today     Recommendations  - New BRISA, could be hemodynamically mediated, patient appears euvolemic, consider holding diuretics today as pt is hypernatremic  - Continue allopurinol 200 mg PO daily   - PER with 1:320 but speckled pattern   - GN work up including c3 and C4 wnl, serum immunofixation with no monoclonality, anti GBM negative. Parvovirus DNA PCR negative. P ANCA and C ANCA negative, anti PLA2R negative  - Would consider kidney biopsy if his platelets are stable. Currently Plts 31K & Hg 6.9  - monitor BMP/tumor lysis markers (Uric acid/LDH/haptoglobin/LFT), strict I/O, avoid nephrotoxic agents (NSAIDs, PPI, contrast), renally dose medications per GFR.    # Hypercalcemia   Uncertain etiology. Last ionized calcium improved to 1.18 today   PTH was 84, not appropriately suppressed with low vit D of 21.6. Could be primary hyperparathyroidism  Continue Sensipar 30 mg PO daily   monitor ionized calcium daily    If you have any questions, please feel free to contact me  Mary Whitfield  Nephrology Fellow  [phone]  (After 5pm or on weekends please page the on-call fellow)         80M PMHx CKD, CAD s/p CABG 2012 & 4 stents (2 in 2014, 2 in 2015) with dilated EF of 27% BiV ICD (2013) initially admitted to [facility] cor acute hypoxic respiratory failure, anemia (Hgb 5.7), thrombocytopenia (plt 30) w/ blast cells (22%), lactic acidosis (LA 10) - transfer to 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  Nephrology consulted for BRISA on CKD.      # BRISA on CKD  Pt with non-oliguric BRISA on CKD unclear etiology possible pre renal in the setting of anemia and ATN.  On admission sCr elevated at 3.45 (last known sCr 1.1-1.3 in 2018, recent hx CKD unclear recent baseline).  Urinalysis showed protein with trace blood.  Lab noted for serum uric acid 13.3, , phos 4.7, Echo severe LV systolic fxn, CT diffuse patchy airspace disease ?multifocal pna?. Urine electrolytes with Fe Urea of 82.5% suggestive of intrinsic pathology. Urine uric acid/creatinine ratio is < 1. Spot urine TP/CR ratio was 0.25 wnl. Kidney/bladder u/s on this admission was wnl (no hydro). Scr plateaued at 3.62 mg/dl on 6/13/21. Last sCr increased to 2.43 mg/dl today     Recommendations  - New BRISA, could be hemodynamically mediated, patient appears euvolemic, consider holding diuretics today as pt is hypernatremic  - Continue allopurinol 200 mg PO daily   - PER with 1:320 but speckled pattern   - GN work up including c3 and C4 wnl, serum immunofixation with no monoclonality, anti GBM negative. Parvovirus DNA PCR negative. P ANCA and C ANCA negative, anti PLA2R negative  - Pt will need a kidney biopsy if his platelets are stable. Currently Plts 31K & Hg 6.9. Pt will need plts of > 50K for biopsy. Please call IR for scheduling the renal biopsy. Will benefit from a platelet & PRBC transfusion prior.   - monitor BMP/tumor lysis markers (Uric acid/LDH/haptoglobin/LFT), strict I/O, avoid nephrotoxic agents (NSAIDs, PPI, contrast), renally dose medications per GFR.    # Hypercalcemia   Uncertain etiology. Last ionized calcium improved to 1.18 today   PTH was 84, not appropriately suppressed with low vit D of 21.6. Could be primary hyperparathyroidism  Continue Sensipar 30 mg PO daily   monitor ionized calcium daily    If you have any questions, please feel free to contact me  Mary Whitfield  Nephrology Fellow  [phone]  (After 5pm or on weekends please page the on-call fellow)
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
## 2021-06-29 NOTE — PROGRESS NOTE ADULT - PROBLEM SELECTOR PLAN 6
Nephrology following-No indication for HD at this time.   6/15 Normal renal ultrasound  monitor for TLS.  follow ionized calcium daily as per nephrology  6/24 on Sensipar for hypercalcemia   follow up Ionized calcium levels daily  Vitamin D 2000 daily.  Renal is following

## 2021-06-29 NOTE — PHYSICAL THERAPY INITIAL EVALUATION ADULT - PRECAUTIONS/LIMITATIONS, REHAB EVAL
SpO2 was found to be in the 80s by EMS and he was placed on BIPAP. Upon arrival to ED, his Hgb was noted to be 4.3, also thrombocytopenic to 27 and a leukocytosis of 40k. Pt was admitted to the ICU for anemia and hypoxic resp failure. He was subsequently transferred to [facility] for hematological eval./fall precautions

## 2021-06-29 NOTE — PHYSICAL THERAPY INITIAL EVALUATION ADULT - ADDITIONAL COMMENTS
Pt unable to provide accurate social history. As per CM note, pt lives with his wife and adult son in a private apt (one level) and is independent with ADL's. No recent CHHA / uses a cane when outside  the house. Pt also have a shower chair. Pt was at [facility] in 2018.
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
## 2021-06-30 NOTE — PROGRESS NOTE ADULT - PROBLEM SELECTOR PLAN 2
Patient is not neutropenic, afebrile   Zosyn added for ppx 6/21 (in lieu of cefepime given increased confusion/lethargy).   If febrile, send pan cultures.  6/21 CXR mid and lower right lung-pna can not be excluded   6/24 Abd US: Cholelithiasis without signs of cholecystitis, persistently dilated CBD; HIDA scan (+) for acute cholecystitis   Seen by Surgery and IR, since patient is asymptomatic and there are no signs of hemodynamic instability   No surgical or IR interventions were recommended. Continue clear liquid diet and antibiotics  6/28  US abdomen Cholelithiasis with no definite sonographic evidence of acute cholecystitis. Patient is not neutropenic, afebrile   6/30 completed Zosyn   If febrile, send pan cultures.  6/21 CXR mid and lower right lung-pna can not be excluded   6/24 Abd US: Cholelithiasis without signs of cholecystitis, persistently dilated CBD; HIDA scan (+) for acute cholecystitis   Seen by Surgery and IR, since patient is asymptomatic and there are no signs of hemodynamic instability   No surgical or IR interventions were recommended. Continue clear liquid diet and antibiotics  6/28  US abdomen Cholelithiasis with no definite sonographic evidence of acute cholecystitis.

## 2021-06-30 NOTE — CHART NOTE - NSCHARTNOTEFT_GEN_A_CORE
Nutrition Follow Up Note  Patient seen for: Malnutrition Follow Up     Chart reviewed, events noted. Pt c AML, new BRISA. Noted pt c cholecystitis, liquid diet and antibiotics.  Noted pt was made DNR after family meeting.     Source: [x] Patient-Arabic speaking but prefers to speak c RD in English       [x] EMR        [] RN        [] Family at bedside       [] Other:    -If unable to interview patient: [] Trach/Vent/BiPAP  [] Disoriented/confused/inappropriate to interview    Diet Order:   Diet, Full Liquid:   Supplement Feeding Modality:  Oral  Ensure Clear Cans or Servings Per Day:  2       Frequency:  Daily (06-30-21)  Pt previously on clear liquids, diet advanced less than an hour ago    - Nutrition-related concerns:      -pt reports he has been tolerating clear liquids without any N+V and no abdominal pain at this time       -RN confirms pt has been taking some liquids       -daughter-in-law at bedside reports pt has not been taking much of the Ensure Clear, discussed berry flavor which pt may prefer, will provide      -family has also been bringing in broth from home which pt is willing to accept; made pt and family aware of progression of diet   GI:  Last BM 6/29.   Bowel Regimen? [] Yes   [x] No      Weights: 6/15: 161.1->6/25: 157.4->6/29: 157.3 pounds, would continue to monitor wt     Nutritionally Pertinent MEDICATIONS  (STANDING):  allopurinol  cholecalciferol  cinacalcet  finasteride  folic acid  insulin lispro (ADMELOG) corrective regimen sliding scale  insulin lispro (ADMELOG) corrective regimen sliding scale  isosorbide   mononitrate ER Tablet (IMDUR)  metoprolol succinate ER  polyethylene glycol 3350  sodium chloride 0.9%.  tamsulosin  venetoclax    Pertinent Labs: 06-30 @ 06:42: Na 134<L>, BUN 33<H>, Cr 2.24<H>, BG 95, K+ 3.6, Phos 3.5, Mg 2.1, Alk Phos 129<H>, ALT/SGPT 29, AST/SGOT 29, HbA1c --    A1C with Estimated Average Glucose Result: 6.4 % (06-12-21 @ 06:09)    Finger Sticks:  POCT Blood Glucose.: 118 mg/dL (06-30 @ 12:20)  POCT Blood Glucose.: 106 mg/dL (06-30 @ 07:36)  POCT Blood Glucose.: 111 mg/dL (06-29 @ 21:03)  POCT Blood Glucose.: 117 mg/dL (06-29 @ 17:40)      Skin per nursing documentation: no pressure injuries   Edema: none at this time     Estimated Needs:   [x] no change since previous assessment    Previous Nutrition Diagnosis: severe malnutrition  Nutrition Diagnosis is: [x] ongoing-to be addressed c intake and supplements     New Nutrition Diagnosis: [x] Not applicable    Nutrition Care Plan:  [x] In Progress  [] Achieved  [] Not applicable    Nutrition Interventions:     Education Provided:       [x] Yes: discussed full liquid diet and need for protein       Recommendations:      1. Continue c current diet. Advance diet as tolerated (would hold off on potassium or phosphorus restrictions given levels WNL at this time).   2. Continue c Ensure Clear.   3. RD remains available for further nutritional interventions as needed.     Monitoring and Evaluation:   Continue to monitor nutritional intake, tolerance to diet prescription, weights, labs, skin integrity      RD remains available upon request and will follow up per protocol  Ambar Wiley MS RD CDN [facility], Pager #[phone] Nutrition Follow Up Note  Patient seen for: Malnutrition Follow Up     Chart reviewed, events noted. Pt c AML, new BRISA. Noted pt c cholecystitis, liquid diet and antibiotics.  Noted pt was made DNR after family meeting.     Source: [x] Patient-Yoruba speaking but prefers to speak c RD in English       [x] EMR        [] RN        [] Family at bedside       [] Other:    -If unable to interview patient: [] Trach/Vent/BiPAP  [] Disoriented/confused/inappropriate to interview    Diet Order:   Diet, Full Liquid:   Supplement Feeding Modality:  Oral  Ensure Clear Cans or Servings Per Day:  2       Frequency:  Daily (06-30-21)  Pt previously on clear liquids, diet advanced less than an hour ago    - Nutrition-related concerns:      -pt reports he has been tolerating clear liquids without any N+V and no abdominal pain at this time       -RN confirms pt has been taking some liquids       -daughter-in-law at bedside reports pt has not been taking much of the Ensure Clear, discussed berry flavor which pt may prefer, will provide      -family has also been bringing in broth from home which pt is willing to accept; made pt and family aware of progression of diet   GI:  Last BM 6/29.   Bowel Regimen? [] Yes   [x] No      Weights: 6/15: 161.1->6/25: 157.4->6/29: 157.3 pounds, would continue to monitor wt     Nutritionally Pertinent MEDICATIONS  (STANDING):  allopurinol  cholecalciferol  cinacalcet  finasteride  folic acid  insulin lispro (ADMELOG) corrective regimen sliding scale  insulin lispro (ADMELOG) corrective regimen sliding scale  isosorbide   mononitrate ER Tablet (IMDUR)  metoprolol succinate ER  polyethylene glycol 3350  sodium chloride 0.9%.  tamsulosin  venetoclax    Pertinent Labs: 06-30 @ 06:42: Na 134<L>, BUN 33<H>, Cr 2.24<H>, BG 95, K+ 3.6, Phos 3.5, Mg 2.1, Alk Phos 129<H>, ALT/SGPT 29, AST/SGOT 29, HbA1c --    A1C with Estimated Average Glucose Result: 6.4 % (06-12-21 @ 06:09)    Finger Sticks:  POCT Blood Glucose.: 118 mg/dL (06-30 @ 12:20)  POCT Blood Glucose.: 106 mg/dL (06-30 @ 07:36)  POCT Blood Glucose.: 111 mg/dL (06-29 @ 21:03)  POCT Blood Glucose.: 117 mg/dL (06-29 @ 17:40)      Skin per nursing documentation: no pressure injuries   Edema: none at this time     Estimated Needs:   [x] no change since previous assessment    Previous Nutrition Diagnosis: severe malnutrition  Nutrition Diagnosis is: [x] ongoing-to be addressed c intake and supplements     New Nutrition Diagnosis: [x] Not applicable    Nutrition Care Plan:  [x] In Progress  [] Achieved  [] Not applicable    Nutrition Interventions:     Education Provided:       [x] Yes: discussed low fat full liquid diet and need for protein       Recommendations:      1. Consider adding low fat to diet restriction at this time. Advance diet as tolerated (would hold off on potassium or phosphorus restrictions given levels WNL at this time).   2. Continue c Ensure Clear.   3. RD remains available for further nutritional interventions as needed.     Monitoring and Evaluation:   Continue to monitor nutritional intake, tolerance to diet prescription, weights, labs, skin integrity      RD remains available upon request and will follow up per protocol  Ambar Wiley MS RD CDN [facility], Pager #[phone] Nutrition Follow Up Note  Patient seen for: Malnutrition Follow Up     Chart reviewed, events noted. Pt c AML, new BRISA. Noted pt c cholecystitis, liquid diet and antibiotics.  Noted pt was made DNR after family meeting.     Source: [x] Patient-English speaking but prefers to speak c RD in English       [x] EMR        [] RN        [] Family at bedside       [] Other:    -If unable to interview patient: [] Trach/Vent/BiPAP  [] Disoriented/confused/inappropriate to interview    Diet Order:   Diet, Full Liquid:   Supplement Feeding Modality:  Oral  Ensure Clear Cans or Servings Per Day:  2       Frequency:  Daily (06-30-21)  Pt previously on clear liquids, diet advanced less than an hour ago    - Nutrition-related concerns:      -pt reports he has been tolerating clear liquids without any N+V and no abdominal pain at this time       -RN confirms pt has been taking some liquids       -daughter-in-law at bedside reports pt has not been taking much of the Ensure Clear, discussed berry flavor which pt may prefer, will provide      -family has also been bringing in broth from home which pt is willing to accept; made pt and family aware of progression of diet   GI:  Last BM 6/29.   Bowel Regimen? [] Yes   [x] No      Weights: 6/15: 161.1->6/25: 157.4->6/29: 157.3 pounds, would continue to monitor wt     Nutritionally Pertinent MEDICATIONS  (STANDING):  allopurinol  cholecalciferol  cinacalcet  finasteride  folic acid  insulin lispro (ADMELOG) corrective regimen sliding scale  insulin lispro (ADMELOG) corrective regimen sliding scale  isosorbide   mononitrate ER Tablet (IMDUR)  metoprolol succinate ER  polyethylene glycol 3350  sodium chloride 0.9%.  tamsulosin  venetoclax    Pertinent Labs: 06-30 @ 06:42: Na 134<L>, BUN 33<H>, Cr 2.24<H>, BG 95, K+ 3.6, Phos 3.5, Mg 2.1, Alk Phos 129<H>, ALT/SGPT 29, AST/SGOT 29, HbA1c --    A1C with Estimated Average Glucose Result: 6.4 % (06-12-21 @ 06:09)    Finger Sticks:  POCT Blood Glucose.: 118 mg/dL (06-30 @ 12:20)  POCT Blood Glucose.: 106 mg/dL (06-30 @ 07:36)  POCT Blood Glucose.: 111 mg/dL (06-29 @ 21:03)  POCT Blood Glucose.: 117 mg/dL (06-29 @ 17:40)      Skin per nursing documentation: no pressure injuries   Edema: none at this time     Estimated Needs:   [x] no change since previous assessment    Previous Nutrition Diagnosis: severe malnutrition  Nutrition Diagnosis is: [x] ongoing-to be addressed c intake and supplements     New Nutrition Diagnosis: [x] Not applicable    Nutrition Care Plan:  [x] In Progress  [] Achieved  [] Not applicable    Nutrition Interventions:     Education Provided:       [x] Yes: discussed low fat full liquid diet and need for protein       Recommendations:      1. Consider adding low fat to diet restriction at this time to promote better tolerance to progression of diet. Advance diet as tolerated (would hold off on potassium or phosphorus restrictions given levels WNL at this time).   2. Continue c Ensure Clear.   3. RD remains available for further nutritional interventions as needed.     Monitoring and Evaluation:   Continue to monitor nutritional intake, tolerance to diet prescription, weights, labs, skin integrity      RD remains available upon request and will follow up per protocol  Ambar Wiley MS RD CDN [facility], Pager #[phone]

## 2021-06-30 NOTE — PROGRESS NOTE ADULT - SUBJECTIVE AND OBJECTIVE BOX
Diagnosis: FLT3 ITD (-) Acute Myeloid Leukemia    Protocol/Chemo Regimen: S/p Cycle 1 Decitabine 20mg/m2 IVSS infused over 1 hour x 5 doses (held on day 3)  + Venetoclax 400 mg po daily     Day: 12     Pt endorsed: Poor appetite     Review of Systems: Denies nausea, vomiting, diarrhea, chest pain, SOB     Pain scale: 0     Diet: clear liquid     Allergies    morphine (Unknown)    Intolerances        ANTIMICROBIALS  piperacillin/tazobactam IVPB.. 3.375 Gram(s) IV Intermittent every 8 hours      HEME/ONC MEDICATIONS  venetoclax 400 milliGRAM(s) Oral <User Schedule>      STANDING MEDICATIONS  allopurinol 200 milliGRAM(s) Oral daily  Biotene Dry Mouth Oral Rinse 5 milliLiter(s) Swish and Spit four times a day  chlorhexidine 4% Liquid 1 Application(s) Topical <User Schedule>  cholecalciferol 2000 Unit(s) Oral daily  cinacalcet 30 milliGRAM(s) Oral daily  escitalopram 5 milliGRAM(s) Oral daily  finasteride 5 milliGRAM(s) Oral daily  folic acid 1 milliGRAM(s) Oral daily  furosemide    Tablet 40 milliGRAM(s) Oral daily  insulin lispro (ADMELOG) corrective regimen sliding scale   SubCutaneous three times a day before meals  insulin lispro (ADMELOG) corrective regimen sliding scale   SubCutaneous at bedtime  isosorbide   mononitrate ER Tablet (IMDUR) 60 milliGRAM(s) Oral daily  metoprolol succinate ER 50 milliGRAM(s) Oral daily  polyethylene glycol 3350 17 Gram(s) Oral daily  sodium chloride 0.9%. 1000 milliLiter(s) IV Continuous <Continuous>  tamsulosin 0.4 milliGRAM(s) Oral at bedtime      PRN MEDICATIONS  acetaminophen   Tablet .. 650 milliGRAM(s) Oral every 6 hours PRN  albuterol/ipratropium for Nebulization 3 milliLiter(s) Nebulizer every 6 hours PRN  ondansetron Injectable 8 milliGRAM(s) IV Push every 8 hours PRN  senna 2 Tablet(s) Oral at bedtime PRN  sodium chloride 0.9% lock flush 10 milliLiter(s) IV Push every 1 hour PRN        Vital Signs Last 24 Hrs  T(C): 36.7 (30 Jun 2021 05:30), Max: 37.1 (30 Jun 2021 00:45)  T(F): 98.1 (30 Jun 2021 05:30), Max: 98.7 (30 Jun 2021 00:45)  HR: 94 (30 Jun 2021 05:30) (73 - 95)  BP: 166/69 (30 Jun 2021 05:30) (126/58 - 166/69)  BP(mean): --  RR: 18 (30 Jun 2021 05:30) (17 - 18)  SpO2: 97% (30 Jun 2021 05:30) (96% - 99%)    PHYSICAL EXAM  General: NAD  HEENT: , clear oropharynx, anicteric sclera,  CV: (+) S1/S2 RRR  Lungs: clear to auscultation, no wheezes or rales  Abdomen: soft, non-tender, non-distended (+) BS  Ext: no  edema  Skin: no rash  Neuro: alert and oriented X 2   Central Line: PICC CDI     LABS:                          7.5    3.01  )-----------( 16       ( 30 Jun 2021 06:43 )             23.3         Mean Cell Volume : 88.3 fl  Mean Cell Hemoglobin : 28.4 pg  Mean Cell Hemoglobin Concentration : 32.2 gm/dL  Auto Neutrophil # : x  Auto Lymphocyte # : x  Auto Monocyte # : x  Auto Eosinophil # : x  Auto Basophil # : x  Auto Neutrophil % : x  Auto Lymphocyte % : x  Auto Monocyte % : x  Auto Eosinophil % : x  Auto Basophil % : x      06-30    134<L>  |  103  |  33<H>  ----------------------------<  95  3.6   |  18<L>  |  2.24<H>    Ca    8.8      30 Jun 2021 06:42  Phos  3.5     06-30  Mg     2.1     06-30    TPro  6.8  /  Alb  2.7<L>  /  TBili  1.4<H>  /  DBili  x   /  AST  29  /  ALT  29  /  AlkPhos  129<H>  06-30        Uric Acid 4.2    RECENT CULTURES:  06-21 @ 23:07  .Blood Blood-Peripheral      No Growth Final  --      RADIOLOGY & ADDITIONAL STUDIES:  US Abdomen Complete (US Abdomen Complete .) (06.29.21 @ 08:35) >  IMPRESSION:    Cholelithiasis with no definite sonographic evidence of acute cholecystitis.    Mildly dilated common bile duct unchanged. No intrahepatic ductal dilatation.    Bilateral pleural effusions, right greater than left.    Increased echogenicity of the bilateral kidneys with no cortical thinning or hydronephrosis consistent with renal parenchymal disease in the setting of elevated creatinine.          NM Hepatobiliary Imaging (06.24.21 @ 10:23) >  IMPRESSION: Abnormal hepatobiliary scan.  Findings consistent with acute cholecystitis, in the proper clinical setting.

## 2021-06-30 NOTE — PROGRESS NOTE ADULT - PROBLEM SELECTOR PLAN 8
s/p 5vCABG 2012 (LIMA to LAD, SVG to Diag and OM, SVG PDA and RPL), PCI (2 in 2014, 2 in 2015 [facility]), HFrEF s/p CRT-D (2013),  No DAPT or AC given thrombocytopenia.

## 2021-06-30 NOTE — PROGRESS NOTE ADULT - PROBLEM SELECTOR PLAN 1
FLT3 ITD (-) AML   Dacogen held on day 3 6/21 due to acute change in mental status now back to baseline. Course will go to day 6 to complete 5 days   Monitor daily CBC's and transfuse as needed, Monitor daily CMP and replete electrolytes as needed   Strict I's/O's, daily weights, mouth care, anti emetics. Allopurinol 200 mg oral daily   On Dacogen 20 mg/m2 x 5 days + Venetoclax. (s/p Venetoclax escalation, now on 400 mg oral daily)  Pt is DNR

## 2021-06-30 NOTE — PROGRESS NOTE ADULT - SUBJECTIVE AND OBJECTIVE BOX
Follow Up:  AML, cholelithiasis    Interval History/ROS:  only mild abd pain    Allergies  morphine (Unknown)    ANTIMICROBIALS:      OTHER MEDS:  MEDICATIONS  (STANDING):  acetaminophen   Tablet .. 650 every 6 hours PRN  albuterol/ipratropium for Nebulization 3 every 6 hours PRN  allopurinol 200 daily  cinacalcet 30 daily  escitalopram 5 daily  finasteride 5 daily  insulin lispro (ADMELOG) corrective regimen sliding scale  three times a day before meals  insulin lispro (ADMELOG) corrective regimen sliding scale  at bedtime  isosorbide   mononitrate ER Tablet (IMDUR) 60 daily  metoprolol succinate ER 50 daily  ondansetron Injectable 8 every 8 hours PRN  polyethylene glycol 3350 17 daily  senna 2 at bedtime PRN  tamsulosin 0.4 at bedtime  venetoclax 400 <User Schedule>    Vital Signs Last 24 Hrs  T(C): 36.4 (30 Jun 2021 09:30), Max: 37.1 (30 Jun 2021 00:45)  T(F): 97.5 (30 Jun 2021 09:30), Max: 98.7 (30 Jun 2021 00:45)  HR: 87 (30 Jun 2021 09:30) (80 - 94)  BP: 148/65 (30 Jun 2021 09:30) (147/63 - 166/69)  BP(mean): --  RR: 18 (30 Jun 2021 09:30) (18 - 18)  SpO2: 97% (30 Jun 2021 09:30) (97% - 98%)    PHYSICAL EXAM:  General: WN/WD NAD, Non-toxic  Neurology: A&Ox3, nonfocal  Respiratory: Clear to auscultation bilaterally  CV: RRR, S1S2, no murmurs, rubs or gallops  Abdominal: Soft, Non-tender, non-distended  Extremities: No edema,  Line Sites: Clear  Skin: No rash                        7.5    3.01  )-----------( 16       ( 30 Jun 2021 06:43 )             23.3   WBC Count: 3.01 (06-30 @ 06:43)   ANC= 1896  WBC Count: 4.00 (06-29 @ 05:46)  WBC Count: 4.27 (06-28 @ 06:57)  WBC Count: 3.09 (06-27 @ 07:00)  WBC Count: 3.42 (06-26 @ 07:08)    06-30    134<L>  |  103  |  33<H>  ----------------------------<  95  3.6   |  18<L>  |  2.24<H>  Creatinine: 2.24 (06-30 @ 06:42)    Creatinine: 2.43 (06-29 @ 05:46)    Creatinine: 2.50 (06-28 @ 06:58)    Creatinine: 2.62 (06-27 @ 07:00)    Creatinine: 2.66 (06-26 @ 07:08)     Ca    8.8      30 Jun 2021 06:42  Phos  3.5     06-30  Mg     2.1     06-30    TPro  6.8  /  Alb  2.7<L>  /  TBili  1.4<H>  /  DBili  x   /  AST  29  /  ALT  29  /  AlkPhos  129<H>  06-30      MICROBIOLOGY:  .Blood Blood-Peripheral  06-21-21   No Growth Final  --  --      .Blood Blood-Peripheral  06-13-21   No Growth Final  --  --      .Blood Blood-Peripheral  06-12-21   Growth in anaerobic bottle: Staphylococcus capitis  Multiple Morphological Strains  --  Staphylococcus capitis  Staphylococcus capitis      RADIOLOGY:  < from: US Abdomen Complete (US Abdomen Complete .) (06.29.21 @ 08:35) >  IMPRESSION:    Cholelithiasis with no definite sonographic evidence of acute cholecystitis.    Mildly dilated common bile duct unchanged. No intrahepatic ductal dilatation.    Bilateral pleural effusions, right greater than left.    Increased echogenicity of the bilateral kidneys with no cortical thinning or hydronephrosis consistent with renal parenchymal disease in the setting of elevated creatinine.    < end of copied text >      Toan Gutierrez MD; Division of Infectious Disease; Pager: [phone]; nights and weekends: [phone]
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
## 2021-07-01 NOTE — PROGRESS NOTE ADULT - PROBLEM SELECTOR PLAN 4
6/24 CT, abdominal US suggestive of CBD dilatation. HIDA scan (+) for acute cholecystitis   Seen by IR and Surgery - since patient is asymptomatic, no acute interventions were suggested   Continue IV antibiotics and clear liquid diet 6/24 CT, abdominal US suggestive of CBD dilatation. HIDA scan (+) for acute cholecystitis   Seen by IR and Surgery - since patient is asymptomatic, no acute interventions were suggested   Continue IV antibiotics and full liquid diet
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
## 2021-07-01 NOTE — PROGRESS NOTE ADULT - SUBJECTIVE AND OBJECTIVE BOX
Follow Up:  AML    Interval History/ROS:  denied pain, appeared more alert when seen this am    Allergies  morphine (Unknown)    ANTIMICROBIALS:      OTHER MEDS:  MEDICATIONS  (STANDING):  acetaminophen   Tablet .. 650 every 6 hours PRN  albuterol/ipratropium for Nebulization 3 every 6 hours PRN  allopurinol 200 daily  cinacalcet 30 daily  escitalopram 5 daily  finasteride 5 daily  insulin lispro (ADMELOG) corrective regimen sliding scale  three times a day before meals  insulin lispro (ADMELOG) corrective regimen sliding scale  at bedtime  isosorbide   mononitrate ER Tablet (IMDUR) 60 daily  metoprolol succinate ER 50 daily  ondansetron Injectable 8 every 8 hours PRN  polyethylene glycol 3350 17 daily  senna 2 at bedtime PRN  tamsulosin 0.4 at bedtime  venetoclax 400 <User Schedule>      Vital Signs Last 24 Hrs  T(C): 37 (01 Jul 2021 17:00), Max: 37.2 (01 Jul 2021 00:45)  T(F): 98.6 (01 Jul 2021 17:00), Max: 98.9 (01 Jul 2021 00:45)  HR: 86 (01 Jul 2021 17:00) (75 - 88)  BP: 133/62 (01 Jul 2021 17:00) (115/63 - 159/70)  BP(mean): --  RR: 18 (01 Jul 2021 17:00) (18 - 18)  SpO2: 98% (01 Jul 2021 17:00) (98% - 99%)    PHYSICAL EXAM:  General: WN/WD NAD, Non-toxic  Neurology: A&Ox3, nonfocal  Respiratory: Clear to auscultation bilaterally  CV: RRR, S1S2, no murmurs, rubs or gallops  Abdominal: Soft, Non-tender, non-distended  Extremities: No edema,   Line Sites: Clear  Skin: No rash                          7.0    2.21  )-----------( 11       ( 01 Jul 2021 07:20 )             21.6   WBC Count: 2.21 (07-01 @ 07:20) 50.9% Neut  ANC= 1125  WBC Count: 3.01 (06-30 @ 06:43)  WBC Count: 4.00 (06-29 @ 05:46)  WBC Count: 4.27 (06-28 @ 06:57)  WBC Count: 3.09 (06-27 @ 07:00)    07-01    138  |  104  |  32<H>  ----------------------------<  93  3.6   |  18<L>  |  2.03<H>  Creatinine: 2.03 (07-01 @ 07:17)    Creatinine: 2.24 (06-30 @ 06:42)    Creatinine: 2.43 (06-29 @ 05:46)    Creatinine: 2.50 (06-28 @ 06:58)    Creatinine: 2.62 (06-27 @ 07:00)      Ca    8.6      01 Jul 2021 07:17  Phos  3.4     07-01  Mg     2.1     07-01    TPro  6.6  /  Alb  2.7<L>  /  TBili  1.3<H>  /  DBili  x   /  AST  25  /  ALT  23  /  AlkPhos  105  07-01    MICROBIOLOGY:  .Blood Blood-Peripheral  06-21-21   No Growth Final  --  --      .Blood Blood-Peripheral  06-13-21   No Growth Final  --  --      .Blood Blood-Peripheral  06-12-21   Growth in anaerobic bottle: Staphylococcus capitis  Multiple Morphological Strains  --  Staphylococcus capitis  Staphylococcus capitis    RADIOLOGY:    Toan Gutierrez MD; Division of Infectious Disease; Pager: [phone]; nights and weekends: [phone]

## 2021-07-01 NOTE — PROGRESS NOTE ADULT - PROBLEM SELECTOR PLAN 7
CHF from volume overload   Echo severe LV systolic fxn,  LVEF 27%  ICD (implantable cardioverter-defibrillator) in place

## 2021-07-01 NOTE — PROGRESS NOTE ADULT - PROBLEM SELECTOR PLAN 8
s/p 5vCABG 2012 (LIMA to LAD, SVG to Diag and OM, SVG PDA and RPL), PCI (2 in 2014, 2 in 2015 [facility]), HFrEF s/p CRT-D (2013),  No DAPT or AC given thrombocytopenia.
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
## 2021-07-01 NOTE — PROGRESS NOTE ADULT - SUBJECTIVE AND OBJECTIVE BOX
Diagnosis: FLT3 ITD (-) Acute Myeloid Leukemia    Protocol/Chemo Regimen: S/p Cycle 1 Decitabine 20mg/m2 IVSS infused over 1 hour x 5 doses (held on day 3)  + Venetoclax 400 mg po daily     Day: 13     Pt endorsed: Poor appetite     Review of Systems: Denies nausea, vomiting, diarrhea, chest pain, SOB     Pain scale: 0     Diet: Full liquid     Allergies    morphine (Unknown)    Intolerances              HEME/ONC MEDICATIONS  venetoclax 400 milliGRAM(s) Oral <User Schedule>      STANDING MEDICATIONS  allopurinol 200 milliGRAM(s) Oral daily  Biotene Dry Mouth Oral Rinse 5 milliLiter(s) Swish and Spit four times a day  chlorhexidine 4% Liquid 1 Application(s) Topical <User Schedule>  cholecalciferol 2000 Unit(s) Oral daily  cinacalcet 30 milliGRAM(s) Oral daily  escitalopram 5 milliGRAM(s) Oral daily  finasteride 5 milliGRAM(s) Oral daily  folic acid 1 milliGRAM(s) Oral daily  furosemide    Tablet 40 milliGRAM(s) Oral daily  insulin lispro (ADMELOG) corrective regimen sliding scale   SubCutaneous three times a day before meals  insulin lispro (ADMELOG) corrective regimen sliding scale   SubCutaneous at bedtime  isosorbide   mononitrate ER Tablet (IMDUR) 60 milliGRAM(s) Oral daily  metoprolol succinate ER 50 milliGRAM(s) Oral daily  polyethylene glycol 3350 17 Gram(s) Oral daily  sodium chloride 0.9%. 1000 milliLiter(s) IV Continuous <Continuous>  tamsulosin 0.4 milliGRAM(s) Oral at bedtime      PRN MEDICATIONS  acetaminophen   Tablet .. 650 milliGRAM(s) Oral every 6 hours PRN  albuterol/ipratropium for Nebulization 3 milliLiter(s) Nebulizer every 6 hours PRN  ondansetron Injectable 8 milliGRAM(s) IV Push every 8 hours PRN  senna 2 Tablet(s) Oral at bedtime PRN  sodium chloride 0.9% lock flush 10 milliLiter(s) IV Push every 1 hour PRN        Vital Signs Last 24 Hrs  T(C): 36.1 (01 Jul 2021 05:28), Max: 37.2 (01 Jul 2021 00:45)  T(F): 97 (01 Jul 2021 05:28), Max: 98.9 (01 Jul 2021 00:45)  HR: 88 (01 Jul 2021 05:28) (74 - 88)  BP: 159/70 (01 Jul 2021 05:28) (135/55 - 159/70)  BP(mean): --  RR: 18 (01 Jul 2021 05:28) (18 - 18)  SpO2: 98% (01 Jul 2021 05:28) (97% - 98%)    PHYSICAL EXAM  General: NAD  HEENT: , clear oropharynx, anicteric sclera,  CV: (+) S1/S2 RRR  Lungs: clear to auscultation, no wheezes or rales  Abdomen: soft, non-tender, non-distended (+) BS  Ext: no  edema  Skin: no rash  Neuro: alert and oriented X 2   Central Line: PICC CDI             --      RADIOLOGY & ADDITIONAL STUDIES:  US Abdomen Complete (US Abdomen Complete .) (06.29.21 @ 08:35) >  IMPRESSION:    Cholelithiasis with no definite sonographic evidence of acute cholecystitis.    Mildly dilated common bile duct unchanged. No intrahepatic ductal dilatation.    Bilateral pleural effusions, right greater than left.    Increased echogenicity of the bilateral kidneys with no cortical thinning or hydronephrosis consistent with renal parenchymal disease in the setting of elevated creatinine.          NM Hepatobiliary Imaging (06.24.21 @ 10:23) >  IMPRESSION: Abnormal hepatobiliary scan.  Findings consistent with acute cholecystitis, in the proper clinical setting.     Diagnosis: FLT3 ITD (-) Acute Myeloid Leukemia    Protocol/Chemo Regimen: S/p Cycle 1 Decitabine 20mg/m2 IVSS infused over 1 hour x 5 doses (held on day 3)  + Venetoclax 400 mg po daily     Day: 13     Pt endorsed: Poor appetite     Review of Systems: Denies nausea, vomiting, diarrhea, chest pain, SOB     Pain scale: 0     Diet: Full liquid     Allergies    morphine (Unknown)    Intolerances              HEME/ONC MEDICATIONS  venetoclax 400 milliGRAM(s) Oral <User Schedule>      STANDING MEDICATIONS  allopurinol 200 milliGRAM(s) Oral daily  Biotene Dry Mouth Oral Rinse 5 milliLiter(s) Swish and Spit four times a day  chlorhexidine 4% Liquid 1 Application(s) Topical <User Schedule>  cholecalciferol 2000 Unit(s) Oral daily  cinacalcet 30 milliGRAM(s) Oral daily  escitalopram 5 milliGRAM(s) Oral daily  finasteride 5 milliGRAM(s) Oral daily  folic acid 1 milliGRAM(s) Oral daily  furosemide    Tablet 40 milliGRAM(s) Oral daily  insulin lispro (ADMELOG) corrective regimen sliding scale   SubCutaneous three times a day before meals  insulin lispro (ADMELOG) corrective regimen sliding scale   SubCutaneous at bedtime  isosorbide   mononitrate ER Tablet (IMDUR) 60 milliGRAM(s) Oral daily  metoprolol succinate ER 50 milliGRAM(s) Oral daily  polyethylene glycol 3350 17 Gram(s) Oral daily  sodium chloride 0.9%. 1000 milliLiter(s) IV Continuous <Continuous>  tamsulosin 0.4 milliGRAM(s) Oral at bedtime      PRN MEDICATIONS  acetaminophen   Tablet .. 650 milliGRAM(s) Oral every 6 hours PRN  albuterol/ipratropium for Nebulization 3 milliLiter(s) Nebulizer every 6 hours PRN  ondansetron Injectable 8 milliGRAM(s) IV Push every 8 hours PRN  senna 2 Tablet(s) Oral at bedtime PRN  sodium chloride 0.9% lock flush 10 milliLiter(s) IV Push every 1 hour PRN        Vital Signs Last 24 Hrs  T(C): 36.1 (01 Jul 2021 05:28), Max: 37.2 (01 Jul 2021 00:45)  T(F): 97 (01 Jul 2021 05:28), Max: 98.9 (01 Jul 2021 00:45)  HR: 88 (01 Jul 2021 05:28) (74 - 88)  BP: 159/70 (01 Jul 2021 05:28) (135/55 - 159/70)  BP(mean): --  RR: 18 (01 Jul 2021 05:28) (18 - 18)  SpO2: 98% (01 Jul 2021 05:28) (97% - 98%)    PHYSICAL EXAM  General: NAD  HEENT: , clear oropharynx, anicteric sclera,  CV: (+) S1/S2 RRR  Lungs: clear to auscultation, no wheezes or rales  Abdomen: soft, non-tender, non-distended (+) BS  Ext: no  edema  Skin: no rash  Neuro: alert and oriented X 2   Central Line: PICC CDI     LABS:                          7.0    2.21  )-----------( 11       ( 01 Jul 2021 07:20 )             21.6         Mean Cell Volume : 88.5 fl  Mean Cell Hemoglobin : 28.7 pg  Mean Cell Hemoglobin Concentration : 32.4 gm/dL  Auto Neutrophil # : 1.12 K/uL  Auto Lymphocyte # : 1.07 K/uL  Auto Monocyte # : 0.02 K/uL  Auto Eosinophil # : 0.00 K/uL  Auto Basophil # : 0.00 K/uL  Auto Neutrophil % : 50.9 %  Auto Lymphocyte % : 48.2 %  Auto Monocyte % : 0.9 %  Auto Eosinophil % : 0.0 %  Auto Basophil % : 0.0 %      07-01    138  |  104  |  32<H>  ----------------------------<  93  3.6   |  18<L>  |  2.03<H>    Ca    8.6      01 Jul 2021 07:17  Phos  3.4     07-01  Mg     2.1     07-01    TPro  6.6  /  Alb  2.7<L>  /  TBili  1.3<H>  /  DBili  x   /  AST  25  /  ALT  23  /  AlkPhos  105  07-01        Uric Acid 4.0        RADIOLOGY & ADDITIONAL STUDIES:  US Abdomen Complete (US Abdomen Complete .) (06.29.21 @ 08:35) >  IMPRESSION:    Cholelithiasis with no definite sonographic evidence of acute cholecystitis.    Mildly dilated common bile duct unchanged. No intrahepatic ductal dilatation.    Bilateral pleural effusions, right greater than left.    Increased echogenicity of the bilateral kidneys with no cortical thinning or hydronephrosis consistent with renal parenchymal disease in the setting of elevated creatinine.          NM Hepatobiliary Imaging (06.24.21 @ 10:23) >  IMPRESSION: Abnormal hepatobiliary scan.  Findings consistent with acute cholecystitis, in the proper clinical setting.

## 2021-07-01 NOTE — PROGRESS NOTE ADULT - PROBLEM SELECTOR PLAN 2
Patient is not neutropenic, afebrile   6/30 completed Zosyn   If febrile, send pan cultures.  will start Augmentin when ANC <500  6/21 CXR mid and lower right lung-pna can not be excluded   6/24 Abd US: Cholelithiasis without signs of cholecystitis, persistently dilated CBD; HIDA scan (+) for acute cholecystitis   Seen by Surgery and IR, since patient is asymptomatic and there are no signs of hemodynamic instability   No surgical or IR interventions were recommended. Continue clear liquid diet and antibiotics  6/28  US abdomen Cholelithiasis with no definite sonographic evidence of acute cholecystitis.

## 2021-07-02 NOTE — PROGRESS NOTE ADULT - PROBLEM SELECTOR PLAN 1
FLT3 ITD (-) AML   Dacogen held on day 3 6/21 due to acute change in mental status now back to baseline. Course will go to day 6 to complete 5 days   Monitor daily CBC's and transfuse as needed, Monitor daily CMP and replete electrolytes as needed   Strict I's/O's, daily weights, mouth care, anti emetics. Allopurinol 200 mg oral daily   On Dacogen 20 mg/m2 x 5 days + Venetoclax. (s/p Venetoclax escalation, now on 400 mg oral daily)  Pt is DNR FLT3 ITD (-) AML   Dacogen held on day 3 6/21 due to acute change in mental status now back to baseline. Course will go to day 6 to complete 5 days   Monitor daily CBC's and transfuse as needed, Monitor daily CMP and replete electrolytes as needed   Anemia PRBC x 1  Thrombocytopenia - Platelet x 1 unit   Strict I's/O's, daily weights, mouth care, anti emetics. Allopurinol 200 mg oral daily   s/p  Dacogen 20 mg/m2 x 5 days + Venetoclax. (s/p Venetoclax escalation, now on 400 mg oral daily)  Pt is DNR

## 2021-07-02 NOTE — PROGRESS NOTE ADULT - PROBLEM SELECTOR PLAN 8
s/p 5vCABG 2012 (LIMA to LAD, SVG to Diag and OM, SVG PDA and RPL), PCI (2 in 2014, 2 in 2015 [facility]), HFrEF s/p CRT-D (2013),  No DAPT or AC given thrombocytopenia.
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
## 2021-07-02 NOTE — PROGRESS NOTE ADULT - SUBJECTIVE AND OBJECTIVE BOX
Diagnosis: FLT3 ITD (-) Acute Myeloid Leukemia    Protocol/Chemo Regimen: S/p Cycle 1 Decitabine 20mg/m2 IVSS infused over 1 hour x 5 doses (held on day 3)  + Venetoclax 400 mg po daily     Day: 14     Pt endorsed: Poor appetite     Review of Systems: Denies nausea, vomiting, diarrhea, chest pain, SOB     Pain scale: 0     Diet: Full liquid     Allergies    morphine (Unknown)    Intolerances              HEME/ONC MEDICATIONS  venetoclax 400 milliGRAM(s) Oral <User Schedule>      STANDING MEDICATIONS  allopurinol 200 milliGRAM(s) Oral daily  Biotene Dry Mouth Oral Rinse 5 milliLiter(s) Swish and Spit four times a day  chlorhexidine 4% Liquid 1 Application(s) Topical <User Schedule>  cholecalciferol 2000 Unit(s) Oral daily  cinacalcet 30 milliGRAM(s) Oral daily  escitalopram 5 milliGRAM(s) Oral daily  finasteride 5 milliGRAM(s) Oral daily  folic acid 1 milliGRAM(s) Oral daily  furosemide    Tablet 40 milliGRAM(s) Oral daily  insulin lispro (ADMELOG) corrective regimen sliding scale   SubCutaneous three times a day before meals  insulin lispro (ADMELOG) corrective regimen sliding scale   SubCutaneous at bedtime  isosorbide   mononitrate ER Tablet (IMDUR) 60 milliGRAM(s) Oral daily  metoprolol succinate ER 50 milliGRAM(s) Oral daily  polyethylene glycol 3350 17 Gram(s) Oral daily  sodium chloride 0.9%. 1000 milliLiter(s) IV Continuous <Continuous>  tamsulosin 0.4 milliGRAM(s) Oral at bedtime      PRN MEDICATIONS  acetaminophen   Tablet .. 650 milliGRAM(s) Oral every 6 hours PRN  albuterol/ipratropium for Nebulization 3 milliLiter(s) Nebulizer every 6 hours PRN  ondansetron Injectable 8 milliGRAM(s) IV Push every 8 hours PRN  senna 2 Tablet(s) Oral at bedtime PRN  sodium chloride 0.9% lock flush 10 milliLiter(s) IV Push every 1 hour PRN        Vital Signs Last 24 Hrs  T(C): 36.9 (02 Jul 2021 05:45), Max: 37 (01 Jul 2021 17:00)  T(F): 98.4 (02 Jul 2021 05:45), Max: 98.6 (01 Jul 2021 17:00)  HR: 98 (02 Jul 2021 05:45) (75 - 98)  BP: 168/66 (02 Jul 2021 05:45) (115/63 - 168/66)  BP(mean): --  RR: 18 (02 Jul 2021 05:45) (18 - 18)  SpO2: 100% (02 Jul 2021 05:45) (98% - 100%)    PHYSICAL EXAM  General: NAD  HEENT: , clear oropharynx, anicteric sclera,  CV: (+) S1/S2 RRR  Lungs: clear to auscultation, no wheezes or rales  Abdomen: soft, non-tender, non-distended (+) BS  Ext: no  edema  Skin: no rash  Neuro: alert and oriented X 2   Central Line: PICC CDI     LABS:                          6.8    1.99  )-----------( 7        ( 02 Jul 2021 06:51 )             20.9         Mean Cell Volume : 87.8 fl  Mean Cell Hemoglobin : 28.6 pg  Mean Cell Hemoglobin Concentration : 32.5 gm/dL  Auto Neutrophil # : 0.68 K/uL  Auto Lymphocyte # : 1.13 K/uL  Auto Monocyte # : 0.13 K/uL  Auto Eosinophil # : 0.00 K/uL  Auto Basophil # : 0.00 K/uL  Auto Neutrophil % : 34.2 %  Auto Lymphocyte % : 56.8 %  Auto Monocyte % : 6.5 %  Auto Eosinophil % : 0.0 %  Auto Basophil % : 0.0 %      07-02    134<L>  |  104  |  29<H>  ----------------------------<  100<H>  3.7   |  19<L>  |  1.95<H>    Ca    9.0      02 Jul 2021 06:51  Phos  3.2     07-02  Mg     1.9     07-02    TPro  6.6  /  Alb  2.6<L>  /  TBili  1.1  /  DBili  x   /  AST  19  /  ALT  20  /  AlkPhos  94  07-02        Uric Acid 4.3      RADIOLOGY & ADDITIONAL STUDIES:  US Abdomen Complete (US Abdomen Complete .) (06.29.21 @ 08:35) >  IMPRESSION:    Cholelithiasis with no definite sonographic evidence of acute cholecystitis.    Mildly dilated common bile duct unchanged. No intrahepatic ductal dilatation.    Bilateral pleural effusions, right greater than left.    Increased echogenicity of the bilateral kidneys with no cortical thinning or hydronephrosis consistent with renal parenchymal disease in the setting of elevated creatinine.          NM Hepatobiliary Imaging (06.24.21 @ 10:23) >  IMPRESSION: Abnormal hepatobiliary scan.  Findings consistent with acute cholecystitis, in the proper clinical setting.

## 2021-07-02 NOTE — PROGRESS NOTE ADULT - PROBLEM SELECTOR PLAN 2
Patient is  neutropenic, afebrile   6/30 completed Zosyn   If febrile, send pan cultures, start cefepime/posaconazole   will start Levaquin when ANC <500  6/21 CXR mid and lower right lung-pna can not be excluded   6/24 Abd US: Cholelithiasis without signs of cholecystitis, persistently dilated CBD; HIDA scan (+) for acute cholecystitis   Seen by Surgery and IR, since patient is asymptomatic and there are no signs of hemodynamic instability   No surgical or IR interventions were recommended. Continue clear liquid diet and antibiotics  6/28  US abdomen Cholelithiasis with no definite sonographic evidence of acute cholecystitis.
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
## 2021-07-02 NOTE — PROGRESS NOTE ADULT - PROBLEM SELECTOR PLAN 4
6/24 CT, abdominal US suggestive of CBD dilatation. HIDA scan (+) for acute cholecystitis   Seen by IR and Surgery - since patient is asymptomatic, no acute interventions were suggested   Continue IV antibiotics and full liquid diet 6/24 CT, abdominal US suggestive of CBD dilatation. HIDA scan (+) for acute cholecystitis   Seen by IR and Surgery - since patient is asymptomatic, no acute interventions were suggested

## 2021-07-03 NOTE — PROGRESS NOTE ADULT - PROBLEM SELECTOR PLAN 1
FLT3 ITD (-) AML   Dacogen held on day 3 6/21 due to acute change in mental status now back to baseline. Course will go to day 6 to complete 5 days   Monitor daily CBC's and transfuse as needed, Monitor daily CMP and replete electrolytes as needed   Strict I's/O's, daily weights, mouth care, anti emetics. Allopurinol 200 mg oral daily   s/p  Dacogen 20 mg/m2 x 5 days + Venetoclax. (s/p Venetoclax escalation, now on 400 mg oral daily)  Pt is DNR FLT3 ITD (-) AML   Dacogen held on day 3 6/21 due to acute change in mental status now back to baseline. Course will go to day 6 to complete 5 days   Monitor daily CBC's and transfuse as needed, Monitor daily CMP and replete electrolytes as needed   Strict I's/O's, daily weights, mouth care, anti emetics. Allopurinol 200 mg oral daily   s/p Dacogen 20 mg/m2 x 5 days + Venetoclax. (s/p Venetoclax escalation, now on 100 mg oral daily - while on posaconazole)  Pt is DNR

## 2021-07-03 NOTE — PROGRESS NOTE ADULT - PROBLEM SELECTOR PLAN 4
6/24 CT, abdominal US suggestive of CBD dilatation. HIDA scan (+) for acute cholecystitis   Seen by IR and Surgery - since patient is asymptomatic, no acute interventions were suggested

## 2021-07-03 NOTE — PROGRESS NOTE ADULT - PROBLEM SELECTOR PLAN 8
s/p 5vCABG 2012 (LIMA to LAD, SVG to Diag and OM, SVG PDA and RPL), PCI (2 in 2014, 2 in 2015 [facility]), HFrEF s/p CRT-D (2013),  No DAPT or AC given thrombocytopenia.

## 2021-07-03 NOTE — PROGRESS NOTE ADULT - PROBLEM SELECTOR PLAN 2
Patient is  neutropenic, afebrile   6/30 completed Zosyn   If febrile, send pan cultures, start cefepime/posaconazole   will start Levaquin when ANC <500  6/21 CXR mid and lower right lung-pna can not be excluded   6/24 Abd US: Cholelithiasis without signs of cholecystitis, persistently dilated CBD; HIDA scan (+) for acute cholecystitis   Seen by Surgery and IR, since patient is asymptomatic and there are no signs of hemodynamic instability   No surgical or IR interventions were recommended. Continue clear liquid diet and antibiotics  6/28  US abdomen Cholelithiasis with no definite sonographic evidence of acute cholecystitis. Patient is neutropenic, afebrile   6/30 completed Zosyn   If febrile, send pan cultures, start cefepime  will start Levaquin when ANC <500  6/21 CXR mid and lower right lung-pna can not be excluded   6/24 Abd US: Cholelithiasis without signs of cholecystitis, persistently dilated CBD; HIDA scan (+) for acute cholecystitis   Seen by Surgery and IR, since patient is asymptomatic and there are no signs of hemodynamic instability   No surgical or IR interventions were recommended. Continue clear liquid diet and antibiotics  6/28  US abdomen Cholelithiasis with no definite sonographic evidence of acute cholecystitis.  7/3 Started posaconazole prophylaxis as patient is neutropenic. Adjusted dose of venetoclax
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
## 2021-07-03 NOTE — PROGRESS NOTE ADULT - SUBJECTIVE AND OBJECTIVE BOX
Diagnosis: FLT3 ITD (-) Acute Myeloid Leukemia    Protocol/Chemo Regimen: S/p Cycle 1 Decitabine 20mg/m2 IVSS infused over 1 hour x 5 doses (held on day 3)  + Venetoclax 400 mg po daily     Day: 15     Pt endorsed: Poor appetite     Review of Systems: Denies nausea, vomiting, diarrhea, chest pain, SOB     Pain scale: 0     Diet: Full liquid     Allergies: morphine (Unknown)      ------------------------                 Diagnosis: FLT3 ITD (-) Acute Myeloid Leukemia    Protocol/Chemo Regimen: S/p Cycle 1 Decitabine 20mg/m2 IVSS infused over 1 hour x 5 doses (held on day 3)  + Venetoclax 400 mg po daily     Day: 15     Pt endorsed: Poor appetite     Review of Systems: Denies nausea, vomiting, diarrhea, chest pain, SOB     Pain scale: 0     Diet: Full liquid     Allergies: morphine (Unknown)    ANTIMICROBIALS  posaconazole DR Tablet 300 milliGRAM(s) Oral daily    HEME/ONC MEDICATIONS  venetoclax 100 milliGRAM(s) Oral <User Schedule>    STANDING MEDICATIONS  allopurinol 200 milliGRAM(s) Oral daily  Biotene Dry Mouth Oral Rinse 5 milliLiter(s) Swish and Spit four times a day  chlorhexidine 4% Liquid 1 Application(s) Topical <User Schedule>  cholecalciferol 2000 Unit(s) Oral daily  cinacalcet 30 milliGRAM(s) Oral daily  escitalopram 5 milliGRAM(s) Oral daily  finasteride 5 milliGRAM(s) Oral daily  folic acid 1 milliGRAM(s) Oral daily  insulin lispro (ADMELOG) corrective regimen sliding scale   SubCutaneous three times a day before meals  insulin lispro (ADMELOG) corrective regimen sliding scale   SubCutaneous at bedtime  isosorbide   mononitrate ER Tablet (IMDUR) 60 milliGRAM(s) Oral daily  lidocaine   Patch 1 Patch Transdermal daily  metoprolol succinate ER 50 milliGRAM(s) Oral daily  polyethylene glycol 3350 17 Gram(s) Oral daily  sodium chloride 0.9%. 1000 milliLiter(s) IV Continuous <Continuous>  tamsulosin 0.4 milliGRAM(s) Oral at bedtime    PRN MEDICATIONS  acetaminophen   Tablet .. 650 milliGRAM(s) Oral every 6 hours PRN  albuterol/ipratropium for Nebulization 3 milliLiter(s) Nebulizer every 6 hours PRN  aluminum hydroxide/magnesium hydroxide/simethicone Suspension 30 milliLiter(s) Oral every 4 hours PRN  calcium carbonate    500 mG (Tums) Chewable 1 Tablet(s) Chew every 4 hours PRN  ondansetron Injectable 8 milliGRAM(s) IV Push every 8 hours PRN  senna 2 Tablet(s) Oral at bedtime PRN  sodium chloride 0.9% lock flush 10 milliLiter(s) IV Push every 1 hour PRN  sucralfate suspension 1 Gram(s) Oral every 6 hours PRN    Vital Signs Last 24 Hrs  T(C): 36.5 (03 Jul 2021 14:00), Max: 37.2 (03 Jul 2021 00:39)  T(F): 97.7 (03 Jul 2021 14:00), Max: 98.9 (03 Jul 2021 00:39)  HR: 71 (03 Jul 2021 14:00) (60 - 86)  BP: 154/61 (03 Jul 2021 14:00) (148/65 - 158/63)  BP(mean): --  RR: 18 (03 Jul 2021 14:00) (18 - 18)  SpO2: 98% (03 Jul 2021 14:00) (95% - 100%)    PHYSICAL EXAM  General: adult in NAD  HEENT: clear oropharynx, no erythema, no ulcers  Neck: supple  CV: normal S1, S2, RRR  Lungs: clear to auscultation, no wheezes, no rales  Abdomen: soft, nontender, nondistended, normal BS  Ext: no edema  Skin: no rash  Neuro: alert and oriented x 3  Central line: normal     LABS:                        7.9    1.95  )-----------( 27       ( 03 Jul 2021 07:15 )             23.7     Mean Cell Volume : 88.1 fl  Mean Cell Hemoglobin : 29.4 pg  Mean Cell Hemoglobin Concentration : 33.3 gm/dL  Auto Neutrophil # : 0.63 K/uL  Auto Lymphocyte # : 1.20 K/uL  Auto Monocyte # : 0.09 K/uL  Auto Eosinophil # : 0.00 K/uL  Auto Basophil # : 0.00 K/uL  Auto Neutrophil % : 32.1 %  Auto Lymphocyte % : 61.6 %  Auto Monocyte % : 4.5 %  Auto Eosinophil % : 0.0 %  Auto Basophil % : 0.0 %    07-03  136  |  106  |  28<H>  ----------------------------<  92  3.9   |  18<L>  |  1.90<H>    Ca    8.9      03 Jul 2021 07:15  Phos  2.8     07-03  Mg     1.9     07-03    TPro  6.5  /  Alb  2.4<L>  /  TBili  1.2  /  DBili  x   /  AST  18  /  ALT  19  /  AlkPhos  88  07-03    Mg 1.9  Phos 2.8      Uric Acid --    RADIOLOGY & ADDITIONAL STUDIES:  < from: US Abdomen Complete (US Abdomen Complete .) (06.29.21 @ 08:35) >  IMPRESSION:  Cholelithiasis with no definite sonographic evidence of acute cholecystitis.  Mildly dilated common bile duct unchanged. No intrahepatic ductal dilatation.  Bilateral pleural effusions, right greater than left.  Increased echogenicity of the bilateral kidneys with no cortical thinning or hydronephrosis consistent with renal parenchymal disease in the setting of elevated creatinine.

## 2021-07-04 NOTE — PROGRESS NOTE ADULT - PROBLEM SELECTOR PLAN 2
Patient is neutropenic, afebrile   6/30 completed Zosyn   If febrile, send pan cultures, start cefepime  will start Levaquin when ANC <500  6/21 CXR mid and lower right lung-pna can not be excluded   6/24 Abd US: Cholelithiasis without signs of cholecystitis, persistently dilated CBD; HIDA scan (+) for acute cholecystitis   Seen by Surgery and IR, since patient is asymptomatic and there are no signs of hemodynamic instability   No surgical or IR interventions were recommended. Continue clear liquid diet and antibiotics  6/28  US abdomen Cholelithiasis with no definite sonographic evidence of acute cholecystitis.  7/3 Started posaconazole prophylaxis as patient is neutropenic. Adjusted dose of venetoclax Patient is neutropenic, afebrile   6/30 completed Zosyn   If febrile, send pan cultures, start cefepime  will start Levaquin when ANC <500  6/21 CXR mid and lower right lung-pna can not be excluded   6/24 Abd US: Cholelithiasis without signs of cholecystitis, persistently dilated CBD; HIDA scan (+) for acute cholecystitis   Seen by Surgery and IR, since patient is asymptomatic and there are no signs of hemodynamic instability   No surgical or IR interventions were recommended. Continue clear liquid diet and antibiotics  6/28  US abdomen Cholelithiasis with no definite sonographic evidence of acute cholecystitis.  7/3 Started posaconazole prophylaxis as patient is neutropenic. Adjusted dose of venetoclax. Refused dose of posaconazole. Changed to suspension on 7/4

## 2021-07-04 NOTE — PROGRESS NOTE ADULT - PROBLEM SELECTOR PLAN 1
FLT3 ITD (-) AML   Dacogen held on day 3 6/21 due to acute change in mental status now back to baseline. Course will go to day 6 to complete 5 days   Monitor daily CBC's and transfuse as needed, Monitor daily CMP and replete electrolytes as needed   Strict I's/O's, daily weights, mouth care, anti emetics. Allopurinol 200 mg oral daily   s/p Dacogen 20 mg/m2 x 5 days + Venetoclax. (s/p Venetoclax escalation, now on 100 mg oral daily - while on posaconazole)  Pt is DNR
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
## 2021-07-04 NOTE — PROGRESS NOTE ADULT - SUBJECTIVE AND OBJECTIVE BOX
Diagnosis: FLT3 ITD (-) Acute Myeloid Leukemia    Protocol/Chemo Regimen: S/p Cycle 1 Decitabine 20mg/m2 IVSS infused over 1 hour x 5 doses (held on day 3)  + Venetoclax 400 mg po daily     Day: 16     Pt endorsed: Poor appetite     Review of Systems: Denies nausea, vomiting, diarrhea, chest pain, SOB     Pain scale: 0     Diet: Full liquid     Allergies: morphine (Unknown)      -------------------------               Diagnosis: FLT3 ITD (-) Acute Myeloid Leukemia    Protocol/Chemo Regimen: S/p Cycle 1 Decitabine 20mg/m2 IVSS infused over 1 hour x 5 doses (held on day 3)  + Venetoclax 400 mg po daily     Day: 16     Pt endorsed: no acute complaints     Review of Systems: Denies nausea, vomiting, diarrhea, chest pain, SOB     Pain scale: 0     Diet: Full liquid     Allergies: morphine (Unknown)    ANTIMICROBIALS  posaconazole Suspension 200 milliGRAM(s) Oral every 8 hours    HEME/ONC MEDICATIONS  venetoclax 100 milliGRAM(s) Oral <User Schedule>    STANDING MEDICATIONS  allopurinol 200 milliGRAM(s) Oral daily  Biotene Dry Mouth Oral Rinse 5 milliLiter(s) Swish and Spit four times a day  chlorhexidine 4% Liquid 1 Application(s) Topical <User Schedule>  cholecalciferol 2000 Unit(s) Oral daily  cinacalcet 30 milliGRAM(s) Oral daily  escitalopram 5 milliGRAM(s) Oral daily  finasteride 5 milliGRAM(s) Oral daily  folic acid 1 milliGRAM(s) Oral daily  insulin lispro (ADMELOG) corrective regimen sliding scale   SubCutaneous three times a day before meals  insulin lispro (ADMELOG) corrective regimen sliding scale   SubCutaneous at bedtime  isosorbide   mononitrate ER Tablet (IMDUR) 60 milliGRAM(s) Oral daily  lidocaine   Patch 1 Patch Transdermal daily  metoprolol succinate ER 50 milliGRAM(s) Oral daily  polyethylene glycol 3350 17 Gram(s) Oral daily  sodium chloride 0.9%. 1000 milliLiter(s) IV Continuous <Continuous>  tamsulosin 0.4 milliGRAM(s) Oral at bedtime    PRN MEDICATIONS  acetaminophen   Tablet .. 650 milliGRAM(s) Oral every 6 hours PRN  albuterol/ipratropium for Nebulization 3 milliLiter(s) Nebulizer every 6 hours PRN  aluminum hydroxide/magnesium hydroxide/simethicone Suspension 30 milliLiter(s) Oral every 4 hours PRN  calcium carbonate    500 mG (Tums) Chewable 1 Tablet(s) Chew every 4 hours PRN  ondansetron Injectable 8 milliGRAM(s) IV Push every 8 hours PRN  senna 2 Tablet(s) Oral at bedtime PRN  sodium chloride 0.9% lock flush 10 milliLiter(s) IV Push every 1 hour PRN  sucralfate suspension 1 Gram(s) Oral every 6 hours PRN    Vital Signs Last 24 Hrs  T(C): 36.6 (04 Jul 2021 13:32), Max: 36.9 (04 Jul 2021 08:00)  T(F): 97.8 (04 Jul 2021 13:32), Max: 98.5 (04 Jul 2021 08:00)  HR: 90 (04 Jul 2021 13:32) (71 - 93)  BP: 150/66 (04 Jul 2021 13:32) (137/61 - 178/79)  BP(mean): --  RR: 18 (04 Jul 2021 13:32) (18 - 18)  SpO2: 98% (04 Jul 2021 13:32) (94% - 99%)    PHYSICAL EXAM  General: adult in NAD  HEENT: clear oropharynx, no erythema, no ulcers  Neck: supple  CV: normal S1, S2, RRR  Lungs: clear to auscultation, no wheezes, no rales  Abdomen: soft, nontender, nondistended, normal BS  Ext: no edema  Skin: no rash  Neuro: alert and oriented x 3  Central line: normal     LABS:                        8.1    1.91  )-----------( 16       ( 04 Jul 2021 06:53 )             24.7     Mean Cell Volume : 88.8 fl  Mean Cell Hemoglobin : 29.1 pg  Mean Cell Hemoglobin Concentration : 32.8 gm/dL  Auto Neutrophil # : 0.56 K/uL  Auto Lymphocyte # : 1.27 K/uL  Auto Monocyte # : 0.05 K/uL  Auto Eosinophil # : 0.02 K/uL  Auto Basophil # : 0.00 K/uL  Auto Neutrophil % : 29.3 %  Auto Lymphocyte % : 66.4 %  Auto Monocyte % : 2.6 %  Auto Eosinophil % : 0.8 %  Auto Basophil % : 0.0 %    07-04  133<L>  |  104  |  24<H>  ----------------------------<  118<H>  4.0   |  18<L>  |  1.73<H>    Ca    8.8      04 Jul 2021 06:51  Phos  2.6     07-04  Mg     1.8     07-04    TPro  6.9  /  Alb  2.4<L>  /  TBili  0.9  /  DBili  x   /  AST  19  /  ALT  16  /  AlkPhos  88  07-04    Mg 1.8  Phos 2.6    Uric Acid --    RADIOLOGY & ADDITIONAL STUDIES:  < from: US Abdomen Complete (US Abdomen Complete .) (06.29.21 @ 08:35) >  IMPRESSION:  Cholelithiasis with no definite sonographic evidence of acute cholecystitis.  Mildly dilated common bile duct unchanged. No intrahepatic ductal dilatation.  Bilateral pleural effusions, right greater than left.  Increased echogenicity of the bilateral kidneys with no cortical thinning or hydronephrosis consistent with renal parenchymal disease in the setting of elevated creatinine.   Diagnosis: FLT3 ITD (-) Acute Myeloid Leukemia    Protocol/Chemo Regimen: S/p Cycle 1 Decitabine (held on day 3) + Venetoclax     Day: 16     Pt endorsed: no acute complaints     Review of Systems: Denies nausea, vomiting, diarrhea, chest pain, SOB     Pain scale: 0     Diet: Full liquid     Allergies: morphine (Unknown)    ANTIMICROBIALS  posaconazole Suspension 200 milliGRAM(s) Oral every 8 hours    HEME/ONC MEDICATIONS  venetoclax 100 milliGRAM(s) Oral <User Schedule>    STANDING MEDICATIONS  allopurinol 200 milliGRAM(s) Oral daily  Biotene Dry Mouth Oral Rinse 5 milliLiter(s) Swish and Spit four times a day  chlorhexidine 4% Liquid 1 Application(s) Topical <User Schedule>  cholecalciferol 2000 Unit(s) Oral daily  cinacalcet 30 milliGRAM(s) Oral daily  escitalopram 5 milliGRAM(s) Oral daily  finasteride 5 milliGRAM(s) Oral daily  folic acid 1 milliGRAM(s) Oral daily  insulin lispro (ADMELOG) corrective regimen sliding scale   SubCutaneous three times a day before meals  insulin lispro (ADMELOG) corrective regimen sliding scale   SubCutaneous at bedtime  isosorbide   mononitrate ER Tablet (IMDUR) 60 milliGRAM(s) Oral daily  lidocaine   Patch 1 Patch Transdermal daily  metoprolol succinate ER 50 milliGRAM(s) Oral daily  polyethylene glycol 3350 17 Gram(s) Oral daily  sodium chloride 0.9%. 1000 milliLiter(s) IV Continuous <Continuous>  tamsulosin 0.4 milliGRAM(s) Oral at bedtime    PRN MEDICATIONS  acetaminophen   Tablet .. 650 milliGRAM(s) Oral every 6 hours PRN  albuterol/ipratropium for Nebulization 3 milliLiter(s) Nebulizer every 6 hours PRN  aluminum hydroxide/magnesium hydroxide/simethicone Suspension 30 milliLiter(s) Oral every 4 hours PRN  calcium carbonate    500 mG (Tums) Chewable 1 Tablet(s) Chew every 4 hours PRN  ondansetron Injectable 8 milliGRAM(s) IV Push every 8 hours PRN  senna 2 Tablet(s) Oral at bedtime PRN  sodium chloride 0.9% lock flush 10 milliLiter(s) IV Push every 1 hour PRN  sucralfate suspension 1 Gram(s) Oral every 6 hours PRN    Vital Signs Last 24 Hrs  T(C): 36.6 (04 Jul 2021 13:32), Max: 36.9 (04 Jul 2021 08:00)  T(F): 97.8 (04 Jul 2021 13:32), Max: 98.5 (04 Jul 2021 08:00)  HR: 90 (04 Jul 2021 13:32) (71 - 93)  BP: 150/66 (04 Jul 2021 13:32) (137/61 - 178/79)  BP(mean): --  RR: 18 (04 Jul 2021 13:32) (18 - 18)  SpO2: 98% (04 Jul 2021 13:32) (94% - 99%)    PHYSICAL EXAM  General: adult in NAD  HEENT: clear oropharynx, no erythema, no ulcers  Neck: supple  CV: normal S1, S2, RRR  Lungs: clear to auscultation, no wheezes, no rales  Abdomen: soft, nontender, nondistended, normal BS  Ext: no edema  Skin: no rash  Neuro: alert and oriented x 3  Central line: normal     LABS:                        8.1    1.91  )-----------( 16       ( 04 Jul 2021 06:53 )             24.7     Mean Cell Volume : 88.8 fl  Mean Cell Hemoglobin : 29.1 pg  Mean Cell Hemoglobin Concentration : 32.8 gm/dL  Auto Neutrophil # : 0.56 K/uL  Auto Lymphocyte # : 1.27 K/uL  Auto Monocyte # : 0.05 K/uL  Auto Eosinophil # : 0.02 K/uL  Auto Basophil # : 0.00 K/uL  Auto Neutrophil % : 29.3 %  Auto Lymphocyte % : 66.4 %  Auto Monocyte % : 2.6 %  Auto Eosinophil % : 0.8 %  Auto Basophil % : 0.0 %    07-04  133<L>  |  104  |  24<H>  ----------------------------<  118<H>  4.0   |  18<L>  |  1.73<H>    Ca    8.8      04 Jul 2021 06:51  Phos  2.6     07-04  Mg     1.8     07-04    TPro  6.9  /  Alb  2.4<L>  /  TBili  0.9  /  DBili  x   /  AST  19  /  ALT  16  /  AlkPhos  88  07-04    Mg 1.8  Phos 2.6    Uric Acid --    RADIOLOGY & ADDITIONAL STUDIES:  < from: US Abdomen Complete (US Abdomen Complete .) (06.29.21 @ 08:35) >  IMPRESSION:  Cholelithiasis with no definite sonographic evidence of acute cholecystitis.  Mildly dilated common bile duct unchanged. No intrahepatic ductal dilatation.  Bilateral pleural effusions, right greater than left.  Increased echogenicity of the bilateral kidneys with no cortical thinning or hydronephrosis consistent with renal parenchymal disease in the setting of elevated creatinine.
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
## 2021-07-04 NOTE — PROGRESS NOTE ADULT - PROBLEM SELECTOR PLAN 8
s/p 5vCABG 2012 (LIMA to LAD, SVG to Diag and OM, SVG PDA and RPL), PCI (2 in 2014, 2 in 2015 [facility]), HFrEF s/p CRT-D (2013),  No DAPT or AC given thrombocytopenia.

## 2021-07-05 NOTE — PROGRESS NOTE ADULT - PROBLEM SELECTOR PLAN 8
s/p 5vCABG 2012 (LIMA to LAD, SVG to Diag and OM, SVG PDA and RPL), PCI (2 in 2014, 2 in 2015 [facility]), HFrEF s/p CRT-D (2013),  No DAPT or AC given thrombocytopenia.

## 2021-07-05 NOTE — PROGRESS NOTE ADULT - PROBLEM SELECTOR PLAN 2
Patient is neutropenic, afebrile   6/30 completed Zosyn   If febrile, send pan cultures, start cefepime  will start Levaquin when ANC <500  6/21 CXR mid and lower right lung-pna can not be excluded   6/24 Abd US: Cholelithiasis without signs of cholecystitis, persistently dilated CBD; HIDA scan (+) for acute cholecystitis   Seen by Surgery and IR, since patient is asymptomatic and there are no signs of hemodynamic instability   No surgical or IR interventions were recommended. Continue clear liquid diet and antibiotics  6/28  US abdomen Cholelithiasis with no definite sonographic evidence of acute cholecystitis.  7/3 Started posaconazole prophylaxis as patient is neutropenic. Adjusted dose of venetoclax. Refused dose of posaconazole. Changed to suspension on 7/4 Patient is neutropenic, afebrile   6/30 completed Zosyn   If febrile, send pan cultures, start cefepime  6/21 CXR mid and lower right lung-pna can not be excluded   6/24 Abd US: Cholelithiasis without signs of cholecystitis, persistently dilated CBD; HIDA scan (+) for acute cholecystitis   Seen by Surgery and IR, since patient is asymptomatic and there are no signs of hemodynamic instability   No surgical or IR interventions were recommended. Continue clear liquid diet and antibiotics  6/28  US abdomen Cholelithiasis with no definite sonographic evidence of acute cholecystitis.  7/3 Started posaconazole prophylaxis as patient is neutropenic. Adjusted dose of venetoclax. Refused dose of posaconazole. Changed to suspension on 7/4 7/5 Started Levaquin for neutropenic prophylaxis as ANC < 500

## 2021-07-05 NOTE — PROGRESS NOTE ADULT - SUBJECTIVE AND OBJECTIVE BOX
Diagnosis: FLT3 ITD (-) Acute Myeloid Leukemia    Protocol/Chemo Regimen: S/p Cycle 1 Decitabine (held on day 3) + Venetoclax     Day: 17     Pt endorsed: no acute complaints     Review of Systems: Denies nausea, vomiting, diarrhea, chest pain, SOB     Pain scale: 0     Diet: low residue     Allergies: morphine (Unknown)    -----------------------------           Diagnosis: FLT3 ITD (-) Acute Myeloid Leukemia    Protocol/Chemo Regimen: S/p Cycle 1 Decitabine (held on day 3) + Venetoclax     Day: 17     Pt endorsed: no acute complaints     Review of Systems: Denies nausea, vomiting, diarrhea, chest pain, SOB     Pain scale: 0     Diet: low residue     Allergies: morphine (Unknown)    ANTIMICROBIALS  posaconazole Suspension 200 milliGRAM(s) Oral every 8 hours    HEME/ONC MEDICATIONS  venetoclax 100 milliGRAM(s) Oral <User Schedule>    STANDING MEDICATIONS  allopurinol 200 milliGRAM(s) Oral daily  Biotene Dry Mouth Oral Rinse 5 milliLiter(s) Swish and Spit four times a day  chlorhexidine 4% Liquid 1 Application(s) Topical <User Schedule>  cholecalciferol 2000 Unit(s) Oral daily  cinacalcet 30 milliGRAM(s) Oral daily  escitalopram 5 milliGRAM(s) Oral daily  finasteride 5 milliGRAM(s) Oral daily  folic acid 1 milliGRAM(s) Oral daily  isosorbide   mononitrate ER Tablet (IMDUR) 60 milliGRAM(s) Oral daily  lidocaine   Patch 1 Patch Transdermal daily  metoprolol succinate ER 50 milliGRAM(s) Oral daily  polyethylene glycol 3350 17 Gram(s) Oral daily  sodium chloride 0.9%. 1000 milliLiter(s) IV Continuous <Continuous>  tamsulosin 0.4 milliGRAM(s) Oral at bedtime    PRN MEDICATIONS  acetaminophen   Tablet .. 650 milliGRAM(s) Oral every 6 hours PRN  albuterol/ipratropium for Nebulization 3 milliLiter(s) Nebulizer every 6 hours PRN  aluminum hydroxide/magnesium hydroxide/simethicone Suspension 30 milliLiter(s) Oral every 4 hours PRN  calcium carbonate    500 mG (Tums) Chewable 1 Tablet(s) Chew every 4 hours PRN  ondansetron Injectable 8 milliGRAM(s) IV Push every 8 hours PRN  senna 2 Tablet(s) Oral at bedtime PRN  sodium chloride 0.9% lock flush 10 milliLiter(s) IV Push every 1 hour PRN  sucralfate suspension 1 Gram(s) Oral every 6 hours PRN    Vital Signs Last 24 Hrs  T(C): 36.8 (05 Jul 2021 08:45), Max: 36.9 (04 Jul 2021 17:08)  T(F): 98.2 (05 Jul 2021 08:45), Max: 98.4 (04 Jul 2021 17:08)  HR: 80 (05 Jul 2021 08:45) (75 - 94)  BP: 142/78 (05 Jul 2021 08:45) (133/56 - 150/66)  BP(mean): --  RR: 18 (05 Jul 2021 08:45) (18 - 18)  SpO2: 100% (05 Jul 2021 08:45) (93% - 100%)    PHYSICAL EXAM  General: adult in NAD  HEENT: clear oropharynx, no erythema, no ulcers  Neck: supple  CV: normal S1, S2, RRR  Lungs: clear to auscultation, no wheezes, no rales  Abdomen: soft, nontender, nondistended, normal BS  Ext: no edema  Skin: no rash  Neuro: alert and oriented x 3  Central line: normal     LABS:                        7.1    1.69  )-----------( 9        ( 05 Jul 2021 07:13 )             21.7     Mean Cell Volume : 87.9 fl  Mean Cell Hemoglobin : 28.7 pg  Mean Cell Hemoglobin Concentration : 32.7 gm/dL  Auto Neutrophil # : 0.37 K/uL  Auto Lymphocyte # : 1.26 K/uL  Auto Monocyte # : 0.05 K/uL  Auto Eosinophil # : 0.00 K/uL  Auto Basophil # : 0.00 K/uL  Auto Neutrophil % : 21.6 %  Auto Lymphocyte % : 74.8 %  Auto Monocyte % : 2.7 %  Auto Eosinophil % : 0.0 %  Auto Basophil % : 0.0 %    07-05  133<L>  |  103  |  21  ----------------------------<  110<H>  3.9   |  19<L>  |  1.81<H>    Ca    8.6      05 Jul 2021 07:12  Phos  2.9     07-05  Mg     1.8     07-05    TPro  6.6  /  Alb  2.5<L>  /  TBili  0.8  /  DBili  x   /  AST  13  /  ALT  13  /  AlkPhos  82  07-05    Mg 1.8  Phos 2.9  PT/INR - ( 05 Jul 2021 07:13 )   PT: 14.6 sec;   INR: 1.23 ratio      Uric Acid --    RADIOLOGY & ADDITIONAL STUDIES:  < from: US Abdomen Complete (US Abdomen Complete .) (06.29.21 @ 08:35) >    IMPRESSION:  Cholelithiasis with no definite sonographic evidence of acute cholecystitis.  Mildly dilated common bile duct unchanged. No intrahepatic ductal dilatation.  Bilateral pleural effusions, right greater than left.  Increased echogenicity of the bilateral kidneys with no cortical thinning or hydronephrosis consistent with renal parenchymal disease in the setting of elevated creatinine.
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
## 2021-07-05 NOTE — PROGRESS NOTE ADULT - PROBLEM SELECTOR PLAN 1
FLT3 ITD (-) AML   Dacogen held on day 3 6/21 due to acute change in mental status now back to baseline. Course will go to day 6 to complete 5 days   Monitor daily CBC's and transfuse as needed, Monitor daily CMP and replete electrolytes as needed   Strict I's/O's, daily weights, mouth care, anti emetics. Allopurinol 200 mg oral daily   s/p Dacogen 20 mg/m2 x 5 days + Venetoclax. (s/p Venetoclax escalation, now on 100 mg oral daily - while on posaconazole)  Pt is DNR FLT3 ITD (-) AML   Dacogen held on day 3 6/21 due to acute change in mental status now back to baseline. Course will go to day 6 to complete 5 days   Monitor daily CBC's and transfuse as needed, Monitor daily CMP and replete electrolytes as needed   Strict I's/O's, daily weights, mouth care, anti emetics. Allopurinol 200 mg oral daily   s/p Dacogen 20 mg/m2 x 5 days + Venetoclax. (s/p Venetoclax escalation, now on 100 mg oral daily - while on posaconazole)  Pt is DNR  - Thrombocytopenia: Platelets 1 bag today
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
## 2021-07-06 NOTE — PROGRESS NOTE ADULT - SUBJECTIVE AND OBJECTIVE BOX
Diagnosis: FLT3 ITD (-) Acute Myeloid Leukemia    Protocol/Chemo Regimen: S/p Cycle 1 Decitabine (held on day 3) + Venetoclax     Day: 18     Pt endorsed: no acute complaints     Review of Systems: Denies nausea, vomiting, diarrhea, chest pain, SOB     Pain scale: 0     Diet: low residue     Allergies: morphine (Unknown)    ANTIMICROBIALS  posaconazole Suspension 200 milliGRAM(s) Oral every 8 hours    HEME/ONC MEDICATIONS  venetoclax 100 milliGRAM(s) Oral <User Schedule>    STANDING MEDICATIONS  allopurinol 200 milliGRAM(s) Oral daily  Biotene Dry Mouth Oral Rinse 5 milliLiter(s) Swish and Spit four times a day  chlorhexidine 4% Liquid 1 Application(s) Topical <User Schedule>  cholecalciferol 2000 Unit(s) Oral daily  cinacalcet 30 milliGRAM(s) Oral daily  escitalopram 5 milliGRAM(s) Oral daily  finasteride 5 milliGRAM(s) Oral daily  folic acid 1 milliGRAM(s) Oral daily  isosorbide   mononitrate ER Tablet (IMDUR) 60 milliGRAM(s) Oral daily  lidocaine   Patch 1 Patch Transdermal daily  metoprolol succinate ER 50 milliGRAM(s) Oral daily  polyethylene glycol 3350 17 Gram(s) Oral daily  sodium chloride 0.9%. 1000 milliLiter(s) IV Continuous <Continuous>  tamsulosin 0.4 milliGRAM(s) Oral at bedtime    PRN MEDICATIONS  acetaminophen   Tablet .. 650 milliGRAM(s) Oral every 6 hours PRN  albuterol/ipratropium for Nebulization 3 milliLiter(s) Nebulizer every 6 hours PRN  aluminum hydroxide/magnesium hydroxide/simethicone Suspension 30 milliLiter(s) Oral every 4 hours PRN  calcium carbonate    500 mG (Tums) Chewable 1 Tablet(s) Chew every 4 hours PRN  ondansetron Injectable 8 milliGRAM(s) IV Push every 8 hours PRN  senna 2 Tablet(s) Oral at bedtime PRN  sodium chloride 0.9% lock flush 10 milliLiter(s) IV Push every 1 hour PRN  sucralfate suspension 1 Gram(s) Oral every 6 hours PRN    Vital Signs Last 24 Hrs  T(C): 36.9 (06 Jul 2021 05:45), Max: 37 (05 Jul 2021 17:00)  T(F): 98.4 (06 Jul 2021 05:45), Max: 98.6 (05 Jul 2021 17:00)  HR: 89 (06 Jul 2021 05:45) (75 - 89)  BP: 136/69 (06 Jul 2021 05:45) (127/67 - 147/65)  BP(mean): --  RR: 18 (06 Jul 2021 05:45) (18 - 18)  SpO2: 97% (06 Jul 2021 05:45) (97% - 99%)    PHYSICAL EXAM  General: adult in NAD  HEENT: clear oropharynx, no erythema, no ulcers  CV: normal S1, S2, RRR  Lungs: clear to auscultation, no wheezes, no rales  Abdomen: soft, nontender, nondistended, normal BS  Ext: no edema  Skin: no rash  Neuro: alert and oriented x 2-3   Central line: PICC CDI     LABS:                          6.8    1.34  )-----------( 31       ( 06 Jul 2021 07:01 )             20.4         Mean Cell Volume : 88.3 fl  Mean Cell Hemoglobin : 29.4 pg  Mean Cell Hemoglobin Concentration : 33.3 gm/dL  Auto Neutrophil # : 0.25 K/uL  Auto Lymphocyte # : 0.94 K/uL  Auto Monocyte # : 0.13 K/uL  Auto Eosinophil # : 0.00 K/uL  Auto Basophil # : 0.00 K/uL  Auto Neutrophil % : 18.8 %  Auto Lymphocyte % : 70.5 %  Auto Monocyte % : 9.8 %  Auto Eosinophil % : 0.0 %  Auto Basophil % : 0.0 %      07-06    130<L>  |  102  |  22  ----------------------------<  104<H>  4.0   |  18<L>  |  2.03<H>    Ca    8.7      06 Jul 2021 07:01  Phos  3.1     07-06  Mg     1.9     07-06    TPro  6.7  /  Alb  2.5<L>  /  TBili  0.8  /  DBili  x   /  AST  10  /  ALT  10  /  AlkPhos  79  07-06          PT/INR - ( 05 Jul 2021 07:13 )   PT: 14.6 sec;   INR: 1.23 ratio         Uric Acid --      RADIOLOGY & ADDITIONAL STUDIES:  US Abdomen Complete (US Abdomen Complete .) (06.29.21 @ 08:35) >    IMPRESSION:  Cholelithiasis with no definite sonographic evidence of acute cholecystitis.  Mildly dilated common bile duct unchanged. No intrahepatic ductal dilatation.  Bilateral pleural effusions, right greater than left.  Increased echogenicity of the bilateral kidneys with no cortical thinning or hydronephrosis consistent with renal parenchymal disease in the setting of elevated creatinine.   Diagnosis: FLT3 ITD (-) Acute Myeloid Leukemia    Protocol/Chemo Regimen: S/p Cycle 1 Decitabine (held on day 3) + Venetoclax     Day: 18     Pt endorsed: no acute complaints     Review of Systems: Denies nausea, vomiting, diarrhea, chest pain, SOB     Pain scale: 0     Diet: Regular     Allergies: morphine (Unknown)    ANTIMICROBIALS  posaconazole Suspension 200 milliGRAM(s) Oral every 8 hours    HEME/ONC MEDICATIONS  venetoclax 100 milliGRAM(s) Oral <User Schedule>    STANDING MEDICATIONS  allopurinol 200 milliGRAM(s) Oral daily  Biotene Dry Mouth Oral Rinse 5 milliLiter(s) Swish and Spit four times a day  chlorhexidine 4% Liquid 1 Application(s) Topical <User Schedule>  cholecalciferol 2000 Unit(s) Oral daily  cinacalcet 30 milliGRAM(s) Oral daily  escitalopram 5 milliGRAM(s) Oral daily  finasteride 5 milliGRAM(s) Oral daily  folic acid 1 milliGRAM(s) Oral daily  isosorbide   mononitrate ER Tablet (IMDUR) 60 milliGRAM(s) Oral daily  lidocaine   Patch 1 Patch Transdermal daily  metoprolol succinate ER 50 milliGRAM(s) Oral daily  polyethylene glycol 3350 17 Gram(s) Oral daily  sodium chloride 0.9%. 1000 milliLiter(s) IV Continuous <Continuous>  tamsulosin 0.4 milliGRAM(s) Oral at bedtime    PRN MEDICATIONS  acetaminophen   Tablet .. 650 milliGRAM(s) Oral every 6 hours PRN  albuterol/ipratropium for Nebulization 3 milliLiter(s) Nebulizer every 6 hours PRN  aluminum hydroxide/magnesium hydroxide/simethicone Suspension 30 milliLiter(s) Oral every 4 hours PRN  calcium carbonate    500 mG (Tums) Chewable 1 Tablet(s) Chew every 4 hours PRN  ondansetron Injectable 8 milliGRAM(s) IV Push every 8 hours PRN  senna 2 Tablet(s) Oral at bedtime PRN  sodium chloride 0.9% lock flush 10 milliLiter(s) IV Push every 1 hour PRN  sucralfate suspension 1 Gram(s) Oral every 6 hours PRN    Vital Signs Last 24 Hrs  T(C): 36.9 (06 Jul 2021 05:45), Max: 37 (05 Jul 2021 17:00)  T(F): 98.4 (06 Jul 2021 05:45), Max: 98.6 (05 Jul 2021 17:00)  HR: 89 (06 Jul 2021 05:45) (75 - 89)  BP: 136/69 (06 Jul 2021 05:45) (127/67 - 147/65)  BP(mean): --  RR: 18 (06 Jul 2021 05:45) (18 - 18)  SpO2: 97% (06 Jul 2021 05:45) (97% - 99%)    PHYSICAL EXAM  General: adult in NAD  HEENT: clear oropharynx, no erythema, no ulcers  CV: normal S1, S2, RRR  Lungs: clear to auscultation, no wheezes, no rales  Abdomen: soft, nontender, nondistended, normal BS  Ext: no edema  Skin: no rash  Neuro: alert and oriented x 2-3   Central line: PICC CDI     LABS:                          6.8    1.34  )-----------( 31       ( 06 Jul 2021 07:01 )             20.4         Mean Cell Volume : 88.3 fl  Mean Cell Hemoglobin : 29.4 pg  Mean Cell Hemoglobin Concentration : 33.3 gm/dL  Auto Neutrophil # : 0.25 K/uL  Auto Lymphocyte # : 0.94 K/uL  Auto Monocyte # : 0.13 K/uL  Auto Eosinophil # : 0.00 K/uL  Auto Basophil # : 0.00 K/uL  Auto Neutrophil % : 18.8 %  Auto Lymphocyte % : 70.5 %  Auto Monocyte % : 9.8 %  Auto Eosinophil % : 0.0 %  Auto Basophil % : 0.0 %      07-06    130<L>  |  102  |  22  ----------------------------<  104<H>  4.0   |  18<L>  |  2.03<H>    Ca    8.7      06 Jul 2021 07:01  Phos  3.1     07-06  Mg     1.9     07-06    TPro  6.7  /  Alb  2.5<L>  /  TBili  0.8  /  DBili  x   /  AST  10  /  ALT  10  /  AlkPhos  79  07-06          PT/INR - ( 05 Jul 2021 07:13 )   PT: 14.6 sec;   INR: 1.23 ratio         Uric Acid --      RADIOLOGY & ADDITIONAL STUDIES:  US Abdomen Complete (US Abdomen Complete .) (06.29.21 @ 08:35) >    IMPRESSION:  Cholelithiasis with no definite sonographic evidence of acute cholecystitis.  Mildly dilated common bile duct unchanged. No intrahepatic ductal dilatation.  Bilateral pleural effusions, right greater than left.  Increased echogenicity of the bilateral kidneys with no cortical thinning or hydronephrosis consistent with renal parenchymal disease in the setting of elevated creatinine.
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
## 2021-07-06 NOTE — PROGRESS NOTE ADULT - SUBJECTIVE AND OBJECTIVE BOX
Follow Up:  neutrpenic    Interval History/ROS:  sleeping comfortably when seen    Allergies  morphine (Unknown)        ANTIMICROBIALS:  levoFLOXacin  Tablet 250 every 24 hours  posaconazole Suspension 200 every 8 hours      OTHER MEDS:  MEDICATIONS  (STANDING):  acetaminophen   Tablet .. 650 every 6 hours PRN  albuterol/ipratropium for Nebulization 3 every 6 hours PRN  allopurinol 200 daily  aluminum hydroxide/magnesium hydroxide/simethicone Suspension 30 every 4 hours PRN  calcium carbonate    500 mG (Tums) Chewable 1 every 4 hours PRN  cinacalcet 30 daily  escitalopram 5 daily  finasteride 5 daily  isosorbide   mononitrate ER Tablet (IMDUR) 60 daily  metoprolol succinate ER 50 daily  ondansetron Injectable 8 every 8 hours PRN  polyethylene glycol 3350 17 daily  senna 2 at bedtime PRN  sucralfate suspension 1 every 6 hours PRN  tamsulosin 0.4 at bedtime  venetoclax 100 <User Schedule>      Vital Signs Last 24 Hrs  T(C): 37 (06 Jul 2021 17:00), Max: 37 (06 Jul 2021 09:10)  T(F): 98.6 (06 Jul 2021 17:00), Max: 98.6 (06 Jul 2021 09:10)  HR: 75 (06 Jul 2021 17:00) (73 - 89)  BP: 132/64 (06 Jul 2021 17:00) (115/59 - 139/66)  BP(mean): --  RR: 17 (06 Jul 2021 17:00) (17 - 18)  SpO2: 99% (06 Jul 2021 17:00) (97% - 100%)    PHYSICAL EXAM:  General: WN/WD NAD, Non-toxic  Neurology: Asleep  Respiratory: no resp distress  Abdominal: Soft, Non-tender, non-distended  Extremities: No edema,   Line Sites: Clear  Skin: No rash                          6.8    1.34  )-----------( 31       ( 06 Jul 2021 07:01 )             20.4       07-06    130<L>  |  102  |  22  ----------------------------<  104<H>  4.0   |  18<L>  |  2.03<H>    Ca    8.7      06 Jul 2021 07:01  Phos  3.1     07-06  Mg     1.9     07-06    TPro  6.7  /  Alb  2.5<L>  /  TBili  0.8  /  DBili  x   /  AST  10  /  ALT  10  /  AlkPhos  79  07-06          MICROBIOLOGY:  .Blood Blood-Peripheral  06-21-21   No Growth Final  --  --      .Blood Blood-Peripheral  06-13-21   No Growth Final  --  --      .Blood Blood-Peripheral  06-12-21   Growth in anaerobic bottle: Staphylococcus capitis  Multiple Morphological Strains  --  Staphylococcus capitis  Staphylococcus capitis      RADIOLOGY:    Toan Gutierrez MD; Division of Infectious Disease; Pager: [phone]; nights and weekends: [phone]

## 2021-07-06 NOTE — PROGRESS NOTE ADULT - PROBLEM SELECTOR PLAN 2
Patient is neutropenic, afebrile   6/30 completed Zosyn   If febrile, send pan cultures, start cefepime  6/21 CXR mid and lower right lung-pna can not be excluded   6/24 Abd US: Cholelithiasis without signs of cholecystitis, persistently dilated CBD; HIDA scan (+) for acute cholecystitis   Seen by Surgery and IR, since patient is asymptomatic and there are no signs of hemodynamic instability   No surgical or IR interventions were recommended. Continue clear liquid diet and antibiotics  6/28  US abdomen Cholelithiasis with no definite sonographic evidence of acute cholecystitis.  7/3 Started posaconazole prophylaxis as patient is neutropenic. Adjusted dose of venetoclax. Refused dose of posaconazole. Changed to suspension on 7/4 7/5 Started Levaquin for neutropenic prophylaxis as ANC < 500 Patient is neutropenic, afebrile   6/30 completed Zosyn   7/5 Started Levaquin for neutropenic prophylaxis as ANC < 500  If febrile, send pan cultures, and switch Levaquin to cefepime  6/21 CXR mid and lower right lung-pna can not be excluded   6/24 Abd US: Cholelithiasis without signs of cholecystitis, persistently dilated CBD; HIDA scan (+) for acute cholecystitis   Seen by Surgery and IR, since patient is asymptomatic and there are no signs of hemodynamic instability   No surgical or IR interventions were recommended. Continue clear liquid diet and antibiotics  6/28  US abdomen Cholelithiasis with no definite sonographic evidence of acute cholecystitis.  7/3 Started posaconazole prophylaxis as patient is neutropenic. Adjusted dose of venetoclax. Refused dose of posaconazole. Changed to suspension on 7/4

## 2021-07-06 NOTE — PROGRESS NOTE ADULT - PROBLEM SELECTOR PLAN 1
FLT3 ITD (-) AML   Dacogen held on day 3 6/21 due to acute change in mental status now back to baseline. Course will go to day 6 to complete 5 days   Monitor daily CBC's and transfuse as needed, Monitor daily CMP and replete electrolytes as needed   Strict I's/O's, daily weights, mouth care, anti emetics. Allopurinol 200 mg oral daily   s/p Dacogen 20 mg/m2 x 5 days + Venetoclax. (s/p Venetoclax escalation, now on 100 mg oral daily - while on posaconazole)  Pt is DNR FLT3 ITD (-) AML   Dacogen held on day 3 6/21 due to acute change in mental status now back to baseline. Course will go to day 6 to complete 5 days   Monitor daily CBC's and transfuse as needed, Monitor daily CMP and replete electrolytes as needed   Strict I's/O's, daily weights, mouth care, anti emetics. Allopurinol 200 mg oral daily   s/p Dacogen 20 mg/m2 x 5 days + Venetoclax. (s/p Venetoclax escalation, now on 100 mg oral daily - while on posaconazole)  Anemia PRBC x 1 unit   Pt is DNR

## 2021-07-06 NOTE — PROGRESS NOTE ADULT - PROBLEM SELECTOR PLAN 8
s/p 5vCABG 2012 (LIMA to LAD, SVG to Diag and OM, SVG PDA and RPL), PCI (2 in 2014, 2 in 2015 [facility]), HFrEF s/p CRT-D (2013),  No DAPT or AC given thrombocytopenia.
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
## 2021-07-07 NOTE — PROGRESS NOTE ADULT - PROBLEM SELECTOR PLAN 4
6/24 CT, abdominal US suggestive of CBD dilatation. HIDA scan (+) for acute cholecystitis   Seen by IR and Surgery - since patient is asymptomatic, no acute interventions were suggested  Tolerating regular diet
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
## 2021-07-07 NOTE — PROGRESS NOTE ADULT - PROBLEM SELECTOR PLAN 1
FLT3 ITD (-) AML   Dacogen held on day 3 6/21 due to acute change in mental status now back to baseline. Course will go to day 6 to complete 5 days   Monitor daily CBC's and transfuse as needed, Monitor daily CMP and replete electrolytes as needed   Strict I's/O's, daily weights, mouth care, anti emetics. Allopurinol 200 mg oral daily   s/p Dacogen 20 mg/m2 x 5 days + Venetoclax. (s/p Venetoclax escalation, now on 100 mg oral daily - while on posaconazole)  Pt is DNR FLT3 ITD (-) AML   Dacogen held on day 3 6/21 due to acute change in mental status now back to baseline. Course will go to day 6 to complete 5 days   Monitor daily CBC's and transfuse as needed, Monitor daily CMP and replete electrolytes as needed   Strict I's/O's, daily weights, mouth care, anti emetics. Allopurinol 200 mg oral daily   s/p Dacogen 20 mg/m2 x 5 days + Venetoclax. (s/p Venetoclax escalation, now on 100 mg oral daily - while on posaconazole)  Pt is DNR  Day 21 BM bx due on 7/9

## 2021-07-07 NOTE — PROGRESS NOTE ADULT - PROBLEM SELECTOR PLAN 8
s/p 5vCABG 2012 (LIMA to LAD, SVG to Diag and OM, SVG PDA and RPL), PCI (2 in 2014, 2 in 2015 [facility]), HFrEF s/p CRT-D (2013),  No DAPT or AC given thrombocytopenia.

## 2021-07-07 NOTE — PROGRESS NOTE ADULT - SUBJECTIVE AND OBJECTIVE BOX
Diagnosis: FLT3 ITD (-) Acute Myeloid Leukemia    Protocol/Chemo Regimen: S/p Cycle 1 Decitabine (held on day 3) + Venetoclax     Day: 19     Pt endorsed: no acute complaints     Review of Systems: Denies nausea, vomiting, diarrhea, chest pain, SOB     Pain scale: 0     Diet: Regular     Allergies: morphine (Unknown)    ANTIMICROBIALS  posaconazole Suspension 200 milliGRAM(s) Oral every 8 hours    HEME/ONC MEDICATIONS  venetoclax 100 milliGRAM(s) Oral <User Schedule>    STANDING MEDICATIONS  allopurinol 200 milliGRAM(s) Oral daily  Biotene Dry Mouth Oral Rinse 5 milliLiter(s) Swish and Spit four times a day  chlorhexidine 4% Liquid 1 Application(s) Topical <User Schedule>  cholecalciferol 2000 Unit(s) Oral daily  cinacalcet 30 milliGRAM(s) Oral daily  escitalopram 5 milliGRAM(s) Oral daily  finasteride 5 milliGRAM(s) Oral daily  folic acid 1 milliGRAM(s) Oral daily  isosorbide   mononitrate ER Tablet (IMDUR) 60 milliGRAM(s) Oral daily  lidocaine   Patch 1 Patch Transdermal daily  metoprolol succinate ER 50 milliGRAM(s) Oral daily  polyethylene glycol 3350 17 Gram(s) Oral daily  sodium chloride 0.9%. 1000 milliLiter(s) IV Continuous <Continuous>  tamsulosin 0.4 milliGRAM(s) Oral at bedtime    PRN MEDICATIONS  acetaminophen   Tablet .. 650 milliGRAM(s) Oral every 6 hours PRN  albuterol/ipratropium for Nebulization 3 milliLiter(s) Nebulizer every 6 hours PRN  aluminum hydroxide/magnesium hydroxide/simethicone Suspension 30 milliLiter(s) Oral every 4 hours PRN  calcium carbonate    500 mG (Tums) Chewable 1 Tablet(s) Chew every 4 hours PRN  ondansetron Injectable 8 milliGRAM(s) IV Push every 8 hours PRN  senna 2 Tablet(s) Oral at bedtime PRN  sodium chloride 0.9% lock flush 10 milliLiter(s) IV Push every 1 hour PRN  sucralfate suspension 1 Gram(s) Oral every 6 hours PRN    Vital Signs Last 24 Hrs  T(C): 36.6 (07 Jul 2021 05:00), Max: 37.1 (06 Jul 2021 21:00)  T(F): 97.8 (07 Jul 2021 05:00), Max: 98.7 (06 Jul 2021 21:00)  HR: 81 (07 Jul 2021 05:00) (73 - 82)  BP: 110/51 (07 Jul 2021 05:00) (110/51 - 139/66)  BP(mean): --  RR: 18 (07 Jul 2021 05:00) (17 - 18)  SpO2: 99% (07 Jul 2021 05:00) (99% - 100%)  PHYSICAL EXAM  General:  NAD  HEENT: clear oropharynx, no erythema, no ulcers  CV:  S1, S2, RRR  Lungs: clear to auscultation, no wheezes, no rales  Abdomen: soft, nontender, nondistended, normal BS  Ext: no edema  Skin: no rash  Neuro: alert and oriented x 2-3   Central line: PICC CDI     LABS:                          7.6    1.33  )-----------( 24       ( 07 Jul 2021 07:02 )             22.8         Mean Cell Volume : 89.1 fl  Mean Cell Hemoglobin : 29.7 pg  Mean Cell Hemoglobin Concentration : 33.3 gm/dL  Auto Neutrophil # : 0.16 K/uL  Auto Lymphocyte # : 1.01 K/uL  Auto Monocyte # : 0.14 K/uL  Auto Eosinophil # : 0.01 K/uL  Auto Basophil # : 0.00 K/uL  Auto Neutrophil % : 12.4 %  Auto Lymphocyte % : 76.1 %  Auto Monocyte % : 10.6 %  Auto Eosinophil % : 0.9 %  Auto Basophil % : 0.0 %      07-07    130<L>  |  101  |  25<H>  ----------------------------<  102<H>  4.0   |  18<L>  |  2.07<H>    Ca    8.6      07 Jul 2021 07:02  Phos  3.3     07-07  Mg     1.8     07-07    TPro  6.8  /  Alb  2.6<L>  /  TBili  1.0  /  DBili  x   /  AST  9<L>  /  ALT  9<L>  /  AlkPhos  78  07-07        Uric Acid --        RADIOLOGY & ADDITIONAL STUDIES:  US Abdomen Complete (US Abdomen Complete .) (06.29.21 @ 08:35) >    IMPRESSION:  Cholelithiasis with no definite sonographic evidence of acute cholecystitis.  Mildly dilated common bile duct unchanged. No intrahepatic ductal dilatation.  Bilateral pleural effusions, right greater than left.  Increased echogenicity of the bilateral kidneys with no cortical thinning or hydronephrosis consistent with renal parenchymal disease in the setting of elevated creatinine.

## 2021-07-07 NOTE — PROGRESS NOTE ADULT - PROBLEM SELECTOR PLAN 2
Patient is neutropenic, afebrile   6/30 completed Zosyn   7/5 Started Levaquin for neutropenic prophylaxis as ANC < 500  If febrile, send pan cultures, and switch Levaquin to cefepime  6/21 CXR mid and lower right lung-pna can not be excluded   6/24 Abd US: Cholelithiasis without signs of cholecystitis, persistently dilated CBD; HIDA scan (+) for acute cholecystitis   Seen by Surgery and IR, since patient is asymptomatic and there are no signs of hemodynamic instability   No surgical or IR interventions were recommended. Continue clear liquid diet and antibiotics  6/28  US abdomen Cholelithiasis with no definite sonographic evidence of acute cholecystitis.  7/3 Started posaconazole prophylaxis as patient is neutropenic. Adjusted dose of venetoclax. Refused dose of posaconazole. Changed to suspension on 7/4
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
## 2021-07-08 NOTE — PROGRESS NOTE ADULT - PROBLEM SELECTOR PLAN 8
s/p 5vCABG 2012 (LIMA to LAD, SVG to Diag and OM, SVG PDA and RPL), PCI (2 in 2014, 2 in 2015 [facility]), HFrEF s/p CRT-D (2013),  No DAPT or AC given thrombocytopenia.

## 2021-07-08 NOTE — PROGRESS NOTE ADULT - PROBLEM SELECTOR PLAN 1
FLT3 ITD (-) AML   Dacogen held on day 3 6/21 due to acute change in mental status now back to baseline. Course will go to day 6 to complete 5 days   Monitor daily CBC's and transfuse as needed, Monitor daily CMP and replete electrolytes as needed   Strict I's/O's, daily weights, mouth care, anti emetics. Allopurinol 200 mg oral daily   s/p Dacogen 20 mg/m2 x 5 days + Venetoclax. (s/p Venetoclax escalation, now on 100 mg oral daily - while on posaconazole)  Pt is DNR  Day 21 BM bx due on 7/9 FLT3 ITD (-) AML   Dacogen held on day 3 6/21 due to acute change in mental status now back to baseline. Course will go to day 6 to complete 5 days   Monitor daily CBC's and transfuse as needed, Monitor daily CMP and replete electrolytes as needed   Strict I's/O's, daily weights, mouth care, anti emetics.   s/p Dacogen 20 mg/m2 x 5 days + Venetoclax. (s/p Venetoclax escalation, now on 100 mg oral daily - while on posaconazole)  Pt is DNR  Day 21 BM bx due on 7/9 FLT3 ITD (-) AML   Dacogen held on day 3 6/21 due to acute change in mental status now back to baseline. Course will go to day 6 to complete 5 days   Monitor daily CBC's and transfuse as needed, Monitor daily CMP and replete electrolytes as needed   Strict I's/O's, daily weights, mouth care, anti emetics.   s/p Dacogen 20 mg/m2 x 5 days + Venetoclax. (s/p Venetoclax escalation, now on 100 mg oral daily - while on posaconazole)  Day 21 BM bx due on 7/9  Pt is DNR

## 2021-07-08 NOTE — PROGRESS NOTE ADULT - ASSESSMENT
80M PMHx CKD, CAD s/p CABG 2012 & 4 stents (2 in 2014, 2 in 2015) with dilated EF of 27% BiV ICD (2013) initially admitted to [facility] cor acute hypoxic respiratory failure, anemia (Hgb 5.7), thrombocytopenia (plt 30) w/ blast cells (22%), lactic acidosis (LA 10) - transfer to [facility] for hematological evaluation for ALL.  Nephrology consulted for BRISA on CKD.      # BRISA on CKD  Pt with non-oliguric BRISA on CKD unclear etiology possible pre renal in the setting of anemia and ATN.  On admission sCr elevated at 3.45 (last known sCr 1.1-1.3 in 2018, recent hx CKD unclear recent baseline).  Urinalysis showed protein with trace blood.  Lab noted for serum uric acid 13.3, , phos 4.7, Echo severe LV systolic fxn, CT diffuse patchy airspace disease ?multifocal pna?. Urine electrolytes with Fe Urea of 82.5% suggestive of intrinsic pathology. Urine uric acid/creatinine ratio is < 1. Spot urine TP/CR ratio was 0.25 wnl. Kidney/bladder u/s on this admission was wnl (no hydro). Scr plateaued at 3.62 mg/dl on 6/13/21. Last sCr increased to 2.1 mg/dl today     - BRISA improved but now upward trending, could be hemodynamically mediated, - Continue allopurinol 200 mg PO daily   - PER with 1:320 but speckled pattern   - GN work up including c3 and C4 wnl, serum immunofixation with no monoclonality, anti GBM negative. Parvovirus DNA PCR negative. P ANCA and C ANCA negative, anti PLA2R negative  - Pt will need a kidney biopsy if his platelets are stable. Currently Plts 15K & Hg7.4 Pt will need plts of > 50K for biopsy. Please call IR for scheduling the renal biopsy. Will benefit from a platelet & PRBC transfusion prior.   - monitor BMP/tumor lysis markers (Uric acid/LDH/haptoglobin/LFT), strict I/O, avoid nephrotoxic agents (NSAIDs, PPI, contrast), renally dose medications per GFR.    # Hypercalcemia   Uncertain etiology. Last ionized calcium improved to 1.19 today   PTH was 84, not appropriately suppressed with low vit D of 21.6. Could be primary hyperparathyroidism  Continue Sensipar 30 mg PO daily   monitor ionized calcium daily    If you have any questions, please feel free to contact me  Griffin Lawson  Nephrology Fellow  [phone]  (After 5pm or on weekends please page the on-call fellow)
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
## 2021-07-08 NOTE — PROGRESS NOTE ADULT - SUBJECTIVE AND OBJECTIVE BOX
[facility] [place] DIVISION OF KIDNEY DISEASES AND HYPERTENSION -- FOLLOW UP NOTE  --------------------------------------------------------------------------------  Chief Complaint:    24 hour events/subjective:    Pt. endorses some shortness of breath over the last few days, not requiring supplemental O2. Pt.' s creatinine has been trending up.     PAST HISTORY  --------------------------------------------------------------------------------  No significant changes to PMH, PSH, FHx, SHx, unless otherwise noted    ALLERGIES & MEDICATIONS  --------------------------------------------------------------------------------  Allergies    morphine (Unknown)    Intolerances      Standing Inpatient Medications  allopurinol 200 milliGRAM(s) Oral daily  Biotene Dry Mouth Oral Rinse 5 milliLiter(s) Swish and Spit four times a day  chlorhexidine 4% Liquid 1 Application(s) Topical <User Schedule>  cholecalciferol 2000 Unit(s) Oral daily  cinacalcet 30 milliGRAM(s) Oral daily  escitalopram 5 milliGRAM(s) Oral daily  finasteride 5 milliGRAM(s) Oral daily  folic acid 1 milliGRAM(s) Oral daily  isosorbide   mononitrate ER Tablet (IMDUR) 60 milliGRAM(s) Oral daily  levoFLOXacin  Tablet 250 milliGRAM(s) Oral every 24 hours  lidocaine   Patch 1 Patch Transdermal daily  metoprolol succinate ER 50 milliGRAM(s) Oral daily  phytonadione   Solution 5 milliGRAM(s) Oral daily  polyethylene glycol 3350 17 Gram(s) Oral daily  posaconazole Suspension 200 milliGRAM(s) Oral every 8 hours  sodium chloride 0.9%. 1000 milliLiter(s) IV Continuous <Continuous>  tamsulosin 0.4 milliGRAM(s) Oral at bedtime  venetoclax 100 milliGRAM(s) Oral <User Schedule>    PRN Inpatient Medications  acetaminophen   Tablet .. 650 milliGRAM(s) Oral every 6 hours PRN  albuterol/ipratropium for Nebulization 3 milliLiter(s) Nebulizer every 6 hours PRN  aluminum hydroxide/magnesium hydroxide/simethicone Suspension 30 milliLiter(s) Oral every 4 hours PRN  calcium carbonate    500 mG (Tums) Chewable 1 Tablet(s) Chew every 4 hours PRN  ondansetron Injectable 8 milliGRAM(s) IV Push every 8 hours PRN  senna 2 Tablet(s) Oral at bedtime PRN  sodium chloride 0.9% lock flush 10 milliLiter(s) IV Push every 1 hour PRN  sucralfate suspension 1 Gram(s) Oral every 6 hours PRN      REVIEW OF SYSTEMS  --------------------------------------------------------------------------------  Gen: No fevers/chills  Skin: No rashes  Head/Eyes/Ears: Normal hearing,   Respiratory: dyspnea, no cough  CV: No chest pain  GI: No abdominal pain, diarrhea  : No dysuria, hematuria  MSK: No  edema  Heme: No easy bruising or bleeding  Psych: No significant depression      All other systems were reviewed and are negative, except as noted.    VITALS/PHYSICAL EXAM  --------------------------------------------------------------------------------  T(C): 36.3 (07-08-21 @ 05:41), Max: 36.8 (07-07-21 @ 13:26)  HR: 84 (07-08-21 @ 05:41) (78 - 93)  BP: 137/68 (07-08-21 @ 05:41) (120/64 - 154/71)  RR: 18 (07-08-21 @ 05:41) (17 - 18)  SpO2: 98% (07-08-21 @ 05:41) (95% - 100%)  Wt(kg): --        07-07-21 @ 07:01  -  07-08-21 @ 07:00  --------------------------------------------------------  IN: 844 mL / OUT: 1000 mL / NET: -156 mL        Physical Exam:  	Gen: NAD  	HEENT: MMM  	Pulm: CTA B/L  	CV: S1S2  	Abd: Soft, +BS   	Ext: No LE edema B/L  	Neuro: Awake  	Skin: Warm and dry        LABS/STUDIES  --------------------------------------------------------------------------------              7.4    1.15  >-----------<  15       [07-08-21 @ 07:09]              22.1     132  |  102  |  26  ----------------------------<  108      [07-08-21 @ 07:09]  3.8   |  17  |  2.18        Ca     8.7     [07-08-21 @ 07:09]      iCa    1.19     [07-08 @ 07:40]      Mg     1.7     [07-08-21 @ 07:09]      Phos  3.4     [07-08-21 @ 07:09]    TPro  6.8  /  Alb  2.6  /  TBili  0.8  /  DBili  x   /  AST  8   /  ALT  8   /  AlkPhos  82  [07-08-21 @ 07:09]    PT/INR: PT 15.4 , INR 1.30       [07-08-21 @ 07:09]          [07-08-21 @ 07:09]    Creatinine Trend:  SCr 2.18 [07-08 @ 07:09]  SCr 2.07 [07-07 @ 07:02]  SCr 2.03 [07-06 @ 07:01]  SCr 1.81 [07-05 @ 07:12]  SCr 1.73 [07-04 @ 06:51]    Urinalysis - [06-12-21 @ 00:38]      Color Light Yellow / Appearance Slightly Turbid / SG 1.014 / pH 6.0      Gluc Negative / Ketone Negative  / Bili Negative / Urobili Negative       Blood Trace / Protein 30 mg/dL / Leuk Est Negative / Nitrite Negative      RBC 1 / WBC 5 / Hyaline 7 / Gran 0-2 / Sq Epi  / Non Sq Epi 4 / Bacteria Negative      Iron 155, TIBC 237, %sat 65      [06-12-21 @ 03:36]  Ferritin 827      [06-12-21 @ 04:44]  PTH -- (Ca 9.8)      [06-18-21 @ 10:41]   84  Vitamin D (25OH) 21.6      [06-18-21 @ 10:41]  HbA1c 5.8      [11-13-17 @ 10:29]    HBsAg Nonreact      [06-14-21 @ 11:02]  HBcAb Nonreact      [06-13-21 @ 13:38]  HCV 0.17, Nonreact      [06-14-21 @ 11:02]  HIV Nonreact      [06-13-21 @ 13:52]    PER: titer 1:320, pattern Speckled      [06-17-21 @ 10:30]  C3 Complement 137      [06-17-21 @ 10:30]  C4 Complement 37      [06-17-21 @ 10:30]  ANCA: cANCA Negative, pANCA Negative, atypical ANCA Negative      [06-17-21 @ 10:30]  anti-GBM 2      [06-17-21 @ 13:12]  PLA2R: OMEGA <1.8, IFA --      [06-17-21 @ 13:12]  Immunofixation Serum:   No Monoclonal Band Identified    Reference Range: None Detected      [06-17-21 @ 10:30]

## 2021-07-08 NOTE — PROGRESS NOTE ADULT - ATTENDING COMMENTS
No new complaints  1.  Renal failure--slow improvement.  No hD required  2.  Hypercalcemia--on sensipar

## 2021-07-08 NOTE — PROVIDER CONTACT NOTE (CRITICAL VALUE NOTIFICATION) - ACTION/TREATMENT ORDERED:
Pt called he went to  his script and they said they didn't have it, needs a refill for Rx tiZANidine (ZANAFLEX) 2 MG tablet   please advise, thanks   No new orders at this time.

## 2021-07-08 NOTE — PROGRESS NOTE ADULT - SUBJECTIVE AND OBJECTIVE BOX
Diagnosis: FLT3 ITD (-) Acute Myeloid Leukemia    Protocol/Chemo Regimen: S/p Cycle 1 Decitabine (held on day 3) + Venetoclax     Day: 20     Pt endorsed: no acute complaints     Review of Systems: Denies nausea, vomiting, diarrhea, chest pain, SOB     Pain scale: 0     Diet: Regular     Allergies: morphine (Unknown)    ANTIMICROBIALS  posaconazole Suspension 200 milliGRAM(s) Oral every 8 hours    HEME/ONC MEDICATIONS  venetoclax 100 milliGRAM(s) Oral <User Schedule>    STANDING MEDICATIONS  allopurinol 200 milliGRAM(s) Oral daily  Biotene Dry Mouth Oral Rinse 5 milliLiter(s) Swish and Spit four times a day  chlorhexidine 4% Liquid 1 Application(s) Topical <User Schedule>  cholecalciferol 2000 Unit(s) Oral daily  cinacalcet 30 milliGRAM(s) Oral daily  escitalopram 5 milliGRAM(s) Oral daily  finasteride 5 milliGRAM(s) Oral daily  folic acid 1 milliGRAM(s) Oral daily  isosorbide   mononitrate ER Tablet (IMDUR) 60 milliGRAM(s) Oral daily  lidocaine   Patch 1 Patch Transdermal daily  metoprolol succinate ER 50 milliGRAM(s) Oral daily  polyethylene glycol 3350 17 Gram(s) Oral daily  sodium chloride 0.9%. 1000 milliLiter(s) IV Continuous <Continuous>  tamsulosin 0.4 milliGRAM(s) Oral at bedtime    PRN MEDICATIONS  acetaminophen   Tablet .. 650 milliGRAM(s) Oral every 6 hours PRN  albuterol/ipratropium for Nebulization 3 milliLiter(s) Nebulizer every 6 hours PRN  aluminum hydroxide/magnesium hydroxide/simethicone Suspension 30 milliLiter(s) Oral every 4 hours PRN  calcium carbonate    500 mG (Tums) Chewable 1 Tablet(s) Chew every 4 hours PRN  ondansetron Injectable 8 milliGRAM(s) IV Push every 8 hours PRN  senna 2 Tablet(s) Oral at bedtime PRN  sodium chloride 0.9% lock flush 10 milliLiter(s) IV Push every 1 hour PRN  sucralfate suspension 1 Gram(s) Oral every 6 hours PRN    Vital Signs Last 24 Hrs  T(C): 36.3 (08 Jul 2021 05:41), Max: 36.8 (07 Jul 2021 13:26)  T(F): 97.4 (08 Jul 2021 05:41), Max: 98.2 (07 Jul 2021 13:26)  HR: 84 (08 Jul 2021 05:41) (78 - 93)  BP: 137/68 (08 Jul 2021 05:41) (120/64 - 154/71)  BP(mean): --  RR: 18 (08 Jul 2021 05:41) (17 - 18)  SpO2: 98% (08 Jul 2021 05:41) (95% - 100%)    PHYSICAL EXAM  General:  NAD  HEENT: clear oropharynx, no erythema, no ulcers  CV:  S1, S2, RRR  Lungs: clear to auscultation, no wheezes, no rales  Abdomen: soft, nontender, nondistended, normal BS  Ext: no edema  Skin: no rash  Neuro: alert and oriented x 2-3   Central line: PICC CDI     LABS:                          7.4    1.15  )-----------( 15       ( 08 Jul 2021 07:09 )             22.1         Mean Cell Volume : 88.4 fl  Mean Cell Hemoglobin : 29.6 pg  Mean Cell Hemoglobin Concentration : 33.5 gm/dL  Auto Neutrophil # : 0.22 K/uL  Auto Lymphocyte # : 0.84 K/uL  Auto Monocyte # : 0.07 K/uL  Auto Eosinophil # : 0.01 K/uL  Auto Basophil # : 0.00 K/uL  Auto Neutrophil % : 19.0 %  Auto Lymphocyte % : 73.4 %  Auto Monocyte % : 6.3 %  Auto Eosinophil % : 1.3 %  Auto Basophil % : 0.0 %      07-08    132<L>  |  102  |  26<H>  ----------------------------<  108<H>  3.8   |  17<L>  |  2.18<H>    Ca    8.7      08 Jul 2021 07:09  Phos  3.4     07-08  Mg     1.7     07-08    TPro  6.8  /  Alb  2.6<L>  /  TBili  0.8  /  DBili  x   /  AST  8<L>  /  ALT  8<L>  /  AlkPhos  82  07-08      PT/INR - ( 08 Jul 2021 07:09 )   PT: 15.4 sec;   INR: 1.30 ratio               Uric Acid --      RADIOLOGY & ADDITIONAL STUDIES:  US Abdomen Complete (US Abdomen Complete .) (06.29.21 @ 08:35) >    IMPRESSION:  Cholelithiasis with no definite sonographic evidence of acute cholecystitis.  Mildly dilated common bile duct unchanged. No intrahepatic ductal dilatation.  Bilateral pleural effusions, right greater than left.  Increased echogenicity of the bilateral kidneys with no cortical thinning or hydronephrosis consistent with renal parenchymal disease in the setting of elevated creatinine.   Diagnosis: FLT3 ITD (-) Acute Myeloid Leukemia    Protocol/Chemo Regimen: S/p Cycle 1 Decitabine (held on day 3) + Venetoclax     Day: 20     Pt endorsed: no acute complaints     Review of Systems: Denies nausea, vomiting, diarrhea, chest pain, SOB     Pain scale: 0     Diet: Regular     Allergies: morphine (Unknown)    ANTIMICROBIALS  posaconazole Suspension 200 milliGRAM(s) Oral every 8 hours    HEME/ONC MEDICATIONS  venetoclax 100 milliGRAM(s) Oral <User Schedule>    STANDING MEDICATIONS    Biotene Dry Mouth Oral Rinse 5 milliLiter(s) Swish and Spit four times a day  chlorhexidine 4% Liquid 1 Application(s) Topical <User Schedule>  cholecalciferol 2000 Unit(s) Oral daily  cinacalcet 30 milliGRAM(s) Oral daily  escitalopram 5 milliGRAM(s) Oral daily  finasteride 5 milliGRAM(s) Oral daily  folic acid 1 milliGRAM(s) Oral daily  isosorbide   mononitrate ER Tablet (IMDUR) 60 milliGRAM(s) Oral daily  lidocaine   Patch 1 Patch Transdermal daily  metoprolol succinate ER 50 milliGRAM(s) Oral daily  polyethylene glycol 3350 17 Gram(s) Oral daily  sodium chloride 0.9%. 1000 milliLiter(s) IV Continuous <Continuous>  tamsulosin 0.4 milliGRAM(s) Oral at bedtime    PRN MEDICATIONS  acetaminophen   Tablet .. 650 milliGRAM(s) Oral every 6 hours PRN  albuterol/ipratropium for Nebulization 3 milliLiter(s) Nebulizer every 6 hours PRN  aluminum hydroxide/magnesium hydroxide/simethicone Suspension 30 milliLiter(s) Oral every 4 hours PRN  calcium carbonate    500 mG (Tums) Chewable 1 Tablet(s) Chew every 4 hours PRN  ondansetron Injectable 8 milliGRAM(s) IV Push every 8 hours PRN  senna 2 Tablet(s) Oral at bedtime PRN  sodium chloride 0.9% lock flush 10 milliLiter(s) IV Push every 1 hour PRN  sucralfate suspension 1 Gram(s) Oral every 6 hours PRN    Vital Signs Last 24 Hrs  T(C): 36.3 (08 Jul 2021 05:41), Max: 36.8 (07 Jul 2021 13:26)  T(F): 97.4 (08 Jul 2021 05:41), Max: 98.2 (07 Jul 2021 13:26)  HR: 84 (08 Jul 2021 05:41) (78 - 93)  BP: 137/68 (08 Jul 2021 05:41) (120/64 - 154/71)  BP(mean): --  RR: 18 (08 Jul 2021 05:41) (17 - 18)  SpO2: 98% (08 Jul 2021 05:41) (95% - 100%)    PHYSICAL EXAM  General:  NAD  HEENT: clear oropharynx, no erythema, no ulcers  CV:  S1, S2, RRR  Lungs: clear to auscultation, no wheezes, no rales  Abdomen: soft, nontender, nondistended, normal BS  Ext: no edema  Skin: no rash  Neuro: alert and oriented x 2-3   Central line: PICC CDI     LABS:                          7.4    1.15  )-----------( 15       ( 08 Jul 2021 07:09 )             22.1         Mean Cell Volume : 88.4 fl  Mean Cell Hemoglobin : 29.6 pg  Mean Cell Hemoglobin Concentration : 33.5 gm/dL  Auto Neutrophil # : 0.22 K/uL  Auto Lymphocyte # : 0.84 K/uL  Auto Monocyte # : 0.07 K/uL  Auto Eosinophil # : 0.01 K/uL  Auto Basophil # : 0.00 K/uL  Auto Neutrophil % : 19.0 %  Auto Lymphocyte % : 73.4 %  Auto Monocyte % : 6.3 %  Auto Eosinophil % : 1.3 %  Auto Basophil % : 0.0 %      07-08    132<L>  |  102  |  26<H>  ----------------------------<  108<H>  3.8   |  17<L>  |  2.18<H>    Ca    8.7      08 Jul 2021 07:09  Phos  3.4     07-08  Mg     1.7     07-08    TPro  6.8  /  Alb  2.6<L>  /  TBili  0.8  /  DBili  x   /  AST  8<L>  /  ALT  8<L>  /  AlkPhos  82  07-08      PT/INR - ( 08 Jul 2021 07:09 )   PT: 15.4 sec;   INR: 1.30 ratio               Uric Acid --      RADIOLOGY & ADDITIONAL STUDIES:  US Abdomen Complete (US Abdomen Complete .) (06.29.21 @ 08:35) >    IMPRESSION:  Cholelithiasis with no definite sonographic evidence of acute cholecystitis.  Mildly dilated common bile duct unchanged. No intrahepatic ductal dilatation.  Bilateral pleural effusions, right greater than left.  Increased echogenicity of the bilateral kidneys with no cortical thinning or hydronephrosis consistent with renal parenchymal disease in the setting of elevated creatinine.

## 2021-07-09 NOTE — PROCEDURE NOTE - NSINFORMCONSENT_GEN_A_CORE
HCP/ son niki/Benefits, risks, and possible complications of procedure explained to patient/caregiver who verbalized understanding and gave verbal consent.
Benefits, risks, and possible complications of procedure explained to patient/caregiver who verbalized understanding and gave written consent.
Benefits, risks, and possible complications of procedure explained to patient/caregiver who verbalized understanding and gave written consent.
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
## 2021-07-09 NOTE — PROGRESS NOTE ADULT - PROBLEM SELECTOR PLAN 8
s/p 5vCABG 2012 (LIMA to LAD, SVG to Diag and OM, SVG PDA and RPL), PCI (2 in 2014, 2 in 2015 [facility]), HFrEF s/p CRT-D (2013),  No DAPT or AC given thrombocytopenia.

## 2021-07-09 NOTE — PROCEDURE NOTE - NSPOSTCAREGUIDE_GEN_A_CORE
Verbal/written post procedure instructions were given to patient/caregiver
Verbal/written post procedure instructions were given to patient/caregiver
Verbal/written post procedure instructions were given to patient/caregiver/Instructed patient/caregiver to follow-up with primary care physician/Instructed patient/caregiver regarding signs and symptoms of infection/Keep the cast/splint/dressing clean and dry

## 2021-07-09 NOTE — PROVIDER CONTACT NOTE (CRITICAL VALUE NOTIFICATION) - ACTION/TREATMENT ORDERED:
NP Iliana Lai notified (NP was in another RRT when labs were resulted) LISA Lai notified (NP was in another RRT when labs were resulted). No orders at the moment. Will continue to monitor.
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
## 2021-07-09 NOTE — PROGRESS NOTE ADULT - SUBJECTIVE AND OBJECTIVE BOX
Diagnosis: FLT3 ITD (-) Acute Myeloid Leukemia    Protocol/Chemo Regimen: S/p Cycle 1 Decitabine (held on day 3) + Venetoclax     Day: 21     Pt endorsed: no acute complaints     Review of Systems: Denies nausea, vomiting, diarrhea, chest pain, SOB     Pain scale: 0     Diet: Regular     Allergies    morphine (Unknown)    ANTIMICROBIALS  levoFLOXacin  Tablet 250 milliGRAM(s) Oral every 24 hours  posaconazole Suspension 200 milliGRAM(s) Oral every 8 hours      HEME/ONC MEDICATIONS  venetoclax 100 milliGRAM(s) Oral <User Schedule>      STANDING MEDICATIONS  Biotene Dry Mouth Oral Rinse 5 milliLiter(s) Swish and Spit four times a day  chlorhexidine 4% Liquid 1 Application(s) Topical <User Schedule>  cholecalciferol 2000 Unit(s) Oral daily  cinacalcet 30 milliGRAM(s) Oral daily  escitalopram 5 milliGRAM(s) Oral daily  finasteride 5 milliGRAM(s) Oral daily  folic acid 1 milliGRAM(s) Oral daily  isosorbide   mononitrate ER Tablet (IMDUR) 60 milliGRAM(s) Oral daily  lidocaine   Patch 1 Patch Transdermal daily  metoprolol succinate ER 50 milliGRAM(s) Oral daily  phytonadione   Solution 5 milliGRAM(s) Oral daily  polyethylene glycol 3350 17 Gram(s) Oral daily  sodium chloride 0.9%. 1000 milliLiter(s) IV Continuous <Continuous>  tamsulosin 0.4 milliGRAM(s) Oral at bedtime      PRN MEDICATIONS  acetaminophen   Tablet .. 650 milliGRAM(s) Oral every 6 hours PRN  albuterol/ipratropium for Nebulization 3 milliLiter(s) Nebulizer every 6 hours PRN  aluminum hydroxide/magnesium hydroxide/simethicone Suspension 30 milliLiter(s) Oral every 4 hours PRN  calcium carbonate    500 mG (Tums) Chewable 1 Tablet(s) Chew every 4 hours PRN  ondansetron Injectable 8 milliGRAM(s) IV Push every 8 hours PRN  senna 2 Tablet(s) Oral at bedtime PRN  sodium chloride 0.9% lock flush 10 milliLiter(s) IV Push every 1 hour PRN  sucralfate suspension 1 Gram(s) Oral every 6 hours PRN        Vital Signs Last 24 Hrs  T(C): 36.7 (09 Jul 2021 05:00), Max: 36.8 (08 Jul 2021 13:35)  T(F): 98 (09 Jul 2021 05:00), Max: 98.2 (08 Jul 2021 13:35)  HR: 83 (09 Jul 2021 05:00) (70 - 86)  BP: 124/64 (09 Jul 2021 05:00) (117/57 - 151/71)  BP(mean): --  RR: 18 (09 Jul 2021 05:00) (17 - 18)  SpO2: 98% (09 Jul 2021 05:00) (98% - 100%)    PHYSICAL EXAM  General: NAD  HEENT: PERRLA, EOMOI, clear oropharynx, anicteric sclera, pink conjunctiva  Neck: supple  CV: (+) S1/S2 RRR  Lungs: clear to auscultation, no wheezes or rales  Abdomen: soft, non-tender, non-distended (+) BS  Ext: no clubbing, cyanosis or edema  Skin: no rashes and no petechiae  Neuro: alert and oriented X 3, no focal deficits  Central Line: PICC c/d/i                      Diagnosis: FLT3 ITD (-) Acute Myeloid Leukemia    Protocol/Chemo Regimen: S/p Cycle 1 Decitabine (held on day 3) + Venetoclax     Day: 21     Pt endorsed: no acute complaints     Review of Systems: Denies nausea, vomiting, diarrhea, chest pain, SOB     Pain scale: 0     Diet: Regular     Allergies    morphine (Unknown)    ANTIMICROBIALS  levoFLOXacin  Tablet 250 milliGRAM(s) Oral every 24 hours  posaconazole Suspension 200 milliGRAM(s) Oral every 8 hours      HEME/ONC MEDICATIONS  venetoclax 100 milliGRAM(s) Oral <User Schedule>      STANDING MEDICATIONS  Biotene Dry Mouth Oral Rinse 5 milliLiter(s) Swish and Spit four times a day  chlorhexidine 4% Liquid 1 Application(s) Topical <User Schedule>  cholecalciferol 2000 Unit(s) Oral daily  cinacalcet 30 milliGRAM(s) Oral daily  escitalopram 5 milliGRAM(s) Oral daily  finasteride 5 milliGRAM(s) Oral daily  folic acid 1 milliGRAM(s) Oral daily  isosorbide   mononitrate ER Tablet (IMDUR) 60 milliGRAM(s) Oral daily  lidocaine   Patch 1 Patch Transdermal daily  metoprolol succinate ER 50 milliGRAM(s) Oral daily  phytonadione   Solution 5 milliGRAM(s) Oral daily  polyethylene glycol 3350 17 Gram(s) Oral daily  sodium chloride 0.9%. 1000 milliLiter(s) IV Continuous <Continuous>  tamsulosin 0.4 milliGRAM(s) Oral at bedtime      PRN MEDICATIONS  acetaminophen   Tablet .. 650 milliGRAM(s) Oral every 6 hours PRN  albuterol/ipratropium for Nebulization 3 milliLiter(s) Nebulizer every 6 hours PRN  aluminum hydroxide/magnesium hydroxide/simethicone Suspension 30 milliLiter(s) Oral every 4 hours PRN  calcium carbonate    500 mG (Tums) Chewable 1 Tablet(s) Chew every 4 hours PRN  ondansetron Injectable 8 milliGRAM(s) IV Push every 8 hours PRN  senna 2 Tablet(s) Oral at bedtime PRN  sodium chloride 0.9% lock flush 10 milliLiter(s) IV Push every 1 hour PRN  sucralfate suspension 1 Gram(s) Oral every 6 hours PRN        Vital Signs Last 24 Hrs  T(C): 36.7 (09 Jul 2021 05:00), Max: 36.8 (08 Jul 2021 13:35)  T(F): 98 (09 Jul 2021 05:00), Max: 98.2 (08 Jul 2021 13:35)  HR: 83 (09 Jul 2021 05:00) (70 - 86)  BP: 124/64 (09 Jul 2021 05:00) (117/57 - 151/71)  BP(mean): --  RR: 18 (09 Jul 2021 05:00) (17 - 18)  SpO2: 98% (09 Jul 2021 05:00) (98% - 100%)    PHYSICAL EXAM  General: NAD  HEENT: PERRLA, EOMOI, clear oropharynx, anicteric sclera, pink conjunctiva  Neck: supple  CV: (+) S1/S2 RRR  Lungs: clear to auscultation, no wheezes or rales  Abdomen: soft, non-tender, non-distended (+) BS  Ext: no clubbing, cyanosis or edema  Skin: no rashes and no petechiae  Neuro: alert and oriented X 3, no focal deficits  Central Line: PICC c/d/i     LABS:                          7.2    1.12  )-----------( 11       ( 09 Jul 2021 07:00 )             21.4         Mean Cell Volume : 88.1 fl  Mean Cell Hemoglobin : 29.6 pg  Mean Cell Hemoglobin Concentration : 33.6 gm/dL  Auto Neutrophil # : 0.11 K/uL  Auto Lymphocyte # : 0.86 K/uL  Auto Monocyte # : 0.15 K/uL  Auto Eosinophil # : 0.00 K/uL  Auto Basophil # : 0.00 K/uL  Auto Neutrophil % : 9.8 %  Auto Lymphocyte % : 76.8 %  Auto Monocyte % : 13.4 %  Auto Eosinophil % : 0.0 %  Auto Basophil % : 0.0 %      07-09    131<L>  |  102  |  29<H>  ----------------------------<  102<H>  3.9   |  16<L>  |  2.16<H>    Ca    8.2<L>      09 Jul 2021 07:00  Phos  3.4     07-09  Mg     1.7     07-09    TPro  6.8  /  Alb  2.5<L>  /  TBili  0.8  /  DBili  x   /  AST  10  /  ALT  8<L>  /  AlkPhos  86  07-09      Mg 1.7  Phos 3.4      PT/INR - ( 08 Jul 2021 07:09 )   PT: 15.4 sec;   INR: 1.30 ratio           Uric Acid --

## 2021-07-10 NOTE — PROGRESS NOTE ADULT - ATTENDING COMMENTS
Per Dr Whitfield.   Renal function stable  Downtrending sodium noted  Please check a bladder scan and cxr  If e/o volume overload, may need to restart home dose of lasix      Ryland Quan MD  O: [phone]  C: [phone]

## 2021-07-10 NOTE — PROGRESS NOTE ADULT - PROBLEM SELECTOR PLAN 1
FLT3 ITD (-) AML   Dacogen held on day 3 6/21 due to acute change in mental status now back to baseline. Course will go to day 6 to complete 5 days   Monitor daily CBC's and transfuse as needed, Monitor daily CMP and replete electrolytes as needed   Strict I's/O's, daily weights, mouth care, anti emetics.   s/p Dacogen 20 mg/m2 x 5 days + Venetoclax. (s/p Venetoclax escalation, now on 100 mg oral daily - while on posaconazole)  Day 21 BM bx due on 7/9  Pt is DNR FLT3 ITD (-) AML   Dacogen held on day 3 6/21 due to acute change in mental status now back to baseline. Course will go to day 6 to complete 5 days   Monitor daily CBC's and transfuse as needed, Monitor daily CMP and replete electrolytes as needed   Strict I's/O's, daily weights, mouth care, anti emetics.   s/p Dacogen 20 mg/m2 x 5 days + Venetoclax. (s/p Venetoclax escalation, now on 100 mg oral daily - while on posaconazole)  FU Day 21 BM bx on 7/9  Pt is DNR

## 2021-07-10 NOTE — PROGRESS NOTE ADULT - PROBLEM SELECTOR PLAN 8
s/p 5vCABG 2012 (LIMA to LAD, SVG to Diag and OM, SVG PDA and RPL), PCI (2 in 2014, 2 in 2015 [facility]), HFrEF s/p CRT-D (2013),  No DAPT or AC given thrombocytopenia.

## 2021-07-10 NOTE — PROGRESS NOTE ADULT - PROBLEM SELECTOR PLAN 2
Patient is neutropenic, afebrile   6/30 completed Zosyn   7/5 Started Levaquin for neutropenic prophylaxis as ANC < 500  If febrile, send pan cultures, and switch Levaquin to cefepime  6/21 CXR mid and lower right lung-pna can not be excluded   6/24 Abd US: Cholelithiasis without signs of cholecystitis, persistently dilated CBD; HIDA scan (+) for acute cholecystitis   Seen by Surgery and IR, since patient is asymptomatic and there are no signs of hemodynamic instability   No surgical or IR interventions were recommended. Continue clear liquid diet and antibiotics  6/28  US abdomen Cholelithiasis with no definite sonographic evidence of acute cholecystitis.  7/3 Started posaconazole prophylaxis as patient is neutropenic. Adjusted dose of venetoclax. Refused dose of posaconazole. Changed to suspension on 7/4 Patient is neutropenic, afebrile   6/30 completed Zosyn   7/5 Started Levaquin for neutropenic prophylaxis as ANC < 500  If febrile, send pan cultures, and switch Levaquin to cefepime  6/24 Abd US: Cholelithiasis without signs of cholecystitis, persistently dilated CBD; HIDA scan (+) for acute cholecystitis   Seen by Surgery and IR, since patient is asymptomatic and there are no signs of hemodynamic instability   No surgical or IR interventions were recommended.  6/28  US abdomen Cholelithiasis with no definite sonographic evidence of acute cholecystitis.  7/3 Started posaconazole prophylaxis as patient is neutropenic. Adjusted dose of venetoclax.
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
## 2021-07-10 NOTE — PROGRESS NOTE ADULT - ASSESSMENT
80M PMHx CKD, CAD s/p CABG 2012 & 4 stents (2 in 2014, 2 in 2015) with dilated EF of 27% BiV ICD (2013) initially admitted to [facility] cor acute hypoxic respiratory failure, anemia (Hgb 5.7), thrombocytopenia (plt 30) w/ blast cells (22%), lactic acidosis (LA 10) - transfer to [facility] for hematological evaluation for ALL.  Nephrology consulted for BRISA on CKD.      # BRISA on CKD  Pt with non-oliguric BRISA on CKD unclear etiology possible pre renal in the setting of anemia and ATN.  On admission sCr elevated at 3.45 (last known sCr 1.1-1.3 in 2018, recent hx CKD unclear recent baseline).  Urinalysis showed protein with trace blood.  Lab noted for serum uric acid 13.3, , phos 4.7, Echo severe LV systolic fxn, CT diffuse patchy airspace disease ?multifocal pna?. Urine electrolytes with Fe Urea of 82.5% suggestive of intrinsic pathology. Urine uric acid/creatinine ratio is < 1. Spot urine TP/CR ratio was 0.25 wnl. Kidney/bladder u/s on this admission was wnl (no hydro). Scr peaked at 3.62 mg/dl on 6/13/21 with downward trend now plateauing. Last sCr increased to 2.16 mg/dl today     - Continue allopurinol 200 mg PO daily   - PER with 1:320 but speckled pattern   - GN work up including c3 and C4 wnl, serum immunofixation with no monoclonality, anti GBM negative. Parvovirus DNA PCR negative. P ANCA and C ANCA negative, anti PLA2R negative  - Pt will need a kidney biopsy if his platelets are stable. Currently Plts 10K & Hg7.0. Pt will need plts of > 50K for biopsy. Please call IR for scheduling the renal biopsy. Will benefit from a platelet & PRBC transfusion prior.   - monitor BMP/tumor lysis markers (Uric acid/LDH/haptoglobin/LFT), strict I/O, avoid nephrotoxic agents (NSAIDs, PPI, contrast), renally dose medications per GFR.    # Hypercalcemia   Uncertain etiology. Last ionized calcium improved to 1.17 today   PTH was 84, not appropriately suppressed with low vit D of 21.6. Could be primary hyperparathyroidism  Continue Sensipar 30 mg PO daily   monitor ionized calcium daily      If you have any questions, please feel free to contact me  Mary Whitfield  Nephrology Fellow  Pager NS: [phone]/ [facility]: [phone]    (After 5 pm or on weekends please page the on-call fellow, can check [email or web address] for schedule. Login is jean-pierre cortez, schedule under [facility] medicine, psych, derm)   80M PMHx CKD, CAD s/p CABG 2012 & 4 stents (2 in 2014, 2 in 2015) with dilated EF of 27% BiV ICD (2013) initially admitted to [facility] cor acute hypoxic respiratory failure, anemia (Hgb 5.7), thrombocytopenia (plt 30) w/ blast cells (22%), lactic acidosis (LA 10) - transfer to [facility] for hematological evaluation for ALL.  Nephrology consulted for BRISA on CKD.      # BRISA on CKD  Pt with non-oliguric BRISA on CKD unclear etiology possible pre renal in the setting of anemia and ATN.  On admission sCr elevated at 3.45 (last known sCr 1.1-1.3 in 2018, recent hx CKD unclear recent baseline).  Urinalysis showed protein with trace blood.  Lab noted for serum uric acid 13.3, , phos 4.7, Echo severe LV systolic fxn, CT diffuse patchy airspace disease ?multifocal pna?. Urine electrolytes with Fe Urea of 82.5% suggestive of intrinsic pathology. Urine uric acid/creatinine ratio is < 1. Spot urine TP/CR ratio was 0.25 wnl. Kidney/bladder u/s on this admission was wnl (no hydro). Scr peaked at 3.62 mg/dl on 6/13/21 with downward trend now plateauing. Last sCr increased to 2.16 mg/dl today     - Continue allopurinol 200 mg PO daily   - PER with 1:320 but speckled pattern   - GN work up including c3 and C4 wnl, serum immunofixation with no monoclonality, anti GBM negative. Parvovirus DNA PCR negative. P ANCA and C ANCA negative, anti PLA2R negative  - Pt will need a kidney biopsy if his platelets are stable. Currently Plts 10K & Hg7.0. Pt will need plts of > 50K for biopsy. Please call IR for scheduling the renal biopsy. Will benefit from a platelet & PRBC transfusion prior.   - monitor BMP/tumor lysis markers (Uric acid/LDH/haptoglobin/LFT), strict I/O, avoid nephrotoxic agents (NSAIDs, PPI, contrast), renally dose medications per GFR.    # Hypercalcemia   Uncertain etiology. Last ionized calcium improved to 1.17 today   PTH was 84, not appropriately suppressed with low 25 vit D & 1,25 vitamin D.  Continue vitamin d supplements. Could be secondary hyperparathyroidism from vitamin D def & CKD.   Continue Sensipar 30 mg PO daily   monitor ionized calcium daily      If you have any questions, please feel free to contact me  Mary Whitfield  Nephrology Fellow  Pager NS: [phone]/ [facility]: [phone]    (After 5 pm or on weekends please page the on-call fellow, can check [email or web address] for schedule. Login is jean-pierre cortez, schedule under [facility] medicine, psych, derm)   80M PMHx CKD, CAD s/p CABG 2012 & 4 stents (2 in 2014, 2 in 2015) with dilated EF of 27% BiV ICD (2013) initially admitted to [facility] cor acute hypoxic respiratory failure, anemia (Hgb 5.7), thrombocytopenia (plt 30) w/ blast cells (22%), lactic acidosis (LA 10) - transfer to [facility] for hematological evaluation for ALL.  Nephrology consulted for BRISA on CKD.      # BRISA on CKD  Pt with non-oliguric BRISA on CKD unclear etiology possible pre renal in the setting of anemia and ATN.  On admission sCr elevated at 3.45 (last known sCr 1.1-1.3 in 2018, recent hx CKD unclear recent baseline).  Urinalysis showed protein with trace blood.  Lab noted for serum uric acid 13.3, , phos 4.7, Echo severe LV systolic fxn, CT diffuse patchy airspace disease ?multifocal pna?. Urine electrolytes with Fe Urea of 82.5% suggestive of intrinsic pathology. Urine uric acid/creatinine ratio is < 1. Spot urine TP/CR ratio was 0.25 wnl. Kidney/bladder u/s on this admission was wnl (no hydro). Scr peaked at 3.62 mg/dl on 6/13/21 with downward trend now plateauing. Last sCr stable at  2.16 mg/dl today     - Continue allopurinol 200 mg PO daily   - PER with 1:320 but speckled pattern   - GN work up including c3 and C4 wnl, serum immunofixation with no monoclonality, anti GBM negative. Parvovirus DNA PCR negative. P ANCA and C ANCA negative, anti PLA2R negative  - Pt will need a kidney biopsy if his platelets are stable. Currently Plts 10K & Hg7.0. Pt will need plts of > 50K for biopsy. Please call IR for scheduling the renal biopsy. Will benefit from a platelet & PRBC transfusion prior.   - monitor BMP/tumor lysis markers (Uric acid/LDH/haptoglobin/LFT), strict I/O, avoid nephrotoxic agents (NSAIDs, PPI, contrast), renally dose medications per GFR.    # Hypercalcemia   Uncertain etiology. Last ionized calcium improved to 1.17 today   PTH was 84, not appropriately suppressed with low 25 vit D & 1,25 vitamin D.  Continue vitamin d supplements. Could be secondary hyperparathyroidism from vitamin D def & CKD.   Continue Sensipar 30 mg PO daily   monitor ionized calcium daily      If you have any questions, please feel free to contact me  Mary Whitfield  Nephrology Fellow  Pager NS: [phone]/ [facility]: [phone]    (After 5 pm or on weekends please page the on-call fellow, can check [email or web address] for schedule. Login is jean-pierre cortez, schedule under [facility] medicine, psych, derm)

## 2021-07-10 NOTE — PROGRESS NOTE ADULT - SUBJECTIVE AND OBJECTIVE BOX
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 Labs & vitals reviewed. Pt. endorses some shortness of breath over the last few days, not requiring supplemental O2. Pt.' s creatinine has been trending up. UO in the last 24 hours  625cc        PAST HISTORY  --------------------------------------------------------------------------------  No significant changes to PMH, PSH, FHx, SHx, unless otherwise noted    ALLERGIES & MEDICATIONS  --------------------------------------------------------------------------------  Allergies    morphine (Unknown)    Intolerances      Standing Inpatient Medications  Biotene Dry Mouth Oral Rinse 5 milliLiter(s) Swish and Spit four times a day  chlorhexidine 4% Liquid 1 Application(s) Topical <User Schedule>  cholecalciferol 2000 Unit(s) Oral daily  cinacalcet 30 milliGRAM(s) Oral daily  escitalopram 5 milliGRAM(s) Oral daily  finasteride 5 milliGRAM(s) Oral daily  folic acid 1 milliGRAM(s) Oral daily  heparin   Injectable 5000 Unit(s) IV Push once  isosorbide   mononitrate ER Tablet (IMDUR) 60 milliGRAM(s) Oral daily  levoFLOXacin  Tablet 250 milliGRAM(s) Oral every 24 hours  lidocaine   Patch 1 Patch Transdermal daily  metoprolol succinate ER 50 milliGRAM(s) Oral daily  phytonadione   Solution 5 milliGRAM(s) Oral daily  polyethylene glycol 3350 17 Gram(s) Oral daily  posaconazole Suspension 200 milliGRAM(s) Oral every 8 hours  sodium chloride 0.9%. 1000 milliLiter(s) IV Continuous <Continuous>  tamsulosin 0.4 milliGRAM(s) Oral at bedtime  venetoclax 100 milliGRAM(s) Oral <User Schedule>    PRN Inpatient Medications  acetaminophen   Tablet .. 650 milliGRAM(s) Oral every 6 hours PRN  albuterol/ipratropium for Nebulization 3 milliLiter(s) Nebulizer every 6 hours PRN  aluminum hydroxide/magnesium hydroxide/simethicone Suspension 30 milliLiter(s) Oral every 4 hours PRN  calcium carbonate    500 mG (Tums) Chewable 1 Tablet(s) Chew every 4 hours PRN  ondansetron Injectable 8 milliGRAM(s) IV Push every 8 hours PRN  senna 2 Tablet(s) Oral at bedtime PRN  sodium chloride 0.9% lock flush 10 milliLiter(s) IV Push every 1 hour PRN  sucralfate suspension 1 Gram(s) Oral every 6 hours PRN      REVIEW OF SYSTEMS  --------------------------------------------------------------------------------  Gen: No  fevers/chills  Respiratory: + dyspnea, no cough  CV: No chest pain  GI: No abdominal pain, diarrhea,  nausea, vomiting  : No increased frequency, dysuria, hematuria  MSK:  no edema  Neuro: No dizziness/lightheadedness      All other systems were reviewed and are negative, except as noted.    VITALS/PHYSICAL EXAM  --------------------------------------------------------------------------------  T(C): 37.1 (07-10-21 @ 09:10), Max: 37.1 (07-09-21 @ 14:00)  HR: 61 (07-10-21 @ 09:10) (61 - 86)  BP: 133/62 (07-10-21 @ 09:10) (115/63 - 135/71)  RR: 18 (07-10-21 @ 09:10) (17 - 18)  SpO2: 95% (07-10-21 @ 09:10) (95% - 100%)  Wt(kg): --        07-09-21 @ 07:01  -  07-10-21 @ 07:00  --------------------------------------------------------  IN: 827 mL / OUT: 625 mL / NET: 202 mL    07-10-21 @ 07:01  -  07-10-21 @ 11:54  --------------------------------------------------------  IN: 125 mL / OUT: 0 mL / NET: 125 mL      Physical Exam:  	Gen: NAD  	HEENT: MMM  	Pulm: CTA B/L  	CV: S1S2  	Abd: Soft, +BS   	Ext: No LE edema B/L  	Neuro: Awake  	Skin: Warm and dry          LABS/STUDIES  --------------------------------------------------------------------------------              7.0    1.21  >-----------<  10       [07-10-21 @ 07:24]              21.2     128  |  100  |  29  ----------------------------<  97      [07-10-21 @ 07:21]  4.1   |  18  |  2.16        Ca     8.6     [07-10-21 @ 07:21]      iCa    1.17     [07-10 @ 07:30]      Mg     1.8     [07-10-21 @ 07:21]      Phos  3.5     [07-10-21 @ 07:21]    TPro  7.0  /  Alb  2.8  /  TBili  0.8  /  DBili  x   /  AST  8   /  ALT  9   /  AlkPhos  86  [07-10-21 @ 07:21]              [07-10-21 @ 07:21]    Creatinine Trend:  SCr 2.16 [07-10 @ 07:21]  SCr 2.16 [07-09 @ 07:00]  SCr 2.18 [07-08 @ 07:09]  SCr 2.07 [07-07 @ 07:02]  SCr 2.03 [07-06 @ 07:01]
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
## 2021-07-10 NOTE — PROGRESS NOTE ADULT - SUBJECTIVE AND OBJECTIVE BOX
Diagnosis: FLT3 ITD (-) Acute Myeloid Leukemia    Protocol/Chemo Regimen: S/p Cycle 1 Decitabine (held on day 3) + Venetoclax     Day: 22     Pt endorsed: no acute complaints     Review of Systems: Denies nausea, vomiting, diarrhea, chest pain, SOB     Pain scale: 0     Diet: Regular   Allergies    morphine (Unknown)    Intolerances        ANTIMICROBIALS  levoFLOXacin  Tablet 250 milliGRAM(s) Oral every 24 hours  posaconazole Suspension 200 milliGRAM(s) Oral every 8 hours      HEME/ONC MEDICATIONS  heparin   Injectable 5000 Unit(s) IV Push once  venetoclax 100 milliGRAM(s) Oral <User Schedule>      STANDING MEDICATIONS  Biotene Dry Mouth Oral Rinse 5 milliLiter(s) Swish and Spit four times a day  chlorhexidine 4% Liquid 1 Application(s) Topical <User Schedule>  cholecalciferol 2000 Unit(s) Oral daily  cinacalcet 30 milliGRAM(s) Oral daily  escitalopram 5 milliGRAM(s) Oral daily  finasteride 5 milliGRAM(s) Oral daily  folic acid 1 milliGRAM(s) Oral daily  isosorbide   mononitrate ER Tablet (IMDUR) 60 milliGRAM(s) Oral daily  lidocaine   Patch 1 Patch Transdermal daily  metoprolol succinate ER 50 milliGRAM(s) Oral daily  phytonadione   Solution 5 milliGRAM(s) Oral daily  polyethylene glycol 3350 17 Gram(s) Oral daily  sodium chloride 0.9%. 1000 milliLiter(s) IV Continuous <Continuous>  tamsulosin 0.4 milliGRAM(s) Oral at bedtime      PRN MEDICATIONS  acetaminophen   Tablet .. 650 milliGRAM(s) Oral every 6 hours PRN  albuterol/ipratropium for Nebulization 3 milliLiter(s) Nebulizer every 6 hours PRN  aluminum hydroxide/magnesium hydroxide/simethicone Suspension 30 milliLiter(s) Oral every 4 hours PRN  calcium carbonate    500 mG (Tums) Chewable 1 Tablet(s) Chew every 4 hours PRN  ondansetron Injectable 8 milliGRAM(s) IV Push every 8 hours PRN  senna 2 Tablet(s) Oral at bedtime PRN  sodium chloride 0.9% lock flush 10 milliLiter(s) IV Push every 1 hour PRN  sucralfate suspension 1 Gram(s) Oral every 6 hours PRN        Vital Signs Last 24 Hrs  T(C): 36.8 (10 Jul 2021 05:00), Max: 37.1 (09 Jul 2021 14:00)  T(F): 98.2 (10 Jul 2021 05:00), Max: 98.8 (09 Jul 2021 14:00)  HR: 81 (10 Jul 2021 05:00) (73 - 86)  BP: 135/71 (10 Jul 2021 05:00) (115/63 - 135/71)  BP(mean): --  RR: 18 (10 Jul 2021 05:00) (17 - 18)  SpO2: 98% (10 Jul 2021 05:00) (97% - 100%)    PHYSICAL EXAM  General: NAD  HEENT: PERRLA, EOMOI, clear oropharynx, anicteric sclera, pink conjunctiva  Neck: supple  CV: (+) S1/S2 RRR  Lungs: clear to auscultation, no wheezes or rales  Abdomen: soft, non-tender, non-distended (+) BS  Ext: no clubbing, cyanosis or edema  Skin: no rashes and no petechiae  Neuro: alert and oriented X 3, no focal deficits  Central Line: PICC c/d/i       LABS: Diagnosis: FLT3 ITD (-) Acute Myeloid Leukemia    Protocol/Chemo Regimen: S/p Cycle 1 Decitabine (held on day 3) + Venetoclax     Day: 22     Pt endorsed: no acute complaints     Review of Systems: Denies nausea, vomiting, diarrhea, chest pain, SOB     Pain scale: 0     Diet: Regular   Allergies    morphine (Unknown)    Intolerances        ANTIMICROBIALS  levoFLOXacin  Tablet 250 milliGRAM(s) Oral every 24 hours  posaconazole Suspension 200 milliGRAM(s) Oral every 8 hours      HEME/ONC MEDICATIONS  heparin   Injectable 5000 Unit(s) IV Push once  venetoclax 100 milliGRAM(s) Oral <User Schedule>      STANDING MEDICATIONS  Biotene Dry Mouth Oral Rinse 5 milliLiter(s) Swish and Spit four times a day  chlorhexidine 4% Liquid 1 Application(s) Topical <User Schedule>  cholecalciferol 2000 Unit(s) Oral daily  cinacalcet 30 milliGRAM(s) Oral daily  escitalopram 5 milliGRAM(s) Oral daily  finasteride 5 milliGRAM(s) Oral daily  folic acid 1 milliGRAM(s) Oral daily  isosorbide   mononitrate ER Tablet (IMDUR) 60 milliGRAM(s) Oral daily  lidocaine   Patch 1 Patch Transdermal daily  metoprolol succinate ER 50 milliGRAM(s) Oral daily  phytonadione   Solution 5 milliGRAM(s) Oral daily  polyethylene glycol 3350 17 Gram(s) Oral daily  sodium chloride 0.9%. 1000 milliLiter(s) IV Continuous <Continuous>  tamsulosin 0.4 milliGRAM(s) Oral at bedtime      PRN MEDICATIONS  acetaminophen   Tablet .. 650 milliGRAM(s) Oral every 6 hours PRN  albuterol/ipratropium for Nebulization 3 milliLiter(s) Nebulizer every 6 hours PRN  aluminum hydroxide/magnesium hydroxide/simethicone Suspension 30 milliLiter(s) Oral every 4 hours PRN  calcium carbonate    500 mG (Tums) Chewable 1 Tablet(s) Chew every 4 hours PRN  ondansetron Injectable 8 milliGRAM(s) IV Push every 8 hours PRN  senna 2 Tablet(s) Oral at bedtime PRN  sodium chloride 0.9% lock flush 10 milliLiter(s) IV Push every 1 hour PRN  sucralfate suspension 1 Gram(s) Oral every 6 hours PRN        Vital Signs Last 24 Hrs  T(C): 36.8 (10 Jul 2021 05:00), Max: 37.1 (09 Jul 2021 14:00)  T(F): 98.2 (10 Jul 2021 05:00), Max: 98.8 (09 Jul 2021 14:00)  HR: 81 (10 Jul 2021 05:00) (73 - 86)  BP: 135/71 (10 Jul 2021 05:00) (115/63 - 135/71)  BP(mean): --  RR: 18 (10 Jul 2021 05:00) (17 - 18)  SpO2: 98% (10 Jul 2021 05:00) (97% - 100%)    PHYSICAL EXAM  General: NAD  HEENT: PERRLA, EOMOI,  Lungs: clear to auscultation, no wheezes or rales  Abdomen: soft, non-tender, non-distended (+) BS  Ext: no edema  Skin: no rashes and no petechiae  Neuro: alert and oriented X 3, no focal deficits  Central Line: PICC c/d/i       LABS:    Blood Cultures:                           7.0    1.21  )-----------( 10       ( 10 Jul 2021 07:24 )             21.2         Mean Cell Volume : 89.1 fl  Mean Cell Hemoglobin : 29.4 pg  Mean Cell Hemoglobin Concentration : 33.0 gm/dL  Auto Neutrophil # : 0.14 K/uL  Auto Lymphocyte # : 0.92 K/uL  Auto Monocyte # : 0.16 K/uL  Auto Eosinophil # : 0.00 K/uL  Auto Basophil # : 0.00 K/uL  Auto Neutrophil % : 10.4 %  Auto Lymphocyte % : 75.7 %  Auto Monocyte % : 13.0 %  Auto Eosinophil % : 0.0 %  Auto Basophil % : 0.0 %      07-10    128<L>  |  100  |  29<H>  ----------------------------<  97  4.1   |  18<L>  |  2.16<H>    Ca    8.6      10 Jul 2021 07:21  Phos  3.5     07-10  Mg     1.8     07-10    TPro  7.0  /  Alb  2.8<L>  /  TBili  0.8  /  DBili  x   /  AST  8<L>  /  ALT  9<L>  /  AlkPhos  86  07-10      Mg 1.8  Phos 3.5      Uric Acid --

## 2021-07-10 NOTE — PROGRESS NOTE ADULT - PROBLEM SELECTOR PLAN 6
Nephrology following-No indication for HD at this time.   6/15 Normal renal ultrasound  monitor for TLS.  follow ionized calcium daily as per nephrology  6/24 on Sensipar for hypercalcemia   follow up Ionized calcium levels daily  Vitamin D 2000 daily.  Renal is following Nephrology following-No indication for HD at this time.   6/15 Normal renal ultrasound  monitor for TLS.  follow ionized calcium daily as per nephrology  6/24 on Sensipar for hypercalcemia   Vitamin D 2000 daily.  7/10 restart home Lasix 40mg daily

## 2021-07-11 NOTE — PROGRESS NOTE ADULT - PROBLEM SELECTOR PLAN 2
Patient is neutropenic, afebrile   6/30 completed Zosyn   7/5 Started Levaquin for neutropenic prophylaxis as ANC < 500  If febrile, send pan cultures, and switch Levaquin to cefepime  6/24 Abd US: Cholelithiasis without signs of cholecystitis, persistently dilated CBD; HIDA scan (+) for acute cholecystitis   Seen by Surgery and IR, since patient is asymptomatic and there are no signs of hemodynamic instability   No surgical or IR interventions were recommended.  6/28  US abdomen Cholelithiasis with no definite sonographic evidence of acute cholecystitis.  7/3 Started posaconazole prophylaxis as patient is neutropenic. Adjusted dose of venetoclax. Patient is neutropenic, afebrile   6/30 completed Zosyn   7/5 Started Levaquin for neutropenic prophylaxis as ANC < 500  If febrile, send pan cultures, and switch Levaquin to cefepime  6/24 Abd US: Cholelithiasis without signs of cholecystitis, persistently dilated CBD; HIDA scan (+) for acute cholecystitis   Seen by Surgery and IR, since patient is asymptomatic and there are no signs of hemodynamic instability   No surgical or IR interventions were recommended.  6/28  US abdomen Cholelithiasis with no definite sonographic evidence of acute cholecystitis.  7/3 Started posaconazole prophylaxis as patient is neutropenic. Adjusted dose of venetoclax.  7/10 CXR vascular congestion

## 2021-07-11 NOTE — PROGRESS NOTE ADULT - PROBLEM SELECTOR PLAN 6
Nephrology following-No indication for HD at this time.   6/15 Normal renal ultrasound  monitor for TLS.  follow ionized calcium daily as per nephrology  6/24 on Sensipar for hypercalcemia   Vitamin D 2000 daily.  7/10 restart home Lasix 40mg daily
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
## 2021-07-11 NOTE — PROGRESS NOTE ADULT - SUBJECTIVE AND OBJECTIVE BOX
Diagnosis: FLT3 ITD (-) Acute Myeloid Leukemia    Protocol/Chemo Regimen: S/p Cycle 1 Decitabine (held on day 3) + Venetoclax     Day: 23     Pt endorsed: no acute complaints     Review of Systems: Denies nausea, vomiting, diarrhea, chest pain, SOB     Pain scale: 0     Diet: Regular   Allergies    morphine (Unknown)    Intolerances        ANTIMICROBIALS  levoFLOXacin  Tablet 250 milliGRAM(s) Oral every 24 hours  posaconazole Suspension 200 milliGRAM(s) Oral every 8 hours      HEME/ONC MEDICATIONS  heparin   Injectable 5000 Unit(s) IV Push once  venetoclax 100 milliGRAM(s) Oral <User Schedule>      STANDING MEDICATIONS  Biotene Dry Mouth Oral Rinse 5 milliLiter(s) Swish and Spit four times a day  chlorhexidine 4% Liquid 1 Application(s) Topical <User Schedule>  cholecalciferol 2000 Unit(s) Oral daily  cinacalcet 30 milliGRAM(s) Oral daily  escitalopram 5 milliGRAM(s) Oral daily  finasteride 5 milliGRAM(s) Oral daily  folic acid 1 milliGRAM(s) Oral daily  isosorbide   mononitrate ER Tablet (IMDUR) 60 milliGRAM(s) Oral daily  lidocaine   Patch 1 Patch Transdermal daily  metoprolol succinate ER 50 milliGRAM(s) Oral daily  phytonadione   Solution 5 milliGRAM(s) Oral daily  polyethylene glycol 3350 17 Gram(s) Oral daily  sodium chloride 0.9%. 1000 milliLiter(s) IV Continuous <Continuous>  tamsulosin 0.4 milliGRAM(s) Oral at bedtime      PRN MEDICATIONS  acetaminophen   Tablet .. 650 milliGRAM(s) Oral every 6 hours PRN  albuterol/ipratropium for Nebulization 3 milliLiter(s) Nebulizer every 6 hours PRN  aluminum hydroxide/magnesium hydroxide/simethicone Suspension 30 milliLiter(s) Oral every 4 hours PRN  calcium carbonate    500 mG (Tums) Chewable 1 Tablet(s) Chew every 4 hours PRN  ondansetron Injectable 8 milliGRAM(s) IV Push every 8 hours PRN  senna 2 Tablet(s) Oral at bedtime PRN  sodium chloride 0.9% lock flush 10 milliLiter(s) IV Push every 1 hour PRN  sucralfate suspension 1 Gram(s) Oral every 6 hours PRN      Vital Signs Last 24 Hrs  T(C): 35.9 (11 Jul 2021 05:34), Max: 36.9 (10 Jul 2021 12:52)  T(F): 96.6 (11 Jul 2021 05:34), Max: 98.5 (10 Jul 2021 12:52)  HR: 83 (11 Jul 2021 05:34) (72 - 83)  BP: 133/64 (11 Jul 2021 05:34) (101/62 - 133/64)  BP(mean): --  RR: 18 (11 Jul 2021 05:34) (16 - 18)  SpO2: 97% (11 Jul 2021 05:34) (97% - 99%)    PHYSICAL EXAM  General: NAD  HEENT- No oral ulceration or erythema  Lungs: clear to auscultation, no wheezes or rales  Abdomen: soft, non-tender, non-distended (+) BS  Ext: no edema  Skin: no rash  Neuro: alert and oriented X 2-3   Central Line: PICC c/d/i     LABS:                          6.6    1.29  )-----------( 7        ( 11 Jul 2021 06:49 )             19.7         Mean Cell Volume : 88.3 fl  Mean Cell Hemoglobin : 29.6 pg  Mean Cell Hemoglobin Concentration : 33.5 gm/dL  Auto Neutrophil # : 0.16 K/uL  Auto Lymphocyte # : 1.01 K/uL  Auto Monocyte # : 0.11 K/uL  Auto Eosinophil # : 0.00 K/uL  Auto Basophil # : 0.00 K/uL  Auto Neutrophil % : 12.2 %  Auto Lymphocyte % : 78.1 %  Auto Monocyte % : 8.5 %  Auto Eosinophil % : 0.0 %  Auto Basophil % : 0.0 %      07-11    130<L>  |  101  |  33<H>  ----------------------------<  93  3.9   |  19<L>  |  2.29<H>    Ca    8.2<L>      11 Jul 2021 06:50  Phos  3.6     07-11  Mg     1.7     07-11    TPro  6.7  /  Alb  2.5<L>  /  TBili  0.8  /  DBili  x   /  AST  9<L>  /  ALT  8<L>  /  AlkPhos  83  07-11      Uric Acid --                                     Diagnosis: FLT3 ITD (-) Acute Myeloid Leukemia    Protocol/Chemo Regimen: S/p Cycle 1 Decitabine (held on day 3) + Venetoclax     Day: 23     Pt endorsed: no acute complaints     Review of Systems: Denies nausea, vomiting, diarrhea, chest pain, SOB     Pain scale: 0     Diet: Regular   Allergies    morphine (Unknown)    Intolerances        ANTIMICROBIALS  levoFLOXacin  Tablet 250 milliGRAM(s) Oral every 24 hours  posaconazole Suspension 200 milliGRAM(s) Oral every 8 hours      HEME/ONC MEDICATIONS  heparin   Injectable 5000 Unit(s) IV Push once  venetoclax 100 milliGRAM(s) Oral <User Schedule>      STANDING MEDICATIONS  Biotene Dry Mouth Oral Rinse 5 milliLiter(s) Swish and Spit four times a day  chlorhexidine 4% Liquid 1 Application(s) Topical <User Schedule>  cholecalciferol 2000 Unit(s) Oral daily  cinacalcet 30 milliGRAM(s) Oral daily  escitalopram 5 milliGRAM(s) Oral daily  finasteride 5 milliGRAM(s) Oral daily  folic acid 1 milliGRAM(s) Oral daily  isosorbide   mononitrate ER Tablet (IMDUR) 60 milliGRAM(s) Oral daily  lidocaine   Patch 1 Patch Transdermal daily  metoprolol succinate ER 50 milliGRAM(s) Oral daily  phytonadione   Solution 5 milliGRAM(s) Oral daily  polyethylene glycol 3350 17 Gram(s) Oral daily  sodium chloride 0.9%. 1000 milliLiter(s) IV Continuous <Continuous>  tamsulosin 0.4 milliGRAM(s) Oral at bedtime      PRN MEDICATIONS  acetaminophen   Tablet .. 650 milliGRAM(s) Oral every 6 hours PRN  albuterol/ipratropium for Nebulization 3 milliLiter(s) Nebulizer every 6 hours PRN  aluminum hydroxide/magnesium hydroxide/simethicone Suspension 30 milliLiter(s) Oral every 4 hours PRN  calcium carbonate    500 mG (Tums) Chewable 1 Tablet(s) Chew every 4 hours PRN  ondansetron Injectable 8 milliGRAM(s) IV Push every 8 hours PRN  senna 2 Tablet(s) Oral at bedtime PRN  sodium chloride 0.9% lock flush 10 milliLiter(s) IV Push every 1 hour PRN  sucralfate suspension 1 Gram(s) Oral every 6 hours PRN      Vital Signs Last 24 Hrs  T(C): 35.9 (11 Jul 2021 05:34), Max: 36.9 (10 Jul 2021 12:52)  T(F): 96.6 (11 Jul 2021 05:34), Max: 98.5 (10 Jul 2021 12:52)  HR: 83 (11 Jul 2021 05:34) (72 - 83)  BP: 133/64 (11 Jul 2021 05:34) (101/62 - 133/64)  BP(mean): --  RR: 18 (11 Jul 2021 05:34) (16 - 18)  SpO2: 97% (11 Jul 2021 05:34) (97% - 99%)    PHYSICAL EXAM  General: NAD  HEENT- No oral ulceration or erythema  Lungs: clear to auscultation, no wheezes or rales  Abdomen: soft, non-tender, non-distended (+) BS  Ext: no edema  Skin: no rash  Neuro: alert and oriented X 2-3   Central Line: PICC c/d/i     LABS:                          6.6    1.29  )-----------( 7        ( 11 Jul 2021 06:49 )             19.7         Mean Cell Volume : 88.3 fl  Mean Cell Hemoglobin : 29.6 pg  Mean Cell Hemoglobin Concentration : 33.5 gm/dL  Auto Neutrophil # : 0.16 K/uL  Auto Lymphocyte # : 1.01 K/uL  Auto Monocyte # : 0.11 K/uL  Auto Eosinophil # : 0.00 K/uL  Auto Basophil # : 0.00 K/uL  Auto Neutrophil % : 12.2 %  Auto Lymphocyte % : 78.1 %  Auto Monocyte % : 8.5 %  Auto Eosinophil % : 0.0 %  Auto Basophil % : 0.0 %      07-11    130<L>  |  101  |  33<H>  ----------------------------<  93  3.9   |  19<L>  |  2.29<H>    Ca    8.2<L>      11 Jul 2021 06:50  Phos  3.6     07-11  Mg     1.7     07-11    TPro  6.7  /  Alb  2.5<L>  /  TBili  0.8  /  DBili  x   /  AST  9<L>  /  ALT  8<L>  /  AlkPhos  83  07-11      Uric Acid --    Radiology    Xray Chest 1 View- PORTABLE-Urgent (Xray Chest 1 View- PORTABLE-Urgent .) (07.10.21 @ 15:53) >  AP chest. Prior 6/21/2021.     Left cardiac pacer. Median sternotomy. No change heart mediastinum. Vascular congestionbibasilar interstitial edema and small hazy effusions are similar to prior. No definite focal infiltrate.    IMPRESSION: Constellation of findings compatible with CHF.

## 2021-07-11 NOTE — PROGRESS NOTE ADULT - PROBLEM SELECTOR PLAN 8
s/p 5vCABG 2012 (LIMA to LAD, SVG to Diag and OM, SVG PDA and RPL), PCI (2 in 2014, 2 in 2015 [facility]), HFrEF s/p CRT-D (2013),  No DAPT or AC given thrombocytopenia.

## 2021-07-11 NOTE — PROGRESS NOTE ADULT - PROBLEM SELECTOR PLAN 1
FLT3 ITD (-) AML   Dacogen held on day 3 6/21 due to acute change in mental status now back to baseline. Course will go to day 6 to complete 5 days   Monitor daily CBC's and transfuse as needed, Monitor daily CMP and replete electrolytes as needed   Anemia PRBC x 1  Thrombocytopenia - Platelet x 1 unit   Strict I's/O's, daily weights, mouth care, anti emetics.   s/p Dacogen 20 mg/m2 x 5 days + Venetoclax. (s/p Venetoclax escalation, now on 100 mg oral daily - while on posaconazole)  Day 21 BM bx on 7/9, follow up results  Pt is DNR
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
## 2021-07-12 NOTE — PROGRESS NOTE ADULT - PROBLEM SELECTOR PLAN 6
Nephrology following-No indication for HD at this time.   6/15 Normal renal ultrasound  monitor for TLS.  follow ionized calcium daily as per nephrology  6/24 on Sensipar for hypercalcemia   Vitamin D 2000 daily.  7/10 restart home Lasix 40mg daily Nephrology following-No indication for HD at this time.   6/15 Normal renal ultrasound  follow ionized calcium daily as per nephrology  6/24 on Sensipar for hypercalcemia   Vitamin D 2000 daily.  7/10 restarted home Lasix 40mg daily

## 2021-07-12 NOTE — PROGRESS NOTE ADULT - SUBJECTIVE AND OBJECTIVE BOX
Diagnosis: FLT3 ITD (-) Acute Myeloid Leukemia    Protocol/Chemo Regimen: S/p Cycle 1 Decitabine (held on day 3) + Venetoclax     Day: 24     Pt endorsed:     Review of Systems:     Pain scale: 0     Diet: Regular   Allergies    morphine (Unknown)    Intolerances      MEDICATIONS  (STANDING):  Biotene Dry Mouth Oral Rinse 5 milliLiter(s) Swish and Spit four times a day  chlorhexidine 4% Liquid 1 Application(s) Topical <User Schedule>  cholecalciferol 2000 Unit(s) Oral daily  cinacalcet 30 milliGRAM(s) Oral daily  escitalopram 5 milliGRAM(s) Oral daily  finasteride 5 milliGRAM(s) Oral daily  folic acid 1 milliGRAM(s) Oral daily  furosemide    Tablet 40 milliGRAM(s) Oral daily  heparin   Injectable 5000 Unit(s) IV Push once  isosorbide   mononitrate ER Tablet (IMDUR) 60 milliGRAM(s) Oral daily  levoFLOXacin  Tablet 250 milliGRAM(s) Oral every 24 hours  lidocaine   Patch 1 Patch Transdermal daily  metoprolol succinate ER 50 milliGRAM(s) Oral daily  polyethylene glycol 3350 17 Gram(s) Oral daily  posaconazole Suspension 200 milliGRAM(s) Oral every 8 hours  sodium chloride 0.9%. 1000 milliLiter(s) (10 mL/Hr) IV Continuous <Continuous>  tamsulosin 0.4 milliGRAM(s) Oral at bedtime  venetoclax 100 milliGRAM(s) Oral <User Schedule>    MEDICATIONS  (PRN):  acetaminophen   Tablet .. 650 milliGRAM(s) Oral every 6 hours PRN Temp greater or equal to 38C (100.4F), Mild Pain (1 - 3)  albuterol/ipratropium for Nebulization 3 milliLiter(s) Nebulizer every 6 hours PRN Shortness of Breath and/or Wheezing  aluminum hydroxide/magnesium hydroxide/simethicone Suspension 30 milliLiter(s) Oral every 4 hours PRN Dyspepsia  calcium carbonate    500 mG (Tums) Chewable 1 Tablet(s) Chew every 4 hours PRN Heartburn  ondansetron Injectable 8 milliGRAM(s) IV Push every 8 hours PRN Nausea and/or Vomiting  senna 2 Tablet(s) Oral at bedtime PRN Constipation  sodium chloride 0.9% lock flush 10 milliLiter(s) IV Push every 1 hour PRN Pre/post blood products, medications, blood draw, and to maintain line patency  sucralfate suspension 1 Gram(s) Oral every 6 hours PRN heart burn      Vital Signs Last 24 Hrs  T(C): 36 (12 Jul 2021 05:30), Max: 36.7 (11 Jul 2021 10:15)  T(F): 96.8 (12 Jul 2021 05:30), Max: 98.1 (11 Jul 2021 13:05)  HR: 70 (12 Jul 2021 05:30) (68 - 91)  BP: 125/61 (12 Jul 2021 05:30) (113/54 - 154/65)  BP(mean): --  RR: 18 (12 Jul 2021 05:30) (17 - 20)  SpO2: 97% (12 Jul 2021 05:30) (96% - 100%)    PHYSICAL EXAM  General: NAD  HEENT- No oral ulceration or erythema  Lungs: clear to auscultation, no wheezes or rales  Abdomen: soft, non-tender, non-distended (+) BS  Ext: no edema  Skin: no rash  Neuro: alert and oriented X 2-3   Central Line: PICC c/d/i     LABS:    -------      Radiology    Xray Chest 1 View- PORTABLE-Urgent (Xray Chest 1 View- PORTABLE-Urgent .) (07.10.21 @ 15:53) >  AP chest. Prior 6/21/2021.     Left cardiac pacer. Median sternotomy. No change heart mediastinum. Vascular congestionbibasilar interstitial edema and small hazy effusions are similar to prior. No definite focal infiltrate.    IMPRESSION: Constellation of findings compatible with CHF.                                             Diagnosis: FLT3 ITD (-) Acute Myeloid Leukemia    Protocol/Chemo Regimen: S/p Cycle 1 Decitabine (held on day 3) + Venetoclax     Day: 24     Pt endorsed: No overnight events, taking po's    Review of Systems: Denies abdominal pain, n/v, cough, HA or dizziness    Pain scale: 0     Diet: Regular   Allergies    morphine (Unknown)    Intolerances      MEDICATIONS  (STANDING):  Biotene Dry Mouth Oral Rinse 5 milliLiter(s) Swish and Spit four times a day  chlorhexidine 4% Liquid 1 Application(s) Topical <User Schedule>  cholecalciferol 2000 Unit(s) Oral daily  cinacalcet 30 milliGRAM(s) Oral daily  escitalopram 5 milliGRAM(s) Oral daily  finasteride 5 milliGRAM(s) Oral daily  folic acid 1 milliGRAM(s) Oral daily  furosemide    Tablet 40 milliGRAM(s) Oral daily  isosorbide   mononitrate ER Tablet (IMDUR) 60 milliGRAM(s) Oral daily  levoFLOXacin  Tablet 250 milliGRAM(s) Oral every 24 hours  lidocaine   Patch 1 Patch Transdermal daily  metoprolol succinate ER 50 milliGRAM(s) Oral daily  polyethylene glycol 3350 17 Gram(s) Oral daily  posaconazole Suspension 200 milliGRAM(s) Oral every 8 hours  sodium chloride 0.9%. 1000 milliLiter(s) (10 mL/Hr) IV Continuous <Continuous>  tamsulosin 0.4 milliGRAM(s) Oral at bedtime  venetoclax 100 milliGRAM(s) Oral <User Schedule>    MEDICATIONS  (PRN):  acetaminophen   Tablet .. 650 milliGRAM(s) Oral every 6 hours PRN Temp greater or equal to 38C (100.4F), Mild Pain (1 - 3)  albuterol/ipratropium for Nebulization 3 milliLiter(s) Nebulizer every 6 hours PRN Shortness of Breath and/or Wheezing  aluminum hydroxide/magnesium hydroxide/simethicone Suspension 30 milliLiter(s) Oral every 4 hours PRN Dyspepsia  calcium carbonate    500 mG (Tums) Chewable 1 Tablet(s) Chew every 4 hours PRN Heartburn  ondansetron Injectable 8 milliGRAM(s) IV Push every 8 hours PRN Nausea and/or Vomiting  senna 2 Tablet(s) Oral at bedtime PRN Constipation  sodium chloride 0.9% lock flush 10 milliLiter(s) IV Push every 1 hour PRN Pre/post blood products, medications, blood draw, and to maintain line patency  sucralfate suspension 1 Gram(s) Oral every 6 hours PRN heart burn      Vital Signs Last 24 Hrs  T(C): 36 (12 Jul 2021 05:30), Max: 36.7 (11 Jul 2021 10:15)  T(F): 96.8 (12 Jul 2021 05:30), Max: 98.1 (11 Jul 2021 13:05)  HR: 70 (12 Jul 2021 05:30) (68 - 91)  BP: 125/61 (12 Jul 2021 05:30) (113/54 - 154/65)  BP(mean): --  RR: 18 (12 Jul 2021 05:30) (17 - 20)  SpO2: 97% (12 Jul 2021 05:30) (96% - 100%)    PHYSICAL EXAM  General: Sitting up in bed NAD  HEENT- No oral ulceration or erythema  Lungs: clear to auscultation, no wheezes or rales  Abdomen: soft, non-tender, mild distended, (+) BS  Ext: no edema BLE's  Skin: no rashes  Neuro: alert and oriented X 2-3   Central Line: PICC c/d/i     LABS:                        7.6    1.18  )-----------( 26       ( 12 Jul 2021 07:16 )             22.4     12 Jul 2021 07:11    131    |  101    |  32     ----------------------------<  100    3.7     |  18     |  2.14     Ca    8.2        12 Jul 2021 07:11  Phos  3.2       12 Jul 2021 07:11  Mg     1.6       12 Jul 2021 07:11    TPro  6.8    /  Alb  2.7    /  TBili  1.0    /  DBili  x      /  AST  9      /  ALT  5      /  AlkPhos  84     12 Jul 2021 07:11    PT/INR - ( 12 Jul 2021 07:11 )   PT: 15.4 sec;   INR: 1.30 ratio        LIVER FUNCTIONS - ( 12 Jul 2021 07:11 )  Alb: 2.7 g/dL / Pro: 6.8 g/dL / ALK PHOS: 84 U/L / ALT: 5 U/L / AST: 9 U/L / GGT: x             Radiology    Xray Chest 1 View- PORTABLE-Urgent (Xray Chest 1 View- PORTABLE-Urgent .) (07.10.21 @ 15:53) >  AP chest. Prior 6/21/2021.     Left cardiac pacer. Median sternotomy. No change heart mediastinum. Vascular congestion, bibasilar interstitial edema and small hazy effusions are similar to prior. No definite focal infiltrate.    IMPRESSION: Constellation of findings compatible with CHF
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
## 2021-07-12 NOTE — PROGRESS NOTE ADULT - PROBLEM SELECTOR PLAN 4
6/24 CT, abdominal US suggestive of CBD dilatation. HIDA scan (+) for acute cholecystitis   Seen by IR and Surgery - since patient is asymptomatic, no acute interventions were suggested  Tolerating regular diet 6/24 CT, abdominal US suggestive of CBD dilatation. HIDA scan (+) for acute cholecystitis   Seen by IR and Surgery - since patient is asymptomatic, no acute interventions were suggested  Tolerating regular diet  LFT's wnl, consider re imaging prn

## 2021-07-12 NOTE — PROGRESS NOTE ADULT - PROBLEM SELECTOR PLAN 2
Patient is neutropenic, afebrile   6/30 completed Zosyn   7/5 Started Levaquin for neutropenic prophylaxis as ANC < 500  If febrile, send pan cultures, and switch Levaquin to cefepime  6/24 Abd US: Cholelithiasis without signs of cholecystitis, persistently dilated CBD; HIDA scan (+) for acute cholecystitis   Seen by Surgery and IR, since patient is asymptomatic and there are no signs of hemodynamic instability   No surgical or IR interventions were recommended.  6/28  US abdomen Cholelithiasis with no definite sonographic evidence of acute cholecystitis.  7/3 Started posaconazole prophylaxis as patient is neutropenic. Adjusted dose of venetoclax.  7/10 CXR vascular congestion Patient is neutropenic, afebrile   6/30 completed Zosyn   7/5 Started Levaquin for neutropenic prophylaxis as ANC < 500  If febrile, send pan cultures, and switch Levaquin to cefepime  6/24 Abd US: Cholelithiasis without signs of cholecystitis, persistently dilated CBD; HIDA scan (+) for acute cholecystitis   Seen by Surgery and IR, since patient is asymptomatic and there are no signs of hemodynamic instability   No surgical or IR interventions were recommended.  6/28  US abdomen Cholelithiasis with no definite sonographic evidence of acute cholecystitis.  7/3 Started posaconazole prophylaxis as patient is neutropenic. Adjusted dose of venetoclax.

## 2021-07-12 NOTE — PROGRESS NOTE ADULT - PROBLEM SELECTOR PLAN 8
s/p 5vCABG 2012 (LIMA to LAD, SVG to Diag and OM, SVG PDA and RPL), PCI (2 in 2014, 2 in 2015 [facility]), HFrEF s/p CRT-D (2013),  No DAPT or AC given thrombocytopenia.
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
## 2021-07-12 NOTE — PROGRESS NOTE ADULT - PROBLEM SELECTOR PLAN 7
CHF from volume overload   Echo severe LV systolic fxn,  LVEF 27%  ICD (implantable cardioverter-defibrillator) in place CHF from volume overload, 7/10 CXR vascular congestion.   Lasix 40mg qd restarted 7/10  Echo severe LV systolic fxn, LVEF 27%  ICD (implantable cardioverter-defibrillator) in place

## 2021-07-13 NOTE — PROGRESS NOTE ADULT - PROBLEM SELECTOR PLAN 1
FLT3 ITD (-) AML   Dacogen held on day 3 6/21 due to acute change in mental status now back to baseline. Course completed on day 6 (to complete 5 days)   Monitor daily CBC's and transfuse as needed, Monitor daily CMP and replete electrolytes as needed   Strict I's/O's, daily weights, mouth care, anti emetics.   s/p Dacogen 20 mg/m2 x 5 days + Venetoclax. (s/p Venetoclax escalation, now on 100 mg oral daily - while on posaconazole)  Day 21 BM bx on 7/9, follow up results  Pt is DNR

## 2021-07-13 NOTE — PROGRESS NOTE ADULT - SUBJECTIVE AND OBJECTIVE BOX
Diagnosis:    Protocol/Chemo Regimen:    Day:     Pt endorsed:    Review of Systems:        Pain scale:     Diet:     Allergies    morphine (Unknown)    Intolerances        ANTIMICROBIALS  levoFLOXacin  Tablet 250 milliGRAM(s) Oral every 24 hours  posaconazole Suspension 200 milliGRAM(s) Oral every 8 hours      HEME/ONC MEDICATIONS  venetoclax 100 milliGRAM(s) Oral <User Schedule>      STANDING MEDICATIONS  Biotene Dry Mouth Oral Rinse 5 milliLiter(s) Swish and Spit four times a day  chlorhexidine 2% Cloths 1 Application(s) Topical <User Schedule>  cholecalciferol 2000 Unit(s) Oral daily  cinacalcet 30 milliGRAM(s) Oral daily  escitalopram 5 milliGRAM(s) Oral daily  finasteride 5 milliGRAM(s) Oral daily  folic acid 1 milliGRAM(s) Oral daily  furosemide    Tablet 40 milliGRAM(s) Oral daily  isosorbide   mononitrate ER Tablet (IMDUR) 60 milliGRAM(s) Oral daily  lidocaine   Patch 1 Patch Transdermal daily  metoprolol succinate ER 50 milliGRAM(s) Oral daily  polyethylene glycol 3350 17 Gram(s) Oral daily  sodium chloride 0.9%. 1000 milliLiter(s) IV Continuous <Continuous>  tamsulosin 0.4 milliGRAM(s) Oral at bedtime      PRN MEDICATIONS  acetaminophen   Tablet .. 650 milliGRAM(s) Oral every 6 hours PRN  albuterol/ipratropium for Nebulization 3 milliLiter(s) Nebulizer every 6 hours PRN  aluminum hydroxide/magnesium hydroxide/simethicone Suspension 30 milliLiter(s) Oral every 4 hours PRN  calcium carbonate    500 mG (Tums) Chewable 1 Tablet(s) Chew every 4 hours PRN  ondansetron Injectable 8 milliGRAM(s) IV Push every 8 hours PRN  senna 2 Tablet(s) Oral at bedtime PRN  sodium chloride 0.9% lock flush 10 milliLiter(s) IV Push every 1 hour PRN  sucralfate suspension 1 Gram(s) Oral every 6 hours PRN        Vital Signs Last 24 Hrs  T(C): 36.7 (13 Jul 2021 04:48), Max: 36.9 (12 Jul 2021 21:00)  T(F): 98.1 (13 Jul 2021 04:48), Max: 98.4 (12 Jul 2021 21:00)  HR: 73 (13 Jul 2021 04:48) (64 - 87)  BP: 125/55 (13 Jul 2021 04:48) (123/58 - 147/63)  BP(mean): --  RR: 18 (13 Jul 2021 04:48) (17 - 18)  SpO2: 100% (13 Jul 2021 04:48) (98% - 100%)    PHYSICAL EXAM  General:  HEENT:   CV:   Lungs:   Abdomen:   Ext:   Skin  Neuro:   Central Line:     RECENT CULTURES:        LABS:                   RADIOLOGY & ADDITIONAL STUDIES:         · Subjective and Objective:   Diagnosis: FLT3 ITD (-) Acute Myeloid Leukemia    Protocol/Chemo Regimen: S/p Cycle 1 Decitabine (held on day 3) + Venetoclax     Day: 25      : # [id number]    Pt endorsed: everything okay, no SOB, no chest discomfort, no N/V/D/abdominal discomfort, no dysuria. Poor appetitie    Review of Systems: no fever, no headache, no dizziness, no SOB, no chest discomfort, no N/V/D/abdominal discomfort, no dysuria.    Pain scale: 0     Diet: Regular     Allergies    morphine (Unknown)    Intolerances          ANTIMICROBIALS  levoFLOXacin  Tablet 250 milliGRAM(s) Oral every 24 hours  posaconazole Suspension 200 milliGRAM(s) Oral every 8 hours      HEME/ONC MEDICATIONS  venetoclax 100 milliGRAM(s) Oral <User Schedule>      STANDING MEDICATIONS  Biotene Dry Mouth Oral Rinse 5 milliLiter(s) Swish and Spit four times a day  chlorhexidine 2% Cloths 1 Application(s) Topical <User Schedule>  cholecalciferol 2000 Unit(s) Oral daily  cinacalcet 30 milliGRAM(s) Oral daily  escitalopram 5 milliGRAM(s) Oral daily  finasteride 5 milliGRAM(s) Oral daily  folic acid 1 milliGRAM(s) Oral daily  furosemide    Tablet 40 milliGRAM(s) Oral daily  isosorbide   mononitrate ER Tablet (IMDUR) 60 milliGRAM(s) Oral daily  lidocaine   Patch 1 Patch Transdermal daily  metoprolol succinate ER 50 milliGRAM(s) Oral daily  polyethylene glycol 3350 17 Gram(s) Oral daily  sodium chloride 0.9%. 1000 milliLiter(s) IV Continuous <Continuous>  tamsulosin 0.4 milliGRAM(s) Oral at bedtime      PRN MEDICATIONS  acetaminophen   Tablet .. 650 milliGRAM(s) Oral every 6 hours PRN  albuterol/ipratropium for Nebulization 3 milliLiter(s) Nebulizer every 6 hours PRN  aluminum hydroxide/magnesium hydroxide/simethicone Suspension 30 milliLiter(s) Oral every 4 hours PRN  calcium carbonate    500 mG (Tums) Chewable 1 Tablet(s) Chew every 4 hours PRN  ondansetron Injectable 8 milliGRAM(s) IV Push every 8 hours PRN  senna 2 Tablet(s) Oral at bedtime PRN  sodium chloride 0.9% lock flush 10 milliLiter(s) IV Push every 1 hour PRN  sucralfate suspension 1 Gram(s) Oral every 6 hours PRN        Vital Signs Last 24 Hrs  T(C): 36.7 (13 Jul 2021 04:48), Max: 36.9 (12 Jul 2021 21:00)  T(F): 98.1 (13 Jul 2021 04:48), Max: 98.4 (12 Jul 2021 21:00)  HR: 73 (13 Jul 2021 04:48) (64 - 87)  BP: 125/55 (13 Jul 2021 04:48) (123/58 - 147/63)  BP(mean): --  RR: 18 (13 Jul 2021 04:48) (17 - 18)  SpO2: 100% (13 Jul 2021 04:48) (98% - 100%)    PHYSICAL EXAM  General:NAD  HEENT: no atraumatic, PERRL, EOMI, conjunctiva clear, no oral mucositis  CV: S1S2, RRR, no murmur  Lungs: Clear breath sounds, no rales, no wheezing  Abdomen: BS(+), soft,  nondistended, nontender  Ext: bilat low extremities no edema , PPP, calves nontender.   Skin: no rash, no lesions  Neuro: Awake/alert, Ox 3, follows commands.   Central Line: Right subclavian TLC, C/D/I    RECENT CULTURES:        LABS:  CBC Full  -  ( 13 Jul 2021 06:58 )  WBC Count : 1.20 K/uL  RBC Count : 2.55 M/uL  Hemoglobin : 7.5 g/dL  Hematocrit : 22.2 %  Platelet Count - Automated : 20 K/uL  Mean Cell Volume : 87.1 fl  Mean Cell Hemoglobin : 29.4 pg  Mean Cell Hemoglobin Concentration : 33.8 gm/dL  Auto Neutrophil # : 0.13 K/uL  Auto Lymphocyte # : 0.92 K/uL  Auto Monocyte # : 0.15 K/uL  Auto Eosinophil # : 0.00 K/uL  Auto Basophil # : 0.00 K/uL  Auto Neutrophil % : 11.2 %  Auto Lymphocyte % : 76.5 %  Auto Monocyte % : 12.3 %  Auto Eosinophil % : 0.0 %  Auto Basophil % : 0.0 %    07-13    128<L>  |  98  |  30<H>  ----------------------------<  91  3.6   |  18<L>  |  2.04<H>    Ca    8.2<L>      13 Jul 2021 06:59  Phos  3.4     07-13  Mg     1.8     07-13    TPro  6.7  /  Alb  2.6<L>  /  TBili  0.9  /  DBili  x   /  AST  8<L>  /  ALT  5<L>  /  AlkPhos  87  07-13            RADIOLOGY & ADDITIONAL STUDIES:      < from: Xray Chest 1 View- PORTABLE-Urgent (Xray Chest 1 View- PORTABLE-Urgent .) (07.10.21 @ 15:53) >     Left cardiac pacer. Median sternotomy. No change heart mediastinum. Vascular congestionbibasilar interstitial edema and small hazy effusions are similar to prior. No definite focal infiltrate.    IMPRESSION: Constellation of findings compatible with CHF.    --- End of Report ---    < end of copied text >     · Subjective and Objective:   Diagnosis: FLT3 ITD (-) Acute Myeloid Leukemia    Protocol/Chemo Regimen: S/p Cycle 1 Decitabine (held on day 3) + Venetoclax     Day: 25      : # [id number]    Pt endorsed: everything okay, no SOB, no chest discomfort, no N/V/D/abdominal discomfort, no dysuria. Poor appetitie    Review of Systems: no fever, no headache, no dizziness, no SOB, no chest discomfort, no N/V/D/abdominal discomfort, no dysuria.    Pain scale: 0     Diet: Regular     Allergies    morphine (Unknown)    Intolerances          ANTIMICROBIALS  levoFLOXacin  Tablet 250 milliGRAM(s) Oral every 24 hours  posaconazole Suspension 200 milliGRAM(s) Oral every 8 hours      HEME/ONC MEDICATIONS  venetoclax 100 milliGRAM(s) Oral <User Schedule>      STANDING MEDICATIONS  Biotene Dry Mouth Oral Rinse 5 milliLiter(s) Swish and Spit four times a day  chlorhexidine 2% Cloths 1 Application(s) Topical <User Schedule>  cholecalciferol 2000 Unit(s) Oral daily  cinacalcet 30 milliGRAM(s) Oral daily  escitalopram 5 milliGRAM(s) Oral daily  finasteride 5 milliGRAM(s) Oral daily  folic acid 1 milliGRAM(s) Oral daily  furosemide    Tablet 40 milliGRAM(s) Oral daily  isosorbide   mononitrate ER Tablet (IMDUR) 60 milliGRAM(s) Oral daily  lidocaine   Patch 1 Patch Transdermal daily  metoprolol succinate ER 50 milliGRAM(s) Oral daily  polyethylene glycol 3350 17 Gram(s) Oral daily  sodium chloride 0.9%. 1000 milliLiter(s) IV Continuous <Continuous>  tamsulosin 0.4 milliGRAM(s) Oral at bedtime      PRN MEDICATIONS  acetaminophen   Tablet .. 650 milliGRAM(s) Oral every 6 hours PRN  albuterol/ipratropium for Nebulization 3 milliLiter(s) Nebulizer every 6 hours PRN  aluminum hydroxide/magnesium hydroxide/simethicone Suspension 30 milliLiter(s) Oral every 4 hours PRN  calcium carbonate    500 mG (Tums) Chewable 1 Tablet(s) Chew every 4 hours PRN  ondansetron Injectable 8 milliGRAM(s) IV Push every 8 hours PRN  senna 2 Tablet(s) Oral at bedtime PRN  sodium chloride 0.9% lock flush 10 milliLiter(s) IV Push every 1 hour PRN  sucralfate suspension 1 Gram(s) Oral every 6 hours PRN        Vital Signs Last 24 Hrs  T(C): 36.7 (13 Jul 2021 04:48), Max: 36.9 (12 Jul 2021 21:00)  T(F): 98.1 (13 Jul 2021 04:48), Max: 98.4 (12 Jul 2021 21:00)  HR: 73 (13 Jul 2021 04:48) (64 - 87)  BP: 125/55 (13 Jul 2021 04:48) (123/58 - 147/63)  BP(mean): --  RR: 18 (13 Jul 2021 04:48) (17 - 18)  SpO2: 100% (13 Jul 2021 04:48) (98% - 100%)    PHYSICAL EXAM  General:NAD  HEENT: no atraumatic, PERRL, EOMI, conjunctiva clear, no oral mucositis  CV: S1S2, RRR, no murmur  Lungs: Clear breath sounds, no rales, no wheezing  Abdomen: BS(+), soft,  nondistended, nontender  Ext: bilat low extremities no edema , PPP, calves nontender.   Skin: no rash, no lesions  Neuro: Awake/alert, Ox 3, follows commands.   Central Line: Right subclavian TLC, site C/D/I    RECENT CULTURES:        LABS:  CBC Full  -  ( 13 Jul 2021 06:58 )  WBC Count : 1.20 K/uL  RBC Count : 2.55 M/uL  Hemoglobin : 7.5 g/dL  Hematocrit : 22.2 %  Platelet Count - Automated : 20 K/uL  Mean Cell Volume : 87.1 fl  Mean Cell Hemoglobin : 29.4 pg  Mean Cell Hemoglobin Concentration : 33.8 gm/dL  Auto Neutrophil # : 0.13 K/uL  Auto Lymphocyte # : 0.92 K/uL  Auto Monocyte # : 0.15 K/uL  Auto Eosinophil # : 0.00 K/uL  Auto Basophil # : 0.00 K/uL  Auto Neutrophil % : 11.2 %  Auto Lymphocyte % : 76.5 %  Auto Monocyte % : 12.3 %  Auto Eosinophil % : 0.0 %  Auto Basophil % : 0.0 %    07-13    128<L>  |  98  |  30<H>  ----------------------------<  91  3.6   |  18<L>  |  2.04<H>    Ca    8.2<L>      13 Jul 2021 06:59  Phos  3.4     07-13  Mg     1.8     07-13    TPro  6.7  /  Alb  2.6<L>  /  TBili  0.9  /  DBili  x   /  AST  8<L>  /  ALT  5<L>  /  AlkPhos  87  07-13            RADIOLOGY & ADDITIONAL STUDIES:      < from: Xray Chest 1 View- PORTABLE-Urgent (Xray Chest 1 View- PORTABLE-Urgent .) (07.10.21 @ 15:53) >     Left cardiac pacer. Median sternotomy. No change heart mediastinum. Vascular congestionbibasilar interstitial edema and small hazy effusions are similar to prior. No definite focal infiltrate.    IMPRESSION: Constellation of findings compatible with CHF.    --- End of Report ---    < end of copied text >       Diagnosis: FLT3 ITD (-) Acute Myeloid Leukemia    Protocol/Chemo Regimen: S/p Cycle 1 Decitabine (held on day 3) + Venetoclax     Day: 25      : # [id number]    Pt endorsed: everything okay, no SOB, no chest discomfort, no N/V/D/abdominal discomfort, no dysuria. Poor appetitie    Review of Systems: no fever, no headache, no dizziness, no SOB, no chest discomfort, no N/V/D/abdominal discomfort, no dysuria.    Pain scale: 0     Diet: Regular     Allergies    morphine (Unknown)    Intolerances          ANTIMICROBIALS  levoFLOXacin  Tablet 250 milliGRAM(s) Oral every 24 hours  posaconazole Suspension 200 milliGRAM(s) Oral every 8 hours      HEME/ONC MEDICATIONS  venetoclax 100 milliGRAM(s) Oral <User Schedule>      STANDING MEDICATIONS  Biotene Dry Mouth Oral Rinse 5 milliLiter(s) Swish and Spit four times a day  chlorhexidine 2% Cloths 1 Application(s) Topical <User Schedule>  cholecalciferol 2000 Unit(s) Oral daily  cinacalcet 30 milliGRAM(s) Oral daily  escitalopram 5 milliGRAM(s) Oral daily  finasteride 5 milliGRAM(s) Oral daily  folic acid 1 milliGRAM(s) Oral daily  furosemide    Tablet 40 milliGRAM(s) Oral daily  isosorbide   mononitrate ER Tablet (IMDUR) 60 milliGRAM(s) Oral daily  lidocaine   Patch 1 Patch Transdermal daily  metoprolol succinate ER 50 milliGRAM(s) Oral daily  polyethylene glycol 3350 17 Gram(s) Oral daily  sodium chloride 0.9%. 1000 milliLiter(s) IV Continuous <Continuous>  tamsulosin 0.4 milliGRAM(s) Oral at bedtime      PRN MEDICATIONS  acetaminophen   Tablet .. 650 milliGRAM(s) Oral every 6 hours PRN  albuterol/ipratropium for Nebulization 3 milliLiter(s) Nebulizer every 6 hours PRN  aluminum hydroxide/magnesium hydroxide/simethicone Suspension 30 milliLiter(s) Oral every 4 hours PRN  calcium carbonate    500 mG (Tums) Chewable 1 Tablet(s) Chew every 4 hours PRN  ondansetron Injectable 8 milliGRAM(s) IV Push every 8 hours PRN  senna 2 Tablet(s) Oral at bedtime PRN  sodium chloride 0.9% lock flush 10 milliLiter(s) IV Push every 1 hour PRN  sucralfate suspension 1 Gram(s) Oral every 6 hours PRN        Vital Signs Last 24 Hrs  T(C): 36.7 (13 Jul 2021 04:48), Max: 36.9 (12 Jul 2021 21:00)  T(F): 98.1 (13 Jul 2021 04:48), Max: 98.4 (12 Jul 2021 21:00)  HR: 73 (13 Jul 2021 04:48) (64 - 87)  BP: 125/55 (13 Jul 2021 04:48) (123/58 - 147/63)  BP(mean): --  RR: 18 (13 Jul 2021 04:48) (17 - 18)  SpO2: 100% (13 Jul 2021 04:48) (98% - 100%)    PHYSICAL EXAM  General:NAD  HEENT: no atraumatic, PERRL, EOMI, conjunctiva clear, no oral mucositis  CV: S1S2, RRR, no murmur  Lungs: Clear breath sounds, no rales, no wheezing  Abdomen: BS(+), soft,  nondistended, nontender  Ext: bilat low extremities no edema , PPP, calves nontender.   Skin: no rash, no lesions  Neuro: Awake/alert, Ox 3, follows commands.   Central Line:PICC LUE, site C/D/I    RECENT CULTURES:        LABS:  CBC Full  -  ( 13 Jul 2021 06:58 )  WBC Count : 1.20 K/uL  RBC Count : 2.55 M/uL  Hemoglobin : 7.5 g/dL  Hematocrit : 22.2 %  Platelet Count - Automated : 20 K/uL  Mean Cell Volume : 87.1 fl  Mean Cell Hemoglobin : 29.4 pg  Mean Cell Hemoglobin Concentration : 33.8 gm/dL  Auto Neutrophil # : 0.13 K/uL  Auto Lymphocyte # : 0.92 K/uL  Auto Monocyte # : 0.15 K/uL  Auto Eosinophil # : 0.00 K/uL  Auto Basophil # : 0.00 K/uL  Auto Neutrophil % : 11.2 %  Auto Lymphocyte % : 76.5 %  Auto Monocyte % : 12.3 %  Auto Eosinophil % : 0.0 %  Auto Basophil % : 0.0 %    07-13    128<L>  |  98  |  30<H>  ----------------------------<  91  3.6   |  18<L>  |  2.04<H>    Ca    8.2<L>      13 Jul 2021 06:59  Phos  3.4     07-13  Mg     1.8     07-13    TPro  6.7  /  Alb  2.6<L>  /  TBili  0.9  /  DBili  x   /  AST  8<L>  /  ALT  5<L>  /  AlkPhos  87  07-13            RADIOLOGY & ADDITIONAL STUDIES:      < from: Xray Chest 1 View- PORTABLE-Urgent (Xray Chest 1 View- PORTABLE-Urgent .) (07.10.21 @ 15:53) >     Left cardiac pacer. Median sternotomy. No change heart mediastinum. Vascular congestionbibasilar interstitial edema and small hazy effusions are similar to prior. No definite focal infiltrate.    IMPRESSION: Constellation of findings compatible with CHF.    --- End of Report ---    < end of copied text >
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
## 2021-07-13 NOTE — PROGRESS NOTE ADULT - PROBLEM SELECTOR PLAN 6
Nephrology following-No indication for HD at this time.   6/15 Normal renal ultrasound  follow ionized calcium daily as per nephrology  6/24 on Sensipar for hypercalcemia   Vitamin D 2000 daily.  7/10 restarted home Lasix 40mg daily

## 2021-07-13 NOTE — PROGRESS NOTE ADULT - PROBLEM SELECTOR PLAN 4
6/24 CT, abdominal US suggestive of CBD dilatation. HIDA scan (+) for acute cholecystitis   Seen by IR and Surgery - since patient is asymptomatic, no acute interventions were suggested  Tolerating regular diet  LFT's wnl, consider re imaging prn

## 2021-07-13 NOTE — PROGRESS NOTE ADULT - PROBLEM SELECTOR PLAN 9
No VTE ppx 2/2 thrombocytopenia    Contact information: [phone] s/p 5vCABG 2012 (LIMA to LAD, SVG to Diag and OM, SVG PDA and RPL), PCI (2 in 2014, 2 in 2015 [facility]), HFrEF s/p CRT-D (2013),  No DAPT or AC given thrombocytopenia.
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
## 2021-07-13 NOTE — PROGRESS NOTE ADULT - PROBLEM SELECTOR PLAN 2
Patient is neutropenic, afebrile   6/30 completed Zosyn   7/5 Started Levaquin for neutropenic prophylaxis as ANC < 500  If febrile, send pan cultures, and switch Levaquin to cefepime  6/24 Abd US: Cholelithiasis without signs of cholecystitis, persistently dilated CBD; HIDA scan (+) for acute cholecystitis   Seen by Surgery and IR, since patient is asymptomatic and there are no signs of hemodynamic instability   No surgical or IR interventions were recommended.  6/28  US abdomen Cholelithiasis with no definite sonographic evidence of acute cholecystitis.  7/3 Started posaconazole prophylaxis as patient is neutropenic. Adjusted dose of venetoclax.

## 2021-07-13 NOTE — PROGRESS NOTE ADULT - PROBLEM SELECTOR PLAN 7
CHF from volume overload, 7/10 CXR vascular congestion.   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

## 2021-07-13 NOTE — PROGRESS NOTE ADULT - PROBLEM SELECTOR PLAN 8
s/p 5vCABG 2012 (LIMA to LAD, SVG to Diag and OM, SVG PDA and RPL), PCI (2 in 2014, 2 in 2015 [facility]), HFrEF s/p CRT-D (2013),  No DAPT or AC given thrombocytopenia. CHF from volume overload, 7/10 CXR vascular congestion.   Lasix 40mg qd restarted 7/10  Echo severe LV systolic fxn, LVEF 27%  ICD (implantable cardioverter-defibrillator) in place

## 2021-07-14 NOTE — PROGRESS NOTE ADULT - PROBLEM SELECTOR PLAN 6
Nephrology following-No indication for HD at this time.   6/15 Normal renal ultrasound  follow ionized calcium daily as per nephrology  6/24 on Sensipar for hypercalcemia   Vitamin D 2000 daily.  7/10 restarted home Lasix 40mg daily stable   Nephrology following-No indication for HD at this time.   6/15 Normal renal ultrasound  follow ionized calcium daily as per nephrology  6/24 on Sensipar for hypercalcemia   Vitamin D 2000 daily.  7/10 restarted home Lasix 40mg daily
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
## 2021-07-14 NOTE — PROGRESS NOTE ADULT - PROBLEM SELECTOR PLAN 8
CHF from volume overload, 7/10 CXR vascular congestion.   Lasix 40mg qd restarted 7/10  Echo severe LV systolic fxn, LVEF 27%  ICD (implantable cardioverter-defibrillator) in place
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
## 2021-07-14 NOTE — PROGRESS NOTE ADULT - PROBLEM SELECTOR PLAN 7
Na+ downtrending likely SIADH   Start Urea 15gm twice daily   Monitor Na+ daily  Nephro following likely SIADH, slight increase today 7/14   Start Urea 15gm twice daily   Monitor Na+ daily  Nephro following

## 2021-07-14 NOTE — PROGRESS NOTE ADULT - PROBLEM SELECTOR PLAN 1
FLT3 ITD (-) AML   Dacogen held on day 3, 6/21 due to acute change in mental status now back to baseline. Course completed on day 6 (to complete 5 days)   Monitor daily CBC's and transfuse as needed, Monitor daily CMP and replete electrolytes as needed   Strict I's/O's, daily weights, mouth care, anti emetics.   s/p Dacogen 20 mg/m2 x 5 days + Venetoclax. (s/p Venetoclax escalation, now on 100 mg oral daily - while on posaconazole)  Day 21 BM bx on 7/9, follow up results  Pt is DNR

## 2021-07-15 NOTE — PROGRESS NOTE ADULT - PROBLEM SELECTOR PLAN 7
likely SIADH, slight increase today 7/14   Start Urea 15gm twice daily   Monitor Na+ daily  Nephro following

## 2021-07-15 NOTE — PROGRESS NOTE ADULT - PROBLEM SELECTOR PLAN 1
FLT3 ITD (-) AML   Dacogen held on day 3, 6/21 due to acute change in mental status now back to baseline. Course completed on day 6 (to complete 5 days)   Monitor daily CBC's and transfuse as needed, Monitor daily CMP and replete electrolytes as needed   Strict I's/O's, daily weights, mouth care, anti emetics.   s/p Dacogen 20 mg/m2 x 5 days + Venetoclax. (s/p Venetoclax escalation, now on 100 mg oral daily - while on posaconazole)  Day 21 BM bx on 7/9, follow up results  Pt is DNR FLT3 ITD (-) AML   Dacogen held on day 3, 6/21 due to acute change in mental status now back to baseline. Course completed on day 6 (to complete 5 days)   Monitor daily CBC's and transfuse as needed, Monitor daily CMP and replete electrolytes as needed   Strict I's/O's, daily weights, mouth care, anti emetics.   s/p Dacogen 20 mg/m2 x 5 days + Venetoclax. (s/p Venetoclax escalation, now on 100 mg oral daily - while on posaconazole)  Day 21 BM bx on 7/9, quantity insufficient. Repeating today 7/15  Vitmain K (5mg po- 7/15-7/17)   Pt is DNR
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
## 2021-07-15 NOTE — PROGRESS NOTE ADULT - ATTENDING COMMENTS
No new complaints  1.  ARF on CKD-- minimal worsening, non oliguric, no HD required  2.  Hypercalcemia--primary hyperparathyroidism, mild controlled on cinacalcet    discussed wth heme onc team

## 2021-07-15 NOTE — PROGRESS NOTE ADULT - SUBJECTIVE AND OBJECTIVE BOX
[facility] DIVISION OF KIDNEY DISEASES AND HYPERTENSION -- FOLLOW UP NOTE  --------------------------------------------------------------------------------  Chief Complaint:    24 hour events/subjective:    Patient was seen and examined at bedside. Reported feeling well. Denies CP, SOB, fever, chills, nausea, vomiting, diarrhea, LE edema or dysuria      PAST HISTORY  --------------------------------------------------------------------------------  No significant changes to PMH, PSH, FHx, SHx, unless otherwise noted    ALLERGIES & MEDICATIONS  --------------------------------------------------------------------------------  Allergies    morphine (Unknown)    Intolerances      Standing Inpatient Medications  Biotene Dry Mouth Oral Rinse 5 milliLiter(s) Swish and Spit four times a day  chlorhexidine 2% Cloths 1 Application(s) Topical <User Schedule>  cholecalciferol 2000 Unit(s) Oral daily  cinacalcet 30 milliGRAM(s) Oral daily  escitalopram 5 milliGRAM(s) Oral daily  finasteride 5 milliGRAM(s) Oral daily  folic acid 1 milliGRAM(s) Oral daily  furosemide    Tablet 40 milliGRAM(s) Oral daily  isosorbide   mononitrate ER Tablet (IMDUR) 60 milliGRAM(s) Oral daily  levoFLOXacin  Tablet 250 milliGRAM(s) Oral every 24 hours  lidocaine   Patch 1 Patch Transdermal daily  metoprolol succinate ER 50 milliGRAM(s) Oral daily  phytonadione   Solution 5 milliGRAM(s) Oral daily  polyethylene glycol 3350 17 Gram(s) Oral daily  posaconazole Suspension 200 milliGRAM(s) Oral every 8 hours  sodium chloride 0.9%. 1000 milliLiter(s) IV Continuous <Continuous>  tamsulosin 0.4 milliGRAM(s) Oral at bedtime  urea Oral Powder 15 Gram(s) Oral every 12 hours  venetoclax 100 milliGRAM(s) Oral <User Schedule>    PRN Inpatient Medications  acetaminophen   Tablet .. 650 milliGRAM(s) Oral every 6 hours PRN  albuterol/ipratropium for Nebulization 3 milliLiter(s) Nebulizer every 6 hours PRN  aluminum hydroxide/magnesium hydroxide/simethicone Suspension 30 milliLiter(s) Oral every 4 hours PRN  calcium carbonate    500 mG (Tums) Chewable 1 Tablet(s) Chew every 4 hours PRN  ondansetron Injectable 8 milliGRAM(s) IV Push every 8 hours PRN  senna 2 Tablet(s) Oral at bedtime PRN  sodium chloride 0.9% lock flush 10 milliLiter(s) IV Push every 1 hour PRN  sucralfate suspension 1 Gram(s) Oral every 6 hours PRN      REVIEW OF SYSTEMS  --------------------------------------------------------------------------------  Gen: No fevers/chills  Skin: No rashes  Respiratory: No dyspnea, cough  CV: No chest pain  GI: No abdominal pain, diarrhea  : No dysuria, hematuria  MSK: No edema        All other systems were reviewed and are negative, except as noted.    VITALS/PHYSICAL EXAM  --------------------------------------------------------------------------------  T(C): 36.4 (07-15-21 @ 05:39), Max: 36.9 (07-14-21 @ 17:44)  HR: 66 (07-15-21 @ 05:39) (63 - 79)  BP: 134/55 (07-15-21 @ 05:39) (109/47 - 145/70)  RR: 18 (07-15-21 @ 05:39) (17 - 18)  SpO2: 96% (07-15-21 @ 05:39) (96% - 100%)  Wt(kg): --        07-14-21 @ 07:01  -  07-15-21 @ 07:00  --------------------------------------------------------  IN: 811 mL / OUT: 625 mL / NET: 186 mL        Physical Exam:  	Gen: NAD  	HEENT: MMM  	Pulm: CTA B/L  	CV: S1S2  	Abd: Soft, +BS   	Ext: No LE edema B/L  	Neuro: Awake  	Skin: Warm and dry  	      LABS/STUDIES  --------------------------------------------------------------------------------              7.2    1.23  >-----------<  15       [07-15-21 @ 07:05]              21.2     133  |  101  |  40  ----------------------------<  87      [07-15-21 @ 07:03]  3.7   |  20  |  2.12        Ca     8.0     [07-15-21 @ 07:03]      iCa    1.14     [07-15 @ 07:04]      Mg     1.7     [07-15-21 @ 07:03]      Phos  3.5     [07-15-21 @ 07:03]    TPro  6.7  /  Alb  2.7  /  TBili  0.8  /  DBili  x   /  AST  5   /  ALT  6   /  AlkPhos  91  [07-15-21 @ 07:03]    PT/INR: PT 15.5 , INR 1.31       [07-15-21 @ 07:05]          [07-15-21 @ 07:03]    Creatinine Trend:  SCr 2.12 [07-15 @ 07:03]  SCr 2.16 [07-14 @ 06:55]  SCr 2.04 [07-13 @ 06:59]  SCr 2.14 [07-12 @ 07:11]  SCr 2.29 [07-11 @ 06:50]        Iron 155, TIBC 237, %sat 65      [06-12-21 @ 03:36]  Ferritin 827      [06-12-21 @ 04:44]  PTH -- (Ca 9.8)      [06-18-21 @ 10:41]   84  Vitamin D (25OH) 21.6      [06-18-21 @ 10:41]  HbA1c 5.8      [11-13-17 @ 10:29]      PER: titer 1:320, pattern Speckled      [06-17-21 @ 10:30]  C3 Complement 137      [06-17-21 @ 10:30]  C4 Complement 37      [06-17-21 @ 10:30]  ANCA: cANCA Negative, pANCA Negative, atypical ANCA Negative      [06-17-21 @ 10:30]  anti-GBM 2      [06-17-21 @ 13:12]  PLA2R: OMEGA <1.8, IFA --      [06-17-21 @ 13:12]  Immunofixation Serum:   No Monoclonal Band Identified    Reference Range: None Detected      [06-17-21 @ 10:30]

## 2021-07-15 NOTE — PROGRESS NOTE ADULT - PROBLEM SELECTOR PLAN 9
s/p 5vCABG 2012 (LIMA to LAD, SVG to Diag and OM, SVG PDA and RPL), PCI (2 in 2014, 2 in 2015 [facility]), HFrEF s/p CRT-D (2013),  No DAPT or AC given thrombocytopenia.

## 2021-07-15 NOTE — PROGRESS NOTE ADULT - SUBJECTIVE AND OBJECTIVE BOX
Diagnosis: FLT3 ITD (-) Acute Myeloid Leukemia    Protocol/Chemo Regimen: S/p Cycle 1 Decitabine (held on day 3) + Venetoclax     Day: 27    Pt endorsed:     Review of Systems:     Pain scale: 0     Diet: Regular   Allergies    morphine (Unknown)    MEDICATIONS  (STANDING):  Biotene Dry Mouth Oral Rinse 5 milliLiter(s) Swish and Spit four times a day  chlorhexidine 2% Cloths 1 Application(s) Topical <User Schedule>  cholecalciferol 2000 Unit(s) Oral daily  cinacalcet 30 milliGRAM(s) Oral daily  escitalopram 5 milliGRAM(s) Oral daily  finasteride 5 milliGRAM(s) Oral daily  folic acid 1 milliGRAM(s) Oral daily  furosemide    Tablet 40 milliGRAM(s) Oral daily  isosorbide   mononitrate ER Tablet (IMDUR) 60 milliGRAM(s) Oral daily  levoFLOXacin  Tablet 250 milliGRAM(s) Oral every 24 hours  lidocaine   Patch 1 Patch Transdermal daily  metoprolol succinate ER 50 milliGRAM(s) Oral daily  polyethylene glycol 3350 17 Gram(s) Oral daily  posaconazole Suspension 200 milliGRAM(s) Oral every 8 hours  sodium chloride 0.9%. 1000 milliLiter(s) (10 mL/Hr) IV Continuous <Continuous>  tamsulosin 0.4 milliGRAM(s) Oral at bedtime  urea Oral Powder 15 Gram(s) Oral every 12 hours  venetoclax 100 milliGRAM(s) Oral <User Schedule>    MEDICATIONS  (PRN):  acetaminophen   Tablet .. 650 milliGRAM(s) Oral every 6 hours PRN Temp greater or equal to 38C (100.4F), Mild Pain (1 - 3)  albuterol/ipratropium for Nebulization 3 milliLiter(s) Nebulizer every 6 hours PRN Shortness of Breath and/or Wheezing  aluminum hydroxide/magnesium hydroxide/simethicone Suspension 30 milliLiter(s) Oral every 4 hours PRN Dyspepsia  calcium carbonate    500 mG (Tums) Chewable 1 Tablet(s) Chew every 4 hours PRN Heartburn  ondansetron Injectable 8 milliGRAM(s) IV Push every 8 hours PRN Nausea and/or Vomiting  senna 2 Tablet(s) Oral at bedtime PRN Constipation  sodium chloride 0.9% lock flush 10 milliLiter(s) IV Push every 1 hour PRN Pre/post blood products, medications, blood draw, and to maintain line patency  sucralfate suspension 1 Gram(s) Oral every 6 hours PRN heart burn      Vital Signs Last 24 Hrs  T(C): 36.4 (15 Jul 2021 05:39), Max: 36.9 (14 Jul 2021 17:44)  T(F): 97.5 (15 Jul 2021 05:39), Max: 98.4 (14 Jul 2021 17:44)  HR: 66 (15 Jul 2021 05:39) (63 - 79)  BP: 134/55 (15 Jul 2021 05:39) (109/47 - 145/70)  BP(mean): --  RR: 18 (15 Jul 2021 05:39) (17 - 18)  SpO2: 96% (15 Jul 2021 05:39) (96% - 100%)    PHYSICAL EXAM  General: NAD  HEENT: PERRLA, EOMOI, clear oropharynx, anicteric sclera, pink conjunctiva  Neck: supple  CV: (+) S1/S2 RRR  Lungs: clear to auscultation, no wheezes or rales  Abdomen: soft, non-tender, non-distended (+) BS  Ext: no clubbing, cyanosis or edema  Skin: no rashes and no petechiae  Neuro: alert and oriented X 3, no focal deficits  Central Line: PICC c/d/i     LABS:             ------                 Diagnosis: FLT3 ITD (-) Acute Myeloid Leukemia    Protocol/Chemo Regimen: S/p Cycle 1 Decitabine (held on day 3) + Venetoclax     Day: 27    Pt endorsed: No overnight events, taking po's    Review of Systems: Denies orthopnea, n/v, abdominal pain, CP, palp's    Pain scale: 0     Diet: Regular   Allergies    morphine (Unknown)    MEDICATIONS  (STANDING):  Biotene Dry Mouth Oral Rinse 5 milliLiter(s) Swish and Spit four times a day  chlorhexidine 2% Cloths 1 Application(s) Topical <User Schedule>  cholecalciferol 2000 Unit(s) Oral daily  cinacalcet 30 milliGRAM(s) Oral daily  escitalopram 5 milliGRAM(s) Oral daily  finasteride 5 milliGRAM(s) Oral daily  folic acid 1 milliGRAM(s) Oral daily  furosemide    Tablet 40 milliGRAM(s) Oral daily  isosorbide   mononitrate ER Tablet (IMDUR) 60 milliGRAM(s) Oral daily  levoFLOXacin  Tablet 250 milliGRAM(s) Oral every 24 hours  lidocaine   Patch 1 Patch Transdermal daily  metoprolol succinate ER 50 milliGRAM(s) Oral daily  polyethylene glycol 3350 17 Gram(s) Oral daily  posaconazole Suspension 200 milliGRAM(s) Oral every 8 hours  sodium chloride 0.9%. 1000 milliLiter(s) (10 mL/Hr) IV Continuous <Continuous>  tamsulosin 0.4 milliGRAM(s) Oral at bedtime  urea Oral Powder 15 Gram(s) Oral every 12 hours  venetoclax 100 milliGRAM(s) Oral <User Schedule>    MEDICATIONS  (PRN):  acetaminophen   Tablet .. 650 milliGRAM(s) Oral every 6 hours PRN Temp greater or equal to 38C (100.4F), Mild Pain (1 - 3)  albuterol/ipratropium for Nebulization 3 milliLiter(s) Nebulizer every 6 hours PRN Shortness of Breath and/or Wheezing  aluminum hydroxide/magnesium hydroxide/simethicone Suspension 30 milliLiter(s) Oral every 4 hours PRN Dyspepsia  calcium carbonate    500 mG (Tums) Chewable 1 Tablet(s) Chew every 4 hours PRN Heartburn  ondansetron Injectable 8 milliGRAM(s) IV Push every 8 hours PRN Nausea and/or Vomiting  senna 2 Tablet(s) Oral at bedtime PRN Constipation  sodium chloride 0.9% lock flush 10 milliLiter(s) IV Push every 1 hour PRN Pre/post blood products, medications, blood draw, and to maintain line patency  sucralfate suspension 1 Gram(s) Oral every 6 hours PRN heart burn      Vital Signs Last 24 Hrs  T(C): 36.4 (15 Jul 2021 05:39), Max: 36.9 (14 Jul 2021 17:44)  T(F): 97.5 (15 Jul 2021 05:39), Max: 98.4 (14 Jul 2021 17:44)  HR: 66 (15 Jul 2021 05:39) (63 - 79)  BP: 134/55 (15 Jul 2021 05:39) (109/47 - 145/70)  BP(mean): --  RR: 18 (15 Jul 2021 05:39) (17 - 18)  SpO2: 96% (15 Jul 2021 05:39) (96% - 100%)    PHYSICAL EXAM  General: NAD  HEENT: PERRLA, EOMOI, clear oropharynx, anicteric sclera, pink conjunctiva  Neck: supple  CV: (+) S1/S2, reg  Lungs: clear to auscultation, no wheezes or rales  Abdomen: soft, non-tender, non-distended (+) BS  Ext: no clubbing, cyanosis or edema  Skin: no rashes and no petechiae  Neuro: alert and oriented X 3, no focal deficits  Central Line: PICC c/d/i     LABS:                        7.2    1.23  )-----------( 15       ( 15 Jul 2021 07:05 )             21.2     15 Jul 2021 07:03    133    |  101    |  40     ----------------------------<  87     3.7     |  20     |  2.12     Ca    8.0        15 Jul 2021 07:03  Phos  3.5       15 Jul 2021 07:03  Mg     1.7       15 Jul 2021 07:03    TPro  6.7    /  Alb  2.7    /  TBili  0.8    /  DBili  x      /  AST  5      /  ALT  6      /  AlkPhos  91     15 Jul 2021 07:03    PT/INR - ( 15 Jul 2021 07:05 )   PT: 15.5 sec;   INR: 1.31 ratio        LIVER FUNCTIONS - ( 15 Jul 2021 07:03 )  Alb: 2.7 g/dL / Pro: 6.7 g/dL / ALK PHOS: 91 U/L / ALT: 6 U/L / AST: 5 U/L / GGT: x

## 2021-07-15 NOTE — PROGRESS NOTE ADULT - ASSESSMENT
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  Nephrology consulted for BRISA on CKD.      # BRISA on CKD  Pt with non-oliguric BRISA on CKD unclear etiology possible pre renal in the setting of anemia and ATN.  On admission sCr elevated at 3.45 (last known sCr 1.1-1.3 in 2018, recent hx CKD unclear recent baseline).  Urinalysis showed protein with trace blood.  Lab noted for serum uric acid 13.3, , phos 4.7, Echo severe LV systolic fxn, CT diffuse patchy airspace disease ?multifocal pna?. Urine electrolytes with Fe Urea of 82.5% suggestive of intrinsic pathology. Urine uric acid/creatinine ratio is < 1. Spot urine TP/CR ratio was 0.25 wnl. Kidney/bladder u/s on this admission was wnl (no hydro). Scr peaked at 3.62 mg/dl on 6/13/21 with downward trend now plateauing. SCr elevated/stable at  2.12 mg/dl today     - Allopurinol discontinued  - PER with 1:320 but speckled pattern   - GN work up including c3 and C4 wnl, serum immunofixation with no monoclonality, anti GBM negative. Parvovirus DNA PCR negative. P ANCA and C ANCA negative, anti PLA2R negative  - Pt will need a kidney biopsy if his platelets are stable. Currently Plts 10K & Hg7.0. Pt will need plts of > 50K for biopsy. Please call IR for scheduling the renal biopsy. Will benefit from a platelet & PRBC transfusion prior.   - monitor BMP/tumor lysis markers (Uric acid/LDH/haptoglobin/LFT), strict I/O, avoid nephrotoxic agents (NSAIDs, PPI, contrast), renally dose medications per GFR.    # Hypercalcemia -resolved  Uncertain etiology. Last ionized calcium improved to 1.14 7/15/21)   PTH was 84, not appropriately suppressed with low 25 vit D & 1,25 vitamin D.  Continue vitamin d supplements. Could be secondary hyperparathyroidism from vitamin D def & CKD.   Continue Sensipar 30 mg PO daily   monitor ionized calcium daily    If you have any questions, please feel free to contact me  Griffin Lawson  Nephrology Fellow  [phone]  (After 5pm or on weekends please page the on-call fellow)

## 2021-07-15 NOTE — PROGRESS NOTE ADULT - PROBLEM SELECTOR PLAN 6
stable   Nephrology following-No indication for HD at this time.   6/15 Normal renal ultrasound  follow ionized calcium daily as per nephrology  6/24 on Sensipar for hypercalcemia   Vitamin D 2000 daily.  7/10 restarted home Lasix 40mg daily

## 2021-07-16 NOTE — PROGRESS NOTE ADULT - PROBLEM SELECTOR PLAN 9
s/p 5vCABG 2012 (LIMA to LAD, SVG to Diag and OM, SVG PDA and RPL), PCI (2 in 2014, 2 in 2015 [facility]), HFrEF s/p CRT-D (2013),  No DAPT or AC given thrombocytopenia.

## 2021-07-16 NOTE — CHART NOTE - NSCHARTNOTEFT_GEN_A_CORE
Nutrition Follow Up Note  Patient seen for: Malnutrition Follow Up     Chart reviewed, events noted. Pt c AML, S/P treatment. Noted pt c BRISA on CKD.     Source: [x] Patient-pt prefers to speak c RD in English       [x] EMR        [] RN        [] Family at bedside       [] Other:    -If unable to interview patient: [] Trach/Vent/BiPAP  [] Disoriented/confused/inappropriate to interview    Diet Order:   Diet, Regular:   Low Fat (LOWFAT)  Supplement Feeding Modality:  Oral  Ensure Clear Cans or Servings Per Day:  3       Frequency:  Daily (07-06-21)    - Is current order appropriate/adequate? [x] Yes  []  No:     - PO intake :   [] >75%  Adequate    [] 50-75%  Fair       [x] <50%  Poor    - Nutrition-related concerns:      -pt reports no appetite at this time, reports he has back pain, made RN aware, pt earlier had refused Lidocaine patch, RN to reassess and offer, made team aware       -pt reports he has not been taking much food in house or from home, however would not provide any food preferences either       -RN and PCA confirm pt c poor PO intake        GI:  Last BM 7/14.   Bowel Regimen? [] Yes   [x] No      Weights: 6/15: 161.1->6/29: 157.3->7/15: 150.7 pounds, wt loss continues    Nutritionally Pertinent MEDICATIONS  (STANDING):  cholecalciferol  cinacalcet  finasteride  folic acid  furosemide    Tablet  furosemide   Injectable  isosorbide   mononitrate ER Tablet (IMDUR)  levoFLOXacin  Tablet  metoprolol succinate ER  phytonadione   Solution  polyethylene glycol 3350  posaconazole DR Tablet  sodium chloride 0.9%.  tamsulosin  urea Oral Powder  venetoclax    Pertinent Labs: 07-16 @ 07:07: Na 135, BUN 37<H>, Cr 2.19<H>, BG 97, K+ 3.6, Phos 3.7, Mg 1.7, Alk Phos 89, ALT/SGPT 5<L>, AST/SGOT 8<L>, HbA1c --    A1C with Estimated Average Glucose Result: 6.4 % (06-12-21 @ 06:09)    Finger Sticks: None pertinent to address at this time.       Skin per nursing documentation: no pressure injuries   Edema: none at this time     Estimated Needs:   [x] no change since previous assessment    Previous Nutrition Diagnosis: severe malnutrition   Nutrition Diagnosis is: [x] ongoing- to improve as pt willing to accept supplements and meal trays     New Nutrition Diagnosis: [x] Not applicable    Nutrition Care Plan:  [x] In Progress  [] Achieved  [] Not applicable    Nutrition Interventions:     Education Provided:       [x] Yes: Reinforced importance of adequate intake, emphasis on protein intake.        Recommendations:      1. Continue c current diet-consider liberalizing diet as feasible.  2. Continue c Ensure Clear.   3. Continue to provide assistance and encouragement c all meals and snacks.     Monitoring and Evaluation:   Continue to monitor nutritional intake, tolerance to diet prescription, weights, labs, skin integrity      RD remains available upon request and will follow up per protocol  Ambar Wiley MS RD CDN [facility], Pager #[phone]
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
## 2021-07-16 NOTE — PROGRESS NOTE ADULT - PROBLEM SELECTOR PLAN 3
Patient waxes and wanes from lethargic and agitated-but more consistently confused  CT head- 6/21 no acute changes. Sinusitis possible. MR if worsening concerns +Improved, pleasantly demented at baseline (see below for earlier in admission MS changes)   Patient waxes and wanes from lethargic and agitated-but more consistently confused  CT head- 6/21 no acute changes. Sinusitis possible. MR if worsening concerns

## 2021-07-16 NOTE — PROGRESS NOTE ADULT - PROBLEM SELECTOR PLAN 1
FLT3 ITD (-) AML   Dacogen held on day 3, 6/21 due to acute change in mental status now back to baseline. Course completed on day 6 (to complete 5 days)   Monitor daily CBC's and transfuse as needed, Monitor daily CMP and replete electrolytes as needed   Strict I's/O's, daily weights, mouth care, anti emetics.   s/p Dacogen 20 mg/m2 x 5 days + Venetoclax. (s/p Venetoclax escalation, now on 100 mg oral daily - while on posaconazole)  Day 21 BM bx on 7/9, quantity insufficient. Repeating today 7/15  Vitmain K (5mg po- 7/15-7/17)   Pt is DNR FLT3 ITD (-) AML   Dacogen held on day 3, 6/21 due to acute change in mental status now back to baseline. Course completed on day 6 (to complete 5 days)   Monitor daily CBC's and transfuse as needed, Monitor daily CMP and replete electrolytes as needed   Strict I's/O's, daily weights, mouth care, anti emetics.   s/p Dacogen 20 mg/m2 x 5 days + Venetoclax. (s/p Venetoclax escalation, now on 100 mg oral daily - while on posaconazole)  Day 21 BM bx on 7/9, quantity insufficient. Repeated 7/16  Vitmain K (5mg po- 7/15-7/17)   Pt is DNR
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
## 2021-07-16 NOTE — PROGRESS NOTE ADULT - SUBJECTIVE AND OBJECTIVE BOX
Diagnosis: FLT3 ITD (-) Acute Myeloid Leukemia    Protocol/Chemo Regimen: S/p Cycle 1 Decitabine (held on day 3) + Venetoclax     Day: 27    Pt endorsed: No overnight events, taking po's    Review of Systems: Denies orthopnea, n/v, abdominal pain, CP, palp's    Pain scale: 0     Diet: Regular   Allergies    morphine (Unknown)      MEDICATIONS  (STANDING):  Biotene Dry Mouth Oral Rinse 5 milliLiter(s) Swish and Spit four times a day  chlorhexidine 2% Cloths 1 Application(s) Topical <User Schedule>  cholecalciferol 2000 Unit(s) Oral daily  cinacalcet 30 milliGRAM(s) Oral daily  escitalopram 5 milliGRAM(s) Oral daily  finasteride 5 milliGRAM(s) Oral daily  folic acid 1 milliGRAM(s) Oral daily  furosemide    Tablet 40 milliGRAM(s) Oral daily  isosorbide   mononitrate ER Tablet (IMDUR) 60 milliGRAM(s) Oral daily  levoFLOXacin  Tablet 250 milliGRAM(s) Oral every 24 hours  lidocaine   Patch 1 Patch Transdermal daily  metoprolol succinate ER 50 milliGRAM(s) Oral daily  phytonadione   Solution 5 milliGRAM(s) Oral daily  polyethylene glycol 3350 17 Gram(s) Oral daily  posaconazole DR Tablet 300 milliGRAM(s) Oral daily  sodium chloride 0.9%. 1000 milliLiter(s) (10 mL/Hr) IV Continuous <Continuous>  tamsulosin 0.4 milliGRAM(s) Oral at bedtime  urea Oral Powder 15 Gram(s) Oral every 12 hours  venetoclax 100 milliGRAM(s) Oral <User Schedule>    MEDICATIONS  (PRN):  acetaminophen   Tablet .. 650 milliGRAM(s) Oral every 6 hours PRN Temp greater or equal to 38C (100.4F), Mild Pain (1 - 3)  albuterol/ipratropium for Nebulization 3 milliLiter(s) Nebulizer every 6 hours PRN Shortness of Breath and/or Wheezing  aluminum hydroxide/magnesium hydroxide/simethicone Suspension 30 milliLiter(s) Oral every 4 hours PRN Dyspepsia  calcium carbonate    500 mG (Tums) Chewable 1 Tablet(s) Chew every 4 hours PRN Heartburn  ondansetron Injectable 8 milliGRAM(s) IV Push every 8 hours PRN Nausea and/or Vomiting  senna 2 Tablet(s) Oral at bedtime PRN Constipation  sodium chloride 0.9% lock flush 10 milliLiter(s) IV Push every 1 hour PRN Pre/post blood products, medications, blood draw, and to maintain line patency  sucralfate suspension 1 Gram(s) Oral every 6 hours PRN heart burn    Vital Signs Last 24 Hrs  T(C): 36.3 (16 Jul 2021 05:25), Max: 36.8 (15 Jul 2021 13:20)  T(F): 97.3 (16 Jul 2021 05:25), Max: 98.3 (15 Jul 2021 13:20)  HR: 69 (16 Jul 2021 05:25) (63 - 79)  BP: 133/58 (16 Jul 2021 05:25) (115/52 - 151/69)  BP(mean): --  RR: 18 (16 Jul 2021 05:25) (16 - 18)  SpO2: 96% (16 Jul 2021 05:25) (96% - 100%)    PHYSICAL EXAM  General: NAD  HEENT: PERRLA, EOMOI, clear oropharynx, anicteric sclera, pink conjunctiva  Neck: supple  CV: (+) S1/S2, reg  Lungs: clear to auscultation, no wheezes or rales  Abdomen: soft, non-tender, non-distended (+) BS  Ext: no clubbing, cyanosis or edema  Skin: no rashes and no petechiae  Neuro: alert and oriented X 3, no focal deficits  Central Line: PICC c/d/i     LABS:                        7.2    1.23  )-----------( 15       ( 15 Jul 2021 07:05 )             21.2     15 Jul 2021 07:03    133    |  101    |  40     ----------------------------<  87     3.7     |  20     |  2.12     Ca    8.0        15 Jul 2021 07:03  Phos  3.5       15 Jul 2021 07:03  Mg     1.7       15 Jul 2021 07:03    TPro  6.7    /  Alb  2.7    /  TBili  0.8    /  DBili  x      /  AST  5      /  ALT  6      /  AlkPhos  91     15 Jul 2021 07:03    PT/INR - ( 15 Jul 2021 07:05 )   PT: 15.5 sec;   INR: 1.31 ratio        LIVER FUNCTIONS - ( 15 Jul 2021 07:03 )  Alb: 2.7 g/dL / Pro: 6.7 g/dL / ALK PHOS: 91 U/L / ALT: 6 U/L / AST: 5 U/L / GGT: x                          Diagnosis: FLT3 ITD (-) Acute Myeloid Leukemia    Protocol/Chemo Regimen: S/p Cycle 1 Decitabine (held on day 3) + Venetoclax     Day: 28    Pt endorsed: No overnight events, taking po's    Review of Systems: Denies LBP, orthopnea, n/v, abdominal pain, CP, palp's    Pain scale: 0     Diet: Regular   Allergies    morphine (Unknown)      MEDICATIONS  (STANDING):  Biotene Dry Mouth Oral Rinse 5 milliLiter(s) Swish and Spit four times a day  chlorhexidine 2% Cloths 1 Application(s) Topical <User Schedule>  cholecalciferol 2000 Unit(s) Oral daily  cinacalcet 30 milliGRAM(s) Oral daily  escitalopram 5 milliGRAM(s) Oral daily  finasteride 5 milliGRAM(s) Oral daily  folic acid 1 milliGRAM(s) Oral daily  furosemide    Tablet 40 milliGRAM(s) Oral daily  isosorbide   mononitrate ER Tablet (IMDUR) 60 milliGRAM(s) Oral daily  levoFLOXacin  Tablet 250 milliGRAM(s) Oral every 24 hours  lidocaine   Patch 1 Patch Transdermal daily  metoprolol succinate ER 50 milliGRAM(s) Oral daily  phytonadione   Solution 5 milliGRAM(s) Oral daily  polyethylene glycol 3350 17 Gram(s) Oral daily  posaconazole DR Tablet 300 milliGRAM(s) Oral daily  sodium chloride 0.9%. 1000 milliLiter(s) (10 mL/Hr) IV Continuous <Continuous>  tamsulosin 0.4 milliGRAM(s) Oral at bedtime  urea Oral Powder 15 Gram(s) Oral every 12 hours  venetoclax 100 milliGRAM(s) Oral <User Schedule>    MEDICATIONS  (PRN):  acetaminophen   Tablet .. 650 milliGRAM(s) Oral every 6 hours PRN Temp greater or equal to 38C (100.4F), Mild Pain (1 - 3)  albuterol/ipratropium for Nebulization 3 milliLiter(s) Nebulizer every 6 hours PRN Shortness of Breath and/or Wheezing  aluminum hydroxide/magnesium hydroxide/simethicone Suspension 30 milliLiter(s) Oral every 4 hours PRN Dyspepsia  calcium carbonate    500 mG (Tums) Chewable 1 Tablet(s) Chew every 4 hours PRN Heartburn  ondansetron Injectable 8 milliGRAM(s) IV Push every 8 hours PRN Nausea and/or Vomiting  senna 2 Tablet(s) Oral at bedtime PRN Constipation  sodium chloride 0.9% lock flush 10 milliLiter(s) IV Push every 1 hour PRN Pre/post blood products, medications, blood draw, and to maintain line patency  sucralfate suspension 1 Gram(s) Oral every 6 hours PRN heart burn    Vital Signs Last 24 Hrs  T(C): 36.3 (16 Jul 2021 05:25), Max: 36.8 (15 Jul 2021 13:20)  T(F): 97.3 (16 Jul 2021 05:25), Max: 98.3 (15 Jul 2021 13:20)  HR: 69 (16 Jul 2021 05:25) (63 - 79)  BP: 133/58 (16 Jul 2021 05:25) (115/52 - 151/69)  BP(mean): --  RR: 18 (16 Jul 2021 05:25) (16 - 18)  SpO2: 96% (16 Jul 2021 05:25) (96% - 100%)    PHYSICAL EXAM  General: NAD  HEENT: PERRLA, EOMOI, clear oropharynx, anicteric sclera, pink conjunctiva  Neck: supple  CV: (+) S1/S2, reg  Lungs: clear to auscultation, no wheezes or rales  Abdomen: soft, non-tender, non-distended (+) BS  Ext: no clubbing, cyanosis or edema  Skin: no rashes and no petechiae  Neuro: alert and oriented X 3, no focal deficits  Central Line: PICC c/d/i     LABS:                          6.9    1.01  )-----------( 14       ( 16 Jul 2021 07:07 )             20.1     16 Jul 2021 07:07    135    |  102    |  37     ----------------------------<  97     3.6     |  20     |  2.19     Ca    8.1        16 Jul 2021 07:07  Phos  3.7       16 Jul 2021 07:07  Mg     1.7       16 Jul 2021 07:07    TPro  6.6    /  Alb  2.8    /  TBili  0.7    /  DBili  x      /  AST  8      /  ALT  5      /  AlkPhos  89     16 Jul 2021 07:07    PT/INR - ( 15 Jul 2021 07:05 )   PT: 15.5 sec;   INR: 1.31 ratio        LIVER FUNCTIONS - ( 16 Jul 2021 07:07 )  Alb: 2.8 g/dL / Pro: 6.6 g/dL / ALK PHOS: 89 U/L / ALT: 5 U/L / AST: 8 U/L / GGT: x

## 2021-07-17 NOTE — PROGRESS NOTE ADULT - PROBLEM SELECTOR PLAN 6
Protocol For Nbuvb: The patient received NBUVB. stable   Nephrology following-No indication for HD at this time.   6/15 Normal renal ultrasound  follow ionized calcium daily as per nephrology  6/24 on Sensipar for hypercalcemia   Vitamin D 2000 daily.  7/10 restarted home Lasix 40mg daily

## 2021-07-17 NOTE — PROGRESS NOTE ADULT - PROBLEM SELECTOR PLAN 9
s/p 5vCABG 2012 (LIMA to LAD, SVG to Diag and OM, SVG PDA and RPL), PCI (2 in 2014, 2 in 2015 [facility]), HFrEF s/p CRT-D (2013),  No DAPT or AC given thrombocytopenia.

## 2021-07-17 NOTE — PROGRESS NOTE ADULT - ATTENDING COMMENTS
79 yo Sudanese speaking male transferred from [place] to [facility] for AML with myelomonocytic features   Bone marrow biopsy done 6/14 -Acute myeloid leukemia  NGS [id number] fusion, U2AF1 mut  Dacogen/Venetoclax C1D28    Echo- severely depressed LV function, EF 25-30%.  Continue lasix 40 mg/d po  Cr stable ~2.0, ? baseline Cr -renal following, appreciate recs - restarted lasix 40 mg daily  Nephro recommending renal biopsy, await plt count recovery > 50k  Na 135 - as per renal, on urea 15 gm po 2x/d, Na was 128  on 7/13  Hypercalcemia noted, consistent with primary hyperparathyroidism, appreciate endo/renal recs, continue sensipar, calcium borderline low-normal now  Unclear baseline mental status, per team and floor nurse, CTH negative, cultures negative   Afebrile on  Levaquin and posa  Transaminitis resolved, cholecystitis improving with conservative management  bone marrow from 7/9/21 non dx, repeated 7/15, hold cycle 2 until marrow status known from 7/15  Receiving transfusional support as needed.  Had palliative care eval -will need discussion w/ family regarding diagnosis/treatment; now DNR/DNI, Pt expresses wishes to go back to [place]. 81 yo Croatian speaking male transferred from [place] to [facility] for AML with myelomonocytic features   Bone marrow biopsy done 6/14 -Acute myeloid leukemia  NGS [id number] fusion, U2AF1 mut  Dacogen/Venetoclax cycle 1 day 29    Echo- severely depressed LV function, EF 25-30%.  Continue lasix 40 mg/d po  Cr stable ~2.0, ? baseline Cr -renal following, appreciate recs - restarted lasix 40 mg daily  Nephro recommending renal biopsy, await plt count recovery > 50k  Na 135 - as per renal, on urea 15 gm po 2x/d, Na was 128  on 7/13  Hypercalcemia noted, consistent with primary hyperparathyroidism, appreciate endo/renal recs, continue sensipar, calcium borderline low-normal now  Unclear baseline mental status, per team and floor nurse, CTH negative, cultures negative   Afebrile on  Levaquin and posa  Transaminitis resolved, cholecystitis improving with conservative management  bone marrow from 7/9/21 non dx, repeated 7/15, hold cycle 2 until marrow status known from 7/15  Receiving transfusional support as needed.  Had palliative care eval -will need discussion w/ family regarding diagnosis/treatment; DNR/DNI, Pt expresses wishes to go back to [place]. 79 yo Telugu speaking male transferred from [place] to [facility] for AML with myelomonocytic features   Bone marrow biopsy done 6/14 -Acute myeloid leukemia  NGS [id number] fusion, U2AF1 mut  Dacogen/Venetoclax cycle 1 day 29    Echo- severely depressed LV function, EF 25-30%.  Continue lasix 40 mg/d po  Cr stable ~2.0, ? baseline Cr -renal following, appreciate recs - restarted lasix 40 mg daily  Nephro recommending renal biopsy, await plt count recovery > 50k  Na 135 - as per renal, on urea 15 gm po 2x/d, Na was 128  on 7/13  Hypercalcemia noted, consistent with primary hyperparathyroidism, appreciate endo/renal recs, continue sensipar, calcium borderline low-normal now  Unclear baseline mental status, per team and floor nurse, CTH negative, cultures negative   Afebrile on  Levaquin and posa  Transaminitis resolved, cholecystitis improving with conservative management  bone marrow from 7/9/21 non dx, repeated 7/15, hold cycle 2 until marrow status known from 7/15  Receiving transfusional support as needed.  Had palliative care eval -will need discussion w/ family regarding diagnosis/treatment; DNR/DNI, Pt expresses wishes to go back to [place].  Discussed updates with family today 7/17

## 2021-07-17 NOTE — PROGRESS NOTE ADULT - PROBLEM SELECTOR PLAN 3
+Improved, pleasantly demented at baseline (see below for earlier in admission MS changes)   Patient waxes and wanes from lethargic and agitated-but more consistently confused  CT head- 6/21 no acute changes. Sinusitis possible. MR if worsening concerns
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
## 2021-07-17 NOTE — PROGRESS NOTE ADULT - PROBLEM SELECTOR PLAN 7
likely SIADH, slight increase today 7/14   Started Urea 15gm twice daily   Monitor Na+ daily  Nephro following

## 2021-07-17 NOTE — PROGRESS NOTE ADULT - SUBJECTIVE AND OBJECTIVE BOX
Diagnosis: FLT3 ITD (-) Acute Myeloid Leukemia    Protocol/Chemo Regimen: S/p Cycle 1 Decitabine (held on day 3) + Venetoclax     Day: 29    Pt endorsed: No acute complaints     Review of Systems: Patient denies  nausea, vomiting, diarrhea, abdominal pain, SOB, chest pain and headache.    Pain scale: 0     Diet: Regular   Allergies    morphine (Unknown)      MEDICATIONS  (STANDING):  Biotene Dry Mouth Oral Rinse 5 milliLiter(s) Swish and Spit four times a day  chlorhexidine 2% Cloths 1 Application(s) Topical <User Schedule>  cholecalciferol 2000 Unit(s) Oral daily  cinacalcet 30 milliGRAM(s) Oral daily  escitalopram 5 milliGRAM(s) Oral daily  finasteride 5 milliGRAM(s) Oral daily  folic acid 1 milliGRAM(s) Oral daily  furosemide    Tablet 40 milliGRAM(s) Oral daily  isosorbide   mononitrate ER Tablet (IMDUR) 60 milliGRAM(s) Oral daily  levoFLOXacin  Tablet 250 milliGRAM(s) Oral every 24 hours  lidocaine   Patch 1 Patch Transdermal daily  metoprolol succinate ER 50 milliGRAM(s) Oral daily  phytonadione   Solution 5 milliGRAM(s) Oral daily  polyethylene glycol 3350 17 Gram(s) Oral daily  posaconazole DR Tablet 300 milliGRAM(s) Oral daily  sodium chloride 0.9%. 1000 milliLiter(s) (10 mL/Hr) IV Continuous <Continuous>  tamsulosin 0.4 milliGRAM(s) Oral at bedtime  urea Oral Powder 15 Gram(s) Oral every 12 hours  venetoclax 100 milliGRAM(s) Oral <User Schedule>    MEDICATIONS  (PRN):  acetaminophen   Tablet .. 650 milliGRAM(s) Oral every 6 hours PRN Temp greater or equal to 38C (100.4F), Mild Pain (1 - 3)  albuterol/ipratropium for Nebulization 3 milliLiter(s) Nebulizer every 6 hours PRN Shortness of Breath and/or Wheezing  aluminum hydroxide/magnesium hydroxide/simethicone Suspension 30 milliLiter(s) Oral every 4 hours PRN Dyspepsia  calcium carbonate    500 mG (Tums) Chewable 1 Tablet(s) Chew every 4 hours PRN Heartburn  ondansetron Injectable 8 milliGRAM(s) IV Push every 8 hours PRN Nausea and/or Vomiting  senna 2 Tablet(s) Oral at bedtime PRN Constipation  sodium chloride 0.9% lock flush 10 milliLiter(s) IV Push every 1 hour PRN Pre/post blood products, medications, blood draw, and to maintain line patency  sucralfate suspension 1 Gram(s) Oral every 6 hours PRN heart burn    Vital Signs Last 24 Hrs  T(C): 36.4 (17 Jul 2021 05:42), Max: 36.6 (16 Jul 2021 13:10)  T(F): 97.6 (17 Jul 2021 05:42), Max: 97.9 (16 Jul 2021 17:48)  HR: 72 (17 Jul 2021 05:42) (71 - 82)  BP: 138/61 (17 Jul 2021 05:42) (122/48 - 141/62)  BP(mean): --  RR: 18 (17 Jul 2021 05:42) (16 - 18)  SpO2: 99% (17 Jul 2021 05:42) (96% - 100%)    PHYSICAL EXAM  General: NAD  HEENT: clear oropharynx, anicteric sclera  CV: (+) S1/S2, RRR  Lungs: clear to auscultation, no wheezes or rales  Abdomen: soft, non-tender, non-distended (+) BS  Ext: no edema  Skin: no rash  Neuro: alert and oriented X 2-3  Central Line: PICC CDI     LABS:                          7.7    1.22  )-----------( 14       ( 17 Jul 2021 07:16 )             22.5         Mean Cell Volume : 84.6 fl  Mean Cell Hemoglobin : 28.9 pg  Mean Cell Hemoglobin Concentration : 34.2 gm/dL  Auto Neutrophil # : 0.40 K/uL  Auto Lymphocyte # : 0.78 K/uL  Auto Monocyte # : 0.04 K/uL  Auto Eosinophil # : 0.00 K/uL  Auto Basophil # : 0.00 K/uL  Auto Neutrophil % : 32.8 %  Auto Lymphocyte % : 63.7 %  Auto Monocyte % : 3.5 %  Auto Eosinophil % : 0.0 %  Auto Basophil % : 0.0 %      07-17    134<L>  |  101  |  36<H>  ----------------------------<  100<H>  3.3<L>   |  20<L>  |  2.15<H>    Ca    8.2<L>      17 Jul 2021 07:13  Phos  4.0     07-17  Mg     1.7     07-17    TPro  6.6  /  Alb  2.7<L>  /  TBili  1.0  /  DBili  x   /  AST  8<L>  /  ALT  5<L>  /  AlkPhos  95  07-17            Uric Acid --

## 2021-07-17 NOTE — PROGRESS NOTE ADULT - PROBLEM SELECTOR PLAN 1
FLT3 ITD (-) AML   Dacogen held on day 3, 6/21 due to acute change in mental status now back to baseline. Course completed on day 6 (to complete 5 days)   Monitor daily CBC's and transfuse as needed, Monitor daily CMP and replete electrolytes as needed   Hypokalemia Potassium Chloride 20 Meq IV x 1  Strict I's/O's, daily weights, mouth care, anti emetics.   s/p Dacogen 20 mg/m2 x 5 days + Venetoclax. (s/p Venetoclax escalation, now on 100 mg oral daily - while on posaconazole)  Day 21 BM bx on 7/9, quantity insufficient. Repeated 7/16 follow up resulst  Vitmain K (5mg po- 7/15-7/17)   Pt is DNR

## 2021-07-18 NOTE — PROGRESS NOTE ADULT - PROBLEM SELECTOR PLAN 6
stable   Nephrology following-No indication for HD at this time.   6/15 Normal renal ultrasound  follow ionized calcium daily as per nephrology  6/24 on Sensipar for hypercalcemia   Vitamin D 2000 daily.  7/10 restarted home Lasix 40mg daily stable   Nephrology following-No indication for HD at this time.   6/15 Normal renal ultrasound  follow ionized calcium daily as per nephrology  6/24 on Sensipar for hypercalcemia   Vitamin D 2000 daily.  7/10 restarted home Lasix 40mg daily  7/18 Decreased Lasix 20mg qd for decreased po's + rising Cr

## 2021-07-18 NOTE — PROGRESS NOTE ADULT - SUBJECTIVE AND OBJECTIVE BOX
Diagnosis: FLT3 ITD (-) Acute Myeloid Leukemia    Protocol/Chemo Regimen: S/p Cycle 1 Decitabine (held on day 3) + Venetoclax     Day: 30    Pt endorsed:     Review of Systems:     Pain scale: 0     Diet: Regular   Allergies    morphine (Unknown)      MEDICATIONS  (STANDING):  Biotene Dry Mouth Oral Rinse 5 milliLiter(s) Swish and Spit four times a day  chlorhexidine 2% Cloths 1 Application(s) Topical <User Schedule>  cholecalciferol 2000 Unit(s) Oral daily  cinacalcet 30 milliGRAM(s) Oral daily  escitalopram 5 milliGRAM(s) Oral daily  finasteride 5 milliGRAM(s) Oral daily  folic acid 1 milliGRAM(s) Oral daily  furosemide    Tablet 40 milliGRAM(s) Oral daily  furosemide   Injectable 20 milliGRAM(s) IV Push once  isosorbide   mononitrate ER Tablet (IMDUR) 60 milliGRAM(s) Oral daily  levoFLOXacin  Tablet 250 milliGRAM(s) Oral every 24 hours  lidocaine   Patch 1 Patch Transdermal daily  metoprolol succinate ER 50 milliGRAM(s) Oral daily  polyethylene glycol 3350 17 Gram(s) Oral daily  posaconazole DR Tablet 300 milliGRAM(s) Oral daily  sodium chloride 0.9%. 1000 milliLiter(s) (10 mL/Hr) IV Continuous <Continuous>  tamsulosin 0.4 milliGRAM(s) Oral at bedtime  urea Oral Powder 15 Gram(s) Oral every 12 hours  venetoclax 100 milliGRAM(s) Oral <User Schedule>    MEDICATIONS  (PRN):  acetaminophen   Tablet .. 650 milliGRAM(s) Oral every 6 hours PRN Temp greater or equal to 38C (100.4F), Mild Pain (1 - 3)  albuterol/ipratropium for Nebulization 3 milliLiter(s) Nebulizer every 6 hours PRN Shortness of Breath and/or Wheezing  aluminum hydroxide/magnesium hydroxide/simethicone Suspension 30 milliLiter(s) Oral every 4 hours PRN Dyspepsia  calcium carbonate    500 mG (Tums) Chewable 1 Tablet(s) Chew every 4 hours PRN Heartburn  ondansetron Injectable 8 milliGRAM(s) IV Push every 8 hours PRN Nausea and/or Vomiting  senna 2 Tablet(s) Oral at bedtime PRN Constipation  sodium chloride 0.9% lock flush 10 milliLiter(s) IV Push every 1 hour PRN Pre/post blood products, medications, blood draw, and to maintain line patency  sucralfate suspension 1 Gram(s) Oral every 6 hours PRN heart burn        Vital Signs Last 24 Hrs  T(C): 36.7 (18 Jul 2021 05:32), Max: 36.7 (18 Jul 2021 00:27)  T(F): 98 (18 Jul 2021 05:32), Max: 98.1 (18 Jul 2021 00:27)  HR: 71 (18 Jul 2021 05:32) (58 - 81)  BP: 137/63 (18 Jul 2021 05:32) (124/56 - 137/63)  BP(mean): --  RR: 18 (18 Jul 2021 05:32) (18 - 18)  SpO2: 96% (18 Jul 2021 05:32) (96% - 100%)    PHYSICAL EXAM  General: NAD  HEENT: clear oropharynx, anicteric sclera  CV: (+) S1/S2, RRR  Lungs: clear to auscultation, no wheezes or rales  Abdomen: soft, non-tender, non-distended (+) BS  Ext: no edema  Skin: no rash  Neuro: alert and oriented X 2-3  Central Line: PICC CDI     LABS:               --------                                         Diagnosis: FLT3 ITD (-) Acute Myeloid Leukemia    Protocol/Chemo Regimen: S/p Cycle 1 Decitabine (held on day 3) + Venetoclax     Day: 30    Pt endorsed: No overnight events, taking little po's    Review of Systems: Denies n/v, abdominal pain, orthopnea, SOB     Pain scale: 0     Diet: Regular   Allergies    morphine (Unknown)      MEDICATIONS  (STANDING):  Biotene Dry Mouth Oral Rinse 5 milliLiter(s) Swish and Spit four times a day  chlorhexidine 2% Cloths 1 Application(s) Topical <User Schedule>  cholecalciferol 2000 Unit(s) Oral daily  cinacalcet 30 milliGRAM(s) Oral daily  escitalopram 5 milliGRAM(s) Oral daily  finasteride 5 milliGRAM(s) Oral daily  folic acid 1 milliGRAM(s) Oral daily  isosorbide   mononitrate ER Tablet (IMDUR) 60 milliGRAM(s) Oral daily  levoFLOXacin  Tablet 250 milliGRAM(s) Oral every 24 hours  lidocaine   Patch 1 Patch Transdermal daily  metoprolol succinate ER 50 milliGRAM(s) Oral daily  polyethylene glycol 3350 17 Gram(s) Oral daily  posaconazole DR Tablet 300 milliGRAM(s) Oral daily  tamsulosin 0.4 milliGRAM(s) Oral at bedtime  urea Oral Powder 15 Gram(s) Oral every 12 hours  venetoclax 100 milliGRAM(s) Oral <User Schedule>    MEDICATIONS  (PRN):  acetaminophen   Tablet .. 650 milliGRAM(s) Oral every 6 hours PRN Temp greater or equal to 38C (100.4F), Mild Pain (1 - 3)  albuterol/ipratropium for Nebulization 3 milliLiter(s) Nebulizer every 6 hours PRN Shortness of Breath and/or Wheezing  aluminum hydroxide/magnesium hydroxide/simethicone Suspension 30 milliLiter(s) Oral every 4 hours PRN Dyspepsia  calcium carbonate    500 mG (Tums) Chewable 1 Tablet(s) Chew every 4 hours PRN Heartburn  ondansetron Injectable 8 milliGRAM(s) IV Push every 8 hours PRN Nausea and/or Vomiting  senna 2 Tablet(s) Oral at bedtime PRN Constipation  sodium chloride 0.9% lock flush 10 milliLiter(s) IV Push every 1 hour PRN Pre/post blood products, medications, blood draw, and to maintain line patency  sucralfate suspension 1 Gram(s) Oral every 6 hours PRN heart burn      Vital Signs Last 24 Hrs  T(C): 36.7 (18 Jul 2021 05:32), Max: 36.7 (18 Jul 2021 00:27)  T(F): 98 (18 Jul 2021 05:32), Max: 98.1 (18 Jul 2021 00:27)  HR: 71 (18 Jul 2021 05:32) (58 - 81)  BP: 137/63 (18 Jul 2021 05:32) (124/56 - 137/63)  BP(mean): --  RR: 18 (18 Jul 2021 05:32) (18 - 18)  SpO2: 96% (18 Jul 2021 05:32) (96% - 100%)    PHYSICAL EXAM  General: NAD  HEENT: clear oropharynx, anicteric sclera  CV: (+) S1/S2, RRR  Lungs: clear to auscultation, no wheezes or rales  Abdomen: soft, non-tender, non-distended (+) BS  Ext: no edema  Skin: no rash  Neuro: alert and oriented X 2-3  Central Line: PICC CDI     LABS:               7.5    1.23  )-----------( 14       ( 18 Jul 2021 07:10 )             22.6     18 Jul 2021 07:10    135    |  102    |  38     ----------------------------<  106    3.6     |  19     |  2.25     Ca    8.2        18 Jul 2021 07:10  Phos  3.6       18 Jul 2021 07:10  Mg     1.7       18 Jul 2021 07:10    TPro  6.9    /  Alb  2.6    /  TBili  0.8    /  DBili  x      /  AST  9      /  ALT  6      /  AlkPhos  98     18 Jul 2021 07:10      LIVER FUNCTIONS - ( 18 Jul 2021 07:10 )  Alb: 2.6 g/dL / Pro: 6.9 g/dL / ALK PHOS: 98 U/L / ALT: 6 U/L / AST: 9 U/L / GGT: x

## 2021-07-18 NOTE — PROGRESS NOTE ADULT - PROBLEM SELECTOR PLAN 1
FLT3 ITD (-) AML   Dacogen held on day 3, 6/21 due to acute change in mental status now back to baseline. Course completed on day 6 (to complete 5 days)   Monitor daily CBC's and transfuse as needed, Monitor daily CMP and replete electrolytes as needed   Hypokalemia Potassium Chloride 20 Meq IV x 1  Strict I's/O's, daily weights, mouth care, anti emetics.   s/p Dacogen 20 mg/m2 x 5 days + Venetoclax. (s/p Venetoclax escalation, now on 100 mg oral daily - while on posaconazole)  Day 21 BM bx on 7/9, quantity insufficient. Repeated 7/16 follow up resulst  Vitmain K (5mg po- 7/15-7/17)   Pt is DNR FLT3 ITD (-) AML   Dacogen held on day 3, 6/21 due to acute change in mental status now back to baseline. Course completed on day 6 (to complete 5 days)   Monitor daily CBC's and transfuse as needed, Monitor daily CMP and replete electrolytes as needed   Hypokalemia Potassium Chloride 20 Meq IV x 1  Strict I's/O's, daily weights, mouth care, anti emetics.   s/p Dacogen 20 mg/m2 x 5 days + Venetoclax. (s/p Venetoclax escalation, now on 100 mg oral daily - while on posaconazole)  Day 21 BM bx on 7/9, quantity insufficient. Repeated 7/16 follow up results  s/p Vitamain K (5mg po- 7/15-7/17)   Pt is DNR
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
## 2021-07-19 NOTE — PROGRESS NOTE ADULT - PROBLEM SELECTOR PLAN 6
stable   Nephrology following-No indication for HD at this time.   6/15 Normal renal ultrasound  follow ionized calcium daily as per nephrology  6/24 on Sensipar for hypercalcemia   Vitamin D 2000 daily.  7/10 restarted home Lasix 40mg daily  7/18 Decreased Lasix 20mg qd for decreased po's + rising Cr
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
## 2021-07-19 NOTE — PROGRESS NOTE ADULT - SUBJECTIVE AND OBJECTIVE BOX
[facility] DIVISION OF KIDNEY DISEASES AND HYPERTENSION -- FOLLOW UP NOTE  --------------------------------------------------------------------------------  Chief Complaint:    24 hour events/subjective:  BRISA on CKD, crt stable      PAST HISTORY  --------------------------------------------------------------------------------  No significant changes to PMH, PSH, FHx, SHx, unless otherwise noted    ALLERGIES & MEDICATIONS  --------------------------------------------------------------------------------  Allergies    morphine (Unknown)    Intolerances      Standing Inpatient Medications  Biotene Dry Mouth Oral Rinse 5 milliLiter(s) Swish and Spit four times a day  chlorhexidine 2% Cloths 1 Application(s) Topical <User Schedule>  cholecalciferol 2000 Unit(s) Oral daily  cinacalcet 30 milliGRAM(s) Oral daily  escitalopram 5 milliGRAM(s) Oral daily  finasteride 5 milliGRAM(s) Oral daily  folic acid 1 milliGRAM(s) Oral daily  furosemide    Tablet 20 milliGRAM(s) Oral daily  isosorbide   mononitrate ER Tablet (IMDUR) 60 milliGRAM(s) Oral daily  levoFLOXacin  Tablet 250 milliGRAM(s) Oral every 24 hours  lidocaine   Patch 1 Patch Transdermal daily  metoprolol succinate ER 50 milliGRAM(s) Oral daily  polyethylene glycol 3350 17 Gram(s) Oral daily  posaconazole DR Tablet 300 milliGRAM(s) Oral daily  tamsulosin 0.4 milliGRAM(s) Oral at bedtime  urea Oral Powder 15 Gram(s) Oral every 12 hours  venetoclax 100 milliGRAM(s) Oral <User Schedule>    PRN Inpatient Medications  acetaminophen   Tablet .. 650 milliGRAM(s) Oral every 6 hours PRN  albuterol/ipratropium for Nebulization 3 milliLiter(s) Nebulizer every 6 hours PRN  aluminum hydroxide/magnesium hydroxide/simethicone Suspension 30 milliLiter(s) Oral every 4 hours PRN  calcium carbonate    500 mG (Tums) Chewable 1 Tablet(s) Chew every 4 hours PRN  ondansetron Injectable 8 milliGRAM(s) IV Push every 8 hours PRN  senna 2 Tablet(s) Oral at bedtime PRN  sodium chloride 0.9% lock flush 10 milliLiter(s) IV Push every 1 hour PRN  sucralfate suspension 1 Gram(s) Oral every 6 hours PRN      REVIEW OF SYSTEMS  --------------------------------------------------------------------------------  Gen: No weight changes, fatigue, fevers/chills, weakness  Skin: No rashes  Head/Eyes/Ears/Mouth: No headache; Normal hearing; Normal vision w/o blurriness; No sinus pain/discomfort, sore throat  Respiratory: No dyspnea, cough, wheezing, hemoptysis  CV: No chest pain, PND, orthopnea  GI: No abdominal pain, diarrhea, constipation, nausea, vomiting, melena, hematochezia  : No increased frequency, dysuria, hematuria, nocturia  MSK: No joint pain/swelling; no back pain; no edema  Neuro: No dizziness/lightheadedness, weakness, seizures, numbness, tingling  Heme: No easy bruising or bleeding  Endo: No heat/cold intolerance  Psych: No significant nervousness, anxiety, stress, depression    All other systems were reviewed and are negative, except as noted.    VITALS/PHYSICAL EXAM  --------------------------------------------------------------------------------  T(C): 36.7 (07-19-21 @ 09:05), Max: 37.1 (07-18-21 @ 21:40)  HR: 60 (07-19-21 @ 12:16) (57 - 75)  BP: 120/58 (07-19-21 @ 12:16) (114/60 - 136/65)  RR: 18 (07-19-21 @ 12:16) (18 - 18)  SpO2: 99% (07-19-21 @ 12:16) (96% - 100%)  Wt(kg): --        07-18-21 @ 07:01  -  07-19-21 @ 07:00  --------------------------------------------------------  IN: 995 mL / OUT: 850 mL / NET: 145 mL    PHYSICAL EXAM: vital signs as above  in no apparent distress  Neck: Supple, no JVD,    Lungs: no rhonchi, no wheeze, no crackles  CVS: S1 S2 no M/R/G  Abdomen: no tenderness, no organomegaly, BS present  Neuro: Grossly intact  Skin: warm, dry  Ext: no cyanosis or clubbing, no edema      LABS/STUDIES  --------------------------------------------------------------------------------              7.6    1.22  >-----------<  15       [07-19-21 @ 07:05]              22.3     132  |  99  |  37  ----------------------------<  113      [07-19-21 @ 07:13]  3.8   |  20  |  2.23        Ca     8.3     [07-19-21 @ 07:13]      iCa    1.12     [07-19 @ 07:07]      Mg     2.1     [07-19-21 @ 07:13]      Phos  4.0     [07-19-21 @ 07:13]    TPro  7.1  /  Alb  2.7  /  TBili  0.8  /  DBili  x   /  AST  10  /  ALT  5   /  AlkPhos  103  [07-19-21 @ 07:13]    PT/INR: PT 14.9 , INR 1.26       [07-19-21 @ 07:06]          [07-19-21 @ 07:13]    Creatinine Trend:  SCr 2.23 [07-19 @ 07:13]  SCr 2.25 [07-18 @ 07:10]  SCr 2.15 [07-17 @ 07:13]  SCr 2.19 [07-16 @ 07:07]  SCr 2.12 [07-15 @ 07:03]        Iron 155, TIBC 237, %sat 65      [06-12-21 @ 03:36]  Ferritin 827      [06-12-21 @ 04:44]  PTH -- (Ca 9.8)      [06-18-21 @ 10:41]   84  Vitamin D (25OH) 21.6      [06-18-21 @ 10:41]  HbA1c 5.8      [11-13-17 @ 10:29]

## 2021-07-19 NOTE — PROGRESS NOTE ADULT - ATTENDING COMMENTS
79 yo Congolese speaking male transferred from [place] to [facility] for AML with myelomonocytic features   Bone marrow biopsy done 6/14 -Acute myeloid leukemia  NGS [id number] fusion, U2AF1 mut  Dacogen/Venetoclax cycle 1 day 30  awaiting 7/15 marrow results and count recovery    Echo- severely depressed LV function, EF 25-30%.  Continue lasix 40 mg/d po  Cr stable ~2.0, ? baseline Cr -renal following, appreciate recs - reduced lasix down to 20 mg, poor oral intake  Nephro recommending renal biopsy, not a priority at this time  Na 135 - as per renal, on urea 15 gm po 2x/d, Na was 128  on 7/13  Hypercalcemia noted, consistent with primary hyperparathyroidism, appreciate endo/renal recs, continue sensipar, calcium borderline low-normal now  Unclear baseline mental status, per team and floor nurse, CTH negative, cultures negative   Afebrile on  Levaquin and posa  Transaminitis resolved, cholecystitis improving with conservative management  bone marrow from 7/9/21 non dx, repeated 7/15, hold cycle 2 until marrow status known from 7/15  Receiving transfusional support as needed.  Had palliative care eval -will need discussion w/ family regarding diagnosis/treatment; DNR/DNI, [facility]  Discussed updates with family today 7/17 79 yo Kenyan speaking male transferred from [place] to [facility] for AML with myelomonocytic features   Bone marrow biopsy done 6/14 -Acute myeloid leukemia  NGS [id number] fusion, U2AF1 mut  7/15 BM results showing 0.2% + population, considered a complete morphological response  Dacogen/Venetoclax cycle 1 day 31    Echo- severely depressed LV function, EF 25-30%.  Continue lasix 40 mg/d po  Cr stable ~2.0, ? baseline Cr -renal following, agree with likely cardiorenal syndrome, appreciate recs  Renal biopsy is not a priority at this time due to reduced plts  Hypercalcemia noted, consistent with primary hyperparathyroidism, appreciate endo/renal recs, continue sensipar, calcium borderline low-normal now  Afebrile on  Levaquin and posa  Receiving transfusional support as needed.  Had palliative care eval -will need discussion w/ family regarding diagnosis/treatment; DNR/DNI, Pt expresses wishes to go back to [place].  Discussed updates with family today 7/19  Will consider holding venetoclax, and possible DC home with outaptient follow-up for count check and restarting of steven + decitabine  DC planning based on ability to hold plt counts

## 2021-07-19 NOTE — PROGRESS NOTE ADULT - PROBLEM SELECTOR PLAN 9
s/p 5vCABG 2012 (LIMA to LAD, SVG to Diag and OM, SVG PDA and RPL), PCI (2 in 2014, 2 in 2015 [facility]), HFrEF s/p CRT-D (2013),  No DAPT or AC given thrombocytopenia.

## 2021-07-19 NOTE — PROGRESS NOTE ADULT - PROBLEM SELECTOR PLAN 1
FLT3 ITD (-) AML   Dacogen held on day 3, 6/21 due to acute change in mental status now back to baseline. Course completed on day 6 (to complete 5 days)   Monitor daily CBC's and transfuse as needed, Monitor daily CMP and replete electrolytes as needed   Strict I's/O's, daily weights, mouth care, anti emetics.   s/p Dacogen 20 mg/m2 x 5 days + Venetoclax. (s/p Venetoclax escalation, now on 100 mg oral daily - while on posaconazole)  Day 21 BM bx on 7/9, quantity insufficient. Repeated 7/16 follow up results  s/p Vitamain K (5mg po- 7/15-7/17)   Pt is DNR  awaiting BM bx results before starting next round of chemo FLT3 ITD (-) AML   Dacogen held on day 3, 6/21 due to acute change in mental status now back to baseline. Course completed on day 6 (to complete 5 days)   Monitor daily CBC's and transfuse as needed, Monitor daily CMP and replete electrolytes as needed   Strict I's/O's, daily weights, mouth care, anti emetics.   s/p Dacogen 20 mg/m2 x 5 days + Venetoclax. (s/p Venetoclax escalation, now on 100 mg oral daily - while on posaconazole)  Day 21 BM bx on 7/9, quantity insufficient. Repeated 7/16 : showing hypercellular marrow   s/p Vitamain K (5mg po- 7/15-7/17)   Pt is DNR  plan to start cycle 2 tomorrow, 7/20 FLT3 ITD (-) AML   Dacogen held on day 3, 6/21 due to acute change in mental status now back to baseline. Course completed on day 6 (to complete 5 days)   Monitor daily CBC's and transfuse as needed, Monitor daily CMP and replete electrolytes as needed   Strict I's/O's, daily weights, mouth care, anti emetics.   s/p Dacogen 20 mg/m2 x 5 days + Venetoclax. (s/p Venetoclax escalation, now on 100 mg oral daily - while on posaconazole)  Day 21 BM bx on 7/9, quantity insufficient. Repeated 7/16 : showing hypercellular marrow   s/p Vitamain K (5mg po- 7/15-7/17)   Pt is DNR  plan to start cycle 2 tomorrow, 7/20  patient wants to return to [place]

## 2021-07-19 NOTE — PROGRESS NOTE ADULT - ASSESSMENT
80M PMHx CKD, CAD s/p CABG 2012 & 4 stents (2 in 2014, 2 in 2015) with dilated EF of 27% BiV ICD (2013) initially admitted to [facility] cor acute hypoxic respiratory failure, anemia (Hgb 5.7), thrombocytopenia (plt 30) w/ blast cells (22%), lactic acidosis (LA 10) - transfer to [facility] for hematological evaluation for ALL.  Nephrology consulted for BRISA on CKD.      # BRISA on CKD  Pt with non-oliguric BRISA on CKD unclear etiology possible pre renal in the setting of anemia and ATN.  On admission sCr elevated at 3.45 (last known sCr 1.1-1.3 in 2018, recent hx CKD unclear recent baseline).  Urinalysis showed protein with trace blood.  Lab noted for serum uric acid 13.3, , phos 4.7, Echo severe LV systolic fxn, CT diffuse patchy airspace disease ?multifocal pna?. Urine electrolytes with Fe Urea of 82.5% suggestive of intrinsic pathology. Urine uric acid/creatinine ratio is < 1. Spot urine TP/CR ratio was 0.25 wnl. Kidney/bladder u/s on this admission was wnl (no hydro). Scr peaked at 3.62 mg/dl on 6/13/21 with downward trend now plateauing. SCr elevated/stable at  2-2.3 range.  Likely CKD could be chronic cardiorenal syndrome given last normal crt was in 2018.     - Allopurinol discontinued  - PER with 1:320 but speckled pattern   - GN work up including c3 and C4 wnl, serum immunofixation with no monoclonality, anti GBM negative. Parvovirus DNA PCR negative. P ANCA and C ANCA negative, anti PLA2R negative  - Pt will need a kidney biopsy if his platelets are stable for overall etiology of CKD as not back to completely normal    # Hypercalcemia -resolved  Uncertain etiology. Last ionized calcium improved to 1.14 7/15/21)   PTH was 84, not appropriately suppressed with low 25 vit D & 1,25 vitamin D.  Continue vitamin d supplements. Could be secondary hyperparathyroidism from vitamin D def & CKD.   Continue Sensipar 30 mg PO daily   monitor ionized calcium daily    #acidosis related to CKD- can start Na bicarb 650mg TID. Usually not a major effect on CHF    Todd Wasserman MD  Cell   Pager   Office

## 2021-07-19 NOTE — PROGRESS NOTE ADULT - SUBJECTIVE AND OBJECTIVE BOX
Diagnosis: FLT3 ITD (-) Acute Myeloid Leukemia    Protocol/Chemo Regimen: S/p Cycle 1 Decitabine (held on day 3) + Venetoclax     Day: 31    Pt endorsed:     Review of Systems: Denies n/v, abdominal pain, orthopnea, SOB     Pain scale: 0     Diet: Regular   Allergies      Allergies    morphine (Unknown)    ANTIMICROBIALS  levoFLOXacin  Tablet 250 milliGRAM(s) Oral every 24 hours  posaconazole DR Tablet 300 milliGRAM(s) Oral daily      HEME/ONC MEDICATIONS  venetoclax 100 milliGRAM(s) Oral <User Schedule>      STANDING MEDICATIONS  Biotene Dry Mouth Oral Rinse 5 milliLiter(s) Swish and Spit four times a day  chlorhexidine 2% Cloths 1 Application(s) Topical <User Schedule>  cholecalciferol 2000 Unit(s) Oral daily  cinacalcet 30 milliGRAM(s) Oral daily  escitalopram 5 milliGRAM(s) Oral daily  finasteride 5 milliGRAM(s) Oral daily  folic acid 1 milliGRAM(s) Oral daily  furosemide    Tablet 20 milliGRAM(s) Oral daily  isosorbide   mononitrate ER Tablet (IMDUR) 60 milliGRAM(s) Oral daily  lidocaine   Patch 1 Patch Transdermal daily  metoprolol succinate ER 50 milliGRAM(s) Oral daily  polyethylene glycol 3350 17 Gram(s) Oral daily  tamsulosin 0.4 milliGRAM(s) Oral at bedtime  urea Oral Powder 15 Gram(s) Oral every 12 hours      PRN MEDICATIONS  acetaminophen   Tablet .. 650 milliGRAM(s) Oral every 6 hours PRN  albuterol/ipratropium for Nebulization 3 milliLiter(s) Nebulizer every 6 hours PRN  aluminum hydroxide/magnesium hydroxide/simethicone Suspension 30 milliLiter(s) Oral every 4 hours PRN  calcium carbonate    500 mG (Tums) Chewable 1 Tablet(s) Chew every 4 hours PRN  ondansetron Injectable 8 milliGRAM(s) IV Push every 8 hours PRN  senna 2 Tablet(s) Oral at bedtime PRN  sodium chloride 0.9% lock flush 10 milliLiter(s) IV Push every 1 hour PRN  sucralfate suspension 1 Gram(s) Oral every 6 hours PRN        Vital Signs Last 24 Hrs  T(C): 36.8 (19 Jul 2021 05:22), Max: 37.1 (18 Jul 2021 21:40)  T(F): 98.2 (19 Jul 2021 05:22), Max: 98.7 (18 Jul 2021 21:40)  HR: 70 (19 Jul 2021 05:22) (57 - 70)  BP: 114/60 (19 Jul 2021 05:22) (114/60 - 136/65)  BP(mean): --  RR: 18 (19 Jul 2021 05:22) (18 - 18)  SpO2: 99% (19 Jul 2021 05:22) (96% - 100%)    PHYSICAL EXAM  General: NAD  HEENT: PERRLA, EOMOI, clear oropharynx, anicteric sclera, pink conjunctiva  Neck: supple  CV: (+) S1/S2 RRR  Lungs: clear to auscultation, no wheezes or rales  Abdomen: soft, non-tender, non-distended (+) BS  Ext: no clubbing, cyanosis or edema  Skin: no rashes and no petechiae  Neuro: alert and oriented X 3, no focal deficits  Central Line: PICC c/d/i          Diagnosis: FLT3 ITD (-) Acute Myeloid Leukemia    Protocol/Chemo Regimen: S/p Cycle 1 Decitabine (held on day 3) + Venetoclax     Day: 31    Pt endorsed: no complaints, no events overnight, son at bedside     Review of Systems: Denies n/v, abdominal pain, orthopnea, SOB     Pain scale: 0     Diet: Regular   Allergies      Allergies    morphine (Unknown)    ANTIMICROBIALS  levoFLOXacin  Tablet 250 milliGRAM(s) Oral every 24 hours  posaconazole DR Tablet 300 milliGRAM(s) Oral daily      HEME/ONC MEDICATIONS  venetoclax 100 milliGRAM(s) Oral <User Schedule>      STANDING MEDICATIONS  Biotene Dry Mouth Oral Rinse 5 milliLiter(s) Swish and Spit four times a day  chlorhexidine 2% Cloths 1 Application(s) Topical <User Schedule>  cholecalciferol 2000 Unit(s) Oral daily  cinacalcet 30 milliGRAM(s) Oral daily  escitalopram 5 milliGRAM(s) Oral daily  finasteride 5 milliGRAM(s) Oral daily  folic acid 1 milliGRAM(s) Oral daily  furosemide    Tablet 20 milliGRAM(s) Oral daily  isosorbide   mononitrate ER Tablet (IMDUR) 60 milliGRAM(s) Oral daily  lidocaine   Patch 1 Patch Transdermal daily  metoprolol succinate ER 50 milliGRAM(s) Oral daily  polyethylene glycol 3350 17 Gram(s) Oral daily  tamsulosin 0.4 milliGRAM(s) Oral at bedtime  urea Oral Powder 15 Gram(s) Oral every 12 hours      PRN MEDICATIONS  acetaminophen   Tablet .. 650 milliGRAM(s) Oral every 6 hours PRN  albuterol/ipratropium for Nebulization 3 milliLiter(s) Nebulizer every 6 hours PRN  aluminum hydroxide/magnesium hydroxide/simethicone Suspension 30 milliLiter(s) Oral every 4 hours PRN  calcium carbonate    500 mG (Tums) Chewable 1 Tablet(s) Chew every 4 hours PRN  ondansetron Injectable 8 milliGRAM(s) IV Push every 8 hours PRN  senna 2 Tablet(s) Oral at bedtime PRN  sodium chloride 0.9% lock flush 10 milliLiter(s) IV Push every 1 hour PRN  sucralfate suspension 1 Gram(s) Oral every 6 hours PRN        Vital Signs Last 24 Hrs  T(C): 36.8 (19 Jul 2021 05:22), Max: 37.1 (18 Jul 2021 21:40)  T(F): 98.2 (19 Jul 2021 05:22), Max: 98.7 (18 Jul 2021 21:40)  HR: 70 (19 Jul 2021 05:22) (57 - 70)  BP: 114/60 (19 Jul 2021 05:22) (114/60 - 136/65)  BP(mean): --  RR: 18 (19 Jul 2021 05:22) (18 - 18)  SpO2: 99% (19 Jul 2021 05:22) (96% - 100%)    PHYSICAL EXAM  General: NAD  HEENT: PERRLA, EOMOI, clear oropharynx, anicteric sclera, pink conjunctiva  Neck: supple  CV: (+) S1/S2 RRR  Lungs: clear to auscultation, no wheezes or rales  Abdomen: soft, non-tender, non-distended (+) BS  Ext: no clubbing, cyanosis or edema  Skin: no rashes and no petechiae  Neuro: alert and oriented X 3, no focal deficits  Central Line: PICC c/d/i     LABS:                        7.6    1.22  )-----------( 15       ( 19 Jul 2021 07:05 )             22.3         Mean Cell Volume : 85.4 fl  Mean Cell Hemoglobin : 29.1 pg  Mean Cell Hemoglobin Concentration : 34.1 gm/dL  Auto Neutrophil # : 0.42 K/uL  Auto Lymphocyte # : 0.71 K/uL  Auto Monocyte # : 0.09 K/uL  Auto Eosinophil # : 0.00 K/uL  Auto Basophil # : 0.00 K/uL  Auto Neutrophil % : 34.8 %  Auto Lymphocyte % : 58.0 %  Auto Monocyte % : 7.2 %  Auto Eosinophil % : 0.0 %  Auto Basophil % : 0.0 %      07-19    132<L>  |  99  |  37<H>  ----------------------------<  113<H>  3.8   |  20<L>  |  2.23<H>    Ca    8.3<L>      19 Jul 2021 07:13  Phos  4.0     07-19  Mg     2.1     07-19    TPro  7.1  /  Alb  2.7<L>  /  TBili  0.8  /  DBili  x   /  AST  10  /  ALT  5<L>  /  AlkPhos  103  07-19      Mg 2.1  Phos 4.0      PT/INR - ( 19 Jul 2021 07:06 )   PT: 14.9 sec;   INR: 1.26 ratio               Uric Acid --

## 2021-07-19 NOTE — PROGRESS NOTE ADULT - PROBLEM SELECTOR PLAN 7
likely SIADH, slight increase today 7/14   Started Urea 15gm twice daily   Monitor Na+ daily  Nephro following improving   likely SIADH  Started Urea 15gm twice daily   Monitor Na+ daily  Nephro following

## 2021-07-20 NOTE — CHART NOTE - NSCHARTNOTEFT_GEN_A_CORE
Pt. has stable Cr.     Upon discharge, for appointment scheduling please email Nephrology at [email or web address] Pt. has stable Cr.     Upon discharge, for appointment scheduling please email Nephrology at [email or web address]  Please have patient f.u with Nephrology office at  with one of the physicians in 1 month  Our address is [address] [place] [address]   (Clair, Arturo, Finger, Meir, Lyn, Stephen, Buddy, Brenda, Lisy, Allen, Anupam, Sabra, Tyrell, Tello, Anam, Praveen, or Avelina) Pt. has stable Cr.     # BRISA on CKD  Pt with non-oliguric BRISA on CKD unclear etiology possible pre renal in the setting of anemia and ATN.  On admission sCr elevated at 3.45 (last known sCr 1.1-1.3 in 2018, recent hx CKD unclear recent baseline).  Urinalysis showed protein with trace blood.  Lab noted for serum uric acid 13.3, , phos 4.7, Echo severe LV systolic fxn, CT diffuse patchy airspace disease ?multifocal pna?. Urine electrolytes with Fe Urea of 82.5% suggestive of intrinsic pathology. Urine uric acid/creatinine ratio is < 1. Spot urine TP/CR ratio was 0.25 wnl. Kidney/bladder u/s on this admission was wnl (no hydro). Scr peaked at 3.62 mg/dl on 6/13/21 with downward trend now plateauing. SCr elevated/stable at  2-2.3 range.  Likely CKD could be chronic cardiorenal syndrome given last normal crt was in 2018.     - Allopurinol discontinued  - PER with 1:320 but speckled pattern   - GN work up including c3 and C4 wnl, serum immunofixation with no monoclonality, anti GBM negative. Parvovirus DNA PCR negative. P ANCA and C ANCA negative, anti PLA2R negative  - Pt will need a kidney biopsy if his platelets are stable for overall etiology of CKD as not back to completely normal    # Hypercalcemia -resolved  Uncertain etiology. Last ionized calcium improved to 1.14 7/15/21)   PTH was 84, not appropriately suppressed with low 25 vit D & 1,25 vitamin D.  Continue vitamin d supplements. Could be secondary hyperparathyroidism from vitamin D def & CKD.   monitor ionized calcium daily    #acidosis related to CKD- on bicarbonate        Upon discharge, for appointment scheduling please email Nephrology at [email or web address]  Please have patient salomón with Nephrology office at  with one of the physicians in 1 month  Our address is [address] [place] [id number]   (Clair, Morris, Finger, yLn Worley, Stephen, Buddy, Brenda, Lisy, Allen, Anupam, Sabra, Tyrell, Tello, Anam, [facility], or [facility])

## 2021-07-20 NOTE — PROGRESS NOTE ADULT - ATTENDING COMMENTS
81 yo Vietnamese speaking male transferred from [place] to [facility] for AML with myelomonocytic features   Bone marrow biopsy done 6/14 -Acute myeloid leukemia  NGS [id number] fusion, U2AF1 mut  7/15 BM results showing 0.2% + population, considered a complete morphological response  Dacogen/Venetoclax cycle 1 day 31    Echo- severely depressed LV function, EF 25-30%.  Continue lasix 40 mg/d po  Cr stable ~2.0, ? baseline Cr -renal following, agree with likely cardiorenal syndrome, appreciate recs  Renal biopsy is not a priority at this time due to reduced plts  Hypercalcemia noted, consistent with primary hyperparathyroidism, appreciate endo/renal recs, continue sensipar, calcium borderline low-normal now  Afebrile on  Levaquin and posa  Receiving transfusional support as needed.  Had palliative care eval -will need discussion w/ family regarding diagnosis/treatment; DNR/DNI, Pt expresses wishes to go back to [place].  Discussed updates with family today 7/19  Will consider holding venetoclax, and possible DC home with outaptient follow-up for count check and restarting of steven + decitabine  DC planning based on ability to hold plt counts 79 yo Swedish speaking male transferred from [place] to [facility] for AML with myelomonocytic features   Bone marrow biopsy done 6/14 -Acute myeloid leukemia  NGS [id number] fusion, U2AF1 mut  7/15 BM results showing 0.2% + population, considered a complete morphological response  Dacogen/Venetoclax cycle 1 day 32  Will start cycle 2 decitabine and steven     Echo- severely depressed LV function, EF 25-30%.  Continue lasix 40 mg/d po  Cr stable ~2.0, ? baseline Cr -renal following, agree with likely cardiorenal syndrome, appreciate recs  Renal biopsy is not a priority at this time due to reduced plts  Hypercalcemia noted, consistent with primary hyperparathyroidism, appreciate endo/renal recs, continue sensipar, calcium borderline low-normal now  Afebrile on  Levaquin and posa  Receiving transfusional support as needed.  Had palliative care eval -will need discussion w/ family regarding diagnosis/treatment; DNR/DNI, Pt expresses wishes to go back to [place].  Discussed updates with family today 7/19  Will consider holding venetoclax, and possible DC home with outaptient follow-up for count check and restarting of steven + decitabine  DC planning based on ability to hold plt counts

## 2021-07-20 NOTE — ADVANCED PRACTICE NURSE CONSULT - ASSESSMENT
Pt. seen in bed a/ox3 . Chemotherapy teachings done, Pt. verbalized understanding .Pt. has left double lumen PICC . Dsg dry and intact .Site with no s/s of redness, swelling or pain. With positive blood return noted and flushing easily with 10 ML NS. Lab values reviewed on rounds by Dr. Constantino  . Pt. received zofran 8 mg IVP as premedication. Drug verification done by 2 RN.'s. At 1614  Day 1/5 decitabine 35 milliGRAM(s) started, Infusing  over 1 hr as a  secondary line of NS chemo back up line  via alaris pump . Primary  RN aware of present treatment.  Safety maintained.   . Pt. seen in bed a/ox3 . Chemotherapy teachings done, Pt. verbalized understanding .Pt. has left double lumen PICC . Dsg dry and intact .Site with no s/s of redness, swelling or pain. With positive blood return noted and flushing easily with 10 ML NS. Lab values creatinine =2.24 , spoke with Maria Glynn NP and reviewed on rounds by Dr. Constantino  . Pt. received zofran 8 mg IVP as premedication. Drug verification done by 2 RN.'s. At 1614  Day 1/5 decitabine 35 milliGRAM(s) started, Infusing  over 1 hr as a  secondary line of NS chemo back up line  via alaris pump . Primary  RN aware of present treatment.  Safety maintained.   . Pt. seen in bed a/ox3 . Chemotherapy teachings done, Pt. verbalized understanding .Pt. has left double lumen PICC . Dsg dry and intact .Site with no s/s of redness, swelling or pain. With positive blood return noted and flushing easily with 10 ML NS. Lab values creatinine =2.24 , spoke with Maria Glynn NP and reviewed on rounds by Dr. Constantino  . Pt. received zofran 8 mg IVP as premedication. Drug verification done by 2 RN.'s. At 1614  Day 1/5 decitabine 35 milliGRAM(s) started, Infusing  over 1 hr as a  secondary line of NS chemo back up line  via alaris pump . Primary  RN aware of present treatment.Left pt. comfortable in bed.  Safety maintained.   .

## 2021-07-20 NOTE — PROGRESS NOTE ADULT - SUBJECTIVE AND OBJECTIVE BOX
Diagnosis: FLT3 ITD (-) Acute Myeloid Leukemia    Protocol/Chemo Regimen: S/p Cycle 1 Decitabine (held on day 3) + Venetoclax     Day: 32    Pt endorsed: no complaints, no events overnight, son at bedside     Review of Systems: Denies n/v, abdominal pain, orthopnea, SOB     Pain scale: 0    Allergies    morphine (Unknown)    Intolerances        ANTIMICROBIALS  levoFLOXacin  Tablet 250 milliGRAM(s) Oral every 24 hours  posaconazole DR Tablet 300 milliGRAM(s) Oral daily      HEME/ONC MEDICATIONS  venetoclax 100 milliGRAM(s) Oral <User Schedule>      STANDING MEDICATIONS  Biotene Dry Mouth Oral Rinse 5 milliLiter(s) Swish and Spit four times a day  chlorhexidine 2% Cloths 1 Application(s) Topical <User Schedule>  cholecalciferol 2000 Unit(s) Oral daily  cinacalcet 30 milliGRAM(s) Oral daily  escitalopram 5 milliGRAM(s) Oral daily  finasteride 5 milliGRAM(s) Oral daily  folic acid 1 milliGRAM(s) Oral daily  furosemide    Tablet 20 milliGRAM(s) Oral daily  isosorbide   mononitrate ER Tablet (IMDUR) 60 milliGRAM(s) Oral daily  lidocaine   Patch 1 Patch Transdermal daily  metoprolol succinate ER 50 milliGRAM(s) Oral daily  polyethylene glycol 3350 17 Gram(s) Oral daily  tamsulosin 0.4 milliGRAM(s) Oral at bedtime  urea Oral Powder 15 Gram(s) Oral every 12 hours      PRN MEDICATIONS  acetaminophen   Tablet .. 650 milliGRAM(s) Oral every 6 hours PRN  albuterol/ipratropium for Nebulization 3 milliLiter(s) Nebulizer every 6 hours PRN  aluminum hydroxide/magnesium hydroxide/simethicone Suspension 30 milliLiter(s) Oral every 4 hours PRN  calcium carbonate    500 mG (Tums) Chewable 1 Tablet(s) Chew every 4 hours PRN  ondansetron Injectable 8 milliGRAM(s) IV Push every 8 hours PRN  senna 2 Tablet(s) Oral at bedtime PRN  sodium chloride 0.9% lock flush 10 milliLiter(s) IV Push every 1 hour PRN  sucralfate suspension 1 Gram(s) Oral every 6 hours PRN        Vital Signs Last 24 Hrs  T(C): 36.5 (20 Jul 2021 05:39), Max: 36.8 (19 Jul 2021 21:10)  T(F): 97.7 (20 Jul 2021 05:39), Max: 98.2 (19 Jul 2021 21:10)  HR: 68 (20 Jul 2021 05:39) (60 - 75)  BP: 119/59 (20 Jul 2021 05:39) (109/51 - 135/77)  BP(mean): --  RR: 18 (20 Jul 2021 05:39) (18 - 19)  SpO2: 97% (20 Jul 2021 05:39) (97% - 100%)    PHYSICAL EXAM  General: NAD  HEENT: PERRLA, EOMOI, clear oropharynx, anicteric sclera, pink conjunctiva  Neck: supple  CV: (+) S1/S2 RRR  Lungs: clear to auscultation, no wheezes or rales  Abdomen: soft, non-tender, non-distended (+) BS  Ext: no clubbing, cyanosis or edema  Skin: no rashes and no petechiae  Neuro: alert and oriented X 3, no focal deficits  Central Line: PICC c/d/i      Diagnosis: FLT3 ITD (-) Acute Myeloid Leukemia    Protocol/Chemo Regimen: S/p Cycle 1 Decitabine (held on day 3) + Venetoclax     Day: 32    Pt endorsed: no complaints,    Review of Systems: Denies n/v, abdominal pain, orthopnea, SOB     Pain scale: 0    Allergies    morphine (Unknown)    Intolerances        ANTIMICROBIALS  levoFLOXacin  Tablet 250 milliGRAM(s) Oral every 24 hours  posaconazole DR Tablet 300 milliGRAM(s) Oral daily      HEME/ONC MEDICATIONS  venetoclax 100 milliGRAM(s) Oral <User Schedule>      STANDING MEDICATIONS  Biotene Dry Mouth Oral Rinse 5 milliLiter(s) Swish and Spit four times a day  chlorhexidine 2% Cloths 1 Application(s) Topical <User Schedule>  cholecalciferol 2000 Unit(s) Oral daily  cinacalcet 30 milliGRAM(s) Oral daily  escitalopram 5 milliGRAM(s) Oral daily  finasteride 5 milliGRAM(s) Oral daily  folic acid 1 milliGRAM(s) Oral daily  furosemide    Tablet 20 milliGRAM(s) Oral daily  isosorbide   mononitrate ER Tablet (IMDUR) 60 milliGRAM(s) Oral daily  lidocaine   Patch 1 Patch Transdermal daily  metoprolol succinate ER 50 milliGRAM(s) Oral daily  polyethylene glycol 3350 17 Gram(s) Oral daily  tamsulosin 0.4 milliGRAM(s) Oral at bedtime  urea Oral Powder 15 Gram(s) Oral every 12 hours      PRN MEDICATIONS  acetaminophen   Tablet .. 650 milliGRAM(s) Oral every 6 hours PRN  albuterol/ipratropium for Nebulization 3 milliLiter(s) Nebulizer every 6 hours PRN  aluminum hydroxide/magnesium hydroxide/simethicone Suspension 30 milliLiter(s) Oral every 4 hours PRN  calcium carbonate    500 mG (Tums) Chewable 1 Tablet(s) Chew every 4 hours PRN  ondansetron Injectable 8 milliGRAM(s) IV Push every 8 hours PRN  senna 2 Tablet(s) Oral at bedtime PRN  sodium chloride 0.9% lock flush 10 milliLiter(s) IV Push every 1 hour PRN  sucralfate suspension 1 Gram(s) Oral every 6 hours PRN        Vital Signs Last 24 Hrs  T(C): 36.5 (20 Jul 2021 05:39), Max: 36.8 (19 Jul 2021 21:10)  T(F): 97.7 (20 Jul 2021 05:39), Max: 98.2 (19 Jul 2021 21:10)  HR: 68 (20 Jul 2021 05:39) (60 - 75)  BP: 119/59 (20 Jul 2021 05:39) (109/51 - 135/77)  BP(mean): --  RR: 18 (20 Jul 2021 05:39) (18 - 19)  SpO2: 97% (20 Jul 2021 05:39) (97% - 100%)    PHYSICAL EXAM  General: NAD  HEENT: PERRLA, EOMOI, clear oropharynx, anicteric sclera, pink conjunctiva  Neck: supple  CV: (+) S1/S2 RRR  Lungs: clear to auscultation, no wheezes or rales  Abdomen: soft, non-tender, non-distended (+) BS  Ext: no clubbing, cyanosis or edema  Skin: no rashes and no petechiae  Neuro: alert and oriented X 3, no focal deficits  Central Line: PICC c/d/i      Diagnosis: FLT3 ITD (-) Acute Myeloid Leukemia    Protocol/Chemo Regimen: Cycle 2 Decitabine+ Venetoclax     Day: 1    Pt endorsed: no complaints,    Review of Systems: Denies n/v, abdominal pain, orthopnea, SOB     Pain scale: 0    Allergies    morphine (Unknown)    Intolerances        ANTIMICROBIALS  levoFLOXacin  Tablet 250 milliGRAM(s) Oral every 24 hours  posaconazole DR Tablet 300 milliGRAM(s) Oral daily      HEME/ONC MEDICATIONS  venetoclax 100 milliGRAM(s) Oral <User Schedule>      STANDING MEDICATIONS  Biotene Dry Mouth Oral Rinse 5 milliLiter(s) Swish and Spit four times a day  chlorhexidine 2% Cloths 1 Application(s) Topical <User Schedule>  cholecalciferol 2000 Unit(s) Oral daily  cinacalcet 30 milliGRAM(s) Oral daily  escitalopram 5 milliGRAM(s) Oral daily  finasteride 5 milliGRAM(s) Oral daily  folic acid 1 milliGRAM(s) Oral daily  furosemide    Tablet 20 milliGRAM(s) Oral daily  isosorbide   mononitrate ER Tablet (IMDUR) 60 milliGRAM(s) Oral daily  lidocaine   Patch 1 Patch Transdermal daily  metoprolol succinate ER 50 milliGRAM(s) Oral daily  polyethylene glycol 3350 17 Gram(s) Oral daily  tamsulosin 0.4 milliGRAM(s) Oral at bedtime  urea Oral Powder 15 Gram(s) Oral every 12 hours      PRN MEDICATIONS  acetaminophen   Tablet .. 650 milliGRAM(s) Oral every 6 hours PRN  albuterol/ipratropium for Nebulization 3 milliLiter(s) Nebulizer every 6 hours PRN  aluminum hydroxide/magnesium hydroxide/simethicone Suspension 30 milliLiter(s) Oral every 4 hours PRN  calcium carbonate    500 mG (Tums) Chewable 1 Tablet(s) Chew every 4 hours PRN  ondansetron Injectable 8 milliGRAM(s) IV Push every 8 hours PRN  senna 2 Tablet(s) Oral at bedtime PRN  sodium chloride 0.9% lock flush 10 milliLiter(s) IV Push every 1 hour PRN  sucralfate suspension 1 Gram(s) Oral every 6 hours PRN        Vital Signs Last 24 Hrs  T(C): 36.5 (20 Jul 2021 05:39), Max: 36.8 (19 Jul 2021 21:10)  T(F): 97.7 (20 Jul 2021 05:39), Max: 98.2 (19 Jul 2021 21:10)  HR: 68 (20 Jul 2021 05:39) (60 - 75)  BP: 119/59 (20 Jul 2021 05:39) (109/51 - 135/77)  BP(mean): --  RR: 18 (20 Jul 2021 05:39) (18 - 19)  SpO2: 97% (20 Jul 2021 05:39) (97% - 100%)    PHYSICAL EXAM  General: NAD  HEENT: PERRLA, EOMOI, clear oropharynx, anicteric sclera, pink conjunctiva  Neck: supple  CV: (+) S1/S2 RRR  Lungs: clear to auscultation, no wheezes or rales  Abdomen: soft, non-tender, non-distended (+) BS  Ext: no clubbing, cyanosis or edema  Skin: no rashes and no petechiae  Neuro: alert and oriented X 3, no focal deficits  Central Line: PICC c/d/i

## 2021-07-20 NOTE — PROVIDER CONTACT NOTE (OTHER) - ASSESSMENT
NAD, denies SOB , chest pain
pt agitated, BP recheck 165/76, not allowing another BP, yelling that its 2am
NAD, afebrile  pt denies sob, chest pain  Hgb= 7.0
Pt in pain around vides cath, no c/o chest pain
Pt was lethargic & difficult to arouse. Pt was confused and aggressive while attempting to arouse. Pt VSS. Afebrile.

## 2021-07-20 NOTE — PROGRESS NOTE ADULT - PROBLEM SELECTOR PLAN 1
FLT3 ITD (-) AML   Dacogen held on day 3, 6/21 due to acute change in mental status now back to baseline. Course completed on day 6 (to complete 5 days)   Monitor daily CBC's and transfuse as needed, Monitor daily CMP and replete electrolytes as needed   Strict I's/O's, daily weights, mouth care, anti emetics.   s/p Dacogen 20 mg/m2 x 5 days + Venetoclax. (s/p Venetoclax escalation, now on 100 mg oral daily - while on posaconazole)  Day 21 BM bx on 7/9, quantity insufficient. Repeated 7/16 : showing hypercellular marrow   s/p Vitamain K (5mg po- 7/15-7/17)   Pt is DNR  plan to start cycle 2 tomorrow, 7/20  patient wants to return to [place] FLT3 ITD (-) AML   Dacogen held on day 3, 6/21 due to acute change in mental status now back to baseline. Course completed on day 6 (to complete 5 days)   Monitor daily CBC's and transfuse as needed, Monitor daily CMP and replete electrolytes as needed   Strict I's/O's, daily weights, mouth care, anti emetics.   s/p Dacogen 20 mg/m2 x 5 days + Venetoclax. (s/p Venetoclax escalation, now on 100 mg oral daily - while on posaconazole)  Day 21 BM bx on 7/9, quantity insufficient. Repeated 7/16 : showing hypercellular marrow   s/p Vitamain K (5mg po- 7/15-7/17)   Pt is DNR  Cycle 2 started 7/20  patient wants to return to [place]  Anemia-replace PRBC   Lasix 20mg x1 today. FLT3 ITD (-) AML   Dacogen held on day 3, 6/21 due to acute change in mental status now back to baseline. Course completed on day 6 (to complete 5 days)   Monitor daily CBC's and transfuse as needed, Monitor daily CMP and replete electrolytes as needed   Strict I's/O's, daily weights, mouth care, anti emetics.    Cycle 2 Dacogen 20 mg/m2 x 5 days + Venetoclax. (s/p Venetoclax escalation, now on 100 mg oral daily - while on posaconazole) started 7/20  Day 21 BM bx on 7/9, quantity insufficient. Repeated 7/16 : showing hypercellular marrow   s/p Vitamain K (5mg po- 7/15-7/17)   Pt is DNR  Cycle 2 started 7/20  patient wants to return to [place]  Anemia-replace PRBC   Lasix 20mg x1 today.

## 2021-07-20 NOTE — PROVIDER CONTACT NOTE (OTHER) - BACKGROUND
ALL
AML
Newly dx AML. Receiving Dacogen & venetoclax. Pt previously confused @ times.
Pt admitted for ARF
New AML, s/p cycle 1 chemo
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
## 2021-07-20 NOTE — PROGRESS NOTE ADULT - PROBLEM SELECTOR PLAN 8
CHF from volume overload, 7/10 CXR vascular congestion.   Lasix 40mg qd restarted 7/10  Echo severe LV systolic fxn, LVEF 27%  ICD (implantable cardioverter-defibrillator) in place CHF from volume overload, 7/10 CXR vascular congestion.   Echo severe LV systolic fxn, LVEF 27%  ICD (implantable cardioverter-defibrillator) in place

## 2021-07-20 NOTE — PROGRESS NOTE ADULT - PROBLEM SELECTOR PLAN 6
stable   Nephrology following-No indication for HD at this time.   6/15 Normal renal ultrasound  follow ionized calcium daily as per nephrology  6/24 on Sensipar for hypercalcemia   Vitamin D 2000 daily.  7/10 restarted home Lasix 40mg daily  7/18 Decreased Lasix 20mg qd for decreased po's + rising Cr stable   Nephrology following-No indication for HD at this time.   6/15 Normal renal ultrasound  follow ionized calcium daily as per nephrology  6/24 on Sensipar for hypercalcemia   Vitamin D 2000 daily.  7/10 restarted home Lasix 40mg daily  Continue Lasix 20mg qd for decreased po's + rising Cr  NAHCo3 started 7/20 per renal.

## 2021-07-20 NOTE — PROVIDER CONTACT NOTE (OTHER) - REASON
Pt c/o dizziness
Pt has been lblvy=506's
pt agitated, not allowing a BP recheck, BP high
Pt was lethargic & difficult to arouse. Pt was confused and aggressive while attempting to arouse
BP= 109/47

## 2021-07-20 NOTE — PROVIDER CONTACT NOTE (OTHER) - SITUATION
as noted above
Pt c/o dizziness
BP= 109/47
Pt was lethargic & difficult to arouse. Pt was confused and aggressive while attempting to arouse
pt agitated, high BP

## 2021-07-20 NOTE — PROVIDER CONTACT NOTE (OTHER) - ACTION/TREATMENT ORDERED:
Reassess in 1 hour
CT scan of head ordered. BCx2 & UC ordered. Vancomycin 1g & cefepime 1g ordered.
no interventions at this time, leave the pt alone, recheck with morning vitals and given medications as scheduled
Administer dilaudid as ordered prn, and if tachycardia persists, do an EKG.
1 unit prbc  20mg lasix ivp

## 2021-07-21 NOTE — PROGRESS NOTE ADULT - ASSESSMENT
80M PMHx CKD, CAD s/p CABG 2012 & 4 stents (2 in 2014, 2 in 2015) with dilated EF of 27% BiV ICD (2013) initially admitted to [facility] cor acute hypoxic respiratory failure, anemia (Hgb 5.7), thrombocytopenia (plt 30) w/ blast cells (22%), lactic acidosis (LA 10) - transfer to [facility] for hematological evaluation for ALL.  Nephrology consulted for BRISA on CKD.      # BRISA on CKD  Pt with non-oliguric BRISA on CKD unclear etiology possible pre renal in the setting of anemia and ATN.  On admission sCr elevated at 3.45 (last known sCr 1.1-1.3 in 2018, recent hx CKD unclear recent baseline).  Urinalysis showed protein with trace blood.  Lab noted for serum uric acid 13.3, , phos 4.7, Echo severe LV systolic fxn, CT diffuse patchy airspace disease ?multifocal pna?. Urine electrolytes with Fe Urea of 82.5% suggestive of intrinsic pathology. Urine uric acid/creatinine ratio is < 1. Spot urine TP/CR ratio was 0.25 wnl. Kidney/bladder u/s on this admission was wnl (no hydro). Scr peaked at 3.62 mg/dl on 6/13/21 with downward trend now plateauing. SCr elevated/stable at  2-2.3 range.  Likely CKD could be chronic cardiorenal syndrome given last normal crt was in 2018.     - Allopurinol discontinued  - PER with 1:320 but speckled pattern   - GN work up including c3 and C4 wnl, serum immunofixation with no monoclonality, anti GBM negative. Parvovirus DNA PCR negative. P ANCA and C ANCA negative, anti PLA2R negative  - Pt will need a kidney biopsy if his platelets are stable for overall etiology of CKD as not back to completely normal    # Hypercalcemia -resolved  Uncertain etiology. Last ionized calcium improved to 1.14 7/15/21)   PTH was 84, not appropriately suppressed with low 25 vit D & 1,25 vitamin D.  Continue vitamin d supplements. Could be secondary hyperparathyroidism from vitamin D def & CKD.   dc sensipar please as calcium below 9.   monitor ionized calcium daily    #acidosis related to CKD- can increase Na bicarb to 2 tabs of the 650mg TID.     # needs f.u with renal on dc. Please have patient f.u with Nephrology office at  with one of the physicians in 2-3 weeks  Our address is [address] [place] [address]   (Arturo Self, Finger, Meir, Lyn, Stephen, Buddy, Brenda, Lisy, Allen, Anupam, Sabra, Tyrell, Tello, Anam, Praveen, or Avelina)      Todd Wasserman MD  Cell   Pager   Office

## 2021-07-21 NOTE — PROGRESS NOTE ADULT - ATTENDING COMMENTS
79 yo [place]guillan speaking male transferred from [place] to [facility] for AML with myelomonocytic features   Bone marrow biopsy done 6/14 -Acute myeloid leukemia  NGS [id number] fusion, U2AF1 mut  7/15 BM results showing 0.2% + population, considered a complete morphological response  Dacogen/Venetoclax cycle 1 day 32  Will start cycle 2 decitabine and steven     Echo- severely depressed LV function, EF 25-30%.  Continue lasix 40 mg/d po  Cr stable ~2.0, ? baseline Cr -renal following, agree with likely cardiorenal syndrome, appreciate recs  Renal biopsy is not a priority at this time due to reduced plts  Hypercalcemia noted, consistent with primary hyperparathyroidism, appreciate endo/renal recs, continue sensipar, calcium borderline low-normal now  Afebrile on  Levaquin and posa  Receiving transfusional support as needed.  Had palliative care eval -will need discussion w/ family regarding diagnosis/treatment; DNR/DNI, Pt expresses wishes to go back to [place].  Discussed updates with family today 7/19  Will consider holding venetoclax, and possible DC home with outaptient follow-up for count check and restarting of steven + decitabine  DC planning based on ability to hold plt counts 79 yo Brazilian speaking male transferred from [place] to [facility] for AML with myelomonocytic features   Bone marrow biopsy done 6/14 -Acute myeloid leukemia  NGS [id number] fusion, U2AF1 mut  7/15 BM results showing 0.2% + population, considered a complete morphological response  Dacogen/Venetoclax cycle 2 day 2  Will start cycle 2 decitabine and steven     Echo- severely depressed LV function, EF 25-30%.  Continue lasix 40 mg/d po  Cr stable ~2.0, ? baseline Cr -renal following, agree with likely cardiorenal syndrome, appreciate recs  Renal biopsy is not a priority at this time due to reduced plts  Hypercalcemia noted, consistent with primary hyperparathyroidism, appreciate endo/renal recs, continue sensipar, calcium borderline low-normal now  Afebrile on  Levaquin and posa  Receiving transfusional support as needed.  Had palliative care eval -will need discussion w/ family regarding diagnosis/treatment; DNR/DNI, Pt expresses wishes to go back to [place].  Discussed updates with family today 7/19  Will consider holding venetoclax, and possible DC home with outaptient follow-up for count check and restarting of steven + decitabine  DC planning for next monday 7/26

## 2021-07-21 NOTE — PROGRESS NOTE ADULT - PROBLEM SELECTOR PLAN 8
CHF from volume overload, 7/10 CXR vascular congestion.   Echo severe LV systolic fxn, LVEF 27%  ICD (implantable cardioverter-defibrillator) in place

## 2021-07-21 NOTE — ADVANCED PRACTICE NURSE CONSULT - ASSESSMENT
Pt. seen in bed a/ox3 . Chemotherapy teachings done, Pt. verbalized understanding .Pt. has left double lumen PICC . Dsg dry and intact .Site with no s/s of redness, swelling or pain. With positive blood return noted and flushing easily with 10 ML NS. Lab values creatinine =2.24 , spoke with Maria Glynn NP and reviewed on rounds by Dr. Constantino  . Pt. received zofran 8 mg IVP as premedication. Drug verification done by 2 RN.'s. At 1614  Day 1/5 decitabine 35 milliGRAM(s) started, Infusing  over 1 hr as a  secondary line of NS chemo back up line  via alaris pump . Primary  RN aware of present treatment.Left pt. comfortable in bed.Safety maintained.        Pt. seen in bed a/ox3 . Chemotherapy teachings done, Pt. verbalized understanding .Pt. has left double lumen PICC . Dsg dry and intact .Site with no s/s of redness, swelling or pain. With positive blood return noted and flushing easily with 10 ML NS. Lab values creatinine =2.24 , spoke with Maria Glynn NP and reviewed on rounds by Dr. Constantino  . Pt. received zofran 8 mg IVP as premedication. Drug verification done by 2 RN.'s. At 1614  Day 2/5 decitabine 35 milliGRAM(s) started, Infusing  over 1 hr as a  secondary line of NS chemo back up line  via alaris pump . Primary  RN aware of present treatment.Left pt. comfortable in bed.Safety maintained.

## 2021-07-21 NOTE — PROGRESS NOTE ADULT - PROBLEM SELECTOR PLAN 9
s/p 5vCABG 2012 (LIMA to LAD, SVG to Diag and OM, SVG PDA and RPL), PCI (2 in 2014, 2 in 2015 [facility]), HFrEF s/p CRT-D (2013),  No DAPT or AC given thrombocytopenia.

## 2021-07-21 NOTE — PROGRESS NOTE ADULT - PROBLEM SELECTOR PLAN 1
FLT3 ITD (-) AML   Dacogen held on day 3, 6/21 due to acute change in mental status now back to baseline. Course completed on day 6 (to complete 5 days)   Monitor daily CBC's and transfuse as needed, Monitor daily CMP and replete electrolytes as needed   Strict I's/O's, daily weights, mouth care, anti emetics.   s/p Dacogen 20 mg/m2 x 5 days + Venetoclax. (s/p Venetoclax escalation, now on 100 mg oral daily - while on posaconazole)  Day 21 BM bx on 7/9, quantity insufficient. Repeated 7/16 : showing hypercellular marrow   s/p Vitamain K (5mg po- 7/15-7/17)   Pt is DNR  Cycle 2 started 7/20  patient wants to return to [place]  Anemia-replace PRBC   Lasix 20mg x1 today. FLT3 ITD (-) AML   Dacogen held on day 3, 6/21 due to acute change in mental status now back to baseline. Course completed on day 6 (to complete 5 days)   Monitor daily CBC's and transfuse as needed, Monitor daily CMP and replete electrolytes as needed   Strict I's/O's, daily weights, mouth care, anti emetics.   s/p Dacogen 20 mg/m2 x 5 days + Venetoclax. (s/p Venetoclax escalation, now on 100 mg oral daily - while on posaconazole)  Day 21 BM bx on 7/9, quantity insufficient. Repeated 7/16 : showing hypercellular marrow   s/p Vitamain K (5mg po- 7/15-7/17)   Pt is DNR  Cycle 2 started 7/20  patient wants to return to [place]  mild epistaxis-platelets ordered. FLT3 ITD (-) AML   Dacogen held on day 3, 6/21 due to acute change in mental status now back to baseline. Course completed on day 6 (to complete 5 days)   Monitor daily CBC's and transfuse as needed, Monitor daily CMP and replete electrolytes as needed   Strict I's/O's, daily weights, mouth care, anti emetics.   cycle 2 Dacogen 20 mg/m2 x 5 days + Venetoclax. (s/p Venetoclax escalation, now on 100 mg oral daily - while on posaconazole) started 7/20  Day 21 BM bx on 7/9, quantity insufficient. Repeated 7/16 : showing hypercellular marrow   s/p Vitamain K (5mg po- 7/15-7/17)   Pt is DNR  Cycle 2 started 7/20  patient wants to return to [place]  mild epistaxis-platelets ordered.

## 2021-07-21 NOTE — PROGRESS NOTE ADULT - PROBLEM SELECTOR PLAN 6
stable   Nephrology following-No indication for HD at this time.   6/15 Normal renal ultrasound  follow ionized calcium daily as per nephrology  6/24 on Sensipar for hypercalcemia   Vitamin D 2000 daily.  7/10 restarted home Lasix 40mg daily  Continue Lasix 20mg qd for decreased po's + rising Cr  NAHCo3 started 7/20 per renal. stable   Nephrology following-No indication for HD at this time.   6/15 Normal renal ultrasound  follow ionized calcium daily as per nephrology  6/24 on Sensipar for hypercalcemia   Vitamin D 2000 daily.  Continue home Lasix 20mg daily  home dose is 40mg daily.   NAHCo3 started 7/20 per renal.

## 2021-07-21 NOTE — PROGRESS NOTE ADULT - SUBJECTIVE AND OBJECTIVE BOX
Diagnosis: FLT3 ITD (-) Acute Myeloid Leukemia    Protocol/Chemo Regimen: S/p Cycle 1 Decitabine (held on day 3) + Venetoclax     Day: 33    Pt endorsed: no complaints,    Review of Systems: Denies n/v, abdominal pain, orthopnea, SOB     Pain scale: 0    Allergies    morphine (Unknown)    Intolerances        ANTIMICROBIALS  levoFLOXacin  Tablet 250 milliGRAM(s) Oral every 24 hours  posaconazole DR Tablet 300 milliGRAM(s) Oral daily      HEME/ONC MEDICATIONS  decitabine IVPB (eMAR) 35 milliGRAM(s) IV Intermittent every 24 hours  venetoclax 100 milliGRAM(s) Oral <User Schedule>      STANDING MEDICATIONS  Biotene Dry Mouth Oral Rinse 5 milliLiter(s) Swish and Spit four times a day  chlorhexidine 2% Cloths 1 Application(s) Topical <User Schedule>  cholecalciferol 2000 Unit(s) Oral daily  cinacalcet 30 milliGRAM(s) Oral daily  escitalopram 5 milliGRAM(s) Oral daily  finasteride 5 milliGRAM(s) Oral daily  folic acid 1 milliGRAM(s) Oral daily  furosemide    Tablet 20 milliGRAM(s) Oral daily  isosorbide   mononitrate ER Tablet (IMDUR) 60 milliGRAM(s) Oral daily  lidocaine   Patch 1 Patch Transdermal daily  metoprolol succinate ER 50 milliGRAM(s) Oral daily  ondansetron Injectable 8 milliGRAM(s) IV Push every 24 hours  polyethylene glycol 3350 17 Gram(s) Oral daily  sodium bicarbonate 650 milliGRAM(s) Oral three times a day  tamsulosin 0.4 milliGRAM(s) Oral at bedtime  urea Oral Powder 15 Gram(s) Oral every 12 hours      PRN MEDICATIONS  acetaminophen   Tablet .. 650 milliGRAM(s) Oral every 6 hours PRN  albuterol/ipratropium for Nebulization 3 milliLiter(s) Nebulizer every 6 hours PRN  aluminum hydroxide/magnesium hydroxide/simethicone Suspension 30 milliLiter(s) Oral every 4 hours PRN  calcium carbonate    500 mG (Tums) Chewable 1 Tablet(s) Chew every 4 hours PRN  ondansetron Injectable 8 milliGRAM(s) IV Push every 8 hours PRN  senna 2 Tablet(s) Oral at bedtime PRN  sodium chloride 0.9% lock flush 10 milliLiter(s) IV Push every 1 hour PRN  sucralfate suspension 1 Gram(s) Oral every 6 hours PRN        Vital Signs Last 24 Hrs  T(C): 36.8 (21 Jul 2021 05:42), Max: 36.8 (21 Jul 2021 05:42)  T(F): 98.2 (21 Jul 2021 05:42), Max: 98.2 (21 Jul 2021 05:42)  HR: 65 (21 Jul 2021 05:42) (64 - 73)  BP: 149/62 (21 Jul 2021 05:42) (114/59 - 149/62)  BP(mean): --  RR: 18 (21 Jul 2021 05:42) (18 - 18)  SpO2: 97% (21 Jul 2021 05:42) (96% - 100%)    PHYSICAL EXAM  General: NAD  HEENT: PERRLA, EOMOI, clear oropharynx, anicteric sclera, pink conjunctiva  Neck: supple  CV: (+) S1/S2 RRR  Lungs: clear to auscultation, no wheezes or rales  Abdomen: soft, non-tender, non-distended (+) BS  Ext: no clubbing, cyanosis or edema  Skin: no rashes and no petechiae  Neuro: alert and oriented X 3, no focal deficits  Central Line: PICC c/d/i     LABS: Diagnosis: FLT3 ITD (-) Acute Myeloid Leukemia    Protocol/Chemo Regimen: S/p Cycle 1 Decitabine (held on day 3) + Venetoclax     Day: 2    Pt endorsed: no complaints,    Review of Systems: Denies n/v, abdominal pain, orthopnea, SOB     Pain scale: 0    Allergies    morphine (Unknown)    Intolerances        ANTIMICROBIALS  levoFLOXacin  Tablet 250 milliGRAM(s) Oral every 24 hours  posaconazole DR Tablet 300 milliGRAM(s) Oral daily      HEME/ONC MEDICATIONS  decitabine IVPB (eMAR) 35 milliGRAM(s) IV Intermittent every 24 hours  venetoclax 100 milliGRAM(s) Oral <User Schedule>      STANDING MEDICATIONS  Biotene Dry Mouth Oral Rinse 5 milliLiter(s) Swish and Spit four times a day  chlorhexidine 2% Cloths 1 Application(s) Topical <User Schedule>  cholecalciferol 2000 Unit(s) Oral daily  cinacalcet 30 milliGRAM(s) Oral daily  escitalopram 5 milliGRAM(s) Oral daily  finasteride 5 milliGRAM(s) Oral daily  folic acid 1 milliGRAM(s) Oral daily  furosemide    Tablet 20 milliGRAM(s) Oral daily  isosorbide   mononitrate ER Tablet (IMDUR) 60 milliGRAM(s) Oral daily  lidocaine   Patch 1 Patch Transdermal daily  metoprolol succinate ER 50 milliGRAM(s) Oral daily  ondansetron Injectable 8 milliGRAM(s) IV Push every 24 hours  polyethylene glycol 3350 17 Gram(s) Oral daily  sodium bicarbonate 650 milliGRAM(s) Oral three times a day  tamsulosin 0.4 milliGRAM(s) Oral at bedtime  urea Oral Powder 15 Gram(s) Oral every 12 hours      PRN MEDICATIONS  acetaminophen   Tablet .. 650 milliGRAM(s) Oral every 6 hours PRN  albuterol/ipratropium for Nebulization 3 milliLiter(s) Nebulizer every 6 hours PRN  aluminum hydroxide/magnesium hydroxide/simethicone Suspension 30 milliLiter(s) Oral every 4 hours PRN  calcium carbonate    500 mG (Tums) Chewable 1 Tablet(s) Chew every 4 hours PRN  ondansetron Injectable 8 milliGRAM(s) IV Push every 8 hours PRN  senna 2 Tablet(s) Oral at bedtime PRN  sodium chloride 0.9% lock flush 10 milliLiter(s) IV Push every 1 hour PRN  sucralfate suspension 1 Gram(s) Oral every 6 hours PRN        Vital Signs Last 24 Hrs  T(C): 36.8 (21 Jul 2021 05:42), Max: 36.8 (21 Jul 2021 05:42)  T(F): 98.2 (21 Jul 2021 05:42), Max: 98.2 (21 Jul 2021 05:42)  HR: 65 (21 Jul 2021 05:42) (64 - 73)  BP: 149/62 (21 Jul 2021 05:42) (114/59 - 149/62)  BP(mean): --  RR: 18 (21 Jul 2021 05:42) (18 - 18)  SpO2: 97% (21 Jul 2021 05:42) (96% - 100%)    PHYSICAL EXAM  General: NAD  HEENT: PERRLA, EOMOI, clear oropharynx, anicteric sclera, pink conjunctiva  Neck: supple  CV: (+) S1/S2 RRR  Lungs: clear to auscultation, no wheezes or rales  Abdomen: soft, non-tender, non-distended (+) BS  Ext: no clubbing, cyanosis or edema  Skin: no rashes and no petechiae  Neuro: alert and oriented X 3, no focal deficits  Central Line: PICC c/d/i     LABS: Diagnosis: FLT3 ITD (-) Acute Myeloid Leukemia    Protocol/Chemo Regimen: S/p Cycle 1 Decitabine (held on day 3) + Venetoclax     Day: 2    Pt endorsed: no complaints, feels good today    Review of Systems: Denies n/v, abdominal pain, orthopnea, SOB     Pain scale: 0    Allergies    morphine (Unknown)    Intolerances        ANTIMICROBIALS  levoFLOXacin  Tablet 250 milliGRAM(s) Oral every 24 hours  posaconazole DR Tablet 300 milliGRAM(s) Oral daily      HEME/ONC MEDICATIONS  decitabine IVPB (eMAR) 35 milliGRAM(s) IV Intermittent every 24 hours  venetoclax 100 milliGRAM(s) Oral <User Schedule>      STANDING MEDICATIONS  Biotene Dry Mouth Oral Rinse 5 milliLiter(s) Swish and Spit four times a day  chlorhexidine 2% Cloths 1 Application(s) Topical <User Schedule>  cholecalciferol 2000 Unit(s) Oral daily  cinacalcet 30 milliGRAM(s) Oral daily  escitalopram 5 milliGRAM(s) Oral daily  finasteride 5 milliGRAM(s) Oral daily  folic acid 1 milliGRAM(s) Oral daily  furosemide    Tablet 20 milliGRAM(s) Oral daily  isosorbide   mononitrate ER Tablet (IMDUR) 60 milliGRAM(s) Oral daily  lidocaine   Patch 1 Patch Transdermal daily  metoprolol succinate ER 50 milliGRAM(s) Oral daily  ondansetron Injectable 8 milliGRAM(s) IV Push every 24 hours  polyethylene glycol 3350 17 Gram(s) Oral daily  sodium bicarbonate 650 milliGRAM(s) Oral three times a day  tamsulosin 0.4 milliGRAM(s) Oral at bedtime  urea Oral Powder 15 Gram(s) Oral every 12 hours      PRN MEDICATIONS  acetaminophen   Tablet .. 650 milliGRAM(s) Oral every 6 hours PRN  albuterol/ipratropium for Nebulization 3 milliLiter(s) Nebulizer every 6 hours PRN  aluminum hydroxide/magnesium hydroxide/simethicone Suspension 30 milliLiter(s) Oral every 4 hours PRN  calcium carbonate    500 mG (Tums) Chewable 1 Tablet(s) Chew every 4 hours PRN  ondansetron Injectable 8 milliGRAM(s) IV Push every 8 hours PRN  senna 2 Tablet(s) Oral at bedtime PRN  sodium chloride 0.9% lock flush 10 milliLiter(s) IV Push every 1 hour PRN  sucralfate suspension 1 Gram(s) Oral every 6 hours PRN        Vital Signs Last 24 Hrs  T(C): 36.8 (21 Jul 2021 05:42), Max: 36.8 (21 Jul 2021 05:42)  T(F): 98.2 (21 Jul 2021 05:42), Max: 98.2 (21 Jul 2021 05:42)  HR: 65 (21 Jul 2021 05:42) (64 - 73)  BP: 149/62 (21 Jul 2021 05:42) (114/59 - 149/62)  BP(mean): --  RR: 18 (21 Jul 2021 05:42) (18 - 18)  SpO2: 97% (21 Jul 2021 05:42) (96% - 100%)    PHYSICAL EXAM  General: NAD  HEENT: PERRLA, EOMOI, clear oropharynx, anicteric sclera, pink conjunctiva  Neck: supple  CV: (+) S1/S2 RRR  Lungs: clear to auscultation, no wheezes or rales  Abdomen: soft, non-tender, non-distended (+) BS  Ext: no clubbing, cyanosis or edema  Skin: no rashes and no petechiae  Neuro: alert and oriented X 3, no focal deficits  Central Line: PICC c/d/i     LABS:    Blood Cultures:                           7.9    1.03  )-----------( 14       ( 21 Jul 2021 06:44 )             23.0         Mean Cell Volume : 85.2 fl  Mean Cell Hemoglobin : 29.3 pg  Mean Cell Hemoglobin Concentration : 34.3 gm/dL  Auto Neutrophil # : 0.41 K/uL  Auto Lymphocyte # : 0.57 K/uL  Auto Monocyte # : 0.05 K/uL  Auto Eosinophil # : 0.00 K/uL  Auto Basophil # : 0.00 K/uL  Auto Neutrophil % : 39.5 %  Auto Lymphocyte % : 55.2 %  Auto Monocyte % : 4.4 %  Auto Eosinophil % : 0.0 %  Auto Basophil % : 0.0 %      07-21    133<L>  |  101  |  35<H>  ----------------------------<  97  3.6   |  19<L>  |  2.28<H>    Ca    8.1<L>      21 Jul 2021 06:44  Phos  4.2     07-21  Mg     1.9     07-21    TPro  6.6  /  Alb  2.5<L>  /  TBili  1.2  /  DBili  x   /  AST  10  /  ALT  5<L>  /  AlkPhos  101  07-21      Mg 1.9  Phos 4.2            Uric Acid --               Diagnosis: FLT3 ITD (-) Acute Myeloid Leukemia    Protocol/Chemo Regimen: Cycle 2 Decitabine+ Venetoclax     Day: 2    Pt endorsed: no complaints, feels good today    Review of Systems: Denies n/v, abdominal pain, orthopnea, SOB     Pain scale: 0    Allergies    morphine (Unknown)    Intolerances        ANTIMICROBIALS  levoFLOXacin  Tablet 250 milliGRAM(s) Oral every 24 hours  posaconazole DR Tablet 300 milliGRAM(s) Oral daily      HEME/ONC MEDICATIONS  decitabine IVPB (eMAR) 35 milliGRAM(s) IV Intermittent every 24 hours  venetoclax 100 milliGRAM(s) Oral <User Schedule>      STANDING MEDICATIONS  Biotene Dry Mouth Oral Rinse 5 milliLiter(s) Swish and Spit four times a day  chlorhexidine 2% Cloths 1 Application(s) Topical <User Schedule>  cholecalciferol 2000 Unit(s) Oral daily  cinacalcet 30 milliGRAM(s) Oral daily  escitalopram 5 milliGRAM(s) Oral daily  finasteride 5 milliGRAM(s) Oral daily  folic acid 1 milliGRAM(s) Oral daily  furosemide    Tablet 20 milliGRAM(s) Oral daily  isosorbide   mononitrate ER Tablet (IMDUR) 60 milliGRAM(s) Oral daily  lidocaine   Patch 1 Patch Transdermal daily  metoprolol succinate ER 50 milliGRAM(s) Oral daily  ondansetron Injectable 8 milliGRAM(s) IV Push every 24 hours  polyethylene glycol 3350 17 Gram(s) Oral daily  sodium bicarbonate 650 milliGRAM(s) Oral three times a day  tamsulosin 0.4 milliGRAM(s) Oral at bedtime  urea Oral Powder 15 Gram(s) Oral every 12 hours      PRN MEDICATIONS  acetaminophen   Tablet .. 650 milliGRAM(s) Oral every 6 hours PRN  albuterol/ipratropium for Nebulization 3 milliLiter(s) Nebulizer every 6 hours PRN  aluminum hydroxide/magnesium hydroxide/simethicone Suspension 30 milliLiter(s) Oral every 4 hours PRN  calcium carbonate    500 mG (Tums) Chewable 1 Tablet(s) Chew every 4 hours PRN  ondansetron Injectable 8 milliGRAM(s) IV Push every 8 hours PRN  senna 2 Tablet(s) Oral at bedtime PRN  sodium chloride 0.9% lock flush 10 milliLiter(s) IV Push every 1 hour PRN  sucralfate suspension 1 Gram(s) Oral every 6 hours PRN        Vital Signs Last 24 Hrs  T(C): 36.8 (21 Jul 2021 05:42), Max: 36.8 (21 Jul 2021 05:42)  T(F): 98.2 (21 Jul 2021 05:42), Max: 98.2 (21 Jul 2021 05:42)  HR: 65 (21 Jul 2021 05:42) (64 - 73)  BP: 149/62 (21 Jul 2021 05:42) (114/59 - 149/62)  BP(mean): --  RR: 18 (21 Jul 2021 05:42) (18 - 18)  SpO2: 97% (21 Jul 2021 05:42) (96% - 100%)    PHYSICAL EXAM  General: NAD  HEENT: PERRLA, EOMOI, clear oropharynx, anicteric sclera, pink conjunctiva  Neck: supple  CV: (+) S1/S2 RRR  Lungs: clear to auscultation, no wheezes or rales  Abdomen: soft, non-tender, non-distended (+) BS  Ext: no clubbing, cyanosis or edema  Skin: no rashes and no petechiae  Neuro: alert and oriented X 3, no focal deficits  Central Line: PICC c/d/i     LABS:    Blood Cultures:                           7.9    1.03  )-----------( 14       ( 21 Jul 2021 06:44 )             23.0         Mean Cell Volume : 85.2 fl  Mean Cell Hemoglobin : 29.3 pg  Mean Cell Hemoglobin Concentration : 34.3 gm/dL  Auto Neutrophil # : 0.41 K/uL  Auto Lymphocyte # : 0.57 K/uL  Auto Monocyte # : 0.05 K/uL  Auto Eosinophil # : 0.00 K/uL  Auto Basophil # : 0.00 K/uL  Auto Neutrophil % : 39.5 %  Auto Lymphocyte % : 55.2 %  Auto Monocyte % : 4.4 %  Auto Eosinophil % : 0.0 %  Auto Basophil % : 0.0 %      07-21    133<L>  |  101  |  35<H>  ----------------------------<  97  3.6   |  19<L>  |  2.28<H>    Ca    8.1<L>      21 Jul 2021 06:44  Phos  4.2     07-21  Mg     1.9     07-21    TPro  6.6  /  Alb  2.5<L>  /  TBili  1.2  /  DBili  x   /  AST  10  /  ALT  5<L>  /  AlkPhos  101  07-21      Mg 1.9  Phos 4.2            Uric Acid --

## 2021-07-21 NOTE — PROGRESS NOTE ADULT - SUBJECTIVE AND OBJECTIVE BOX
[facility] DIVISION OF KIDNEY DISEASES AND HYPERTENSION -- FOLLOW UP NOTE  --------------------------------------------------------------------------------  Chief Complaint:    24 hour events/subjective:        PAST HISTORY  --------------------------------------------------------------------------------  No significant changes to PMH, PSH, FHx, SHx, unless otherwise noted    ALLERGIES & MEDICATIONS  --------------------------------------------------------------------------------  Allergies    morphine (Unknown)    Intolerances      Standing Inpatient Medications  Biotene Dry Mouth Oral Rinse 5 milliLiter(s) Swish and Spit four times a day  chlorhexidine 2% Cloths 1 Application(s) Topical <User Schedule>  cholecalciferol 2000 Unit(s) Oral daily  cinacalcet 30 milliGRAM(s) Oral daily  decitabine IVPB (eMAR) 35 milliGRAM(s) IV Intermittent every 24 hours  escitalopram 5 milliGRAM(s) Oral daily  finasteride 5 milliGRAM(s) Oral daily  folic acid 1 milliGRAM(s) Oral daily  furosemide    Tablet 20 milliGRAM(s) Oral daily  isosorbide   mononitrate ER Tablet (IMDUR) 60 milliGRAM(s) Oral daily  levoFLOXacin  Tablet 250 milliGRAM(s) Oral every 24 hours  lidocaine   Patch 1 Patch Transdermal daily  metoprolol succinate ER 50 milliGRAM(s) Oral daily  ondansetron Injectable 8 milliGRAM(s) IV Push every 24 hours  polyethylene glycol 3350 17 Gram(s) Oral daily  posaconazole DR Tablet 300 milliGRAM(s) Oral daily  sodium bicarbonate 650 milliGRAM(s) Oral three times a day  tamsulosin 0.4 milliGRAM(s) Oral at bedtime  urea Oral Powder 15 Gram(s) Oral every 12 hours  venetoclax 100 milliGRAM(s) Oral <User Schedule>    PRN Inpatient Medications  acetaminophen   Tablet .. 650 milliGRAM(s) Oral every 6 hours PRN  albuterol/ipratropium for Nebulization 3 milliLiter(s) Nebulizer every 6 hours PRN  aluminum hydroxide/magnesium hydroxide/simethicone Suspension 30 milliLiter(s) Oral every 4 hours PRN  calcium carbonate    500 mG (Tums) Chewable 1 Tablet(s) Chew every 4 hours PRN  ondansetron Injectable 8 milliGRAM(s) IV Push every 8 hours PRN  senna 2 Tablet(s) Oral at bedtime PRN  sodium chloride 0.9% lock flush 10 milliLiter(s) IV Push every 1 hour PRN  sucralfate suspension 1 Gram(s) Oral every 6 hours PRN      REVIEW OF SYSTEMS  --------------------------------------------------------------------------------  Gen: No weight changes, fatigue, fevers/chills, weakness  Skin: No rashes  Head/Eyes/Ears/Mouth: No headache; Normal hearing; Normal vision w/o blurriness; No sinus pain/discomfort, sore throat  Respiratory: No dyspnea, cough, wheezing, hemoptysis  CV: No chest pain, PND, orthopnea  GI: No abdominal pain, diarrhea, constipation, nausea, vomiting, melena, hematochezia  : No increased frequency, dysuria, hematuria, nocturia  MSK: No joint pain/swelling; no back pain; no edema  Neuro: No dizziness/lightheadedness, weakness, seizures, numbness, tingling  Heme: No easy bruising or bleeding  Endo: No heat/cold intolerance  Psych: No significant nervousness, anxiety, stress, depression    All other systems were reviewed and are negative, except as noted.    VITALS/PHYSICAL EXAM  --------------------------------------------------------------------------------  T(C): 36.6 (07-21-21 @ 09:20), Max: 36.8 (07-21-21 @ 05:42)  HR: 96 (07-21-21 @ 09:20) (64 - 96)  BP: 112/54 (07-21-21 @ 09:20) (112/54 - 149/62)  RR: 18 (07-21-21 @ 09:20) (18 - 18)  SpO2: 98% (07-21-21 @ 09:20) (96% - 100%)  Wt(kg): --        07-20-21 @ 07:01  -  07-21-21 @ 07:00  --------------------------------------------------------  IN: 1195 mL / OUT: 950 mL / NET: 245 mL    07-21-21 @ 07:01  -  07-21-21 @ 11:55  --------------------------------------------------------  IN: 200 mL / OUT: 0 mL / NET: 200 mL      Physical Exam:  	Gen: NAD, well-appearing  	HEENT: PERRL, supple neck, clear oropharynx  	Pulm: CTA B/L  	CV: RRR, S1S2; no rub  	Back: No spinal or CVA tenderness; no sacral edema  	Abd: +BS, soft, nontender/nondistended  	: No suprapubic tenderness  	UE: Warm, FROM, no clubbing, intact strength; no edema; no asterixis  	LE: Warm, FROM, no clubbing, intact strength; no edema  	Neuro: No focal deficits, intact gait  	Psych: Normal affect and mood  	Skin: Warm, without rashes  	Vascular access:    LABS/STUDIES  --------------------------------------------------------------------------------              7.9    1.03  >-----------<  14       [07-21-21 @ 06:44]              23.0     133  |  101  |  35  ----------------------------<  97      [07-21-21 @ 06:44]  3.6   |  19  |  2.28        Ca     8.1     [07-21-21 @ 06:44]      iCa    1.08     [07-21 @ 07:06]      Mg     1.9     [07-21-21 @ 06:44]      Phos  4.2     [07-21-21 @ 06:44]    TPro  6.6  /  Alb  2.5  /  TBili  1.2  /  DBili  x   /  AST  10  /  ALT  5   /  AlkPhos  101  [07-21-21 @ 06:44]              [07-21-21 @ 06:44]    Creatinine Trend:  SCr 2.28 [07-21 @ 06:44]  SCr 2.24 [07-20 @ 06:39]  SCr 2.23 [07-19 @ 07:13]  SCr 2.25 [07-18 @ 07:10]  SCr 2.15 [07-17 @ 07:13]        Iron 155, TIBC 237, %sat 65      [06-12-21 @ 03:36]  Ferritin 827      [06-12-21 @ 04:44]  PTH -- (Ca 9.8)      [06-18-21 @ 10:41]   84  Vitamin D (25OH) 21.6      [06-18-21 @ 10:41]  HbA1c 5.8      [11-13-17 @ 10:29]
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
## 2021-07-22 NOTE — PROGRESS NOTE ADULT - SUBJECTIVE AND OBJECTIVE BOX
[facility] DIVISION OF KIDNEY DISEASES AND HYPERTENSION -- FOLLOW UP NOTE  --------------------------------------------------------------------------------  Chief Complaint:    24 hour events/subjective:    Patient was seen and examined at bedside. Reported feeling well. Denies CP, SOB, fever, chills, nausea, vomiting, diarrhea, LE edema or dysuria      PAST HISTORY  --------------------------------------------------------------------------------  No significant changes to PMH, PSH, FHx, SHx, unless otherwise noted    ALLERGIES & MEDICATIONS  --------------------------------------------------------------------------------  Allergies    morphine (Unknown)    Intolerances      Standing Inpatient Medications  Biotene Dry Mouth Oral Rinse 5 milliLiter(s) Swish and Spit four times a day  calcium carbonate    500 mG (Tums) Chewable 2 Tablet(s) Chew daily  chlorhexidine 2% Cloths 1 Application(s) Topical <User Schedule>  cholecalciferol 2000 Unit(s) Oral daily  decitabine IVPB (eMAR) 35 milliGRAM(s) IV Intermittent every 24 hours  escitalopram 5 milliGRAM(s) Oral daily  finasteride 5 milliGRAM(s) Oral daily  folic acid 1 milliGRAM(s) Oral daily  furosemide    Tablet 20 milliGRAM(s) Oral daily  isosorbide   mononitrate ER Tablet (IMDUR) 60 milliGRAM(s) Oral daily  levoFLOXacin  Tablet 250 milliGRAM(s) Oral every 24 hours  lidocaine   Patch 1 Patch Transdermal daily  metoprolol succinate ER 50 milliGRAM(s) Oral daily  ondansetron Injectable 8 milliGRAM(s) IV Push every 24 hours  polyethylene glycol 3350 17 Gram(s) Oral daily  posaconazole DR Tablet 300 milliGRAM(s) Oral daily  potassium chloride  20 mEq/100 mL IVPB 20 milliEquivalent(s) IV Intermittent every 2 hours  sodium bicarbonate 650 milliGRAM(s) Oral three times a day  tamsulosin 0.4 milliGRAM(s) Oral at bedtime  urea Oral Powder 15 Gram(s) Oral every 12 hours  venetoclax 100 milliGRAM(s) Oral <User Schedule>    PRN Inpatient Medications  acetaminophen   Tablet .. 650 milliGRAM(s) Oral every 6 hours PRN  albuterol/ipratropium for Nebulization 3 milliLiter(s) Nebulizer every 6 hours PRN  aluminum hydroxide/magnesium hydroxide/simethicone Suspension 30 milliLiter(s) Oral every 4 hours PRN  calcium carbonate    500 mG (Tums) Chewable 1 Tablet(s) Chew every 4 hours PRN  ondansetron Injectable 8 milliGRAM(s) IV Push every 8 hours PRN  senna 2 Tablet(s) Oral at bedtime PRN  sodium chloride 0.9% lock flush 10 milliLiter(s) IV Push every 1 hour PRN  sucralfate suspension 1 Gram(s) Oral every 6 hours PRN      REVIEW OF SYSTEMS  --------------------------------------------------------------------------------  Gen: No fevers/chills  Skin: No rashes  Head/Eyes/Ears: Normal hearing,   Respiratory: No dyspnea, cough  CV: No chest pain  GI: No abdominal pain, diarrhea  : No dysuria, hematuria  MSK: No  edema  Heme: No easy bruising or bleeding  Psych: No significant depression      All other systems were reviewed and are negative, except as noted.    VITALS/PHYSICAL EXAM  --------------------------------------------------------------------------------  T(C): 36.7 (07-22-21 @ 09:45), Max: 37.1 (07-22-21 @ 00:47)  HR: 67 (07-22-21 @ 09:45) (66 - 79)  BP: 123/56 (07-22-21 @ 09:45) (100/65 - 123/56)  RR: 18 (07-22-21 @ 09:45) (18 - 18)  SpO2: 98% (07-22-21 @ 09:45) (95% - 98%)  Wt(kg): --        07-21-21 @ 07:01  -  07-22-21 @ 07:00  --------------------------------------------------------  IN: 1057 mL / OUT: 1050 mL / NET: 7 mL        Physical Exam:  	Gen: NAD  	HEENT: MMM  	Pulm: CTA B/L  	CV: S1S2  	Abd: Soft, +BS   	Ext: No LE edema B/L  	Neuro: Awake  	Skin: Warm and dry  	Vascular access:      LABS/STUDIES  --------------------------------------------------------------------------------              7.0    0.80  >-----------<  31       [07-22-21 @ 07:10]              20.6     134  |  104  |  37  ----------------------------<  96      [07-22-21 @ 07:10]  3.3   |  19  |  2.32        Ca     7.9     [07-22-21 @ 07:10]      iCa    1.14     [07-22 @ 07:10]      Mg     2.0     [07-22-21 @ 07:10]      Phos  4.2     [07-22-21 @ 07:10]    TPro  6.3  /  Alb  2.6  /  TBili  0.9  /  DBili  x   /  AST  8   /  ALT  5   /  AlkPhos  98  [07-22-21 @ 07:10]              [07-22-21 @ 07:10]    Creatinine Trend:  SCr 2.32 [07-22 @ 07:10]  SCr 2.28 [07-21 @ 06:44]  SCr 2.24 [07-20 @ 06:39]  SCr 2.23 [07-19 @ 07:13]  SCr 2.25 [07-18 @ 07:10]        Iron 155, TIBC 237, %sat 65      [06-12-21 @ 03:36]  Ferritin 827      [06-12-21 @ 04:44]  PTH -- (Ca 9.8)      [06-18-21 @ 10:41]   84  Vitamin D (25OH) 21.6      [06-18-21 @ 10:41]  HbA1c 5.8      [11-13-17 @ 10:29]

## 2021-07-22 NOTE — PROGRESS NOTE ADULT - PROBLEM SELECTOR PLAN 6
stable   Nephrology following-No indication for HD at this time.   6/15 Normal renal ultrasound  follow ionized calcium daily as per nephrology  6/24 on Sensipar for hypercalcemia   Vitamin D 2000 daily.  Continue home Lasix 20mg daily  home dose is 40mg daily.   NAHCo3 started 7/20 per renal. stable   Nephrology following-No indication for HD at this time.   6/15 Normal renal ultrasound  follow ionized calcium daily as per nephrology  6/24 on Sensipar for hypercalcemia   Vitamin D 2000 daily.  Continue home Lasix 20mg daily  home dose is 40mg daily.   NAHCo3 started 7/20 per renal.  Oral calcium supplementation started 7/22

## 2021-07-22 NOTE — PROGRESS NOTE ADULT - PROBLEM SELECTOR PLAN 2
Patient is neutropenic, afebrile   6/30 completed Zosyn   7/5 Started Levaquin for neutropenic prophylaxis as ANC < 500  If febrile, send pan cultures, and switch Levaquin to cefepime  6/24 Abd US: Cholelithiasis without signs of cholecystitis, persistently dilated CBD; HIDA scan (+) for acute cholecystitis   Seen by Surgery and IR, since patient is asymptomatic and there are no signs of hemodynamic instability   No surgical or IR interventions were recommended.  6/28  US abdomen Cholelithiasis with no definite sonographic evidence of acute cholecystitis.  7/3 Started posaconazole prophylaxis as patient is neutropenic. Adjusted dose of venetoclax. Patient is neutropenic, afebrile   6/30 completed Zosyn   7/5 Started Levaquin for neutropenic prophylaxis as ANC < 500  If febrile, send pan cultures, and switch Levaquin to cefepime  6/24 Abd US: Cholelithiasis without signs of cholecystitis, persistently dilated CBD; HIDA scan (+) for acute cholecystitis-Seen by Surgery and IR, No surgical or IR interventions were recommended.  6/28  US abdomen Cholelithiasis with no definite sonographic evidence of acute cholecystitis.  7/3 Started posaconazole prophylaxis as patient is neutropenic. Adjusted dose of venetoclax.

## 2021-07-22 NOTE — PROGRESS NOTE ADULT - ATTENDING COMMENTS
I have seen this patient with the fellow and agree with their assessment and plan. I was physically present for significant portions of the evaluation and management (E/M) service provided.  I agree with the above history, physical, and plan which I have reviewed and edited where appropriate.    Cont to monitor CKD and needs calcium replete  Cont bicarb for now at this dose      Todd Wasserman MD  Cell   Pager   Office

## 2021-07-22 NOTE — PROGRESS NOTE ADULT - SUBJECTIVE AND OBJECTIVE BOX
Diagnosis: FLT3 ITD (-) Acute Myeloid Leukemia    Protocol/Chemo Regimen: S/p Cycle 1 Decitabine (held on day 3) + Venetoclax     Day: 3    Pt endorsed: no complaints, feels good today    Review of Systems: Denies n/v, abdominal pain, orthopnea, SOB     Pain scale: 0    Allergies    morphine (Unknown)    Intolerances        ANTIMICROBIALS  levoFLOXacin  Tablet 250 milliGRAM(s) Oral every 24 hours  posaconazole DR Tablet 300 milliGRAM(s) Oral daily      HEME/ONC MEDICATIONS  decitabine IVPB (eMAR) 35 milliGRAM(s) IV Intermittent every 24 hours  venetoclax 100 milliGRAM(s) Oral <User Schedule>      STANDING MEDICATIONS  Biotene Dry Mouth Oral Rinse 5 milliLiter(s) Swish and Spit four times a day  chlorhexidine 2% Cloths 1 Application(s) Topical <User Schedule>  cholecalciferol 2000 Unit(s) Oral daily  escitalopram 5 milliGRAM(s) Oral daily  finasteride 5 milliGRAM(s) Oral daily  folic acid 1 milliGRAM(s) Oral daily  furosemide    Tablet 20 milliGRAM(s) Oral daily  isosorbide   mononitrate ER Tablet (IMDUR) 60 milliGRAM(s) Oral daily  lidocaine   Patch 1 Patch Transdermal daily  metoprolol succinate ER 50 milliGRAM(s) Oral daily  ondansetron Injectable 8 milliGRAM(s) IV Push every 24 hours  polyethylene glycol 3350 17 Gram(s) Oral daily  sodium bicarbonate 650 milliGRAM(s) Oral three times a day  tamsulosin 0.4 milliGRAM(s) Oral at bedtime  urea Oral Powder 15 Gram(s) Oral every 12 hours      PRN MEDICATIONS  acetaminophen   Tablet .. 650 milliGRAM(s) Oral every 6 hours PRN  albuterol/ipratropium for Nebulization 3 milliLiter(s) Nebulizer every 6 hours PRN  aluminum hydroxide/magnesium hydroxide/simethicone Suspension 30 milliLiter(s) Oral every 4 hours PRN  calcium carbonate    500 mG (Tums) Chewable 1 Tablet(s) Chew every 4 hours PRN  ondansetron Injectable 8 milliGRAM(s) IV Push every 8 hours PRN  senna 2 Tablet(s) Oral at bedtime PRN  sodium chloride 0.9% lock flush 10 milliLiter(s) IV Push every 1 hour PRN  sucralfate suspension 1 Gram(s) Oral every 6 hours PRN        Vital Signs Last 24 Hrs  T(C): 36.7 (22 Jul 2021 05:30), Max: 37.1 (22 Jul 2021 00:47)  T(F): 98.1 (22 Jul 2021 05:30), Max: 98.7 (22 Jul 2021 00:47)  HR: 66 (22 Jul 2021 05:30) (66 - 96)  BP: 120/47 (22 Jul 2021 05:30) (100/65 - 120/47)  BP(mean): --  RR: 18 (22 Jul 2021 05:30) (18 - 18)  SpO2: 95% (22 Jul 2021 05:30) (95% - 99%)    PHYSICAL EXAM  General: NAD  HEENT: PERRLA, EOMOI, clear oropharynx, anicteric sclera, pink conjunctiva  Neck: supple  CV: (+) S1/S2 RRR  Lungs: clear to auscultation, no wheezes or rales  Abdomen: soft, non-tender, non-distended (+) BS  Ext: no clubbing, cyanosis or edema  Skin: no rashes and no petechiae  Neuro: alert and oriented X 3, no focal deficits  Central Line: PICC c/d/i     LABS: Diagnosis: FLT3 ITD (-) Acute Myeloid Leukemia    Protocol/Chemo Regimen: Cycle 2 Decitabine + Venetoclax     Day: 3    Pt endorsed: no complaints, feels good today    Review of Systems: Denies n/v, abdominal pain, orthopnea, SOB     Pain scale: 0    Allergies    morphine (Unknown)    Intolerances        ANTIMICROBIALS  levoFLOXacin  Tablet 250 milliGRAM(s) Oral every 24 hours  posaconazole DR Tablet 300 milliGRAM(s) Oral daily      HEME/ONC MEDICATIONS  decitabine IVPB (eMAR) 35 milliGRAM(s) IV Intermittent every 24 hours  venetoclax 100 milliGRAM(s) Oral <User Schedule>      STANDING MEDICATIONS  Biotene Dry Mouth Oral Rinse 5 milliLiter(s) Swish and Spit four times a day  chlorhexidine 2% Cloths 1 Application(s) Topical <User Schedule>  cholecalciferol 2000 Unit(s) Oral daily  escitalopram 5 milliGRAM(s) Oral daily  finasteride 5 milliGRAM(s) Oral daily  folic acid 1 milliGRAM(s) Oral daily  furosemide    Tablet 20 milliGRAM(s) Oral daily  isosorbide   mononitrate ER Tablet (IMDUR) 60 milliGRAM(s) Oral daily  lidocaine   Patch 1 Patch Transdermal daily  metoprolol succinate ER 50 milliGRAM(s) Oral daily  ondansetron Injectable 8 milliGRAM(s) IV Push every 24 hours  polyethylene glycol 3350 17 Gram(s) Oral daily  sodium bicarbonate 650 milliGRAM(s) Oral three times a day  tamsulosin 0.4 milliGRAM(s) Oral at bedtime  urea Oral Powder 15 Gram(s) Oral every 12 hours      PRN MEDICATIONS  acetaminophen   Tablet .. 650 milliGRAM(s) Oral every 6 hours PRN  albuterol/ipratropium for Nebulization 3 milliLiter(s) Nebulizer every 6 hours PRN  aluminum hydroxide/magnesium hydroxide/simethicone Suspension 30 milliLiter(s) Oral every 4 hours PRN  calcium carbonate    500 mG (Tums) Chewable 1 Tablet(s) Chew every 4 hours PRN  ondansetron Injectable 8 milliGRAM(s) IV Push every 8 hours PRN  senna 2 Tablet(s) Oral at bedtime PRN  sodium chloride 0.9% lock flush 10 milliLiter(s) IV Push every 1 hour PRN  sucralfate suspension 1 Gram(s) Oral every 6 hours PRN        Vital Signs Last 24 Hrs  T(C): 36.7 (22 Jul 2021 05:30), Max: 37.1 (22 Jul 2021 00:47)  T(F): 98.1 (22 Jul 2021 05:30), Max: 98.7 (22 Jul 2021 00:47)  HR: 66 (22 Jul 2021 05:30) (66 - 96)  BP: 120/47 (22 Jul 2021 05:30) (100/65 - 120/47)  BP(mean): --  RR: 18 (22 Jul 2021 05:30) (18 - 18)  SpO2: 95% (22 Jul 2021 05:30) (95% - 99%)    PHYSICAL EXAM  General: NAD  HEENT: PERRLA, EOMOI, clear oropharynx, anicteric sclera, pink conjunctiva  Neck: supple  CV: (+) S1/S2 RRR  Lungs: clear to auscultation, no wheezes or rales  Abdomen: soft, non-tender, non-distended (+) BS  Ext: no clubbing, cyanosis or edema  Skin: no rashes and no petechiae  Neuro: alert and oriented X 3, no focal deficits  Central Line: PICC c/d/i     LABS: Diagnosis: FLT3 ITD (-) Acute Myeloid Leukemia    Protocol/Chemo Regimen: Cycle 2 Decitabine + Venetoclax     Day: 3    Pt endorsed: no complaints, feels good today    Review of Systems: Denies n/v, abdominal pain, orthopnea, SOB     Pain scale: 0    Allergies    morphine (Unknown)    Intolerances        ANTIMICROBIALS  levoFLOXacin  Tablet 250 milliGRAM(s) Oral every 24 hours  posaconazole DR Tablet 300 milliGRAM(s) Oral daily      HEME/ONC MEDICATIONS  decitabine IVPB (eMAR) 35 milliGRAM(s) IV Intermittent every 24 hours  venetoclax 100 milliGRAM(s) Oral <User Schedule>      STANDING MEDICATIONS  Biotene Dry Mouth Oral Rinse 5 milliLiter(s) Swish and Spit four times a day  chlorhexidine 2% Cloths 1 Application(s) Topical <User Schedule>  cholecalciferol 2000 Unit(s) Oral daily  escitalopram 5 milliGRAM(s) Oral daily  finasteride 5 milliGRAM(s) Oral daily  folic acid 1 milliGRAM(s) Oral daily  furosemide    Tablet 20 milliGRAM(s) Oral daily  isosorbide   mononitrate ER Tablet (IMDUR) 60 milliGRAM(s) Oral daily  lidocaine   Patch 1 Patch Transdermal daily  metoprolol succinate ER 50 milliGRAM(s) Oral daily  ondansetron Injectable 8 milliGRAM(s) IV Push every 24 hours  polyethylene glycol 3350 17 Gram(s) Oral daily  sodium bicarbonate 650 milliGRAM(s) Oral three times a day  tamsulosin 0.4 milliGRAM(s) Oral at bedtime  urea Oral Powder 15 Gram(s) Oral every 12 hours      PRN MEDICATIONS  acetaminophen   Tablet .. 650 milliGRAM(s) Oral every 6 hours PRN  albuterol/ipratropium for Nebulization 3 milliLiter(s) Nebulizer every 6 hours PRN  aluminum hydroxide/magnesium hydroxide/simethicone Suspension 30 milliLiter(s) Oral every 4 hours PRN  calcium carbonate    500 mG (Tums) Chewable 1 Tablet(s) Chew every 4 hours PRN  ondansetron Injectable 8 milliGRAM(s) IV Push every 8 hours PRN  senna 2 Tablet(s) Oral at bedtime PRN  sodium chloride 0.9% lock flush 10 milliLiter(s) IV Push every 1 hour PRN  sucralfate suspension 1 Gram(s) Oral every 6 hours PRN        Vital Signs Last 24 Hrs  T(C): 36.7 (22 Jul 2021 05:30), Max: 37.1 (22 Jul 2021 00:47)  T(F): 98.1 (22 Jul 2021 05:30), Max: 98.7 (22 Jul 2021 00:47)  HR: 66 (22 Jul 2021 05:30) (66 - 96)  BP: 120/47 (22 Jul 2021 05:30) (100/65 - 120/47)  BP(mean): --  RR: 18 (22 Jul 2021 05:30) (18 - 18)  SpO2: 95% (22 Jul 2021 05:30) (95% - 99%)    PHYSICAL EXAM  General: NAD  HEENT: PERRLA, EOMOI, clear oropharynx,   CV: (+) S1/S2 RRR  Lungs: clear to auscultation, no wheezes or rales  Abdomen: soft, non-tender, non-distended (+) BS  Ext: no edema  Skin: no rashes and no petechiae  Neuro: alert and oriented X 3, no focal deficits  Central Line: PICC c/d/i     LABS:                      7.0    0.80  )-----------( 31       ( 22 Jul 2021 07:10 )             20.6         Mean Cell Volume : 86.2 fl  Mean Cell Hemoglobin : 29.3 pg  Mean Cell Hemoglobin Concentration : 34.0 gm/dL  Auto Neutrophil # : 0.25 K/uL  Auto Lymphocyte # : 0.53 K/uL  Auto Monocyte # : 0.03 K/uL  Auto Eosinophil # : 0.00 K/uL  Auto Basophil # : 0.00 K/uL  Auto Neutrophil % : 30.7 %  Auto Lymphocyte % : 66.1 %  Auto Monocyte % : 3.2 %  Auto Eosinophil % : 0.0 %  Auto Basophil % : 0.0 %      07-22    134<L>  |  104  |  37<H>  ----------------------------<  96  3.3<L>   |  19<L>  |  2.32<H>    Ca    7.9<L>      22 Jul 2021 07:10  Phos  4.2     07-22  Mg     2.0     07-22    TPro  6.3  /  Alb  2.6<L>  /  TBili  0.9  /  DBili  x   /  AST  8<L>  /  ALT  5<L>  /  AlkPhos  98  07-22      Mg 2.0  Phos 4.2    Uric Acid --

## 2021-07-22 NOTE — ADVANCED PRACTICE NURSE CONSULT - ASSESSMENT
Pt. seen in bed a/ox3 . Chemotherapy teachings done, Pt. verbalized understanding .Pt. has left double lumen PICC . Dsg dry and intact .Site with no s/s of redness, swelling or pain. With positive blood return noted and flushing easily with 10 ML NS. Lab values  reviewed on rounds by Dr. Constantino  . Pt. received zofran 8 mg IVP as premedication. Drug verification done by 2 RN.'s. At 1617  Day 3/5 decitabine 35 milliGRAM(s) started, Infusing  over 1 hr as a  secondary line of NS chemo back up line  via alaris pump . Primary  RN aware of present treatment.Left pt. comfortable in bed.Safety maintained.

## 2021-07-22 NOTE — PROGRESS NOTE ADULT - ATTENDING COMMENTS
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  Continue lasix 40 mg/d po  Cr stable ~2.0, ? baseline Cr -renal following, agree with likely cardiorenal syndrome, appreciate recs  Renal biopsy is not a priority at this time due to reduced plts  Hypercalcemia noted, consistent with primary hyperparathyroidism, appreciate endo/renal recs, continue sensipar, calcium borderline low-normal now  Afebrile on  Levaquin and posa  Receiving transfusional support as needed.  Had palliative care eval -will need discussion w/ family regarding diagnosis/treatment; DNR/DNI, Pt expresses wishes to go back to [place].  Discussed updates with family today 7/19  Will consider holding venetoclax, and possible DC home with outaptient follow-up for count check and restarting of steven + decitabine  DC planning for next monday 7/26 81 yo [place]yanese speaking male transferred from [place] to [facility] for AML with myelomonocytic features   Bone marrow biopsy done 6/14 -Acute myeloid leukemia  NGS [id number] fusion, U2AF1 mut  7/15 BM results showing 0.2% + population, considered a complete morphological response  Dacogen/Venetoclax cycle 2 day 3    Echo- severely depressed LV function, EF 25-30%.  Continue lasix 40 mg/d po  Cr stable ~2.0, ? baseline Cr -renal following, agree with likely cardiorenal syndrome, appreciate recs  Renal biopsy is not a priority at this time due to reduced plts  Hypercalcemia noted, consistent with primary hyperparathyroidism, appreciate endo/renal recs, continue sensipar, calcium borderline low-normal now  Afebrile on  Levaquin and posa  Receiving transfusional support as needed.  Had palliative care eval -will need discussion w/ family regarding diagnosis/treatment; DNR/DNI, Pt expresses wishes to go back to [place].  Discussed updates with family today 7/19  Will consider holding venetoclax, and possible DC home with outpatient follow-up for count check and restarting of steven + decitabine  DC planning for next monday 7/26

## 2021-07-22 NOTE — PROGRESS NOTE ADULT - ASSESSMENT
80M PMHx CKD, CAD s/p CABG 2012 & 4 stents (2 in 2014, 2 in 2015) with dilated EF of 27% BiV ICD (2013) initially admitted to [facility] cor acute hypoxic respiratory failure, anemia (Hgb 5.7), thrombocytopenia (plt 30) w/ blast cells (22%), lactic acidosis (LA 10) - transfer to [facility] for hematological evaluation for ALL.  Nephrology consulted for BRISA on CKD.      # BRISA on CKD  Pt with non-oliguric BRISA on CKD unclear etiology possible pre renal in the setting of anemia and ATN.  On admission sCr elevated at 3.45 (last known sCr 1.1-1.3 in 2018, recent hx CKD unclear recent baseline).  Urinalysis showed protein with trace blood.  Lab noted for serum uric acid 13.3, , phos 4.7, Echo severe LV systolic fxn, CT diffuse patchy airspace disease ?multifocal pna?. Urine electrolytes with Fe Urea of 82.5% suggestive of intrinsic pathology. Urine uric acid/creatinine ratio is < 1. Spot urine TP/CR ratio was 0.25 wnl. Kidney/bladder u/s on this admission was wnl (no hydro). Scr peaked at 3.62 mg/dl on 6/13/21 with downward trend now plateauing. SCr elevated/stable at  2.32 mg/dl today     - Allopurinol discontinued  - PER with 1:320 but speckled pattern   - GN work up including c3 and C4 wnl, serum immunofixation with no monoclonality, anti GBM negative. Parvovirus DNA PCR negative. P ANCA and C ANCA negative, anti PLA2R negative  - Pt will need a kidney biopsy if his platelets are stable. Currently Plts 10K & Hg7.0. Pt will need plts of > 50K for biopsy. Please call IR for scheduling the renal biopsy. Will benefit from a platelet & PRBC transfusion prior.   - monitor BMP/tumor lysis markers (Uric acid/LDH/haptoglobin/LFT), strict I/O, avoid nephrotoxic agents (NSAIDs, PPI, contrast), renally dose medications per GFR.    # Hypercalcemia -resolved  Uncertain etiology. Last ionized calcium improved to 1.14 7/15/21)   PTH was 84, not appropriately suppressed with low 25 vit D & 1,25 vitamin D.  Continue vitamin d supplements. Could be secondary hyperparathyroidism from vitamin D def & CKD.   Discontinued Sensipar  monitor ionized calcium daily---> will give IV calcium gluconate 7/22    If you have any questions, please feel free to contact me  Griffin Lawson  Nephrology Fellow  [phone]  (After 5pm or on weekends please page the on-call fellow)

## 2021-07-22 NOTE — PROGRESS NOTE ADULT - PROBLEM SELECTOR PLAN 9
s/p 5vCABG 2012 (LIMA to LAD, SVG to Diag and OM, SVG PDA and RPL), PCI (2 in 2014, 2 in 2015 [facility]), HFrEF s/p CRT-D (2013),  No DAPT or AC given thrombocytopenia.

## 2021-07-22 NOTE — PROGRESS NOTE ADULT - PROBLEM SELECTOR PLAN 1
FLT3 ITD (-) AML   Dacogen held on day 3, 6/21 due to acute change in mental status now back to baseline. Course completed on day 6 (to complete 5 days)   Monitor daily CBC's and transfuse as needed, Monitor daily CMP and replete electrolytes as needed   Strict I's/O's, daily weights, mouth care, anti emetics.   s/p Dacogen 20 mg/m2 x 5 days + Venetoclax. (s/p Venetoclax escalation, now on 100 mg oral daily - while on posaconazole)  Day 21 BM bx on 7/9, quantity insufficient. Repeated 7/16 : showing hypercellular marrow   s/p Vitamain K (5mg po- 7/15-7/17)   Pt is DNR  Cycle 2 started 7/20  patient wants to return to [place]  mild epistaxis-platelets ordered. FLT3 ITD (-) AML   Dacogen held on day 3, 6/21 due to acute change in mental status now back to baseline. Course completed on day 6 (to complete 5 days)   Monitor daily CBC's and transfuse as needed, Monitor daily CMP and replete electrolytes as needed   Strict I's/O's, daily weights, mouth care, anti emetics.   Cycle 2 Dacogen 20 mg/m2 x 5 days + Venetoclax. (s/p Venetoclax escalation, now on 100 mg oral daily - while on posaconazole) started 7/20  Day 21 BM bx on 7/9, quantity insufficient. Repeated 7/16 : showing hypercellular marrow   s/p Vitamain K (5mg po- 7/15-7/17)   Pt is DNR  Cycle 2 started 7/20  patient wants to return to [place]  mild epistaxis-platelets ordered. FLT3 ITD (-) AML   Monitor daily CBC's and transfuse as needed, Monitor daily CMP and replete electrolytes as needed   Strict I's/O's, daily weights, mouth care, anti emetics.   Cycle 2 Dacogen 20 mg/m2 x 5 days + Venetoclax. (s/p Venetoclax escalation, now on 100 mg oral daily - while on posaconazole) started 7/20  Day 21 BM bx on 7/9, quantity insufficient. Repeated 7/16 : showing hypercellular marrow   s/p Vitamain K (5mg po- 7/15-7/17)   Pt is DNR  Cycle 2 started 7/20  patient wants to return to [place]  discharge planning  hypokalemia- replace K.
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
## 2021-07-23 NOTE — PROGRESS NOTE ADULT - PROBLEM SELECTOR PLAN 9
s/p 5vCABG 2012 (LIMA to LAD, SVG to Diag and OM, SVG PDA and RPL), PCI (2 in 2014, 2 in 2015 [facility]), HFrEF s/p CRT-D (2013),  No DAPT or AC given thrombocytopenia.

## 2021-07-23 NOTE — PROGRESS NOTE ADULT - ASSESSMENT
80M PMHx CKD, CAD s/p CABG 2012 & 4 stents (2 in 2014, 2 in 2015) with dilated EF of 27% BiV ICD (2013) initially admitted to [facility] cor acute hypoxic respiratory failure, anemia (Hgb 5.7), thrombocytopenia (plt 30) w/ blast cells (22%), lactic acidosis (LA 10) - transfer to [facility] for hematological evaluation for ALL.  Nephrology consulted for BRISA on CKD.      # BRISA on CKD  Pt with non-oliguric BRISA on CKD unclear etiology possible pre renal in the setting of anemia and ATN.  On admission sCr elevated at 3.45 (last known sCr 1.1-1.3 in 2018, recent hx CKD unclear recent baseline).  Urinalysis showed protein with trace blood.  Lab noted for serum uric acid 13.3, , phos 4.7, Echo severe LV systolic fxn, CT diffuse patchy airspace disease ?multifocal pna?. Urine electrolytes with Fe Urea of 82.5% suggestive of intrinsic pathology. Urine uric acid/creatinine ratio is < 1. Spot urine TP/CR ratio was 0.25 wnl. Kidney/bladder u/s on this admission was wnl (no hydro). Scr peaked at 3.62 mg/dl on 6/13/21 with downward trend now plateauing. SCr elevated/stable at  2.1 mg/dl today     - Allopurinol discontinued  - PER with 1:320 but speckled pattern   - GN work up including c3 and C4 wnl, serum immunofixation with no monoclonality, anti GBM negative. Parvovirus DNA PCR negative. P ANCA and C ANCA negative, anti PLA2R negative  - Pt will need a kidney biopsy if his platelets are stable. Currently Plts 10K & Hg7.0. Pt will need plts of > 50K for biopsy. Please call IR for scheduling the renal biopsy. Will benefit from a platelet & PRBC transfusion prior.   - monitor BMP/tumor lysis markers (Uric acid/LDH/haptoglobin/LFT), strict I/O, avoid nephrotoxic agents (NSAIDs, PPI, contrast), renally dose medications per GFR.    # Hypercalcemia -resolved  Uncertain etiology. Last ionized calcium improved to 1.14 7/15/21)   PTH was 84, not appropriately suppressed with low 25 vit D & 1,25 vitamin D.  Continue vitamin d supplements. Could be secondary hyperparathyroidism from vitamin D def & CKD.   Discontinued Sensipar  Cont to keep calcium 9-10 range      Todd Wasserman MD  Cell   Pager   Office     For weekend please contact Dr Shreyas Talbot( fellow) or Dr Janina Montaño( attending)

## 2021-07-23 NOTE — PROGRESS NOTE ADULT - ATTENDING COMMENTS
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  Continue lasix 40 mg/d po  Cr stable ~2.0, ? baseline Cr -renal following, agree with likely cardiorenal syndrome, appreciate recs  Renal biopsy is not a priority at this time due to reduced plts  Hypercalcemia noted, consistent with primary hyperparathyroidism, appreciate endo/renal recs, continue sensipar, calcium borderline low-normal now  Afebrile on  Levaquin and posa  Receiving transfusional support as needed.  Had palliative care eval -will need discussion w/ family regarding diagnosis/treatment; DNR/DNI, Pt expresses wishes to go back to [place].  Discussed updates with family today 7/19  Will consider holding venetoclax, and possible DC home with outpatient follow-up for count check and restarting of steven + decitabine  DC planning for next monday 7/26 81 yo Iranian speaking male transferred from [place] to [facility] for AML with myelomonocytic features   Bone marrow biopsy done 6/14 -Acute myeloid leukemia  NGS [id number] fusion, U2AF1 mut  7/15 BM results showing 0.2% + population, considered a complete morphological response  Dacogen/Venetoclax cycle 2 day 4    Echo- severely depressed LV function, EF 25-30%.  Continue lasix 40 mg/d po  Cr stable ~2.0, ? baseline Cr -renal following, agree with likely cardiorenal syndrome, appreciate recs  Renal biopsy is not a priority at this time due to reduced plts  Hypercalcemia noted, consistent with primary hyperparathyroidism, appreciate endo/renal recs, continue sensipar, calcium borderline low-normal now  Afebrile on  Levaquin and posa  Receiving transfusional support as needed.  He is DNR/DNI, Pt has excpressed wishes to go back to [place].     Will  likely need MARISOL - will need transfusional post d/c from here  Completes dac x 5d on 7/24, cont venetoclax

## 2021-07-23 NOTE — ADVANCED PRACTICE NURSE CONSULT - ASSESSMENT
Pt. seen in bed a/ox3 . Chemotherapy teachings done, Pt. verbalized understanding .Pt. has left double lumen PICC . Dsg dry and intact .Site with no s/s of redness, swelling or pain. With positive blood return noted and flushing easily with 10 ML NS. Lab values  reviewed on rounds by    . Pt. received zofran 8 mg IVP as premedication. Drug verification done by 2 RN.'s. At 1554 Day 345 decitabine 20 mg/m2= 35 milliGRAM(s) started, Infusing  over 1 hr as a  secondary line of NS chemo back up line  via alaris pump . Primary  RN aware of present treatment.Left pt. comfortable in bed.Safety maintained.

## 2021-07-23 NOTE — PROGRESS NOTE ADULT - PROBLEM SELECTOR PLAN 1
FLT3 ITD (-) AML   Monitor daily CBC's and transfuse as needed, Monitor daily CMP and replete electrolytes as needed   Strict I's/O's, daily weights, mouth care, anti emetics.   Cycle 2 Dacogen 20 mg/m2 x 5 days + Venetoclax. (s/p Venetoclax escalation, now on 100 mg oral daily - while on posaconazole) started 7/20  Day 21 BM bx on 7/9, quantity insufficient. Repeated 7/16 : showing hypercellular marrow   s/p Vitamain K (5mg po- 7/15-7/17)   Pt is DNR  Cycle 2 started 7/20  patient wants to return to [place]  discharge planning  hypokalemia- replace K. FLT3 ITD (-) AML   Monitor daily CBC's and transfuse as needed, Monitor daily CMP and replete electrolytes as needed   Strict I's/O's, daily weights, mouth care, anti emetics.   Cycle 2 Dacogen 20 mg/m2 x 5 days + Venetoclax. (s/p Venetoclax escalation, now on 100 mg oral daily - while on posaconazole) started 7/20  Day 21 BM bx on 7/9, quantity insufficient. Repeated 7/16 : showing hypercellular marrow   s/p Vitamain K (5mg po- 7/15-7/17)   Pt is DNR  Cycle 2 started 7/20  patient wants to return to [place]  discharge planning pending [facility] rehab placement.
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
## 2021-07-23 NOTE — PROGRESS NOTE ADULT - SUBJECTIVE AND OBJECTIVE BOX
[facility] DIVISION OF KIDNEY DISEASES AND HYPERTENSION -- FOLLOW UP NOTE  --------------------------------------------------------------------------------  Chief Complaint:    24 hour events/subjective:  BRISA and acidosis        PAST HISTORY  --------------------------------------------------------------------------------  No significant changes to PMH, PSH, FHx, SHx, unless otherwise noted    ALLERGIES & MEDICATIONS  --------------------------------------------------------------------------------  Allergies    morphine (Unknown)    Intolerances      Standing Inpatient Medications  Biotene Dry Mouth Oral Rinse 5 milliLiter(s) Swish and Spit four times a day  calcium carbonate    500 mG (Tums) Chewable 2 Tablet(s) Chew daily  chlorhexidine 2% Cloths 1 Application(s) Topical <User Schedule>  cholecalciferol 2000 Unit(s) Oral daily  decitabine IVPB (eMAR) 35 milliGRAM(s) IV Intermittent every 24 hours  escitalopram 5 milliGRAM(s) Oral daily  finasteride 5 milliGRAM(s) Oral daily  folic acid 1 milliGRAM(s) Oral daily  furosemide    Tablet 20 milliGRAM(s) Oral daily  isosorbide   mononitrate ER Tablet (IMDUR) 60 milliGRAM(s) Oral daily  levoFLOXacin  Tablet 250 milliGRAM(s) Oral every 24 hours  lidocaine   Patch 1 Patch Transdermal daily  metoprolol succinate ER 50 milliGRAM(s) Oral daily  ondansetron Injectable 8 milliGRAM(s) IV Push every 24 hours  polyethylene glycol 3350 17 Gram(s) Oral daily  posaconazole DR Tablet 300 milliGRAM(s) Oral daily  sodium bicarbonate 650 milliGRAM(s) Oral three times a day  tamsulosin 0.4 milliGRAM(s) Oral at bedtime  urea Oral Powder 15 Gram(s) Oral every 12 hours  venetoclax 100 milliGRAM(s) Oral <User Schedule>    PRN Inpatient Medications  acetaminophen   Tablet .. 650 milliGRAM(s) Oral every 6 hours PRN  albuterol/ipratropium for Nebulization 3 milliLiter(s) Nebulizer every 6 hours PRN  aluminum hydroxide/magnesium hydroxide/simethicone Suspension 30 milliLiter(s) Oral every 4 hours PRN  calcium carbonate    500 mG (Tums) Chewable 1 Tablet(s) Chew every 4 hours PRN  ondansetron Injectable 8 milliGRAM(s) IV Push every 8 hours PRN  senna 2 Tablet(s) Oral at bedtime PRN  sodium chloride 0.9% lock flush 10 milliLiter(s) IV Push every 1 hour PRN  sucralfate suspension 1 Gram(s) Oral every 6 hours PRN      REVIEW OF SYSTEMS  --------------------------------------------------------------------------------  Gen: No weight changes, fatigue, fevers/chills, weakness  Skin: No rashes  Head/Eyes/Ears/Mouth: No headache; Normal hearing; Normal vision w/o blurriness; No sinus pain/discomfort, sore throat  Respiratory: No dyspnea, cough, wheezing, hemoptysis  CV: No chest pain, PND, orthopnea  GI: No abdominal pain, diarrhea, constipation, nausea, vomiting, melena, hematochezia  : No increased frequency, dysuria, hematuria, nocturia  MSK: No joint pain/swelling; no back pain; no edema  Neuro: No dizziness/lightheadedness, weakness, seizures, numbness, tingling  Heme: No easy bruising or bleeding  Endo: No heat/cold intolerance  Psych: No significant nervousness, anxiety, stress, depression    All other systems were reviewed and are negative, except as noted.    VITALS/PHYSICAL EXAM  --------------------------------------------------------------------------------  T(C): 36.1 (07-23-21 @ 13:10), Max: 36.7 (07-22-21 @ 21:10)  HR: 82 (07-23-21 @ 13:10) (65 - 86)  BP: 129/58 (07-23-21 @ 13:10) (117/59 - 129/58)  RR: 18 (07-23-21 @ 13:10) (16 - 18)  SpO2: 98% (07-23-21 @ 13:10) (94% - 99%)  Wt(kg): --        07-22-21 @ 07:01  -  07-23-21 @ 07:00  --------------------------------------------------------  IN: 1323 mL / OUT: 625 mL / NET: 698 mL    07-23-21 @ 07:01  -  07-23-21 @ 17:07  --------------------------------------------------------  IN: 150 mL / OUT: 200 mL / NET: -50 mL      Physical Exam:  	Gen: NAD, well-appearing  	HEENT: PERRL, supple neck, clear oropharynx  	Pulm: CTA B/L  	CV: RRR, S1S2; no rub  	Back: No spinal or CVA tenderness; no sacral edema  	Abd: +BS, soft, nontender/nondistended  	: No suprapubic tenderness  	UE: Warm, FROM, no clubbing, intact strength; no edema; no asterixis  	LE: Warm, FROM, no clubbing, intact strength; no edema  	Neuro: No focal deficits, intact gait  	Psych: Normal affect and mood  	Skin: Warm, without rashes  	Vascular access:    LABS/STUDIES  --------------------------------------------------------------------------------              7.0    0.89  >-----------<  29       [07-23-21 @ 06:54]              21.0     135  |  105  |  34  ----------------------------<  100      [07-23-21 @ 06:53]  4.0   |  20  |  2.18        Ca     8.3     [07-23-21 @ 06:53]      iCa    1.19     [07-23 @ 06:58]      Mg     1.9     [07-23-21 @ 06:53]      Phos  3.4     [07-23-21 @ 06:53]    TPro  6.3  /  Alb  2.7  /  TBili  0.9  /  DBili  x   /  AST  7   /  ALT  5   /  AlkPhos  96  [07-23-21 @ 06:53]              [07-23-21 @ 06:53]    Creatinine Trend:  SCr 2.18 [07-23 @ 06:53]  SCr 2.32 [07-22 @ 07:10]  SCr 2.28 [07-21 @ 06:44]  SCr 2.24 [07-20 @ 06:39]  SCr 2.23 [07-19 @ 07:13]        Iron 155, TIBC 237, %sat 65      [06-12-21 @ 03:36]  Ferritin 827      [06-12-21 @ 04:44]  PTH -- (Ca 9.8)      [06-18-21 @ 10:41]   84  Vitamin D (25OH) 21.6      [06-18-21 @ 10:41]  HbA1c 5.8      [11-13-17 @ 10:29]

## 2021-07-23 NOTE — PROGRESS NOTE ADULT - PROBLEM SELECTOR PLAN 2
Patient is neutropenic, afebrile   6/30 completed Zosyn   7/5 Started Levaquin for neutropenic prophylaxis as ANC < 500  If febrile, send pan cultures, and switch Levaquin to cefepime  6/24 Abd US: Cholelithiasis without signs of cholecystitis, persistently dilated CBD; HIDA scan (+) for acute cholecystitis-Seen by Surgery and IR, No surgical or IR interventions were recommended.  6/28  US abdomen Cholelithiasis with no definite sonographic evidence of acute cholecystitis.  7/3 Started posaconazole prophylaxis as patient is neutropenic. Adjusted dose of venetoclax.

## 2021-07-23 NOTE — PROGRESS NOTE ADULT - PROBLEM SELECTOR PLAN 6
stable   Nephrology following-No indication for HD at this time.   6/15 Normal renal ultrasound  follow ionized calcium daily as per nephrology  6/24 on Sensipar for hypercalcemia   Vitamin D 2000 daily.  Continue home Lasix 20mg daily  home dose is 40mg daily.   NAHCo3 started 7/20 per renal.  Oral calcium supplementation started 7/22 stable   Nephrology following-No indication for HD at this time.   6/15 Normal renal ultrasound  follow ionized calcium daily as per nephrology  6/24 on Sensipar for hypercalcemia stopped 7/21  Vitamin D 2000 daily.  Continue home Lasix 20mg daily  home dose is 40mg daily.   NAHCo3 started 7/20 per renal.  Oral calcium supplementation started 7/22

## 2021-07-23 NOTE — PROGRESS NOTE ADULT - SUBJECTIVE AND OBJECTIVE BOX
Diagnosis: FLT3 ITD (-) Acute Myeloid Leukemia    Protocol/Chemo Regimen: Cycle 2 Decitabine + Venetoclax     Day: 4    Pt endorsed: no complaints, feels good today    Review of Systems: Denies n/v, abdominal pain, orthopnea, SOB     Pain scale: 0    Allergies    morphine (Unknown)    Intolerances        ANTIMICROBIALS  levoFLOXacin  Tablet 250 milliGRAM(s) Oral every 24 hours  posaconazole DR Tablet 300 milliGRAM(s) Oral daily      HEME/ONC MEDICATIONS  decitabine IVPB (eMAR) 35 milliGRAM(s) IV Intermittent every 24 hours  venetoclax 100 milliGRAM(s) Oral <User Schedule>      STANDING MEDICATIONS  Biotene Dry Mouth Oral Rinse 5 milliLiter(s) Swish and Spit four times a day  calcium carbonate    500 mG (Tums) Chewable 2 Tablet(s) Chew daily  chlorhexidine 2% Cloths 1 Application(s) Topical <User Schedule>  cholecalciferol 2000 Unit(s) Oral daily  escitalopram 5 milliGRAM(s) Oral daily  finasteride 5 milliGRAM(s) Oral daily  folic acid 1 milliGRAM(s) Oral daily  furosemide    Tablet 20 milliGRAM(s) Oral daily  isosorbide   mononitrate ER Tablet (IMDUR) 60 milliGRAM(s) Oral daily  lidocaine   Patch 1 Patch Transdermal daily  metoprolol succinate ER 50 milliGRAM(s) Oral daily  ondansetron Injectable 8 milliGRAM(s) IV Push every 24 hours  polyethylene glycol 3350 17 Gram(s) Oral daily  sodium bicarbonate 650 milliGRAM(s) Oral three times a day  tamsulosin 0.4 milliGRAM(s) Oral at bedtime  urea Oral Powder 15 Gram(s) Oral every 12 hours      PRN MEDICATIONS  acetaminophen   Tablet .. 650 milliGRAM(s) Oral every 6 hours PRN  albuterol/ipratropium for Nebulization 3 milliLiter(s) Nebulizer every 6 hours PRN  aluminum hydroxide/magnesium hydroxide/simethicone Suspension 30 milliLiter(s) Oral every 4 hours PRN  calcium carbonate    500 mG (Tums) Chewable 1 Tablet(s) Chew every 4 hours PRN  ondansetron Injectable 8 milliGRAM(s) IV Push every 8 hours PRN  senna 2 Tablet(s) Oral at bedtime PRN  sodium chloride 0.9% lock flush 10 milliLiter(s) IV Push every 1 hour PRN  sucralfate suspension 1 Gram(s) Oral every 6 hours PRN        Vital Signs Last 24 Hrs  T(C): 36.6 (23 Jul 2021 05:02), Max: 36.7 (22 Jul 2021 09:45)  T(F): 97.8 (23 Jul 2021 05:02), Max: 98 (22 Jul 2021 09:45)  HR: 80 (23 Jul 2021 05:02) (67 - 86)  BP: 122/90 (23 Jul 2021 05:02) (118/61 - 129/58)  BP(mean): --  RR: 17 (23 Jul 2021 05:02) (17 - 18)  SpO2: 94% (23 Jul 2021 05:02) (94% - 98%)    PHYSICAL EXAM  General: NAD  HEENT: PERRLA, EOMOI, clear oropharynx,   CV: (+) S1/S2 RRR  Lungs: clear to auscultation, no wheezes or rales  Abdomen: soft, non-tender, non-distended (+) BS  Ext: no edema  Skin: no rashes and no petechiae  Neuro: alert and oriented X 3, no focal deficits  Central Line: PICC c/d/i     RECENT CULTURES:        LABS:                         Diagnosis: FLT3 ITD (-) Acute Myeloid Leukemia    Protocol/Chemo Regimen: Cycle 2 Decitabine + Venetoclax     Day: 4    Pt endorsed: no complaints, feels good today    Review of Systems: Denies n/v, abdominal pain, orthopnea, SOB     Pain scale: 0    Allergies    morphine (Unknown)    Intolerances        ANTIMICROBIALS  levoFLOXacin  Tablet 250 milliGRAM(s) Oral every 24 hours  posaconazole DR Tablet 300 milliGRAM(s) Oral daily      HEME/ONC MEDICATIONS  decitabine IVPB (eMAR) 35 milliGRAM(s) IV Intermittent every 24 hours  venetoclax 100 milliGRAM(s) Oral <User Schedule>      STANDING MEDICATIONS  Biotene Dry Mouth Oral Rinse 5 milliLiter(s) Swish and Spit four times a day  calcium carbonate    500 mG (Tums) Chewable 2 Tablet(s) Chew daily  chlorhexidine 2% Cloths 1 Application(s) Topical <User Schedule>  cholecalciferol 2000 Unit(s) Oral daily  escitalopram 5 milliGRAM(s) Oral daily  finasteride 5 milliGRAM(s) Oral daily  folic acid 1 milliGRAM(s) Oral daily  furosemide    Tablet 20 milliGRAM(s) Oral daily  isosorbide   mononitrate ER Tablet (IMDUR) 60 milliGRAM(s) Oral daily  lidocaine   Patch 1 Patch Transdermal daily  metoprolol succinate ER 50 milliGRAM(s) Oral daily  ondansetron Injectable 8 milliGRAM(s) IV Push every 24 hours  polyethylene glycol 3350 17 Gram(s) Oral daily  sodium bicarbonate 650 milliGRAM(s) Oral three times a day  tamsulosin 0.4 milliGRAM(s) Oral at bedtime  urea Oral Powder 15 Gram(s) Oral every 12 hours      PRN MEDICATIONS  acetaminophen   Tablet .. 650 milliGRAM(s) Oral every 6 hours PRN  albuterol/ipratropium for Nebulization 3 milliLiter(s) Nebulizer every 6 hours PRN  aluminum hydroxide/magnesium hydroxide/simethicone Suspension 30 milliLiter(s) Oral every 4 hours PRN  calcium carbonate    500 mG (Tums) Chewable 1 Tablet(s) Chew every 4 hours PRN  ondansetron Injectable 8 milliGRAM(s) IV Push every 8 hours PRN  senna 2 Tablet(s) Oral at bedtime PRN  sodium chloride 0.9% lock flush 10 milliLiter(s) IV Push every 1 hour PRN  sucralfate suspension 1 Gram(s) Oral every 6 hours PRN        Vital Signs Last 24 Hrs  T(C): 36.6 (23 Jul 2021 05:02), Max: 36.7 (22 Jul 2021 09:45)  T(F): 97.8 (23 Jul 2021 05:02), Max: 98 (22 Jul 2021 09:45)  HR: 80 (23 Jul 2021 05:02) (67 - 86)  BP: 122/90 (23 Jul 2021 05:02) (118/61 - 129/58)  BP(mean): --  RR: 17 (23 Jul 2021 05:02) (17 - 18)  SpO2: 94% (23 Jul 2021 05:02) (94% - 98%)    PHYSICAL EXAM  General: NAD  HEENT: PERRLA, EOMOI, clear oropharynx,   CV: (+) S1/S2 RRR  Lungs: clear to auscultation, no wheezes or rales  Abdomen: soft, non-tender, non-distended (+) BS  Ext: no edema  Skin: no rashes and no petechiae  Neuro: alert and oriented X 3, no focal deficits  Central Line: PICC c/d/i       LABS:    Blood Cultures:                           7.0    0.89  )-----------( 29       ( 23 Jul 2021 06:54 )             21.0         Mean Cell Volume : 87.1 fl  Mean Cell Hemoglobin : 29.0 pg  Mean Cell Hemoglobin Concentration : 33.3 gm/dL  Auto Neutrophil # : 0.32 K/uL  Auto Lymphocyte # : 0.50 K/uL  Auto Monocyte # : 0.05 K/uL  Auto Eosinophil # : 0.00 K/uL  Auto Basophil # : 0.00 K/uL  Auto Neutrophil % : 35.4 %  Auto Lymphocyte % : 55.7 %  Auto Monocyte % : 5.3 %  Auto Eosinophil % : 0.0 %  Auto Basophil % : 0.0 %      07-23    135  |  105  |  34<H>  ----------------------------<  100<H>  4.0   |  20<L>  |  2.18<H>    Ca    8.3<L>      23 Jul 2021 06:53  Phos  3.4     07-23  Mg     1.9     07-23    TPro  6.3  /  Alb  2.7<L>  /  TBili  0.9  /  DBili  x   /  AST  7<L>  /  ALT  5<L>  /  AlkPhos  96  07-23      Mg 1.9  Phos 3.4            Uric Acid --
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
## 2021-07-24 NOTE — ADVANCED PRACTICE NURSE CONSULT - ASSESSMENT
Pt. seen in bed a/ox3 . Chemotherapy teachings done, Pt. verbalized understanding .Pt. has left double lumen PICC . Dsg dry and intact .Site with no s/s of redness, swelling or pain. With positive blood return noted and flushing easily with 10 ML NS. Lab values  reviewed on rounds by Dr. Berman   . Pt. received zofran 8 mg IVP as premedication. Drug verification done by 2 RN.'s. At 1700 Day 5/5 decitabine 20 mg/m2= 35 milliGRAM(s) started, Infusing  over 1 hr as a  secondary line of NS chemo back up line  via alaris pump . Primary  RN aware of present treatment.Left pt. comfortable in bed.

## 2021-07-24 NOTE — PROGRESS NOTE ADULT - PROBLEM SELECTOR PLAN 6
stable   Nephrology following-No indication for HD at this time.   6/15 Normal renal ultrasound  follow ionized calcium daily as per nephrology  6/24 on Sensipar for hypercalcemia stopped 7/21  Vitamin D 2000 daily.  Continue home Lasix 20mg daily  home dose is 40mg daily.   NAHCo3 started 7/20 per renal.  Oral calcium supplementation started 7/22

## 2021-07-24 NOTE — PROGRESS NOTE ADULT - PROBLEM SELECTOR PLAN 1
FLT3 ITD (-) AML   Monitor daily CBC's and transfuse as needed, Monitor daily CMP and replete electrolytes as needed   Strict I's/O's, daily weights, mouth care, anti emetics.   Cycle 2 Dacogen 20 mg/m2 x 5 days + Venetoclax. (s/p Venetoclax escalation, now on 100 mg oral daily - while on posaconazole) started 7/20  Day 21 BM bx on 7/9, quantity insufficient. Repeated 7/16 : showing hypercellular marrow   s/p Vitamain K (5mg po- 7/15-7/17)   Pt is DNR  Cycle 2 started 7/20  patient wants to return to [place]  discharge planning pending [facility] rehab placement. FLT3 ITD (-) AML   Monitor daily CBC's and transfuse as needed, Monitor daily CMP and replete electrolytes as needed   Strict I's/O's, daily weights, mouth care, anti emetics.   Cycle 2 Dacogen 20 mg/m2 x 5 days + Venetoclax. (s/p Venetoclax escalation, now on 100 mg oral daily - while on posaconazole) started 7/20  Day 21 BM bx on 7/9, quantity insufficient. Repeated 7/16 : showing hypercellular marrow   s/p Vitamain K (5mg po- 7/15-7/17)   Pt is DNR  Cycle 2 started 7/20  patient wants to return to [place]  discharge planning pending [facility] rehab placement.  Anemia: PRBC x1

## 2021-07-24 NOTE — PROGRESS NOTE ADULT - ATTENDING COMMENTS
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  Continue lasix 40 mg/d po  Cr stable ~2.0, ? baseline Cr -renal following, agree with likely cardiorenal syndrome, appreciate recs  Renal biopsy is not a priority at this time due to reduced plts  Hypercalcemia noted, consistent with primary hyperparathyroidism, appreciate endo/renal recs, continue sensipar, calcium borderline low-normal now  Afebrile on  Levaquin and posa  Receiving transfusional support as needed.  He is DNR/DNI, Pt has excpressed wishes to go back to [place].     Will  likely need MARISOL - will need transfusional post d/c from here  Completes dac x 5d on 7/24, cont venetoclax 81 yo Maldivian speaking male transferred from [place] to [facility] for AML with myelomonocytic features   Bone marrow biopsy done 6/14 -Acute myeloid leukemia  NGS [id number] fusion, U2AF1 mut  7/15 BM results showing 0.2% + population, considered a complete morphological response  Dacogen/Venetoclax cycle 2 day 5    Echo- severely depressed LV function, EF 25-30%.  Continue lasix 40 mg/d po  Cr stable ~2.0, ? baseline Cr -renal following, agree with likely cardiorenal syndrome, appreciate recs  Renal biopsy is not a priority at this time due to reduced plts  Hypercalcemia noted, consistent with primary hyperparathyroidism, appreciate endo/renal recs, continue sensipar, calcium borderline low-normal now  Afebrile on  Levaquin and posa  Receiving transfusional support as needed.  He is DNR/DNI, Pt has excpressed wishes to go back to [place].     Will  likely need MARISOL - will need transfusional post d/c from here  Completes dac x 5d on 7/24, cont venetoclax

## 2021-07-24 NOTE — PROGRESS NOTE ADULT - SUBJECTIVE AND OBJECTIVE BOX
Diagnosis: FLT3 ITD (-) Acute Myeloid Leukemia    Protocol/Chemo Regimen: Cycle 2 Decitabine + Venetoclax     Day: 5    Pt endorsed: no complaints, feels good today    Review of Systems: Denies n/v, abdominal pain, orthopnea, SOB     Pain scale: 0    Allergies    morphine (Unknown)            ANTIMICROBIALS  levoFLOXacin  Tablet 250 milliGRAM(s) Oral every 24 hours  posaconazole DR Tablet 300 milliGRAM(s) Oral daily      HEME/ONC MEDICATIONS  decitabine IVPB (eMAR) 35 milliGRAM(s) IV Intermittent every 24 hours  venetoclax 100 milliGRAM(s) Oral <User Schedule>      STANDING MEDICATIONS  Biotene Dry Mouth Oral Rinse 5 milliLiter(s) Swish and Spit four times a day  calcium carbonate    500 mG (Tums) Chewable 2 Tablet(s) Chew daily  chlorhexidine 2% Cloths 1 Application(s) Topical <User Schedule>  cholecalciferol 2000 Unit(s) Oral daily  escitalopram 5 milliGRAM(s) Oral daily  finasteride 5 milliGRAM(s) Oral daily  folic acid 1 milliGRAM(s) Oral daily  furosemide    Tablet 20 milliGRAM(s) Oral daily  isosorbide   mononitrate ER Tablet (IMDUR) 60 milliGRAM(s) Oral daily  lidocaine   Patch 1 Patch Transdermal daily  metoprolol succinate ER 50 milliGRAM(s) Oral daily  ondansetron Injectable 8 milliGRAM(s) IV Push every 24 hours  polyethylene glycol 3350 17 Gram(s) Oral daily  sodium bicarbonate 650 milliGRAM(s) Oral three times a day  tamsulosin 0.4 milliGRAM(s) Oral at bedtime  urea Oral Powder 15 Gram(s) Oral every 12 hours      PRN MEDICATIONS  acetaminophen   Tablet .. 650 milliGRAM(s) Oral every 6 hours PRN  albuterol/ipratropium for Nebulization 3 milliLiter(s) Nebulizer every 6 hours PRN  aluminum hydroxide/magnesium hydroxide/simethicone Suspension 30 milliLiter(s) Oral every 4 hours PRN  calcium carbonate    500 mG (Tums) Chewable 1 Tablet(s) Chew every 4 hours PRN  ondansetron Injectable 8 milliGRAM(s) IV Push every 8 hours PRN  senna 2 Tablet(s) Oral at bedtime PRN  sodium chloride 0.9% lock flush 10 milliLiter(s) IV Push every 1 hour PRN  sucralfate suspension 1 Gram(s) Oral every 6 hours PRN        Vital Signs Last 24 Hrs  T(C): 36.6 (24 Jul 2021 05:40), Max: 37.1 (23 Jul 2021 22:05)  T(F): 97.9 (24 Jul 2021 05:40), Max: 98.7 (23 Jul 2021 22:05)  HR: 71 (24 Jul 2021 05:40) (65 - 86)  BP: 136/61 (24 Jul 2021 05:40) (117/59 - 136/61)  BP(mean): --  RR: 18 (24 Jul 2021 05:40) (16 - 18)  SpO2: 98% (24 Jul 2021 05:40) (96% - 99%)        PHYSICAL EXAM  General: NAD  HEENT: PERRLA, EOMOI, clear oropharynx,   CV: (+) S1/S2 RRR  Lungs: clear to auscultation, no wheezes or rales  Abdomen: soft, non-tender, non-distended (+) BS  Ext: no edema  Skin: no rashes and no petechiae  Neuro: alert and oriented X 3, no focal deficits  Central Line: PICC c/d/i       LABS:                        7.0    0.89  )-----------( 29       ( 23 Jul 2021 06:54 )             21.0         Mean Cell Volume : 87.1 fl  Mean Cell Hemoglobin : 29.0 pg  Mean Cell Hemoglobin Concentration : 33.3 gm/dL  Auto Neutrophil # : 0.32 K/uL  Auto Lymphocyte # : 0.50 K/uL  Auto Monocyte # : 0.05 K/uL  Auto Eosinophil # : 0.00 K/uL  Auto Basophil # : 0.00 K/uL  Auto Neutrophil % : 35.4 %  Auto Lymphocyte % : 55.7 %  Auto Monocyte % : 5.3 %  Auto Eosinophil % : 0.0 %  Auto Basophil % : 0.0 %      07-23    135  |  105  |  34<H>  ----------------------------<  100<H>  4.0   |  20<L>  |  2.18<H>    Ca    8.3<L>      23 Jul 2021 06:53  Phos  3.4     07-23  Mg     1.9     07-23    TPro  6.3  /  Alb  2.7<L>  /  TBili  0.9  /  DBili  x   /  AST  7<L>  /  ALT  5<L>  /  AlkPhos  96  07-23                  RECENT CULTURES:      RADIOLOGY & ADDITIONAL STUDIES:         Diagnosis: FLT3 ITD (-) Acute Myeloid Leukemia    Protocol/Chemo Regimen: Cycle 2 Decitabine + Venetoclax     Day: 5    Pt endorsed: no complaints, feels good today    Review of Systems: Denies n/v, abdominal pain, orthopnea, SOB     Pain scale: 0    Allergies:     morphine (Unknown)      ANTIMICROBIALS  levoFLOXacin  Tablet 250 milliGRAM(s) Oral every 24 hours  posaconazole DR Tablet 300 milliGRAM(s) Oral daily      HEME/ONC MEDICATIONS  decitabine IVPB (eMAR) 35 milliGRAM(s) IV Intermittent every 24 hours  venetoclax 100 milliGRAM(s) Oral <User Schedule>      STANDING MEDICATIONS  Biotene Dry Mouth Oral Rinse 5 milliLiter(s) Swish and Spit four times a day  calcium carbonate    500 mG (Tums) Chewable 2 Tablet(s) Chew daily  chlorhexidine 2% Cloths 1 Application(s) Topical <User Schedule>  cholecalciferol 2000 Unit(s) Oral daily  escitalopram 5 milliGRAM(s) Oral daily  finasteride 5 milliGRAM(s) Oral daily  folic acid 1 milliGRAM(s) Oral daily  furosemide    Tablet 20 milliGRAM(s) Oral daily  isosorbide   mononitrate ER Tablet (IMDUR) 60 milliGRAM(s) Oral daily  lidocaine   Patch 1 Patch Transdermal daily  metoprolol succinate ER 50 milliGRAM(s) Oral daily  ondansetron Injectable 8 milliGRAM(s) IV Push every 24 hours  polyethylene glycol 3350 17 Gram(s) Oral daily  sodium bicarbonate 650 milliGRAM(s) Oral three times a day  tamsulosin 0.4 milliGRAM(s) Oral at bedtime  urea Oral Powder 15 Gram(s) Oral every 12 hours      PRN MEDICATIONS  acetaminophen   Tablet .. 650 milliGRAM(s) Oral every 6 hours PRN  albuterol/ipratropium for Nebulization 3 milliLiter(s) Nebulizer every 6 hours PRN  aluminum hydroxide/magnesium hydroxide/simethicone Suspension 30 milliLiter(s) Oral every 4 hours PRN  calcium carbonate    500 mG (Tums) Chewable 1 Tablet(s) Chew every 4 hours PRN  ondansetron Injectable 8 milliGRAM(s) IV Push every 8 hours PRN  senna 2 Tablet(s) Oral at bedtime PRN  sodium chloride 0.9% lock flush 10 milliLiter(s) IV Push every 1 hour PRN  sucralfate suspension 1 Gram(s) Oral every 6 hours PRN        Vital Signs Last 24 Hrs  T(C): 36.6 (24 Jul 2021 05:40), Max: 37.1 (23 Jul 2021 22:05)  T(F): 97.9 (24 Jul 2021 05:40), Max: 98.7 (23 Jul 2021 22:05)  HR: 71 (24 Jul 2021 05:40) (65 - 86)  BP: 136/61 (24 Jul 2021 05:40) (117/59 - 136/61)  BP(mean): --  RR: 18 (24 Jul 2021 05:40) (16 - 18)  SpO2: 98% (24 Jul 2021 05:40) (96% - 99%)        PHYSICAL EXAM  General: NAD  HEENT: PERRLA, EOMOI, clear oropharynx,   CV: (+) S1/S2 RRR  Lungs: clear to auscultation, no wheezes or rales  Abdomen: soft, non-tender, non-distended (+) BS  Ext: no edema  Skin: no rashes and no petechiae  Neuro: alert and oriented X 3, no focal deficits  Central Line: PICC c/d/i     LABS:                          6.7    0.68  )-----------( 24       ( 24 Jul 2021 09:59 )             20.2         Mean Cell Volume : 87.1 fl  Mean Cell Hemoglobin : 28.9 pg  Mean Cell Hemoglobin Concentration : 33.2 gm/dL  Auto Neutrophil # : x  Auto Lymphocyte # : x  Auto Monocyte # : x  Auto Eosinophil # : x  Auto Basophil # : x  Auto Neutrophil % : x  Auto Lymphocyte % : x  Auto Monocyte % : x  Auto Eosinophil % : x  Auto Basophil % : x      07-24    135  |  104  |  33<H>  ----------------------------<  123<H>  3.7   |  19<L>  |  2.18<H>    Ca    8.6      24 Jul 2021 09:59  Phos  3.4     07-24  Mg     1.8     07-24    TPro  6.4  /  Alb  2.7<L>  /  TBili  0.9  /  DBili  x   /  AST  8<L>  /  ALT  <5<L>  /  AlkPhos  90  07-24          Uric Acid --        RADIOLOGY & ADDITIONAL STUDIES:    < from: Xray Chest 1 View- PORTABLE-Urgent (Xray Chest 1 View- PORTABLE-Urgent .) (07.10.21 @ 15:53) >  IMPRESSION: Constellation of findings compatible with CHF.   Diagnosis: FLT3 ITD (-) Acute Myeloid Leukemia    Protocol/Chemo Regimen: Cycle 2 Decitabine + Venetoclax     Day: 5    Pt endorsed: no complaints, feels good today    Review of Systems: Denies n/v, abdominal pain, orthopnea, SOB     Pain scale: 0    Allergies:     morphine (Unknown)      ANTIMICROBIALS  levoFLOXacin  Tablet 250 milliGRAM(s) Oral every 24 hours  posaconazole DR Tablet 300 milliGRAM(s) Oral daily      HEME/ONC MEDICATIONS  decitabine IVPB (eMAR) 35 milliGRAM(s) IV Intermittent every 24 hours  venetoclax 100 milliGRAM(s) Oral <User Schedule>      STANDING MEDICATIONS  Biotene Dry Mouth Oral Rinse 5 milliLiter(s) Swish and Spit four times a day  calcium carbonate    500 mG (Tums) Chewable 2 Tablet(s) Chew daily  chlorhexidine 2% Cloths 1 Application(s) Topical <User Schedule>  cholecalciferol 2000 Unit(s) Oral daily  escitalopram 5 milliGRAM(s) Oral daily  finasteride 5 milliGRAM(s) Oral daily  folic acid 1 milliGRAM(s) Oral daily  furosemide    Tablet 20 milliGRAM(s) Oral daily  isosorbide   mononitrate ER Tablet (IMDUR) 60 milliGRAM(s) Oral daily  lidocaine   Patch 1 Patch Transdermal daily  metoprolol succinate ER 50 milliGRAM(s) Oral daily  ondansetron Injectable 8 milliGRAM(s) IV Push every 24 hours  polyethylene glycol 3350 17 Gram(s) Oral daily  sodium bicarbonate 650 milliGRAM(s) Oral three times a day  tamsulosin 0.4 milliGRAM(s) Oral at bedtime  urea Oral Powder 15 Gram(s) Oral every 12 hours      PRN MEDICATIONS  acetaminophen   Tablet .. 650 milliGRAM(s) Oral every 6 hours PRN  albuterol/ipratropium for Nebulization 3 milliLiter(s) Nebulizer every 6 hours PRN  aluminum hydroxide/magnesium hydroxide/simethicone Suspension 30 milliLiter(s) Oral every 4 hours PRN  calcium carbonate    500 mG (Tums) Chewable 1 Tablet(s) Chew every 4 hours PRN  ondansetron Injectable 8 milliGRAM(s) IV Push every 8 hours PRN  senna 2 Tablet(s) Oral at bedtime PRN  sodium chloride 0.9% lock flush 10 milliLiter(s) IV Push every 1 hour PRN  sucralfate suspension 1 Gram(s) Oral every 6 hours PRN        Vital Signs Last 24 Hrs  T(C): 36.6 (24 Jul 2021 05:40), Max: 37.1 (23 Jul 2021 22:05)  T(F): 97.9 (24 Jul 2021 05:40), Max: 98.7 (23 Jul 2021 22:05)  HR: 71 (24 Jul 2021 05:40) (65 - 86)  BP: 136/61 (24 Jul 2021 05:40) (117/59 - 136/61)  BP(mean): --  RR: 18 (24 Jul 2021 05:40) (16 - 18)  SpO2: 98% (24 Jul 2021 05:40) (96% - 99%)        PHYSICAL EXAM  General: NAD  HEENT: PERRLA, EOMOI, clear oropharynx,   CV: (+) S1/S2 RRR  Lungs: clear to auscultation, no wheezes or rales  Abdomen: soft, non-tender, non-distended (+) BS  Ext: no edema  Skin: no rashes and no petechiae  Neuro: alert and oriented X 3, no focal deficits  Central Line: PICC c/d/i         LABS:                        6.7    0.68  )-----------( 24       ( 24 Jul 2021 09:59 )             20.2         Mean Cell Volume : 87.1 fl  Mean Cell Hemoglobin : 28.9 pg  Mean Cell Hemoglobin Concentration : 33.2 gm/dL  Auto Neutrophil # : 0.17 K/uL  Auto Lymphocyte # : 0.47 K/uL  Auto Monocyte # : 0.04 K/uL  Auto Eosinophil # : 0.00 K/uL  Auto Basophil # : 0.00 K/uL  Auto Neutrophil % : 24.6 %  Auto Lymphocyte % : 69.3 %  Auto Monocyte % : 6.1 %  Auto Eosinophil % : 0.0 %  Auto Basophil % : 0.0 %      07-24    135  |  104  |  33<H>  ----------------------------<  123<H>  3.7   |  19<L>  |  2.18<H>    Ca    8.6      24 Jul 2021 09:59  Phos  3.4     07-24  Mg     1.8     07-24    TPro  6.4  /  Alb  2.7<L>  /  TBili  0.9  /  DBili  x   /  AST  8<L>  /  ALT  <5<L>  /  AlkPhos  90  07-24      Uric Acid --        RADIOLOGY & ADDITIONAL STUDIES:    < from: Xray Chest 1 View- PORTABLE-Urgent (Xray Chest 1 View- PORTABLE-Urgent .) (07.10.21 @ 15:53) >  IMPRESSION: Constellation of findings compatible with CHF.

## 2021-07-24 NOTE — PROGRESS NOTE ADULT - PROBLEM SELECTOR PLAN 2
Patient is neutropenic, afebrile   6/30 completed Zosyn   7/5 Started Levaquin for neutropenic prophylaxis as ANC < 500  If febrile, send pan cultures, and switch Levaquin to cefepime  6/24 Abd US: Cholelithiasis without signs of cholecystitis, persistently dilated CBD; HIDA scan (+) for acute cholecystitis-Seen by Surgery and IR, No surgical or IR interventions were recommended.  6/28  US abdomen Cholelithiasis with no definite sonographic evidence of acute cholecystitis.  7/3 Started posaconazole prophylaxis as patient is neutropenic. Adjusted dose of venetoclax. Patient is neutropenic, afebrile   Pancx CXR if fever   6/30 completed Zosyn   7/5 Started Levaquin for neutropenic prophylaxis as ANC < 500  If febrile, send pan cultures, and switch Levaquin to cefepime  6/24 Abd US: Cholelithiasis without signs of cholecystitis, persistently dilated CBD; HIDA scan (+) for acute cholecystitis-Seen by Surgery and IR, No surgical or IR interventions were recommended.  6/28  US abdomen Cholelithiasis with no definite sonographic evidence of acute cholecystitis.  7/3 Started posaconazole prophylaxis as patient is neutropenic. Adjusted dose of venetoclax.

## 2021-07-24 NOTE — PROGRESS NOTE ADULT - PROBLEM SELECTOR PLAN 9
s/p 5vCABG 2012 (LIMA to LAD, SVG to Diag and OM, SVG PDA and RPL), PCI (2 in 2014, 2 in 2015 [facility]), HFrEF s/p CRT-D (2013),  No DAPT or AC given thrombocytopenia.

## 2021-07-24 NOTE — ADVANCED PRACTICE NURSE CONSULT - REASON FOR CONSULT
Chemotherapy Notes : Day 4/5 Dacogen ,Cycle 2
Chemotherapy notes : Day 1/5 Dacogen
Dacogen held 6/21 as  per team. Pt with increased periods of confusion. Primary RN made aware. 
Day 3 of 5 Decitabine. Consent on file.
Chemotherapy Notes : Day 1/5 Dacogen ,Cycle 2
Chemotherapy Notes : Day 2/5 Dacogen ,Cycle 2
Chemotherapy Notes : Day 3/5 Dacogen ,Cycle 2
Chemotherapy Notes : Day 5/5 Dacogen ,Cycle 2
Chemotherapy notes : Day 2/5 Dacogen
Chemotherapy, Day 5 Dacogen 
Day 4 of 5 Decitabine. Consent on file.

## 2021-07-25 NOTE — PROGRESS NOTE ADULT - PROBLEM SELECTOR PLAN 9
s/p 5vCABG 2012 (LIMA to LAD, SVG to Diag and OM, SVG PDA and RPL), PCI (2 in 2014, 2 in 2015 [facility]), HFrEF s/p CRT-D (2013),  No DAPT or AC given thrombocytopenia.

## 2021-07-25 NOTE — PROGRESS NOTE ADULT - PROBLEM SELECTOR PLAN 1
FLT3 ITD (-) AML   Monitor daily CBC's and transfuse as needed, Monitor daily CMP and replete electrolytes as needed   Strict I's/O's, daily weights, mouth care, anti emetics.   Cycle 2 Dacogen 20 mg/m2 x 5 days + Venetoclax. (s/p Venetoclax escalation, now on 100 mg oral daily - while on posaconazole) started 7/20  Day 21 BM bx on 7/9, quantity insufficient. Repeated 7/16 : showing hypercellular marrow   s/p Vitamain K (5mg po- 7/15-7/17)   Pt is DNR  Cycle 2 started 7/20  patient wants to return to [place]  discharge planning pending [facility] rehab placement.  Anemia: PRBC x1 FLT3 ITD (-) AML   Monitor daily CBC's and transfuse as needed, Monitor daily CMP and replete electrolytes as needed   Strict I's/O's, daily weights, mouth care, anti emetics.   Cycle 2 Dacogen 20 mg/m2 x 5 days + Venetoclax. (s/p Venetoclax escalation, now on 100 mg oral daily - while on posaconazole) started 7/20  Day 21 BM bx on 7/9, quantity insufficient. Repeated 7/16 : showing hypercellular marrow   s/p Vitamain K (5mg po- 7/15-7/17)   Pt is DNR  Cycle 2 started 7/20  patient wants to return to [place]  Discharge planning pending [facility] rehab placement, but pt request to be discharged home instead
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
## 2021-07-25 NOTE — PROGRESS NOTE ADULT - SUBJECTIVE AND OBJECTIVE BOX
Diagnosis: FLT3 ITD (-) Acute Myeloid Leukemia    Protocol/Chemo Regimen: Cycle 2 Decitabine + Venetoclax     Day: 6    Pt endorsed: no complaints, feels good today    Review of Systems: Denies n/v, abdominal pain, orthopnea, SOB     Pain scale: 0    Allergies:     morphine (Unknown)      ANTIMICROBIALS  levoFLOXacin  Tablet 250 milliGRAM(s) Oral every 24 hours  posaconazole DR Tablet 300 milliGRAM(s) Oral daily      HEME/ONC MEDICATIONS  venetoclax 100 milliGRAM(s) Oral <User Schedule>      STANDING MEDICATIONS  Biotene Dry Mouth Oral Rinse 5 milliLiter(s) Swish and Spit four times a day  calcium carbonate    500 mG (Tums) Chewable 2 Tablet(s) Chew daily  chlorhexidine 2% Cloths 1 Application(s) Topical <User Schedule>  cholecalciferol 2000 Unit(s) Oral daily  escitalopram 5 milliGRAM(s) Oral daily  finasteride 5 milliGRAM(s) Oral daily  folic acid 1 milliGRAM(s) Oral daily  furosemide    Tablet 20 milliGRAM(s) Oral daily  isosorbide   mononitrate ER Tablet (IMDUR) 60 milliGRAM(s) Oral daily  lidocaine   Patch 1 Patch Transdermal daily  metoprolol succinate ER 50 milliGRAM(s) Oral daily  polyethylene glycol 3350 17 Gram(s) Oral daily  sodium bicarbonate 650 milliGRAM(s) Oral three times a day  tamsulosin 0.4 milliGRAM(s) Oral at bedtime  urea Oral Powder 15 Gram(s) Oral every 12 hours      PRN MEDICATIONS  acetaminophen   Tablet .. 650 milliGRAM(s) Oral every 6 hours PRN  albuterol/ipratropium for Nebulization 3 milliLiter(s) Nebulizer every 6 hours PRN  aluminum hydroxide/magnesium hydroxide/simethicone Suspension 30 milliLiter(s) Oral every 4 hours PRN  calcium carbonate    500 mG (Tums) Chewable 1 Tablet(s) Chew every 4 hours PRN  ondansetron Injectable 8 milliGRAM(s) IV Push every 8 hours PRN  senna 2 Tablet(s) Oral at bedtime PRN  sodium chloride 0.9% lock flush 10 milliLiter(s) IV Push every 1 hour PRN  sucralfate suspension 1 Gram(s) Oral every 6 hours PRN        Vital Signs Last 24 Hrs  T(C): 36.6 (25 Jul 2021 04:50), Max: 36.8 (24 Jul 2021 21:00)  T(F): 97.8 (25 Jul 2021 04:50), Max: 98.3 (24 Jul 2021 21:00)  HR: 79 (25 Jul 2021 04:50) (61 - 82)  BP: 145/77 (25 Jul 2021 04:50) (113/55 - 145/77)  BP(mean): --  RR: 18 (25 Jul 2021 04:50) (16 - 18)  SpO2: 95% (25 Jul 2021 04:50) (94% - 100%)          PHYSICAL EXAM  General: NAD  HEENT: PERRLA, EOMOI, clear oropharynx,   CV: (+) S1/S2 RRR  Lungs: clear to auscultation, no wheezes or rales  Abdomen: soft, non-tender, non-distended (+) BS  Ext: no edema  Skin: no rashes and no petechiae  Neuro: alert and oriented X 3, no focal deficits  Central Line: PICC c/d/i         LABS:                        7.4    0.67  )-----------( 24       ( 25 Jul 2021 06:54 )             22.1         Mean Cell Volume : 87.7 fl  Mean Cell Hemoglobin : 29.4 pg  Mean Cell Hemoglobin Concentration : 33.5 gm/dL  Auto Neutrophil # : x  Auto Lymphocyte # : x  Auto Monocyte # : x  Auto Eosinophil # : x  Auto Basophil # : x  Auto Neutrophil % : x  Auto Lymphocyte % : x  Auto Monocyte % : x  Auto Eosinophil % : x  Auto Basophil % : x      07-25    134<L>  |  104  |  36<H>  ----------------------------<  95  3.9   |  20<L>  |  2.06<H>    Ca    8.7      25 Jul 2021 06:54  Phos  3.5     07-25  Mg     1.8     07-25    TPro  6.3  /  Alb  2.5<L>  /  TBili  1.2  /  DBili  x   /  AST  6<L>  /  ALT  x   /  AlkPhos  88  07-25      Mg 1.8  Phos 3.5  Mg 1.8  Phos 3.4            Uric Acid --      Uric Acid --          RADIOLOGY & ADDITIONAL STUDIES:    < from: Xray Chest 1 View- PORTABLE-Urgent (Xray Chest 1 View- PORTABLE-Urgent .) (07.10.21 @ 15:53) >  IMPRESSION: Constellation of findings compatible with CHF.       Diagnosis: FLT3 ITD (-) Acute Myeloid Leukemia    Protocol/Chemo Regimen: Cycle 2 Decitabine + Venetoclax     Day: 6    Hungarian ID [id number] Jarrett   Pt endorsed: fatigue; decreased appetite, ate 25% of food     Review of Systems: Denies n/v, abdominal pain, orthopnea, SOB     Pain scale: 0    Allergies:     morphine (Unknown)      ANTIMICROBIALS  levoFLOXacin  Tablet 250 milliGRAM(s) Oral every 24 hours  posaconazole DR Tablet 300 milliGRAM(s) Oral daily      HEME/ONC MEDICATIONS  venetoclax 100 milliGRAM(s) Oral <User Schedule>      STANDING MEDICATIONS  Biotene Dry Mouth Oral Rinse 5 milliLiter(s) Swish and Spit four times a day  calcium carbonate    500 mG (Tums) Chewable 2 Tablet(s) Chew daily  chlorhexidine 2% Cloths 1 Application(s) Topical <User Schedule>  cholecalciferol 2000 Unit(s) Oral daily  escitalopram 5 milliGRAM(s) Oral daily  finasteride 5 milliGRAM(s) Oral daily  folic acid 1 milliGRAM(s) Oral daily  furosemide    Tablet 20 milliGRAM(s) Oral daily  isosorbide   mononitrate ER Tablet (IMDUR) 60 milliGRAM(s) Oral daily  lidocaine   Patch 1 Patch Transdermal daily  metoprolol succinate ER 50 milliGRAM(s) Oral daily  polyethylene glycol 3350 17 Gram(s) Oral daily  sodium bicarbonate 650 milliGRAM(s) Oral three times a day  tamsulosin 0.4 milliGRAM(s) Oral at bedtime  urea Oral Powder 15 Gram(s) Oral every 12 hours      PRN MEDICATIONS  acetaminophen   Tablet .. 650 milliGRAM(s) Oral every 6 hours PRN  albuterol/ipratropium for Nebulization 3 milliLiter(s) Nebulizer every 6 hours PRN  aluminum hydroxide/magnesium hydroxide/simethicone Suspension 30 milliLiter(s) Oral every 4 hours PRN  calcium carbonate    500 mG (Tums) Chewable 1 Tablet(s) Chew every 4 hours PRN  ondansetron Injectable 8 milliGRAM(s) IV Push every 8 hours PRN  senna 2 Tablet(s) Oral at bedtime PRN  sodium chloride 0.9% lock flush 10 milliLiter(s) IV Push every 1 hour PRN  sucralfate suspension 1 Gram(s) Oral every 6 hours PRN        Vital Signs Last 24 Hrs  T(C): 36.6 (25 Jul 2021 04:50), Max: 36.8 (24 Jul 2021 21:00)  T(F): 97.8 (25 Jul 2021 04:50), Max: 98.3 (24 Jul 2021 21:00)  HR: 79 (25 Jul 2021 04:50) (61 - 82)  BP: 145/77 (25 Jul 2021 04:50) (113/55 - 145/77)  BP(mean): --  RR: 18 (25 Jul 2021 04:50) (16 - 18)  SpO2: 95% (25 Jul 2021 04:50) (94% - 100%)          PHYSICAL EXAM  General: NAD  HEENT: clear oropharynx  CV: (+) S1/S2 RRR  Lungs: clear to auscultation, no wheezes or rales  Abdomen: soft, non-tender, non-distended (+) BS  Ext: no edema  Skin: no rashes   Neuro: alert and oriented X3  Central Line: PICC c/d/i         LABS:                          7.4    0.67  )-----------( 24       ( 25 Jul 2021 06:54 )             22.1         Mean Cell Volume : 87.7 fl  Mean Cell Hemoglobin : 29.4 pg  Mean Cell Hemoglobin Concentration : 33.5 gm/dL  Auto Neutrophil # : 0.18 K/uL  Auto Lymphocyte # : 0.47 K/uL  Auto Monocyte # : 0.02 K/uL  Auto Eosinophil # : 0.00 K/uL  Auto Basophil # : 0.00 K/uL  Auto Neutrophil % : 27.3 %  Auto Lymphocyte % : 70.4 %  Auto Monocyte % : 2.3 %  Auto Eosinophil % : 0.0 %  Auto Basophil % : 0.0 %      07-25    134<L>  |  104  |  36<H>  ----------------------------<  95  3.9   |  20<L>  |  2.06<H>    Ca    8.7      25 Jul 2021 06:54  Phos  3.5     07-25  Mg     1.8     07-25    TPro  6.3  /  Alb  2.5<L>  /  TBili  1.2  /  DBili  x   /  AST  6<L>  /  ALT  5<L>  /  AlkPhos  88  07-25        Uric Acid --        RADIOLOGY & ADDITIONAL STUDIES:    < from: Xray Chest 1 View- PORTABLE-Urgent (Xray Chest 1 View- PORTABLE-Urgent .) (07.10.21 @ 15:53) >  IMPRESSION: Constellation of findings compatible with CHF.

## 2021-07-25 NOTE — PROGRESS NOTE ADULT - ATTENDING COMMENTS
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  Continue lasix 40 mg/d po  Cr stable ~2.0, ? baseline Cr -renal following, agree with likely cardiorenal syndrome, appreciate recs  Renal biopsy is not a priority at this time due to reduced plts  Hypercalcemia noted, consistent with primary hyperparathyroidism, appreciate endo/renal recs, continue sensipar, calcium borderline low-normal now  Afebrile on  Levaquin and posa  Receiving transfusional support as needed.  He is DNR/DNI, Pt has excpressed wishes to go back to [place].     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  7/15 BM results showing 0.2% + population, considered a complete morphological response  Dacogen/Venetoclax cycle 2 day 6.    Will need to discuss with son and  if patient can be discharged home with social and physical therapy support.    He is DNR/DNI  Pt has expressed wishes to go back to [place].

## 2021-07-25 NOTE — PROGRESS NOTE ADULT - PROBLEM SELECTOR PLAN 2
Patient is neutropenic, afebrile   Pancx CXR if fever   6/30 completed Zosyn   7/5 Started Levaquin for neutropenic prophylaxis as ANC < 500  If febrile, send pan cultures, and switch Levaquin to cefepime  6/24 Abd US: Cholelithiasis without signs of cholecystitis, persistently dilated CBD; HIDA scan (+) for acute cholecystitis-Seen by Surgery and IR, No surgical or IR interventions were recommended.  6/28  US abdomen Cholelithiasis with no definite sonographic evidence of acute cholecystitis.  7/3 Started posaconazole prophylaxis as patient is neutropenic. Adjusted dose of venetoclax.

## 2021-07-26 NOTE — PROGRESS NOTE ADULT - PROBLEM SELECTOR PLAN 1
FLT3 ITD (-) AML   Monitor daily CBC's and transfuse as needed, Monitor daily CMP and replete electrolytes as needed   Strict I's/O's, daily weights, mouth care, anti emetics.   Cycle 2 Dacogen 20 mg/m2 x 5 days + Venetoclax. (s/p Venetoclax escalation, now on 100 mg oral daily - while on posaconazole) started 7/20  Day 21 BM bx on 7/9, quantity insufficient. Repeated 7/16 : showing hypercellular marrow   s/p Vitamain K (5mg po- 7/15-7/17)   Pt is DNR  Cycle 2 started 7/20  patient wants to return to [place]  Discharge planning pending [facility] rehab placement, but pt request to be discharged home instead FLT3 ITD (-) AML   Monitor daily CBC's and transfuse as needed, Monitor daily CMP and replete electrolytes as needed   Strict I's/O's, daily weights, mouth care, anti emetics.   Cycle 2 Dacogen 20 mg/m2 x 5 days + Venetoclax. (s/p Venetoclax escalation, now on 100 mg oral daily - while on posaconazole) started 7/20  Day 21 BM bx on 7/9, quantity insufficient. Repeated 7/16 : showing hypercellular marrow   Pt is DNR  off allopurinol  s/p Cycle 2 started 7/20  patient wants to return to [place]  Anemia-replace PRBC today with dc planning when [facility] approved. Family aware.   Discharge planning pending [facility] rehab placement

## 2021-07-26 NOTE — CHART NOTE - NSCHARTNOTEFT_GEN_A_CORE
Nutrition Follow Up Note  Patient seen for: Consult for Education and Malnutrition Follow up on 7MON    Chart reviewed, events noted. Cycle 2 Decitabine + Venetoclax started on . Pt on Day 6 of regimen. Plan for d/c to rehab.    Source: [x] Patient- [place] and English speaking; prefers to conduct RD  interview in English     [x] EMR           Diet Order:   Diet, Regular (21)    - Is current order appropriate/adequate? [x] Yes    - PO intake:  [x] <50%  Poor    - Nutrition-related concerns: Upon RD visit, pt reports appetite remains poor. Pt reports wanting to sleep as he did not sleep well last night. Pt denies nausea, vomiting, constipation or diarrhea at this time. Last bowel movement  per flow sheets. RD observed many unopened ensure clear supplements at bedside; noted order for additional supplements has been suspended. RD offered to send pt up additional food options or have pt voice preferences but he declines to at this time.    Weights:   Daily Weight in k.7 (), Weight in k (), Weight in k.3 (), Weight in k.9 (), Weight in k.1 (), Weight in k.8 (); daily weights noted, will continue to monitor trends as able.    Nutritionally Pertinent MEDICATIONS  (STANDING):  calcium carbonate    500 mG (Tums) Chewable  cholecalciferol  finasteride  folic acid  furosemide    Tablet  isosorbide   mononitrate ER Tablet (IMDUR)  levoFLOXacin  Tablet  metoprolol succinate ER  polyethylene glycol 3350  posaconazole DR Tablet  sodium bicarbonate  tamsulosin  urea Oral Powder  venetoclax    Pertinent Labs:  @ 07:10: Na 135, BUN 32<H>, Cr 1.98<H>, BG 91, K+ 3.9, Phos 3.2, Mg 1.9, Alk Phos 87, ALT/SGPT <5<L>, AST/SGOT 5<L>    A1C with Estimated Average Glucose Result: 6.4 % (21 @ 06:09)    Finger Sticks: none at this time    Skin per nursing documentation: no pressure injuries per flow sheets  Edema: none noted per flow sheets    Estimated Needs:   [x] no change since previous assessment    Previous Nutrition Diagnosis: Severe malnutrition  Nutrition Diagnosis is: [x] ongoing, being addressed with encouragement of PO intake, food preferences    New Nutrition Diagnosis: [x] Not applicable    Nutrition Care Plan:  [x] In Progress    Nutrition Recommendations:      1. Continue current diet as tolerated  2. Recommend nursing to continue to provide encouragement of PO intake at meal times as needed  3. Honor pt food preferences to promote adequate oral intake while in-house    Monitoring and Evaluation:   Continue to monitor nutritional intake, tolerance to diet prescription, weights, labs, skin integrity    RD remains available upon request and will follow up per protocol  Aurora Kidd MS, RD, CDN, [facility] pgr #[phone] Nutrition Follow Up Note  Patient seen for: Consult for Education and Malnutrition Follow up on 7MON    Chart reviewed, events noted. Cycle 2 Decitabine + Venetoclax started on . Pt on Day 6 of regimen. Plan for d/c to rehab.    Source: [x] Patient- Albanian and English speaking; prefers to conduct RD  interview in English     [x] EMR           Diet Order:   Diet, Regular (21)    - Is current order appropriate/adequate? [x] Yes    - PO intake:  [x] <50%  Poor    - Nutrition-related concerns: Upon RD visit, pt reports appetite remains poor. Pt reports wanting to sleep as he did not sleep well last night. Pt denies nausea, vomiting, constipation or diarrhea at this time. Last bowel movement  per flow sheets. RD observed many unopened ensure clear supplements at bedside; noted order for additional supplements has been suspended. RD offered to send pt up additional food options or have pt voice preferences but he declines to at this time.    Weights:   Daily Weight in k.7 (), Weight in k (), Weight in k.3 (), Weight in k.9 (), Weight in k.1 (), Weight in k.8 (); daily weights noted, will continue to monitor trends as able.    Nutritionally Pertinent MEDICATIONS  (STANDING):  calcium carbonate    500 mG (Tums) Chewable  cholecalciferol  finasteride  folic acid  furosemide    Tablet  isosorbide   mononitrate ER Tablet (IMDUR)  levoFLOXacin  Tablet  metoprolol succinate ER  polyethylene glycol 3350  posaconazole DR Tablet  sodium bicarbonate  tamsulosin  urea Oral Powder  venetoclax    Pertinent Labs:  @ 07:10: Na 135, BUN 32<H>, Cr 1.98<H>, BG 91, K+ 3.9, Phos 3.2, Mg 1.9, Alk Phos 87, ALT/SGPT <5<L>, AST/SGOT 5<L>    A1C with Estimated Average Glucose Result: 6.4 % (21 @ 06:09)    Finger Sticks: none at this time    Skin per nursing documentation: no pressure injuries per flow sheets  Edema: none noted per flow sheets    Estimated Needs:   [x] no change since previous assessment    Previous Nutrition Diagnosis: Severe malnutrition  Nutrition Diagnosis is: [x] ongoing, being addressed with encouragement of PO intake, food preferences    New Nutrition Diagnosis: [x] Not applicable    Nutrition Care Plan:  [x] In Progress    Nutrition Recommendations:      1. Continue current diet as tolerated  2. Recommend nursing to continue to provide encouragement of PO intake at meal times as needed  3. Honor pt food preferences to promote adequate oral intake while in-house  4. RD discussed continued importance of adequate intake with focus on protein foods first at meals; consuming main entree first and then sides for adequate calorie and protein provision     Monitoring and Evaluation:   Continue to monitor nutritional intake, tolerance to diet prescription, weights, labs, skin integrity    RD remains available upon request and will follow up per protocol  Aurora Kidd MS, RD, CDN, [facility] pgr #[phone]

## 2021-07-26 NOTE — PROGRESS NOTE ADULT - SUBJECTIVE AND OBJECTIVE BOX
Nephrology progress note    Patient is a 80y Male with    ON events/Subjective:     Allergies:  morphine (Unknown)    Hospital Medications:   MEDICATIONS  (STANDING):  Biotene Dry Mouth Oral Rinse 5 milliLiter(s) Swish and Spit four times a day  calcium carbonate    500 mG (Tums) Chewable 2 Tablet(s) Chew daily  chlorhexidine 2% Cloths 1 Application(s) Topical <User Schedule>  cholecalciferol 2000 Unit(s) Oral daily  escitalopram 5 milliGRAM(s) Oral daily  finasteride 5 milliGRAM(s) Oral daily  folic acid 1 milliGRAM(s) Oral daily  furosemide    Tablet 20 milliGRAM(s) Oral daily  isosorbide   mononitrate ER Tablet (IMDUR) 60 milliGRAM(s) Oral daily  levoFLOXacin  Tablet 250 milliGRAM(s) Oral every 24 hours  lidocaine   Patch 1 Patch Transdermal daily  metoprolol succinate ER 50 milliGRAM(s) Oral daily  polyethylene glycol 3350 17 Gram(s) Oral daily  posaconazole DR Tablet 300 milliGRAM(s) Oral daily  sodium bicarbonate 650 milliGRAM(s) Oral three times a day  tamsulosin 0.4 milliGRAM(s) Oral at bedtime  urea Oral Powder 15 Gram(s) Oral every 12 hours  venetoclax 100 milliGRAM(s) Oral <User Schedule>    REVIEW OF SYSTEMS:  CONSTITUTIONAL: No weakness, fevers or chills  EYES/ENT: No visual changes;  No vertigo or throat pain   NECK: No pain or stiffness  RESPIRATORY: No cough, wheezing, hemoptysis; No shortness of breath  CARDIOVASCULAR: No chest pain or palpitations.  GASTROINTESTINAL: No abdominal or epigastric pain. No nausea, vomiting, or hematemesis; No diarrhea or constipation. No melena or hematochezia.  GENITOURINARY: No dysuria, frequency, foamy urine, urinary urgency, incontinence or hematuria  NEUROLOGICAL: No numbness or weakness  SKIN: No itching, burning, rashes, or lesions   VASCULAR: No bilateral lower extremity edema.   All other review of systems is negative unless indicated above.    VITALS:  T(F): 98.1 (07-26-21 @ 06:11), Max: 98.1 (07-25-21 @ 09:27)  HR: 93 (07-26-21 @ 06:11)  BP: 110/57 (07-26-21 @ 06:11)  RR: 18 (07-26-21 @ 06:11)  SpO2: 95% (07-26-21 @ 06:11)  Wt(kg): --    07-24 @ 07:01  -  07-25 @ 07:00  --------------------------------------------------------  IN: 1159 mL / OUT: 420 mL / NET: 739 mL    07-25 @ 07:01  -  07-26 @ 07:00  --------------------------------------------------------  IN: 1080 mL / OUT: 600 mL / NET: 480 mL        PHYSICAL EXAM:  Constitutional: NAD  HEENT: anicteric sclera, oropharynx clear, MMM  Neck: No JVD  Respiratory: CTAB, no wheezes, rales or rhonchi  Cardiovascular: S1, S2, RRR  Gastrointestinal: BS+, soft, NT/ND  Extremities: No cyanosis or clubbing. No peripheral edema  Neurological: A/O x 3, no focal deficits  Psychiatric: Normal mood, normal affect  : No CVA tenderness. No vides.   Skin: No rashes  Vascular Access:    LABS:  07-26    135  |  105  |  32<H>  ----------------------------<  91  3.9   |  20<L>  |  1.98<H>    Ca    8.8      26 Jul 2021 07:10  Phos  3.2     07-26  Mg     1.9     07-26    TPro  6.4  /  Alb  2.5<L>  /  TBili  1.0  /  DBili      /  AST  5<L>  /  ALT  <5<L>  /  AlkPhos  87  07-26                          7.3    0.58  )-----------( 20       ( 26 Jul 2021 07:11 )             21.7       Urine Studies:  Creatinine Trend: 1.98<--, 2.06<--, 2.18<--, 2.18<--, 2.32<--, 2.28<--      RADIOLOGY & ADDITIONAL STUDIES:     Nephrology progress note    Patient is a 80y Male with    ON events/Subjective:     Allergies:  morphine (Unknown)    Hospital Medications:   MEDICATIONS  (STANDING):  Biotene Dry Mouth Oral Rinse 5 milliLiter(s) Swish and Spit four times a day  calcium carbonate    500 mG (Tums) Chewable 2 Tablet(s) Chew daily  chlorhexidine 2% Cloths 1 Application(s) Topical <User Schedule>  cholecalciferol 2000 Unit(s) Oral daily  escitalopram 5 milliGRAM(s) Oral daily  finasteride 5 milliGRAM(s) Oral daily  folic acid 1 milliGRAM(s) Oral daily  furosemide    Tablet 20 milliGRAM(s) Oral daily  isosorbide   mononitrate ER Tablet (IMDUR) 60 milliGRAM(s) Oral daily  levoFLOXacin  Tablet 250 milliGRAM(s) Oral every 24 hours  lidocaine   Patch 1 Patch Transdermal daily  metoprolol succinate ER 50 milliGRAM(s) Oral daily  polyethylene glycol 3350 17 Gram(s) Oral daily  posaconazole DR Tablet 300 milliGRAM(s) Oral daily  sodium bicarbonate 650 milliGRAM(s) Oral three times a day  tamsulosin 0.4 milliGRAM(s) Oral at bedtime  urea Oral Powder 15 Gram(s) Oral every 12 hours  venetoclax 100 milliGRAM(s) Oral <User Schedule>    REVIEW OF SYSTEMS:  CONSTITUTIONAL: No fevers or chills  EYES/ENT: No visual changes  RESPIRATORY: +shortness of breath  CARDIOVASCULAR: No chest pain  GASTROINTESTINAL: No abdominal or epigastric pain. No nausea, vomiting, or hematemesis; No diarrhea. No melena or hematochezia.  GENITOURINARY: No dysuria, frequency, foamy urine, urinary urgency, or hematuria  VASCULAR: +bilateral lower extremity edema.   All other review of systems is negative unless indicated above.    VITALS:  T(F): 98.1 (07-26-21 @ 06:11), Max: 98.1 (07-25-21 @ 09:27)  HR: 93 (07-26-21 @ 06:11)  BP: 110/57 (07-26-21 @ 06:11)  RR: 18 (07-26-21 @ 06:11)  SpO2: 95% (07-26-21 @ 06:11)  Wt(kg): --    07-24 @ 07:01  -  07-25 @ 07:00  --------------------------------------------------------  IN: 1159 mL / OUT: 420 mL / NET: 739 mL    07-25 @ 07:01  -  07-26 @ 07:00  --------------------------------------------------------  IN: 1080 mL / OUT: 600 mL / NET: 480 mL        PHYSICAL EXAM:  Constitutional: NAD  HEENT: anicteric sclera   Respiratory: CTAB, no wheezes, rales or rhonchi  Cardiovascular: S1, S2, RRR  Gastrointestinal: soft, NT/ND  Extremities: noted bilateral peripheral LE edema  Neurological: A/O x 3, no gross deficits  : No CVA tenderness. No vides.     LABS:  07-26    135  |  105  |  32<H>  ----------------------------<  91  3.9   |  20<L>  |  1.98<H>    Ca    8.8      26 Jul 2021 07:10  Phos  3.2     07-26  Mg     1.9     07-26    TPro  6.4  /  Alb  2.5<L>  /  TBili  1.0  /  DBili      /  AST  5<L>  /  ALT  <5<L>  /  AlkPhos  87  07-26                          7.3    0.58  )-----------( 20 ( 26 Jul 2021 07:11 )             21.7       Urine Studies:  Creatinine Trend: 1.98<--, 2.06<--, 2.18<--, 2.18<--, 2.32<--, 2.28<--      RADIOLOGY & ADDITIONAL STUDIES:     Nephrology progress note    Patient is a 80y Male admitted for hypoxic respiratory failure likely 2/2 symptomatic anemia w/ c/b ALL.      ON events/Subjective:   - No acute events overnight  - Patient seen and evaluated at bedside  - Patient reports absence of fevers, chills, headaches, visual disturbances, CP, abd pain, dysuria, hematuria, diarrhea  - Patient reports feeling better    Allergies:  morphine (Unknown)    Hospital Medications:   MEDICATIONS  (STANDING):  Biotene Dry Mouth Oral Rinse 5 milliLiter(s) Swish and Spit four times a day  calcium carbonate    500 mG (Tums) Chewable 2 Tablet(s) Chew daily  chlorhexidine 2% Cloths 1 Application(s) Topical <User Schedule>  cholecalciferol 2000 Unit(s) Oral daily  escitalopram 5 milliGRAM(s) Oral daily  finasteride 5 milliGRAM(s) Oral daily  folic acid 1 milliGRAM(s) Oral daily  furosemide    Tablet 20 milliGRAM(s) Oral daily  isosorbide   mononitrate ER Tablet (IMDUR) 60 milliGRAM(s) Oral daily  levoFLOXacin  Tablet 250 milliGRAM(s) Oral every 24 hours  lidocaine   Patch 1 Patch Transdermal daily  metoprolol succinate ER 50 milliGRAM(s) Oral daily  polyethylene glycol 3350 17 Gram(s) Oral daily  posaconazole DR Tablet 300 milliGRAM(s) Oral daily  sodium bicarbonate 650 milliGRAM(s) Oral three times a day  tamsulosin 0.4 milliGRAM(s) Oral at bedtime  urea Oral Powder 15 Gram(s) Oral every 12 hours  venetoclax 100 milliGRAM(s) Oral <User Schedule>    REVIEW OF SYSTEMS:  CONSTITUTIONAL: No fevers or chills  EYES/ENT: No visual changes  RESPIRATORY: +shortness of breath  CARDIOVASCULAR: No chest pain  GASTROINTESTINAL: No abdominal or epigastric pain. No nausea, vomiting, or hematemesis; No diarrhea. No melena or hematochezia.  GENITOURINARY: No dysuria, frequency, foamy urine, urinary urgency, or hematuria  VASCULAR: +bilateral lower extremity edema.   All other review of systems is negative unless indicated above.    VITALS:  T(F): 98.1 (07-26-21 @ 06:11), Max: 98.1 (07-25-21 @ 09:27)  HR: 93 (07-26-21 @ 06:11)  BP: 110/57 (07-26-21 @ 06:11)  RR: 18 (07-26-21 @ 06:11)  SpO2: 95% (07-26-21 @ 06:11)  Wt(kg): --    07-24 @ 07:01  -  07-25 @ 07:00  --------------------------------------------------------  IN: 1159 mL / OUT: 420 mL / NET: 739 mL    07-25 @ 07:01  -  07-26 @ 07:00  --------------------------------------------------------  IN: 1080 mL / OUT: 600 mL / NET: 480 mL        PHYSICAL EXAM:  Constitutional: NAD  HEENT: anicteric sclera   Respiratory: CTAB, no wheezes, rales or rhonchi  Cardiovascular: S1, S2, RRR  Gastrointestinal: soft, NT/ND to light and deep palpation  Extremities: noted bilateral peripheral LE edema  Neurological: A/O x 3, no gross deficits  : No CVA tenderness. No vides.     LABS:  07-26    135  |  105  |  32<H>  ----------------------------<  91  3.9   |  20<L>  |  1.98<H>    Ca    8.8      26 Jul 2021 07:10  Phos  3.2     07-26  Mg     1.9     07-26    TPro  6.4  /  Alb  2.5<L>  /  TBili  1.0  /  DBili      /  AST  5<L>  /  ALT  <5<L>  /  AlkPhos  87  07-26                          7.3    0.58  )-----------( 20       ( 26 Jul 2021 07:11 )             21.7       Urine Studies:  Creatinine Trend: 1.98<--, 2.06<--, 2.18<--, 2.18<--, 2.32<--, 2.28<--      RADIOLOGY & ADDITIONAL STUDIES:     Nephrology progress note    Patient is a 80y Male admitted for hypoxic respiratory failure likely 2/2 symptomatic anemia w/ c/b ALL.      ON events/Subjective:   - No acute events overnight  - Patient seen and evaluated at bedside  - Patient reports absence of fevers, chills, headaches, visual disturbances, CP, abd pain, dysuria, hematuria, diarrhea      Allergies:  morphine (Unknown)    Hospital Medications:   MEDICATIONS  (STANDING):  Biotene Dry Mouth Oral Rinse 5 milliLiter(s) Swish and Spit four times a day  calcium carbonate    500 mG (Tums) Chewable 2 Tablet(s) Chew daily  chlorhexidine 2% Cloths 1 Application(s) Topical <User Schedule>  cholecalciferol 2000 Unit(s) Oral daily  escitalopram 5 milliGRAM(s) Oral daily  finasteride 5 milliGRAM(s) Oral daily  folic acid 1 milliGRAM(s) Oral daily  furosemide    Tablet 20 milliGRAM(s) Oral daily  isosorbide   mononitrate ER Tablet (IMDUR) 60 milliGRAM(s) Oral daily  levoFLOXacin  Tablet 250 milliGRAM(s) Oral every 24 hours  lidocaine   Patch 1 Patch Transdermal daily  metoprolol succinate ER 50 milliGRAM(s) Oral daily  polyethylene glycol 3350 17 Gram(s) Oral daily  posaconazole DR Tablet 300 milliGRAM(s) Oral daily  sodium bicarbonate 650 milliGRAM(s) Oral three times a day  tamsulosin 0.4 milliGRAM(s) Oral at bedtime  urea Oral Powder 15 Gram(s) Oral every 12 hours  venetoclax 100 milliGRAM(s) Oral <User Schedule>    REVIEW OF SYSTEMS:  CONSTITUTIONAL: No fevers or chills  EYES/ENT: No visual changes  CARDIOVASCULAR: No chest pain  GASTROINTESTINAL: No abdominal or epigastric pain. No nausea, vomiting, or hematemesis; No diarrhea. No melena or hematochezia.  GENITOURINARY: No dysuria, frequency, foamy urine, urinary urgency, or hematuria  VASCULAR: No bilateral lower extremity edema or calf tenderness.   All other review of systems is negative unless indicated above.    VITALS:  T(F): 98.1 (07-26-21 @ 06:11), Max: 98.1 (07-25-21 @ 09:27)  HR: 93 (07-26-21 @ 06:11)  BP: 110/57 (07-26-21 @ 06:11)  RR: 18 (07-26-21 @ 06:11)  SpO2: 95% (07-26-21 @ 06:11)  Wt(kg): --    07-24 @ 07:01  -  07-25 @ 07:00  --------------------------------------------------------  IN: 1159 mL / OUT: 420 mL / NET: 739 mL    07-25 @ 07:01  -  07-26 @ 07:00  --------------------------------------------------------  IN: 1080 mL / OUT: 600 mL / NET: 480 mL        PHYSICAL EXAM:  Constitutional: NAD  HEENT: anicteric sclera   Respiratory: CTAB, no wheezes, rales or rhonchi  Cardiovascular: S1, S2, RRR  Gastrointestinal: soft, NT/ND to light and deep palpation  Extremities: no bilateral peripheral LE edema or calf tenderness  Neurological: A/O x 3, no gross deficits  : No CVA tenderness. No vides.     LABS:  07-26    135  |  105  |  32<H>  ----------------------------<  91  3.9   |  20<L>  |  1.98<H>    Ca    8.8      26 Jul 2021 07:10  Phos  3.2     07-26  Mg     1.9     07-26    TPro  6.4  /  Alb  2.5<L>  /  TBili  1.0  /  DBili      /  AST  5<L>  /  ALT  <5<L>  /  AlkPhos  87  07-26                          7.3    0.58  )-----------( 20       ( 26 Jul 2021 07:11 )             21.7       Urine Studies:  Creatinine Trend: 1.98<--, 2.06<--, 2.18<--, 2.18<--, 2.32<--, 2.28<--      RADIOLOGY & ADDITIONAL STUDIES:

## 2021-07-26 NOTE — PROGRESS NOTE ADULT - ATTENDING COMMENTS
I have seen this patient with the fellow and agree with their assessment and plan. I was physically present for significant portions of the evaluation and management (E/M) service provided.  I agree with the above history, physical, and plan which I have reviewed and edited where appropriate.    On dc, needs f.u with Nephrology  Please have patient f.u with Nephrology office at  with one of the physicians in 1 month  Our address is [address] [place] [address]   (Clair, Arturo, Finger, Meir, Lyn, Stephen, Buddy, Brenda, Lisy, Allen, Anupam, Sabra, Tyrell, Tello, Anam, Praveen, or Avelina)      Todd Wasserman MD  Cell   Pager   Office

## 2021-07-26 NOTE — CHART NOTE - NSCHARTNOTESELECT_GEN_ALL_CORE
Critical Results - CT A/P/Event Note
Nutrition Services
POCUS/Event Note
Palliative
Palliative/Event Note
Transfer Note
Event Note
MAR ACCEPT NOTE/Event Note
Nephrology
Nutrition Services
Nutrition Services
Palliative
Procedure note: Bone marrow asp/biopsy/Event Note

## 2021-07-26 NOTE — PROGRESS NOTE ADULT - ATTENDING COMMENTS
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  7/15 BM results showing 0.2% + population, considered a complete morphological response  Dacogen/Venetoclax cycle 2 day 6.    Will need to discuss with son and  if patient can be discharged home with social and physical therapy support.    He is DNR/DNI  Pt has expressed wishes to go back to [place]. 81 yo Turkmen speaking male transferred from [place] to [facility] for AML with myelomonocytic features   Bone marrow biopsy done 6/14 - Acute myeloid leukemia  NGS [id number] fusion, U2AF1 mutation.  7/15 BM results showing 0.2% + population, considered a complete morphological response  Dacogen/Venetoclax cycle 2 started 7/20/21  Today is day 7    Plan for DC to rehab, waiting for acceptance  He is DNR/DNI  Pt has expressed wishes to go back to [place].

## 2021-07-26 NOTE — PROGRESS NOTE ADULT - PROBLEM SELECTOR PLAN 9
s/p 5vCABG 2012 (LIMA to LAD, SVG to Diag and OM, SVG PDA and RPL), PCI (2 in 2014, 2 in 2015 [facility]), HFrEF s/p CRT-D (2013),  No DAPT or AC given thrombocytopenia.

## 2021-07-26 NOTE — PROGRESS NOTE ADULT - SUBJECTIVE AND OBJECTIVE BOX
Diagnosis: FLT3 ITD (-) Acute Myeloid Leukemia    Protocol/Chemo Regimen: Cycle 2 Decitabine + Venetoclax     Day: 6      Pt endorsed: fatigue; decreased appetite, ate 25% of food     Review of Systems: Denies n/v, abdominal pain, orthopnea, SOB     Pain scale: 0    Allergies    morphine (Unknown)    Intolerances        ANTIMICROBIALS  levoFLOXacin  Tablet 250 milliGRAM(s) Oral every 24 hours  posaconazole DR Tablet 300 milliGRAM(s) Oral daily      HEME/ONC MEDICATIONS  venetoclax 100 milliGRAM(s) Oral <User Schedule>      STANDING MEDICATIONS  Biotene Dry Mouth Oral Rinse 5 milliLiter(s) Swish and Spit four times a day  calcium carbonate    500 mG (Tums) Chewable 2 Tablet(s) Chew daily  chlorhexidine 2% Cloths 1 Application(s) Topical <User Schedule>  cholecalciferol 2000 Unit(s) Oral daily  escitalopram 5 milliGRAM(s) Oral daily  finasteride 5 milliGRAM(s) Oral daily  folic acid 1 milliGRAM(s) Oral daily  furosemide    Tablet 20 milliGRAM(s) Oral daily  isosorbide   mononitrate ER Tablet (IMDUR) 60 milliGRAM(s) Oral daily  lidocaine   Patch 1 Patch Transdermal daily  metoprolol succinate ER 50 milliGRAM(s) Oral daily  polyethylene glycol 3350 17 Gram(s) Oral daily  sodium bicarbonate 650 milliGRAM(s) Oral three times a day  tamsulosin 0.4 milliGRAM(s) Oral at bedtime  urea Oral Powder 15 Gram(s) Oral every 12 hours      PRN MEDICATIONS  acetaminophen   Tablet .. 650 milliGRAM(s) Oral every 6 hours PRN  albuterol/ipratropium for Nebulization 3 milliLiter(s) Nebulizer every 6 hours PRN  aluminum hydroxide/magnesium hydroxide/simethicone Suspension 30 milliLiter(s) Oral every 4 hours PRN  calcium carbonate    500 mG (Tums) Chewable 1 Tablet(s) Chew every 4 hours PRN  ondansetron Injectable 8 milliGRAM(s) IV Push every 8 hours PRN  senna 2 Tablet(s) Oral at bedtime PRN  sodium chloride 0.9% lock flush 10 milliLiter(s) IV Push every 1 hour PRN  sucralfate suspension 1 Gram(s) Oral every 6 hours PRN        Vital Signs Last 24 Hrs  T(C): 36.7 (26 Jul 2021 06:11), Max: 36.7 (25 Jul 2021 09:27)  T(F): 98.1 (26 Jul 2021 06:11), Max: 98.1 (25 Jul 2021 09:27)  HR: 93 (26 Jul 2021 06:11) (74 - 93)  BP: 110/57 (26 Jul 2021 06:11) (110/57 - 144/67)  BP(mean): --  RR: 18 (26 Jul 2021 06:11) (18 - 18)  SpO2: 95% (26 Jul 2021 06:11) (95% - 98%)    PHYSICAL EXAM  General: NAD  HEENT: clear oropharynx  CV: (+) S1/S2 RRR  Lungs: clear to auscultation, no wheezes or rales  Abdomen: soft, non-tender, non-distended (+) BS  Ext: no edema  Skin: no rashes   Neuro: alert and oriented X3  Central Line: PICC c/d/i     RECENT CULTURES:        LABS:                        Diagnosis: FLT3 ITD (-) Acute Myeloid Leukemia    Protocol/Chemo Regimen: Cycle 2 Decitabine + Venetoclax     Day: 6      Pt endorsed: No complaints.     Review of Systems: Denies n/v, abdominal pain, orthopnea, SOB     Pain scale: 0    Allergies    morphine (Unknown)    Intolerances        ANTIMICROBIALS  levoFLOXacin  Tablet 250 milliGRAM(s) Oral every 24 hours  posaconazole DR Tablet 300 milliGRAM(s) Oral daily      HEME/ONC MEDICATIONS  venetoclax 100 milliGRAM(s) Oral <User Schedule>      STANDING MEDICATIONS  Biotene Dry Mouth Oral Rinse 5 milliLiter(s) Swish and Spit four times a day  calcium carbonate    500 mG (Tums) Chewable 2 Tablet(s) Chew daily  chlorhexidine 2% Cloths 1 Application(s) Topical <User Schedule>  cholecalciferol 2000 Unit(s) Oral daily  escitalopram 5 milliGRAM(s) Oral daily  finasteride 5 milliGRAM(s) Oral daily  folic acid 1 milliGRAM(s) Oral daily  furosemide    Tablet 20 milliGRAM(s) Oral daily  isosorbide   mononitrate ER Tablet (IMDUR) 60 milliGRAM(s) Oral daily  lidocaine   Patch 1 Patch Transdermal daily  metoprolol succinate ER 50 milliGRAM(s) Oral daily  polyethylene glycol 3350 17 Gram(s) Oral daily  sodium bicarbonate 650 milliGRAM(s) Oral three times a day  tamsulosin 0.4 milliGRAM(s) Oral at bedtime  urea Oral Powder 15 Gram(s) Oral every 12 hours      PRN MEDICATIONS  acetaminophen   Tablet .. 650 milliGRAM(s) Oral every 6 hours PRN  albuterol/ipratropium for Nebulization 3 milliLiter(s) Nebulizer every 6 hours PRN  aluminum hydroxide/magnesium hydroxide/simethicone Suspension 30 milliLiter(s) Oral every 4 hours PRN  calcium carbonate    500 mG (Tums) Chewable 1 Tablet(s) Chew every 4 hours PRN  ondansetron Injectable 8 milliGRAM(s) IV Push every 8 hours PRN  senna 2 Tablet(s) Oral at bedtime PRN  sodium chloride 0.9% lock flush 10 milliLiter(s) IV Push every 1 hour PRN  sucralfate suspension 1 Gram(s) Oral every 6 hours PRN        Vital Signs Last 24 Hrs  T(C): 36.7 (26 Jul 2021 06:11), Max: 36.7 (25 Jul 2021 09:27)  T(F): 98.1 (26 Jul 2021 06:11), Max: 98.1 (25 Jul 2021 09:27)  HR: 93 (26 Jul 2021 06:11) (74 - 93)  BP: 110/57 (26 Jul 2021 06:11) (110/57 - 144/67)  BP(mean): --  RR: 18 (26 Jul 2021 06:11) (18 - 18)  SpO2: 95% (26 Jul 2021 06:11) (95% - 98%)    PHYSICAL EXAM  General: NAD  HEENT: clear oropharynx  CV: (+) S1/S2 RRR  Lungs: clear to auscultation, no wheezes or rales  Abdomen: soft, non-tender, non-distended (+) BS  Ext: no edema  Skin: no rashes   Neuro: alert and oriented X3  Central Line: PICC c/d/i     LABS:    Blood Cultures:                           7.3    0.58  )-----------( 20       ( 26 Jul 2021 07:11 )             21.7         Mean Cell Volume : 87.9 fl  Mean Cell Hemoglobin : 29.6 pg  Mean Cell Hemoglobin Concentration : 33.6 gm/dL  Auto Neutrophil # : 0.20 K/uL  Auto Lymphocyte # : 0.38 K/uL  Auto Monocyte # : 0.00 K/uL  Auto Eosinophil # : 0.00 K/uL  Auto Basophil # : 0.00 K/uL  Auto Neutrophil % : 33.8 %  Auto Lymphocyte % : 66.2 %  Auto Monocyte % : 0.0 %  Auto Eosinophil % : 0.0 %  Auto Basophil % : 0.0 %      07-26    135  |  105  |  32<H>  ----------------------------<  91  3.9   |  20<L>  |  1.98<H>    Ca    8.8      26 Jul 2021 07:10  Phos  3.2     07-26  Mg     1.9     07-26    TPro  6.4  /  Alb  2.5<L>  /  TBili  1.0  /  DBili  x   /  AST  5<L>  /  ALT  <5<L>  /  AlkPhos  87  07-26      Mg 1.9  Phos 3.2      PT/INR - ( 26 Jul 2021 07:13 )   PT: 15.5 sec;   INR: 1.31 ratio               Uric Acid --                                      Diagnosis: FLT3 ITD (-) Acute Myeloid Leukemia    Protocol/Chemo Regimen: Cycle 2 Decitabine + Venetoclax     Day: 7      Pt endorsed: No complaints.     Review of Systems: Denies n/v, abdominal pain, orthopnea, SOB     Pain scale: 0    Allergies    morphine (Unknown)    Intolerances        ANTIMICROBIALS  levoFLOXacin  Tablet 250 milliGRAM(s) Oral every 24 hours  posaconazole DR Tablet 300 milliGRAM(s) Oral daily      HEME/ONC MEDICATIONS  venetoclax 100 milliGRAM(s) Oral <User Schedule>      STANDING MEDICATIONS  Biotene Dry Mouth Oral Rinse 5 milliLiter(s) Swish and Spit four times a day  calcium carbonate    500 mG (Tums) Chewable 2 Tablet(s) Chew daily  chlorhexidine 2% Cloths 1 Application(s) Topical <User Schedule>  cholecalciferol 2000 Unit(s) Oral daily  escitalopram 5 milliGRAM(s) Oral daily  finasteride 5 milliGRAM(s) Oral daily  folic acid 1 milliGRAM(s) Oral daily  furosemide    Tablet 20 milliGRAM(s) Oral daily  isosorbide   mononitrate ER Tablet (IMDUR) 60 milliGRAM(s) Oral daily  lidocaine   Patch 1 Patch Transdermal daily  metoprolol succinate ER 50 milliGRAM(s) Oral daily  polyethylene glycol 3350 17 Gram(s) Oral daily  sodium bicarbonate 650 milliGRAM(s) Oral three times a day  tamsulosin 0.4 milliGRAM(s) Oral at bedtime  urea Oral Powder 15 Gram(s) Oral every 12 hours      PRN MEDICATIONS  acetaminophen   Tablet .. 650 milliGRAM(s) Oral every 6 hours PRN  albuterol/ipratropium for Nebulization 3 milliLiter(s) Nebulizer every 6 hours PRN  aluminum hydroxide/magnesium hydroxide/simethicone Suspension 30 milliLiter(s) Oral every 4 hours PRN  calcium carbonate    500 mG (Tums) Chewable 1 Tablet(s) Chew every 4 hours PRN  ondansetron Injectable 8 milliGRAM(s) IV Push every 8 hours PRN  senna 2 Tablet(s) Oral at bedtime PRN  sodium chloride 0.9% lock flush 10 milliLiter(s) IV Push every 1 hour PRN  sucralfate suspension 1 Gram(s) Oral every 6 hours PRN        Vital Signs Last 24 Hrs  T(C): 36.7 (26 Jul 2021 06:11), Max: 36.7 (25 Jul 2021 09:27)  T(F): 98.1 (26 Jul 2021 06:11), Max: 98.1 (25 Jul 2021 09:27)  HR: 93 (26 Jul 2021 06:11) (74 - 93)  BP: 110/57 (26 Jul 2021 06:11) (110/57 - 144/67)  BP(mean): --  RR: 18 (26 Jul 2021 06:11) (18 - 18)  SpO2: 95% (26 Jul 2021 06:11) (95% - 98%)    PHYSICAL EXAM  General: NAD  HEENT: clear oropharynx  CV: (+) S1/S2 RRR  Lungs: clear to auscultation, no wheezes or rales  Abdomen: soft, non-tender, non-distended (+) BS  Ext: no edema  Skin: no rashes   Neuro: alert and oriented X3  Central Line: PICC c/d/i     LABS:    Blood Cultures:                           7.3    0.58  )-----------( 20       ( 26 Jul 2021 07:11 )             21.7         Mean Cell Volume : 87.9 fl  Mean Cell Hemoglobin : 29.6 pg  Mean Cell Hemoglobin Concentration : 33.6 gm/dL  Auto Neutrophil # : 0.20 K/uL  Auto Lymphocyte # : 0.38 K/uL  Auto Monocyte # : 0.00 K/uL  Auto Eosinophil # : 0.00 K/uL  Auto Basophil # : 0.00 K/uL  Auto Neutrophil % : 33.8 %  Auto Lymphocyte % : 66.2 %  Auto Monocyte % : 0.0 %  Auto Eosinophil % : 0.0 %  Auto Basophil % : 0.0 %      07-26    135  |  105  |  32<H>  ----------------------------<  91  3.9   |  20<L>  |  1.98<H>    Ca    8.8      26 Jul 2021 07:10  Phos  3.2     07-26  Mg     1.9     07-26    TPro  6.4  /  Alb  2.5<L>  /  TBili  1.0  /  DBili  x   /  AST  5<L>  /  ALT  <5<L>  /  AlkPhos  87  07-26      Mg 1.9  Phos 3.2      PT/INR - ( 26 Jul 2021 07:13 )   PT: 15.5 sec;   INR: 1.31 ratio               Uric Acid --
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
## 2021-07-26 NOTE — PROGRESS NOTE ADULT - ASSESSMENT
80M PMHx CKD, CAD s/p CABG 2012 & 4 stents (2 in 2014, 2 in 2015) with dilated EF of 27% BiV ICD (2013) initially admitted to [facility] cor acute hypoxic respiratory failure, anemia (Hgb 5.7), thrombocytopenia (plt 30) w/ blast cells (22%), lactic acidosis (LA 10) - transfer to [facility] for hematological evaluation for ALL.  Nephrology consulted for BRISA on CKD.      # BRISA on CKD  Pt with non-oliguric BRISA on CKD unclear etiology possible pre renal in the setting of anemia and ATN.  On admission sCr elevated at 3.45 (last known sCr 1.1-1.3 in 2018, recent hx CKD unclear recent baseline).  Urinalysis showed protein with trace blood.  Lab noted for serum uric acid 13.3, , phos 4.7, Echo severe LV systolic fxn, CT diffuse patchy airspace disease ?multifocal pna?. Urine electrolytes with Fe Urea of 82.5% suggestive of intrinsic pathology. Urine uric acid/creatinine ratio is < 1. Spot urine TP/CR ratio was 0.25 wnl. Kidney/bladder u/s on this admission was wnl (no hydro). Scr peaked at 3.62 mg/dl on 6/13/21 with downward trend now plateauing. SCr elevated/stable at  1.98 mg/dl today     - Allopurinol discontinued  - PER with 1:320 but speckled pattern   - GN work up including c3 and C4 wnl, serum immunofixation with no monoclonality, anti GBM negative. Parvovirus DNA PCR negative. P ANCA and C ANCA negative, anti PLA2R negative  - Pt will need a kidney biopsy if his platelets are stable. Currently Plts 10K & Hg7.0. Pt will need plts of > 50K for biopsy. Please call IR for scheduling the renal biopsy. Will benefit from a platelet & PRBC transfusion prior.   - monitor BMP/tumor lysis markers (Uric acid/LDH/haptoglobin/LFT), strict I/O, avoid nephrotoxic agents (NSAIDs, PPI, contrast), renally dose medications per GFR.    # Hypercalcemia -resolved  Uncertain etiology. Last ionized calcium improved to 1.14 7/15/21)   PTH was 84, not appropriately suppressed with low 25 vit D & 1,25 vitamin D.  Continue vitamin d supplements. Could be secondary hyperparathyroidism from vitamin D def & CKD.   Discontinued Sensipar  Cont to keep calcium 9-10 range     80M PMHx CKD, CAD s/p CABG 2012 & 4 stents (2 in 2014, 2 in 2015) with dilated EF of 27% BiV ICD (2013) initially admitted to [facility] cor acute hypoxic respiratory failure, anemia (Hgb 5.7), thrombocytopenia (plt 30) w/ blast cells (22%), lactic acidosis (LA 10) - transfer to [facility] for hematological evaluation for ALL.  Nephrology consulted for BRISA on CKD.      # BRISA on CKD  Pt with non-oliguric BRISA on CKD unclear etiology possible pre renal in the setting of anemia and ATN.  On admission sCr elevated at 3.45 (last known sCr 1.1-1.3 in 2018, recent hx CKD unclear recent baseline).  Urinalysis showed protein with trace blood.  Lab noted for serum uric acid 13.3, , phos 4.7, Echo severe LV systolic fxn, CT diffuse patchy airspace disease ?multifocal pna?. Urine electrolytes with Fe Urea of 82.5% suggestive of intrinsic pathology. Urine uric acid/creatinine ratio is < 1. Spot urine TP/CR ratio was 0.25 wnl. Kidney/bladder u/s on this admission was wnl (no hydro). Scr peaked at 3.62 mg/dl on 6/13/21 with downward trend now plateauing. SCr elevated/stable at  1.98 mg/dl today     - Allopurinol discontinued  - PRE with 1:320 but speckled pattern   - GN work up including c3 and C4 wnl, serum immunofixation with no monoclonality, anti GBM negative. Parvovirus DNA PCR negative. P ANCA and C ANCA negative, anti PLA2R negative  - monitor BMP/tumor lysis markers (Uric acid/LDH/haptoglobin/LFT), strict I/O, avoid nephrotoxic agents (NSAIDs, PPI, contrast), renally dose medications per GFR.    # Hypercalcemia -resolved  Uncertain etiology. Last ionized calcium improved to 1.14 7/15/21)   PTH was 84, not appropriately suppressed with low 25 vit D & 1,25 vitamin D.  Continue vitamin d supplements. Could be secondary hyperparathyroidism from vitamin D def & CKD.   Discontinued Sensipar  Cont to keep calcium 9-10 range  -Continue current managment

## 2021-07-27 NOTE — PROGRESS NOTE ADULT - PROBLEM SELECTOR PLAN 2
Patient is neutropenic, afebrile   Pancx CXR if fever   6/30 completed Zosyn   7/5 Started Levaquin for neutropenic prophylaxis as ANC < 500  If febrile, send pan cultures, and switch Levaquin to cefepime  6/24 Abd US: Cholelithiasis without signs of cholecystitis, persistently dilated CBD; HIDA scan (+) for acute cholecystitis-Seen by Surgery and IR, No surgical or IR interventions were recommended.  6/28  US abdomen Cholelithiasis with no definite sonographic evidence of acute cholecystitis.  7/3 Started posaconazole prophylaxis as patient is neutropenic. Adjusted dose of venetoclax. Patient is neutropenic, afebrile   Pancx CXR if fever   6/30 completed Zosyn   7/5 Started Levaquin for neutropenic prophylaxis  If febrile, send pan cultures, and switch Levaquin to cefepime  6/24 Abd US: Cholelithiasis without signs of cholecystitis, persistently dilated CBD; HIDA scan (+) for acute cholecystitis-Seen by Surgery and IR, No surgical or IR interventions were recommended.  6/28  US abdomen Cholelithiasis with no definite sonographic evidence of acute cholecystitis.  7/3 Started posaconazole prophylaxis as patient is neutropenic. Adjusted dose of venetoclax.

## 2021-07-27 NOTE — PROGRESS NOTE ADULT - PROBLEM SELECTOR PLAN 9
s/p 5vCABG 2012 (LIMA to LAD, SVG to Diag and OM, SVG PDA and RPL), PCI (2 in 2014, 2 in 2015 [facility]), HFrEF s/p CRT-D (2013),  No DAPT or AC given thrombocytopenia.

## 2021-07-27 NOTE — PROGRESS NOTE ADULT - SUBJECTIVE AND OBJECTIVE BOX
Nephrology progress note    Patient is a 80y Male with    ON events/Subjective:     Allergies:  morphine (Unknown)    Hospital Medications:   MEDICATIONS  (STANDING):  Biotene Dry Mouth Oral Rinse 5 milliLiter(s) Swish and Spit four times a day  calcium carbonate    500 mG (Tums) Chewable 2 Tablet(s) Chew daily  chlorhexidine 2% Cloths 1 Application(s) Topical <User Schedule>  cholecalciferol 2000 Unit(s) Oral daily  escitalopram 5 milliGRAM(s) Oral daily  finasteride 5 milliGRAM(s) Oral daily  folic acid 1 milliGRAM(s) Oral daily  furosemide    Tablet 20 milliGRAM(s) Oral daily  isosorbide   mononitrate ER Tablet (IMDUR) 60 milliGRAM(s) Oral daily  levoFLOXacin  Tablet 250 milliGRAM(s) Oral every 24 hours  lidocaine   Patch 1 Patch Transdermal daily  metoprolol succinate ER 50 milliGRAM(s) Oral daily  polyethylene glycol 3350 17 Gram(s) Oral daily  posaconazole DR Tablet 300 milliGRAM(s) Oral daily  sodium bicarbonate 650 milliGRAM(s) Oral three times a day  tamsulosin 0.4 milliGRAM(s) Oral at bedtime  urea Oral Powder 15 Gram(s) Oral every 12 hours  venetoclax 100 milliGRAM(s) Oral <User Schedule>    REVIEW OF SYSTEMS:  CONSTITUTIONAL: No weakness, fevers or chills  EYES/ENT: No visual changes;  No vertigo or throat pain   NECK: No pain or stiffness  RESPIRATORY: No cough, wheezing, hemoptysis; No shortness of breath  CARDIOVASCULAR: No chest pain or palpitations.  GASTROINTESTINAL: No abdominal or epigastric pain. No nausea, vomiting, or hematemesis; No diarrhea or constipation. No melena or hematochezia.  GENITOURINARY: No dysuria, frequency, foamy urine, urinary urgency, incontinence or hematuria  NEUROLOGICAL: No numbness or weakness  SKIN: No itching, burning, rashes, or lesions   VASCULAR: No bilateral lower extremity edema.   All other review of systems is negative unless indicated above.    VITALS:  T(F): 98.5 (07-27-21 @ 05:43), Max: 98.5 (07-27-21 @ 05:43)  HR: 80 (07-27-21 @ 05:43)  BP: 127/66 (07-27-21 @ 05:43)  RR: 18 (07-27-21 @ 05:43)  SpO2: 98% (07-27-21 @ 05:43)  Wt(kg): --    07-25 @ 07:01  -  07-26 @ 07:00  --------------------------------------------------------  IN: 1080 mL / OUT: 600 mL / NET: 480 mL    07-26 @ 07:01  -  07-27 @ 07:00  --------------------------------------------------------  IN: 714 mL / OUT: 400 mL / NET: 314 mL        PHYSICAL EXAM:  Constitutional: NAD  HEENT: anicteric sclera, oropharynx clear, MMM  Neck: No JVD  Respiratory: CTAB, no wheezes, rales or rhonchi  Cardiovascular: S1, S2, RRR  Gastrointestinal: BS+, soft, NT/ND  Extremities: No cyanosis or clubbing. No peripheral edema  Neurological: A/O x 3, no focal deficits  Psychiatric: Normal mood, normal affect  : No CVA tenderness. No vides.   Skin: No rashes  Vascular Access:    LABS:  07-27    136  |  105  |  31<H>  ----------------------------<  99  4.0   |  22  |  1.88<H>    Ca    9.1      27 Jul 2021 06:40  Phos  2.8     07-27  Mg     1.9     07-27    TPro  6.6  /  Alb  2.7<L>  /  TBili  1.4<H>  /  DBili      /  AST  6<L>  /  ALT  <5<L>  /  AlkPhos  86  07-27                          8.3    0.57  )-----------( 17       ( 27 Jul 2021 06:40 )             25.1       Urine Studies:  Creatinine Trend: 1.88<--, 1.98<--, 2.06<--, 2.18<--, 2.18<--, 2.32<--      RADIOLOGY & ADDITIONAL STUDIES:

## 2021-07-27 NOTE — PROGRESS NOTE ADULT - PROBLEM SELECTOR PLAN 1
FLT3 ITD (-) AML   Monitor daily CBC's and transfuse as needed, Monitor daily CMP and replete electrolytes as needed   Strict I's/O's, daily weights, mouth care, anti emetics.   Cycle 2 Dacogen 20 mg/m2 x 5 days + Venetoclax. (s/p Venetoclax escalation, now on 100 mg oral daily - while on posaconazole) started 7/20  Day 21 BM bx on 7/9, quantity insufficient. Repeated 7/16 : showing hypercellular marrow   Pt is DNR  off allopurinol  s/p Cycle 2 started 7/20  patient wants to return to [place]  Anemia-replace PRBC today with dc planning when [facility] approved. Family aware.   Discharge planning pending [facility] rehab placement FLT3 ITD (-) AML   Monitor daily CBC's   Transfusions up to 2x weekly as needed to maintain hbg >7 and plt >15.   Monitor daily CMP and replete electrolytes as needed   Strict I's/O's, daily weights, mouth care, anti emetics.   Cycle 2 Dacogen 20 mg/m2 x 5 days + Venetoclax. (s/p Venetoclax escalation, now on 100 mg oral daily - while on posaconazole) started 7/20  Day 21 BM bx on 7/9, quantity insufficient. Repeated 7/16 : showing hypercellular marrow   Pt is DNR  off allopurinol  s/p Cycle 2 started 7/20  patient wants to return to [place]  Anemia-replace PRBC today with dc planning when [facility] approved. Family aware.   Discharge planning pending [facility] rehab placement FLT3 ITD (-) AML   Monitor daily CBC's   Transfusions up to 2x weekly as needed to maintain hbg >7 and plt >15.   Monitor daily CMP and replete electrolytes as needed   Strict I's/O's, daily weights, mouth care, anti emetics.   Cycle 2 Dacogen 20 mg/m2 x 5 days + Venetoclax. (s/p Venetoclax escalation, now on 100 mg oral daily - while on posaconazole) started 7/20  Day 21 BM bx on 7/9, quantity insufficient. Repeated 7/16 : showing hypercellular marrow   Pt is DNR  off allopurinol  s/p Cycle 2 started 7/20  patient wants to return to [place]  thrombocytopenia-replace plt today with dc planning when [facility] approved. Family aware.   Discharge planning pending [facility] rehab placement
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
## 2021-07-27 NOTE — PROGRESS NOTE ADULT - SUBJECTIVE AND OBJECTIVE BOX
Diagnosis: FLT3 ITD (-) Acute Myeloid Leukemia    Protocol/Chemo Regimen: Cycle 2 Decitabine + Venetoclax     Day: 8      Pt endorsed: No complaints.     Review of Systems: Denies n/v, abdominal pain, orthopnea, SOB     Pain scale: 0  Diet regular    Allergies    morphine (Unknown)    Intolerances        ANTIMICROBIALS  levoFLOXacin  Tablet 250 milliGRAM(s) Oral every 24 hours  posaconazole DR Tablet 300 milliGRAM(s) Oral daily      HEME/ONC MEDICATIONS  venetoclax 100 milliGRAM(s) Oral <User Schedule>      STANDING MEDICATIONS  Biotene Dry Mouth Oral Rinse 5 milliLiter(s) Swish and Spit four times a day  calcium carbonate    500 mG (Tums) Chewable 2 Tablet(s) Chew daily  chlorhexidine 2% Cloths 1 Application(s) Topical <User Schedule>  cholecalciferol 2000 Unit(s) Oral daily  escitalopram 5 milliGRAM(s) Oral daily  finasteride 5 milliGRAM(s) Oral daily  folic acid 1 milliGRAM(s) Oral daily  furosemide    Tablet 20 milliGRAM(s) Oral daily  isosorbide   mononitrate ER Tablet (IMDUR) 60 milliGRAM(s) Oral daily  lidocaine   Patch 1 Patch Transdermal daily  metoprolol succinate ER 50 milliGRAM(s) Oral daily  polyethylene glycol 3350 17 Gram(s) Oral daily  sodium bicarbonate 650 milliGRAM(s) Oral three times a day  tamsulosin 0.4 milliGRAM(s) Oral at bedtime  urea Oral Powder 15 Gram(s) Oral every 12 hours      PRN MEDICATIONS  acetaminophen   Tablet .. 650 milliGRAM(s) Oral every 6 hours PRN  albuterol/ipratropium for Nebulization 3 milliLiter(s) Nebulizer every 6 hours PRN  aluminum hydroxide/magnesium hydroxide/simethicone Suspension 30 milliLiter(s) Oral every 4 hours PRN  calcium carbonate    500 mG (Tums) Chewable 1 Tablet(s) Chew every 4 hours PRN  ondansetron Injectable 8 milliGRAM(s) IV Push every 8 hours PRN  senna 2 Tablet(s) Oral at bedtime PRN  sodium chloride 0.9% lock flush 10 milliLiter(s) IV Push every 1 hour PRN  sucralfate suspension 1 Gram(s) Oral every 6 hours PRN        Vital Signs Last 24 Hrs  T(C): 36.9 (27 Jul 2021 05:43), Max: 36.9 (27 Jul 2021 05:43)  T(F): 98.5 (27 Jul 2021 05:43), Max: 98.5 (27 Jul 2021 05:43)  HR: 80 (27 Jul 2021 05:43) (69 - 85)  BP: 127/66 (27 Jul 2021 05:43) (115/61 - 167/78)  BP(mean): --  RR: 18 (27 Jul 2021 05:43) (16 - 18)  SpO2: 98% (27 Jul 2021 05:43) (93% - 98%)    PHYSICAL EXAM  General: NAD  HEENT: clear oropharynx  CV: (+) S1/S2 RRR  Lungs: clear to auscultation, no wheezes or rales  Abdomen: soft, non-tender, non-distended (+) BS  Ext: no edema  Skin: no rashes   Neuro: alert and oriented X3  Central Line: PICC c/d/i     LABS: Diagnosis: FLT3 ITD (-) Acute Myeloid Leukemia    Protocol/Chemo Regimen: Cycle 2 Decitabine + Venetoclax     Day: 8      Pt endorsed: No complaints.     Review of Systems: Denies n/v, abdominal pain, orthopnea, SOB     Pain scale: 0  Diet regular    Allergies    morphine (Unknown)    Intolerances        ANTIMICROBIALS  levoFLOXacin  Tablet 250 milliGRAM(s) Oral every 24 hours  posaconazole DR Tablet 300 milliGRAM(s) Oral daily      HEME/ONC MEDICATIONS  venetoclax 100 milliGRAM(s) Oral <User Schedule>      STANDING MEDICATIONS  Biotene Dry Mouth Oral Rinse 5 milliLiter(s) Swish and Spit four times a day  calcium carbonate    500 mG (Tums) Chewable 2 Tablet(s) Chew daily  chlorhexidine 2% Cloths 1 Application(s) Topical <User Schedule>  cholecalciferol 2000 Unit(s) Oral daily  escitalopram 5 milliGRAM(s) Oral daily  finasteride 5 milliGRAM(s) Oral daily  folic acid 1 milliGRAM(s) Oral daily  furosemide    Tablet 20 milliGRAM(s) Oral daily  isosorbide   mononitrate ER Tablet (IMDUR) 60 milliGRAM(s) Oral daily  lidocaine   Patch 1 Patch Transdermal daily  metoprolol succinate ER 50 milliGRAM(s) Oral daily  polyethylene glycol 3350 17 Gram(s) Oral daily  sodium bicarbonate 650 milliGRAM(s) Oral three times a day  tamsulosin 0.4 milliGRAM(s) Oral at bedtime  urea Oral Powder 15 Gram(s) Oral every 12 hours      PRN MEDICATIONS  acetaminophen   Tablet .. 650 milliGRAM(s) Oral every 6 hours PRN  albuterol/ipratropium for Nebulization 3 milliLiter(s) Nebulizer every 6 hours PRN  aluminum hydroxide/magnesium hydroxide/simethicone Suspension 30 milliLiter(s) Oral every 4 hours PRN  calcium carbonate    500 mG (Tums) Chewable 1 Tablet(s) Chew every 4 hours PRN  ondansetron Injectable 8 milliGRAM(s) IV Push every 8 hours PRN  senna 2 Tablet(s) Oral at bedtime PRN  sodium chloride 0.9% lock flush 10 milliLiter(s) IV Push every 1 hour PRN  sucralfate suspension 1 Gram(s) Oral every 6 hours PRN        Vital Signs Last 24 Hrs  T(C): 36.9 (27 Jul 2021 05:43), Max: 36.9 (27 Jul 2021 05:43)  T(F): 98.5 (27 Jul 2021 05:43), Max: 98.5 (27 Jul 2021 05:43)  HR: 80 (27 Jul 2021 05:43) (69 - 85)  BP: 127/66 (27 Jul 2021 05:43) (115/61 - 167/78)  BP(mean): --  RR: 18 (27 Jul 2021 05:43) (16 - 18)  SpO2: 98% (27 Jul 2021 05:43) (93% - 98%)    PHYSICAL EXAM  General: NAD  HEENT: clear oropharynx  CV: (+) S1/S2 RRR  Lungs: clear to auscultation, no wheezes or rales  Abdomen: soft, non-tender, non-distended (+) BS  Ext: no edema  Skin: no rashes   Neuro: alert and oriented X3  Central Line: PICC c/d/i     LABS:    Blood Cultures:                           8.3    0.57  )-----------( 17       ( 27 Jul 2021 06:40 )             25.1         Mean Cell Volume : 86.6 fl  Mean Cell Hemoglobin : 28.6 pg  Mean Cell Hemoglobin Concentration : 33.1 gm/dL  Auto Neutrophil # : 0.09 K/uL  Auto Lymphocyte # : 0.42 K/uL  Auto Monocyte # : 0.00 K/uL  Auto Eosinophil # : 0.00 K/uL  Auto Basophil # : 0.00 K/uL  Auto Neutrophil % : 16.0 %  Auto Lymphocyte % : 74.0 %  Auto Monocyte % : 0.0 %  Auto Eosinophil % : 0.0 %  Auto Basophil % : 0.0 %      07-27    136  |  105  |  31<H>  ----------------------------<  99  4.0   |  22  |  1.88<H>    Ca    9.1      27 Jul 2021 06:40  Phos  2.8     07-27  Mg     1.9     07-27    TPro  6.6  /  Alb  2.7<L>  /  TBili  1.4<H>  /  DBili  x   /  AST  6<L>  /  ALT  <5<L>  /  AlkPhos  86  07-27      Mg 1.9  Phos 2.8      PT/INR - ( 26 Jul 2021 07:13 )   PT: 15.5 sec;   INR: 1.31 ratio               Uric Acid --

## 2021-07-27 NOTE — PROGRESS NOTE ADULT - ATTENDING COMMENTS
79 yo Palestinian speaking male transferred from [place] to [facility] for AML with myelomonocytic features   Bone marrow biopsy done 6/14 - Acute myeloid leukemia  NGS [id number] fusion, U2AF1 mutation.  7/15 BM results showing 0.2% + population, considered a complete morphological response  Dacogen/Venetoclax cycle 2 started 7/20/21  Today is day 7    Plan for DC to rehab, waiting for acceptance  He is DNR/DNI  Pt has expressed wishes to go back to [place]. 79 yo Afghan speaking male transferred from [place] to [facility] for AML with myelomonocytic features   Bone marrow biopsy done 6/14 - Acute myeloid leukemia  NGS [id number] fusion, U2AF1 mutation.  7/15 BM results showing 0.2% + population, considered a complete morphological response  Dacogen/Venetoclax cycle 2 started 7/20/21  Today is day 8    Transfusions up to 2x weekly as needed to maintain hbg >7 and plt >15.   His transfusion requirements have been low with average of once a week  Plan for DC to rehab, waiting for acceptance  He is DNR/DNI  Pt has expressed wishes to go back to [place].

## 2021-07-27 NOTE — PROGRESS NOTE ADULT - ASSESSMENT
80M PMHx CKD, CAD s/p CABG 2012 & 4 stents (2 in 2014, 2 in 2015) with dilated EF of 27% BiV ICD (2013) initially admitted to [facility] cor acute hypoxic respiratory failure, anemia (Hgb 5.7), thrombocytopenia (plt 30) w/ blast cells (22%), lactic acidosis (LA 10) - transfer to [facility] for hematological evaluation for ALL.  Nephrology consulted for BRISA on CKD.      # BRISA on CKD  Pt with non-oliguric BRISA on CKD unclear etiology possible pre renal in the setting of anemia and ATN.  On admission sCr elevated at 3.45 (last known sCr 1.1-1.3 in 2018, recent hx CKD unclear recent baseline).  Urinalysis showed protein with trace blood.  Lab noted for serum uric acid 13.3, , phos 4.7, Echo severe LV systolic fxn, CT diffuse patchy airspace disease ?multifocal pna?. Urine electrolytes with Fe Urea of 82.5% suggestive of intrinsic pathology. Urine uric acid/creatinine ratio is < 1. Spot urine TP/CR ratio was 0.25 wnl. Kidney/bladder u/s on this admission was wnl (no hydro). Scr peaked at 3.62 mg/dl on 6/13/21 with downward trend now plateauing. SCr elevated/stable at  1.98 mg/dl today     - Allopurinol discontinued  - PER with 1:320 but speckled pattern   - GN work up including c3 and C4 wnl, serum immunofixation with no monoclonality, anti GBM negative. Parvovirus DNA PCR negative. P ANCA and C ANCA negative, anti PLA2R negative  - monitor BMP/tumor lysis markers (Uric acid/LDH/haptoglobin/LFT), strict I/O, avoid nephrotoxic agents (NSAIDs, PPI, contrast), renally dose medications per GFR.    # Hypercalcemia -resolved  Uncertain etiology. Last ionized calcium improved to 1.14 7/15/21)   PTH was 84, not appropriately suppressed with low 25 vit D & 1,25 vitamin D.  Continue vitamin d supplements. Could be secondary hyperparathyroidism from vitamin D def & CKD.   Discontinued Sensipar  Cont to keep calcium 9-10 range  -Continue current managment

## 2021-07-27 NOTE — PROVIDER CONTACT NOTE (CRITICAL VALUE NOTIFICATION) - RECOMMENDATIONS
1 unit of PRBCs and 1/2 unit of platelets(given during IR procedure) transfusion ordered
None.
Abx?
N/a
no new orders at this time  continue to monitor
Maintain bleeding precautions.
N/a
continue current treatment
Will continue to monitor.
transfuse as needed
If transfusion is needed.
Transfuse plts and prbc
Will continue to monitor labs.
Monitor pateint closely.
continue current treatment
Continue to monitor
Notify provider
1 unit PLTs to be ordered.
Continue to monitor
Continue to monitor.
Maintain bleeding precautions.
continue to monitor and bleeding precautions maintained
none
Continue to monitor
Continue to monitor.
N/a
continue current management
no new orders at this time  continue to monitor
no new orders at this time  continue to monitor for acute changes, any signs of bleeding
no transfusions as per protocol
transfusion

## 2021-07-28 NOTE — PROGRESS NOTE ADULT - PROBLEM SELECTOR PLAN 2
Patient is neutropenic, afebrile   Pancx CXR if fever   6/30 completed Zosyn   7/5 Started Levaquin for neutropenic prophylaxis  If febrile, send pan cultures, and switch Levaquin to cefepime  6/24 Abd US: Cholelithiasis without signs of cholecystitis, persistently dilated CBD; HIDA scan (+) for acute cholecystitis-Seen by Surgery and IR, No surgical or IR interventions were recommended.  6/28  US abdomen Cholelithiasis with no definite sonographic evidence of acute cholecystitis.  7/3 Started posaconazole prophylaxis as patient is neutropenic. Adjusted dose of venetoclax. Patient is neutropenic, afebrile   Pancx  and CXR if fever   6/30 completed Zosyn   7/5 Started Levaquin for neutropenic prophylaxis  If febrile, send pan cultures, and switch Levaquin to cefepime  6/24 Abd US: Cholelithiasis without signs of cholecystitis, persistently dilated CBD; HIDA scan (+) for acute cholecystitis-Seen by Surgery and IR, No surgical or IR interventions were recommended.  6/28  US abdomen Cholelithiasis with no definite sonographic evidence of acute cholecystitis.  7/3 Started posaconazole prophylaxis as patient is neutropenic. Adjusted dose of venetoclax.

## 2021-07-28 NOTE — PROGRESS NOTE ADULT - ATTENDING COMMENTS
81 yo Burmese speaking male transferred from [place] to [facility] for AML with myelomonocytic features   Bone marrow biopsy done 6/14 - Acute myeloid leukemia  NGS [id number] fusion, U2AF1 mutation.  7/15 BM results showing 0.2% + population, considered a complete morphological response  Dacogen/Venetoclax cycle 2 started 7/20/21  Today is day 8    Transfusions up to 2x weekly as needed to maintain hbg >7 and plt >15.   His transfusion requirements have been low with average of once a week  Plan for DC to rehab, waiting for acceptance  He is DNR/DNI  Pt has expressed wishes to go back to [place]. 81 yo South Korean speaking male transferred from [place] to [facility] for AML with myelomonocytic features   Bone marrow biopsy done 6/14 - Acute myeloid leukemia  NGS [id number] fusion, U2AF1 mutation.  7/15 BM results showing 0.2% + population, considered a complete morphological response  Dacogen/Venetoclax cycle 2 started 7/20/21  Today is day 9    Transfusions up to 2x weekly as needed to maintain hbg >7 and plt >15.   His transfusion requirements have been low with average of once a week  He is DNR/DNI  Pt has expressed wishes to go back to [place]., Will try to get him home with PT, rehab is not accepting him at this point
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
## 2021-07-28 NOTE — PROGRESS NOTE ADULT - SUBJECTIVE AND OBJECTIVE BOX
Diagnosis: FLT3 ITD (-) Acute Myeloid Leukemia    Protocol/Chemo Regimen: Cycle 2 Decitabine + Venetoclax     Day: 9      Pt endorsed: No complaints.     Review of Systems: Denies n/v, abdominal pain, orthopnea, SOB     Pain scale: 0  Diet regular    Allergies    morphine (Unknown)      ANTIMICROBIALS  levoFLOXacin  Tablet 250 milliGRAM(s) Oral every 24 hours  posaconazole DR Tablet 300 milliGRAM(s) Oral daily      HEME/ONC MEDICATIONS  venetoclax 100 milliGRAM(s) Oral <User Schedule>      STANDING MEDICATIONS  Biotene Dry Mouth Oral Rinse 5 milliLiter(s) Swish and Spit four times a day  calcium carbonate    500 mG (Tums) Chewable 2 Tablet(s) Chew daily  chlorhexidine 2% Cloths 1 Application(s) Topical <User Schedule>  cholecalciferol 2000 Unit(s) Oral daily  escitalopram 5 milliGRAM(s) Oral daily  finasteride 5 milliGRAM(s) Oral daily  folic acid 1 milliGRAM(s) Oral daily  furosemide    Tablet 20 milliGRAM(s) Oral daily  isosorbide   mononitrate ER Tablet (IMDUR) 60 milliGRAM(s) Oral daily  lidocaine   Patch 1 Patch Transdermal daily  metoprolol succinate ER 50 milliGRAM(s) Oral daily  polyethylene glycol 3350 17 Gram(s) Oral daily  sodium bicarbonate 650 milliGRAM(s) Oral three times a day  tamsulosin 0.4 milliGRAM(s) Oral at bedtime  urea Oral Powder 15 Gram(s) Oral every 12 hours      PRN MEDICATIONS  acetaminophen   Tablet .. 650 milliGRAM(s) Oral every 6 hours PRN  albuterol/ipratropium for Nebulization 3 milliLiter(s) Nebulizer every 6 hours PRN  aluminum hydroxide/magnesium hydroxide/simethicone Suspension 30 milliLiter(s) Oral every 4 hours PRN  calcium carbonate    500 mG (Tums) Chewable 1 Tablet(s) Chew every 4 hours PRN  ondansetron Injectable 8 milliGRAM(s) IV Push every 8 hours PRN  senna 2 Tablet(s) Oral at bedtime PRN  sodium chloride 0.9% lock flush 10 milliLiter(s) IV Push every 1 hour PRN  sucralfate suspension 1 Gram(s) Oral every 6 hours PRN      Vital Signs Last 24 Hrs  T(C): 36.9 (28 Jul 2021 05:18), Max: 36.9 (28 Jul 2021 00:00)  T(F): 98.5 (28 Jul 2021 05:18), Max: 98.5 (28 Jul 2021 05:18)  HR: 83 (28 Jul 2021 05:18) (66 - 83)  BP: 112/69 (28 Jul 2021 05:18) (112/69 - 159/66)  BP(mean): --  RR: 18 (28 Jul 2021 05:18) (16 - 18)  SpO2: 96% (28 Jul 2021 05:18) (94% - 99%)      PHYSICAL EXAM  General: NAD  HEENT: clear oropharynx  CV: (+) S1/S2 RRR  Lungs: clear to auscultation, no wheezes or rales  Abdomen: soft, non-tender, non-distended (+) BS  Ext: no edema  Skin: no rashes   Neuro: alert and oriented X3  Central Line: PICC c/d/i         LABS:                        8.3    0.57  )-----------( 17       ( 27 Jul 2021 06:40 )             25.1         Mean Cell Volume : 86.6 fl  Mean Cell Hemoglobin : 28.6 pg  Mean Cell Hemoglobin Concentration : 33.1 gm/dL  Auto Neutrophil # : 0.09 K/uL  Auto Lymphocyte # : 0.42 K/uL  Auto Monocyte # : 0.00 K/uL  Auto Eosinophil # : 0.00 K/uL  Auto Basophil # : 0.00 K/uL  Auto Neutrophil % : 16.0 %  Auto Lymphocyte % : 74.0 %  Auto Monocyte % : 0.0 %  Auto Eosinophil % : 0.0 %  Auto Basophil % : 0.0 %      07-27    136  |  105  |  31<H>  ----------------------------<  99  4.0   |  22  |  1.88<H>    Ca    9.1      27 Jul 2021 06:40  Phos  2.8     07-27  Mg     1.9     07-27    TPro  6.6  /  Alb  2.7<L>  /  TBili  1.4<H>  /  DBili  x   /  AST  6<L>  /  ALT  <5<L>  /  AlkPhos  86  07-27          PT/INR - ( 26 Jul 2021 07:13 )   PT: 15.5 sec;   INR: 1.31 ratio                 RECENT CULTURES:      RADIOLOGY & ADDITIONAL STUDIES:      < from: Xray Chest 1 View- PORTABLE-Urgent (Xray Chest 1 View- PORTABLE-Urgent .) (07.10.21 @ 15:53) >  IMPRESSION: Constellation of findings compatible with CHF.           Diagnosis: FLT3 ITD (-) Acute Myeloid Leukemia    Protocol/Chemo Regimen: Cycle 2 Decitabine + Venetoclax     Day: 9      Pt endorsed: R shoulder pain x few days, relieved with Tylenol     Review of Systems: Denies n/v, abdominal pain, orthopnea, SOB     Pain scale: 0  Diet regular    Allergies    morphine (Unknown)      ANTIMICROBIALS  levoFLOXacin  Tablet 250 milliGRAM(s) Oral every 24 hours  posaconazole DR Tablet 300 milliGRAM(s) Oral daily      HEME/ONC MEDICATIONS  venetoclax 100 milliGRAM(s) Oral <User Schedule>      STANDING MEDICATIONS  Biotene Dry Mouth Oral Rinse 5 milliLiter(s) Swish and Spit four times a day  calcium carbonate    500 mG (Tums) Chewable 2 Tablet(s) Chew daily  chlorhexidine 2% Cloths 1 Application(s) Topical <User Schedule>  cholecalciferol 2000 Unit(s) Oral daily  escitalopram 5 milliGRAM(s) Oral daily  finasteride 5 milliGRAM(s) Oral daily  folic acid 1 milliGRAM(s) Oral daily  furosemide    Tablet 20 milliGRAM(s) Oral daily  isosorbide   mononitrate ER Tablet (IMDUR) 60 milliGRAM(s) Oral daily  lidocaine   Patch 1 Patch Transdermal daily  metoprolol succinate ER 50 milliGRAM(s) Oral daily  polyethylene glycol 3350 17 Gram(s) Oral daily  sodium bicarbonate 650 milliGRAM(s) Oral three times a day  tamsulosin 0.4 milliGRAM(s) Oral at bedtime  urea Oral Powder 15 Gram(s) Oral every 12 hours      PRN MEDICATIONS  acetaminophen   Tablet .. 650 milliGRAM(s) Oral every 6 hours PRN  albuterol/ipratropium for Nebulization 3 milliLiter(s) Nebulizer every 6 hours PRN  aluminum hydroxide/magnesium hydroxide/simethicone Suspension 30 milliLiter(s) Oral every 4 hours PRN  calcium carbonate    500 mG (Tums) Chewable 1 Tablet(s) Chew every 4 hours PRN  ondansetron Injectable 8 milliGRAM(s) IV Push every 8 hours PRN  senna 2 Tablet(s) Oral at bedtime PRN  sodium chloride 0.9% lock flush 10 milliLiter(s) IV Push every 1 hour PRN  sucralfate suspension 1 Gram(s) Oral every 6 hours PRN      Vital Signs Last 24 Hrs  T(C): 36.9 (28 Jul 2021 05:18), Max: 36.9 (28 Jul 2021 00:00)  T(F): 98.5 (28 Jul 2021 05:18), Max: 98.5 (28 Jul 2021 05:18)  HR: 83 (28 Jul 2021 05:18) (66 - 83)  BP: 112/69 (28 Jul 2021 05:18) (112/69 - 159/66)  BP(mean): --  RR: 18 (28 Jul 2021 05:18) (16 - 18)  SpO2: 96% (28 Jul 2021 05:18) (94% - 99%)      PHYSICAL EXAM  General: NAD  HEENT: clear oropharynx  CV: (+) S1/S2 RRR  Lungs: clear to auscultation, no wheezes or rales  Abdomen: soft, non-tender, non-distended (+) BS  Ext: no edema  Skin: no rashes   Neuro: alert and oriented X3  Central Line: PICC c/d/i       LABS:                        7.7    0.64  )-----------( 31       ( 28 Jul 2021 07:35 )             23.6         Mean Cell Volume : 86.8 fl  Mean Cell Hemoglobin : 28.3 pg  Mean Cell Hemoglobin Concentration : 32.6 gm/dL  Auto Neutrophil # : 0.11 K/uL  Auto Lymphocyte # : 0.52 K/uL  Auto Monocyte # : 0.00 K/uL  Auto Eosinophil # : 0.01 K/uL  Auto Basophil # : 0.00 K/uL  Auto Neutrophil % : 17.5 %  Auto Lymphocyte % : 81.3 %  Auto Monocyte % : 0.0 %  Auto Eosinophil % : 1.2 %  Auto Basophil % : 0.0 %      07-28    136  |  105  |  31<H>  ----------------------------<  88  4.1   |  23  |  1.92<H>    Ca    9.1      28 Jul 2021 07:34  Phos  3.1     07-28  Mg     2.0     07-28    TPro  6.4  /  Alb  2.8<L>  /  TBili  0.9  /  DBili  x   /  AST  7<L>  /  ALT  6<L>  /  AlkPhos  83  07-28      Uric Acid --        RADIOLOGY & ADDITIONAL STUDIES:    < from: Xray Chest 1 View- PORTABLE-Urgent (Xray Chest 1 View- PORTABLE-Urgent .) (07.10.21 @ 15:53) >  IMPRESSION: Constellation of findings compatible with CHF.

## 2021-07-28 NOTE — PROGRESS NOTE ADULT - PROBLEM SELECTOR PLAN 1
FLT3 ITD (-) AML   Monitor daily CBC's   Transfusions up to 2x weekly as needed to maintain hbg >7 and plt >15.   Monitor daily CMP and replete electrolytes as needed   Strict I's/O's, daily weights, mouth care, anti emetics.   Cycle 2 Dacogen 20 mg/m2 x 5 days + Venetoclax. (s/p Venetoclax escalation, now on 100 mg oral daily - while on posaconazole) started 7/20  Day 21 BM bx on 7/9, quantity insufficient. Repeated 7/16 : showing hypercellular marrow   Pt is DNR  off allopurinol  s/p Cycle 2 started 7/20  patient wants to return to [place]  thrombocytopenia-replace plt today with dc planning when [facility] approved. Family aware.   Discharge planning pending [facility] rehab placement FLT3 ITD (-) AML   Monitor daily CBC's, transfusions up to 2x weekly as needed to maintain hbg >7 and plt >15.   Monitor daily CMP and replete electrolytes as needed   Strict I's/O's, daily weights, mouth care, anti emetics.   Cycle 2 Dacogen 20 mg/m2 x 5 days + Venetoclax. (s/p Venetoclax escalation, now on 100 mg oral daily - while on posaconazole) started 7/20  Day 21 BM bx on 7/9, quantity insufficient. Repeated 7/16 : showing hypercellular marrow   Pt is DNR  Plan dc home with home care, son away until Friday 7/30

## 2021-07-29 NOTE — PROGRESS NOTE ADULT - PROBLEM SELECTOR PLAN 1
FLT3 ITD (-) AML   Monitor daily CBC's, transfusions up to 2x weekly as needed to maintain hbg >7 and plt >15.   Monitor daily CMP and replete electrolytes as needed   Strict I's/O's, daily weights, mouth care, anti emetics.   Cycle 2 Dacogen 20 mg/m2 x 5 days + Venetoclax. (s/p Venetoclax escalation, now on 100 mg oral daily - while on posaconazole) started 7/20  Day 21 BM bx on 7/9, quantity insufficient. Repeated 7/16 : showing hypercellular marrow   Pt is DNR  Plan dc home with home care, son away until Friday 7/30 FLT3 ITD (-) AML   Monitor daily CBC's, transfusions up to 2x weekly as needed to maintain hbg >7 and plt >15.   Monitor daily CMP and replete electrolytes as needed   Strict I's/O's, daily weights, mouth care, anti emetics.   Cycle 2 Dacogen 20 mg/m2 x 5 days + Venetoclax. (s/p Venetoclax escalation, now on 100 mg oral daily - while on posaconazole) started 7/20  Day 21 BM bx on 7/9, quantity insufficient. Repeated 7/16 : showing hypercellular marrow   Pt is DNR  Plan dc home with home care, son away until Friday 7/30  PRBC x1 for anemia pre DC home

## 2021-07-29 NOTE — PROGRESS NOTE ADULT - PROBLEM SELECTOR PLAN 7
improving   likely SIADH  Started Urea 15gm twice daily   Monitor Na+ daily  Nephro following improving   likely SIADH  Started Urea 15gm twice daily   Monitor Na+ daily  Nephro following  7/29 dc urea and Tums and continue sodium bicarb tabs per renal

## 2021-07-29 NOTE — PROGRESS NOTE ADULT - SUBJECTIVE AND OBJECTIVE BOX
Diagnosis: FLT3 ITD (-) Acute Myeloid Leukemia    Protocol/Chemo Regimen: Cycle 2 Decitabine + Venetoclax     Day: 10      Pt endorsed: R shoulder pain x few days, relieved with Tylenol     Review of Systems: Denies n/v, abdominal pain, orthopnea, SOB     Pain scale: 0  Diet regular    Allergies    morphine (Unknown)      ANTIMICROBIALS  levoFLOXacin  Tablet 250 milliGRAM(s) Oral every 24 hours  posaconazole DR Tablet 300 milliGRAM(s) Oral daily      HEME/ONC MEDICATIONS  venetoclax 100 milliGRAM(s) Oral <User Schedule>      STANDING MEDICATIONS  Biotene Dry Mouth Oral Rinse 5 milliLiter(s) Swish and Spit four times a day  calcium carbonate    500 mG (Tums) Chewable 2 Tablet(s) Chew daily  chlorhexidine 2% Cloths 1 Application(s) Topical <User Schedule>  cholecalciferol 2000 Unit(s) Oral daily  escitalopram 5 milliGRAM(s) Oral daily  finasteride 5 milliGRAM(s) Oral daily  folic acid 1 milliGRAM(s) Oral daily  furosemide    Tablet 20 milliGRAM(s) Oral daily  isosorbide   mononitrate ER Tablet (IMDUR) 60 milliGRAM(s) Oral daily  lidocaine   Patch 1 Patch Transdermal daily  metoprolol succinate ER 50 milliGRAM(s) Oral daily  polyethylene glycol 3350 17 Gram(s) Oral daily  sodium bicarbonate 650 milliGRAM(s) Oral three times a day  tamsulosin 0.4 milliGRAM(s) Oral at bedtime  urea Oral Powder 15 Gram(s) Oral every 12 hours      PRN MEDICATIONS  acetaminophen   Tablet .. 650 milliGRAM(s) Oral every 6 hours PRN  albuterol/ipratropium for Nebulization 3 milliLiter(s) Nebulizer every 6 hours PRN  aluminum hydroxide/magnesium hydroxide/simethicone Suspension 30 milliLiter(s) Oral every 4 hours PRN  calcium carbonate    500 mG (Tums) Chewable 1 Tablet(s) Chew every 4 hours PRN  ondansetron Injectable 8 milliGRAM(s) IV Push every 8 hours PRN  senna 2 Tablet(s) Oral at bedtime PRN  sodium chloride 0.9% lock flush 10 milliLiter(s) IV Push every 1 hour PRN  sucralfate suspension 1 Gram(s) Oral every 6 hours PRN      Vital Signs Last 24 Hrs  T(C): 36.7 (29 Jul 2021 06:05), Max: 36.7 (29 Jul 2021 06:05)  T(F): 98.1 (29 Jul 2021 06:05), Max: 98.1 (29 Jul 2021 06:05)  HR: 71 (29 Jul 2021 06:05) (62 - 76)  BP: 147/60 (29 Jul 2021 06:05) (124/72 - 147/60)  BP(mean): --  RR: 18 (29 Jul 2021 06:05) (18 - 18)  SpO2: 97% (29 Jul 2021 06:05) (94% - 98%)        PHYSICAL EXAM  General: NAD  HEENT: clear oropharynx  CV: (+) S1/S2 RRR  Lungs: clear to auscultation, no wheezes or rales  Abdomen: soft, non-tender, non-distended (+) BS  Ext: no edema  Skin: no rashes   Neuro: alert and oriented X3  Central Line: PICC c/d/i         LABS:                        7.7    0.64  )-----------( 31       ( 28 Jul 2021 07:35 )             23.6         Mean Cell Volume : 86.8 fl  Mean Cell Hemoglobin : 28.3 pg  Mean Cell Hemoglobin Concentration : 32.6 gm/dL  Auto Neutrophil # : 0.11 K/uL  Auto Lymphocyte # : 0.52 K/uL  Auto Monocyte # : 0.00 K/uL  Auto Eosinophil # : 0.01 K/uL  Auto Basophil # : 0.00 K/uL  Auto Neutrophil % : 17.5 %  Auto Lymphocyte % : 81.3 %  Auto Monocyte % : 0.0 %  Auto Eosinophil % : 1.2 %  Auto Basophil % : 0.0 %      07-28    136  |  105  |  31<H>  ----------------------------<  88  4.1   |  23  |  1.92<H>    Ca    9.1      28 Jul 2021 07:34  Phos  3.1     07-28  Mg     2.0     07-28    TPro  6.4  /  Alb  2.8<L>  /  TBili  0.9  /  DBili  x   /  AST  7<L>  /  ALT  6<L>  /  AlkPhos  83  07-28      Mg 2.0  Phos 3.1            Uric Acid --        RECENT CULTURES:      RADIOLOGY & ADDITIONAL STUDIES:    < from: Xray Chest 1 View- PORTABLE-Urgent (Xray Chest 1 View- PORTABLE-Urgent .) (07.10.21 @ 15:53) >  IMPRESSION: Constellation of findings compatible with CHF.               Diagnosis: FLT3 ITD (-) Acute Myeloid Leukemia    Protocol/Chemo Regimen: Cycle 2 Decitabine + Venetoclax     Day: 10      Pt endorsed: no complaint     Review of Systems: Denies n/v, abdominal pain, orthopnea, SOB     Pain scale: 0  Diet regular    Allergies    morphine (Unknown)      ANTIMICROBIALS  levoFLOXacin  Tablet 250 milliGRAM(s) Oral every 24 hours  posaconazole DR Tablet 300 milliGRAM(s) Oral daily      HEME/ONC MEDICATIONS  venetoclax 100 milliGRAM(s) Oral <User Schedule>      STANDING MEDICATIONS  Biotene Dry Mouth Oral Rinse 5 milliLiter(s) Swish and Spit four times a day  calcium carbonate    500 mG (Tums) Chewable 2 Tablet(s) Chew daily  chlorhexidine 2% Cloths 1 Application(s) Topical <User Schedule>  cholecalciferol 2000 Unit(s) Oral daily  escitalopram 5 milliGRAM(s) Oral daily  finasteride 5 milliGRAM(s) Oral daily  folic acid 1 milliGRAM(s) Oral daily  furosemide    Tablet 20 milliGRAM(s) Oral daily  isosorbide   mononitrate ER Tablet (IMDUR) 60 milliGRAM(s) Oral daily  lidocaine   Patch 1 Patch Transdermal daily  metoprolol succinate ER 50 milliGRAM(s) Oral daily  polyethylene glycol 3350 17 Gram(s) Oral daily  sodium bicarbonate 650 milliGRAM(s) Oral three times a day  tamsulosin 0.4 milliGRAM(s) Oral at bedtime  urea Oral Powder 15 Gram(s) Oral every 12 hours      PRN MEDICATIONS  acetaminophen   Tablet .. 650 milliGRAM(s) Oral every 6 hours PRN  albuterol/ipratropium for Nebulization 3 milliLiter(s) Nebulizer every 6 hours PRN  aluminum hydroxide/magnesium hydroxide/simethicone Suspension 30 milliLiter(s) Oral every 4 hours PRN  calcium carbonate    500 mG (Tums) Chewable 1 Tablet(s) Chew every 4 hours PRN  ondansetron Injectable 8 milliGRAM(s) IV Push every 8 hours PRN  senna 2 Tablet(s) Oral at bedtime PRN  sodium chloride 0.9% lock flush 10 milliLiter(s) IV Push every 1 hour PRN  sucralfate suspension 1 Gram(s) Oral every 6 hours PRN      Vital Signs Last 24 Hrs  T(C): 36.7 (29 Jul 2021 06:05), Max: 36.7 (29 Jul 2021 06:05)  T(F): 98.1 (29 Jul 2021 06:05), Max: 98.1 (29 Jul 2021 06:05)  HR: 71 (29 Jul 2021 06:05) (62 - 76)  BP: 147/60 (29 Jul 2021 06:05) (124/72 - 147/60)  BP(mean): --  RR: 18 (29 Jul 2021 06:05) (18 - 18)  SpO2: 97% (29 Jul 2021 06:05) (94% - 98%)      PHYSICAL EXAM  General: NAD  HEENT: clear oropharynx  CV: (+) S1/S2 RRR  Lungs: clear to auscultation, no wheezes or rales  Abdomen: soft, non-tender, non-distended (+) BS  Ext: no edema  Skin: no rashes   Neuro: alert and oriented X3  Central Line: PICC c/d/i         RECENT CULTURES:      RADIOLOGY & ADDITIONAL STUDIES:    < from: Xray Chest 1 View- PORTABLE-Urgent (Xray Chest 1 View- PORTABLE-Urgent .) (07.10.21 @ 15:53) >  IMPRESSION: Constellation of findings compatible with CHF.               Diagnosis: FLT3 ITD (-) Acute Myeloid Leukemia    Protocol/Chemo Regimen: Cycle 2 Decitabine + Venetoclax     Day: 10      Pt endorsed: no complaint     Review of Systems: Denies n/v, abdominal pain, orthopnea, SOB     Pain scale: 0  Diet regular    Allergies    morphine (Unknown)      ANTIMICROBIALS  levoFLOXacin  Tablet 250 milliGRAM(s) Oral every 24 hours  posaconazole DR Tablet 300 milliGRAM(s) Oral daily      HEME/ONC MEDICATIONS  venetoclax 100 milliGRAM(s) Oral <User Schedule>      STANDING MEDICATIONS  Biotene Dry Mouth Oral Rinse 5 milliLiter(s) Swish and Spit four times a day  calcium carbonate    500 mG (Tums) Chewable 2 Tablet(s) Chew daily  chlorhexidine 2% Cloths 1 Application(s) Topical <User Schedule>  cholecalciferol 2000 Unit(s) Oral daily  escitalopram 5 milliGRAM(s) Oral daily  finasteride 5 milliGRAM(s) Oral daily  folic acid 1 milliGRAM(s) Oral daily  furosemide    Tablet 20 milliGRAM(s) Oral daily  isosorbide   mononitrate ER Tablet (IMDUR) 60 milliGRAM(s) Oral daily  lidocaine   Patch 1 Patch Transdermal daily  metoprolol succinate ER 50 milliGRAM(s) Oral daily  polyethylene glycol 3350 17 Gram(s) Oral daily  sodium bicarbonate 650 milliGRAM(s) Oral three times a day  tamsulosin 0.4 milliGRAM(s) Oral at bedtime  urea Oral Powder 15 Gram(s) Oral every 12 hours      PRN MEDICATIONS  acetaminophen   Tablet .. 650 milliGRAM(s) Oral every 6 hours PRN  albuterol/ipratropium for Nebulization 3 milliLiter(s) Nebulizer every 6 hours PRN  aluminum hydroxide/magnesium hydroxide/simethicone Suspension 30 milliLiter(s) Oral every 4 hours PRN  calcium carbonate    500 mG (Tums) Chewable 1 Tablet(s) Chew every 4 hours PRN  ondansetron Injectable 8 milliGRAM(s) IV Push every 8 hours PRN  senna 2 Tablet(s) Oral at bedtime PRN  sodium chloride 0.9% lock flush 10 milliLiter(s) IV Push every 1 hour PRN  sucralfate suspension 1 Gram(s) Oral every 6 hours PRN      Vital Signs Last 24 Hrs  T(C): 36.7 (29 Jul 2021 06:05), Max: 36.7 (29 Jul 2021 06:05)  T(F): 98.1 (29 Jul 2021 06:05), Max: 98.1 (29 Jul 2021 06:05)  HR: 71 (29 Jul 2021 06:05) (62 - 76)  BP: 147/60 (29 Jul 2021 06:05) (124/72 - 147/60)  BP(mean): --  RR: 18 (29 Jul 2021 06:05) (18 - 18)  SpO2: 97% (29 Jul 2021 06:05) (94% - 98%)      PHYSICAL EXAM  General: NAD  HEENT: clear oropharynx  CV: (+) S1/S2 RRR  Lungs: clear to auscultation, no wheezes or rales  Abdomen: soft, non-tender, non-distended (+) BS  Ext: no edema  Skin: no rashes   Neuro: alert and oriented X3  Central Line: PICC c/d/i     LABS:    Blood Cultures:                           7.4    0.46  )-----------( 26       ( 29 Jul 2021 07:03 )             22.0         Mean Cell Volume : 87.3 fl  Mean Cell Hemoglobin : 29.4 pg  Mean Cell Hemoglobin Concentration : 33.6 gm/dL  Auto Neutrophil # : 0.04 K/uL  Auto Lymphocyte # : 0.38 K/uL  Auto Monocyte # : 0.03 K/uL  Auto Eosinophil # : 0.00 K/uL  Auto Basophil # : 0.00 K/uL  Auto Neutrophil % : 8.7 %  Auto Lymphocyte % : 82.6 %  Auto Monocyte % : 6.5 %  Auto Eosinophil % : 0.0 %  Auto Basophil % : 0.0 %      07-29    137  |  105  |  30<H>  ----------------------------<  89  3.8   |  22  |  1.87<H>    Ca    9.0      29 Jul 2021 07:05  Phos  2.8     07-29  Mg     2.1     07-29    TPro  6.1  /  Alb  2.5<L>  /  TBili  0.6  /  DBili  x   /  AST  7<L>  /  ALT  <5<L>  /  AlkPhos  82  07-29      PT/INR - ( 29 Jul 2021 07:06 )   PT: 14.9 sec;   INR: 1.26 ratio          Uric Acid --      RADIOLOGY & ADDITIONAL STUDIES:    < from: Xray Chest 1 View- PORTABLE-Urgent (Xray Chest 1 View- PORTABLE-Urgent .) (07.10.21 @ 15:53) >  IMPRESSION: Constellation of findings compatible with CHF.

## 2021-07-29 NOTE — PROGRESS NOTE ADULT - SUBJECTIVE AND OBJECTIVE BOX
[facility] DIVISION OF KIDNEY DISEASES AND HYPERTENSION -- FOLLOW UP NOTE  --------------------------------------------------------------------------------  Chief Complaint:    24 hour events/subjective:        PAST HISTORY  --------------------------------------------------------------------------------  No significant changes to PMH, PSH, FHx, SHx, unless otherwise noted    ALLERGIES & MEDICATIONS  --------------------------------------------------------------------------------  Allergies    morphine (Unknown)    Intolerances      Standing Inpatient Medications  Biotene Dry Mouth Oral Rinse 5 milliLiter(s) Swish and Spit four times a day  calcium carbonate    500 mG (Tums) Chewable 2 Tablet(s) Chew daily  chlorhexidine 2% Cloths 1 Application(s) Topical <User Schedule>  cholecalciferol 2000 Unit(s) Oral daily  escitalopram 5 milliGRAM(s) Oral daily  finasteride 5 milliGRAM(s) Oral daily  folic acid 1 milliGRAM(s) Oral daily  furosemide    Tablet 20 milliGRAM(s) Oral daily  isosorbide   mononitrate ER Tablet (IMDUR) 60 milliGRAM(s) Oral daily  levoFLOXacin  Tablet 250 milliGRAM(s) Oral every 24 hours  lidocaine   Patch 1 Patch Transdermal daily  metoprolol succinate ER 50 milliGRAM(s) Oral daily  polyethylene glycol 3350 17 Gram(s) Oral daily  posaconazole DR Tablet 300 milliGRAM(s) Oral daily  sodium bicarbonate 650 milliGRAM(s) Oral three times a day  tamsulosin 0.4 milliGRAM(s) Oral at bedtime  urea Oral Powder 15 Gram(s) Oral every 12 hours  venetoclax 100 milliGRAM(s) Oral <User Schedule>    PRN Inpatient Medications  acetaminophen   Tablet .. 650 milliGRAM(s) Oral every 6 hours PRN  albuterol/ipratropium for Nebulization 3 milliLiter(s) Nebulizer every 6 hours PRN  aluminum hydroxide/magnesium hydroxide/simethicone Suspension 30 milliLiter(s) Oral every 4 hours PRN  calcium carbonate    500 mG (Tums) Chewable 1 Tablet(s) Chew every 4 hours PRN  ondansetron Injectable 8 milliGRAM(s) IV Push every 8 hours PRN  senna 2 Tablet(s) Oral at bedtime PRN  sodium chloride 0.9% lock flush 10 milliLiter(s) IV Push every 1 hour PRN  sucralfate suspension 1 Gram(s) Oral every 6 hours PRN      REVIEW OF SYSTEMS  --------------------------------------------------------------------------------  Gen: No weight changes, fatigue, fevers/chills, weakness  Skin: No rashes  Head/Eyes/Ears/Mouth: No headache; Normal hearing; Normal vision w/o blurriness; No sinus pain/discomfort, sore throat  Respiratory: No dyspnea, cough, wheezing, hemoptysis  CV: No chest pain, PND, orthopnea  GI: No abdominal pain, diarrhea, constipation, nausea, vomiting, melena, hematochezia  : No increased frequency, dysuria, hematuria, nocturia  MSK: No joint pain/swelling; no back pain; no edema  Neuro: No dizziness/lightheadedness, weakness, seizures, numbness, tingling  Heme: No easy bruising or bleeding  Endo: No heat/cold intolerance  Psych: No significant nervousness, anxiety, stress, depression    All other systems were reviewed and are negative, except as noted.    VITALS/PHYSICAL EXAM  --------------------------------------------------------------------------------  T(C): 36.7 (07-29-21 @ 10:00), Max: 36.7 (07-29-21 @ 06:05)  HR: 69 (07-29-21 @ 10:00) (69 - 76)  BP: 124/75 (07-29-21 @ 10:00) (124/72 - 147/60)  RR: 18 (07-29-21 @ 10:00) (18 - 18)  SpO2: 97% (07-29-21 @ 10:00) (95% - 98%)  Wt(kg): --        07-28-21 @ 07:01  -  07-29-21 @ 07:00  --------------------------------------------------------  IN: 875 mL / OUT: 975 mL / NET: -100 mL    07-29-21 @ 07:01  -  07-29-21 @ 13:17  --------------------------------------------------------  IN: 240 mL / OUT: 300 mL / NET: -60 mL      Physical Exam:  	Gen: NAD, well-appearing  	HEENT: PERRL, supple neck, clear oropharynx  	Pulm: CTA B/L  	CV: RRR, S1S2; no rub  	Back: No spinal or CVA tenderness; no sacral edema  	Abd: +BS, soft, nontender/nondistended  	: No suprapubic tenderness  	UE: Warm, FROM, no clubbing, intact strength; no edema; no asterixis  	LE: Warm, FROM, no clubbing, intact strength; no edema  	Neuro: No focal deficits, intact gait  	Psych: Normal affect and mood  	Skin: Warm, without rashes  	Vascular access:    LABS/STUDIES  --------------------------------------------------------------------------------              7.4    0.46  >-----------<  26       [07-29-21 @ 07:03]              22.0     137  |  105  |  30  ----------------------------<  89      [07-29-21 @ 07:05]  3.8   |  22  |  1.87        Ca     9.0     [07-29-21 @ 07:05]      iCa    1.27     [07-29 @ 07:08]      Mg     2.1     [07-29-21 @ 07:05]      Phos  2.8     [07-29-21 @ 07:05]    TPro  6.1  /  Alb  2.5  /  TBili  0.6  /  DBili  x   /  AST  7   /  ALT  <5  /  AlkPhos  82  [07-29-21 @ 07:05]    PT/INR: PT 14.9 , INR 1.26       [07-29-21 @ 07:06]          [07-29-21 @ 07:05]    Creatinine Trend:  SCr 1.87 [07-29 @ 07:05]  SCr 1.92 [07-28 @ 07:34]  SCr 1.88 [07-27 @ 06:40]  SCr 1.98 [07-26 @ 07:10]  SCr 2.06 [07-25 @ 06:54]        Iron 155, TIBC 237, %sat 65      [06-12-21 @ 03:36]  Ferritin 827      [06-12-21 @ 04:44]  PTH -- (Ca 9.8)      [06-18-21 @ 10:41]   84  Vitamin D (25OH) 21.6      [06-18-21 @ 10:41]  HbA1c 5.8      [11-13-17 @ 10:29]

## 2021-07-29 NOTE — PROGRESS NOTE ADULT - ATTENDING COMMENTS
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  7/15 BM results showing 0.2% + population, considered a complete morphological response  Dacogen/Venetoclax cycle 2 started 7/20/21  Today is day 9    Transfusions up to 2x weekly as needed to maintain hbg >7 and plt >15.   His transfusion requirements have been low with average of once a week  He is DNR/DNI  Pt has expressed wishes to go back to [place] Will try to get him home with PT, rehab is not accepting him at this point 79 yo Bulgarian speaking male transferred from [place] to [facility] for AML with myelomonocytic features   Bone marrow biopsy done 6/14 - Acute myeloid leukemia  NGS [id number] fusion, U2AF1 mutation.  7/15 BM results showing 0.2% + population, considered a complete morphological response  Dacogen/Venetoclax cycle 2 started 7/20/21  Today is day 10    Transfusions up to 2x weekly as needed to maintain hbg >7 and plt >15.   His transfusion requirements have been low with average of once a week  Son to take home 7/29/21  He is DNR/DNI 79 yo Salvadorean speaking male transferred from [place] to [facility] for AML with myelomonocytic features   Bone marrow biopsy done 6/14 - Acute myeloid leukemia  NGS [id number] fusion, U2AF1 mutation.  7/15 BM results showing 0.2% + population, considered a complete morphological response  Dacogen/Venetoclax cycle 2 started 7/20/21  Today is day 10    Transfusions up to 2x weekly as needed to maintain hbg >7 and plt >15.   His transfusion requirements have been low with average of once a week  Son to take home 7/30/21, currently out of town  He is DNR/DNI

## 2021-07-29 NOTE — PROGRESS NOTE ADULT - PROBLEM SELECTOR PLAN 2
Patient is neutropenic, afebrile   Pancx  and CXR if fever   6/30 completed Zosyn   7/5 Started Levaquin for neutropenic prophylaxis  If febrile, send pan cultures, and switch Levaquin to cefepime  6/24 Abd US: Cholelithiasis without signs of cholecystitis, persistently dilated CBD; HIDA scan (+) for acute cholecystitis-Seen by Surgery and IR, No surgical or IR interventions were recommended.  6/28  US abdomen Cholelithiasis with no definite sonographic evidence of acute cholecystitis.  7/3 Started posaconazole prophylaxis as patient is neutropenic. Adjusted dose of venetoclax.
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
## 2021-07-29 NOTE — PROGRESS NOTE ADULT - PROBLEM SELECTOR PLAN 9
s/p 5vCABG 2012 (LIMA to LAD, SVG to Diag and OM, SVG PDA and RPL), PCI (2 in 2014, 2 in 2015 [facility]), HFrEF s/p CRT-D (2013),  No DAPT or AC given thrombocytopenia.

## 2021-07-29 NOTE — PROGRESS NOTE ADULT - PROBLEM SELECTOR PLAN 6
stable   Nephrology following-No indication for HD at this time.   6/15 Normal renal ultrasound  follow ionized calcium daily as per nephrology  6/24 on Sensipar for hypercalcemia stopped 7/21  Vitamin D 2000 daily.  Continue home Lasix 20mg daily  home dose is 40mg daily.   NAHCo3 started 7/20 per renal.  Oral calcium supplementation started 7/22 stable   Nephrology following-No indication for HD at this time.   6/15 Normal renal ultrasound  follow ionized calcium daily as per nephrology  6/24 on Sensipar for hypercalcemia stopped 7/21  Vitamin D 2000 daily.  Continue home Lasix 20mg daily  home dose is 40mg daily.   NAHCo3 started 7/20 per renal.  Oral calcium supplementation started 7/22 7/29 dc urea and Tums and continue sodium bicarb tabs per renal

## 2021-07-29 NOTE — PROGRESS NOTE ADULT - ASSESSMENT
80M PMHx CKD, CAD s/p CABG 2012 & 4 stents (2 in 2014, 2 in 2015) with dilated EF of 27% BiV ICD (2013) initially admitted to [facility] cor acute hypoxic respiratory failure, anemia (Hgb 5.7), thrombocytopenia (plt 30) w/ blast cells (22%), lactic acidosis (LA 10) - transfer to 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  Nephrology consulted for BRISA on CKD.      # BRISA on CKD  Now resolved and stable crt 1.8 last 1 week  # Hypercalcemia -resolved  # Na - resolved    For Dc planning    Cont dc on bicarb but NOT tums or urena given stable calcium and Na at 137  cont home dose of lasix    Please have patient f.u with Nephrology office at  with one of the physicians in 2-3 weeks  Our address is [address] [place] [address]   (Clair, Arturo, Finger, Meir, Lyn, Stephen, Buddy, Brenda, Lisy, Allen, Anupam, Sabra, Tyrell, Tello, Anam, Praveen, or [facility])    oTdd Wasserman MD  Cell   Pager   Office            80M PMHx CKD, CAD s/p CABG 2012 & 4 stents (2 in 2014, 2 in 2015) with dilated EF of 27% BiV ICD (2013) initially admitted to [facility] cor acute hypoxic respiratory failure, anemia (Hgb 5.7), thrombocytopenia (plt 30) w/ blast cells (22%), lactic acidosis (LA 10) - transfer to [facility] for hematological evaluation for ALL.  Nephrology consulted for BRISA on CKD.      # BRISA on CKD  Now resolved and stable crt 1.8 last 1 week  # Hypercalcemia -resolved  # Na - resolved    For Dc planning    Continue bicarbonate dose on discharge but no need for urea given stable calcium and Na at 137. Also no need for calcium supplements on discharge  cont to discharge on lasix current dose of 20mg    Please have patient f.u with Nephrology office at  with one of the physicians in 2-3 weeks  Our address is [address] [place] [address]   (Clair, Arturo, Finger, Meir, Lyn, Stephen, Buddy, Brenda, Lisy, Allen, Anupam, Sabra, Tyrell, Tello, Anam, Praveen, or [facility])    Todd Wasserman MD  Cell   Pager   Office

## 2021-07-30 PROBLEM — U07.1 COVID-19: Chronic | Status: ACTIVE | Noted: 2021-01-01

## 2021-07-30 PROBLEM — D73.5 INFARCTION OF SPLEEN: Chronic | Status: ACTIVE | Noted: 2021-01-01

## 2021-07-30 NOTE — PROGRESS NOTE ADULT - PROBLEM SELECTOR PLAN 1
FLT3 ITD (-) AML   Monitor daily CBC's, transfusions up to 2x weekly as needed to maintain hbg >7 and plt >15.   Monitor daily CMP and replete electrolytes as needed   Strict I's/O's, daily weights, mouth care, anti emetics.   Cycle 2 Dacogen 20 mg/m2 x 5 days + Venetoclax. (s/p Venetoclax escalation, now on 100 mg oral daily - while on posaconazole) started 7/20  Day 21 BM bx on 7/9, quantity insufficient. Repeated 7/16 : showing hypercellular marrow   Pt is DNR  Plan dc home with home care, son away until Friday 7/30  PRBC x1 for anemia pre DC home FLT3 ITD (-) AML   Monitor daily CBC's, transfusions up to 2x weekly as needed to maintain hbg >7 and plt >15.   Monitor daily CMP and replete electrolytes as needed   Strict I's/O's, daily weights, mouth care, anti emetics.   Cycle 2 Dacogen 20 mg/m2 x 5 days + Venetoclax. (s/p Venetoclax escalation, now on 100 mg oral daily - while on posaconazole) started 7/20  Day 21 BM bx on 7/9, quantity insufficient. Repeated 7/16 : showing hypercellular marrow   Pt is DNR  Plan dc home with home care, son away until Friday 7/30  Thrombocytopenia: PLT x1 pre DC home

## 2021-07-30 NOTE — PROGRESS NOTE ADULT - PROBLEM SELECTOR PROBLEM 2
Infectious disease
Hypercalcemia
Infectious disease
Acute on chronic kidney failure
Acute on chronic kidney failure
Infectious disease

## 2021-07-30 NOTE — PROGRESS NOTE ADULT - PROBLEM SELECTOR PROBLEM 1
Acute leukemia
Hyperparathyroidism
Acute leukemia
Leukocytosis
Acute leukemia
Leukocytosis
Acute leukemia

## 2021-07-30 NOTE — PROGRESS NOTE ADULT - PROBLEM SELECTOR PLAN 9
s/p 5vCABG 2012 (LIMA to LAD, SVG to Diag and OM, SVG PDA and RPL), PCI (2 in 2014, 2 in 2015 [facility]), HFrEF s/p CRT-D (2013),  No DAPT or AC given thrombocytopenia.

## 2021-07-30 NOTE — PROVIDER CONTACT NOTE (CRITICAL VALUE NOTIFICATION) - NS PROVIDER READ BACK
yes

## 2021-07-30 NOTE — PROVIDER CONTACT NOTE (CRITICAL VALUE NOTIFICATION) - PERSON GIVING RESULT:
Apolinar from Lab
Mark coronado tech
Adam Anthony
Aurora
Mark Avila
Missael
Jorge Luis Ahuja
Margarita Serrano
Michelle Zepeda
Rashmi Flores
Critical; Margarita (CHEYENNE Maldonado took report for me)
Debby Howe
Margarita Flores
Zach
caleb castle, lab
Jorge Luis
Kasandra I
Lab: Margarita
Pradeep Jameson
Aurora
Luke Reed (lab)
Margarita rayo, lab
Missael
Missael/Lab
Anurag/Mark
Aurora Stanford
Jorge Luis Ahuja
Margarita dunn
Mark coronado
Missael
Jorge Luis Ahuja, lab
Margarita DURHAM
Mark Simon
Margarita
Missael Flores
shaun Moses

## 2021-07-30 NOTE — PROGRESS NOTE ADULT - ATTENDING SUPERVISION STATEMENT
ACP
Fellow
Resident
ACP
Fellow
ACP
ACP
Fellow
ACP
ACP
Fellow
Resident
ACP
Fellow
ACP
Resident
ACP
Resident
ACP

## 2021-07-30 NOTE — PROVIDER CONTACT NOTE (CRITICAL VALUE NOTIFICATION) - SITUATION
Hgb= 7.0 Plt= 15
low plt ct
plt 16
H/H= 7.0/21.6  plt= 11
PLT 15
Plt 9
 notifying RN of platelet count of 17
Patient platelet=20
Plt=14
low plt ct
pt HGB 6.7 HCT 20.2
Hgb= 7.0 Plt= 10
Hgb=6.9, Hct=21.2, Platelets=16
Hgb 6.8 Hct 20.4
Hgb 7.0  hct 21
Hgb 7.8, Hct 20.8, PLT 7
PLT 16
Pateint with ALL, receiving chemotherapy daily , CBC result from [facility] evening reveals HG 7.0 and Platelets 15
Platelets=17
hgb 6.8,hct 20.9,plt 7
Blood cultures obtained on 6/11 shows growth in aerobic bottle gram positive cocci in pairs
H/H= 6.2/19.7
Hemoglobin of 6.9
platelets =14
Plt = 20
low plt ct
Plt 16
Pt platelet count 19, previously 20 @1529.
as noted above
platelets = 18
PLT 15
Hgb= 6.6 Hct=19.7 Plt= 7
Plt= 15
platelet 11

## 2021-07-30 NOTE — DISCHARGE NOTE NURSING/CASE MANAGEMENT/SOCIAL WORK - NSSCTYPOFSERV_GEN_ALL_CORE
visiting nurse, physical therapy                                                           visiting nurse

## 2021-07-30 NOTE — PROGRESS NOTE ADULT - TIME BILLING
Time was spent reviewing chart, direct patient interaction and examination, discussion with consultants, and plan of care.

## 2021-07-30 NOTE — DISCHARGE NOTE NURSING/CASE MANAGEMENT/SOCIAL WORK - PATIENT PORTAL LINK FT
You can access the FollowMyHealth Patient Portal offered by [facility] by registering at the following website: [email or web address]. By joining [facility]’s FollowMyHealth portal, you will also be able to view your health information using other applications (apps) compatible with our system.

## 2021-07-30 NOTE — PROGRESS NOTE ADULT - PROBLEM SELECTOR PLAN 6
stable   Nephrology following-No indication for HD at this time.   6/15 Normal renal ultrasound  follow ionized calcium daily as per nephrology  6/24 on Sensipar for hypercalcemia stopped 7/21  Vitamin D 2000 daily.  Continue home Lasix 20mg daily  home dose is 40mg daily.   NAHCo3 started 7/20 per renal.  Oral calcium supplementation started 7/22 7/29 dc urea and Tums and continue sodium bicarb tabs per renal

## 2021-07-30 NOTE — PROVIDER CONTACT NOTE (CRITICAL VALUE NOTIFICATION) - ASSESSMENT
NAD
NAD, afebrile  pt denies sob, chest pain
No s/s of active bleeding
Pt alert and oriented    X3,VSS, pt denies SOB, chest pain, N/V. No active bleeding
NAD, afebrile  pt denies sob, chest pain
NAD, afebrile  pt denies sob, chest pain  no signs of bleeding
Pt A&Ox4, confused @ times, VSS, afebrile. No acute distress noted. No signs/symptoms of bleeding noted.
alert slight confusion
NAD
No acute distress noted
Pt VSS, trending labs q8h. Denies of pain or discomfort. Primarily Turkish speaking, currently on 2L NC.
Pt VSS, trending labs q8h. Denies of pain or discomfort. Primarily Wol[place] speaking, currently on 2L NC and no abx ordered.
Pt alert and oriented x3/forgetful at times. Pt denies SOB, chest pain, No headache and no active bleeding
VSS. No acute signs of bleeding or distress noted.
alert no active bleeding
Patient A&Ox4; patient denies SOB, headache, chest pain and abdominal pain. No signs and symptoms of bleeding.
Pt resting in bed, arousal to voice, VSS.
Stable. No s/s of bleeding noted
Patient A&Ox4; patient denies SOB, headache, chest pain and abdominal pain. No signs and symptoms of bleeding.
Pt alert and oriented X3/forgetful at times, VSS, pt denies SOB, chest pain, no headache and no active bleeding
alert slight disorientation
Pt A&O x1, lethargic & difficult to arouse in AM. Alyse Stokes NP made aware.  Pt with generalzied ecchymosis. No signs/symptoms of bleeding noted.
Pt A&Ox4. VSS, afebrile. No signs or symptoms of bleeding, bruising, swelling. Pt denies chest pain, SOB, headaches, N/V/D. Pt resting in bed comfortably.
VSS. No acute signs of bleeding or distress noted.
NAD
Pt is resting comfortably in bed no complaints of fatigue.
Stable,no bleeding
NAD, no signs/symptoms of bleeding
Pt AOX4, VSS. Pt denies pain, laying comfortably in bed. No s/s of bleeding. Healing ecchymosis on R upper arm.
A/Ox2-3 with confusion, VSS. No signs of bruising/bleeding. No signs of acute distress.
No acute distress noted
Stable
pt is AOx2-3, reports no symptoms of dizziness of discomfort and is currently resting comfortably.
pt in no acute signs of distress  no active signs of bleeding
Pt A&Ox3-4 with confusion @ times, VSS, afebrile. No acute distress noted. No signs/symptoms of bleeding present

## 2021-07-30 NOTE — PROGRESS NOTE ADULT - ATTENDING COMMENTS
81 yo Yemeni speaking male transferred from [place] to [facility] for AML with myelomonocytic features   Bone marrow biopsy done 6/14 - Acute myeloid leukemia  NGS [id number] fusion, U2AF1 mutation.  7/15 BM results showing 0.2% + population, considered a complete morphological response  Dacogen/Venetoclax cycle 2 started 7/20/21  Today is day 10    Transfusions up to 2x weekly as needed to maintain hbg >7 and plt >15.   His transfusion requirements have been low with average of once a week  Son to take home 7/30/21, currently out of town  He is DNR/DNI 79 yo Montserratian speaking male transferred from [place] to [facility] for AML with myelomonocytic features   Bone marrow biopsy done 6/14 - Acute myeloid leukemia  NGS [id number] fusion, U2AF1 mutation.  7/15 BM results showing 0.2% + population, considered a complete morphological response  Dacogen/Venetoclax cycle 2 started 7/20/21  Today is day 11    Transfusions up to 2x weekly as needed to maintain hbg >7 and plt >15.   His transfusion requirements have been low with average of once a week  Son to take home today 7/30/21  He is DNR/DNI

## 2021-07-30 NOTE — PROGRESS NOTE ADULT - NSICDXPILOT_GEN_ALL_CORE
[place] [place]
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
## 2021-07-30 NOTE — PROGRESS NOTE ADULT - PROBLEM SELECTOR PROBLEM 10
Prophylactic measure

## 2021-07-30 NOTE — PROVIDER CONTACT NOTE (CRITICAL VALUE NOTIFICATION) - NS PROVIDER READ BACK TO LAB
yes
Growth in aerobic bottle gram positive cocci in pairs/yes
yes

## 2021-07-30 NOTE — PROVIDER CONTACT NOTE (CRITICAL VALUE NOTIFICATION) - TEST AND RESULT REPORTED:
Hgb= 6.6 Hct=19.7 Plt= 7
CBC result from the evening reveals HG 7.0 and Platelets 15
H/H= 6.2/19.7
Hemoglobin of 6.9
hgb 6.8,hct 20.9,plt 7
PLT 15
Plt=18
HGB 6.7 HCT 20.2
platelets = 14
Platelet=20
Hgb 7.0  hct 21
Hgb= 7.0 Plt= 15
Hgb=6.9, Hct=21.2, Platelets=16
platelet 11
platelet 17
Growth in aerobic bottle gram positive cocci in pairs
H/H= 7/20.8
Hgb= 7.0 Plt= 10
Plt 9
Plt= 15
platelets 14
Hgb 6.8 Hct 20.4
Hgb 7.8, Hct 20.8, PLT 7
PLT 15
PLT 16
Platelets=17
plt 16
plt ct 19
H/H= 7.0/21.6  plt= 11
Platelets = 18
Plt = 20
Plt=14
plt ct 11
plt ct 16
Platelet count 19
plt 16

## 2021-07-30 NOTE — DISCHARGE NOTE NURSING/CASE MANAGEMENT/SOCIAL WORK - NSDCFUADDAPPT_GEN_ALL_CORE_FT
You have an appointment with Dr. Wise office on  Wednesday 8/11 at 3pm      To [facility] on the following dates for blood work and possible Platelet transfusion      Please make an appointment with Dr Wasserman, nephrologist in 1 month

## 2021-07-30 NOTE — PROGRESS NOTE ADULT - NUTRITIONAL ASSESSMENT
This patient has been assessed with a concern for Malnutrition and has been determined to have a diagnosis/diagnoses of Severe protein-calorie malnutrition.    This patient is being managed with:   Diet Regular-  Consistent Carbohydrate {Evening Snack} (CSTCHOSN)  1000mL Fluid Restriction ([id number])  No Concentrated Potassium  Low Sodium  Supplement Feeding Modality:  Oral  Nepro Cans or Servings Per Day:  1       Frequency:  Two Times a day  Entered: Jun 18 2021  2:51PM    
This patient has been assessed with a concern for Malnutrition and has been determined to have a diagnosis/diagnoses of Severe protein-calorie malnutrition.    This patient is being managed with:   Diet Full Liquid-  Low Fat (LOWFAT)  Supplement Feeding Modality:  Oral  Ensure Clear Cans or Servings Per Day:  2       Frequency:  Daily  Entered: Jun 30 2021  1:17PM    
This patient has been assessed with a concern for Malnutrition and has been determined to have a diagnosis/diagnoses of Severe protein-calorie malnutrition.    This patient is being managed with:   Diet NPO after Midnight-     NPO Start Date: 23-Jun-2021   NPO Start Time: 23:59  Except Medications  Entered: Jun 23 2021  7:06PM    Diet Regular-  Consistent Carbohydrate {Evening Snack} (CSTCHOSN)  1000mL Fluid Restriction ([id number])  No Concentrated Potassium  Low Sodium  Supplement Feeding Modality:  Oral  Nepro Cans or Servings Per Day:  1       Frequency:  Two Times a day  Entered: Jun 18 2021  2:51PM    
This patient has been assessed with a concern for Malnutrition and has been determined to have a diagnosis/diagnoses of Severe protein-calorie malnutrition.    This patient is being managed with:   Diet Regular-  Entered: Jul 26 2021 10:34AM    
This patient has been assessed with a concern for Malnutrition and has been determined to have a diagnosis/diagnoses of Severe protein-calorie malnutrition.    This patient is being managed with:   Diet Regular-  Consistent Carbohydrate {Evening Snack} (CSTCHOSN)  1000mL Fluid Restriction ([id number])  No Concentrated Potassium  Low Sodium  Supplement Feeding Modality:  Oral  Nepro Cans or Servings Per Day:  1       Frequency:  Two Times a day  Entered: Jun 18 2021  2:51PM    
This patient has been assessed with a concern for Malnutrition and has been determined to have a diagnosis/diagnoses of Severe protein-calorie malnutrition.    This patient is being managed with:   Diet Regular-  Entered: Jul 26 2021 10:34AM    
This patient has been assessed with a concern for Malnutrition and has been determined to have a diagnosis/diagnoses of Severe protein-calorie malnutrition.    This patient is being managed with:   Diet Regular-  Consistent Carbohydrate {Evening Snack} (CSTCHOSN)  1000mL Fluid Restriction ([id number])  No Concentrated Potassium  Low Sodium  Supplement Feeding Modality:  Oral  Nepro Cans or Servings Per Day:  1       Frequency:  Two Times a day  Entered: Jun 18 2021  2:51PM    
This patient has been assessed with a concern for Malnutrition and has been determined to have a diagnosis/diagnoses of Severe protein-calorie malnutrition.    This patient is being managed with:   Diet Regular-  Consistent Carbohydrate {Evening Snack} (CSTCHOSN)  DASH/TLC {Sodium & Cholesterol Restricted} (DASH)  1000mL Fluid Restriction ([id number])  For patients receiving Renal Replacement - No Protein Restr No Conc K No Conc Phos Low Sodium (RENAL)  No Concentrated Potassium  Low Sodium  Supplement Feeding Modality:  Oral  Nepro Cans or Servings Per Day:  1       Frequency:  Two Times a day  Entered: Jun 17 2021  3:54PM    
This patient has been assessed with a concern for Malnutrition and has been determined to have a diagnosis/diagnoses of Severe protein-calorie malnutrition.    This patient is being managed with:   Diet Regular-  Low Fat (LOWFAT)  Supplement Feeding Modality:  Oral  Ensure Clear Cans or Servings Per Day:  3       Frequency:  Daily  Entered: Jul 6 2021 12:08PM    
This patient has been assessed with a concern for Malnutrition and has been determined to have a diagnosis/diagnoses of Severe protein-calorie malnutrition.    This patient is being managed with:   Diet NPO after Midnight-     NPO Start Date: 23-Jun-2021   NPO Start Time: 23:59  Except Medications  Entered: Jun 23 2021  7:06PM    Diet Regular-  Consistent Carbohydrate {Evening Snack} (CSTCHOSN)  1000mL Fluid Restriction ([id number])  No Concentrated Potassium  Low Sodium  Supplement Feeding Modality:  Oral  Nepro Cans or Servings Per Day:  1       Frequency:  Two Times a day  Entered: Jun 18 2021  2:51PM    
This patient has been assessed with a concern for Malnutrition and has been determined to have a diagnosis/diagnoses of Severe protein-calorie malnutrition.    This patient is being managed with:   Diet Clear Liquid-  Supplement Feeding Modality:  Oral  Ensure Clear Cans or Servings Per Day:  2       Frequency:  Daily  Entered: Jun 25 2021  6:31PM    
This patient has been assessed with a concern for Malnutrition and has been determined to have a diagnosis/diagnoses of Severe protein-calorie malnutrition.    This patient is being managed with:   Diet NPO after Midnight-     NPO Start Date: 23-Jun-2021   NPO Start Time: 23:59  Except Medications  Entered: Jun 23 2021  7:06PM    Diet Regular-  Consistent Carbohydrate {Evening Snack} (CSTCHOSN)  1000mL Fluid Restriction ([id number])  No Concentrated Potassium  Low Sodium  Supplement Feeding Modality:  Oral  Nepro Cans or Servings Per Day:  1       Frequency:  Two Times a day  Entered: Jun 18 2021  2:51PM    
This patient has been assessed with a concern for Malnutrition and has been determined to have a diagnosis/diagnoses of Severe protein-calorie malnutrition.    This patient is being managed with:   Diet Regular-  Low Fat (LOWFAT)  Supplement Feeding Modality:  Oral  Ensure Clear Cans or Servings Per Day:  3       Frequency:  Daily  Entered: Jul 6 2021 12:08PM    
This patient has been assessed with a concern for Malnutrition and has been determined to have a diagnosis/diagnoses of Severe protein-calorie malnutrition.    This patient is being managed with:   Diet Regular-  Consistent Carbohydrate {Evening Snack} (CSTCHOSN)  1000mL Fluid Restriction ([id number])  No Concentrated Potassium  Low Sodium  Supplement Feeding Modality:  Oral  Nepro Cans or Servings Per Day:  1       Frequency:  Two Times a day  Entered: Jun 18 2021  2:51PM    
This patient has been assessed with a concern for Malnutrition and has been determined to have a diagnosis/diagnoses of Severe protein-calorie malnutrition.    This patient is being managed with:   Diet Regular-  Low Fat (LOWFAT)  Supplement Feeding Modality:  Oral  Ensure Clear Cans or Servings Per Day:  3       Frequency:  Daily  Entered: Jul 6 2021 12:08PM    
This patient has been assessed with a concern for Malnutrition and has been determined to have a diagnosis/diagnoses of Severe protein-calorie malnutrition.    This patient is being managed with:   Diet Regular-  Consistent Carbohydrate {Evening Snack} (CSTCHOSN)  1000mL Fluid Restriction ([id number])  No Concentrated Potassium  Low Sodium  Supplement Feeding Modality:  Oral  Nepro Cans or Servings Per Day:  1       Frequency:  Two Times a day  Entered: Jun 18 2021  2:51PM    
This patient has been assessed with a concern for Malnutrition and has been determined to have a diagnosis/diagnoses of Severe protein-calorie malnutrition.    This patient is being managed with:   Diet Regular-  Supplement Feeding Modality:  Oral  Ensure Clear Cans or Servings Per Day:  3       Frequency:  Daily  Entered: Jul 16 2021  3:32PM    
This patient has been assessed with a concern for Malnutrition and has been determined to have a diagnosis/diagnoses of Severe protein-calorie malnutrition.    This patient is being managed with:   Diet Regular-  Entered: Jul 26 2021 10:34AM    
This patient has been assessed with a concern for Malnutrition and has been determined to have a diagnosis/diagnoses of Severe protein-calorie malnutrition.    This patient is being managed with:   Diet Regular-  Low Fat (LOWFAT)  Supplement Feeding Modality:  Oral  Ensure Clear Cans or Servings Per Day:  3       Frequency:  Daily  Entered: Jul 6 2021 12:08PM    
This patient has been assessed with a concern for Malnutrition and has been determined to have a diagnosis/diagnoses of Severe protein-calorie malnutrition.    This patient is being managed with:   Diet Regular-  Low Fat (LOWFAT)  Supplement Feeding Modality:  Oral  Ensure Clear Cans or Servings Per Day:  3       Frequency:  Daily  Entered: Jul 6 2021 12:08PM    
This patient has been assessed with a concern for Malnutrition and has been determined to have a diagnosis/diagnoses of Severe protein-calorie malnutrition.    This patient is being managed with:   Diet Regular-  Supplement Feeding Modality:  Oral  Ensure Clear Cans or Servings Per Day:  3       Frequency:  Daily  Entered: Jul 16 2021  3:32PM    
This patient has been assessed with a concern for Malnutrition and has been determined to have a diagnosis/diagnoses of Severe protein-calorie malnutrition.    This patient is being managed with:   Diet Full Liquid-  Low Fat (LOWFAT)  Supplement Feeding Modality:  Oral  Ensure Clear Cans or Servings Per Day:  2       Frequency:  Daily  Entered: Jun 30 2021  1:17PM    
This patient has been assessed with a concern for Malnutrition and has been determined to have a diagnosis/diagnoses of Severe protein-calorie malnutrition.    This patient is being managed with:   Diet Regular-  Consistent Carbohydrate {Evening Snack} (CSTCHOSN)  1000mL Fluid Restriction ([id number])  No Concentrated Potassium  Low Sodium  Supplement Feeding Modality:  Oral  Nepro Cans or Servings Per Day:  1       Frequency:  Two Times a day  Entered: Jun 18 2021  2:51PM    
This patient has been assessed with a concern for Malnutrition and has been determined to have a diagnosis/diagnoses of Severe protein-calorie malnutrition.    This patient is being managed with:   Diet Regular-  Entered: Jul 26 2021 10:34AM    
This patient has been assessed with a concern for Malnutrition and has been determined to have a diagnosis/diagnoses of Severe protein-calorie malnutrition.    This patient is being managed with:   Diet Full Liquid-  Low Fat (LOWFAT)  Supplement Feeding Modality:  Oral  Ensure Clear Cans or Servings Per Day:  2       Frequency:  Daily  Entered: Jun 30 2021  1:17PM    
This patient has been assessed with a concern for Malnutrition and has been determined to have a diagnosis/diagnoses of Severe protein-calorie malnutrition.    This patient is being managed with:   Diet NPO after Midnight-     NPO Start Date: 23-Jun-2021   NPO Start Time: 23:59  Except Medications  Entered: Jun 23 2021  7:06PM    Diet Regular-  Consistent Carbohydrate {Evening Snack} (CSTCHOSN)  1000mL Fluid Restriction ([id number])  No Concentrated Potassium  Low Sodium  Supplement Feeding Modality:  Oral  Nepro Cans or Servings Per Day:  1       Frequency:  Two Times a day  Entered: Jun 18 2021  2:51PM    
This patient has been assessed with a concern for Malnutrition and has been determined to have a diagnosis/diagnoses of Severe protein-calorie malnutrition.    This patient is being managed with:   Diet Full Liquid-  Low Fat (LOWFAT)  Supplement Feeding Modality:  Oral  Ensure Clear Cans or Servings Per Day:  2       Frequency:  Daily  Entered: Jun 30 2021  1:17PM    
This patient has been assessed with a concern for Malnutrition and has been determined to have a diagnosis/diagnoses of Severe protein-calorie malnutrition.    This patient is being managed with:   Diet Regular-  Supplement Feeding Modality:  Oral  Ensure Clear Cans or Servings Per Day:  3       Frequency:  Daily  Entered: Jul 16 2021  3:32PM    
This patient has been assessed with a concern for Malnutrition and has been determined to have a diagnosis/diagnoses of Severe protein-calorie malnutrition.    This patient is being managed with:   Diet Clear Liquid-  Supplement Feeding Modality:  Oral  Ensure Clear Cans or Servings Per Day:  2       Frequency:  Daily  Entered: Jun 25 2021  6:31PM    
This patient has been assessed with a concern for Malnutrition and has been determined to have a diagnosis/diagnoses of Severe protein-calorie malnutrition.    This patient is being managed with:   Diet Regular-  Fiber/Residue Restricted (LOWFIBER)  Low Fat (LOWFAT)  Supplement Feeding Modality:  Oral  Ensure Clear Cans or Servings Per Day:  3       Frequency:  Daily  Entered: Jul 2 2021 12:16PM    
This patient has been assessed with a concern for Malnutrition and has been determined to have a diagnosis/diagnoses of Severe protein-calorie malnutrition.    This patient is being managed with:   Diet Regular-  Supplement Feeding Modality:  Oral  Ensure Clear Cans or Servings Per Day:  3       Frequency:  Daily  Entered: Jul 16 2021  3:32PM    
This patient has been assessed with a concern for Malnutrition and has been determined to have a diagnosis/diagnoses of Severe protein-calorie malnutrition.    This patient is being managed with:   Diet Regular-  Low Fat (LOWFAT)  Supplement Feeding Modality:  Oral  Ensure Clear Cans or Servings Per Day:  3       Frequency:  Daily  Entered: Jul 6 2021 12:08PM    
This patient has been assessed with a concern for Malnutrition and has been determined to have a diagnosis/diagnoses of Severe protein-calorie malnutrition.    This patient is being managed with:   Diet Regular-  Supplement Feeding Modality:  Oral  Ensure Clear Cans or Servings Per Day:  3       Frequency:  Daily  Entered: Jul 16 2021  3:32PM    
This patient has been assessed with a concern for Malnutrition and has been determined to have a diagnosis/diagnoses of Severe protein-calorie malnutrition.    This patient is being managed with:   Diet Clear Liquid-  Supplement Feeding Modality:  Oral  Ensure Clear Cans or Servings Per Day:  2       Frequency:  Daily  Entered: Jun 25 2021  6:31PM    
This patient has been assessed with a concern for Malnutrition and has been determined to have a diagnosis/diagnoses of Severe protein-calorie malnutrition.    This patient is being managed with:   Diet Regular-  Low Fat (LOWFAT)  Supplement Feeding Modality:  Oral  Ensure Clear Cans or Servings Per Day:  3       Frequency:  Daily  Entered: Jul 6 2021 12:08PM    
This patient has been assessed with a concern for Malnutrition and has been determined to have a diagnosis/diagnoses of Severe protein-calorie malnutrition.    This patient is being managed with:   Diet Full Liquid-  Low Fat (LOWFAT)  Supplement Feeding Modality:  Oral  Ensure Clear Cans or Servings Per Day:  2       Frequency:  Daily  Entered: Jun 30 2021  1:17PM    
This patient has been assessed with a concern for Malnutrition and has been determined to have a diagnosis/diagnoses of Severe protein-calorie malnutrition.    This patient is being managed with:   Diet Regular-  Low Fat (LOWFAT)  Supplement Feeding Modality:  Oral  Ensure Clear Cans or Servings Per Day:  3       Frequency:  Daily  Entered: Jul 6 2021 12:08PM    
This patient has been assessed with a concern for Malnutrition and has been determined to have a diagnosis/diagnoses of Severe protein-calorie malnutrition.    This patient is being managed with:   Diet Regular-  Supplement Feeding Modality:  Oral  Ensure Clear Cans or Servings Per Day:  3       Frequency:  Daily  Entered: Jul 16 2021  3:32PM    

## 2021-07-30 NOTE — PROGRESS NOTE ADULT - SUBJECTIVE AND OBJECTIVE BOX
Diagnosis: FLT3 ITD (-) Acute Myeloid Leukemia    Protocol/Chemo Regimen: Cycle 2 Decitabine + Venetoclax     Day: 11      Pt endorsed: no complaint     Review of Systems: Denies n/v, abdominal pain, orthopnea, SOB     Pain scale: 0  Diet regular    Allergies    morphine (Unknown)      ANTIMICROBIALS  levoFLOXacin  Tablet 250 milliGRAM(s) Oral every 24 hours  posaconazole DR Tablet 300 milliGRAM(s) Oral daily      HEME/ONC MEDICATIONS  venetoclax 100 milliGRAM(s) Oral <User Schedule>      STANDING MEDICATIONS  Biotene Dry Mouth Oral Rinse 5 milliLiter(s) Swish and Spit four times a day  calcium carbonate    500 mG (Tums) Chewable 2 Tablet(s) Chew daily  chlorhexidine 2% Cloths 1 Application(s) Topical <User Schedule>  cholecalciferol 2000 Unit(s) Oral daily  escitalopram 5 milliGRAM(s) Oral daily  finasteride 5 milliGRAM(s) Oral daily  folic acid 1 milliGRAM(s) Oral daily  furosemide    Tablet 20 milliGRAM(s) Oral daily  isosorbide   mononitrate ER Tablet (IMDUR) 60 milliGRAM(s) Oral daily  lidocaine   4% Patch 1 Patch Transdermal daily  metoprolol succinate ER 50 milliGRAM(s) Oral daily  polyethylene glycol 3350 17 Gram(s) Oral daily  sodium bicarbonate 650 milliGRAM(s) Oral three times a day  tamsulosin 0.4 milliGRAM(s) Oral at bedtime  urea Oral Powder 15 Gram(s) Oral every 12 hours      PRN MEDICATIONS  acetaminophen   Tablet .. 650 milliGRAM(s) Oral every 6 hours PRN  albuterol/ipratropium for Nebulization 3 milliLiter(s) Nebulizer every 6 hours PRN  aluminum hydroxide/magnesium hydroxide/simethicone Suspension 30 milliLiter(s) Oral every 4 hours PRN  calcium carbonate    500 mG (Tums) Chewable 1 Tablet(s) Chew every 4 hours PRN  ondansetron Injectable 8 milliGRAM(s) IV Push every 8 hours PRN  senna 2 Tablet(s) Oral at bedtime PRN  sodium chloride 0.9% lock flush 10 milliLiter(s) IV Push every 1 hour PRN  sucralfate suspension 1 Gram(s) Oral every 6 hours PRN      Vital Signs Last 24 Hrs  T(C): 36.1 (30 Jul 2021 06:15), Max: 37.2 (29 Jul 2021 20:15)  T(F): 97 (30 Jul 2021 06:15), Max: 99 (29 Jul 2021 20:15)  HR: 77 (30 Jul 2021 06:15) (62 - 77)  BP: 138/62 (30 Jul 2021 06:15) (121/66 - 150/59)  BP(mean): --  RR: 18 (30 Jul 2021 06:15) (18 - 18)  SpO2: 95% (30 Jul 2021 06:15) (95% - 98%)      PHYSICAL EXAM  General: NAD  HEENT: clear oropharynx  CV: (+) S1/S2 RRR  Lungs: clear to auscultation, no wheezes or rales  Abdomen: soft, non-tender, non-distended (+) BS  Ext: no edema  Skin: no rashes   Neuro: alert and oriented X3  Central Line: PICC c/d/i         LABS:                        7.4    0.46  )-----------( 26       ( 29 Jul 2021 07:03 )             22.0         Mean Cell Volume : 87.3 fl  Mean Cell Hemoglobin : 29.4 pg  Mean Cell Hemoglobin Concentration : 33.6 gm/dL  Auto Neutrophil # : 0.04 K/uL  Auto Lymphocyte # : 0.38 K/uL  Auto Monocyte # : 0.03 K/uL  Auto Eosinophil # : 0.00 K/uL  Auto Basophil # : 0.00 K/uL  Auto Neutrophil % : 8.7 %  Auto Lymphocyte % : 82.6 %  Auto Monocyte % : 6.5 %  Auto Eosinophil % : 0.0 %  Auto Basophil % : 0.0 %      07-29    137  |  105  |  30<H>  ----------------------------<  89  3.8   |  22  |  1.87<H>    Ca    9.0      29 Jul 2021 07:05  Phos  2.8     07-29  Mg     2.1     07-29    TPro  6.1  /  Alb  2.5<L>  /  TBili  0.6  /  DBili  x   /  AST  7<L>  /  ALT  <5<L>  /  AlkPhos  82  07-29          PT/INR - ( 29 Jul 2021 07:06 )   PT: 14.9 sec;   INR: 1.26 ratio           RADIOLOGY & ADDITIONAL STUDIES:    < from: Xray Chest 1 View- PORTABLE-Urgent (Xray Chest 1 View- PORTABLE-Urgent .) (07.10.21 @ 15:53) >  IMPRESSION: Constellation of findings compatible with CHF.                     Diagnosis: FLT3 ITD (-) Acute Myeloid Leukemia    Protocol/Chemo Regimen: Cycle 2 Decitabine + Venetoclax     Day: 11    Serbian ID [id number] Lore  Pt endorsed: mild fatigue     Review of Systems: Denies n/v, abdominal pain, orthopnea, SOB     Pain scale: 0    Diet regular    Allergies    morphine (Unknown)      ANTIMICROBIALS  levoFLOXacin  Tablet 250 milliGRAM(s) Oral every 24 hours  posaconazole DR Tablet 300 milliGRAM(s) Oral daily      HEME/ONC MEDICATIONS  venetoclax 100 milliGRAM(s) Oral <User Schedule>      STANDING MEDICATIONS  Biotene Dry Mouth Oral Rinse 5 milliLiter(s) Swish and Spit four times a day  calcium carbonate    500 mG (Tums) Chewable 2 Tablet(s) Chew daily  chlorhexidine 2% Cloths 1 Application(s) Topical <User Schedule>  cholecalciferol 2000 Unit(s) Oral daily  escitalopram 5 milliGRAM(s) Oral daily  finasteride 5 milliGRAM(s) Oral daily  folic acid 1 milliGRAM(s) Oral daily  furosemide    Tablet 20 milliGRAM(s) Oral daily  isosorbide   mononitrate ER Tablet (IMDUR) 60 milliGRAM(s) Oral daily  lidocaine   4% Patch 1 Patch Transdermal daily  metoprolol succinate ER 50 milliGRAM(s) Oral daily  polyethylene glycol 3350 17 Gram(s) Oral daily  sodium bicarbonate 650 milliGRAM(s) Oral three times a day  tamsulosin 0.4 milliGRAM(s) Oral at bedtime  urea Oral Powder 15 Gram(s) Oral every 12 hours      PRN MEDICATIONS  acetaminophen   Tablet .. 650 milliGRAM(s) Oral every 6 hours PRN  albuterol/ipratropium for Nebulization 3 milliLiter(s) Nebulizer every 6 hours PRN  aluminum hydroxide/magnesium hydroxide/simethicone Suspension 30 milliLiter(s) Oral every 4 hours PRN  calcium carbonate    500 mG (Tums) Chewable 1 Tablet(s) Chew every 4 hours PRN  ondansetron Injectable 8 milliGRAM(s) IV Push every 8 hours PRN  senna 2 Tablet(s) Oral at bedtime PRN  sodium chloride 0.9% lock flush 10 milliLiter(s) IV Push every 1 hour PRN  sucralfate suspension 1 Gram(s) Oral every 6 hours PRN      Vital Signs Last 24 Hrs  T(C): 36.1 (30 Jul 2021 06:15), Max: 37.2 (29 Jul 2021 20:15)  T(F): 97 (30 Jul 2021 06:15), Max: 99 (29 Jul 2021 20:15)  HR: 77 (30 Jul 2021 06:15) (62 - 77)  BP: 138/62 (30 Jul 2021 06:15) (121/66 - 150/59)  BP(mean): --  RR: 18 (30 Jul 2021 06:15) (18 - 18)  SpO2: 95% (30 Jul 2021 06:15) (95% - 98%)      PHYSICAL EXAM  General: NAD  HEENT: clear oropharynx  CV: (+) S1/S2 RRR  Lungs: clear to auscultation, no wheezes or rales  Abdomen: soft, non-tender, non-distended (+) BS  Ext: no edema  Skin: no rashes   Neuro: alert and oriented X3  Central Line: PICC c/d/i       LABS:                          8.4    0.60  )-----------( 16       ( 30 Jul 2021 06:54 )             25.3         Mean Cell Volume : 87.5 fl  Mean Cell Hemoglobin : 29.1 pg  Mean Cell Hemoglobin Concentration : 33.2 gm/dL  Auto Neutrophil # : 0.02 K/uL  Auto Lymphocyte # : 0.58 K/uL  Auto Monocyte # : 0.00 K/uL  Auto Eosinophil # : 0.00 K/uL  Auto Basophil # : 0.00 K/uL  Auto Neutrophil % : 2.7 %  Auto Lymphocyte % : 97.3 %  Auto Monocyte % : 0.0 %  Auto Eosinophil % : 0.0 %  Auto Basophil % : 0.0 %      07-30    138  |  106  |  28<H>  ----------------------------<  96  4.0   |  24  |  1.84<H>    Ca    9.1      30 Jul 2021 06:53  Phos  2.9     07-30  Mg     2.0     07-30    TPro  6.4  /  Alb  2.6<L>  /  TBili  1.2  /  DBili  x   /  AST  7<L>  /  ALT  <5<L>  /  AlkPhos  89  07-30      PT/INR - ( 29 Jul 2021 07:06 )   PT: 14.9 sec;   INR: 1.26 ratio            Uric Acid --         RADIOLOGY & ADDITIONAL STUDIES:    < from: Xray Chest 1 View- PORTABLE-Urgent (Xray Chest 1 View- PORTABLE-Urgent .) (07.10.21 @ 15:53) >  IMPRESSION: Constellation of findings compatible with CHF.

## 2021-07-30 NOTE — PROGRESS NOTE ADULT - PROVIDER SPECIALTY LIST ADULT
Heme/Onc
MICU
Nephrology
Nephrology
Heme/Onc
Infectious Disease
Nephrology
Endocrinology
Heme/Onc
Infectious Disease
Infectious Disease
Nephrology
Surgery
Heme/Onc
Infectious Disease
Nephrology
Heme/Onc
Heme/Onc
Internal Medicine
Nephrology
Surgery
Heme/Onc
Infectious Disease
Heme/Onc
Internal Medicine
Heme/Onc

## 2021-07-30 NOTE — PROVIDER CONTACT NOTE (CRITICAL VALUE NOTIFICATION) - NAME OF MD/NP/PA/DO NOTIFIED:
LISA Vazquez
Malgorzata Martínez PA
Tina GONZALEZ
Indu Bobo NP made aware
LISA beck
LISA johnson
PREETI Oconnell
Cornelius Proctor MD
LISA Serrato
PREETI Shields
Migdalia Taylor
PREETI Lott
Tina,NP
Indu,NP
LISA Bobo
Marilyn Leos NP
Alyse Stokes NP
Alyse Stokes, NP
LISA Sanders
LISA Wilder
Malgorzata Martínez
NP
NP
Juan ÁLVAREZ
LISA Stokes
Indu Bobo, NP
Indu Hilton
LISA Marino
LISA beck
Maria Cuevas
Maria Cuevas NP
Marilyn Dhillon NP
Ming, NP
Soila Stokes NP
Tina Calvert, NP
Cornelius Proctor MD

## 2021-07-30 NOTE — PROGRESS NOTE ADULT - PROBLEM SELECTOR PLAN 7
improving   likely SIADH  Started Urea 15gm twice daily   Monitor Na+ daily  Nephro following  7/29 dc urea and Tums and continue sodium bicarb tabs per renal resolved  likely SIADH  Started Urea 15gm twice daily   Monitor Na+ daily  Nephro following  7/29 dc urea and Tums and continue sodium bicarb tabs per renal

## 2021-08-13 NOTE — REASON FOR VISIT
[Follow-Up Visit] : a follow-up visit for [Acute Myeloid Leukemia] : acute myeloid leukemia [Family Member] : family member [Other: ______] : provided by YUNG

## 2021-08-13 NOTE — REVIEW OF SYSTEMS
[Fatigue] : fatigue [SOB on Exertion] : shortness of breath during exertion [Constipation] : constipation [Negative] : Allergic/Immunologic
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
## 2021-08-13 NOTE — PHYSICAL EXAM
[Normal] : affect appropriate [de-identified] : in wheelchair [de-identified] : edema in feet and ankles
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
## 2021-08-27 NOTE — ED PROVIDER NOTE - PROGRESS NOTE DETAILS
Yessy Javier MD (PGY2) -  Pt started on Bipap for RR35s, sat wnl. Resident: Yessy Javier (PGY2) – Pt was re-evaluated at bedside, VSS, feeling better overall on the bipap, RR 17. We discussed the results of ED workup with the patient including the need for hospitalization for further management. Time was taken to answer any questions that the patient/family had.

## 2021-08-27 NOTE — ED CLERICAL - NS ED CLERK NOTE PRE-ARRIVAL INFORMATION; ADDITIONAL PRE-ARRIVAL INFORMATION
CC/Reason For referral: worsening; chest heart failure; r/o PE; ; right side leg and arm swelling; Shortness of breathe; fluid in lungs  Preferred Consultant(if applicable):  Who admits for you (if needed):  Do you have documents you would like to fax over?  Would you still like to speak to an ED attending? YES

## 2021-08-27 NOTE — ED PROVIDER NOTE - OBJECTIVE STATEMENT
Attending note (Vidal): 81 y/o M with h/o AML, CAD, CHF, prior CVA (mild right sided weakness), HTN, HLD, and BPH, prior COVID 19 infection (2/2021), who presents now with worsening shortness of breath.  Per family, patient has been having some shortness of breath that is worsening. Worse with lying flat recently (few weeks).  Recently diagnosed with leukemia in June of this year; had some SOB at that time; however, was mostly associated with walking longer distances. However, since past ~1 week, had worsening SOB, and then today had worsening since last night, can only take few steps.  Recently also noted increased swelling of the extremities. On lasix, 20mg (recently lowered doses few weeks ago), but noted increased SOB so went back up to 40mg qd last week.  On chemotherapy; last dose was yesterday; was supposed to get today but was sent to ED because of complaints of SOB. Needed frequent transfusions, last PRBC 1 week ago, has had platelets before but not last week.  Also of note, was diagnosed with a "gallbladder infection" treated with antibiotics.  Also noted some increased right sided abdominal pain recently (unclear exactly when it started).  no vomiting/diarrhea.

## 2021-08-27 NOTE — H&P ADULT - ASSESSMENT
80M PMHx CKD, CAD s/p CABG 2012 & 4 stents (2 in 2014, 2 in 2015), CHF with BiV ICD (2013), AML, prior CVA (mild R sided weakness), HTN, HLD, BPH, prior covid infection presented with worsening SOB.

## 2021-08-27 NOTE — H&P ADULT - NSHPPHYSICALEXAM_GEN_ALL_CORE
T(C): 36.1 (08-27-21 @ 22:05), Max: 36.8 (08-27-21 @ 13:38)  HR: 90 (08-27-21 @ 22:05) (90 - 102)  BP: 166/87 (08-27-21 @ 22:05) (164/89 - 169/78)  RR: 19 (08-27-21 @ 22:05) (19 - 24)  SpO2: 96% (08-27-21 @ 22:05) (96% - 100%)  Wt(kg): --  GENERAL: NAD, well-developed. On Bipap  HEAD:  Atraumatic, Normocephalic  EYES: EOMI, PERRLA, conjunctiva and sclera clear  NECK: Supple, No JVD  CHEST/LUNG:  No wheeze. Mild crackles bilateral.   HEART: Regular rate and rhythm; No murmurs, rubs, or gallops  ABDOMEN: Soft, Nondistended; Bowel sounds present. RUQ pain on palpation   EXTREMITIES:  2+ Peripheral Pulses, No clubbing, cyanosis. 2+ LE edema bilaterally. edema on RUE  PSYCH: AAOx3  NEUROLOGY: non-focal  SKIN: No rashes or lesions

## 2021-08-27 NOTE — H&P ADULT - ATTENDING COMMENTS
80M PMHx significant for CKD, CAD s/p CABG 2012 & 4 stents (2 in 2014, 2 in 2015), ischemic cardiomyopathy with last EF 25-30% on June 2021 with BiV ICD (2013), prior CVA (mild R sided weakness), recent diagnosis of AML on Dacogen who presents with shortness of breath as well as RUQ pain.     The patient complaints of SOB without cough. The patient had no complaints of abdominal pain; however, on exam he has tenderness on palpitation to the RUQ. Lower extremity bilateral 2-3+ pitting but worse on the right than the left. There was also tenderness to the right lower extremity behind the knees, not present on the left side. The patient was on bipap support at the time of evaluation 10/5 FiO2 of 40% breathing comfortably at RR 20 and Vt of 425. He was placed on this for tachypnea after the patient returned from CT scan. The patient appeared clinically improved at the time. The patient was transitioned from BiPAP to nasal canula, SpO2 of 100% and breathing comfortably.     Of note, the patient had a recent long hospitalization in June-July 2021 for AML. He presented as transfer from [place] for management of AML. He has a positive bone marrow biopsy (6/4/21) for AML and rec’ed 2 cycles of chemotherapy with dacogen and venteoclax. This hospitalization was complicated by volume overload from presumed CHF exacerbation requiring aggressive diuresis in the ICU, as well as and BRISA on CKD. He also had cholecystitis demonstrated on HIDA scan, managed conservatively as patient had minimal symptoms and had no associated transaminases. He also developed hyponatremia from SIADH and was started on urea at that time.     The patient was discharged home and since then received 1 platelet transfusion on 8/4/21, 8/5/21, 8/6/21 as well as pRBC transfusion on 8/5/21, 8/6/21, 8/20/21. He rec’ed treatment with Dacogen on Aug 24th.     He presents now with fluid overload due to heart failure exacerbation. Precipitating factor may be from recent docogen treatment, which the patient received just 3 days prior to arrival. The patient has also rec’ed multiple doses of pRBCs and platelet transfusion for his pancytopenia from chemo regimen. His outpatient lasix regimen was increased from 20mg daily to 40mg daily, but differential also includes volume overload due to these products. He was placed on BiPAP support in the ED for symptom management.     CT chest, abdomen, and pelvis were reviewed. Patient has bilateral pleural effusions, present on prior CT scan on prior scans but worsened.     Given the patient’s recent history of cholecystitis demonstrated on HIDA and current RUQ pain, patient should be evaluated further for possible recurrence of cholecystitis. Would consider re-consulting surgery, pending results of the RUQ ultrasound.     #HF exacerbation   #ischemic cardiomyopathy   #evaluate for cholecytistitis      Plan:   -s/p lasix 40mg IV x1 in the ED, no clear I/O available   -agree with diuresis with lasix 80mg daily IV   -insert indwelling vides for strict I/O   - patient now titrated off BiPaP to nasal canula support  -full respiratory viral panel   -continue home metoprolol   -clarify why the patient is not on ACEi   -continue home posaconazole and levofloxacin   -RUQ ultrasound   -consider surgical consultation regarding RUQ pain and prior HIDA scan demonstrating cholecystitis.      Rest of plan per resident note     DNR- confirmed with the patient’s NOK and son at the time of admission.

## 2021-08-27 NOTE — H&P ADULT - NSHPLABSRESULTS_GEN_ALL_CORE
(08-27 @ 15:03)                      8.3  4.53 )-----------( 344                 26.3    Neutrophils = 3.76 (82.9%)  Lymphocytes = 0.35 (7.7%)  Eosinophils = 0.00 (0.0%)  Basophils = 0.00 (0.0%)  Monocytes = 0.39 (8.5%)  Bands = --%    08-27    139  |  107  |  17  ----------------------------<  95  4.2   |  21<L>  |  1.86<H>    Ca    9.3      27 Aug 2021 15:03    TPro  6.5  /  Alb  2.6<L>  /  TBili  0.6  /  DBili  x   /  AST  10  /  ALT  9<L>  /  AlkPhos  83  08-27          RVP:    Venous Blood Gas:  08-27 @ 20:08  7.36/46/37/26/68.8  VBG Lactate: 1.1  Venous Blood Gas:  08-27 @ 16:08  7.33/50/42/26/73.3  VBG Lactate: 1.0        Tox:

## 2021-08-27 NOTE — ED PROVIDER NOTE - PHYSICAL EXAMINATION
On Physical Exam:  General: well appearing, in NAD, speaking clearly in full sentences and without difficulty; cooperative with exam  HEENT: anicteric sclera, airway patent  Neck: no neck tenderness, no nuchal rigidity  Cardiac: normal s1, s2; RRR; no MGR  Lungs: bibasilar rales  Abdomen: soft, nondistended, RLQ tenderness without rebound or guarding. no overlying erythema/rashes/skin changes  : no bladder tenderness or distension  Skin: intact, no rash  Extremities: bilateral lower extremity pitting edema from feet to pre-tibial region, no erythema/rashes/ecchymoses; nontender calves b/l. nontender popliteal regions bilaterally.  dp and pt pulses 1+ palpable bilaterally. cap refill in toes < 2 sec bilaterally. FROM of hips, knees and ankles b/l.

## 2021-08-27 NOTE — H&P ADULT - PROBLEM SELECTOR PLAN 5
Cr at baseline. Not on HD.   -avoid nephrotoxic drugs and agents  -renally dosed meds -continue home meds

## 2021-08-27 NOTE — H&P ADULT - HISTORY OF PRESENT ILLNESS
80M PMHx CKD, CAD s/p CABG 2012 & 4 stents (2 in 2014, 2 in 2015), CHF with BiV ICD (2013), AML, prior CVA (mild R sided weakness), HTN, HLD, BPH, prior covid infection presented with worsening SOB.   Patient endorsed SOB with walking long distance starting in June after diagnosed with AML. SOB has gotten worse over the past week, couldn't tolerate laying flat and one day prior to admission, he could only walk couple steps before significant SOB. He also had HF and taking lasix 40 mg daily. He did not missed any doses of lasix, and noticed swelling in both legs. He was C2 D4 of Dacogen (chemo) today but then was sent in ED for SOB and significant R leg swelling. Patient also endorsed RUQ pain, couldn't elaborate on the pain further. He denied N/V/D, fever, chills, chest pain, cough, blood in urine or stools.    80M PMHx CKD, CAD s/p CABG 2012 & 4 stents (2 in 2014, 2 in 2015), CHF with BiV ICD (2013), AML, prior CVA (mild R sided weakness), HTN, HLD, BPH, prior covid infection presented with worsening SOB.   Patient endorsed SOB with walking long distance starting in June after diagnosed with AML. SOB has gotten worse over the past week, couldn't tolerate laying flat and one day prior to admission, he has significant SOB at rest. He also had HF and taking lasix 40 mg daily. He did not missed any doses of lasix, and noticed swelling in both legs. He was C2 D4 of Dacogen (chemo) today but then was sent in ED for SOB and significant R leg swelling. Patient also endorsed RUQ pain, couldn't elaborate on the pain further. He denied N/V/D, fever, chills, chest pain, cough, blood in urine or stools.    80M PMHx CKD, CAD s/p CABG 2012 & 4 stents (2 in 2014, 2 in 2015), CHF with BiV ICD (2013), AML, prior CVA (mild R sided weakness), HTN, HLD, BPH, prior covid infection presented with worsening SOB.   Patient endorsed SOB with walking long distance starting in June after diagnosed with AML. SOB has gotten worse over the past week, couldn't tolerate laying flat and one day prior to admission, he has significant SOB at rest. He also had CHF and taking lasix 40 mg daily. He did not missed any doses of lasix, and noticed swelling in both legs. He was C2 D4 of Dacogen (chemo) today but then was sent in ED for SOB and significant R leg swelling. Per son, patient only had RUQ pain on palpation with doctor's exam. At home, did not complain of abdominal pain. He denied N/V/D, fever, chills, chest pain, cough, blood in urine or stools.

## 2021-08-27 NOTE — ED PROVIDER NOTE - NS ED ROS FT
Review of Systems:  -General: no fever or chills  -ENT: no congestion, no difficulty swallowing  -Pulmonary: +cough (nonproductive), +shortness of breath  -Cardiac: no chest pain, no palpitations  -Gastrointestinal: no abdominal pain, no nausea, no vomiting, and no diarrhea.  -Genitourinary: no blood or pain with urination  -Musculoskeletal: no back or neck pain  -Skin: no rashes  -Endocrine: No h/o diabetes or thyroid disease  -Neurologic: No focal weakness or numbness    All else negative unless otherwise specified elsewhere in this note.

## 2021-08-27 NOTE — H&P ADULT - PROBLEM SELECTOR PLAN 1
Last Echo in June 2021 showed EF of 25-30% with severe decreased LV systolic function, with large area of apical akinesis. Volume overloaded on exam with elevated pro-BNP. Trop elevated but could also be due to CKD. CXR showed pleural effusion (L>R). CT chest did not show PE, and showed bilateral pleural effusion (increased on L) and new MYKEL atalectasis. EKG showed no ischemic changes. 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   -lasix 80 mg IV  -bipap PRN  -daily weight  -strict ins and outs  -Dopler of bilateral legs and R arm  -trend trop Last Echo in June 2021 showed EF of 25-30% with severe decreased LV systolic function, with large area of apical akinesis. Volume overloaded on exam with elevated pro-BNP. Trop elevated but could also be due to CKD. CXR showed pleural effusion (L>R). CT chest did not show PE, and showed bilateral pleural effusion (increased on L) and new MYKEL atalectasis. EKG showed no ischemic changes. 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   -lasix 80 mg IV  -bipap PRN  -daily weight  -strict ins and outs  -Dopler of bilateral legs and R arm  -trend trop  -follow with a cardiologist at [facility]  -ANTONY kan

## 2021-08-27 NOTE — H&P ADULT - PROBLEM SELECTOR PLAN 2
Per son, no complaint of abdominal pain at home. Only had abdominal pain when pressed on by provider. CT abdomen showed limited assessment of gallbladder, no bowel obstruction, wall thickening or inflammatory changes. Liver enzymes were WNL.    -continue to monitor Per son, no complaint of abdominal pain at home. Only had abdominal pain when pressed on by provider. CT abdomen showed limited assessment of gallbladder, no bowel obstruction, wall thickening or inflammatory changes. Liver enzymes were WNL.    -RUQ ultrasound Per son, no complaint of abdominal pain at home. Only had abdominal pain when pressed on by provider. CT abdomen showed limited assessment of gallbladder, no bowel obstruction, wall thickening or inflammatory changes. Liver enzymes were WNL. Last admission found to have cholecystitis, asymptomatic so no intervention by surgery or IR.   -RUQ ultrasound
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
## 2021-08-27 NOTE — H&P ADULT - PARTICIPANTS
Pt unable to participate in conversation due to underlying history of cognitive impairment. Patient current ED course and treatment plan reviewed with the patient's son. Confirmed code status with son, who is the HCP. The patient's wishes would be for DNR; however, would like a trail of intubation if respiratory status were to deteriorate. I spent 20 minutes updating the family and discussing code status./Family

## 2021-08-27 NOTE — ED ADULT NURSE NOTE - OBJECTIVE STATEMENT
81 y/o Kiswahili speaking only, male aox4 with PMH of DM, CAD s/p CABG 2012, CKD, AML , CVA w/RUE weakness, covid 2/2021, cardiac defibrillator, systolic CHF, depression, HTN, from [facility] s/o shortness of breath and cough, was 98% pulse ox room air, was unstable to get treatment at [facility] and sent to ER for eval. EMS placed pt on 2liters oxygen nasal cannula for comfort.

## 2021-08-27 NOTE — ED PROVIDER NOTE - ATTENDING CONTRIBUTION TO CARE
Attending note (Vidal):  79 y/o M with h/o AML, CAD, CHF, prior CVA (mild right sided weakness), HTN, HLD, and BPH, prior COVID 19 infection (2/2021), who presents now with worsening shortness of breath; worsening bilateral lower extremity edema; mild / minimal rales in bibasilar lung fields and is not hypoxic on room air; 2+ pitting peripheral edema in bilateral lower extremities.  Given h/o CHF, overall presentation seems most consistent with CHF exacerbation / volume overload state, however, given largely immobile and h/o cancer also concern for VTE: will obtain CTA chest and b/l LE ultrasounds to eval for PE and DVT respectively.  Also concern for possible intraabdominal process given tenderness (and h/o prior cholecystitis w/o cholecystectomy); will botain CT A/P to further evaluate.  Is not febrile or otherwise clinically consistent with sepsis at time of initial evaluation.  Will obtain screening labs: cbc (to evaluate for leukocytosis or anemia), CMP (to evaluate for electrolyte abnormalities or renal/liver dysfunction) and troponin/proBNP (CHF eval).  CXR, covid PCR also ordered. Likely to require admission for ongoing evaluation/management.

## 2021-08-28 NOTE — PROGRESS NOTE ADULT - SUBJECTIVE AND OBJECTIVE BOX
called by RN staff for vides catheter placement for strict Is and Os.  ER staff unable to successfully pass catheter.  pt states he is able to void.  urinal at bedside with ~ 500cc clear yellow urine    pt with meatal stenosis- gently dilated with mosquito clamp  attempts at passing numerous catheters unsuccessful, likely secondary to enlarged prostate.    bedside cystoscopy for vides placement not recommended at this time.  would recommend placing condom catheter as patient is not in urinary retention and is able to void
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
## 2021-08-28 NOTE — PHYSICAL THERAPY INITIAL EVALUATION ADULT - PERTINENT HX OF CURRENT PROBLEM, REHAB EVAL
80M PMHx CKD, CAD s/p CABG 2012 & 4 stents (2 in 2014, 2 in 2015), CHF with BiV ICD (2013), AML, prior CVA (mild R sided weakness), HTN, HLD, BPH, prior covid infection presented with worsening SOB. CT Angio/chest- No PE.

## 2021-08-28 NOTE — PROGRESS NOTE ADULT - PROBLEM SELECTOR PLAN 3
-on C2 D4 of Dacogen   -on Venetoclax week   -heme-onc consult -on C2 D4 of Dacogen   -on Venetoclax week   -heme-onc consult-no treatment while inpatient
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
## 2021-08-28 NOTE — PROGRESS NOTE ADULT - PROBLEM SELECTOR PLAN 1
Last Echo in June 2021 showed EF of 25-30% with severe decreased LV systolic function, with large area of apical akinesis. Volume overloaded on exam with elevated pro-BNP. Trop elevated but could also be due to CKD. CXR showed pleural effusion (L>R). CT chest did not show PE, and showed bilateral pleural effusion (increased on L) and new MYKEL atalectasis. EKG showed no ischemic changes. 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   -lasix 80 mg IV  -bipap PRN  -daily weight  -strict ins and outs  -Dopler of bilateral legs and R arm  -trend trop  -follow with a cardiologist at [facility]  -ANTONY kan Last Echo in June 2021 showed EF of 25-30% with severe decreased LV systolic function, with large area of apical akinesis. Volume overloaded on exam with elevated pro-BNP. Trop elevated but could also be due to CKD. CXR showed pleural effusion (L>R). CT chest did not show PE, and showed bilateral pleural effusion (increased on L) and new MYKEL atalectasis. EKG showed no ischemic changes. 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   -Cards called, follow up recs  -lasix 80 mg IV  -daily weight  -strict ins and outs

## 2021-08-28 NOTE — CONSULT NOTE ADULT - ASSESSMENT
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 Hematology consulted for history of AML.    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 Kenyatta [place] [facility] [place]  -FLT3 ITD (-) Acute Myeloid Leukemia diagnosed in July 2021  -s/p C1 and C2 Dacogen/Venetoclax at [facility]  -Day 21 BM bx on 7/9, quantity insufficient. Repeated 7/16 : showing hypercellular marrow   -C3D1 Dacogen on 8/24/21. Currently on hold pending w/u of dyspn  -Continue home posaconazole and levofloxacin for ppx  -Hold Dacogen and Venetoclax for now pending work-up  -Check CBC with Diff daily  -No current plans for inpatient chemotherapy at this time. Patient will follow-up with Dr. Wise on discharge    #Dyspnea  -Unclear etiology at this time  -Work-up per primary team  -CT PE showing no pulmonary embolism. Bilateral pleural effusions, increased on the left with increased left lower and new left upper lobe compressive atelectasis.  -Suspect Acute on Chronic HF. 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 Hematology consulted for history of AML.    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 Repeated 7/16 : showing hypercellular marrow   -C3D1 Dacogen on 8/24/21. Currently on hold pending w/u of dyspn  -Continue home posaconazole and levofloxacin for ppx  -Hold Dacogen and Venetoclax for now pending work-up  -Check CBC with Diff daily  -No current plans for inpatient chemotherapy at this time. Patient will follow-up with Dr. Wise on discharge    #Dyspnea  -Unclear etiology at this time  -Work-up per primary team  -CT PE showing no pulmonary embolism. Bilateral pleural effusions, increased on the left with increased left lower and new left upper lobe compressive atelectasis.  -Suspect Acute on Chronic HF. Management per primary team  -UE and LE dopplers pending to r/o DVT    Lisa Stoner MD  Hematology/Oncology Fellow, PGY-5  Pager: [phone]  After 5pm and on weekends please page on-call fellow

## 2021-08-28 NOTE — ED ADULT NURSE REASSESSMENT NOTE - NS ED NURSE REASSESS COMMENT FT1
0715 Pt in [facility] ER Rm 2 TBA med. no bed yet. Fall risk precautions maintained. PICC line noted left upper arm. Lips and nailbeds pink. skin W&D. Denies pain

## 2021-08-28 NOTE — PROGRESS NOTE ADULT - SUBJECTIVE AND OBJECTIVE BOX
[facility] Division of Hospital Medicine  Pradeep MaddisonDO anthony  Pager (GATITO, 7K-5B): [phone]  Other Times:  [phone]    Patient is a 80y old  Male who presents with a chief complaint of CHF (28 Aug 2021 12:51)    SUBJECTIVE / OVERNIGHT EVENTS: Patient admitted overnight for possible CHF exacerbation. No acute events overnight. Patient seen and examined at bedside this morning-denies chest pain, palpitations, cough or shortness of breath.    REVIEW OF SYSTEMS:    CONSTITUTIONAL: No weakness, fevers or chills  EYES/ENT: No visual changes;  No vertigo or throat pain   NECK: No pain or stiffness  RESPIRATORY: No cough, wheezing, hemoptysis; No shortness of breath  CARDIOVASCULAR: No chest pain or palpitations  GASTROINTESTINAL: No abdominal or epigastric pain. No nausea, vomiting, or hematemesis; No diarrhea or constipation. No melena or hematochezia.  GENITOURINARY: No dysuria, frequency or hematuria  NEUROLOGICAL: No numbness or weakness  SKIN: No itching, burning, rashes, or lesions  MSK: No joint pain, no back pain  HEME: No easy bleeding, no easy bruising  All other review of systems is negative unless indicated above.    MEDICATIONS  (STANDING):  aspirin enteric coated 81 milliGRAM(s) Oral daily  enoxaparin Injectable 40 milliGRAM(s) SubCutaneous daily  finasteride 5 milliGRAM(s) Oral daily  folic acid 1 milliGRAM(s) Oral daily  furosemide   Injectable 80 milliGRAM(s) IV Push daily  isosorbide   mononitrate ER Tablet (IMDUR) 60 milliGRAM(s) Oral daily  levoFLOXacin  Tablet 250 milliGRAM(s) Oral every 24 hours  metoprolol succinate ER 50 milliGRAM(s) Oral daily  posaconazole Suspension 200 milliGRAM(s) Oral every 8 hours    MEDICATIONS  (PRN):  acetaminophen   Tablet .. 650 milliGRAM(s) Oral every 6 hours PRN Temp greater or equal to 38.5C (101.3F), Mild Pain (1 - 3)  aluminum hydroxide/magnesium hydroxide/simethicone Suspension 30 milliLiter(s) Oral every 4 hours PRN Dyspepsia  melatonin 3 milliGRAM(s) Oral at bedtime PRN Insomnia  ondansetron Injectable 4 milliGRAM(s) IV Push every 8 hours PRN Nausea and/or Vomiting      CAPILLARY BLOOD GLUCOSE        I&O's Summary      PHYSICAL EXAM:  Vital Signs Last 24 Hrs  T(C): 36.6 (28 Aug 2021 13:32), Max: 36.6 (28 Aug 2021 09:00)  T(F): 97.8 (28 Aug 2021 13:32), Max: 97.8 (28 Aug 2021 09:00)  HR: 91 (28 Aug 2021 13:32) (75 - 102)  BP: 136/86 (28 Aug 2021 13:32) (136/86 - 169/78)  BP(mean): --  RR: 20 (28 Aug 2021 13:32) (18 - 24)  SpO2: 94% (28 Aug 2021 13:32) (94% - 100%)    CONSTITUTIONAL: NAD, well-developed, well-groomed  EYES: EOMI; conjunctiva and sclera clear  ENMT: Moist oral mucosa, no pharyngeal injection or exudates  RESPIRATORY: Normal respiratory effort; lungs are clear to auscultation bilaterally  CARDIOVASCULAR: Regular rate and rhythm, normal S1 and S2, no murmur/rub/gallop; No lower extremity edema; Peripheral pulses are 2+ bilaterally  ABDOMEN: Nontender to palpation, normoactive bowel sounds, no rebound/guarding; No hepatosplenomegaly  MUSCULOSKELETAL:  Normal gait; no clubbing or cyanosis of digits; no joint swelling or tenderness to palpation  PSYCH: A+O to person, place, and time; affect appropriate  NEUROLOGY: CN 2-12 are intact and symmetric; no gross sensory deficits   SKIN: No rashes; no palpable lesions    LABS:                        8.9    4.62  )-----------( 388      ( 28 Aug 2021 06:02 )             27.3     08-28    138  |  105  |  19  ----------------------------<  98  4.2   |  22  |  1.88<H>    Ca    9.5      28 Aug 2021 06:02  Phos  2.9     08-28  Mg     2.1     08-28    TPro  6.6  /  Alb  2.9<L>  /  TBili  0.7  /  DBili  x   /  AST  10  /  ALT  9<L>  /  AlkPhos  92  08-28                RADIOLOGY & ADDITIONAL TESTS:  Results Reviewed:   Imaging Personally Reviewed:  Electrocardiogram Personally Reviewed:    COORDINATION OF CARE:  Care Discussed with Consultants/Other Providers [Y/N]:  Prior or Outpatient Records Reviewed [Y/N]:   [facility] Division of Hospital Medicine  Pradeep MaddisonDO anthony  Pager (GATITO, 1T-6X): [phone]  Other Times:  [phone]    Patient is a 80y old  Male who presents with a chief complaint of CHF (28 Aug 2021 12:51)    SUBJECTIVE / OVERNIGHT EVENTS: Patient admitted overnight for possible CHF exacerbation. No acute events overnight. Patient seen and examined at bedside this morning-denies chest pain, palpitations, cough or shortness of breath.    REVIEW OF SYSTEMS:    CONSTITUTIONAL: No weakness, fevers or chills  EYES/ENT: No visual changes;  No vertigo or throat pain   NECK: No pain or stiffness  RESPIRATORY: No cough, wheezing, hemoptysis; No shortness of breath  CARDIOVASCULAR: No chest pain or palpitations  GASTROINTESTINAL: No abdominal or epigastric pain. No nausea, vomiting, or hematemesis; No diarrhea or constipation. No melena or hematochezia.  GENITOURINARY: No dysuria, frequency or hematuria  NEUROLOGICAL: No numbness or weakness  SKIN: No itching, burning, rashes, or lesions  MSK: No joint pain, no back pain  HEME: No easy bleeding, no easy bruising  All other review of systems is negative unless indicated above.    MEDICATIONS  (STANDING):  aspirin enteric coated 81 milliGRAM(s) Oral daily  enoxaparin Injectable 40 milliGRAM(s) SubCutaneous daily  finasteride 5 milliGRAM(s) Oral daily  folic acid 1 milliGRAM(s) Oral daily  furosemide   Injectable 80 milliGRAM(s) IV Push daily  isosorbide   mononitrate ER Tablet (IMDUR) 60 milliGRAM(s) Oral daily  levoFLOXacin  Tablet 250 milliGRAM(s) Oral every 24 hours  metoprolol succinate ER 50 milliGRAM(s) Oral daily  posaconazole Suspension 200 milliGRAM(s) Oral every 8 hours    MEDICATIONS  (PRN):  acetaminophen   Tablet .. 650 milliGRAM(s) Oral every 6 hours PRN Temp greater or equal to 38.5C (101.3F), Mild Pain (1 - 3)  aluminum hydroxide/magnesium hydroxide/simethicone Suspension 30 milliLiter(s) Oral every 4 hours PRN Dyspepsia  melatonin 3 milliGRAM(s) Oral at bedtime PRN Insomnia  ondansetron Injectable 4 milliGRAM(s) IV Push every 8 hours PRN Nausea and/or Vomiting      CAPILLARY BLOOD GLUCOSE        I&O's Summary      PHYSICAL EXAM:  Vital Signs Last 24 Hrs  T(C): 36.6 (28 Aug 2021 13:32), Max: 36.6 (28 Aug 2021 09:00)  T(F): 97.8 (28 Aug 2021 13:32), Max: 97.8 (28 Aug 2021 09:00)  HR: 91 (28 Aug 2021 13:32) (75 - 102)  BP: 136/86 (28 Aug 2021 13:32) (136/86 - 169/78)  BP(mean): --  RR: 20 (28 Aug 2021 13:32) (18 - 24)  SpO2: 94% (28 Aug 2021 13:32) (94% - 100%)    CONSTITUTIONAL: NAD, well-developed, well-groomed  EYES: EOMI; conjunctiva and sclera clear  ENMT: Moist oral mucosa, no pharyngeal injection or exudates  RESPIRATORY: Normal respiratory effort; no rhonchi or wheezing but does have bibasilar rales  CARDIOVASCULAR: Regular rate and rhythm, normal S1 and S2, no murmur/rub/gallop; bilateral pitting edema R>L  ABDOMEN: Nontender to palpation, normoactive bowel sounds, no rebound/guarding, surgical scar present, well healed  MUSCULOSKELETAL: No clubbing or cyanosis of digits; no joint swelling or tenderness to palpation  PSYCH: A+O to person, place, and time; affect appropriate  NEUROLOGY: CN 2-12 are intact and symmetric; no gross sensory deficits   SKIN: No rashes; no palpable lesions    LABS:                        8.9    4.62  )-----------( 388      ( 28 Aug 2021 06:02 )             27.3     08-28    138  |  105  |  19  ----------------------------<  98  4.2   |  22  |  1.88<H>    Ca    9.5      28 Aug 2021 06:02  Phos  2.9     08-28  Mg     2.1     08-28    TPro  6.6  /  Alb  2.9<L>  /  TBili  0.7  /  DBili  x   /  AST  10  /  ALT  9<L>  /  AlkPhos  92  08-28

## 2021-08-28 NOTE — ED ADULT NURSE REASSESSMENT NOTE - NS ED NURSE REASSESS COMMENT FT1
[facility] room called, pt had 3 sec run of vtach. Pt remains at baseline mental status with stable VS. Inpatient MD Lyon contacted and made aware of episode. No nursing interventions necessary at this time.

## 2021-08-28 NOTE — ED ADULT NURSE REASSESSMENT NOTE - NS ED NURSE REASSESS COMMENT FT1
Levy catheter insertion attempted 3x by 2 RNs. Could not insert catheter. Urology paged. states he will be down to attempt insertion,

## 2021-08-28 NOTE — CONSULT NOTE ADULT - ATTENDING COMMENTS
history of FLT3 ITD (-) Acute Myeloid Leukemia on Dacogen/Venetoclax C3D1 Dacogen on 8/24/21.   h/o prior covid infection   presented with worsening SOB.   CT PE showing no pulmonary embolism. Bilateral pleural effusions, increased on the left with increased left lower and new left upper lobe compressive atelectasis.  Suspect Acute on Chronic HF. Diuresis as per cards.  Repeat echo pending.  UE and LE dopplers pending to r/o DVT given swelling  Hold Dacogen and Venetoclax for now pending work-up  Continue home posaconazole and levofloxacin for ppx  Follows with Dr. Wise at [facility]

## 2021-08-28 NOTE — PHYSICAL THERAPY INITIAL EVALUATION ADULT - ADDITIONAL COMMENTS
As per pt., lives with wife and son in apt. 6th flr,elevator+.  Patient ambulated with straight cane independent. pt. owns RW, w/c at home. (call placed to son to obtain PLOF. No answer. will f/u)

## 2021-08-28 NOTE — PHYSICAL THERAPY INITIAL EVALUATION ADULT - RANGE OF MOTION EXAMINATION, REHAB EVAL
Calorie Counting for Weight Loss  Calories are units of energy. Your body needs a certain amount of calories from food to keep you going throughout the day. When you eat more calories than your body needs, your body stores the extra calories as fat. When you eat fewer calories than your body needs, your body burns fat to get the energy it needs.  Calorie counting means keeping track of how many calories you eat and drink each day. Calorie counting can be helpful if you need to lose weight. If you make sure to eat fewer calories than your body needs, you should lose weight. Ask your health care provider what a healthy weight is for you.  For calorie counting to work, you will need to eat the right number of calories in a day in order to lose a healthy amount of weight per week. A dietitian can help you determine how many calories you need in a day and will give you suggestions on how to reach your calorie goal.  · A healthy amount of weight to lose per week is usually 1-2 lb (0.5-0.9 kg). This usually means that your daily calorie intake should be reduced by 500-750 calories.  · Eating 1,200 - 1,500 calories per day can help most women lose weight.  · Eating 1,500 - 1,800 calories per day can help most men lose weight.  What is my plan?  My goal is to have __________ calories per day.  If I have this many calories per day, I should lose around __________ pounds per week.  What do I need to know about calorie counting?  In order to meet your daily calorie goal, you will need to:  · Find out how many calories are in each food you would like to eat. Try to do this before you eat.  · Decide how much of the food you plan to eat.  · Write down what you ate and how many calories it had. Doing this is called keeping a food log.  To successfully lose weight, it is important to balance calorie counting with a healthy lifestyle that includes regular activity. Aim for 150 minutes of moderate exercise (such as walking) or 75  minutes of vigorous exercise (such as running) each week.  Where do I find calorie information?    The number of calories in a food can be found on a Nutrition Facts label. If a food does not have a Nutrition Facts label, try to look up the calories online or ask your dietitian for help.  Remember that calories are listed per serving. If you choose to have more than one serving of a food, you will have to multiply the calories per serving by the amount of servings you plan to eat. For example, the label on a package of bread might say that a serving size is 1 slice and that there are 90 calories in a serving. If you eat 1 slice, you will have eaten 90 calories. If you eat 2 slices, you will have eaten 180 calories.  How do I keep a food log?  Immediately after each meal, record the following information in your food log:  · What you ate. Don't forget to include toppings, sauces, and other extras on the food.  · How much you ate. This can be measured in cups, ounces, or number of items.  · How many calories each food and drink had.  · The total number of calories in the meal.  Keep your food log near you, such as in a small notebook in your pocket, or use a mobile narciso or website. Some programs will calculate calories for you and show you how many calories you have left for the day to meet your goal.  What are some calorie counting tips?    · Use your calories on foods and drinks that will fill you up and not leave you hungry:  ? Some examples of foods that fill you up are nuts and nut butters, vegetables, lean proteins, and high-fiber foods like whole grains. High-fiber foods are foods with more than 5 g fiber per serving.  ? Drinks such as sodas, specialty coffee drinks, alcohol, and juices have a lot of calories, yet do not fill you up.  · Eat nutritious foods and avoid empty calories. Empty calories are calories you get from foods or beverages that do not have many vitamins or protein, such as candy, sweets, and  "soda. It is better to have a nutritious high-calorie food (such as an avocado) than a food with few nutrients (such as a bag of chips).  · Know how many calories are in the foods you eat most often. This will help you calculate calorie counts faster.  · Pay attention to calories in drinks. Low-calorie drinks include water and unsweetened drinks.  · Pay attention to nutrition labels for \"low fat\" or \"fat free\" foods. These foods sometimes have the same amount of calories or more calories than the full fat versions. They also often have added sugar, starch, or salt, to make up for flavor that was removed with the fat.  · Find a way of tracking calories that works for you. Get creative. Try different apps or programs if writing down calories does not work for you.  What are some portion control tips?  · Know how many calories are in a serving. This will help you know how many servings of a certain food you can have.  · Use a measuring cup to measure serving sizes. You could also try weighing out portions on a kitchen scale. With time, you will be able to estimate serving sizes for some foods.  · Take some time to put servings of different foods on your favorite plates, bowls, and cups so you know what a serving looks like.  · Try not to eat straight from a bag or box. Doing this can lead to overeating. Put the amount you would like to eat in a cup or on a plate to make sure you are eating the right portion.  · Use smaller plates, glasses, and bowls to prevent overeating.  · Try not to multitask (for example, watch TV or use your computer) while eating. If it is time to eat, sit down at a table and enjoy your food. This will help you to know when you are full. It will also help you to be aware of what you are eating and how much you are eating.  What are tips for following this plan?  Reading food labels  · Check the calorie count compared to the serving size. The serving size may be smaller than what you are used to " "eating.  · Check the source of the calories. Make sure the food you are eating is high in vitamins and protein and low in saturated and trans fats.  Shopping  · Read nutrition labels while you shop. This will help you make healthy decisions before you decide to purchase your food.  · Make a grocery list and stick to it.  Cooking  · Try to cook your favorite foods in a healthier way. For example, try baking instead of frying.  · Use low-fat dairy products.  Meal planning  · Use more fruits and vegetables. Half of your plate should be fruits and vegetables.  · Include lean proteins like poultry and fish.  How do I count calories when eating out?  · Ask for smaller portion sizes.  · Consider sharing an entree and sides instead of getting your own entree.  · If you get your own entree, eat only half. Ask for a box at the beginning of your meal and put the rest of your entree in it so you are not tempted to eat it.  · If calories are listed on the menu, choose the lower calorie options.  · Choose dishes that include vegetables, fruits, whole grains, low-fat dairy products, and lean protein.  · Choose items that are boiled, broiled, grilled, or steamed. Stay away from items that are buttered, battered, fried, or served with cream sauce. Items labeled \"crispy\" are usually fried, unless stated otherwise.  · Choose water, low-fat milk, unsweetened iced tea, or other drinks without added sugar. If you want an alcoholic beverage, choose a lower calorie option such as a glass of wine or light beer.  · Ask for dressings, sauces, and syrups on the side. These are usually high in calories, so you should limit the amount you eat.  · If you want a salad, choose a garden salad and ask for grilled meats. Avoid extra toppings like page, cheese, or fried items. Ask for the dressing on the side, or ask for olive oil and vinegar or lemon to use as dressing.  · Estimate how many servings of a food you are given. For example, a serving of " cooked rice is ½ cup or about the size of half a baseball. Knowing serving sizes will help you be aware of how much food you are eating at restaurants. The list below tells you how big or small some common portion sizes are based on everyday objects:  ? 1 oz--4 stacked dice.  ? 3 oz--1 deck of cards.  ? 1 tsp--1 die.  ? 1 Tbsp--½ a ping-pong ball.  ? 2 Tbsp--1 ping-pong ball.  ? ½ cup--½ baseball.  ? 1 cup--1 baseball.  Summary  · Calorie counting means keeping track of how many calories you eat and drink each day. If you eat fewer calories than your body needs, you should lose weight.  · A healthy amount of weight to lose per week is usually 1-2 lb (0.5-0.9 kg). This usually means reducing your daily calorie intake by 500-750 calories.  · The number of calories in a food can be found on a Nutrition Facts label. If a food does not have a Nutrition Facts label, try to look up the calories online or ask your dietitian for help.  · Use your calories on foods and drinks that will fill you up, and not on foods and drinks that will leave you hungry.  · Use smaller plates, glasses, and bowls to prevent overeating.  This information is not intended to replace advice given to you by your health care provider. Make sure you discuss any questions you have with your health care provider.  Document Released: 12/18/2006 Document Revised: 09/06/2019 Document Reviewed: 11/17/2017  [employer] Patient Education © 2020 [employer]      Exercising to Lose Weight  Exercise is structured, repetitive physical activity to improve fitness and health. Getting regular exercise is important for everyone. It is especially important if you are overweight. Being overweight increases your risk of heart disease, stroke, diabetes, high blood pressure, and several types of cancer. Reducing your calorie intake and exercising can help you lose weight.  Exercise is usually categorized as moderate or vigorous intensity. To lose weight, most people need  to do a certain amount of moderate-intensity or vigorous-intensity exercise each week.  Moderate-intensity exercise    Moderate-intensity exercise is any activity that gets you moving enough to burn at least three times more energy (calories) than if you were sitting.  Examples of moderate exercise include:  · Walking a mile in 15 minutes.  · Doing light yard work.  · Biking at an easy pace.  Most people should get at least 150 minutes (2 hours and 30 minutes) a week of moderate-intensity exercise to maintain their body weight.  Vigorous-intensity exercise  Vigorous-intensity exercise is any activity that gets you moving enough to burn at least six times more calories than if you were sitting. When you exercise at this intensity, you should be working hard enough that you are not able to carry on a conversation.  Examples of vigorous exercise include:  · Running.  · Playing a team sport, such as football, basketball, and soccer.  · Jumping rope.  Most people should get at least 75 minutes (1 hour and 15 minutes) a week of vigorous-intensity exercise to maintain their body weight.  How can exercise affect me?  When you exercise enough to burn more calories than you eat, you lose weight. Exercise also reduces body fat and builds muscle. The more muscle you have, the more calories you burn. Exercise also:  · Improves mood.  · Reduces stress and tension.  · Improves your overall fitness, flexibility, and endurance.  · Increases bone strength.  The amount of exercise you need to lose weight depends on:  · Your age.  · The type of exercise.  · Any health conditions you have.  · Your overall physical ability.  Talk to your health care provider about how much exercise you need and what types of activities are safe for you.  What actions can I take to lose weight?  Nutrition    · Make changes to your diet as told by your health care provider or diet and nutrition specialist (dietitian). This may include:  ? Eating fewer  calories.  ? Eating more protein.  ? Eating less unhealthy fats.  ? Eating a diet that includes fresh fruits and vegetables, whole grains, low-fat dairy products, and lean protein.  ? Avoiding foods with added fat, salt, and sugar.  · Drink plenty of water while you exercise to prevent dehydration or heat stroke.  Activity  · Choose an activity that you enjoy and set realistic goals. Your health care provider can help you make an exercise plan that works for you.  · Exercise at a moderate or vigorous intensity most days of the week.  ? The intensity of exercise may vary from person to person. You can tell how intense a workout is for you by paying attention to your breathing and heartbeat. Most people will notice their breathing and heartbeat get faster with more intense exercise.  · Do resistance training twice each week, such as:  ? Push-ups.  ? Sit-ups.  ? Lifting weights.  ? Using resistance bands.  · Getting short amounts of exercise can be just as helpful as long structured periods of exercise. If you have trouble finding time to exercise, try to include exercise in your daily routine.  ? Get up, stretch, and walk around every 30 minutes throughout the day.  ? Go for a walk during your lunch break.  ? Park your car farther away from your destination.  ? If you take public transportation, get off one stop early and walk the rest of the way.  ? Make phone calls while standing up and walking around.  ? Take the stairs instead of elevators or escalators.  · Wear comfortable clothes and shoes with good support.  · Do not exercise so much that you hurt yourself, feel dizzy, or get very short of breath.  Where to find more information  · [place] Department of Health and Human Services: www.hhs.gov  · Centers for Disease Control and Prevention (CDC): www.cdc.gov  Contact a health care provider:  · Before starting a new exercise program.  · If you have questions or concerns about your weight.  · If you have a medical  problem that keeps you from exercising.  Get help right away if you have any of the following while exercising:  · Injury.  · Dizziness.  · Difficulty breathing or shortness of breath that does not go away when you stop exercising.  · Chest pain.  · Rapid heartbeat.  Summary  · Being overweight increases your risk of heart disease, stroke, diabetes, high blood pressure, and several types of cancer.  · Losing weight happens when you burn more calories than you eat.  · Reducing the amount of calories you eat in addition to getting regular moderate or vigorous exercise each week helps you lose weight.  This information is not intended to replace advice given to you by your health care provider. Make sure you discuss any questions you have with your health care provider.  Document Released: 01/20/2012 Document Revised: 12/31/2018 Document Reviewed: 12/31/2018  [employer] Patient Education © 2020 [employer]       bilateral upper extremity ROM was WFL (within functional limits)/bilateral lower extremity ROM was WFL (within functional limits)

## 2021-08-29 NOTE — PROGRESS NOTE ADULT - SUBJECTIVE AND OBJECTIVE BOX
Patient is a 80y old  Male who presents with a chief complaint of CHF exacerbation (29 Aug 2021 13:44)      INTERVAL HISTORY: pt feeling ok       REVIEW OF SYSTEMS:   CONSTITUTIONAL: No weakness  EYES/ENT: No visual changes; No throat pain  Neck: No pain or stiffness  Respiratory: No cough, wheezing, No shortness of breath  CARDIOVASCULAR: no chest pain or palpitations  GASTROINTESTINAL: No abdominal pain, no nausea, vomiting or hematemesis  GENITOURINARY: No dysuria, frequency or hematuria  NEUROLOGICAL: No stroke like symptoms  SKIN: No rashes    	  MEDICATIONS:  furosemide   Injectable 80 milliGRAM(s) IV Push daily  hydrALAZINE 25 milliGRAM(s) Oral three times a day  isosorbide   mononitrate ER Tablet (IMDUR) 60 milliGRAM(s) Oral daily  metoprolol succinate ER 50 milliGRAM(s) Oral daily        PHYSICAL EXAM:  T(C): 36.5 (08-29-21 @ 11:38), Max: 36.7 (08-29-21 @ 00:08)  HR: 71 (08-29-21 @ 11:38) (71 - 89)  BP: 130/61 (08-29-21 @ 11:38) (130/61 - 166/75)  RR: 18 (08-29-21 @ 11:38) (18 - 20)  SpO2: 100% (08-29-21 @ 11:38) (95% - 100%)  Wt(kg): --  I&O's Summary        Appearance: In no distress	  HEENT:    PERRL, EOMI	  Cardiovascular:  S1 S2, No JVD  Respiratory: Lungs clear to auscultation	  Gastrointestinal:  Soft, Non-tender, + BS	  Vascularature:  No edema of LE  Psychiatric: Appropriate affect   Neuro: no acute focal deficits                               9.2    4.44  )-----------( 379      ( 29 Aug 2021 08:49 )             29.1     08-29    138  |  103  |  22  ----------------------------<  110<H>  3.9   |  25  |  2.12<H>    Ca    9.5      29 Aug 2021 08:49  Phos  3.5     08-29  Mg     2.1     08-29    TPro  6.8  /  Alb  2.9<L>  /  TBili  0.7  /  DBili  x   /  AST  13  /  ALT  8<L>  /  AlkPhos  93  08-29        Labs personally reviewed      ASSESSMENT/PLAN: 	    80M PMHx CKD, CAD s/p CABG 2012 & 4 stents (2 in 2014, 2 in 2015), CHF with BiV ICD (2013), AML, prior CVA (mild R sided weakness), HTN, HLD, BPH, prior covid infection presented with worsening SOB.     Problem/Plan - 1:  ·  Problem: Acute decompensated systolic heart failure.   ·  Plan: Last Echo in June 2021 showed EF of 25-30% with severe decreased LV systolic function, with large area of apical akinesis. Volume overloaded on exam with elevated pro-BNP.   Trop elevated but likely from CKD.   -c/w lasix 80 mg IVm daily weight, strict i/o   will optimize GDMT as BP tolerates   On Toprol 50mg and start Hydral 25mg TID in AM  Hold ACE/ARB given elevated cr      Problem/Plan - 2:  ·  Problem:  CAD s/p CABG  2012) and s/p PCI/WENDI 2014 and 2015   c/w GDMT   will uptitrate as tolerated  previously not on AC or DAPT d/t thrombocytopenia   pending echo, continue to diurese.    Problem/Plan - 3:  ·  Problem: AML (acute myeloblastic leukemia).   ·  Plan: -on C2 D4 of Dacogen   -per heme     Problem/Plan - 4:  ·  Problem: DVT prophylaxis.   ·  Plan: Lovenox.      Jelena Graff [facility]   Lamont Huffman DO [facility]  Cardiovascular Medicine  [address] [place] [address], [address]  Office: [phone]  Cell: [phone] Patient is a 80y old  Male who presents with a chief complaint of CHF exacerbation (29 Aug 2021 13:44)      INTERVAL HISTORY: pt feeling ok       REVIEW OF SYSTEMS:   CONSTITUTIONAL: No weakness  EYES/ENT: No visual changes; No throat pain  Neck: No pain or stiffness  Respiratory: No cough, wheezing, No shortness of breath  CARDIOVASCULAR: no chest pain or palpitations  GASTROINTESTINAL: No abdominal pain, no nausea, vomiting or hematemesis  GENITOURINARY: No dysuria, frequency or hematuria  NEUROLOGICAL: No stroke like symptoms  SKIN: No rashes    	  MEDICATIONS:  furosemide   Injectable 80 milliGRAM(s) IV Push daily  hydrALAZINE 25 milliGRAM(s) Oral three times a day  isosorbide   mononitrate ER Tablet (IMDUR) 60 milliGRAM(s) Oral daily  metoprolol succinate ER 50 milliGRAM(s) Oral daily        PHYSICAL EXAM:  T(C): 36.5 (08-29-21 @ 11:38), Max: 36.7 (08-29-21 @ 00:08)  HR: 71 (08-29-21 @ 11:38) (71 - 89)  BP: 130/61 (08-29-21 @ 11:38) (130/61 - 166/75)  RR: 18 (08-29-21 @ 11:38) (18 - 20)  SpO2: 100% (08-29-21 @ 11:38) (95% - 100%)  Wt(kg): --  I&O's Summary        Appearance: In no distress	  HEENT:    PERRL, EOMI	  Cardiovascular:  S1 S2, No JVD  Respiratory: Lungs clear to auscultation	  Gastrointestinal:  Soft, Non-tender, + BS	  Vascularature:  No edema of LE  Psychiatric: Appropriate affect   Neuro: no acute focal deficits                               9.2    4.44  )-----------( 379      ( 29 Aug 2021 08:49 )             29.1     08-29    138  |  103  |  22  ----------------------------<  110<H>  3.9   |  25  |  2.12<H>    Ca    9.5      29 Aug 2021 08:49  Phos  3.5     08-29  Mg     2.1     08-29    TPro  6.8  /  Alb  2.9<L>  /  TBili  0.7  /  DBili  x   /  AST  13  /  ALT  8<L>  /  AlkPhos  93  08-29        Labs personally reviewed      ASSESSMENT/PLAN: 	    80M PMHx CKD, CAD s/p CABG 2012 & 4 stents (2 in 2014, 2 in 2015), CHF with BiV ICD (2013), AML, prior CVA (mild R sided weakness), HTN, HLD, BPH, prior covid infection presented with worsening SOB.     Problem/Plan - 1:  ·  Problem: Acute decompensated systolic heart failure.   ·  Plan: Last Echo in June 2021 showed EF of 25-30% with severe decreased LV systolic function, with large area of apical akinesis. Volume overloaded on exam with elevated pro-BNP.   Trop elevated but likely from CKD.   -c/w lasix 80 mg IVm daily weight, strict i/o   will optimize GDMT as BP tolerates   On Toprol 50mg and started Hydral 25mg TID and uptitrate  Hold ACE/ARB given elevated cr      Problem/Plan - 2:  ·  Problem:  CAD s/p CABG  2012) and s/p PCI/WENDI 2014 and 2015   c/w GDMT   will uptitrate as tolerated  previously not on AC or DAPT d/t thrombocytopenia   pending echo, continue to diurese.    Problem/Plan - 3:  ·  Problem: AML (acute myeloblastic leukemia).   ·  Plan: -on C2 D4 of Dacogen   -per heme     Problem/Plan - 4:  ·  Problem: DVT prophylaxis.   ·  Plan: Lovenox.      Jelena Graff [facility]   Lamont Huffman DO [facility]  Cardiovascular Medicine  [address] [place] [address], [address]  Office: [phone]  Cell: [phone]

## 2021-08-29 NOTE — PROGRESS NOTE ADULT - SUBJECTIVE AND OBJECTIVE BOX
[facility] Division of Hospital Medicine  Pradeep Pratt DO  Pager ([place], 6W-2T): [phone]  Other Times:  [phone]    Patient is a 80y old  Male who presents with a chief complaint of Fluid overload (28 Aug 2021 14:51)    SUBJECTIVE / OVERNIGHT EVENTS: No acute events overnight. Patient seen and examined at bedside this morning-denies chest pain, palpitations, cough or shortness of breath.    REVIEW OF SYSTEMS:    CONSTITUTIONAL: No weakness, fevers or chills  EYES/ENT: No visual changes;  No vertigo or throat pain   NECK: No pain or stiffness  RESPIRATORY: No cough, wheezing, hemoptysis; No shortness of breath  CARDIOVASCULAR: No chest pain or palpitations  GASTROINTESTINAL: No abdominal or epigastric pain. No nausea, vomiting, or hematemesis; No diarrhea or constipation. No melena or hematochezia.  GENITOURINARY: No dysuria, frequency or hematuria  NEUROLOGICAL: No numbness or weakness  SKIN: No itching, burning, rashes, or lesions  MSK: No joint pain, no back pain  HEME: No easy bleeding, no easy bruising  All other review of systems is negative unless indicated above.    MEDICATIONS  (STANDING):  aspirin enteric coated 81 milliGRAM(s) Oral daily  enoxaparin Injectable 40 milliGRAM(s) SubCutaneous daily  finasteride 5 milliGRAM(s) Oral daily  folic acid 1 milliGRAM(s) Oral daily  furosemide   Injectable 80 milliGRAM(s) IV Push daily  hydrALAZINE 25 milliGRAM(s) Oral three times a day  isosorbide   mononitrate ER Tablet (IMDUR) 60 milliGRAM(s) Oral daily  levoFLOXacin  Tablet 250 milliGRAM(s) Oral every 24 hours  metoprolol succinate ER 50 milliGRAM(s) Oral daily  posaconazole Suspension 200 milliGRAM(s) Oral every 8 hours    MEDICATIONS  (PRN):  acetaminophen   Tablet .. 650 milliGRAM(s) Oral every 6 hours PRN Temp greater or equal to 38.5C (101.3F), Mild Pain (1 - 3)  aluminum hydroxide/magnesium hydroxide/simethicone Suspension 30 milliLiter(s) Oral every 4 hours PRN Dyspepsia  melatonin 3 milliGRAM(s) Oral at bedtime PRN Insomnia  ondansetron Injectable 4 milliGRAM(s) IV Push every 8 hours PRN Nausea and/or Vomiting      CAPILLARY BLOOD GLUCOSE        I&O's Summary      PHYSICAL EXAM:  Vital Signs Last 24 Hrs  T(C): 36.5 (29 Aug 2021 11:38), Max: 36.7 (29 Aug 2021 00:08)  T(F): 97.7 (29 Aug 2021 11:38), Max: 98 (29 Aug 2021 00:08)  HR: 71 (29 Aug 2021 11:38) (71 - 89)  BP: 130/61 (29 Aug 2021 11:38) (130/61 - 166/75)  BP(mean): --  RR: 18 (29 Aug 2021 11:38) (18 - 20)  SpO2: 100% (29 Aug 2021 11:38) (95% - 100%)    CONSTITUTIONAL: NAD, well-developed, well-groomed  EYES: EOMI; conjunctiva and sclera clear  ENMT: Moist oral mucosa, no pharyngeal injection or exudates  RESPIRATORY: Normal respiratory effort; no rhonchi or wheezing but does have bibasilar rales  CARDIOVASCULAR: Regular rate and rhythm, normal S1 and S2, no murmur/rub/gallop; bilateral pitting edema R>L  ABDOMEN: Nontender to palpation, normoactive bowel sounds, no rebound/guarding, surgical scar present, well healed  MUSCULOSKELETAL: No clubbing or cyanosis of digits; no joint swelling or tenderness to palpation  PSYCH: A+O1-2; affect appropriate  NEUROLOGY: CN 2-12 are intact and symmetric; no gross sensory deficits   SKIN: No rashes; no palpable lesions    LABS:                        9.2    4.44  )-----------( 379      ( 29 Aug 2021 08:49 )             29.1         138  |  103  |  22  ----------------------------<  110<H>  3.9   |  25  |  2.12<H>    Ca    9.5      29 Aug 2021 08:49  Phos  3.5       Mg     2.1         TPro  6.8  /  Alb  2.9<L>  /  TBili  0.7  /  DBili  x   /  AST  13  /  ALT  8<L>  /  AlkPhos  93            Urinalysis Basic - ( 28 Aug 2021 15:06 )    Color: Colorless / Appearance: Clear / S.010 / pH: x  Gluc: x / Ketone: Negative  / Bili: Negative / Urobili: Negative   Blood: x / Protein: Negative / Nitrite: Negative   Leuk Esterase: Negative / RBC: x / WBC x   Sq Epi: x / Non Sq Epi: x / Bacteria: x

## 2021-08-29 NOTE — PROGRESS NOTE ADULT - PROBLEM SELECTOR PLAN 1
Last Echo in June 2021 showed EF of 25-30% with severe decreased LV systolic function, with large area of apical akinesis. Volume overloaded on exam with elevated pro-BNP. Trop elevated but could also be due to CKD. CXR showed pleural effusion (L>R). CT chest did not show PE, and showed bilateral pleural effusion (increased on L) and new MYKEL atalectasis. EKG showed no ischemic changes. 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   -Cards called, follow up recs  -lasix 80 mg IV  -daily weight  -strict ins and outs  -TTE pending

## 2021-08-30 NOTE — PROGRESS NOTE ADULT - PROBLEM SELECTOR PLAN 1
Last Echo in June 2021 showed EF of 25-30% with severe decreased LV systolic function, with large area of apical akinesis. CT showed bilateral pleural effusion (increased on L) and new MYKEL atelectasis.     Dyspnea now significantly improved w diuresis. Pt on room air now, saturating 99%.     Will hold lasix for now as Cr increased to 2.12 today. Monitor.

## 2021-08-30 NOTE — PROGRESS NOTE ADULT - SUBJECTIVE AND OBJECTIVE BOX
Patient is a 80y old  Male who presents with a chief complaint of CHF exacerbation (29 Aug 2021 13:44)      INTERVAL HISTORY: feelsok     REVIEW OF SYSTEMS:   CONSTITUTIONAL: No weakness  EYES/ENT: No visual changes; No throat pain  Neck: No pain or stiffness  Respiratory: No cough, wheezing, No shortness of breath  CARDIOVASCULAR: no chest pain or palpitations  GASTROINTESTINAL: No abdominal pain, no nausea, vomiting or hematemesis  GENITOURINARY: No dysuria, frequency or hematuria  NEUROLOGICAL: No stroke like symptoms  SKIN: No rashes    	  MEDICATIONS:  hydrALAZINE 25 milliGRAM(s) Oral three times a day  isosorbide   mononitrate ER Tablet (IMDUR) 60 milliGRAM(s) Oral daily  metoprolol succinate ER 50 milliGRAM(s) Oral daily        PHYSICAL EXAM:  T(C): 36.6 (08-30-21 @ 08:33), Max: 36.8 (08-29-21 @ 15:47)  HR: 73 (08-30-21 @ 08:33) (73 - 77)  BP: 152/64 (08-30-21 @ 08:33) (152/62 - 153/69)  RR: 18 (08-30-21 @ 08:33) (18 - 18)  SpO2: 99% (08-30-21 @ 08:33) (93% - 99%)  Wt(kg): --  I&O's Summary    29 Aug 2021 07:01  -  30 Aug 2021 07:00  --------------------------------------------------------  IN: 240 mL / OUT: 300 mL / NET: -60 mL    30 Aug 2021 07:01  -  30 Aug 2021 14:13  --------------------------------------------------------  IN: 0 mL / OUT: 600 mL / NET: -600 mL      Appearance: In no distress	  HEENT:    PERRL, EOMI	  Cardiovascular:  S1 S2, No JVD  Respiratory: Lungs clear to auscultation	  Gastrointestinal:  Soft, Non-tender, + BS	  Vascularature:  No edema of LE  Psychiatric: Appropriate affect   Neuro: no acute focal deficits                           9.2    4.44  )-----------( 379      ( 29 Aug 2021 08:49 )             29.1     08-29    138  |  103  |  22  ----------------------------<  110<H>  3.9   |  25  |  2.12<H>    Ca    9.5      29 Aug 2021 08:49  Phos  3.5     08-29  Mg     2.1     08-29    TPro  6.8  /  Alb  2.9<L>  /  TBili  0.7  /  DBili  x   /  AST  13  /  ALT  8<L>  /  AlkPhos  93  08-29    Labs personally reviewed    ASSESSMENT/PLAN: 	  80M PMHx CKD, CAD s/p CABG 2012 & 4 stents (2 in 2014, 2 in 2015), CHF with BiV ICD (2013), AML, prior CVA (mild R sided weakness), HTN, HLD, BPH, prior covid infection presented with worsening SOB.     Problem/Plan - 1:  ·  Problem: Acute decompensated systolic heart failure.   ·  Plan: Last Echo in June 2021 showed EF of 25-30% with severe decreased LV systolic function, with large area of apical akinesis. Volume overloaded on exam with elevated pro-BNP.   Trop elevated but likely from CKD.   -c/w lasix 80 mg IVm daily weight, strict i/o   will optimize GDMT as BP tolerates   On Toprol 50mg and started Hydral 25mg TID and uptitrate  Hold ACE/ARB given elevated cr      Problem/Plan - 2:  ·  Problem:  CAD s/p CABG  2012) and s/p PCI/WENDI 2014 and 2015   c/w GDMT   will uptitrate as tolerated  previously not on AC or DAPT d/t thrombocytopenia   pending echo, continue to diurese.    Problem/Plan - 3:  ·  Problem: AML (acute myeloblastic leukemia).   ·  Plan: -on C2 D4 of Dacogen   -per heme     Problem/Plan - 4:  ·  Problem: DVT prophylaxis.   ·  Plan: Lovenox.          Marylu Duarte ANP-C  Lamont Huffman DO [facility]  Cardiovascular Medicine  [address], [address]  Office: [phone]  Cell: [phone]

## 2021-08-31 NOTE — PROGRESS NOTE ADULT - SUBJECTIVE AND OBJECTIVE BOX
Patient is a 80y old  Male who presents with a chief complaint of CHF exacerbation (29 Aug 2021 13:44)      SUBJECTIVE / OVERNIGHT EVENTS: Pt in bed, denies dyspnea.     Vital Signs Last 24 Hrs  T(C): 36.8 (31 Aug 2021 18:13), Max: 37 (31 Aug 2021 00:14)  T(F): 98.3 (31 Aug 2021 18:13), Max: 98.6 (31 Aug 2021 00:14)  HR: 70 (31 Aug 2021 18:13) (70 - 87)  BP: 125/67 (31 Aug 2021 18:13) (125/67 - 150/68)  BP(mean): --  RR: 18 (31 Aug 2021 18:13) (18 - 18)  SpO2: 97% (31 Aug 2021 18:13) (85% - 100%)    MEDICATIONS  (STANDING):  aspirin enteric coated 81 milliGRAM(s) Oral daily  chlorhexidine 4% Liquid 1 Application(s) Topical <User Schedule>  enoxaparin Injectable 30 milliGRAM(s) SubCutaneous daily  finasteride 5 milliGRAM(s) Oral daily  folic acid 1 milliGRAM(s) Oral daily  hydrALAZINE 25 milliGRAM(s) Oral three times a day  isosorbide   mononitrate ER Tablet (IMDUR) 60 milliGRAM(s) Oral daily  levoFLOXacin  Tablet 250 milliGRAM(s) Oral every 24 hours  metoprolol succinate ER 50 milliGRAM(s) Oral daily  posaconazole Suspension 200 milliGRAM(s) Oral every 8 hours    MEDICATIONS  (PRN):  acetaminophen   Tablet .. 650 milliGRAM(s) Oral every 6 hours PRN Temp greater or equal to 38.5C (101.3F), Mild Pain (1 - 3)  aluminum hydroxide/magnesium hydroxide/simethicone Suspension 30 milliLiter(s) Oral every 4 hours PRN Dyspepsia  melatonin 3 milliGRAM(s) Oral at bedtime PRN Insomnia  ondansetron Injectable 4 milliGRAM(s) IV Push every 8 hours PRN Nausea and/or Vomiting        CAPILLARY BLOOD GLUCOSE        I&O's Summary    30 Aug 2021 07:01  -  31 Aug 2021 07:00  --------------------------------------------------------  IN: 180 mL / OUT: 1000 mL / NET: -820 mL        PHYSICAL EXAM:  GENERAL: NAD, well-developed  HEAD:  Atraumatic, Normocephalic  EYES: EOMI, PERRLA, conjunctiva and sclera clear  NECK: Supple, No JVD  CHEST/LUNG: Decreased breath sounds at bases  HEART: Regular rate and rhythm; No murmurs, rubs, or gallops  ABDOMEN: Soft, Nontender, Nondistended; Bowel sounds present  EXTREMITIES:  2+ Peripheral Pulses, No clubbing, cyanosis, or edema  PSYCH: AAOx3  NEUROLOGY: non-focal  SKIN: No rashes or lesions    LABS:    08-31    138  |  104  |  26<H>  ----------------------------<  96  3.8   |  22  |  2.36<H>    Ca    9.4      31 Aug 2021 06:27        RADIOLOGY & ADDITIONAL TESTS:    Imaging Personally Reviewed:    Consultant(s) Notes Reviewed:      Care Discussed with Consultants/Other Providers: NP

## 2021-08-31 NOTE — PROGRESS NOTE ADULT - PROBLEM SELECTOR PLAN 1
Last Echo in June 2021 showed EF of 25-30% with severe decreased LV systolic function, with large area of apical akinesis.     Dyspnea now significantly improved w diuresis. Diuresis held for worsening Cr, monitor.     Pt 97% at rest but desats w ambulation to 82-88%. Monitor. Lasix 80mg IV given.     Dyspnea now significantly improved w diuresis. Diuresis held for worsening Cr, monitor.     Pt 97% at rest but desats w ambulation to 82-88%. Monitor.      Last Echo in June 2021 showed EF of 25-30% with severe decreased LV systolic function, with large area of apical akinesis.

## 2021-08-31 NOTE — PROGRESS NOTE ADULT - SUBJECTIVE AND OBJECTIVE BOX
Patient is a 80y old  Male who presents with a chief complaint of CHF exacerbation (29 Aug 2021 13:44)      INTERVAL HISTORY: feels ok, still gets a little winded when talking     REVIEW OF SYSTEMS:   CONSTITUTIONAL: No weakness  EYES/ENT: No visual changes; No throat pain  Neck: No pain or stiffness  Respiratory: No cough, wheezing, + shortness of breath with exertion   CARDIOVASCULAR: no chest pain or palpitations  GASTROINTESTINAL: No abdominal pain, no nausea, vomiting or hematemesis  GENITOURINARY: No dysuria, frequency or hematuria  NEUROLOGICAL: No stroke like symptoms  SKIN: No rashes  	  MEDICATIONS:  hydrALAZINE 25 milliGRAM(s) Oral three times a day  isosorbide   mononitrate ER Tablet (IMDUR) 60 milliGRAM(s) Oral daily  metoprolol succinate ER 50 milliGRAM(s) Oral daily    PHYSICAL EXAM:  T(C): 36.4 (08-31-21 @ 08:29), Max: 37 (08-31-21 @ 00:14)  HR: 78 (08-31-21 @ 08:29) (78 - 87)  BP: 148/71 (08-31-21 @ 08:29) (134/69 - 150/68)  RR: 18 (08-31-21 @ 08:29) (18 - 18)  SpO2: 98% (08-31-21 @ 08:29) (94% - 98%)  Wt(kg): --  I&O's Summary    30 Aug 2021 07:01  -  31 Aug 2021 07:00  --------------------------------------------------------  IN: 180 mL / OUT: 1000 mL / NET: -820 mL      Appearance: In no distress	  HEENT:    PERRL, EOMI	  Cardiovascular:  S1 S2, No JVD  Respiratory: Lungs clear to auscultation	  Gastrointestinal:  Soft, Non-tender, + BS	  Vascularature:  No edema of LE  Psychiatric: Appropriate affect   Neuro: no acute focal deficits     08-31    138  |  104  |  26<H>  ----------------------------<  96  3.8   |  22  |  2.36<H>    Ca    9.4      31 Aug 2021 06:27  Labs personally reviewed    ASSESSMENT/PLAN: 	  80M PMHx CKD, CAD s/p CABG 2012 & 4 stents (2 in 2014, 2 in 2015), CHF with BiV ICD (2013), AML, prior CVA (mild R sided weakness), HTN, HLD, BPH, prior covid infection presented with worsening SOB.     Problem/Plan - 1:  ·  Problem: Acute decompensated systolic heart failure.   ·  Plan: Last Echo in June 2021 showed EF of 25-30% with severe decreased LV systolic function, with large area of apical akinesis. Volume overloaded on exam with elevated pro-BNP.   Trop elevated but likely from CKD.   -c/w lasix 80 mg IV daily weight, strict i/o   will optimize GDMT as BP tolerates   On Toprol 50mg and  Hydral 25mg TID- please uptitrate hydral   Hold ACE/ARB given elevated cr    * lasix 80mg IV daily on hold given rise in cr. would resume when ok with renal . patient still reports dyspnea while talking/ambulating.  document o2 while ambulating  pending echo     Problem/Plan - 2:  ·  Problem:  CAD s/p CABG  2012) and s/p PCI/WENDI 2014 and 2015   c/w GDMT   will uptitrate as tolerated  previously not on AC or DAPT d/t thrombocytopenia   pending echo, continue to diurese.    Problem/Plan - 3:  ·  Problem: AML (acute myeloblastic leukemia).   ·  Plan: -on C2 D4 of Dacogen   -per heme     Problem/Plan - 4:  ·  Problem: DVT prophylaxis.   ·  Plan: Lovenox.            Marylu HUDDLESTON-GEORGE Huffman DO [facility]  Cardiovascular Medicine  [address] [place] [address], [address]  Office: [phone]  Cell: [phone] Patient is a 80y old  Male who presents with a chief complaint of CHF exacerbation (29 Aug 2021 13:44)      INTERVAL HISTORY: feels ok, still gets a little winded when talking     REVIEW OF SYSTEMS:   CONSTITUTIONAL: No weakness  EYES/ENT: No visual changes; No throat pain  Neck: No pain or stiffness  Respiratory: No cough, wheezing, + shortness of breath with exertion   CARDIOVASCULAR: no chest pain or palpitations  GASTROINTESTINAL: No abdominal pain, no nausea, vomiting or hematemesis  GENITOURINARY: No dysuria, frequency or hematuria  NEUROLOGICAL: No stroke like symptoms  SKIN: No rashes  	  MEDICATIONS:  hydrALAZINE 25 milliGRAM(s) Oral three times a day  isosorbide   mononitrate ER Tablet (IMDUR) 60 milliGRAM(s) Oral daily  metoprolol succinate ER 50 milliGRAM(s) Oral daily    PHYSICAL EXAM:  T(C): 36.4 (08-31-21 @ 08:29), Max: 37 (08-31-21 @ 00:14)  HR: 78 (08-31-21 @ 08:29) (78 - 87)  BP: 148/71 (08-31-21 @ 08:29) (134/69 - 150/68)  RR: 18 (08-31-21 @ 08:29) (18 - 18)  SpO2: 98% (08-31-21 @ 08:29) (94% - 98%)  Wt(kg): --  I&O's Summary    30 Aug 2021 07:01  -  31 Aug 2021 07:00  --------------------------------------------------------  IN: 180 mL / OUT: 1000 mL / NET: -820 mL      Appearance: In no distress	  HEENT:    PERRL, EOMI	  Cardiovascular:  S1 S2, No JVD  Respiratory: Lungs clear to auscultation	  Gastrointestinal:  Soft, Non-tender, + BS	  Vascularature:  No edema of LE  Psychiatric: Appropriate affect   Neuro: no acute focal deficits     08-31    138  |  104  |  26<H>  ----------------------------<  96  3.8   |  22  |  2.36<H>    Ca    9.4      31 Aug 2021 06:27  Labs personally reviewed    ASSESSMENT/PLAN: 	  80M PMHx CKD, CAD s/p CABG 2012 & 4 stents (2 in 2014, 2 in 2015), CHF with BiV ICD (2013), AML, prior CVA (mild R sided weakness), HTN, HLD, BPH, prior covid infection presented with worsening SOB.     Problem/Plan - 1:  ·  Problem: Acute decompensated systolic heart failure.   ·  Plan: Last Echo in June 2021 showed EF of 25-30% with severe decreased LV systolic function, with large area of apical akinesis. Volume overloaded on exam with elevated pro-BNP.   Trop elevated but likely from CKD.   -c/w lasix 80 mg IV daily weight, strict i/o   will optimize GDMT as BP tolerates   On Toprol 50mg and  Hydral 25mg TID- please uptitrate hydral   Hold ACE/ARB given elevated cr    * lasix 80mg IV daily on hold given rise in cr. would resume when cr improves . patient still reports dyspnea while talking/ambulating.  document o2 while ambulating  pending echo     Problem/Plan - 2:  ·  Problem:  CAD s/p CABG  2012) and s/p PCI/EWNDI 2014 and 2015   c/w GDMT   will uptitrate as tolerated  previously not on AC or DAPT d/t thrombocytopenia   pending echo, continue to diurese.    Problem/Plan - 3:  ·  Problem: AML (acute myeloblastic leukemia).   ·  Plan: -on C2 D4 of Dacogen   -per heme     Problem/Plan - 4:  ·  Problem: DVT prophylaxis.   ·  Plan: Lovenox.            Marylu Duarte ANP-GEORGE Huffman DO [facility]  Cardiovascular Medicine  [address] [place] [address], [address]  Office: [phone]  Cell: [phone]

## 2021-09-01 NOTE — PROGRESS NOTE ADULT - SUBJECTIVE AND OBJECTIVE BOX
Patient is a 80y old  Male who presents with a chief complaint of CHF exacerbation (29 Aug 2021 13:44)      SUBJECTIVE / OVERNIGHT EVENTS: Pt sitting up in chair, feels well.     Vital Signs Last 24 Hrs  T(C): 37.1 (01 Sep 2021 16:29), Max: 37.1 (01 Sep 2021 16:29)  T(F): 98.7 (01 Sep 2021 16:29), Max: 98.7 (01 Sep 2021 16:29)  HR: 76 (01 Sep 2021 16:29) (66 - 84)  BP: 148/63 (01 Sep 2021 16:29) (125/67 - 148/63)  BP(mean): --  RR: 18 (01 Sep 2021 16:29) (18 - 18)  SpO2: 99% (01 Sep 2021 16:29) (96% - 99%)    MEDICATIONS  (STANDING):  aspirin enteric coated 81 milliGRAM(s) Oral daily  chlorhexidine 4% Liquid 1 Application(s) Topical <User Schedule>  finasteride 5 milliGRAM(s) Oral daily  folic acid 1 milliGRAM(s) Oral daily  heparin   Injectable 5000 Unit(s) SubCutaneous every 12 hours  hydrALAZINE 50 milliGRAM(s) Oral three times a day  isosorbide   mononitrate ER Tablet (IMDUR) 60 milliGRAM(s) Oral daily  levoFLOXacin  Tablet 250 milliGRAM(s) Oral every 24 hours  metoprolol succinate ER 50 milliGRAM(s) Oral daily  posaconazole Suspension 200 milliGRAM(s) Oral every 8 hours    MEDICATIONS  (PRN):  acetaminophen   Tablet .. 650 milliGRAM(s) Oral every 6 hours PRN Temp greater or equal to 38.5C (101.3F), Mild Pain (1 - 3)  aluminum hydroxide/magnesium hydroxide/simethicone Suspension 30 milliLiter(s) Oral every 4 hours PRN Dyspepsia  melatonin 3 milliGRAM(s) Oral at bedtime PRN Insomnia  ondansetron Injectable 4 milliGRAM(s) IV Push every 8 hours PRN Nausea and/or Vomiting        CAPILLARY BLOOD GLUCOSE        I&O's Summary    01 Sep 2021 07:01  -  01 Sep 2021 16:38  --------------------------------------------------------  IN: 440 mL / OUT: 200 mL / NET: 240 mL        PHYSICAL EXAM:  GENERAL: NAD, well-developed  HEAD:  Atraumatic, Normocephalic  EYES: EOMI, PERRLA, conjunctiva and sclera clear  NECK: Supple, No JVD  CHEST/LUNG: Clear to auscultation bilaterally; No wheeze  HEART: Regular rate and rhythm; No murmurs, rubs, or gallops  ABDOMEN: Soft, Nontender, Nondistended; Bowel sounds present  EXTREMITIES:  2+ Peripheral Pulses, No clubbing, cyanosis, or edema  PSYCH: AAOx3  NEUROLOGY: non-focal  SKIN: No rashes or lesions    LABS:    09-01    136  |  106  |  27<H>  ----------------------------<  99  3.8   |  19<L>  |  2.32<H>    Ca    9.5      01 Sep 2021 06:06        RADIOLOGY & ADDITIONAL TESTS:    Imaging Personally Reviewed:    Consultant(s) Notes Reviewed:      Care Discussed with Consultants/Other Providers: NP

## 2021-09-01 NOTE — PROGRESS NOTE ADULT - PROBLEM SELECTOR PLAN 1
Lasix 80mg IV given.     Dyspnea now significantly improved w diuresis. Diuresis held for worsening Cr, now stabilizing. PO Lasix restarted.     Hydralazine uptitrated on 8/31.     Pt 99% at rest but desats w ambulation to 82-88%. Monitor.      Last Echo in June 2021 showed EF of 25-30% with severe decreased LV systolic function, with large area of apical akinesis. Echo 8/31 w no significant change.

## 2021-09-02 NOTE — DISCHARGE NOTE PROVIDER - CARE PROVIDER_API CALL
Lamont Huffman (DO)  Cardiovascular Disease; Internal Medicine; Nuclear Cardiology  [address], [address]  [place] [address]  Phone: [phone]  Fax: [phone]  Follow Up Time:     Kenyatta Wise  HEMATOLOGY/ONCOLOGY  [address] [place] [address]  Phone: [phone]  Fax: [phone]  Follow Up Time:

## 2021-09-02 NOTE — DISCHARGE NOTE PROVIDER - NSDCMRMEDTOKEN_GEN_ALL_CORE_FT
finasteride 5 mg oral tablet: 1 tab(s) orally once a day  folic acid 1 mg oral tablet: 1 tab(s) orally once a day  Imdur 60 mg oral tablet, extended release: 1 tab(s) orally once a day (in the morning)  Lasix 40 mg oral tablet: 1 tab(s) orally once a day  levoFLOXacin 250 mg oral tablet: 1 tab(s) orally every 24 hours  Metoprolol Succinate ER 50 mg oral tablet, extended release: 1 tab(s) orally once a day  posaconazole 40 mg/mL oral suspension: 5 milliliter(s) orally every 8 hours  venetoclax 100 mg oral tablet: 1 tab(s) orally once a day   Vitamin D3: 1 tab(s) orally once a day   aluminum hydroxide-magnesium hydroxide 200 mg-200 mg/5 mL oral suspension: 30 milliliter(s) orally every 4 hours, As needed, Dyspepsia  aspirin 81 mg oral delayed release tablet: 1 tab(s) orally once a day  finasteride 5 mg oral tablet: 1 tab(s) orally once a day  folic acid 1 mg oral tablet: 1 tab(s) orally once a day  hydrALAZINE 25 mg oral tablet: 3 tab(s) orally 3 times a day  Imdur 60 mg oral tablet, extended release: 1 tab(s) orally once a day (in the morning)  Lasix 40 mg oral tablet: 1 tab(s) orally once a day  levoFLOXacin 250 mg oral tablet: 1 tab(s) orally every 24 hours  melatonin 3 mg oral tablet: 1 tab(s) orally once a day (at bedtime), As needed, Insomnia  Metoprolol Succinate ER 50 mg oral tablet, extended release: 1 tab(s) orally once a day  posaconazole 40 mg/mL oral suspension: 5 milliliter(s) orally every 8 hours  Vitamin D3: 1 tab(s) orally once a day  Zofran 4 mg oral tablet: 1 tab(s) orally every 8 hours, As Needed -for muscle spasm - for nausea

## 2021-09-02 NOTE — PROGRESS NOTE ADULT - PROBLEM SELECTOR PLAN 1
Lasix 80mg IV given.     Dyspnea now significantly improved w diuresis. Diuresis held for worsening Cr, now stabilizing. PO Lasix restarted on 9/1.     Hydralazine uptitrated by Cardiology.     Saturating well on room air at rest and w ambulation.       Last Echo in June 2021 showed EF of 25-30% with severe decreased LV systolic function, with large area of apical akinesis. Echo 8/31 w no significant change.

## 2021-09-02 NOTE — DISCHARGE NOTE NURSING/CASE MANAGEMENT/SOCIAL WORK - PATIENT PORTAL LINK FT
You can access the FollowMyHealth Patient Portal offered by [facility] by registering at the following website: [email or web address]. By joining [employer]’s FollowMyHealth portal, you will also be able to view your health information using other applications (apps) compatible with our system.

## 2021-09-02 NOTE — CHART NOTE - NSCHARTNOTEFT_GEN_A_CORE
My patient was seen today for his diagnosis of CHF. While in a chronic and stable state my patient is still hypoxic due to his baseline CHF. SP02 85% on RA while ambulating; Spo2 91% on RA at rest; SPO2 (  ) on 2NC during ambulation on. Patient will require oxygen 24 hours.

## 2021-09-02 NOTE — DISCHARGE NOTE PROVIDER - NSDCFUSCHEDAPPT_GEN_ALL_CORE_FT
TAMIKO PEREZ L ; 09/03/2021 ; [facility]  TAMIKO PEREZ L ; 09/03/2021 ; [facility]  TAMIKO PEREZ L ; 09/08/2021 ; [facility]  TAMIKO PEREZ L ; 09/11/2021 ; [facility]  TAMIKO PEREZ L ; 09/14/2021 ; [facility]  TAMIKO PEREZ L ; 09/17/2021 ; [facility]  TAMIKO PEREZ L ; 09/27/2021 ; [facility] [address] TAMIKO PEREZ L ; 09/08/2021 ; [facility]  TAMIKO PEREZ L ; 09/11/2021 ; [facility]  TAMIKO PEREZ ; 09/14/2021 ; [facility]  TAMIKO PEREZ L ; 09/17/2021 ; [facility]  TAMIKO PEREZ L ; 09/27/2021 ; [facility] [address]

## 2021-09-02 NOTE — PROGRESS NOTE ADULT - SUBJECTIVE AND OBJECTIVE BOX
Patient is a 80y old  Male who presents with a chief complaint of CHF exacerbation (29 Aug 2021 13:44)      SUBJECTIVE / OVERNIGHT EVENTS: Pt sitting up in chair, feels well. Son by bedside.     Vital Signs Last 24 Hrs  T(C): 36.7 (02 Sep 2021 16:05), Max: 37.1 (01 Sep 2021 16:29)  T(F): 98 (02 Sep 2021 16:05), Max: 98.7 (01 Sep 2021 16:29)  HR: 69 (02 Sep 2021 16:05) (69 - 76)  BP: 123/60 (02 Sep 2021 16:05) (123/60 - 148/63)  BP(mean): --  RR: 18 (02 Sep 2021 16:05) (18 - 18)  SpO2: 99% (02 Sep 2021 16:05) (98% - 99%)    MEDICATIONS  (STANDING):  alteplase for catheter clearance 2 milliGRAM(s) Catheter once  aspirin enteric coated 81 milliGRAM(s) Oral daily  chlorhexidine 4% Liquid 1 Application(s) Topical <User Schedule>  finasteride 5 milliGRAM(s) Oral daily  folic acid 1 milliGRAM(s) Oral daily  furosemide    Tablet 40 milliGRAM(s) Oral daily  heparin   Injectable 5000 Unit(s) SubCutaneous every 12 hours  hydrALAZINE 75 milliGRAM(s) Oral three times a day  isosorbide   mononitrate ER Tablet (IMDUR) 60 milliGRAM(s) Oral daily  levoFLOXacin  Tablet 250 milliGRAM(s) Oral every 24 hours  metoprolol succinate ER 50 milliGRAM(s) Oral daily  posaconazole Suspension 200 milliGRAM(s) Oral every 8 hours    MEDICATIONS  (PRN):  acetaminophen   Tablet .. 650 milliGRAM(s) Oral every 6 hours PRN Temp greater or equal to 38.5C (101.3F), Mild Pain (1 - 3)  aluminum hydroxide/magnesium hydroxide/simethicone Suspension 30 milliLiter(s) Oral every 4 hours PRN Dyspepsia  melatonin 3 milliGRAM(s) Oral at bedtime PRN Insomnia  ondansetron Injectable 4 milliGRAM(s) IV Push every 8 hours PRN Nausea and/or Vomiting        CAPILLARY BLOOD GLUCOSE        I&O's Summary    01 Sep 2021 07:01  -  02 Sep 2021 07:00  --------------------------------------------------------  IN: 860 mL / OUT: 600 mL / NET: 260 mL    02 Sep 2021 07:01  -  02 Sep 2021 16:21  --------------------------------------------------------  IN: 0 mL / OUT: 420 mL / NET: -420 mL        PHYSICAL EXAM:  GENERAL: NAD, well-developed  HEAD:  Atraumatic, Normocephalic  EYES: EOMI, PERRLA, conjunctiva and sclera clear  NECK: Supple, No JVD  CHEST/LUNG: Clear to auscultation bilaterally; No wheeze  HEART: Regular rate and rhythm; No murmurs, rubs, or gallops  ABDOMEN: Soft, Nontender, Nondistended; Bowel sounds present  EXTREMITIES:  2+ Peripheral Pulses, No clubbing, cyanosis, or edema  PSYCH: AAOx3  NEUROLOGY: non-focal  SKIN: No rashes or lesions    LABS:                        9.6    6.38  )-----------( 428      ( 02 Sep 2021 09:05 )             29.4     09-02    135  |  103  |  29<H>  ----------------------------<  139<H>  3.8   |  18<L>  |  2.42<H>    Ca    9.2      02 Sep 2021 09:05                RADIOLOGY & ADDITIONAL TESTS:    Imaging Personally Reviewed:    Consultant(s) Notes Reviewed:      Care Discussed with Consultants/Other Providers: NP

## 2021-09-02 NOTE — PROGRESS NOTE ADULT - SUBJECTIVE AND OBJECTIVE BOX
Patient is a 80y old  Male who presents with a chief complaint of CHF exacerbation (29 Aug 2021 13:44)      INTERVAL HISTORY: pt is feeling ok     REVIEW OF SYSTEMS:   CONSTITUTIONAL: No weakness  EYES/ENT: No visual changes; No throat pain  Neck: No pain or stiffness  Respiratory: No cough, wheezing, No shortness of breath  CARDIOVASCULAR: no chest pain or palpitations  GASTROINTESTINAL: No abdominal pain, no nausea, vomiting or hematemesis  GENITOURINARY: No dysuria, frequency or hematuria  NEUROLOGICAL: No stroke like symptoms  SKIN: No rashes    	  MEDICATIONS:  furosemide    Tablet 40 milliGRAM(s) Oral daily  hydrALAZINE 75 milliGRAM(s) Oral three times a day  isosorbide   mononitrate ER Tablet (IMDUR) 60 milliGRAM(s) Oral daily  metoprolol succinate ER 50 milliGRAM(s) Oral daily        PHYSICAL EXAM:  T(C): 36.7 (09-02-21 @ 08:21), Max: 37.1 (09-01-21 @ 16:29)  HR: 73 (09-02-21 @ 08:21) (71 - 76)  BP: 131/- (09-02-21 @ 13:12) (131/- - 148/63)  RR: 18 (09-02-21 @ 08:21) (18 - 18)  SpO2: 98% (09-02-21 @ 08:21) (98% - 99%)  Wt(kg): --  I&O's Summary    01 Sep 2021 07:01  -  02 Sep 2021 07:00  --------------------------------------------------------  IN: 860 mL / OUT: 600 mL / NET: 260 mL    02 Sep 2021 07:01  -  02 Sep 2021 14:18  --------------------------------------------------------  IN: 0 mL / OUT: 420 mL / NET: -420 mL          Appearance: In no distress	  HEENT:    PERRL, EOMI	  Cardiovascular:  S1 S2, No JVD  Respiratory: Lungs clear to auscultation	  Gastrointestinal:  Soft, Non-tender, + BS	  Vascularature:  No edema of LE  Psychiatric: Appropriate affect   Neuro: no acute focal deficits                               9.6    6.38  )-----------( 428      ( 02 Sep 2021 09:05 )             29.4     09-02    135  |  103  |  29<H>  ----------------------------<  139<H>  3.8   |  18<L>  |  2.42<H>    Ca    9.2      02 Sep 2021 09:05          Labs personally reviewed  radiology   < from: TTE with Doppler (w/Cont) (08.31.21 @ 08:38) >  1. Calcified trileaflet aortic valve with decreased  opening. Peak transaortic valve gradient equals 5 mm Hg,  mean transaortic valve gradient equals 2 mm Hg, estimated  aortic valve area equals 1.9 sqcm (by planimetry),  consistent with mild aortic stenosis.  2. Endocardial visualization enhanced with intravenous  injection of Ultrasonic Enhancing Agent (Definity).  Severe   segmental left ventricular systolic dysfunction.  The mid-  distal inferolateral wall, the apical septum, the  anterolateral wall, and the apical cap are severely  hypokinetic. No obvious left ventricular thrombus.  3. Moderate diastolic dysfunction (Stage II). Increased  E/e'  is consistent with elevated left ventricular filling  pressure.  4. The right ventricle is not well visualized; grossly  reduced  right ventricular systolic function. A device wire  is noted in the right heart.    < end of copied text >    ASSESSMENT/PLAN: 	  	  80M PMHx CKD, CAD s/p CABG 2012 & 4 stents (2 in 2014, 2 in 2015), CHF with BiV ICD (2013), AML, prior CVA (mild R sided weakness), HTN, HLD, BPH, prior covid infection presented with worsening SOB.     Problem/Plan - 1:  ·  Problem: Acute decompensated systolic heart failure.   ·  Plan: Last Echo in June 2021 showed EF of 25-30% with severe decreased LV systolic function, with large area of apical akinesis. Volume overloaded on exam with elevated pro-BNP.   Trop elevated but likely from CKD.   -c/w lasix 80 mg IV daily weight, strict i/o   will optimize GDMT as BP tolerates   On Toprol 50mg and  Hydral 25mg TID- please uptitrate hydral   Hold ACE/ARB given elevated cr    * lasix 80mg IV daily on hold given rise in cr. would resume when cr improves  . patient still reports dyspnea while ambulating.  document o2 while ambulating   - on lasix 40mg   - on hydral 75 mg   - ECHO shows EF 30-35% an sevre LVF and wall abnormalities as noted above     Problem/Plan - 2:  ·  Problem:  CAD s/p CABG  2012) and s/p PCI/WENDI 2014 and 2015   c/w GDMT   will uptitrate as tolerated  previously not on AC or DAPT d/t thrombocytopenia   - ECHO shows EF 30-35% an sevre LVF and wall abnormalities as noted above     Problem/Plan - 3:  ·  Problem: AML (acute myeloblastic leukemia).   ·  Plan: -on C2 D4 of Dacogen   -per heme     Problem/Plan - 4:  ·  Problem: DVT prophylaxis.   ·  Plan: Lovenox.          Jelena Graff [facility]-BC   Lamont Huffman DO [facility]  Cardiovascular Medicine  [address] [place] [address], [address]  Office: [phone]  Cell: [phone] Patient is a 80y old  Male who presents with a chief complaint of CHF exacerbation (29 Aug 2021 13:44)      INTERVAL HISTORY: pt is feeling ok     REVIEW OF SYSTEMS:   CONSTITUTIONAL: No weakness  EYES/ENT: No visual changes; No throat pain  Neck: No pain or stiffness  Respiratory: No cough, wheezing, No shortness of breath  CARDIOVASCULAR: no chest pain or palpitations  GASTROINTESTINAL: No abdominal pain, no nausea, vomiting or hematemesis  GENITOURINARY: No dysuria, frequency or hematuria  NEUROLOGICAL: No stroke like symptoms  SKIN: No rashes    	  MEDICATIONS:  furosemide    Tablet 40 milliGRAM(s) Oral daily  hydrALAZINE 75 milliGRAM(s) Oral three times a day  isosorbide   mononitrate ER Tablet (IMDUR) 60 milliGRAM(s) Oral daily  metoprolol succinate ER 50 milliGRAM(s) Oral daily        PHYSICAL EXAM:  T(C): 36.7 (09-02-21 @ 08:21), Max: 37.1 (09-01-21 @ 16:29)  HR: 73 (09-02-21 @ 08:21) (71 - 76)  BP: 131/- (09-02-21 @ 13:12) (131/- - 148/63)  RR: 18 (09-02-21 @ 08:21) (18 - 18)  SpO2: 98% (09-02-21 @ 08:21) (98% - 99%)  Wt(kg): --  I&O's Summary    01 Sep 2021 07:01  -  02 Sep 2021 07:00  --------------------------------------------------------  IN: 860 mL / OUT: 600 mL / NET: 260 mL    02 Sep 2021 07:01  -  02 Sep 2021 14:18  --------------------------------------------------------  IN: 0 mL / OUT: 420 mL / NET: -420 mL          Appearance: In no distress	  HEENT:    PERRL, EOMI	  Cardiovascular:  S1 S2, No JVD  Respiratory: Lungs clear to auscultation	  Gastrointestinal:  Soft, Non-tender, + BS	  Vascularature:  No edema of LE  Psychiatric: Appropriate affect   Neuro: no acute focal deficits                               9.6    6.38  )-----------( 428      ( 02 Sep 2021 09:05 )             29.4     09-02    135  |  103  |  29<H>  ----------------------------<  139<H>  3.8   |  18<L>  |  2.42<H>    Ca    9.2      02 Sep 2021 09:05          Labs personally reviewed  radiology   < from: TTE with Doppler (w/Cont) (08.31.21 @ 08:38) >  1. Calcified trileaflet aortic valve with decreased  opening. Peak transaortic valve gradient equals 5 mm Hg,  mean transaortic valve gradient equals 2 mm Hg, estimated  aortic valve area equals 1.9 sqcm (by planimetry),  consistent with mild aortic stenosis.  2. Endocardial visualization enhanced with intravenous  injection of Ultrasonic Enhancing Agent (Definity).  Severe   segmental left ventricular systolic dysfunction.  The mid-  distal inferolateral wall, the apical septum, the  anterolateral wall, and the apical cap are severely  hypokinetic. No obvious left ventricular thrombus.  3. Moderate diastolic dysfunction (Stage II). Increased  E/e'  is consistent with elevated left ventricular filling  pressure.  4. The right ventricle is not well visualized; grossly  reduced  right ventricular systolic function. A device wire  is noted in the right heart.    < end of copied text >    ASSESSMENT/PLAN: 	  	  80M PMHx CKD, CAD s/p CABG 2012 & 4 stents (2 in 2014, 2 in 2015), CHF with BiV ICD (2013), AML, prior CVA (mild R sided weakness), HTN, HLD, BPH, prior covid infection presented with worsening SOB.     Problem/Plan - 1:  ·  Problem: Acute decompensated systolic heart failure.   ·  Plan: Last Echo in June 2021 showed EF of 25-30% with severe decreased LV systolic function, with large area of apical akinesis. Volume overloaded on exam with elevated pro-BNP.   Trop elevated but likely from CKD and CHF   will optimize GDMT as BP tolerates   On Toprol 50mg and  Hydral 75mg TID- please uptitrate hydral as BP tolerates, will try for 100mg TID for discharge  Hold ACE/ARB given elevated cr  . patient still reports mild dyspnea while ambulating but not hypoxic sat of 97% on ambulation   - on lasix 40mg   - on hydral 75 mg   - ECHO shows EF 30-35% an sevre LVF and wall abnormalities as noted above   - will repeat CXR and proBNP in AM prior to discharge  - plan for discharge Friday AM with follow up in the office 9/8/21 @ 12:30pm    Problem/Plan - 2:  ·  Problem:  CAD s/p CABG  2012) and s/p PCI/WENDI 2014 and 2015   c/w GDMT   will uptitrate as tolerated  previously not on AC or DAPT d/t thrombocytopenia   - ECHO shows EF 30-35% an sevre LVF and wall abnormalities as noted above     Problem/Plan - 3:  ·  Problem: DVT prophylaxis.   ·  Plan: Lovenox.          Jelena Graff FNP-BC   Lamont Huffman DO [facility]  Cardiovascular Medicine  [address] [place] [address], [address]  Office: [phone]  Cell: [phone]

## 2021-09-02 NOTE — DISCHARGE NOTE PROVIDER - NSDCCPCAREPLAN_GEN_ALL_CORE_FT
PRINCIPAL DISCHARGE DIAGNOSIS  Diagnosis: Acute exacerbation of CHF (congestive heart failure)  Assessment and Plan of Treatment: Symptoms resolved   -continue Lasix as prescribed   -Follow-up with Dr. Pollard cardiologist --- call for appointment   Weigh yourself daily.  If you gain 3lbs in 3 days, or 5lbs in a week call your Health Care Provider.  Do not eat or drink foods containing more than 2000mg of salt (sodium) in your diet every day.  Call your Health Care Provider if you have any swelling or increased swelling in your feet, ankles, and/or stomach.  Take all of your medication as directed.  If you become dizzy call your Health Care Provider.         SECONDARY DISCHARGE DIAGNOSES  Diagnosis: Chronic kidney disease, unspecified CKD stage  Assessment and Plan of Treatment: Follow-up with your PCP for further monitorig    Diagnosis: AML (acute myeloblastic leukemia)  Assessment and Plan of Treatment: Follow-up with Dr. Wise- hematologist for further monitoring and treatment     PRINCIPAL DISCHARGE DIAGNOSIS  Diagnosis: Acute exacerbation of CHF (congestive heart failure)  Assessment and Plan of Treatment: Symptoms resolved   -continue Lasix as prescribed   -Follow-up with Dr. Pollard cardiologist  on Wednesday September 8th @1230pm  Weigh yourself daily.  If you gain 3lbs in 3 days, or 5lbs in a week call your Health Care Provider.  Do not eat or drink foods containing more than 2000mg of salt (sodium) in your diet every day.  Call your Health Care Provider if you have any swelling or increased swelling in your feet, ankles, and/or stomach.  Take all of your medication as directed.  If you become dizzy call your Health Care Provider.         SECONDARY DISCHARGE DIAGNOSES  Diagnosis: AML (acute myeloblastic leukemia)  Assessment and Plan of Treatment: Follow-up with Dr. Wise- hematologist for further monitoring and treatment    Diagnosis: Chronic kidney disease, unspecified CKD stage  Assessment and Plan of Treatment: Follow-up with your PCP for further monitorig

## 2021-09-02 NOTE — DISCHARGE NOTE PROVIDER - NSRESEARCHGRANT_OVERRIDEREC_GEN_A_CORE
Active cancer (i.e. undergoing acute, in-hospital cancer treatment)/This is a surgical and/or non-medical patient.

## 2021-09-02 NOTE — DISCHARGE NOTE PROVIDER - HOSPITAL COURSE
80M PMHx CKD, CAD s/p CABG 2012 & 4 stents (2 in 2014, 2 in 2015), CHF with BiV ICD (2013), AML, prior CVA (mild R sided weakness), HTN, HLD, BPH, prior covid infection presented with worsening SOB, aw acute systolic CHF.  CT showed bilateral pleural effusion (increased on L) and new MYKEL atelectasis. Cardiology consulted;  Lasix 80mg IV given.... dyspnea now significantly improved w diuresis. Diuresis held for worsening Cr, now stabilizing. PO Lasix restarted.   Hydralazine uptitrated on 8/31. Oxygen saturation 97% during ambulation/.   Last Echo in June 2021 showed EF of 25-30% with severe decreased LV systolic function, with large area of apical akinesis. Echo 8/31 w no significant change.      #Abdominal pain.   Now resolved. CT abdomen showed limited assessment of gallbladder, no bowel obstruction, wall thickening or inflammatory changes.   Liver enzymes were WNL. Last admission found to have cholecystitis, asymptomatic so no intervention by surgery or IR.  #AML (acute myeloblastic leukemia).   ·on Dacogen and Venetoclax   heme-onc-no treatment while inpatient.  #Hypertension.   continue home meds.  #Chronic kidney disease, unspecified CKD stage.   BRISA on CKD as above, Cr stabilizing.   80M PMHx CKD, CAD s/p CABG 2012 & 4 stents (2 in 2014, 2 in 2015), CHF with BiV ICD (2013), AML, prior CVA (mild R sided weakness), HTN, HLD, BPH, prior covid infection presented with worsening SOB, aw acute systolic CHF.  CT showed bilateral pleural effusion (increased on L) and new MYKEL atelectasis. Cardiology consulted;  Lasix 80mg IV given for  dyspnea,  now significantly improved w diuresis. Diuresis held for worsening Cr, now stabilizing. PO Lasix restarted.   Hydralazine uptitrated on 8/31. Oxygen saturation 97% during ambulation. Last Echo in June 2021 showed EF of 25-30% with severe decreased LV systolic function, with large area of apical akinesis. Echo 8/31 w no significant change.      > Abdominal pain.   Now resolved. CT abdomen showed limited assessment of gallbladder, no bowel obstruction, wall thickening or inflammatory changes.   Liver enzymes were WNL. Last admission found to have cholecystitis, asymptomatic so no intervention by surgery or IR.  > AML (acute myeloblastic leukemia).   ·on Dacogen and Venetoclax   heme-onc-no treatment while inpatient.  > Hypertension.   continue home meds.  > Chronic kidney disease, unspecified CKD stage.   BRISA on CKD as above, Cr stabilizing.  Cleared by Dr Welsh for discharge home.

## 2021-09-02 NOTE — DISCHARGE NOTE PROVIDER - NSDCFUADDAPPT_GEN_ALL_CORE_FT
Follow up with Dr Huffman on September 8th, 2021 @ 1230pm.  Follow up with oncologist as scheduled  Hold Lasix( Furosemide) until evaluated by Dr Huffman.  Follow up with PMD within 2 days of discharge.

## 2021-09-02 NOTE — PROGRESS NOTE ADULT - SUBJECTIVE AND OBJECTIVE BOX
INTERVAL HPI/OVERNIGHT EVENTS:  Patient S&E at bedside. No o/n events. His son was with him, and son reported that pt feels comfortable on the bed. Symptoms of SOB significantly improved. Denied fever/chills, N/V/D, abd pain, changes with BMs and urination.     VITAL SIGNS:  T(F): 98 (09-02-21 @ 16:05)  HR: 69 (09-02-21 @ 16:05)  BP: 123/60 (09-02-21 @ 16:05)  RR: 18 (09-02-21 @ 16:05)  SpO2: 99% (09-02-21 @ 16:05)  Wt(kg): --    PHYSICAL EXAM:    Constitutional: NAD; AAOX3   Eyes: EOMI, sclera non-icteric  Neck: supple  Respiratory: CTAB, no wheezes or crackles   Cardiovascular: RRR  Gastrointestinal: soft, NTND, + BS  Extremities: no cyanosis, clubbing or Alf edema   Neurological: awake and alert      MEDICATIONS  (STANDING):  alteplase for catheter clearance 2 milliGRAM(s) Catheter once  aspirin enteric coated 81 milliGRAM(s) Oral daily  chlorhexidine 4% Liquid 1 Application(s) Topical <User Schedule>  finasteride 5 milliGRAM(s) Oral daily  folic acid 1 milliGRAM(s) Oral daily  furosemide    Tablet 40 milliGRAM(s) Oral daily  heparin   Injectable 5000 Unit(s) SubCutaneous every 12 hours  hydrALAZINE 75 milliGRAM(s) Oral three times a day  isosorbide   mononitrate ER Tablet (IMDUR) 60 milliGRAM(s) Oral daily  levoFLOXacin  Tablet 250 milliGRAM(s) Oral every 24 hours  metoprolol succinate ER 50 milliGRAM(s) Oral daily  posaconazole Suspension 200 milliGRAM(s) Oral every 8 hours    MEDICATIONS  (PRN):  acetaminophen   Tablet .. 650 milliGRAM(s) Oral every 6 hours PRN Temp greater or equal to 38.5C (101.3F), Mild Pain (1 - 3)  aluminum hydroxide/magnesium hydroxide/simethicone Suspension 30 milliLiter(s) Oral every 4 hours PRN Dyspepsia  melatonin 3 milliGRAM(s) Oral at bedtime PRN Insomnia  ondansetron Injectable 4 milliGRAM(s) IV Push every 8 hours PRN Nausea and/or Vomiting      Allergies    morphine (Unknown)    Intolerances        LABS:                        9.6    6.38  )-----------( 428      ( 02 Sep 2021 09:05 )             29.4     09-02    135  |  103  |  29<H>  ----------------------------<  139<H>  3.8   |  18<L>  |  2.42<H>    Ca    9.2      02 Sep 2021 09:05            RADIOLOGY & ADDITIONAL TESTS:  Studies reviewed.

## 2021-09-03 NOTE — PROGRESS NOTE ADULT - SUBJECTIVE AND OBJECTIVE BOX
Patient is a 80y old  Male who presents with a chief complaint of Worsening of SOB (02 Sep 2021 18:31)      INTERVAL HISTORY: pt feeling ok     REVIEW OF SYSTEMS:   CONSTITUTIONAL: No weakness  EYES/ENT: No visual changes; No throat pain  Neck: No pain or stiffness  Respiratory: No cough, wheezing, No shortness of breath  CARDIOVASCULAR: no chest pain or palpitations  GASTROINTESTINAL: No abdominal pain, no nausea, vomiting or hematemesis  GENITOURINARY: No dysuria, frequency or hematuria  NEUROLOGICAL: No stroke like symptoms  SKIN: No rashes    	  MEDICATIONS:  furosemide    Tablet 40 milliGRAM(s) Oral daily  hydrALAZINE 75 milliGRAM(s) Oral three times a day  isosorbide   mononitrate ER Tablet (IMDUR) 60 milliGRAM(s) Oral daily  metoprolol succinate ER 50 milliGRAM(s) Oral daily        PHYSICAL EXAM:  T(C): 36.4 (09-03-21 @ 09:18), Max: 36.7 (09-02-21 @ 16:05)  HR: 72 (09-03-21 @ 09:51) (69 - 76)  BP: 110/55 (09-03-21 @ 09:18) (110/55 - 143/55)  RR: 18 (09-03-21 @ 09:18) (18 - 18)  SpO2: 94% (09-03-21 @ 09:51) (94% - 99%)  Wt(kg): --  I&O's Summary    02 Sep 2021 07:01  -  03 Sep 2021 07:00  --------------------------------------------------------  IN: 300 mL / OUT: 620 mL / NET: -320 mL    03 Sep 2021 07:01  -  03 Sep 2021 13:52  --------------------------------------------------------  IN: 510 mL / OUT: 0 mL / NET: 510 mL          Appearance: In no distress	  HEENT:    PERRL, EOMI	  Cardiovascular:  S1 S2, No JVD  Respiratory: Lungs clear to auscultation	  Gastrointestinal:  Soft, Non-tender, + BS	  Vascularature:  No edema of LE  Psychiatric: Appropriate affect   Neuro: no acute focal deficits                               9.6    6.38  )-----------( 428      ( 02 Sep 2021 09:05 )             29.4     09-03    136  |  105  |  35<H>  ----------------------------<  122<H>  4.1   |  17<L>  |  2.77<H>    Ca    10.6<H>      03 Sep 2021 09:26          Labs personally reviewed      ASSESSMENT/PLAN: 	      80M PMHx CKD, CAD s/p CABG 2012 & 4 stents (2 in 2014, 2 in 2015), CHF with BiV ICD (2013), AML, prior CVA (mild R sided weakness), HTN, HLD, BPH, prior covid infection presented with worsening SOB.     Problem/Plan - 1:  ·  Problem: Acute decompensated systolic heart failure.   ·  Plan: Last Echo in June 2021 showed EF of 25-30% with severe decreased LV systolic function, with large area of apical akinesis. Volume overloaded on exam with elevated pro-BNP.   Trop elevated but likely from CKD and CHF   will optimize GDMT as BP tolerates   On Toprol 50mg and  Hydral 75mg TID- please uptitrate hydral as BP tolerates, will try for 100mg TID for discharge  Hold ACE/ARB given elevated cr  . patient still reports mild dyspnea while ambulating but not hypoxic sat of 97% on ambulation   - on lasix 40mg   - on hydral 75 mg   - ECHO shows EF 30-35% an sevre LVF and wall abnormalities as noted above   - will repeat CXR and proBNP in AM prior to discharge  - plan for discharge today with follow up in the office 9/8/21 @ 12:30pm    Problem/Plan - 2:  ·  Problem:  CAD s/p CABG  2012) and s/p PCI/WENDI 2014 and 2015   c/w GDMT   will uptitrate as tolerated  previously not on AC or DAPT d/t thrombocytopenia   - ECHO shows EF 30-35% an severe LVF and wall abnormalities     Problem/Plan - 3:  ·  Problem: DVT prophylaxis.   ·  Plan: Lovenox.      Jelena PISANOP-BC   Lamont Huffman DO [facility]  Cardiovascular Medicine  [address] [place] [address], [address]  Office: [phone]  Cell: [phone] Patient is a 80y old  Male who presents with a chief complaint of Worsening of SOB (02 Sep 2021 18:31)      INTERVAL HISTORY: pt feeling ok     REVIEW OF SYSTEMS:   CONSTITUTIONAL: No weakness  EYES/ENT: No visual changes; No throat pain  Neck: No pain or stiffness  Respiratory: No cough, wheezing, No shortness of breath  CARDIOVASCULAR: no chest pain or palpitations  GASTROINTESTINAL: No abdominal pain, no nausea, vomiting or hematemesis  GENITOURINARY: No dysuria, frequency or hematuria  NEUROLOGICAL: No stroke like symptoms  SKIN: No rashes    	  MEDICATIONS:  furosemide    Tablet 40 milliGRAM(s) Oral daily  hydrALAZINE 75 milliGRAM(s) Oral three times a day  isosorbide   mononitrate ER Tablet (IMDUR) 60 milliGRAM(s) Oral daily  metoprolol succinate ER 50 milliGRAM(s) Oral daily        PHYSICAL EXAM:  T(C): 36.4 (09-03-21 @ 09:18), Max: 36.7 (09-02-21 @ 16:05)  HR: 72 (09-03-21 @ 09:51) (69 - 76)  BP: 110/55 (09-03-21 @ 09:18) (110/55 - 143/55)  RR: 18 (09-03-21 @ 09:18) (18 - 18)  SpO2: 94% (09-03-21 @ 09:51) (94% - 99%)  Wt(kg): --  I&O's Summary    02 Sep 2021 07:01  -  03 Sep 2021 07:00  --------------------------------------------------------  IN: 300 mL / OUT: 620 mL / NET: -320 mL    03 Sep 2021 07:01  -  03 Sep 2021 13:52  --------------------------------------------------------  IN: 510 mL / OUT: 0 mL / NET: 510 mL          Appearance: In no distress	  HEENT:    PERRL, EOMI	  Cardiovascular:  S1 S2, No JVD  Respiratory: Lungs clear to auscultation	  Gastrointestinal:  Soft, Non-tender, + BS	  Vascularature:  No edema of LE  Psychiatric: Appropriate affect   Neuro: no acute focal deficits                               9.6    6.38  )-----------( 428      ( 02 Sep 2021 09:05 )             29.4     09-03    136  |  105  |  35<H>  ----------------------------<  122<H>  4.1   |  17<L>  |  2.77<H>    Ca    10.6<H>      03 Sep 2021 09:26          Labs personally reviewed      ASSESSMENT/PLAN: 	      80M PMHx CKD, CAD s/p CABG 2012 & 4 stents (2 in 2014, 2 in 2015), CHF with BiV ICD (2013), AML, prior CVA (mild R sided weakness), HTN, HLD, BPH, prior covid infection presented with worsening SOB.     Problem/Plan - 1:  ·  Problem: Acute decompensated systolic heart failure.   ·  Plan: Last Echo in June 2021 showed EF of 25-30% with severe decreased LV systolic function, with large area of apical akinesis. Volume overloaded on exam with elevated pro-BNP.   Trop elevated but likely from CKD and CHF   will optimize GDMT as BP tolerates   On Toprol 50mg and  Hydral 75mg TID-   Hold ACE/ARB given elevated cr  . patient still reports mild dyspnea while ambulating but not hypoxic sat of 97% on ambulation   - Hold lasix 40mg for discharge given BRISA  - ECHO shows EF 30-35% an sevre LVF and wall abnormalities as noted above   -  proBNP cut in half since admisison   - plan for discharge today with follow up in the office 9/8/21 @ 12:30pm for repeat BMP and assessment as to restart Lasix     Problem/Plan - 2:  ·  Problem:  CAD s/p CABG  2012) and s/p PCI/WENDI 2014 and 2015   c/w GDMT   will uptitrate as tolerated  previously not on AC or DAPT d/t thrombocytopenia   - ECHO shows EF 30-35% an severe LVF and wall abnormalities     Problem/Plan - 3:  ·  Problem: DVT prophylaxis.   ·  Plan: Lovenox.      Jelena Graff [facility]-BC   Lamont Huffman, DO [facility]  Cardiovascular Medicine  [address], [address]  Office: [phone]  Cell: [phone]

## 2021-09-03 NOTE — PROGRESS NOTE ADULT - PROBLEM SELECTOR PLAN 1
Lasix 80mg IV given. Dyspnea now significantly improved w diuresis. Diuresis held for worsening Cr, now stabilizing. PO Lasix restarted on 9/1, cr worsening. Cr held now after d/w Cardiology.     Hydralazine uptitrated by Cardiology.     Saturating well on room air at rest and w ambulation.       Last Echo in June 2021 showed EF of 25-30% with severe decreased LV systolic function, with large area of apical akinesis. Echo 8/31 w no significant change.

## 2021-09-03 NOTE — PROGRESS NOTE ADULT - PROBLEM SELECTOR PLAN 2
Now resolved. CT abdomen showed limited assessment of gallbladder, no bowel obstruction, wall thickening or inflammatory changes.     Liver enzymes were WNL. Last admission found to have cholecystitis, asymptomatic so no intervention by surgery or IR.
Per son, no complaint of abdominal pain at home. Only had abdominal pain when pressed on by provider. CT abdomen showed limited assessment of gallbladder, no bowel obstruction, wall thickening or inflammatory changes. Liver enzymes were WNL. Last admission found to have cholecystitis, asymptomatic so no intervention by surgery or IR.   -RUQ ultrasound
Now resolved. CT abdomen showed limited assessment of gallbladder, no bowel obstruction, wall thickening or inflammatory changes.     Liver enzymes were WNL. Last admission found to have cholecystitis, asymptomatic so no intervention by surgery or IR.
Per son, no complaint of abdominal pain at home. Only had abdominal pain when pressed on by provider. CT abdomen showed limited assessment of gallbladder, no bowel obstruction, wall thickening or inflammatory changes. Liver enzymes were WNL. Last admission found to have cholecystitis, asymptomatic so no intervention by surgery or IR.   -RUQ ultrasound
Now resolved. CT abdomen showed limited assessment of gallbladder, no bowel obstruction, wall thickening or inflammatory changes.     Liver enzymes were WNL. Last admission found to have cholecystitis, asymptomatic so no intervention by surgery or IR.

## 2021-09-03 NOTE — PROGRESS NOTE ADULT - PROBLEM SELECTOR PLAN 6
BRISA on CKD as above, Cr worse today. Monitor.
BRISA on CKD as above.
Cr at baseline. Not on HD.   -avoid nephrotoxic drugs and agents  -renally dosed meds
Cr at baseline. Not on HD.   -avoid nephrotoxic drugs and agents  -renally dosed meds
BRISA on CKD as above, Cr stabilizing.
BRISA on CKD as above, Cr stabilizing.
BRISA on CKD as above. Monitor Cr.

## 2021-09-03 NOTE — PROGRESS NOTE ADULT - PROVIDER SPECIALTY LIST ADULT
Cardiology
Cardiology
Urology
Cardiology
Heme/Onc
Hospitalist

## 2021-09-03 NOTE — PROGRESS NOTE ADULT - NSPROGADDITIONALINFOA_GEN_ALL_CORE
D/c home w f/u. D/c time 40 mins.
Discussed with patient, Dilma Sprague ACP.
Discussed with patient, Dilma Sprague ACP.
Discussed with patient, ER ACP.

## 2021-09-03 NOTE — PROGRESS NOTE ADULT - PROBLEM SELECTOR PLAN 4
Most likely secondary to malignancy.   -iron study  -transfuse if Hgb <7  -type and cross
Most likely secondary to malignancy. Hb 9.2 today.   -transfuse if Hgb <7
Most likely secondary to malignancy. Hb 9.2 today.   -transfuse if Hgb <7
Most likely secondary to malignancy.   -transfuse if Hgb <7
Most likely secondary to malignancy.   -transfuse if Hgb <7  -type and cross

## 2021-09-03 NOTE — PROGRESS NOTE ADULT - SUBJECTIVE AND OBJECTIVE BOX
Patient is a 80y old  Male who presents with a chief complaint of Worsening of SOB (02 Sep 2021 18:31)      SUBJECTIVE / OVERNIGHT EVENTS: Pt sitting up in chair, feels well.     Vital Signs Last 24 Hrs  T(C): 36.4 (03 Sep 2021 09:18), Max: 36.7 (02 Sep 2021 16:05)  T(F): 97.5 (03 Sep 2021 09:18), Max: 98 (02 Sep 2021 16:05)  HR: 72 (03 Sep 2021 09:51) (69 - 76)  BP: 110/55 (03 Sep 2021 09:18) (110/55 - 143/55)  BP(mean): --  RR: 18 (03 Sep 2021 09:18) (18 - 18)  SpO2: 94% (03 Sep 2021 09:51) (94% - 99%)    MEDICATIONS  (STANDING):  alteplase for catheter clearance 2 milliGRAM(s) Catheter once  alteplase for catheter clearance 2 milliGRAM(s) Catheter once  alteplase for catheter clearance 2 milliGRAM(s) Catheter once  aspirin enteric coated 81 milliGRAM(s) Oral daily  chlorhexidine 4% Liquid 1 Application(s) Topical <User Schedule>  finasteride 5 milliGRAM(s) Oral daily  folic acid 1 milliGRAM(s) Oral daily  furosemide    Tablet 40 milliGRAM(s) Oral daily  heparin   Injectable 5000 Unit(s) SubCutaneous every 12 hours  hydrALAZINE 75 milliGRAM(s) Oral three times a day  isosorbide   mononitrate ER Tablet (IMDUR) 60 milliGRAM(s) Oral daily  levoFLOXacin  Tablet 250 milliGRAM(s) Oral every 24 hours  metoprolol succinate ER 50 milliGRAM(s) Oral daily  posaconazole Suspension 200 milliGRAM(s) Oral every 8 hours    MEDICATIONS  (PRN):  acetaminophen   Tablet .. 650 milliGRAM(s) Oral every 6 hours PRN Temp greater or equal to 38.5C (101.3F), Mild Pain (1 - 3)  aluminum hydroxide/magnesium hydroxide/simethicone Suspension 30 milliLiter(s) Oral every 4 hours PRN Dyspepsia  melatonin 3 milliGRAM(s) Oral at bedtime PRN Insomnia  ondansetron Injectable 4 milliGRAM(s) IV Push every 8 hours PRN Nausea and/or Vomiting        CAPILLARY BLOOD GLUCOSE        I&O's Summary    02 Sep 2021 07:01  -  03 Sep 2021 07:00  --------------------------------------------------------  IN: 300 mL / OUT: 620 mL / NET: -320 mL    03 Sep 2021 07:01  -  03 Sep 2021 14:24  --------------------------------------------------------  IN: 510 mL / OUT: 0 mL / NET: 510 mL        PHYSICAL EXAM:  GENERAL: NAD, well-developed  HEAD:  Atraumatic, Normocephalic  EYES: EOMI, PERRLA, conjunctiva and sclera clear  NECK: Supple, No JVD  CHEST/LUNG: Clear to auscultation bilaterally; No wheeze  HEART: Regular rate and rhythm; No murmurs, rubs, or gallops  ABDOMEN: Soft, Nontender, Nondistended; Bowel sounds present  EXTREMITIES:  2+ Peripheral Pulses, No clubbing, cyanosis, or edema  PSYCH: AAOx3  NEUROLOGY: non-focal  SKIN: No rashes or lesions    LABS:                        9.6    6.38  )-----------( 428      ( 02 Sep 2021 09:05 )             29.4     09-03    136  |  105  |  35<H>  ----------------------------<  122<H>  4.1   |  17<L>  |  2.77<H>    Ca    10.6<H>      03 Sep 2021 09:26        RADIOLOGY & ADDITIONAL TESTS:    Imaging Personally Reviewed:    Consultant(s) Notes Reviewed:      Care Discussed with Consultants/Other Providers: NP

## 2021-09-03 NOTE — PROGRESS NOTE ADULT - ASSESSMENT
80M PMHx CKD, CAD s/p CABG 2012 & 4 stents (2 in 2014, 2 in 2015), CHF with BiV ICD (2013), AML, prior CVA (mild R sided weakness), HTN, HLD, BPH, prior covid infection presented with worsening SOB, aw acute systolic CHF.
80M PMHx CKD, CAD s/p CABG 2012 & 4 stents (2 in 2014, 2 in 2015), CHF with BiV ICD (2013), AML, prior CVA (mild R sided weakness), HTN, HLD, BPH, prior covid infection presented with worsening SOB. 
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     Hematology team follow up for his AML.    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 [facility] (-) Acute Myeloid Leukemia diagnosed in July 2021  -s/p C1 and C2 Dacogen/Venetoclax at [facility]; he also received BM biopsy on 7/9/21 (quantity insufficient) and Repeat BM Bx 7/16/21 showing hypercellular marrow   -C3D1 Dacogen on 8/24/21. Currently on hold in view of CHF exacerbation treatment as inpat.   -Continue home posaconazole and levofloxacin for ppx  -Hold Dacogen and Venetoclax; No current plans for inpatient chemotherapy at this time. pt will f/u Dr. Wise (scheduled appointment for next week) on discharged.   -f/u CBC with Diff daily as inpatient; this morning Hb 9.6; WBC 6.3k; platelet 428.   -the rest of care for CHF exacerbation per primary team     Discussed with Attendind Dr. Hutton and fellow Dr. Eric BARRAZA.O Ph.D   PGY4 Hem&Onco fellow   Pager [phone]   Please contact with on call fellow after 5pm or on weekends             
80M PMHx CKD, CAD s/p CABG 2012 & 4 stents (2 in 2014, 2 in 2015), CHF with BiV ICD (2013), AML, prior CVA (mild R sided weakness), HTN, HLD, BPH, prior covid infection presented with worsening SOB, aw acute systolic CHF.    CT showed bilateral pleural effusion (increased on L) and new MYKEL atelectasis. 
80M PMHx CKD, CAD s/p CABG 2012 & 4 stents (2 in 2014, 2 in 2015), CHF with BiV ICD (2013), AML, prior CVA (mild R sided weakness), HTN, HLD, BPH, prior covid infection presented with worsening SOB, aw acute systolic CHF.    CT showed bilateral pleural effusion (increased on L) and new MYKEL atelectasis. 
80M PMHx CKD, CAD s/p CABG 2012 & 4 stents (2 in 2014, 2 in 2015), CHF with BiV ICD (2013), AML, prior CVA (mild R sided weakness), HTN, HLD, BPH, prior covid infection presented with worsening SOB, aw acute systolic CHF.    CT showed bilateral pleural effusion (increased on L) and new MYKEL atelectasis.     Improved on diuresis but developed BRISA.
80M PMHx CKD, CAD s/p CABG 2012 & 4 stents (2 in 2014, 2 in 2015), CHF with BiV ICD (2013), AML, prior CVA (mild R sided weakness), HTN, HLD, BPH, prior covid infection presented with worsening SOB. 
80M PMHx CKD, CAD s/p CABG 2012 & 4 stents (2 in 2014, 2 in 2015), CHF with BiV ICD (2013), AML, prior CVA (mild R sided weakness), HTN, HLD, BPH, prior covid infection presented with worsening SOB, aw acute systolic CHF.    CT showed bilateral pleural effusion (increased on L) and new MYKEL atelectasis.     Improved on diuresis but developed BRISA, now stabilizing.

## 2021-09-03 NOTE — PROGRESS NOTE ADULT - PROBLEM SELECTOR PLAN 7
Lovenox
Lovenox      Plan discussed w pt and son at bedside.
Heparin sq.    Spoke w Dr Huffman from Cardiology- d/c home if cleared by Cardiology.
Lovenox
Heparin sq.
Heparin sq.    Spoke w Dr Huffman from Cardiology- can be d/jerrica home off lasix. He will see pt on 9/8 at 12.30 pm.
Heparin sq.

## 2021-09-03 NOTE — PROGRESS NOTE ADULT - PROBLEM SELECTOR PROBLEM 6
Chronic kidney disease, unspecified CKD stage

## 2021-09-03 NOTE — PROGRESS NOTE ADULT - PROBLEM SELECTOR PLAN 5
-continue home meds
-Hydralazine increased as above.
-Hydralazine increased as above.
-continue home meds

## 2021-09-03 NOTE — PROGRESS NOTE ADULT - PROBLEM SELECTOR PROBLEM 3
AML (acute myeloblastic leukemia)

## 2021-09-03 NOTE — PROGRESS NOTE ADULT - ATTENDING COMMENTS
Pt care and plan discussed and reviewed with NP. Plan as outlined above edited by me to reflect our discussion.
ZENON Nair is an 80 year old male with a history of acute myeloid leukemia; He was not able to completed the thrid cycle of venetoclax and decitabine. Patient has been improving medically and is planned for discharge when medical staff is ready; currently remains comfortable at bed rest no orthopnea; mildly forgetful and son is of assistance in discussion. PICC line may need TPA

## 2021-09-09 NOTE — REASON FOR VISIT
[Follow-Up Visit] : a follow-up visit for [Acute Myeloid Leukemia] : acute myeloid leukemia [Family Member] : family member

## 2021-09-10 NOTE — PHYSICAL EXAM
[Normal] : affect appropriate [de-identified] : in wheelchair [de-identified] : edema in feet and ankles
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
## 2021-09-22 PROBLEM — M79.89 SWELLING OF RIGHT EXTREMITY: Status: ACTIVE | Noted: 2021-01-01

## 2021-09-22 PROBLEM — R06.00 DYSPNEA ON MINIMAL EXERTION: Status: ACTIVE | Noted: 2021-01-01

## 2021-09-27 PROBLEM — N18.4 CHRONIC KIDNEY DISEASE (CKD), STAGE IV (SEVERE): Status: ACTIVE | Noted: 2021-01-01

## 2021-09-27 NOTE — PHYSICAL EXAM
[General Appearance - Alert] : alert [General Appearance - In No Acute Distress] : in no acute distress [Sclera] : the sclera and conjunctiva were normal [Outer Ear] : the ears and nose were normal in appearance [Neck Appearance] : the appearance of the neck was normal [] : no respiratory distress [Exaggerated Use Of Accessory Muscles For Inspiration] : no accessory muscle use [Apical Impulse] : the apical impulse was normal [Heart Rate And Rhythm] : heart rate was normal and rhythm regular [Heart Sounds] : normal S1 and S2 [Edema] : there was no peripheral edema [Bowel Sounds] : normal bowel sounds [Abdomen Tenderness] : non-tender [Cervical Lymph Nodes Enlarged Posterior Bilaterally] : posterior cervical [Cervical Lymph Nodes Enlarged Anterior Bilaterally] : anterior cervical [Supraclavicular Lymph Nodes Enlarged Bilaterally] : supraclavicular [No CVA Tenderness] : no ~M costovertebral angle tenderness [No Spinal Tenderness] : no spinal tenderness [Abnormal Walk] : normal gait [Musculoskeletal - Swelling] : no joint swelling seen [Skin Color & Pigmentation] : normal skin color and pigmentation [Oriented To Time, Place, And Person] : oriented to person, place, and time

## 2021-09-28 PROBLEM — E87.5 HYPERKALEMIA: Status: ACTIVE | Noted: 2021-01-01

## 2021-09-28 NOTE — HISTORY OF PRESENT ILLNESS
[FreeTextEntry1] : A case of BRISA on CKD in the setting of hypertension and CAD admitted to [facility] twice with CHF and also dianosed AML. Patient was diuresed and serum stabilized to 1.8 mg/dl (base line). Renal sonogram gram bilateral echogenic kidneys without any obstruction. Patient has come for follow-up.

## 2021-09-28 NOTE — REVIEW OF SYSTEMS
[Feeling Tired] : feeling tired [Eyesight Problems] : eyesight problems [Loss Of Hearing] : hearing loss [SOB on Exertion] : shortness of breath during exertion [Constipation] : constipation [Nocturia] : nocturia [Recent Weight Gain (___ Lbs)] : no recent weight gain [Recent Weight Loss (___ Lbs)] : no recent weight loss [Chest Pain] : no chest pain [Palpitations] : no palpitations [Lower Ext Edema] : no extremity edema [Cough] : no cough [Heartburn] : no heartburn [Joint Swelling] : no joint swelling [Joint Stiffness] : no joint stiffness [Itching] : no itching [Dizziness] : no dizziness [Fainting] : no fainting [Anxiety] : no anxiety [Depression] : no depression [Muscle Weakness] : no muscle weakness [Easy Bleeding] : no tendency for easy bleeding [Easy Bruising] : no tendency for easy bruising

## 2021-09-28 NOTE — ASSESSMENT
[FreeTextEntry1] : A case of BRISA on CKD in the setting of hypertension and CAD admitted to [facility] twice with CHF and also dianosed AML. Patient was diuresed and serum stabilized to 1.8 mg/dl (base line). Renal sonogram gram bilateral echogenic kidneys without any obstruction. Patient has come for follow-up. BP is controlled. Weight is stable. Advised w/u including glomerular serology. \par Lab tests discussed. Renal function stable. There is mild increase in serum K (5.7 mE/L). Patient has been advised to avoid food with high K. Patient is already on furosemide. Advised BMP within a week. If K remains high, patient has to be started on potassium chelating agents.
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
## 2021-10-04 NOTE — PHYSICAL EXAM
[Normal] : affect appropriate [de-identified] : in wheelchair [de-identified] : edema in feet and ankles
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
## 2021-11-09 NOTE — PHYSICAL EXAM
[Normal] : affect appropriate [de-identified] : in wheelchair [de-identified] : edema in feet and ankles
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
## 2021-11-15 NOTE — PROGRESS NOTE ADULT - PROBLEM SELECTOR PLAN 2
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  Patient ports she has history of vasculitis, primarily affecting her lungs in the past with past history of PE and pulmonary hemorrhage, had been on Eliquis however was been off for 6 months.    She reports that for the past 1 week she has noticed some swelling around her bilateral eyes, as well as generalized weakness; the symptoms are consistent with vasculitis flareups that she has had in the past, so she called Dr. Salas, her nephrologist, to set up infusion of rituximab which she has not yet received given that overnight she developed left-sided chest pain/sensation of swelling as well as pain in the left arm and swelling in the left arm that she says resolved after taking Advil.  She has not been on Eliquis for 6+ months, however she does note that she had a tablet at home and so she took this this morning.  She denies any known injuries or trauma to the left arm/shoulder.  She notes that the shortness of breath has been with exertion and now also sometimes with rest.      She has had nasal congestion with rhinorrhea and postnasal drip, not currently vaccinated against COVID-19 given that she is concerned that this may cause problems with the rituximab infusions that she intermittently gets.  Denies any headache or vision changes, denies any neck or back pain, denies any focal numbness/tingling/weakness in any of her extremities.  Denies any changes with urination, has been urinating at baseline without any decrease in urination or hematuria.  Denies any nausea,  vomiting, abdominal pain. The patient denies any other physical concerns today.     Past Medical History  Past Medical History:   Diagnosis Date     Pulmonary embolism      Pulmonary hemorrhage      Vaginal vault prolapse after hysterectomy      Vasculitis (H)      Past Surgical History:   Procedure Laterality Date     BRONCHOSCOPY (RIGID OR FLEXIBLE), DIAGNOSTIC N/A 9/23/2020    Procedure: BRONCHOSCOPY, WITH BRONCHOALVEOLAR LAVAGE;  Surgeon: Yael Ashford MD;  Location:  GI     COLPORRHAPHY ANTERIOR, POSTERIOR, COMBINED N/A 8/14/2018    A & P repair with sacrospinous vault suspension     HYSTERECTOMY, PAP NO LONGER INDICATED      in her 20s - vaginal hyst. ovaries intact.     LAPAROTOMY EXPLORATORY  1990    teratoma     calcium carbonate 600 mg-vitamin D 400 units (CALTRATE) 600-400 MG-UNIT per tablet  Cholecalciferol (VITAMIN D3 PO)  estradiol (VAGIFEM) 10 MCG TABS vaginal tablet  IBANdronate (BONIVA) 150 MG tablet  UNABLE TO FIND  valACYclovir (VALTREX) 500 MG tablet      Allergies   Allergen Reactions     Codeine Other (See Comments) and Unknown     Vomiting       Family History  Family History   Problem Relation Age of Onset     Heart Failure Mother      Thyroid Disease Mother      Heart Failure Father      Hypothyroidism Daughter      Social History   Social History     Tobacco Use     Smoking status: Never Smoker     Smokeless tobacco: Never Used   Substance Use Topics     Alcohol use: No     Drug use: No      Past medical history, past surgical history, medications, allergies, family history, and social history were reviewed with the patient. No additional pertinent items.       Review of Systems   Constitutional: Negative for chills, fatigue and fever.   HENT: Positive for congestion, facial swelling, postnasal drip, rhinorrhea and sinus pressure. Negative for drooling, ear discharge, ear pain, hearing loss, mouth sores, nosebleeds, sore throat, tinnitus, trouble swallowing and voice change.   "  Eyes: Negative for visual disturbance.   Respiratory: Positive for shortness of breath. Negative for cough.    Cardiovascular: Positive for chest pain. Negative for palpitations and leg swelling.        Positive for left arm swelling.   Gastrointestinal: Negative for abdominal pain, constipation, diarrhea, nausea and vomiting.   Genitourinary: Negative for dysuria, flank pain, frequency, hematuria, vaginal bleeding and vaginal discharge.   Musculoskeletal: Negative for back pain and neck pain.   Skin: Negative for color change, rash and wound.   Allergic/Immunologic: Negative for immunocompromised state (Not currently receiving rituximab).   Neurological: Positive for weakness (Generalized, nonfocal). Negative for syncope, light-headedness, numbness and headaches.   Hematological: Does not bruise/bleed easily (Not on Eliquis anymore, however of note did take 1 tablet today).   Psychiatric/Behavioral: Negative for confusion.   All other systems reviewed and are negative.    A complete review of systems was performed with pertinent positives and negatives noted in the HPI, and all other systems negative.    Physical Exam   BP: (!) 146/76  Pulse: 74  Resp: 20  Height: 162.6 cm (5' 4\")  Weight: 59 kg (130 lb)  SpO2: 99 %     Physical Exam  Vitals and nursing note reviewed.   Constitutional:       General: She is not in acute distress.     Appearance: She is normal weight. She is not ill-appearing, toxic-appearing or diaphoretic.   HENT:      Head: Normocephalic and atraumatic.      Right Ear: External ear normal.      Left Ear: External ear normal.      Nose: Nose normal.      Mouth/Throat:      Mouth: Mucous membranes are moist.   Eyes:      Extraocular Movements: Extraocular movements intact.      Conjunctiva/sclera: Conjunctivae normal.      Pupils: Pupils are equal, round, and reactive to light.      Comments: Some mild edema appreciated to the inferior eyelids/folds bilaterally.  No pain with extraocular " movements.  Intact vision bilaterally on gross examination.   Cardiovascular:      Rate and Rhythm: Normal rate and regular rhythm.      Pulses: Normal pulses.      Heart sounds: Normal heart sounds. No murmur heard.  No friction rub. No gallop.    Pulmonary:      Effort: Pulmonary effort is normal. No respiratory distress.      Breath sounds: Normal breath sounds. No stridor. No wheezing, rhonchi or rales.   Abdominal:      General: Bowel sounds are normal. There is no distension.      Palpations: Abdomen is soft.      Tenderness: There is no abdominal tenderness. There is no right CVA tenderness, left CVA tenderness, guarding or rebound.   Musculoskeletal:         General: Normal range of motion.      Cervical back: Normal range of motion and neck supple. No rigidity or tenderness (No midline tenderness to palpation).      Right lower leg: No edema.      Left lower leg: No edema.      Comments: No appreciable swelling to the bilateral upper extremities.  Pain with attempted range of motion of the left shoulder, without obvious gross deformity.   Skin:     General: Skin is warm.      Capillary Refill: Capillary refill takes less than 2 seconds.   Neurological:      General: No focal deficit present.      Mental Status: She is alert.      Comments: GCS 15.  Cranial nerves II through XII intact.  Strength 5/5 in all all distributions of the upper and lower extremities.  Intact sensation in all distributions of the bilateral upper and lower extremities.    Psychiatric:         Mood and Affect: Mood normal.         Behavior: Behavior normal.         ED Course     ED Course as of 11/15/21 1354   Mon Nov 15, 2021   0933 EKG 12-lead  Sinus rhythm with first-degree AV block, no acute ST elevation or depression, there is T wave flattening in lead III and T wave inversion in V1 through V3; when compared to EKG October 11, 2020 TVI new.    1019 Sodium: 139   1019 Potassium: 3.9   1019 D-Dimer Quantitative: 0.29   1019 INR:  0.96   1019 PTT: 25   1022 Creatinine: 0.94   1022 Glucose: 96   1022 Magnesium: 2.2   1022 CRP Inflammation: <2.9   1022 Troponin I: <0.015   1027 WBC: 4.8   1027 Hemoglobin: 11.9   1027 Platelet Count: 310   1027 TSH: 1.43   1103 Bacteria Urine(!): Few   1103 Leukocyte Esterase Urine(!): Trace   1103 Nitrite Urine: Negative   1103 Ketones Urine: Negative   1103 Protein Albumin Urine: Negative   1103 Influenza A: Negative   1103 Influenza B: Negative   1103 Resp Syncytial Virus: Negative   1103 SARS CoV2 PCR: Negative   1103 Sed Rate: 13   1103 XR Shoulder Left G/E 3 Views  1. No acute osseous abnormality.  2. Moderate acromioclavicular joint degenerative change.   1308 CT Chest Pulmonary Embolism w Contrast  1. The exam is negative for acute pulmonary embolism. No right heart strain.  2. No focal airspace opacity.     Procedures: none.             EKG Interpretation:      Interpreted by Ashley Taylor MD  Time reviewed: 09:30  Symptoms at time of EKG: Chest pain    Rhythm: normal sinus   Rate: normal  Axis: normal  Ectopy: none  Conduction: First-degree AV block  ST Segments/ T Waves: No ST-T wave changes  Q Waves: none  Comparison to prior: Sinus rhythm with first-degree AV block, no acute ST elevation or depression, there is T wave flattening in lead III and T wave inversion in V1 through V3; when compared to EKG October 11, 2020 TVI new although TVI present on EKG 10/7/2020.     Clinical Impression: T wave inversion unchanged from 10/7/2020, otherwise no acute changes from previous  _______________________________________________       The medical record was reviewed and interpreted.  Current labs reviewed and interpreted.  Previous labs reviewed and interpreted.  Current images reviewed and interpreted: as above.  EKG reviewed and interpreted: as above.     Medications   oxyCODONE (ROXICODONE) tablet 5 mg (has no administration in time range)   acetaminophen (TYLENOL) tablet 1,000 mg (1,000 mg Oral Given 11/15/21  1004)   sodium chloride (PF) 0.9% PF flush 10 mL (84 mLs Intracatheter Given 11/15/21 1232)   iopamidol (ISOVUE-370) solution 52 mL (52 mLs Intravenous Given 11/15/21 1232)        Assessments & Plan (with Medical Decision Making)   Nursing notes and vital signs reviewed.     Presentation, exam, and workup is most consistent with chest pain and left shoulder pain.    Please see HPI, ROS, exam, and ED course above for pertinent history and findings. In short, the patient presented to the ED for evaluation of left-sided chest pain and shoulder pain, she is concerned that she might have a pulmonary embolism given that she had similar symptoms when she had a PE in the past, prompting her to come here for further evaluation.      In regards to her chest pain, EKG without acute ST elevation or depression and troponin undetectable, less concern for ACS.  CT chest thankfully negative for acute pulmonary embolism.  In regards to her shoulder pain x-ray without acute fracture or finding.  D-dimer within normal limits, making DVT less likely.    She was also having some nasal congestion and rhinorrhea, given that she is unvaccinated against COVID-19 COVID-19/influenza testing was obtained and thankfully returned negative.    Of note, patient did request for her labs that are ordered on outpatient basis by her nephrologist to be obtained today, so these were ordered and nephrology will follow up with these as they will likely not return until tomorrow.  In regards to her history of vasculitis, ESR and CRP within normal limits and renal function within normal limits without proteinuria.  I spoke with the nephrology fellow on-call for the patient's physician, he reports that they will follow-up on the labs that were drawn today in regards to her vasculitis follow-up care, he will send a message to her clinical  and have the patient follow-up in clinic to schedule rituximab infusion.  Discussed this with the patient, she  is comfortable in agreement with this plan and comfortable with discharge to home at this time.    Discussed pain management with the patient, she declined any prescription for pain medication would like to up-to-date Tylenol and ibuprofen at home.    On re-evaluation, the patient is resting comfortably after the above interventions. I discussed with the patient the results of the above studies. All questions were answered prior to discharge, and the patient was told to follow up with nephrology and her primary care physician per discharge instructions. Reasons for return as well as follow up were reviewed with the patient.  The patient expressed understanding and agreement.    Disposition: The patient was discharged to home in stable condition.      Final diagnoses:   Chest pain, unspecified type   Acute pain of left shoulder       --  Ashley Taylor MD   [facility] EMERGENCY DEPARTMENT  11/15/2021     Ashley Taylor MD  11/15/21 2128     Patient is neutropenic, afebrile   6/30 completed Zosyn   7/5 Started Levaquin for neutropenic prophylaxis as ANC < 500  If febrile, send pan cultures, and switch Levaquin to cefepime  6/24 Abd US: Cholelithiasis without signs of cholecystitis, persistently dilated CBD; HIDA scan (+) for acute cholecystitis   Seen by Surgery and IR, since patient is asymptomatic and there are no signs of hemodynamic instability   No surgical or IR interventions were recommended.  6/28  US abdomen Cholelithiasis with no definite sonographic evidence of acute cholecystitis.  7/3 Started posaconazole prophylaxis as patient is neutropenic. Adjusted dose of venetoclax.

## 2021-12-13 NOTE — PROGRESS NOTE ADULT - PROBLEM SELECTOR PROBLEM 4
"Chief Complaint  Follow-up (4 week follow up)    Subjective          Juliette Figueroa presents to [facility]  History of Present Illness      Patient continues to have bilateral lower extremity swelling with accompanying pain.  She is wearing compression stockings and taking Lasix 40 mg daily.  Patient denies any improvement in symptoms.  BNP was previously normal.    Patient has been hypertensive the last few visits.  Today she is 162/84.  Patient was put on lisinopril 10 mg daily at last visit.  She reports that she took 1-2 doses of medication and her blood pressure \"drop too low.\"  Patient merely had syncopal episode at home.  Patient's son is monitoring blood pressure regularly at home, reports that this morning it was 124/60.  Patient denies dizziness, headache, chest pain or vision changes.  Patient is in need of a Rollator to assist with ambulation due to generalized weakness and lower extremity swelling.  She is also requesting an order for a device that will assist with toilet safety support  Patient is requesting orders to go to [facility].    Objective   Vital Signs:   /84 (BP Location: Left arm, Patient Position: Sitting, Cuff Size: Adult)   Pulse 84   Ht 162.6 cm (64\")   Wt 90.7 kg (200 lb)   SpO2 99%   BMI 34.33 kg/m²       Physical Exam  Constitutional:       Appearance: Normal appearance.   HENT:      Head: Normocephalic.   Cardiovascular:      Rate and Rhythm: Normal rate and regular rhythm.   Pulmonary:      Effort: Pulmonary effort is normal.      Breath sounds: Normal breath sounds.   Abdominal:      General: Abdomen is flat. Bowel sounds are normal.      Palpations: Abdomen is soft.   Musculoskeletal:         General: Normal range of motion.      Cervical back: Neck supple.      Right lower le+ Pitting Edema present.      Left lower le+ Pitting Edema present.   Skin:     General: Skin is warm and dry.   Neurological:      Mental Status: " She is alert and oriented to person, place, and time.      Gait: Gait is intact.   Psychiatric:         Attention and Perception: Attention normal.         Mood and Affect: Mood normal.         Speech: Speech normal.        Result Review :     CMP    CMP 4/27/21 11/5/21   Glucose 78 88   BUN 15 18   Creatinine 0.75 1.04 (A)   eGFR Non African Am 73 50 (A)   Sodium 141 140   Potassium 4.8 4.7   Chloride 103 104   Calcium 9.2 9.6   Albumin 4.30 4.30   Total Bilirubin 0.3 0.4   Alkaline Phosphatase 78 75   AST (SGOT) 34 (A) 34 (A)   ALT (SGPT) 35 (A) 45 (A)   (A) Abnormal value            CBC    CBC 4/27/21 11/5/21   WBC 6.10 5.71   RBC 4.73 4.39   Hemoglobin 14.2 12.8   Hematocrit 43.2 40.3   MCV 91.3 91.8   MCH 30.0 29.2   MCHC 32.9 31.8   RDW 12.1 (A) 11.7 (A)   Platelets 221 225   (A) Abnormal value                      Assessment and Plan    Diagnoses and all orders for this visit:    1. Leg swelling (Primary)  Assessment & Plan:  1.  Discontinue Lasix  2.  Start Bumex 1 mg daily  3.  Continue potassium  4.  Check NELLY and bilateral venous Doppler  5.  Rollator and toilet safety frame ordered    Orders:  -     Cancel: Duplex Venous Lower Extremity - Bilateral CAR; Future  -     Cancel: US Ankle / Brachial Indices Extremity Complete; Future  -     Duplex Venous Lower Extremity - Bilateral CAR; Future  -     US Ankle / Brachial Indices Extremity Complete; Future    2. Other specified symptoms and signs involving the circulatory and respiratory systems   -     Cancel: US Ankle / Brachial Indices Extremity Complete; Future  -     US Ankle / Brachial Indices Extremity Complete; Future    3. Essential (primary) hypertension   Assessment & Plan:  1.  Monitor blood pressure at home regularly, call if greater than 140/90      Other orders  -     bumetanide (Bumex) 1 MG tablet; Take 1 tablet by mouth Daily.  Dispense: 30 tablet; Refill: 1  -     Misc. Devices (Rollator Ultra-Light) misc; 1 Device 1 (One) Time for 1 dose.   Dispense: 1 each; Refill: 0  -     Misc. Devices (Toilet Safety Frame) misc; 1 Device 1 (One) Time for 1 dose.  Dispense: 1 each; Refill: 0    I spent 30 minutes caring for Juliette on this date of service. This time includes time spent by me in the following activities:preparing for the visit, reviewing tests, obtaining and/or reviewing a separately obtained history, performing a medically appropriate examination and/or evaluation , counseling and educating the patient/family/caregiver, ordering medications, tests, or procedures, documenting information in the medical record and care coordination  Follow Up   Return in about 2 weeks (around 12/27/2021).  Patient was given instructions and counseling regarding her condition or for health maintenance advice. Please see specific information pulled into the AVS if appropriate.        Hypertension, unspecified type

## 2022-01-01 ENCOUNTER — OUTPATIENT (OUTPATIENT)
Dept: OUTPATIENT SERVICES | Facility: HOSPITAL | Age: 82
LOS: 1 days | Discharge: HOME | End: 2022-01-01

## 2022-01-01 ENCOUNTER — APPOINTMENT (OUTPATIENT)
Dept: INFUSION THERAPY | Facility: HOSPITAL | Age: 82
End: 2022-01-01

## 2022-01-01 ENCOUNTER — RESULT REVIEW (OUTPATIENT)
Age: 82
End: 2022-01-01

## 2022-01-01 ENCOUNTER — APPOINTMENT (OUTPATIENT)
Dept: HEMATOLOGY ONCOLOGY | Facility: CLINIC | Age: 82
End: 2022-01-01
Payer: MEDICARE

## 2022-01-01 ENCOUNTER — INPATIENT (INPATIENT)
Facility: HOSPITAL | Age: 82
LOS: 10 days | DRG: 834 | End: 2022-06-03
Attending: INTERNAL MEDICINE | Admitting: STUDENT IN AN ORGANIZED HEALTH CARE EDUCATION/TRAINING PROGRAM
Payer: MEDICARE

## 2022-01-01 ENCOUNTER — OUTPATIENT (OUTPATIENT)
Dept: OUTPATIENT SERVICES | Facility: HOSPITAL | Age: 82
LOS: 1 days | End: 2022-01-01
Payer: MEDICARE

## 2022-01-01 ENCOUNTER — NON-APPOINTMENT (OUTPATIENT)
Age: 82
End: 2022-01-01

## 2022-01-01 ENCOUNTER — APPOINTMENT (OUTPATIENT)
Dept: RADIOLOGY | Facility: IMAGING CENTER | Age: 82
End: 2022-01-01
Payer: MEDICARE

## 2022-01-01 ENCOUNTER — APPOINTMENT (OUTPATIENT)
Dept: HEMATOLOGY ONCOLOGY | Facility: CLINIC | Age: 82
End: 2022-01-01

## 2022-01-01 ENCOUNTER — LABORATORY RESULT (OUTPATIENT)
Age: 82
End: 2022-01-01

## 2022-01-01 VITALS
TEMPERATURE: 97.3 F | BODY MASS INDEX: 27.47 KG/M2 | DIASTOLIC BLOOD PRESSURE: 73 MMHG | OXYGEN SATURATION: 99 % | HEIGHT: 64.96 IN | SYSTOLIC BLOOD PRESSURE: 178 MMHG | RESPIRATION RATE: 16 BRPM | WEIGHT: 164.91 LBS | HEART RATE: 62 BPM

## 2022-01-01 VITALS
RESPIRATION RATE: 14 BRPM | HEART RATE: 66 BPM | SYSTOLIC BLOOD PRESSURE: 150 MMHG | DIASTOLIC BLOOD PRESSURE: 55 MMHG | OXYGEN SATURATION: 98 % | WEIGHT: 158.95 LBS | BODY MASS INDEX: 26.45 KG/M2 | TEMPERATURE: 97.5 F

## 2022-01-01 VITALS — TEMPERATURE: 98 F

## 2022-01-01 VITALS
SYSTOLIC BLOOD PRESSURE: 137 MMHG | HEIGHT: 66 IN | HEART RATE: 86 BPM | WEIGHT: 164.91 LBS | OXYGEN SATURATION: 96 % | TEMPERATURE: 98 F | DIASTOLIC BLOOD PRESSURE: 66 MMHG | RESPIRATION RATE: 21 BRPM

## 2022-01-01 DIAGNOSIS — R53.2 FUNCTIONAL QUADRIPLEGIA: ICD-10-CM

## 2022-01-01 DIAGNOSIS — Z95.810 PRESENCE OF AUTOMATIC (IMPLANTABLE) CARDIAC DEFIBRILLATOR: Chronic | ICD-10-CM

## 2022-01-01 DIAGNOSIS — Z71.89 OTHER SPECIFIED COUNSELING: ICD-10-CM

## 2022-01-01 DIAGNOSIS — M25.511 PAIN IN RIGHT SHOULDER: ICD-10-CM

## 2022-01-01 DIAGNOSIS — R50.9 FEVER, UNSPECIFIED: ICD-10-CM

## 2022-01-01 DIAGNOSIS — Z51.5 ENCOUNTER FOR PALLIATIVE CARE: ICD-10-CM

## 2022-01-01 DIAGNOSIS — R53.81 OTHER MALAISE: ICD-10-CM

## 2022-01-01 DIAGNOSIS — D69.6 THROMBOCYTOPENIA, UNSPECIFIED: ICD-10-CM

## 2022-01-01 DIAGNOSIS — Z98.89 OTHER SPECIFIED POSTPROCEDURAL STATES: Chronic | ICD-10-CM

## 2022-01-01 DIAGNOSIS — B99.9 UNSPECIFIED INFECTIOUS DISEASE: ICD-10-CM

## 2022-01-01 DIAGNOSIS — Z51.11 ENCOUNTER FOR ANTINEOPLASTIC CHEMOTHERAPY: ICD-10-CM

## 2022-01-01 DIAGNOSIS — K59.00 CONSTIPATION, UNSPECIFIED: ICD-10-CM

## 2022-01-01 DIAGNOSIS — Z00.00 ENCOUNTER FOR GENERAL ADULT MEDICAL EXAMINATION WITHOUT ABNORMAL FINDINGS: ICD-10-CM

## 2022-01-01 DIAGNOSIS — R06.00 DYSPNEA, UNSPECIFIED: ICD-10-CM

## 2022-01-01 DIAGNOSIS — C92.00 ACUTE MYELOBLASTIC LEUKEMIA, NOT HAVING ACHIEVED REMISSION: ICD-10-CM

## 2022-01-01 DIAGNOSIS — R09.89 OTHER SPECIFIED SYMPTOMS AND SIGNS INVOLVING THE CIRCULATORY AND RESPIRATORY SYSTEMS: ICD-10-CM

## 2022-01-01 DIAGNOSIS — R45.1 RESTLESSNESS AND AGITATION: ICD-10-CM

## 2022-01-01 DIAGNOSIS — E83.52 HYPERCALCEMIA: ICD-10-CM

## 2022-01-01 DIAGNOSIS — N18.9 CHRONIC KIDNEY DISEASE, UNSPECIFIED: ICD-10-CM

## 2022-01-01 DIAGNOSIS — R52 PAIN, UNSPECIFIED: ICD-10-CM

## 2022-01-01 DIAGNOSIS — I50.9 HEART FAILURE, UNSPECIFIED: ICD-10-CM

## 2022-01-01 LAB
24R-OH-CALCIDIOL SERPL-MCNC: 31.2 NG/ML — SIGNIFICANT CHANGE UP (ref 30–80)
ALBUMIN SERPL ELPH-MCNC: 2.8 G/DL — LOW (ref 3.3–5)
ALBUMIN SERPL ELPH-MCNC: 2.8 G/DL — LOW (ref 3.3–5)
ALBUMIN SERPL ELPH-MCNC: 3 G/DL — LOW (ref 3.3–5)
ALBUMIN SERPL ELPH-MCNC: 3.1 G/DL — LOW (ref 3.3–5)
ALBUMIN SERPL ELPH-MCNC: 3.1 G/DL — LOW (ref 3.3–5)
ALBUMIN SERPL ELPH-MCNC: 3.4 G/DL — SIGNIFICANT CHANGE UP (ref 3.3–5)
ALBUMIN SERPL ELPH-MCNC: 3.8 G/DL
ALBUMIN SERPL ELPH-MCNC: 3.8 G/DL — SIGNIFICANT CHANGE UP (ref 3.3–5)
ALBUMIN SERPL ELPH-MCNC: 4 G/DL — SIGNIFICANT CHANGE UP (ref 3.3–5)
ALBUMIN SERPL ELPH-MCNC: 4.1 G/DL
ALP BLD-CCNC: 108 U/L
ALP BLD-CCNC: 117 U/L
ALP SERPL-CCNC: 109 U/L — SIGNIFICANT CHANGE UP (ref 40–120)
ALP SERPL-CCNC: 109 U/L — SIGNIFICANT CHANGE UP (ref 40–120)
ALP SERPL-CCNC: 84 U/L — SIGNIFICANT CHANGE UP (ref 40–120)
ALP SERPL-CCNC: 85 U/L — SIGNIFICANT CHANGE UP (ref 40–120)
ALP SERPL-CCNC: 87 U/L — SIGNIFICANT CHANGE UP (ref 40–120)
ALP SERPL-CCNC: 90 U/L — SIGNIFICANT CHANGE UP (ref 40–120)
ALP SERPL-CCNC: 92 U/L — SIGNIFICANT CHANGE UP (ref 40–120)
ALP SERPL-CCNC: 94 U/L — SIGNIFICANT CHANGE UP (ref 40–120)
ALT FLD-CCNC: 13 U/L — SIGNIFICANT CHANGE UP (ref 10–45)
ALT FLD-CCNC: 14 U/L — SIGNIFICANT CHANGE UP (ref 10–45)
ALT FLD-CCNC: 14 U/L — SIGNIFICANT CHANGE UP (ref 10–45)
ALT FLD-CCNC: 15 U/L — SIGNIFICANT CHANGE UP (ref 10–45)
ALT FLD-CCNC: 15 U/L — SIGNIFICANT CHANGE UP (ref 10–45)
ALT FLD-CCNC: 22 U/L — SIGNIFICANT CHANGE UP (ref 10–45)
ALT SERPL-CCNC: 8 U/L
ALT SERPL-CCNC: 9 U/L
ANION GAP SERPL CALC-SCNC: 11 MMOL/L — SIGNIFICANT CHANGE UP (ref 5–17)
ANION GAP SERPL CALC-SCNC: 12 MMOL/L
ANION GAP SERPL CALC-SCNC: 12 MMOL/L — SIGNIFICANT CHANGE UP (ref 5–17)
ANION GAP SERPL CALC-SCNC: 12 MMOL/L — SIGNIFICANT CHANGE UP (ref 5–17)
ANION GAP SERPL CALC-SCNC: 13 MMOL/L — SIGNIFICANT CHANGE UP (ref 5–17)
ANION GAP SERPL CALC-SCNC: 14 MMOL/L
ANION GAP SERPL CALC-SCNC: 14 MMOL/L — SIGNIFICANT CHANGE UP (ref 5–17)
ANION GAP SERPL CALC-SCNC: 15 MMOL/L — SIGNIFICANT CHANGE UP (ref 5–17)
ANION GAP SERPL CALC-SCNC: 16 MMOL/L — SIGNIFICANT CHANGE UP (ref 5–17)
ANION GAP SERPL CALC-SCNC: 16 MMOL/L — SIGNIFICANT CHANGE UP (ref 5–17)
ANION GAP SERPL CALC-SCNC: 17 MMOL/L — SIGNIFICANT CHANGE UP (ref 5–17)
ANION GAP SERPL CALC-SCNC: 18 MMOL/L — HIGH (ref 5–17)
APPEARANCE UR: ABNORMAL
APPEARANCE UR: ABNORMAL
APPEARANCE UR: CLEAR — SIGNIFICANT CHANGE UP
APTT BLD: 28.6 SEC — SIGNIFICANT CHANGE UP (ref 27.5–35.5)
APTT BLD: 35.2 SEC — SIGNIFICANT CHANGE UP (ref 27.5–35.5)
AST SERPL-CCNC: 13 U/L
AST SERPL-CCNC: 13 U/L
AST SERPL-CCNC: 21 U/L — SIGNIFICANT CHANGE UP (ref 10–40)
AST SERPL-CCNC: 29 U/L — SIGNIFICANT CHANGE UP (ref 10–40)
AST SERPL-CCNC: 29 U/L — SIGNIFICANT CHANGE UP (ref 10–40)
AST SERPL-CCNC: 30 U/L — SIGNIFICANT CHANGE UP (ref 10–40)
AST SERPL-CCNC: 30 U/L — SIGNIFICANT CHANGE UP (ref 10–40)
AST SERPL-CCNC: 35 U/L — SIGNIFICANT CHANGE UP (ref 10–40)
AST SERPL-CCNC: 37 U/L — SIGNIFICANT CHANGE UP (ref 10–40)
AST SERPL-CCNC: 41 U/L — HIGH (ref 10–40)
BACTERIA # UR AUTO: NEGATIVE — SIGNIFICANT CHANGE UP
BASE EXCESS BLDV CALC-SCNC: 3.5 MMOL/L — HIGH (ref -2–2)
BASOPHILS # BLD AUTO: 0 K/UL — SIGNIFICANT CHANGE UP (ref 0–0.2)
BASOPHILS # BLD AUTO: 0.04 K/UL — SIGNIFICANT CHANGE UP (ref 0–0.2)
BASOPHILS # BLD AUTO: 0.06 K/UL — SIGNIFICANT CHANGE UP (ref 0–0.2)
BASOPHILS # BLD AUTO: 0.11 K/UL — SIGNIFICANT CHANGE UP (ref 0–0.2)
BASOPHILS # BLD AUTO: 0.13 K/UL — SIGNIFICANT CHANGE UP (ref 0–0.2)
BASOPHILS # BLD AUTO: 0.19 K/UL — SIGNIFICANT CHANGE UP (ref 0–0.2)
BASOPHILS # BLD AUTO: 0.19 K/UL — SIGNIFICANT CHANGE UP (ref 0–0.2)
BASOPHILS NFR BLD AUTO: 0 % — SIGNIFICANT CHANGE UP (ref 0–2)
BASOPHILS NFR BLD AUTO: 1 % — SIGNIFICANT CHANGE UP (ref 0–2)
BASOPHILS NFR BLD AUTO: 1.7 % — SIGNIFICANT CHANGE UP (ref 0–2)
BASOPHILS NFR BLD AUTO: 1.8 % — SIGNIFICANT CHANGE UP (ref 0–2)
BASOPHILS NFR BLD AUTO: 2.1 % — HIGH (ref 0–2)
BASOPHILS NFR BLD AUTO: 3.2 % — HIGH (ref 0–2)
BASOPHILS NFR BLD AUTO: 3.4 % — HIGH (ref 0–2)
BCR/ABL BY RT - PCR QUANTITATIVE: SIGNIFICANT CHANGE UP
BILIRUB SERPL-MCNC: 0.2 MG/DL
BILIRUB SERPL-MCNC: 0.2 MG/DL
BILIRUB SERPL-MCNC: 0.2 MG/DL — SIGNIFICANT CHANGE UP (ref 0.2–1.2)
BILIRUB SERPL-MCNC: 0.3 MG/DL — SIGNIFICANT CHANGE UP (ref 0.2–1.2)
BILIRUB SERPL-MCNC: 0.4 MG/DL — SIGNIFICANT CHANGE UP (ref 0.2–1.2)
BILIRUB UR-MCNC: NEGATIVE — SIGNIFICANT CHANGE UP
BLASTS # FLD: 12 % — HIGH (ref 0–0)
BLASTS # FLD: 17 % — HIGH (ref 0–0)
BLASTS # FLD: 2.7 % — HIGH (ref 0–0)
BLASTS # FLD: 22 % — HIGH (ref 0–0)
BLASTS # FLD: 4 % — HIGH (ref 0–0)
BLASTS # FLD: 42 % — HIGH (ref 0–0)
BLASTS # FLD: 6.3 % — HIGH (ref 0–0)
BLD GP AB SCN SERPL QL: NEGATIVE — SIGNIFICANT CHANGE UP
BUN SERPL-MCNC: 21 MG/DL — SIGNIFICANT CHANGE UP (ref 7–23)
BUN SERPL-MCNC: 25 MG/DL — HIGH (ref 7–23)
BUN SERPL-MCNC: 26 MG/DL
BUN SERPL-MCNC: 29 MG/DL
BUN SERPL-MCNC: 30 MG/DL — HIGH (ref 7–23)
BUN SERPL-MCNC: 30 MG/DL — HIGH (ref 7–23)
BUN SERPL-MCNC: 31 MG/DL — HIGH (ref 7–23)
BUN SERPL-MCNC: 36 MG/DL — HIGH (ref 7–23)
BUN SERPL-MCNC: 39 MG/DL — HIGH (ref 7–23)
BUN SERPL-MCNC: 41 MG/DL — HIGH (ref 7–23)
BUN SERPL-MCNC: 42 MG/DL — HIGH (ref 7–23)
BUN SERPL-MCNC: 45 MG/DL — HIGH (ref 7–23)
BUN SERPL-MCNC: 53 MG/DL — HIGH (ref 7–23)
BUN SERPL-MCNC: 66 MG/DL — HIGH (ref 7–23)
BUN SERPL-MCNC: 73 MG/DL — HIGH (ref 7–23)
BUN SERPL-MCNC: 81 MG/DL — HIGH (ref 7–23)
CA-I BLD-SCNC: 1.55 MMOL/L — HIGH (ref 1.15–1.33)
CA-I SERPL-SCNC: 1.43 MMOL/L — HIGH (ref 1.15–1.33)
CALCIUM SERPL-MCNC: 10.1 MG/DL — SIGNIFICANT CHANGE UP (ref 8.4–10.5)
CALCIUM SERPL-MCNC: 10.2 MG/DL — SIGNIFICANT CHANGE UP (ref 8.4–10.5)
CALCIUM SERPL-MCNC: 10.4 MG/DL
CALCIUM SERPL-MCNC: 10.4 MG/DL — SIGNIFICANT CHANGE UP (ref 8.4–10.5)
CALCIUM SERPL-MCNC: 10.5 MG/DL — SIGNIFICANT CHANGE UP (ref 8.4–10.5)
CALCIUM SERPL-MCNC: 10.6 MG/DL — HIGH (ref 8.4–10.5)
CALCIUM SERPL-MCNC: 10.7 MG/DL
CALCIUM SERPL-MCNC: 10.8 MG/DL — HIGH (ref 8.4–10.5)
CALCIUM SERPL-MCNC: 10.9 MG/DL — HIGH (ref 8.4–10.5)
CALCIUM SERPL-MCNC: 11 MG/DL — HIGH (ref 8.4–10.5)
CALCIUM SERPL-MCNC: 11.1 MG/DL — HIGH (ref 8.4–10.5)
CALCIUM SERPL-MCNC: 11.3 MG/DL — HIGH (ref 8.4–10.5)
CALCIUM SERPL-MCNC: 11.7 MG/DL — HIGH (ref 8.4–10.5)
CALCIUM SERPL-MCNC: 11.8 MG/DL — HIGH (ref 8.4–10.5)
CHLORIDE BLDV-SCNC: 98 MMOL/L — SIGNIFICANT CHANGE UP (ref 96–108)
CHLORIDE SERPL-SCNC: 101 MMOL/L — SIGNIFICANT CHANGE UP (ref 96–108)
CHLORIDE SERPL-SCNC: 102 MMOL/L — SIGNIFICANT CHANGE UP (ref 96–108)
CHLORIDE SERPL-SCNC: 103 MMOL/L — SIGNIFICANT CHANGE UP (ref 96–108)
CHLORIDE SERPL-SCNC: 104 MMOL/L
CHLORIDE SERPL-SCNC: 104 MMOL/L — SIGNIFICANT CHANGE UP (ref 96–108)
CHLORIDE SERPL-SCNC: 105 MMOL/L
CHLORIDE SERPL-SCNC: 105 MMOL/L — SIGNIFICANT CHANGE UP (ref 96–108)
CHLORIDE SERPL-SCNC: 97 MMOL/L — SIGNIFICANT CHANGE UP (ref 96–108)
CHLORIDE SERPL-SCNC: 98 MMOL/L — SIGNIFICANT CHANGE UP (ref 96–108)
CHLORIDE SERPL-SCNC: 99 MMOL/L — SIGNIFICANT CHANGE UP (ref 96–108)
CHLORIDE SERPL-SCNC: 99 MMOL/L — SIGNIFICANT CHANGE UP (ref 96–108)
CHROM ANALY OVERALL INTERP SPEC-IMP: SIGNIFICANT CHANGE UP
CK MB BLD-MCNC: 1.6 % — SIGNIFICANT CHANGE UP (ref 0–3.5)
CK MB CFR SERPL CALC: 2.1 NG/ML — SIGNIFICANT CHANGE UP (ref 0–6.7)
CK SERPL-CCNC: 135 U/L — SIGNIFICANT CHANGE UP (ref 30–200)
CO2 BLDV-SCNC: 29 MMOL/L — HIGH (ref 22–26)
CO2 SERPL-SCNC: 18 MMOL/L — LOW (ref 22–31)
CO2 SERPL-SCNC: 21 MMOL/L
CO2 SERPL-SCNC: 21 MMOL/L — LOW (ref 22–31)
CO2 SERPL-SCNC: 22 MMOL/L — SIGNIFICANT CHANGE UP (ref 22–31)
CO2 SERPL-SCNC: 22 MMOL/L — SIGNIFICANT CHANGE UP (ref 22–31)
CO2 SERPL-SCNC: 23 MMOL/L
CO2 SERPL-SCNC: 23 MMOL/L — SIGNIFICANT CHANGE UP (ref 22–31)
CO2 SERPL-SCNC: 24 MMOL/L — SIGNIFICANT CHANGE UP (ref 22–31)
CO2 SERPL-SCNC: 24 MMOL/L — SIGNIFICANT CHANGE UP (ref 22–31)
CO2 SERPL-SCNC: 25 MMOL/L — SIGNIFICANT CHANGE UP (ref 22–31)
CO2 SERPL-SCNC: 25 MMOL/L — SIGNIFICANT CHANGE UP (ref 22–31)
CO2 SERPL-SCNC: 28 MMOL/L — SIGNIFICANT CHANGE UP (ref 22–31)
COD CRY URNS QL: SIGNIFICANT CHANGE UP
COLOR SPEC: SIGNIFICANT CHANGE UP
COLOR SPEC: SIGNIFICANT CHANGE UP
COLOR SPEC: YELLOW — SIGNIFICANT CHANGE UP
CREAT ?TM UR-MCNC: 99 MG/DL — SIGNIFICANT CHANGE UP
CREAT SERPL-MCNC: 2.32 MG/DL — HIGH (ref 0.5–1.3)
CREAT SERPL-MCNC: 2.4 MG/DL
CREAT SERPL-MCNC: 2.53 MG/DL
CREAT SERPL-MCNC: 3.31 MG/DL — HIGH (ref 0.5–1.3)
CREAT SERPL-MCNC: 3.39 MG/DL — HIGH (ref 0.5–1.3)
CREAT SERPL-MCNC: 3.41 MG/DL — HIGH (ref 0.5–1.3)
CREAT SERPL-MCNC: 3.48 MG/DL — HIGH (ref 0.5–1.3)
CREAT SERPL-MCNC: 3.48 MG/DL — HIGH (ref 0.5–1.3)
CREAT SERPL-MCNC: 3.49 MG/DL — HIGH (ref 0.5–1.3)
CREAT SERPL-MCNC: 3.49 MG/DL — HIGH (ref 0.5–1.3)
CREAT SERPL-MCNC: 3.51 MG/DL — HIGH (ref 0.5–1.3)
CREAT SERPL-MCNC: 3.54 MG/DL — HIGH (ref 0.5–1.3)
CREAT SERPL-MCNC: 3.74 MG/DL — HIGH (ref 0.5–1.3)
CREAT SERPL-MCNC: 3.93 MG/DL — HIGH (ref 0.5–1.3)
CREAT SERPL-MCNC: 4.03 MG/DL — HIGH (ref 0.5–1.3)
CREAT SERPL-MCNC: 4.08 MG/DL — HIGH (ref 0.5–1.3)
CULTURE RESULTS: SIGNIFICANT CHANGE UP
DIFF PNL FLD: ABNORMAL
EGFR: 14 ML/MIN/1.73M2 — LOW
EGFR: 15 ML/MIN/1.73M2 — LOW
EGFR: 16 ML/MIN/1.73M2 — LOW
EGFR: 17 ML/MIN/1.73M2 — LOW
EGFR: 18 ML/MIN/1.73M2 — LOW
EGFR: 26 ML/MIN/1.73M2
EGFR: 27 ML/MIN/1.73M2 — LOW
EOSINOPHIL # BLD AUTO: 0 K/UL — SIGNIFICANT CHANGE UP (ref 0–0.5)
EOSINOPHIL # BLD AUTO: 0.08 K/UL — SIGNIFICANT CHANGE UP (ref 0–0.5)
EOSINOPHIL # BLD AUTO: 0.13 K/UL — SIGNIFICANT CHANGE UP (ref 0–0.5)
EOSINOPHIL # BLD AUTO: 0.18 K/UL — SIGNIFICANT CHANGE UP (ref 0–0.5)
EOSINOPHIL # BLD AUTO: 0.19 K/UL — SIGNIFICANT CHANGE UP (ref 0–0.5)
EOSINOPHIL # BLD AUTO: 0.23 K/UL — SIGNIFICANT CHANGE UP (ref 0–0.5)
EOSINOPHIL # BLD AUTO: 0.24 K/UL — SIGNIFICANT CHANGE UP (ref 0–0.5)
EOSINOPHIL # BLD AUTO: 0.37 K/UL — SIGNIFICANT CHANGE UP (ref 0–0.5)
EOSINOPHIL # BLD AUTO: 0.98 K/UL — HIGH (ref 0–0.5)
EOSINOPHIL # BLD AUTO: 2.26 K/UL — HIGH (ref 0–0.5)
EOSINOPHIL NFR BLD AUTO: 0 % — SIGNIFICANT CHANGE UP (ref 0–6)
EOSINOPHIL NFR BLD AUTO: 0.9 % — SIGNIFICANT CHANGE UP (ref 0–6)
EOSINOPHIL NFR BLD AUTO: 1 % — SIGNIFICANT CHANGE UP (ref 0–6)
EOSINOPHIL NFR BLD AUTO: 2 % — SIGNIFICANT CHANGE UP (ref 0–6)
EOSINOPHIL NFR BLD AUTO: 2 % — SIGNIFICANT CHANGE UP (ref 0–6)
EOSINOPHIL NFR BLD AUTO: 3 % — SIGNIFICANT CHANGE UP (ref 0–6)
EOSINOPHIL NFR BLD AUTO: 3 % — SIGNIFICANT CHANGE UP (ref 0–6)
EOSINOPHIL NFR BLD AUTO: 3.6 % — SIGNIFICANT CHANGE UP (ref 0–6)
EOSINOPHIL NFR BLD AUTO: 3.8 % — SIGNIFICANT CHANGE UP (ref 0–6)
EOSINOPHIL NFR BLD AUTO: 4.2 % — SIGNIFICANT CHANGE UP (ref 0–6)
EPI CELLS # UR: 11 /HPF — HIGH
EPI CELLS # UR: 5 /HPF — SIGNIFICANT CHANGE UP
EPI CELLS # UR: 9 /HPF — HIGH
GAS PNL BLDV: 136 MMOL/L — SIGNIFICANT CHANGE UP (ref 136–145)
GAS PNL BLDV: SIGNIFICANT CHANGE UP
GLUCOSE BLDV-MCNC: 183 MG/DL — HIGH (ref 70–99)
GLUCOSE SERPL-MCNC: 101 MG/DL
GLUCOSE SERPL-MCNC: 121 MG/DL — HIGH (ref 70–99)
GLUCOSE SERPL-MCNC: 122 MG/DL
GLUCOSE SERPL-MCNC: 126 MG/DL — HIGH (ref 70–99)
GLUCOSE SERPL-MCNC: 127 MG/DL — HIGH (ref 70–99)
GLUCOSE SERPL-MCNC: 132 MG/DL — HIGH (ref 70–99)
GLUCOSE SERPL-MCNC: 133 MG/DL — HIGH (ref 70–99)
GLUCOSE SERPL-MCNC: 138 MG/DL — HIGH (ref 70–99)
GLUCOSE SERPL-MCNC: 139 MG/DL — HIGH (ref 70–99)
GLUCOSE SERPL-MCNC: 141 MG/DL — HIGH (ref 70–99)
GLUCOSE SERPL-MCNC: 148 MG/DL — HIGH (ref 70–99)
GLUCOSE SERPL-MCNC: 151 MG/DL — HIGH (ref 70–99)
GLUCOSE SERPL-MCNC: 153 MG/DL — HIGH (ref 70–99)
GLUCOSE SERPL-MCNC: 160 MG/DL — HIGH (ref 70–99)
GLUCOSE SERPL-MCNC: 166 MG/DL — HIGH (ref 70–99)
GLUCOSE SERPL-MCNC: 99 MG/DL — SIGNIFICANT CHANGE UP (ref 70–99)
GLUCOSE UR QL: NEGATIVE — SIGNIFICANT CHANGE UP
HCO3 BLDV-SCNC: 28 MMOL/L — SIGNIFICANT CHANGE UP (ref 22–29)
HCT VFR BLD CALC: 30.3 % — LOW (ref 39–50)
HCT VFR BLD CALC: 31 % — LOW (ref 39–50)
HCT VFR BLD CALC: 31.1 % — LOW (ref 39–50)
HCT VFR BLD CALC: 31.7 % — LOW (ref 39–50)
HCT VFR BLD CALC: 33 % — LOW (ref 39–50)
HCT VFR BLD CALC: 33.2 % — LOW (ref 39–50)
HCT VFR BLD CALC: 33.7 % — LOW (ref 39–50)
HCT VFR BLD CALC: 33.8 % — LOW (ref 39–50)
HCT VFR BLD CALC: 33.9 % — LOW (ref 39–50)
HCT VFR BLD CALC: 34.4 % — LOW (ref 39–50)
HCT VFR BLD CALC: 35.5 % — LOW (ref 39–50)
HCT VFR BLD CALC: 35.7 % — LOW (ref 39–50)
HCT VFR BLD CALC: 35.7 % — LOW (ref 39–50)
HCT VFR BLD CALC: 36.6 % — LOW (ref 39–50)
HCT VFR BLD CALC: 37 % — LOW (ref 39–50)
HCT VFR BLD CALC: 37.4 % — LOW (ref 39–50)
HCT VFR BLDA CALC: 38 % — LOW (ref 39–51)
HEMATOPATHOLOGY REPORT: SIGNIFICANT CHANGE UP
HGB BLD CALC-MCNC: 12.5 G/DL — LOW (ref 12.6–17.4)
HGB BLD-MCNC: 10.4 G/DL — LOW (ref 13–17)
HGB BLD-MCNC: 10.4 G/DL — LOW (ref 13–17)
HGB BLD-MCNC: 10.5 G/DL — LOW (ref 13–17)
HGB BLD-MCNC: 10.6 G/DL — LOW (ref 13–17)
HGB BLD-MCNC: 11.1 G/DL — LOW (ref 13–17)
HGB BLD-MCNC: 11.1 G/DL — LOW (ref 13–17)
HGB BLD-MCNC: 11.3 G/DL — LOW (ref 13–17)
HGB BLD-MCNC: 11.6 G/DL — LOW (ref 13–17)
HGB BLD-MCNC: 11.7 G/DL — LOW (ref 13–17)
HGB BLD-MCNC: 11.9 G/DL — LOW (ref 13–17)
HGB BLD-MCNC: 12 G/DL — LOW (ref 13–17)
HGB BLD-MCNC: 12 G/DL — LOW (ref 13–17)
HGB BLD-MCNC: 12.1 G/DL — LOW (ref 13–17)
HGB BLD-MCNC: 12.3 G/DL — LOW (ref 13–17)
HGB BLD-MCNC: 12.4 G/DL — LOW (ref 13–17)
HGB BLD-MCNC: 12.7 G/DL — LOW (ref 13–17)
HYALINE CASTS # UR AUTO: 4 /LPF — HIGH (ref 0–2)
HYALINE CASTS # UR AUTO: 6 /LPF — HIGH (ref 0–2)
HYALINE CASTS # UR AUTO: ABNORMAL /LPF
IMM GRANULOCYTES NFR BLD AUTO: 0.2 % — SIGNIFICANT CHANGE UP (ref 0–1.5)
IMM GRANULOCYTES NFR BLD AUTO: 0.3 % — SIGNIFICANT CHANGE UP (ref 0–1.5)
IMM GRANULOCYTES NFR BLD AUTO: 0.5 % — SIGNIFICANT CHANGE UP (ref 0–1.5)
IMM GRANULOCYTES NFR BLD AUTO: 0.5 % — SIGNIFICANT CHANGE UP (ref 0–1.5)
IMM GRANULOCYTES NFR BLD AUTO: 0.9 % — SIGNIFICANT CHANGE UP (ref 0–1.5)
INR BLD: 1.32 RATIO — HIGH (ref 0.88–1.16)
INR BLD: 1.45 RATIO — HIGH (ref 0.88–1.16)
KETONES UR-MCNC: NEGATIVE — SIGNIFICANT CHANGE UP
LACTATE BLDV-MCNC: 3.3 MMOL/L — HIGH (ref 0.7–2)
LDH SERPL L TO P-CCNC: 1105 U/L — HIGH (ref 50–242)
LDH SERPL L TO P-CCNC: 1124 U/L — HIGH (ref 50–242)
LDH SERPL L TO P-CCNC: 1134 U/L — HIGH (ref 50–242)
LDH SERPL L TO P-CCNC: 1486 U/L — HIGH (ref 50–242)
LDH SERPL L TO P-CCNC: 207 U/L — SIGNIFICANT CHANGE UP (ref 50–242)
LDH SERPL L TO P-CCNC: 530 U/L — HIGH (ref 50–242)
LDH SERPL L TO P-CCNC: 539 U/L — HIGH (ref 50–242)
LDH SERPL L TO P-CCNC: 698 U/L — HIGH (ref 50–242)
LDH SERPL L TO P-CCNC: 800 U/L — HIGH (ref 50–242)
LDH SERPL L TO P-CCNC: 811 U/L — HIGH (ref 50–242)
LDH SERPL L TO P-CCNC: 855 U/L — HIGH (ref 50–242)
LDH SERPL L TO P-CCNC: 859 U/L — HIGH (ref 50–242)
LDH SERPL L TO P-CCNC: 868 U/L — HIGH (ref 50–242)
LDH SERPL L TO P-CCNC: 873 U/L — HIGH (ref 50–242)
LDH SERPL-CCNC: 122 U/L
LDH SERPL-CCNC: 133 U/L
LEUKOCYTE ESTERASE UR-ACNC: NEGATIVE — SIGNIFICANT CHANGE UP
LYMPHOCYTES # BLD AUTO: 1.99 K/UL — SIGNIFICANT CHANGE UP (ref 1–3.3)
LYMPHOCYTES # BLD AUTO: 15.62 K/UL — HIGH (ref 1–3.3)
LYMPHOCYTES # BLD AUTO: 16 % — SIGNIFICANT CHANGE UP (ref 13–44)
LYMPHOCYTES # BLD AUTO: 2 % — LOW (ref 13–44)
LYMPHOCYTES # BLD AUTO: 2.06 K/UL — SIGNIFICANT CHANGE UP (ref 1–3.3)
LYMPHOCYTES # BLD AUTO: 2.26 K/UL — SIGNIFICANT CHANGE UP (ref 1–3.3)
LYMPHOCYTES # BLD AUTO: 2.26 K/UL — SIGNIFICANT CHANGE UP (ref 1–3.3)
LYMPHOCYTES # BLD AUTO: 2.34 K/UL — SIGNIFICANT CHANGE UP (ref 1–3.3)
LYMPHOCYTES # BLD AUTO: 2.35 K/UL — SIGNIFICANT CHANGE UP (ref 1–3.3)
LYMPHOCYTES # BLD AUTO: 2.47 K/UL — SIGNIFICANT CHANGE UP (ref 1–3.3)
LYMPHOCYTES # BLD AUTO: 2.63 K/UL — SIGNIFICANT CHANGE UP (ref 1–3.3)
LYMPHOCYTES # BLD AUTO: 2.95 K/UL — SIGNIFICANT CHANGE UP (ref 1–3.3)
LYMPHOCYTES # BLD AUTO: 35.2 % — SIGNIFICANT CHANGE UP (ref 13–44)
LYMPHOCYTES # BLD AUTO: 36.1 % — SIGNIFICANT CHANGE UP (ref 13–44)
LYMPHOCYTES # BLD AUTO: 39.2 % — SIGNIFICANT CHANGE UP (ref 13–44)
LYMPHOCYTES # BLD AUTO: 4 % — LOW (ref 13–44)
LYMPHOCYTES # BLD AUTO: 4.05 K/UL — HIGH (ref 1–3.3)
LYMPHOCYTES # BLD AUTO: 4.18 K/UL — HIGH (ref 1–3.3)
LYMPHOCYTES # BLD AUTO: 43.6 % — SIGNIFICANT CHANGE UP (ref 13–44)
LYMPHOCYTES # BLD AUTO: 5 % — LOW (ref 13–44)
LYMPHOCYTES # BLD AUTO: 57.1 % — HIGH (ref 13–44)
LYMPHOCYTES # BLD AUTO: 6.76 K/UL — HIGH (ref 1–3.3)
LYMPHOCYTES # BLD AUTO: 61 % — HIGH (ref 13–44)
LYMPHOCYTES # BLD AUTO: 7 % — LOW (ref 13–44)
LYMPHOCYTES # BLD AUTO: 7.2 % — LOW (ref 13–44)
LYMPHOCYTES # BLD AUTO: 8.1 % — LOW (ref 13–44)
MAGNESIUM SERPL-MCNC: 1.8 MG/DL — SIGNIFICANT CHANGE UP (ref 1.6–2.6)
MAGNESIUM SERPL-MCNC: 1.9 MG/DL — SIGNIFICANT CHANGE UP (ref 1.6–2.6)
MAGNESIUM SERPL-MCNC: 2.2 MG/DL — SIGNIFICANT CHANGE UP (ref 1.6–2.6)
MAGNESIUM SERPL-MCNC: 2.3 MG/DL — SIGNIFICANT CHANGE UP (ref 1.6–2.6)
MAGNESIUM SERPL-MCNC: 2.4 MG/DL — SIGNIFICANT CHANGE UP (ref 1.6–2.6)
MAGNESIUM SERPL-MCNC: 2.6 MG/DL — SIGNIFICANT CHANGE UP (ref 1.6–2.6)
MANUAL DIF COMMENT BLD-IMP: SIGNIFICANT CHANGE UP
MANUAL DIF COMMENT BLD-IMP: SIGNIFICANT CHANGE UP
MANUAL SMEAR VERIFICATION: SIGNIFICANT CHANGE UP
MCHC RBC-ENTMCNC: 32.1 PG — SIGNIFICANT CHANGE UP (ref 27–34)
MCHC RBC-ENTMCNC: 32.2 PG — SIGNIFICANT CHANGE UP (ref 27–34)
MCHC RBC-ENTMCNC: 32.2 PG — SIGNIFICANT CHANGE UP (ref 27–34)
MCHC RBC-ENTMCNC: 32.3 PG — SIGNIFICANT CHANGE UP (ref 27–34)
MCHC RBC-ENTMCNC: 32.4 PG — SIGNIFICANT CHANGE UP (ref 27–34)
MCHC RBC-ENTMCNC: 32.5 PG — SIGNIFICANT CHANGE UP (ref 27–34)
MCHC RBC-ENTMCNC: 32.5 PG — SIGNIFICANT CHANGE UP (ref 27–34)
MCHC RBC-ENTMCNC: 32.6 PG — SIGNIFICANT CHANGE UP (ref 27–34)
MCHC RBC-ENTMCNC: 32.6 PG — SIGNIFICANT CHANGE UP (ref 27–34)
MCHC RBC-ENTMCNC: 32.7 PG — SIGNIFICANT CHANGE UP (ref 27–34)
MCHC RBC-ENTMCNC: 32.8 GM/DL — SIGNIFICANT CHANGE UP (ref 32–36)
MCHC RBC-ENTMCNC: 33 PG — SIGNIFICANT CHANGE UP (ref 27–34)
MCHC RBC-ENTMCNC: 33.1 PG — SIGNIFICANT CHANGE UP (ref 27–34)
MCHC RBC-ENTMCNC: 33.2 G/DL — SIGNIFICANT CHANGE UP (ref 32–36)
MCHC RBC-ENTMCNC: 33.4 G/DL — SIGNIFICANT CHANGE UP (ref 32–36)
MCHC RBC-ENTMCNC: 33.4 G/DL — SIGNIFICANT CHANGE UP (ref 32–36)
MCHC RBC-ENTMCNC: 33.4 GM/DL — SIGNIFICANT CHANGE UP (ref 32–36)
MCHC RBC-ENTMCNC: 33.4 PG — SIGNIFICANT CHANGE UP (ref 27–34)
MCHC RBC-ENTMCNC: 33.4 PG — SIGNIFICANT CHANGE UP (ref 27–34)
MCHC RBC-ENTMCNC: 33.5 G/DL — SIGNIFICANT CHANGE UP (ref 32–36)
MCHC RBC-ENTMCNC: 33.5 GM/DL — SIGNIFICANT CHANGE UP (ref 32–36)
MCHC RBC-ENTMCNC: 33.6 GM/DL — SIGNIFICANT CHANGE UP (ref 32–36)
MCHC RBC-ENTMCNC: 33.8 GM/DL — SIGNIFICANT CHANGE UP (ref 32–36)
MCHC RBC-ENTMCNC: 33.9 GM/DL — SIGNIFICANT CHANGE UP (ref 32–36)
MCHC RBC-ENTMCNC: 34 G/DL — SIGNIFICANT CHANGE UP (ref 32–36)
MCHC RBC-ENTMCNC: 34.3 GM/DL — SIGNIFICANT CHANGE UP (ref 32–36)
MCHC RBC-ENTMCNC: 34.4 GM/DL — SIGNIFICANT CHANGE UP (ref 32–36)
MCHC RBC-ENTMCNC: 34.5 GM/DL — SIGNIFICANT CHANGE UP (ref 32–36)
MCHC RBC-ENTMCNC: 34.7 G/DL — SIGNIFICANT CHANGE UP (ref 32–36)
MCHC RBC-ENTMCNC: 34.9 G/DL — SIGNIFICANT CHANGE UP (ref 32–36)
MCV RBC AUTO: 100 FL — SIGNIFICANT CHANGE UP (ref 80–100)
MCV RBC AUTO: 93.5 FL — SIGNIFICANT CHANGE UP (ref 80–100)
MCV RBC AUTO: 94.2 FL — SIGNIFICANT CHANGE UP (ref 80–100)
MCV RBC AUTO: 94.7 FL — SIGNIFICANT CHANGE UP (ref 80–100)
MCV RBC AUTO: 94.9 FL — SIGNIFICANT CHANGE UP (ref 80–100)
MCV RBC AUTO: 94.9 FL — SIGNIFICANT CHANGE UP (ref 80–100)
MCV RBC AUTO: 95.4 FL — SIGNIFICANT CHANGE UP (ref 80–100)
MCV RBC AUTO: 95.5 FL — SIGNIFICANT CHANGE UP (ref 80–100)
MCV RBC AUTO: 95.7 FL — SIGNIFICANT CHANGE UP (ref 80–100)
MCV RBC AUTO: 96.1 FL — SIGNIFICANT CHANGE UP (ref 80–100)
MCV RBC AUTO: 96.6 FL — SIGNIFICANT CHANGE UP (ref 80–100)
MCV RBC AUTO: 96.9 FL — SIGNIFICANT CHANGE UP (ref 80–100)
MCV RBC AUTO: 97.1 FL — SIGNIFICANT CHANGE UP (ref 80–100)
MCV RBC AUTO: 97.5 FL — SIGNIFICANT CHANGE UP (ref 80–100)
MCV RBC AUTO: 98.4 FL — SIGNIFICANT CHANGE UP (ref 80–100)
MCV RBC AUTO: 99.1 FL — SIGNIFICANT CHANGE UP (ref 80–100)
MONOCYTES # BLD AUTO: 0.27 K/UL — SIGNIFICANT CHANGE UP (ref 0–0.9)
MONOCYTES # BLD AUTO: 0.32 K/UL — SIGNIFICANT CHANGE UP (ref 0–0.9)
MONOCYTES # BLD AUTO: 0.39 K/UL — SIGNIFICANT CHANGE UP (ref 0–0.9)
MONOCYTES # BLD AUTO: 0.9 K/UL — SIGNIFICANT CHANGE UP (ref 0–0.9)
MONOCYTES # BLD AUTO: 25.81 K/UL — HIGH (ref 0–0.9)
MONOCYTES # BLD AUTO: 36.14 K/UL — HIGH (ref 0–0.9)
MONOCYTES # BLD AUTO: 58.68 K/UL — HIGH (ref 0–0.9)
MONOCYTES # BLD AUTO: 64.05 K/UL — HIGH (ref 0–0.9)
MONOCYTES # BLD AUTO: 69.3 K/UL — HIGH (ref 0–0.9)
MONOCYTES # BLD AUTO: 71.44 K/UL — HIGH (ref 0–0.9)
MONOCYTES # BLD AUTO: 73.11 K/UL — HIGH (ref 0–0.9)
MONOCYTES NFR BLD AUTO: 10 % — SIGNIFICANT CHANGE UP (ref 2–14)
MONOCYTES NFR BLD AUTO: 10.8 % — SIGNIFICANT CHANGE UP (ref 2–14)
MONOCYTES NFR BLD AUTO: 14.9 % — HIGH (ref 2–14)
MONOCYTES NFR BLD AUTO: 4.5 % — SIGNIFICANT CHANGE UP (ref 2–14)
MONOCYTES NFR BLD AUTO: 5.7 % — SIGNIFICANT CHANGE UP (ref 2–14)
MONOCYTES NFR BLD AUTO: 52 % — HIGH (ref 2–14)
MONOCYTES NFR BLD AUTO: 6.2 % — SIGNIFICANT CHANGE UP (ref 2–14)
MONOCYTES NFR BLD AUTO: 60 % — HIGH (ref 2–14)
MONOCYTES NFR BLD AUTO: 70 % — HIGH (ref 2–14)
MONOCYTES NFR BLD AUTO: 74 % — HIGH (ref 2–14)
MONOCYTES NFR BLD AUTO: 79 % — HIGH (ref 2–14)
MONOCYTES NFR BLD AUTO: 84.7 % — HIGH (ref 2–14)
MONOCYTES NFR BLD AUTO: 88.3 % — HIGH (ref 2–14)
MYELOCYTES NFR BLD: 7 % — HIGH (ref 0–0)
NEUTROPHILS # BLD AUTO: 0.27 K/UL — LOW (ref 1.8–7.4)
NEUTROPHILS # BLD AUTO: 0.37 K/UL — LOW (ref 1.8–7.4)
NEUTROPHILS # BLD AUTO: 0.96 K/UL — LOW (ref 1.8–7.4)
NEUTROPHILS # BLD AUTO: 1 K/UL — LOW (ref 1.8–7.4)
NEUTROPHILS # BLD AUTO: 2.13 K/UL — SIGNIFICANT CHANGE UP (ref 1.8–7.4)
NEUTROPHILS # BLD AUTO: 2.26 K/UL — SIGNIFICANT CHANGE UP (ref 1.8–7.4)
NEUTROPHILS # BLD AUTO: 2.86 K/UL — SIGNIFICANT CHANGE UP (ref 1.8–7.4)
NEUTROPHILS # BLD AUTO: 2.96 K/UL — SIGNIFICANT CHANGE UP (ref 1.8–7.4)
NEUTROPHILS # BLD AUTO: 3.24 K/UL — SIGNIFICANT CHANGE UP (ref 1.8–7.4)
NEUTROPHILS # BLD AUTO: 3.28 K/UL — SIGNIFICANT CHANGE UP (ref 1.8–7.4)
NEUTROPHILS # BLD AUTO: 4.18 K/UL — SIGNIFICANT CHANGE UP (ref 1.8–7.4)
NEUTROPHILS # BLD AUTO: 5.86 K/UL — SIGNIFICANT CHANGE UP (ref 1.8–7.4)
NEUTROPHILS # BLD AUTO: 7.72 K/UL — HIGH (ref 1.8–7.4)
NEUTROPHILS NFR BLD AUTO: 0.9 % — LOW (ref 43–77)
NEUTROPHILS NFR BLD AUTO: 0.9 % — LOW (ref 43–77)
NEUTROPHILS NFR BLD AUTO: 2 % — LOW (ref 43–77)
NEUTROPHILS NFR BLD AUTO: 26 % — LOW (ref 43–77)
NEUTROPHILS NFR BLD AUTO: 26.5 % — LOW (ref 43–77)
NEUTROPHILS NFR BLD AUTO: 35.4 % — LOW (ref 43–77)
NEUTROPHILS NFR BLD AUTO: 4 % — LOW (ref 43–77)
NEUTROPHILS NFR BLD AUTO: 4 % — LOW (ref 43–77)
NEUTROPHILS NFR BLD AUTO: 49.6 % — SIGNIFICANT CHANGE UP (ref 43–77)
NEUTROPHILS NFR BLD AUTO: 5 % — LOW (ref 43–77)
NEUTROPHILS NFR BLD AUTO: 50.6 % — SIGNIFICANT CHANGE UP (ref 43–77)
NEUTROPHILS NFR BLD AUTO: 52.4 % — SIGNIFICANT CHANGE UP (ref 43–77)
NEUTROPHILS NFR BLD AUTO: 8 % — LOW (ref 43–77)
NEUTS BAND # BLD: 1 % — SIGNIFICANT CHANGE UP (ref 0–8)
NITRITE UR-MCNC: NEGATIVE — SIGNIFICANT CHANGE UP
NRBC # BLD: 0 /100 WBCS — SIGNIFICANT CHANGE UP (ref 0–0)
NRBC # BLD: 0 /100 — SIGNIFICANT CHANGE UP (ref 0–0)
NRBC # BLD: 1 /100 — HIGH (ref 0–0)
NRBC # BLD: 2 /100 — HIGH (ref 0–0)
NRBC # BLD: 2 /100 — HIGH (ref 0–0)
NRBC # BLD: SIGNIFICANT CHANGE UP /100 WBCS (ref 0–0)
NRBC # BLD: SIGNIFICANT CHANGE UP /100 WBCS (ref 0–0)
NT-PROBNP SERPL-SCNC: HIGH PG/ML (ref 0–300)
OSMOLALITY UR: 431 MOS/KG — SIGNIFICANT CHANGE UP (ref 300–900)
PCO2 BLDV: 40 MMHG — LOW (ref 42–55)
PH BLDV: 7.45 — HIGH (ref 7.32–7.43)
PH UR: 6 — SIGNIFICANT CHANGE UP (ref 5–8)
PH UR: 6.5 — SIGNIFICANT CHANGE UP (ref 5–8)
PH UR: 7.5 — SIGNIFICANT CHANGE UP (ref 5–8)
PHOSPHATE SERPL-MCNC: 2.8 MG/DL — SIGNIFICANT CHANGE UP (ref 2.5–4.5)
PHOSPHATE SERPL-MCNC: 2.9 MG/DL — SIGNIFICANT CHANGE UP (ref 2.5–4.5)
PHOSPHATE SERPL-MCNC: 3.6 MG/DL — SIGNIFICANT CHANGE UP (ref 2.5–4.5)
PHOSPHATE SERPL-MCNC: 3.7 MG/DL — SIGNIFICANT CHANGE UP (ref 2.5–4.5)
PHOSPHATE SERPL-MCNC: 3.8 MG/DL — SIGNIFICANT CHANGE UP (ref 2.5–4.5)
PHOSPHATE SERPL-MCNC: 3.9 MG/DL — SIGNIFICANT CHANGE UP (ref 2.5–4.5)
PHOSPHATE SERPL-MCNC: 4.2 MG/DL — SIGNIFICANT CHANGE UP (ref 2.5–4.5)
PHOSPHATE SERPL-MCNC: 4.3 MG/DL — SIGNIFICANT CHANGE UP (ref 2.5–4.5)
PHOSPHATE SERPL-MCNC: 4.4 MG/DL — SIGNIFICANT CHANGE UP (ref 2.5–4.5)
PHOSPHATE SERPL-MCNC: 4.5 MG/DL — SIGNIFICANT CHANGE UP (ref 2.5–4.5)
PHOSPHATE SERPL-MCNC: 4.9 MG/DL — HIGH (ref 2.5–4.5)
PLAT MORPH BLD: NORMAL — SIGNIFICANT CHANGE UP
PLATELET # BLD AUTO: 105 K/UL — LOW (ref 150–400)
PLATELET # BLD AUTO: 106 K/UL — LOW (ref 150–400)
PLATELET # BLD AUTO: 127 K/UL — LOW (ref 150–400)
PLATELET # BLD AUTO: 138 K/UL — LOW (ref 150–400)
PLATELET # BLD AUTO: 151 K/UL — SIGNIFICANT CHANGE UP (ref 150–400)
PLATELET # BLD AUTO: 177 K/UL — SIGNIFICANT CHANGE UP (ref 150–400)
PLATELET # BLD AUTO: 179 K/UL — SIGNIFICANT CHANGE UP (ref 150–400)
PLATELET # BLD AUTO: 189 K/UL — SIGNIFICANT CHANGE UP (ref 150–400)
PLATELET # BLD AUTO: 239 K/UL — SIGNIFICANT CHANGE UP (ref 150–400)
PLATELET # BLD AUTO: 256 K/UL — SIGNIFICANT CHANGE UP (ref 150–400)
PLATELET # BLD AUTO: 271 K/UL — SIGNIFICANT CHANGE UP (ref 150–400)
PLATELET # BLD AUTO: 274 K/UL — SIGNIFICANT CHANGE UP (ref 150–400)
PLATELET # BLD AUTO: 307 K/UL — SIGNIFICANT CHANGE UP (ref 150–400)
PLATELET # BLD AUTO: 326 K/UL — SIGNIFICANT CHANGE UP (ref 150–400)
PLATELET # BLD AUTO: 74 K/UL — LOW (ref 150–400)
PLATELET # BLD AUTO: 84 K/UL — LOW (ref 150–400)
PO2 BLDV: 82 MMHG — HIGH (ref 25–45)
POTASSIUM BLDV-SCNC: 3.8 MMOL/L — SIGNIFICANT CHANGE UP (ref 3.5–5.1)
POTASSIUM SERPL-MCNC: 3.5 MMOL/L — SIGNIFICANT CHANGE UP (ref 3.5–5.3)
POTASSIUM SERPL-MCNC: 3.5 MMOL/L — SIGNIFICANT CHANGE UP (ref 3.5–5.3)
POTASSIUM SERPL-MCNC: 3.6 MMOL/L — SIGNIFICANT CHANGE UP (ref 3.5–5.3)
POTASSIUM SERPL-MCNC: 3.7 MMOL/L — SIGNIFICANT CHANGE UP (ref 3.5–5.3)
POTASSIUM SERPL-MCNC: 4 MMOL/L — SIGNIFICANT CHANGE UP (ref 3.5–5.3)
POTASSIUM SERPL-MCNC: 4 MMOL/L — SIGNIFICANT CHANGE UP (ref 3.5–5.3)
POTASSIUM SERPL-MCNC: 4.2 MMOL/L — SIGNIFICANT CHANGE UP (ref 3.5–5.3)
POTASSIUM SERPL-MCNC: 4.6 MMOL/L — SIGNIFICANT CHANGE UP (ref 3.5–5.3)
POTASSIUM SERPL-MCNC: 4.6 MMOL/L — SIGNIFICANT CHANGE UP (ref 3.5–5.3)
POTASSIUM SERPL-MCNC: 4.7 MMOL/L — SIGNIFICANT CHANGE UP (ref 3.5–5.3)
POTASSIUM SERPL-MCNC: 5 MMOL/L — SIGNIFICANT CHANGE UP (ref 3.5–5.3)
POTASSIUM SERPL-MCNC: 5.4 MMOL/L — HIGH (ref 3.5–5.3)
POTASSIUM SERPL-SCNC: 3.5 MMOL/L — SIGNIFICANT CHANGE UP (ref 3.5–5.3)
POTASSIUM SERPL-SCNC: 3.5 MMOL/L — SIGNIFICANT CHANGE UP (ref 3.5–5.3)
POTASSIUM SERPL-SCNC: 3.6 MMOL/L — SIGNIFICANT CHANGE UP (ref 3.5–5.3)
POTASSIUM SERPL-SCNC: 3.7 MMOL/L — SIGNIFICANT CHANGE UP (ref 3.5–5.3)
POTASSIUM SERPL-SCNC: 4 MMOL/L — SIGNIFICANT CHANGE UP (ref 3.5–5.3)
POTASSIUM SERPL-SCNC: 4 MMOL/L — SIGNIFICANT CHANGE UP (ref 3.5–5.3)
POTASSIUM SERPL-SCNC: 4.2 MMOL/L — SIGNIFICANT CHANGE UP (ref 3.5–5.3)
POTASSIUM SERPL-SCNC: 4.6 MMOL/L — SIGNIFICANT CHANGE UP (ref 3.5–5.3)
POTASSIUM SERPL-SCNC: 4.6 MMOL/L — SIGNIFICANT CHANGE UP (ref 3.5–5.3)
POTASSIUM SERPL-SCNC: 4.7 MMOL/L
POTASSIUM SERPL-SCNC: 4.7 MMOL/L — SIGNIFICANT CHANGE UP (ref 3.5–5.3)
POTASSIUM SERPL-SCNC: 5 MMOL/L — SIGNIFICANT CHANGE UP (ref 3.5–5.3)
POTASSIUM SERPL-SCNC: 5.4 MMOL/L
POTASSIUM SERPL-SCNC: 5.4 MMOL/L — HIGH (ref 3.5–5.3)
PROT ?TM UR-MCNC: 112 MG/DL — HIGH (ref 0–12)
PROT ?TM UR-MCNC: 116 MG/DL — HIGH (ref 0–12)
PROT SERPL-MCNC: 6.2 G/DL — SIGNIFICANT CHANGE UP (ref 6–8.3)
PROT SERPL-MCNC: 6.3 G/DL — SIGNIFICANT CHANGE UP (ref 6–8.3)
PROT SERPL-MCNC: 6.3 G/DL — SIGNIFICANT CHANGE UP (ref 6–8.3)
PROT SERPL-MCNC: 6.4 G/DL — SIGNIFICANT CHANGE UP (ref 6–8.3)
PROT SERPL-MCNC: 6.4 G/DL — SIGNIFICANT CHANGE UP (ref 6–8.3)
PROT SERPL-MCNC: 6.8 G/DL — SIGNIFICANT CHANGE UP (ref 6–8.3)
PROT SERPL-MCNC: 6.9 G/DL
PROT SERPL-MCNC: 7 G/DL — SIGNIFICANT CHANGE UP (ref 6–8.3)
PROT SERPL-MCNC: 7.2 G/DL
PROT SERPL-MCNC: 7.5 G/DL — SIGNIFICANT CHANGE UP (ref 6–8.3)
PROT UR-MCNC: 100 — SIGNIFICANT CHANGE UP
PROT/CREAT UR-RTO: 1.1 RATIO — HIGH (ref 0–0.2)
PROTHROM AB SERPL-ACNC: 15.3 SEC — HIGH (ref 10.5–13.4)
PROTHROM AB SERPL-ACNC: 16.7 SEC — HIGH (ref 10.5–13.4)
PTH-INTACT FLD-MCNC: 50 PG/ML — SIGNIFICANT CHANGE UP (ref 15–65)
RBC # BLD: 3.2 M/UL — LOW (ref 4.2–5.8)
RBC # BLD: 3.22 M/UL — LOW (ref 4.2–5.8)
RBC # BLD: 3.24 M/UL — LOW (ref 4.2–5.8)
RBC # BLD: 3.3 M/UL — LOW (ref 4.2–5.8)
RBC # BLD: 3.32 M/UL — LOW (ref 4.2–5.8)
RBC # BLD: 3.41 M/UL — LOW (ref 4.2–5.8)
RBC # BLD: 3.46 M/UL — LOW (ref 4.2–5.8)
RBC # BLD: 3.47 M/UL — LOW (ref 4.2–5.8)
RBC # BLD: 3.6 M/UL — LOW (ref 4.2–5.8)
RBC # BLD: 3.64 M/UL — LOW (ref 4.2–5.8)
RBC # BLD: 3.68 M/UL — LOW (ref 4.2–5.8)
RBC # BLD: 3.72 M/UL — LOW (ref 4.2–5.8)
RBC # BLD: 3.74 M/UL — LOW (ref 4.2–5.8)
RBC # BLD: 3.74 M/UL — LOW (ref 4.2–5.8)
RBC # BLD: 3.76 M/UL — LOW (ref 4.2–5.8)
RBC # BLD: 3.82 M/UL — LOW (ref 4.2–5.8)
RBC # BLD: 3.94 M/UL — LOW (ref 4.2–5.8)
RBC # FLD: 14.9 % — HIGH (ref 10.3–14.5)
RBC # FLD: 15 % — HIGH (ref 10.3–14.5)
RBC # FLD: 15.3 % — HIGH (ref 10.3–14.5)
RBC # FLD: 15.5 % — HIGH (ref 10.3–14.5)
RBC # FLD: 15.5 % — HIGH (ref 10.3–14.5)
RBC # FLD: 15.6 % — HIGH (ref 10.3–14.5)
RBC # FLD: 16.8 % — HIGH (ref 10.3–14.5)
RBC # FLD: 17 % — HIGH (ref 10.3–14.5)
RBC # FLD: 17.1 % — HIGH (ref 10.3–14.5)
RBC # FLD: 17.2 % — HIGH (ref 10.3–14.5)
RBC BLD AUTO: SIGNIFICANT CHANGE UP
RBC CASTS # UR COMP ASSIST: 2 /HPF — SIGNIFICANT CHANGE UP (ref 0–4)
RBC CASTS # UR COMP ASSIST: 2 /HPF — SIGNIFICANT CHANGE UP (ref 0–4)
RBC CASTS # UR COMP ASSIST: 3 /HPF — SIGNIFICANT CHANGE UP (ref 0–4)
RETICS #: 70.3 K/UL — SIGNIFICANT CHANGE UP (ref 25–125)
RETICS/RBC NFR: 1.9 % — SIGNIFICANT CHANGE UP (ref 0.5–2.5)
RH IG SCN BLD-IMP: POSITIVE — SIGNIFICANT CHANGE UP
SAO2 % BLDV: 97.4 % — HIGH (ref 67–88)
SARS-COV-2 RNA SPEC QL NAA+PROBE: SIGNIFICANT CHANGE UP
SMUDGE CELLS # BLD: PRESENT — SIGNIFICANT CHANGE UP
SODIUM SERPL-SCNC: 135 MMOL/L — SIGNIFICANT CHANGE UP (ref 135–145)
SODIUM SERPL-SCNC: 135 MMOL/L — SIGNIFICANT CHANGE UP (ref 135–145)
SODIUM SERPL-SCNC: 136 MMOL/L — SIGNIFICANT CHANGE UP (ref 135–145)
SODIUM SERPL-SCNC: 137 MMOL/L — SIGNIFICANT CHANGE UP (ref 135–145)
SODIUM SERPL-SCNC: 139 MMOL/L
SODIUM SERPL-SCNC: 139 MMOL/L — SIGNIFICANT CHANGE UP (ref 135–145)
SODIUM SERPL-SCNC: 141 MMOL/L
SODIUM UR-SCNC: 101 MMOL/L — SIGNIFICANT CHANGE UP
SP GR SPEC: 1.02 — SIGNIFICANT CHANGE UP (ref 1.01–1.02)
SPECIMEN SOURCE: SIGNIFICANT CHANGE UP
TM INTERPRETATION: SIGNIFICANT CHANGE UP
TM INTERPRETATION: SIGNIFICANT CHANGE UP
TROPONIN T, HIGH SENSITIVITY RESULT: 49 NG/L — SIGNIFICANT CHANGE UP (ref 0–51)
URATE SERPL-MCNC: 2.3 MG/DL — LOW (ref 3.4–8.8)
URATE SERPL-MCNC: 3 MG/DL — LOW (ref 3.4–8.8)
URATE SERPL-MCNC: 3 MG/DL — LOW (ref 3.4–8.8)
URATE SERPL-MCNC: 3.4 MG/DL — SIGNIFICANT CHANGE UP (ref 3.4–8.8)
URATE SERPL-MCNC: 3.8 MG/DL — SIGNIFICANT CHANGE UP (ref 3.4–8.8)
URATE SERPL-MCNC: 3.9 MG/DL — SIGNIFICANT CHANGE UP (ref 3.4–8.8)
URATE SERPL-MCNC: 4 MG/DL — SIGNIFICANT CHANGE UP (ref 3.4–8.8)
URATE SERPL-MCNC: 4.5 MG/DL — SIGNIFICANT CHANGE UP (ref 3.4–8.8)
URATE SERPL-MCNC: 5.2 MG/DL — SIGNIFICANT CHANGE UP (ref 3.4–8.8)
URATE SERPL-MCNC: 5.5 MG/DL — SIGNIFICANT CHANGE UP (ref 3.4–8.8)
URATE SERPL-MCNC: 5.6 MG/DL — SIGNIFICANT CHANGE UP (ref 3.4–8.8)
URATE SERPL-MCNC: 6.1 MG/DL — SIGNIFICANT CHANGE UP (ref 3.4–8.8)
URATE SERPL-MCNC: 8 MG/DL — SIGNIFICANT CHANGE UP (ref 3.4–8.8)
URATE UR-MCNC: 22.6 MG/DL — SIGNIFICANT CHANGE UP
UROBILINOGEN FLD QL: NEGATIVE — SIGNIFICANT CHANGE UP
UUN UR-MCNC: 379 MG/DL — SIGNIFICANT CHANGE UP
VIT D25+D1,25 OH+D1,25 PNL SERPL-MCNC: 21.3 PG/ML — SIGNIFICANT CHANGE UP (ref 19.9–79.3)
WBC # BLD: 102.9 K/UL — CRITICAL HIGH (ref 3.8–10.5)
WBC # BLD: 104.44 K/UL — CRITICAL HIGH (ref 3.8–10.5)
WBC # BLD: 112.84 K/UL — CRITICAL HIGH (ref 3.8–10.5)
WBC # BLD: 3.61 K/UL — LOW (ref 3.8–10.5)
WBC # BLD: 3.85 K/UL — SIGNIFICANT CHANGE UP (ref 3.8–10.5)
WBC # BLD: 30.47 K/UL — HIGH (ref 3.8–10.5)
WBC # BLD: 40.93 K/UL — CRITICAL HIGH (ref 3.8–10.5)
WBC # BLD: 5.65 K/UL — SIGNIFICANT CHANGE UP (ref 3.8–10.5)
WBC # BLD: 5.97 K/UL — SIGNIFICANT CHANGE UP (ref 3.8–10.5)
WBC # BLD: 6.03 K/UL — SIGNIFICANT CHANGE UP (ref 3.8–10.5)
WBC # BLD: 6.26 K/UL — SIGNIFICANT CHANGE UP (ref 3.8–10.5)
WBC # BLD: 62.21 K/UL — CRITICAL HIGH (ref 3.8–10.5)
WBC # BLD: 81.08 K/UL — CRITICAL HIGH (ref 3.8–10.5)
WBC # BLD: 90.99 K/UL — CRITICAL HIGH (ref 3.8–10.5)
WBC # BLD: 96.54 K/UL — CRITICAL HIGH (ref 3.8–10.5)
WBC # BLD: 97.61 K/UL — CRITICAL HIGH (ref 3.8–10.5)
WBC # FLD AUTO: 102.9 K/UL — CRITICAL HIGH (ref 3.8–10.5)
WBC # FLD AUTO: 104.44 K/UL — CRITICAL HIGH (ref 3.8–10.5)
WBC # FLD AUTO: 112.84 K/UL — CRITICAL HIGH (ref 3.8–10.5)
WBC # FLD AUTO: 3.61 K/UL — LOW (ref 3.8–10.5)
WBC # FLD AUTO: 3.85 K/UL — SIGNIFICANT CHANGE UP (ref 3.8–10.5)
WBC # FLD AUTO: 30.47 K/UL — HIGH (ref 3.8–10.5)
WBC # FLD AUTO: 40.93 K/UL — CRITICAL HIGH (ref 3.8–10.5)
WBC # FLD AUTO: 5.65 K/UL — SIGNIFICANT CHANGE UP (ref 3.8–10.5)
WBC # FLD AUTO: 5.97 K/UL — SIGNIFICANT CHANGE UP (ref 3.8–10.5)
WBC # FLD AUTO: 6.03 K/UL — SIGNIFICANT CHANGE UP (ref 3.8–10.5)
WBC # FLD AUTO: 6.26 K/UL — SIGNIFICANT CHANGE UP (ref 3.8–10.5)
WBC # FLD AUTO: 62.21 K/UL — CRITICAL HIGH (ref 3.8–10.5)
WBC # FLD AUTO: 81.08 K/UL — CRITICAL HIGH (ref 3.8–10.5)
WBC # FLD AUTO: 90.99 K/UL — CRITICAL HIGH (ref 3.8–10.5)
WBC # FLD AUTO: 96.54 K/UL — CRITICAL HIGH (ref 3.8–10.5)
WBC # FLD AUTO: 97.61 K/UL — CRITICAL HIGH (ref 3.8–10.5)
WBC UR QL: 4 /HPF — SIGNIFICANT CHANGE UP (ref 0–5)
WBC UR QL: 7 /HPF — HIGH (ref 0–5)
WBC UR QL: 8 /HPF — HIGH (ref 0–5)

## 2022-01-01 PROCEDURE — 82550 ASSAY OF CK (CPK): CPT

## 2022-01-01 PROCEDURE — 88189 FLOWCYTOMETRY/READ 16 & >: CPT

## 2022-01-01 PROCEDURE — 93010 ELECTROCARDIOGRAM REPORT: CPT

## 2022-01-01 PROCEDURE — 71045 X-RAY EXAM CHEST 1 VIEW: CPT | Mod: 26

## 2022-01-01 PROCEDURE — 85027 COMPLETE CBC AUTOMATED: CPT

## 2022-01-01 PROCEDURE — 99233 SBSQ HOSP IP/OBS HIGH 50: CPT

## 2022-01-01 PROCEDURE — 99497 ADVNCD CARE PLAN 30 MIN: CPT | Mod: 25

## 2022-01-01 PROCEDURE — 82947 ASSAY GLUCOSE BLOOD QUANT: CPT

## 2022-01-01 PROCEDURE — 99232 SBSQ HOSP IP/OBS MODERATE 35: CPT | Mod: GC

## 2022-01-01 PROCEDURE — 84550 ASSAY OF BLOOD/URIC ACID: CPT

## 2022-01-01 PROCEDURE — 97161 PT EVAL LOW COMPLEX 20 MIN: CPT

## 2022-01-01 PROCEDURE — 99223 1ST HOSP IP/OBS HIGH 75: CPT | Mod: GC

## 2022-01-01 PROCEDURE — 84560 ASSAY OF URINE/URIC ACID: CPT

## 2022-01-01 PROCEDURE — 99232 SBSQ HOSP IP/OBS MODERATE 35: CPT

## 2022-01-01 PROCEDURE — 82330 ASSAY OF CALCIUM: CPT

## 2022-01-01 PROCEDURE — 99231 SBSQ HOSP IP/OBS SF/LOW 25: CPT | Mod: GC,25

## 2022-01-01 PROCEDURE — 80053 COMPREHEN METABOLIC PANEL: CPT

## 2022-01-01 PROCEDURE — 99223 1ST HOSP IP/OBS HIGH 75: CPT

## 2022-01-01 PROCEDURE — 88341 IMHCHEM/IMCYTCHM EA ADD ANTB: CPT

## 2022-01-01 PROCEDURE — 88313 SPECIAL STAINS GROUP 2: CPT

## 2022-01-01 PROCEDURE — 86850 RBC ANTIBODY SCREEN: CPT

## 2022-01-01 PROCEDURE — 85384 FIBRINOGEN ACTIVITY: CPT

## 2022-01-01 PROCEDURE — 99214 OFFICE O/P EST MOD 30 MIN: CPT

## 2022-01-01 PROCEDURE — 84295 ASSAY OF SERUM SODIUM: CPT

## 2022-01-01 PROCEDURE — 81450 HL NEO GSAP 5-50DNA/DNA&RNA: CPT

## 2022-01-01 PROCEDURE — 88185 FLOWCYTOMETRY/TC ADD-ON: CPT

## 2022-01-01 PROCEDURE — 99498 ADVNCD CARE PLAN ADDL 30 MIN: CPT

## 2022-01-01 PROCEDURE — 71045 X-RAY EXAM CHEST 1 VIEW: CPT

## 2022-01-01 PROCEDURE — 86900 BLOOD TYPING SEROLOGIC ABO: CPT

## 2022-01-01 PROCEDURE — 96365 THER/PROPH/DIAG IV INF INIT: CPT

## 2022-01-01 PROCEDURE — 85045 AUTOMATED RETICULOCYTE COUNT: CPT

## 2022-01-01 PROCEDURE — 88341 IMHCHEM/IMCYTCHM EA ADD ANTB: CPT | Mod: 26,59

## 2022-01-01 PROCEDURE — 93975 VASCULAR STUDY: CPT | Mod: 26

## 2022-01-01 PROCEDURE — 80048 BASIC METABOLIC PNL TOTAL CA: CPT

## 2022-01-01 PROCEDURE — 97166 OT EVAL MOD COMPLEX 45 MIN: CPT

## 2022-01-01 PROCEDURE — 85018 HEMOGLOBIN: CPT

## 2022-01-01 PROCEDURE — 83880 ASSAY OF NATRIURETIC PEPTIDE: CPT

## 2022-01-01 PROCEDURE — 85014 HEMATOCRIT: CPT

## 2022-01-01 PROCEDURE — 83735 ASSAY OF MAGNESIUM: CPT

## 2022-01-01 PROCEDURE — 87040 BLOOD CULTURE FOR BACTERIA: CPT

## 2022-01-01 PROCEDURE — 88313 SPECIAL STAINS GROUP 2: CPT | Mod: 26

## 2022-01-01 PROCEDURE — 71046 X-RAY EXAM CHEST 2 VIEWS: CPT

## 2022-01-01 PROCEDURE — 82652 VIT D 1 25-DIHYDROXY: CPT

## 2022-01-01 PROCEDURE — 88264 CHROMOSOME ANALYSIS 20-25: CPT

## 2022-01-01 PROCEDURE — 93306 TTE W/DOPPLER COMPLETE: CPT | Mod: 26

## 2022-01-01 PROCEDURE — U0005: CPT

## 2022-01-01 PROCEDURE — 86901 BLOOD TYPING SEROLOGIC RH(D): CPT

## 2022-01-01 PROCEDURE — 82435 ASSAY OF BLOOD CHLORIDE: CPT

## 2022-01-01 PROCEDURE — 99285 EMERGENCY DEPT VISIT HI MDM: CPT | Mod: 25

## 2022-01-01 PROCEDURE — 85025 COMPLETE CBC W/AUTO DIFF WBC: CPT

## 2022-01-01 PROCEDURE — 99285 EMERGENCY DEPT VISIT HI MDM: CPT

## 2022-01-01 PROCEDURE — 88280 CHROMOSOME KARYOTYPE STUDY: CPT

## 2022-01-01 PROCEDURE — 81001 URINALYSIS AUTO W/SCOPE: CPT

## 2022-01-01 PROCEDURE — 93306 TTE W/DOPPLER COMPLETE: CPT

## 2022-01-01 PROCEDURE — 83615 LACTATE (LD) (LDH) ENZYME: CPT

## 2022-01-01 PROCEDURE — 83970 ASSAY OF PARATHORMONE: CPT

## 2022-01-01 PROCEDURE — 99233 SBSQ HOSP IP/OBS HIGH 50: CPT | Mod: GC

## 2022-01-01 PROCEDURE — 84156 ASSAY OF PROTEIN URINE: CPT

## 2022-01-01 PROCEDURE — 84132 ASSAY OF SERUM POTASSIUM: CPT

## 2022-01-01 PROCEDURE — 71046 X-RAY EXAM CHEST 2 VIEWS: CPT | Mod: 26

## 2022-01-01 PROCEDURE — 84100 ASSAY OF PHOSPHORUS: CPT

## 2022-01-01 PROCEDURE — G0452: CPT | Mod: 26

## 2022-01-01 PROCEDURE — 83010 ASSAY OF HAPTOGLOBIN QUANT: CPT

## 2022-01-01 PROCEDURE — 81261 IGH GENE REARRANGE AMP METH: CPT

## 2022-01-01 PROCEDURE — 88342 IMHCHEM/IMCYTCHM 1ST ANTB: CPT | Mod: 26,59

## 2022-01-01 PROCEDURE — 36415 COLL VENOUS BLD VENIPUNCTURE: CPT

## 2022-01-01 PROCEDURE — 82553 CREATINE MB FRACTION: CPT

## 2022-01-01 PROCEDURE — 85097 BONE MARROW INTERPRETATION: CPT

## 2022-01-01 PROCEDURE — 81207 BCR/ABL1 GENE MINOR BP: CPT

## 2022-01-01 PROCEDURE — 82570 ASSAY OF URINE CREATININE: CPT

## 2022-01-01 PROCEDURE — 81206 BCR/ABL1 GENE MAJOR BP: CPT

## 2022-01-01 PROCEDURE — 82306 VITAMIN D 25 HYDROXY: CPT

## 2022-01-01 PROCEDURE — 38221 DX BONE MARROW BIOPSIES: CPT | Mod: GC

## 2022-01-01 PROCEDURE — 93005 ELECTROCARDIOGRAM TRACING: CPT

## 2022-01-01 PROCEDURE — 83605 ASSAY OF LACTIC ACID: CPT

## 2022-01-01 PROCEDURE — 88342 IMHCHEM/IMCYTCHM 1ST ANTB: CPT

## 2022-01-01 PROCEDURE — 82310 ASSAY OF CALCIUM: CPT

## 2022-01-01 PROCEDURE — 85610 PROTHROMBIN TIME: CPT

## 2022-01-01 PROCEDURE — 87086 URINE CULTURE/COLONY COUNT: CPT

## 2022-01-01 PROCEDURE — 83935 ASSAY OF URINE OSMOLALITY: CPT

## 2022-01-01 PROCEDURE — 84540 ASSAY OF URINE/UREA-N: CPT

## 2022-01-01 PROCEDURE — 93975 VASCULAR STUDY: CPT

## 2022-01-01 PROCEDURE — 82803 BLOOD GASES ANY COMBINATION: CPT

## 2022-01-01 PROCEDURE — 85060 BLOOD SMEAR INTERPRETATION: CPT

## 2022-01-01 PROCEDURE — 38222 DX BONE MARROW BX & ASPIR: CPT | Mod: GC,RT

## 2022-01-01 PROCEDURE — 88305 TISSUE EXAM BY PATHOLOGIST: CPT

## 2022-01-01 PROCEDURE — 88305 TISSUE EXAM BY PATHOLOGIST: CPT | Mod: 26

## 2022-01-01 PROCEDURE — 84300 ASSAY OF URINE SODIUM: CPT

## 2022-01-01 PROCEDURE — 85730 THROMBOPLASTIN TIME PARTIAL: CPT

## 2022-01-01 PROCEDURE — 88237 TISSUE CULTURE BONE MARROW: CPT

## 2022-01-01 PROCEDURE — 84484 ASSAY OF TROPONIN QUANT: CPT

## 2022-01-01 PROCEDURE — U0003: CPT

## 2022-01-01 PROCEDURE — 87205 SMEAR GRAM STAIN: CPT

## 2022-01-01 RX ORDER — HYDROMORPHONE HYDROCHLORIDE 2 MG/ML
0.2 INJECTION INTRAMUSCULAR; INTRAVENOUS; SUBCUTANEOUS
Refills: 0 | Status: DISCONTINUED | OUTPATIENT
Start: 2022-01-01 | End: 2022-01-01

## 2022-01-01 RX ORDER — POLYETHYLENE GLYCOL 3350 17 G/17G
17 POWDER, FOR SOLUTION ORAL
Refills: 0 | Status: DISCONTINUED | OUTPATIENT
Start: 2022-01-01 | End: 2022-01-01

## 2022-01-01 RX ORDER — HYDROMORPHONE HYDROCHLORIDE 2 MG/ML
0.5 INJECTION INTRAMUSCULAR; INTRAVENOUS; SUBCUTANEOUS
Refills: 0 | Status: DISCONTINUED | OUTPATIENT
Start: 2022-01-01 | End: 2022-01-01

## 2022-01-01 RX ORDER — VENETOCLAX 100 MG/1
100 TABLET, FILM COATED ORAL
Qty: 30 | Refills: 1 | Status: ACTIVE | COMMUNITY
Start: 2021-01-01 | End: 1900-01-01

## 2022-01-01 RX ORDER — HYDROMORPHONE HYDROCHLORIDE 2 MG/ML
2.5 INJECTION INTRAMUSCULAR; INTRAVENOUS; SUBCUTANEOUS
Refills: 0 | Status: DISCONTINUED | OUTPATIENT
Start: 2022-01-01 | End: 2022-01-01

## 2022-01-01 RX ORDER — HEPARIN SODIUM 5000 [USP'U]/ML
5000 INJECTION INTRAVENOUS; SUBCUTANEOUS EVERY 8 HOURS
Refills: 0 | Status: DISCONTINUED | OUTPATIENT
Start: 2022-01-01 | End: 2022-01-01

## 2022-01-01 RX ORDER — MAGNESIUM SULFATE 500 MG/ML
1 VIAL (ML) INJECTION ONCE
Refills: 0 | Status: COMPLETED | OUTPATIENT
Start: 2022-01-01 | End: 2022-01-01

## 2022-01-01 RX ORDER — RASBURICASE 7.5 MG
3 KIT INTRAVENOUS ONCE
Refills: 0 | Status: COMPLETED | OUTPATIENT
Start: 2022-01-01 | End: 2022-01-01

## 2022-01-01 RX ORDER — CASPOFUNGIN ACETATE 7 MG/ML
50 INJECTION, POWDER, LYOPHILIZED, FOR SOLUTION INTRAVENOUS EVERY 24 HOURS
Refills: 0 | Status: DISCONTINUED | OUTPATIENT
Start: 2022-01-01 | End: 2022-01-01

## 2022-01-01 RX ORDER — HYDROMORPHONE HYDROCHLORIDE 2 MG/ML
0.5 INJECTION INTRAMUSCULAR; INTRAVENOUS; SUBCUTANEOUS EVERY 6 HOURS
Refills: 0 | Status: DISCONTINUED | OUTPATIENT
Start: 2022-01-01 | End: 2022-01-01

## 2022-01-01 RX ORDER — ACETAMINOPHEN 500 MG
975 TABLET ORAL ONCE
Refills: 0 | Status: COMPLETED | OUTPATIENT
Start: 2022-01-01 | End: 2022-01-01

## 2022-01-01 RX ORDER — SODIUM CHLORIDE 9 MG/ML
1000 INJECTION INTRAMUSCULAR; INTRAVENOUS; SUBCUTANEOUS
Refills: 0 | Status: DISCONTINUED | OUTPATIENT
Start: 2022-01-01 | End: 2022-01-01

## 2022-01-01 RX ORDER — FUROSEMIDE 40 MG
80 TABLET ORAL ONCE
Refills: 0 | Status: COMPLETED | OUTPATIENT
Start: 2022-01-01 | End: 2022-01-01

## 2022-01-01 RX ORDER — VENETOCLAX 100 MG/1
1 TABLET, FILM COATED ORAL
Qty: 0 | Refills: 0 | DISCHARGE

## 2022-01-01 RX ORDER — CHLORHEXIDINE GLUCONATE 213 G/1000ML
1 SOLUTION TOPICAL DAILY
Refills: 0 | Status: DISCONTINUED | OUTPATIENT
Start: 2022-01-01 | End: 2022-01-01

## 2022-01-01 RX ORDER — ACETAMINOPHEN 500 MG
1000 TABLET ORAL ONCE
Refills: 0 | Status: COMPLETED | OUTPATIENT
Start: 2022-01-01 | End: 2022-01-01

## 2022-01-01 RX ORDER — METOCLOPRAMIDE HCL 10 MG
5 TABLET ORAL EVERY 6 HOURS
Refills: 0 | Status: DISCONTINUED | OUTPATIENT
Start: 2022-01-01 | End: 2022-01-01

## 2022-01-01 RX ORDER — FUROSEMIDE 40 MG
40 TABLET ORAL ONCE
Refills: 0 | Status: COMPLETED | OUTPATIENT
Start: 2022-01-01 | End: 2022-01-01

## 2022-01-01 RX ORDER — CEFEPIME 1 G/1
INJECTION, POWDER, FOR SOLUTION INTRAMUSCULAR; INTRAVENOUS
Refills: 0 | Status: DISCONTINUED | OUTPATIENT
Start: 2022-01-01 | End: 2022-01-01

## 2022-01-01 RX ORDER — ACETAMINOPHEN 500 MG
1000 TABLET ORAL ONCE
Refills: 0 | Status: DISCONTINUED | OUTPATIENT
Start: 2022-01-01 | End: 2022-01-01

## 2022-01-01 RX ORDER — LIDOCAINE HCL 20 MG/ML
20 VIAL (ML) INJECTION ONCE
Refills: 0 | Status: DISCONTINUED | OUTPATIENT
Start: 2022-01-01 | End: 2022-01-01

## 2022-01-01 RX ORDER — ASPIRIN/CALCIUM CARB/MAGNESIUM 324 MG
81 TABLET ORAL DAILY
Refills: 0 | Status: DISCONTINUED | OUTPATIENT
Start: 2022-01-01 | End: 2022-01-01

## 2022-01-01 RX ORDER — FINASTERIDE 5 MG/1
5 TABLET, FILM COATED ORAL DAILY
Refills: 0 | Status: DISCONTINUED | OUTPATIENT
Start: 2022-01-01 | End: 2022-01-01

## 2022-01-01 RX ORDER — ROBINUL 0.2 MG/ML
0.4 INJECTION INTRAMUSCULAR; INTRAVENOUS EVERY 6 HOURS
Refills: 0 | Status: DISCONTINUED | OUTPATIENT
Start: 2022-01-01 | End: 2022-01-01

## 2022-01-01 RX ORDER — LIDOCAINE 4 G/100G
1 CREAM TOPICAL DAILY
Refills: 0 | Status: DISCONTINUED | OUTPATIENT
Start: 2022-01-01 | End: 2022-01-01

## 2022-01-01 RX ORDER — POTASSIUM CHLORIDE 20 MEQ
20 PACKET (EA) ORAL ONCE
Refills: 0 | Status: COMPLETED | OUTPATIENT
Start: 2022-01-01 | End: 2022-01-01

## 2022-01-01 RX ORDER — ALTEPLASE 100 MG
2 KIT INTRAVENOUS ONCE
Refills: 0 | Status: COMPLETED | OUTPATIENT
Start: 2022-01-01 | End: 2022-01-01

## 2022-01-01 RX ORDER — FOLIC ACID 0.8 MG
1 TABLET ORAL
Qty: 0 | Refills: 0 | DISCHARGE

## 2022-01-01 RX ORDER — METOPROLOL TARTRATE 50 MG
50 TABLET ORAL DAILY
Refills: 0 | Status: DISCONTINUED | OUTPATIENT
Start: 2022-01-01 | End: 2022-01-01

## 2022-01-01 RX ORDER — HYDROMORPHONE HYDROCHLORIDE 2 MG/ML
1 INJECTION INTRAMUSCULAR; INTRAVENOUS; SUBCUTANEOUS
Refills: 0 | Status: DISCONTINUED | OUTPATIENT
Start: 2022-01-01 | End: 2022-01-01

## 2022-01-01 RX ORDER — LACTULOSE 10 G/15ML
20 SOLUTION ORAL ONCE
Refills: 0 | Status: COMPLETED | OUTPATIENT
Start: 2022-01-01 | End: 2022-01-01

## 2022-01-01 RX ORDER — ACETAMINOPHEN 500 MG
650 TABLET ORAL EVERY 6 HOURS
Refills: 0 | Status: DISCONTINUED | OUTPATIENT
Start: 2022-01-01 | End: 2022-01-01

## 2022-01-01 RX ORDER — ISOSORBIDE MONONITRATE 60 MG/1
60 TABLET, EXTENDED RELEASE ORAL DAILY
Refills: 0 | Status: DISCONTINUED | OUTPATIENT
Start: 2022-01-01 | End: 2022-01-01

## 2022-01-01 RX ORDER — CEFEPIME 1 G/1
1000 INJECTION, POWDER, FOR SOLUTION INTRAMUSCULAR; INTRAVENOUS ONCE
Refills: 0 | Status: COMPLETED | OUTPATIENT
Start: 2022-01-01 | End: 2022-01-01

## 2022-01-01 RX ORDER — SALIVA SUBSTITUTE COMB NO.11 351 MG
15 POWDER IN PACKET (EA) MUCOUS MEMBRANE
Refills: 0 | Status: DISCONTINUED | OUTPATIENT
Start: 2022-01-01 | End: 2022-01-01

## 2022-01-01 RX ORDER — HYDROXYUREA 500 MG/1
1000 CAPSULE ORAL EVERY 12 HOURS
Refills: 0 | Status: DISCONTINUED | OUTPATIENT
Start: 2022-01-01 | End: 2022-01-01

## 2022-01-01 RX ORDER — FUROSEMIDE 40 MG
1 TABLET ORAL
Qty: 0 | Refills: 0 | DISCHARGE

## 2022-01-01 RX ORDER — SODIUM BICARBONATE 1 MEQ/ML
0.2 SYRINGE (ML) INTRAVENOUS
Qty: 150 | Refills: 0 | Status: DISCONTINUED | OUTPATIENT
Start: 2022-01-01 | End: 2022-01-01

## 2022-01-01 RX ORDER — CHOLECALCIFEROL (VITAMIN D3) 125 MCG
1 CAPSULE ORAL
Qty: 0 | Refills: 0 | DISCHARGE

## 2022-01-01 RX ORDER — POSACONAZOLE 100 MG/1
300 TABLET, DELAYED RELEASE ORAL DAILY
Refills: 0 | Status: DISCONTINUED | OUTPATIENT
Start: 2022-01-01 | End: 2022-01-01

## 2022-01-01 RX ORDER — SENNA PLUS 8.6 MG/1
2 TABLET ORAL ONCE
Refills: 0 | Status: COMPLETED | OUTPATIENT
Start: 2022-01-01 | End: 2022-01-01

## 2022-01-01 RX ORDER — ROBINUL 0.2 MG/ML
0.4 INJECTION INTRAMUSCULAR; INTRAVENOUS
Refills: 0 | Status: DISCONTINUED | OUTPATIENT
Start: 2022-01-01 | End: 2022-01-01

## 2022-01-01 RX ORDER — DECITABINE 50 MG/20ML
50 INJECTION, POWDER, LYOPHILIZED, FOR SOLUTION INTRAVENOUS
Qty: 5 | Refills: 5 | Status: ACTIVE | COMMUNITY
Start: 2021-01-01 | End: 1900-01-01

## 2022-01-01 RX ORDER — FINASTERIDE 5 MG/1
1 TABLET, FILM COATED ORAL
Qty: 0 | Refills: 0 | DISCHARGE

## 2022-01-01 RX ORDER — SODIUM CHLORIDE 9 MG/ML
1000 INJECTION, SOLUTION INTRAVENOUS
Refills: 0 | Status: DISCONTINUED | OUTPATIENT
Start: 2022-01-01 | End: 2022-01-01

## 2022-01-01 RX ORDER — FUROSEMIDE 40 MG/1
40 TABLET ORAL
Qty: 90 | Refills: 0 | Status: ACTIVE | COMMUNITY
Start: 2021-01-01 | End: 1900-01-01

## 2022-01-01 RX ORDER — DECITABINE 50 MG/20ML
36 INJECTION, POWDER, LYOPHILIZED, FOR SOLUTION INTRAVENOUS
Qty: 0 | Refills: 0 | DISCHARGE

## 2022-01-01 RX ORDER — CEFEPIME 1 G/1
1000 INJECTION, POWDER, FOR SOLUTION INTRAMUSCULAR; INTRAVENOUS EVERY 12 HOURS
Refills: 0 | Status: DISCONTINUED | OUTPATIENT
Start: 2022-01-01 | End: 2022-01-01

## 2022-01-01 RX ORDER — POSACONAZOLE 100 MG/1
3 TABLET, DELAYED RELEASE ORAL
Qty: 0 | Refills: 0 | DISCHARGE

## 2022-01-01 RX ORDER — SEVELAMER CARBONATE 2400 MG/1
800 POWDER, FOR SUSPENSION ORAL THREE TIMES A DAY
Refills: 0 | Status: DISCONTINUED | OUTPATIENT
Start: 2022-01-01 | End: 2022-01-01

## 2022-01-01 RX ADMIN — Medication 20 MILLIEQUIVALENT(S): at 00:20

## 2022-01-01 RX ADMIN — CHLORHEXIDINE GLUCONATE 1 APPLICATION(S): 213 SOLUTION TOPICAL at 13:10

## 2022-01-01 RX ADMIN — CHLORHEXIDINE GLUCONATE 1 APPLICATION(S): 213 SOLUTION TOPICAL at 11:12

## 2022-01-01 RX ADMIN — ISOSORBIDE MONONITRATE 60 MILLIGRAM(S): 60 TABLET, EXTENDED RELEASE ORAL at 12:38

## 2022-01-01 RX ADMIN — ROBINUL 0.4 MILLIGRAM(S): 0.2 INJECTION INTRAMUSCULAR; INTRAVENOUS at 23:48

## 2022-01-01 RX ADMIN — HYDROMORPHONE HYDROCHLORIDE 1 MILLIGRAM(S): 2 INJECTION INTRAMUSCULAR; INTRAVENOUS; SUBCUTANEOUS at 19:02

## 2022-01-01 RX ADMIN — FINASTERIDE 5 MILLIGRAM(S): 5 TABLET, FILM COATED ORAL at 11:28

## 2022-01-01 RX ADMIN — POLYETHYLENE GLYCOL 3350 17 GRAM(S): 17 POWDER, FOR SOLUTION ORAL at 06:38

## 2022-01-01 RX ADMIN — HEPARIN SODIUM 5000 UNIT(S): 5000 INJECTION INTRAVENOUS; SUBCUTANEOUS at 05:36

## 2022-01-01 RX ADMIN — LIDOCAINE 1 PATCH: 4 CREAM TOPICAL at 19:30

## 2022-01-01 RX ADMIN — SENNA PLUS 2 TABLET(S): 8.6 TABLET ORAL at 21:00

## 2022-01-01 RX ADMIN — Medication 81 MILLIGRAM(S): at 11:29

## 2022-01-01 RX ADMIN — LIDOCAINE 1 PATCH: 4 CREAM TOPICAL at 19:08

## 2022-01-01 RX ADMIN — HYDROMORPHONE HYDROCHLORIDE 0.5 MILLIGRAM(S): 2 INJECTION INTRAMUSCULAR; INTRAVENOUS; SUBCUTANEOUS at 23:48

## 2022-01-01 RX ADMIN — CEFEPIME 100 MILLIGRAM(S): 1 INJECTION, POWDER, FOR SOLUTION INTRAMUSCULAR; INTRAVENOUS at 21:48

## 2022-01-01 RX ADMIN — Medication 81 MILLIGRAM(S): at 12:38

## 2022-01-01 RX ADMIN — LIDOCAINE 1 PATCH: 4 CREAM TOPICAL at 20:04

## 2022-01-01 RX ADMIN — CEFEPIME 100 MILLIGRAM(S): 1 INJECTION, POWDER, FOR SOLUTION INTRAMUSCULAR; INTRAVENOUS at 18:56

## 2022-01-01 RX ADMIN — HYDROXYUREA 1000 MILLIGRAM(S): 500 CAPSULE ORAL at 09:02

## 2022-01-01 RX ADMIN — FINASTERIDE 5 MILLIGRAM(S): 5 TABLET, FILM COATED ORAL at 11:26

## 2022-01-01 RX ADMIN — HYDROXYUREA 1000 MILLIGRAM(S): 500 CAPSULE ORAL at 10:30

## 2022-01-01 RX ADMIN — HEPARIN SODIUM 5000 UNIT(S): 5000 INJECTION INTRAVENOUS; SUBCUTANEOUS at 21:30

## 2022-01-01 RX ADMIN — Medication 50 MILLIGRAM(S): at 22:28

## 2022-01-01 RX ADMIN — Medication 40 MILLIGRAM(S): at 10:29

## 2022-01-01 RX ADMIN — Medication 40 MILLIGRAM(S): at 05:16

## 2022-01-01 RX ADMIN — HEPARIN SODIUM 5000 UNIT(S): 5000 INJECTION INTRAVENOUS; SUBCUTANEOUS at 13:43

## 2022-01-01 RX ADMIN — Medication 1000 MILLIGRAM(S): at 11:00

## 2022-01-01 RX ADMIN — LIDOCAINE 1 PATCH: 4 CREAM TOPICAL at 23:48

## 2022-01-01 RX ADMIN — Medication 50 MILLIGRAM(S): at 06:01

## 2022-01-01 RX ADMIN — POLYETHYLENE GLYCOL 3350 17 GRAM(S): 17 POWDER, FOR SOLUTION ORAL at 06:08

## 2022-01-01 RX ADMIN — Medication 400 MILLIGRAM(S): at 15:37

## 2022-01-01 RX ADMIN — Medication 81 MILLIGRAM(S): at 22:28

## 2022-01-01 RX ADMIN — HYDROMORPHONE HYDROCHLORIDE 0.5 MILLIGRAM(S): 2 INJECTION INTRAMUSCULAR; INTRAVENOUS; SUBCUTANEOUS at 08:17

## 2022-01-01 RX ADMIN — CHLORHEXIDINE GLUCONATE 1 APPLICATION(S): 213 SOLUTION TOPICAL at 12:09

## 2022-01-01 RX ADMIN — HYDROMORPHONE HYDROCHLORIDE 2.5 MILLIGRAM(S): 2 INJECTION INTRAMUSCULAR; INTRAVENOUS; SUBCUTANEOUS at 22:41

## 2022-01-01 RX ADMIN — HEPARIN SODIUM 5000 UNIT(S): 5000 INJECTION INTRAVENOUS; SUBCUTANEOUS at 07:03

## 2022-01-01 RX ADMIN — ISOSORBIDE MONONITRATE 60 MILLIGRAM(S): 60 TABLET, EXTENDED RELEASE ORAL at 11:26

## 2022-01-01 RX ADMIN — Medication 975 MILLIGRAM(S): at 21:00

## 2022-01-01 RX ADMIN — HEPARIN SODIUM 5000 UNIT(S): 5000 INJECTION INTRAVENOUS; SUBCUTANEOUS at 06:09

## 2022-01-01 RX ADMIN — ISOSORBIDE MONONITRATE 60 MILLIGRAM(S): 60 TABLET, EXTENDED RELEASE ORAL at 12:13

## 2022-01-01 RX ADMIN — LIDOCAINE 1 PATCH: 4 CREAM TOPICAL at 12:45

## 2022-01-01 RX ADMIN — POLYETHYLENE GLYCOL 3350 17 GRAM(S): 17 POWDER, FOR SOLUTION ORAL at 05:17

## 2022-01-01 RX ADMIN — CEFEPIME 100 MILLIGRAM(S): 1 INJECTION, POWDER, FOR SOLUTION INTRAMUSCULAR; INTRAVENOUS at 06:39

## 2022-01-01 RX ADMIN — SEVELAMER CARBONATE 800 MILLIGRAM(S): 2400 POWDER, FOR SUSPENSION ORAL at 22:26

## 2022-01-01 RX ADMIN — Medication 0.5 MILLIGRAM(S): at 19:54

## 2022-01-01 RX ADMIN — HYDROMORPHONE HYDROCHLORIDE 0.5 MILLIGRAM(S): 2 INJECTION INTRAMUSCULAR; INTRAVENOUS; SUBCUTANEOUS at 17:14

## 2022-01-01 RX ADMIN — SEVELAMER CARBONATE 800 MILLIGRAM(S): 2400 POWDER, FOR SUSPENSION ORAL at 13:57

## 2022-01-01 RX ADMIN — HEPARIN SODIUM 5000 UNIT(S): 5000 INJECTION INTRAVENOUS; SUBCUTANEOUS at 13:19

## 2022-01-01 RX ADMIN — Medication 400 MILLIGRAM(S): at 08:53

## 2022-01-01 RX ADMIN — Medication 100 MEQ/KG/HR: at 20:08

## 2022-01-01 RX ADMIN — POSACONAZOLE 300 MILLIGRAM(S): 100 TABLET, DELAYED RELEASE ORAL at 22:28

## 2022-01-01 RX ADMIN — LIDOCAINE 1 PATCH: 4 CREAM TOPICAL at 00:00

## 2022-01-01 RX ADMIN — CASPOFUNGIN ACETATE 260 MILLIGRAM(S): 7 INJECTION, POWDER, LYOPHILIZED, FOR SOLUTION INTRAVENOUS at 11:01

## 2022-01-01 RX ADMIN — HYDROXYUREA 1000 MILLIGRAM(S): 500 CAPSULE ORAL at 22:27

## 2022-01-01 RX ADMIN — Medication 15 MILLILITER(S): at 06:42

## 2022-01-01 RX ADMIN — RASBURICASE 3 MILLIGRAM(S): KIT at 14:34

## 2022-01-01 RX ADMIN — HYDROMORPHONE HYDROCHLORIDE 0.5 MILLIGRAM(S): 2 INJECTION INTRAMUSCULAR; INTRAVENOUS; SUBCUTANEOUS at 00:58

## 2022-01-01 RX ADMIN — HEPARIN SODIUM 5000 UNIT(S): 5000 INJECTION INTRAVENOUS; SUBCUTANEOUS at 21:47

## 2022-01-01 RX ADMIN — ISOSORBIDE MONONITRATE 60 MILLIGRAM(S): 60 TABLET, EXTENDED RELEASE ORAL at 11:40

## 2022-01-01 RX ADMIN — Medication 975 MILLIGRAM(S): at 16:44

## 2022-01-01 RX ADMIN — POLYETHYLENE GLYCOL 3350 17 GRAM(S): 17 POWDER, FOR SOLUTION ORAL at 17:16

## 2022-01-01 RX ADMIN — CHLORHEXIDINE GLUCONATE 1 APPLICATION(S): 213 SOLUTION TOPICAL at 11:59

## 2022-01-01 RX ADMIN — Medication 650 MILLIGRAM(S): at 22:00

## 2022-01-01 RX ADMIN — Medication 100 MEQ/KG/HR: at 15:27

## 2022-01-01 RX ADMIN — Medication 0.5 MILLIGRAM(S): at 08:18

## 2022-01-01 RX ADMIN — HEPARIN SODIUM 5000 UNIT(S): 5000 INJECTION INTRAVENOUS; SUBCUTANEOUS at 21:19

## 2022-01-01 RX ADMIN — HYDROMORPHONE HYDROCHLORIDE 0.2 MILLIGRAM(S): 2 INJECTION INTRAMUSCULAR; INTRAVENOUS; SUBCUTANEOUS at 10:50

## 2022-01-01 RX ADMIN — SEVELAMER CARBONATE 800 MILLIGRAM(S): 2400 POWDER, FOR SUSPENSION ORAL at 15:27

## 2022-01-01 RX ADMIN — LIDOCAINE 1 PATCH: 4 CREAM TOPICAL at 18:37

## 2022-01-01 RX ADMIN — RASBURICASE 104 MILLIGRAM(S): KIT at 13:59

## 2022-01-01 RX ADMIN — HEPARIN SODIUM 5000 UNIT(S): 5000 INJECTION INTRAVENOUS; SUBCUTANEOUS at 06:35

## 2022-01-01 RX ADMIN — HYDROMORPHONE HYDROCHLORIDE 0.5 MILLIGRAM(S): 2 INJECTION INTRAMUSCULAR; INTRAVENOUS; SUBCUTANEOUS at 09:41

## 2022-01-01 RX ADMIN — HYDROMORPHONE HYDROCHLORIDE 0.5 MILLIGRAM(S): 2 INJECTION INTRAMUSCULAR; INTRAVENOUS; SUBCUTANEOUS at 19:53

## 2022-01-01 RX ADMIN — HYDROXYUREA 1000 MILLIGRAM(S): 500 CAPSULE ORAL at 22:17

## 2022-01-01 RX ADMIN — HEPARIN SODIUM 5000 UNIT(S): 5000 INJECTION INTRAVENOUS; SUBCUTANEOUS at 13:57

## 2022-01-01 RX ADMIN — HYDROMORPHONE HYDROCHLORIDE 0.2 MILLIGRAM(S): 2 INJECTION INTRAMUSCULAR; INTRAVENOUS; SUBCUTANEOUS at 07:10

## 2022-01-01 RX ADMIN — HEPARIN SODIUM 5000 UNIT(S): 5000 INJECTION INTRAVENOUS; SUBCUTANEOUS at 13:08

## 2022-01-01 RX ADMIN — Medication 50 MILLIGRAM(S): at 09:37

## 2022-01-01 RX ADMIN — HYDROXYUREA 1000 MILLIGRAM(S): 500 CAPSULE ORAL at 09:29

## 2022-01-01 RX ADMIN — LIDOCAINE 1 PATCH: 4 CREAM TOPICAL at 11:28

## 2022-01-01 RX ADMIN — HEPARIN SODIUM 5000 UNIT(S): 5000 INJECTION INTRAVENOUS; SUBCUTANEOUS at 06:38

## 2022-01-01 RX ADMIN — Medication 15 MILLILITER(S): at 12:36

## 2022-01-01 RX ADMIN — SODIUM CHLORIDE 100 MILLILITER(S): 9 INJECTION INTRAMUSCULAR; INTRAVENOUS; SUBCUTANEOUS at 22:29

## 2022-01-01 RX ADMIN — Medication 81 MILLIGRAM(S): at 12:13

## 2022-01-01 RX ADMIN — HYDROMORPHONE HYDROCHLORIDE 0.2 MILLIGRAM(S): 2 INJECTION INTRAMUSCULAR; INTRAVENOUS; SUBCUTANEOUS at 16:51

## 2022-01-01 RX ADMIN — CHLORHEXIDINE GLUCONATE 1 APPLICATION(S): 213 SOLUTION TOPICAL at 11:27

## 2022-01-01 RX ADMIN — Medication 400 MILLIGRAM(S): at 10:17

## 2022-01-01 RX ADMIN — Medication 15 MILLILITER(S): at 17:36

## 2022-01-01 RX ADMIN — ALTEPLASE 2 MILLIGRAM(S): KIT at 21:00

## 2022-01-01 RX ADMIN — LIDOCAINE 1 PATCH: 4 CREAM TOPICAL at 19:45

## 2022-01-01 RX ADMIN — SEVELAMER CARBONATE 800 MILLIGRAM(S): 2400 POWDER, FOR SUSPENSION ORAL at 21:19

## 2022-01-01 RX ADMIN — Medication 100 GRAM(S): at 08:51

## 2022-01-01 RX ADMIN — SODIUM CHLORIDE 100 MILLILITER(S): 9 INJECTION INTRAMUSCULAR; INTRAVENOUS; SUBCUTANEOUS at 11:37

## 2022-01-01 RX ADMIN — POSACONAZOLE 300 MILLIGRAM(S): 100 TABLET, DELAYED RELEASE ORAL at 11:38

## 2022-01-01 RX ADMIN — HYDROXYUREA 1000 MILLIGRAM(S): 500 CAPSULE ORAL at 21:47

## 2022-01-01 RX ADMIN — CEFEPIME 100 MILLIGRAM(S): 1 INJECTION, POWDER, FOR SOLUTION INTRAMUSCULAR; INTRAVENOUS at 05:17

## 2022-01-01 RX ADMIN — ISOSORBIDE MONONITRATE 60 MILLIGRAM(S): 60 TABLET, EXTENDED RELEASE ORAL at 11:29

## 2022-01-01 RX ADMIN — SEVELAMER CARBONATE 800 MILLIGRAM(S): 2400 POWDER, FOR SUSPENSION ORAL at 09:37

## 2022-01-01 RX ADMIN — LIDOCAINE 1 PATCH: 4 CREAM TOPICAL at 23:49

## 2022-01-01 RX ADMIN — HYDROMORPHONE HYDROCHLORIDE 0.2 MILLIGRAM(S): 2 INJECTION INTRAMUSCULAR; INTRAVENOUS; SUBCUTANEOUS at 11:05

## 2022-01-01 RX ADMIN — HYDROXYUREA 1000 MILLIGRAM(S): 500 CAPSULE ORAL at 22:26

## 2022-01-01 RX ADMIN — LIDOCAINE 1 PATCH: 4 CREAM TOPICAL at 12:37

## 2022-01-01 RX ADMIN — Medication 81 MILLIGRAM(S): at 11:26

## 2022-01-01 RX ADMIN — Medication 650 MILLIGRAM(S): at 09:28

## 2022-01-01 RX ADMIN — ISOSORBIDE MONONITRATE 60 MILLIGRAM(S): 60 TABLET, EXTENDED RELEASE ORAL at 22:28

## 2022-01-01 RX ADMIN — HYDROXYUREA 1000 MILLIGRAM(S): 500 CAPSULE ORAL at 11:25

## 2022-01-01 RX ADMIN — LIDOCAINE 1 PATCH: 4 CREAM TOPICAL at 00:18

## 2022-01-01 RX ADMIN — LIDOCAINE 1 PATCH: 4 CREAM TOPICAL at 13:08

## 2022-01-01 RX ADMIN — HYDROXYUREA 1000 MILLIGRAM(S): 500 CAPSULE ORAL at 10:05

## 2022-01-01 RX ADMIN — FINASTERIDE 5 MILLIGRAM(S): 5 TABLET, FILM COATED ORAL at 12:13

## 2022-01-01 RX ADMIN — ROBINUL 0.4 MILLIGRAM(S): 0.2 INJECTION INTRAMUSCULAR; INTRAVENOUS at 08:17

## 2022-01-01 RX ADMIN — HYDROMORPHONE HYDROCHLORIDE 0.5 MILLIGRAM(S): 2 INJECTION INTRAMUSCULAR; INTRAVENOUS; SUBCUTANEOUS at 11:12

## 2022-01-01 RX ADMIN — Medication 0.5 MILLIGRAM(S): at 00:59

## 2022-01-01 RX ADMIN — HEPARIN SODIUM 5000 UNIT(S): 5000 INJECTION INTRAVENOUS; SUBCUTANEOUS at 22:26

## 2022-01-01 RX ADMIN — SEVELAMER CARBONATE 800 MILLIGRAM(S): 2400 POWDER, FOR SUSPENSION ORAL at 06:09

## 2022-01-01 RX ADMIN — HEPARIN SODIUM 5000 UNIT(S): 5000 INJECTION INTRAVENOUS; SUBCUTANEOUS at 22:16

## 2022-01-01 RX ADMIN — HYDROMORPHONE HYDROCHLORIDE 1 MILLIGRAM(S): 2 INJECTION INTRAMUSCULAR; INTRAVENOUS; SUBCUTANEOUS at 09:34

## 2022-01-01 RX ADMIN — Medication 50 MILLIGRAM(S): at 06:35

## 2022-01-01 RX ADMIN — LIDOCAINE 1 PATCH: 4 CREAM TOPICAL at 23:14

## 2022-01-01 RX ADMIN — Medication 1000 MILLIGRAM(S): at 16:30

## 2022-01-01 RX ADMIN — Medication 0.5 MILLIGRAM(S): at 17:27

## 2022-01-01 RX ADMIN — HYDROMORPHONE HYDROCHLORIDE 0.2 MILLIGRAM(S): 2 INJECTION INTRAMUSCULAR; INTRAVENOUS; SUBCUTANEOUS at 06:45

## 2022-01-01 RX ADMIN — RASBURICASE 104 MILLIGRAM(S): KIT at 14:10

## 2022-01-01 RX ADMIN — Medication 15 MILLILITER(S): at 23:14

## 2022-01-01 RX ADMIN — LIDOCAINE 1 PATCH: 4 CREAM TOPICAL at 11:12

## 2022-01-01 RX ADMIN — ROBINUL 0.4 MILLIGRAM(S): 0.2 INJECTION INTRAMUSCULAR; INTRAVENOUS at 14:31

## 2022-01-01 RX ADMIN — LIDOCAINE 1 PATCH: 4 CREAM TOPICAL at 23:32

## 2022-01-01 RX ADMIN — LIDOCAINE 1 PATCH: 4 CREAM TOPICAL at 19:50

## 2022-01-01 RX ADMIN — Medication 0.25 MILLIGRAM(S): at 10:58

## 2022-01-01 RX ADMIN — CASPOFUNGIN ACETATE 260 MILLIGRAM(S): 7 INJECTION, POWDER, LYOPHILIZED, FOR SOLUTION INTRAVENOUS at 10:54

## 2022-01-01 RX ADMIN — LIDOCAINE 1 PATCH: 4 CREAM TOPICAL at 11:59

## 2022-01-01 RX ADMIN — HYDROXYUREA 1000 MILLIGRAM(S): 500 CAPSULE ORAL at 21:30

## 2022-01-01 RX ADMIN — Medication 80 MILLIGRAM(S): at 10:05

## 2022-01-01 RX ADMIN — SEVELAMER CARBONATE 800 MILLIGRAM(S): 2400 POWDER, FOR SUSPENSION ORAL at 06:38

## 2022-01-01 RX ADMIN — FINASTERIDE 5 MILLIGRAM(S): 5 TABLET, FILM COATED ORAL at 11:37

## 2022-01-01 RX ADMIN — LIDOCAINE 1 PATCH: 4 CREAM TOPICAL at 12:19

## 2022-01-01 RX ADMIN — LIDOCAINE 1 PATCH: 4 CREAM TOPICAL at 00:05

## 2022-01-01 RX ADMIN — SEVELAMER CARBONATE 800 MILLIGRAM(S): 2400 POWDER, FOR SUSPENSION ORAL at 13:42

## 2022-01-01 RX ADMIN — POLYETHYLENE GLYCOL 3350 17 GRAM(S): 17 POWDER, FOR SOLUTION ORAL at 18:55

## 2022-01-01 RX ADMIN — Medication 15 MILLILITER(S): at 17:46

## 2022-01-01 RX ADMIN — Medication 50 MILLIGRAM(S): at 05:36

## 2022-01-01 RX ADMIN — Medication 81 MILLIGRAM(S): at 11:38

## 2022-01-01 RX ADMIN — LIDOCAINE 1 PATCH: 4 CREAM TOPICAL at 11:25

## 2022-01-01 RX ADMIN — FINASTERIDE 5 MILLIGRAM(S): 5 TABLET, FILM COATED ORAL at 12:38

## 2022-01-01 RX ADMIN — FINASTERIDE 5 MILLIGRAM(S): 5 TABLET, FILM COATED ORAL at 22:28

## 2022-01-01 RX ADMIN — Medication 50 MILLIGRAM(S): at 06:38

## 2022-01-01 RX ADMIN — Medication 650 MILLIGRAM(S): at 20:26

## 2022-01-01 RX ADMIN — Medication 0.25 MILLIGRAM(S): at 16:20

## 2022-01-01 RX ADMIN — Medication 15 MILLILITER(S): at 11:29

## 2022-01-01 RX ADMIN — HEPARIN SODIUM 5000 UNIT(S): 5000 INJECTION INTRAVENOUS; SUBCUTANEOUS at 05:32

## 2022-01-01 RX ADMIN — Medication 15 MILLILITER(S): at 17:14

## 2022-01-01 RX ADMIN — Medication 20 MILLIEQUIVALENT(S): at 08:45

## 2022-01-01 RX ADMIN — LACTULOSE 20 GRAM(S): 10 SOLUTION ORAL at 12:14

## 2022-01-01 RX ADMIN — LIDOCAINE 1 PATCH: 4 CREAM TOPICAL at 19:47

## 2022-01-01 RX ADMIN — Medication 975 MILLIGRAM(S): at 15:27

## 2022-01-01 RX ADMIN — SEVELAMER CARBONATE 800 MILLIGRAM(S): 2400 POWDER, FOR SUSPENSION ORAL at 06:35

## 2022-01-01 RX ADMIN — HYDROXYUREA 1000 MILLIGRAM(S): 500 CAPSULE ORAL at 09:37

## 2022-01-01 RX ADMIN — HYDROMORPHONE HYDROCHLORIDE 0.5 MILLIGRAM(S): 2 INJECTION INTRAMUSCULAR; INTRAVENOUS; SUBCUTANEOUS at 09:26

## 2022-01-01 RX ADMIN — SEVELAMER CARBONATE 800 MILLIGRAM(S): 2400 POWDER, FOR SUSPENSION ORAL at 21:47

## 2022-01-01 RX ADMIN — CHLORHEXIDINE GLUCONATE 1 APPLICATION(S): 213 SOLUTION TOPICAL at 12:36

## 2022-01-01 RX ADMIN — POSACONAZOLE 300 MILLIGRAM(S): 100 TABLET, DELAYED RELEASE ORAL at 11:27

## 2022-01-01 RX ADMIN — SEVELAMER CARBONATE 800 MILLIGRAM(S): 2400 POWDER, FOR SUSPENSION ORAL at 21:30

## 2022-01-01 RX ADMIN — HEPARIN SODIUM 5000 UNIT(S): 5000 INJECTION INTRAVENOUS; SUBCUTANEOUS at 22:28

## 2022-01-01 RX ADMIN — Medication 1000 MILLIGRAM(S): at 09:37

## 2022-01-01 RX ADMIN — CASPOFUNGIN ACETATE 260 MILLIGRAM(S): 7 INJECTION, POWDER, LYOPHILIZED, FOR SOLUTION INTRAVENOUS at 12:58

## 2022-01-01 RX ADMIN — HYDROXYUREA 1000 MILLIGRAM(S): 500 CAPSULE ORAL at 21:20

## 2022-01-01 RX ADMIN — LIDOCAINE 1 PATCH: 4 CREAM TOPICAL at 20:03

## 2022-01-01 RX ADMIN — Medication 975 MILLIGRAM(S): at 21:45

## 2022-01-01 RX ADMIN — CEFEPIME 100 MILLIGRAM(S): 1 INJECTION, POWDER, FOR SOLUTION INTRAMUSCULAR; INTRAVENOUS at 09:45

## 2022-01-01 RX ADMIN — LIDOCAINE 1 PATCH: 4 CREAM TOPICAL at 11:39

## 2022-01-01 RX ADMIN — ROBINUL 0.4 MILLIGRAM(S): 0.2 INJECTION INTRAMUSCULAR; INTRAVENOUS at 17:27

## 2022-01-01 RX ADMIN — HYDROMORPHONE HYDROCHLORIDE 0.5 MILLIGRAM(S): 2 INJECTION INTRAMUSCULAR; INTRAVENOUS; SUBCUTANEOUS at 17:52

## 2022-01-01 RX ADMIN — Medication 650 MILLIGRAM(S): at 10:10

## 2022-01-03 NOTE — PHYSICAL EXAM
[Normal] : affect appropriate [de-identified] : in wheelchair [de-identified] : edema in feet and ankles much improved
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
## 2022-02-28 NOTE — PHYSICAL EXAM
[Normal] : affect appropriate [de-identified] : in wheelchair [de-identified] : edema in feet and ankles much improved
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
## 2022-03-30 NOTE — PHYSICAL EXAM
[Normal] : affect appropriate [de-identified] : in wheelchair [de-identified] : edema in feet and ankles much improved

## 2022-04-13 NOTE — H&P ADULT - PROBLEM SELECTOR PROBLEM 8
Impression: Age-related reticular degeneration of retina, bilateral: H35.443. Plan: Will continue to observe. Need for prophylactic measure CKD (chronic kidney disease), stage III

## 2022-04-25 NOTE — PHYSICAL EXAM
[Normal] : affect appropriate [de-identified] : in wheelchair [de-identified] : edema in feet and ankles much improved
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
## 2022-05-23 PROBLEM — C92.00 ACUTE MYELOID LEUKEMIA NOT HAVING ACHIEVED REMISSION: Status: ACTIVE | Noted: 2021-01-01
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
## 2022-05-23 NOTE — ED CLERICAL - NS ED CLERK NOTE PRE-ARRIVAL INFORMATION; ADDITIONAL PRE-ARRIVAL INFORMATION
CC/Reason For referral: r/o leukemia relapse, AML, WHITE COUNT 96  Preferred Consultant(if applicable):  Who admits for you (if needed):  Do you have documents you would like to fax over?  Would you still like to speak to an ED attending? YES

## 2022-05-23 NOTE — H&P ADULT - PROBLEM SELECTOR PLAN 3
- f/u BNP  - judicious IVF hydration  - c/w Home metoprolol 50mg daily  - if Short of breath, Furosemide IV 40mg   - strict I's/ O's IVF  monitor BMP  check PTH

## 2022-05-23 NOTE — CONSULT NOTE ADULT - PROBLEM SELECTOR RECOMMENDATION 2
Pt. with hypercalcemia of unclear etiology. Check serum PTH levels. IVF as outlined above. Monitor serum Calcium level.     If you have any questions, please feel free to contact me  Quique Barney  Nephrology Fellow  [phone]/ [employer] Teams(Preferred)  (After 5pm or on weekends please page the on-call fellow). Pt. with hypercalcemia of unclear etiology. Check serum PTH levels. IVF as outlined above. Monitor serum Calcium level.     D/w Attending on call.     If you have any questions, please feel free to contact me  Quique Barney  Nephrology Fellow  [phone]/ [employer] Teams(Preferred)  (After 5pm or on weekends please page the on-call fellow).

## 2022-05-23 NOTE — ED PROVIDER NOTE - ATTENDING CONTRIBUTION TO CARE
I, Jeramie Levy, performed a history and physical exam of the patient and discussed their management with the resident and /or advanced care provider. I reviewed the resident and /or ACP's note and agree with the documented findings and plan of care. I was present and available for all procedures.  Patient sent the ED by oncologist for likely recurring AML. Patient to be evaluated with labs and discuss with heme/onc. Likely admission for further medical management. Patient stable in the ED.
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
## 2022-05-23 NOTE — CONSULT NOTE ADULT - PROBLEM SELECTOR RECOMMENDATION 9
Pt. with BRISA onC KD stage 4 in the setting of poor oral intake and ? TLS. On review of [facility], it was noted that Scr was 2.37 on 3/30/22. Scr elevated to 3.41 on admission. labs also showed UA of 10.9, serum Ca of 11.8 with LDH of 1147. Currently receiving IVF and rasburicase 3 mg once in ER. Check UA, kidney sonogram and spot urine TP/CR. Check  TLS labs - BMP, serum UA, and LDH q6-8 hrly.    Recommend increasing IVF , NS @ 100cc/hr. Hem onc notes reviewed. Labs reviewed. Monitor labs and urine output. Avoid any potential nephrotoxins. Dose medications as per eGFR. Pt. with BRISA onC KD stage 4 in the setting of poor oral intake and ? TLS. On review of [facility], it was noted that Scr was 2.37 on 3/30/22. Scr elevated to 3.41 on admission. labs also showed UA of 10.9, serum Ca of 11.8 with LDH of 1147. Currently receiving IVF and rasburicase 3 mg once in ER. Check UA, kidney sonogram and spot urine TP/CR. Check  TLS labs - BMP, serum UA, and LDH q6-8 hrly.    Recommend increasing IVF , NS @ 100cc/hr. Hem onc notes reviewed. Agree with rasburicase. Hold off decitabine 2/2 BRISA. Labs reviewed. Monitor labs and urine output. Avoid any potential nephrotoxins. Dose medications as per eGFR.

## 2022-05-23 NOTE — ED PROVIDER NOTE - OBJECTIVE STATEMENT
82 y/o M, PMH of CKD, CAD s/p CABG 2012 & 4 stents (2 in 2014, 2 in 2015), CHF with BiV ICD (2013), AML (on chemo), prior CVA (mild R sided weakness), HTN, and HLD, sent to ED from [facility] for elevated WBC (96) and 4% blast on outpt labs, c/f leukemia relapse. Per son, pt has been having increased weakness/lethargy and myalgias x 2 days. Denies f/c, CP, SOB, abd pain, n/v/d, urinary sx, LE edema, numbness.    Heme/Onc: Dr. Kenyatta Wise ([facility])

## 2022-05-23 NOTE — H&P ADULT - PROBLEM SELECTOR PLAN 1
AML diagnosed 6/2021, in remission with current blast crisis  Blasts 4%-22%   wbc >100k   Plan:  - c/w IV hydration 100cc/hr NS 16hrs  - TLS labs, LDH, Uric acid Q8  - CBC w/ diff daily  - Peripheral smear  - f/u Heme onc reccs

## 2022-05-23 NOTE — H&P ADULT - PROBLEM SELECTOR PLAN 4
2/2 Chronic Rt shoulder pain s/p CVA w/t RUE weakness  - Lidocaine patch to affected area - f/u BNP  - judicious IVF hydration  - c/w Home metoprolol 50mg daily  - if Short of breath, Furosemide IV 40mg   - strict I's/ O's

## 2022-05-23 NOTE — ED ADULT NURSE NOTE - OBJECTIVE STATEMENT
Patient is alert and oriented x3. Color is good and skin warm tot ouch. He is c/o weakness and dry cough. He  is sent here for elevated WBC.

## 2022-05-23 NOTE — H&P ADULT - PROBLEM SELECTOR PLAN 5
#Hx CAD s/p CABG, PCI   #Hx CVA w/ RUE deficits   - c/w Aspirin 81  Diet: Regular diet  Dispo: PEnding clinical Improvement  DNR/DNI confirmed. MOLST in chart. Continue all treatment measures otherwise. #Hx CAD s/p CABG, PCI   #Hx CVA w/ RUE deficits   - c/w Aspirin 81  Diet: Regular diet  Dispo: Pending clinical Improvement  DVT prophylaxis: HEparin subc 5000   DNR/DNI confirmed. MOLST in chart. Continue all treatment measures otherwise. 2/2 Chronic Rt shoulder pain s/p CVA w/t RUE weakness  - Lidocaine patch to affected area

## 2022-05-23 NOTE — H&P ADULT - NSHPPHYSICALEXAM_GEN_ALL_CORE
Vital Signs Last 24 Hrs  T(C): 36.7 (23 May 2022 14:05), Max: 36.7 (23 May 2022 08:48)  T(F): 98 (23 May 2022 14:05), Max: 98 (23 May 2022 08:48)  HR: 92 (23 May 2022 14:05) (86 - 92)  BP: 147/63 (23 May 2022 14:05) (128/70 - 147/63)  BP(mean): --  RR: 19 (23 May 2022 14:05) (18 - 21)  SpO2: 98% (23 May 2022 14:05) (96% - 98%)    PHYSICAL EXAM:  GENERAL: NAD, lying in bed supine comfortably, Rt PIC, (recently treated for clotting as an outpatient)  HEENT: NC/AT, EOMI, PERRL  NECK: Supple, No JVD  CHEST/LUNG: CTAB, no increased WOB  HEART: RRR, no m/r/g  ABDOMEN: +dullness to percussion LT side to the midline, Spleen palpable. +midline laparotomy scar, No CVA tenderness, soft, NT, ND, BS+  EXTREMITIES:  2+ peripheral pulses, no clubbing, no edema  NERVOUS SYSTEM:  A&Ox2, primarily Fijian speaking, RUE weakness s/p mild CVA (2009)  MSK: +3/5 strength LE's, equally bilaterally. FROM all 4 extremities, full and equal strength  SKIN: No rashes or lesions

## 2022-05-23 NOTE — PHYSICAL EXAM
[Normal] : affect appropriate [de-identified] : in wheelchair [de-identified] : edema in feet and ankles much improved

## 2022-05-23 NOTE — H&P ADULT - NSHPLABSRESULTS_GEN_ALL_CORE
12.1   104.44 )-----------( 189      ( 23 May 2022 12:12 )             35.7       05-23    136  |  97  |  21  ----------------------------<  139<H>  4.6   |  22  |  3.41<H>    Ca    11.8<H>      23 May 2022 12:12    TPro  7.5  /  Alb  4.0  /  TBili  0.2  /  DBili  x   /  AST  41<H>  /  ALT  13  /  AlkPhos  109  05-23                  PT/INR - ( 23 May 2022 12:12 )   PT: 15.3 sec;   INR: 1.32 ratio         PTT - ( 23 May 2022 12:12 )  PTT:28.6 sec    Lactate Trend            CAPILLARY BLOOD GLUCOSE            EXAM: [id number] - XR CHEST PA LAT 2V - ORDERED BY: RAVI SMITH      PROCEDURE DATE: 04/05/2022        INTERPRETATION: INDICATION: Evaluate PICC line placement.    COMPARISON: 09/03/2021.    FINDINGS:  There is a left-sided PICC line with tip overlying the SVC. A triple lead pacemaker is identified.  Heart/Vascular: Magnified cardiac silhouette. No pulmonary venous congestion.  Pulmonary: Small bilateral pleural effusions.  Bones: Sternotomy.    Impression:  Tip of the left-sided PICC line overlies the SVC.  Small bilateral pleural effusions.  Pacemaker.        --- End of Report ---
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
## 2022-05-23 NOTE — H&P ADULT - NSHPREVIEWOFSYSTEMS_GEN_ALL_CORE
REVIEW OF SYSTEMS:    CONSTITUTIONAL: +fatigue, malaise, weakness. No fevers or chills  EYES/ENT: No visual changes;  No vertigo or throat pain   NECK: No pain or stiffness  RESPIRATORY: No cough, wheezing, hemoptysis; No shortness of breath  CARDIOVASCULAR: No chest pain or palpitations  GASTROINTESTINAL: No abdominal or epigastric pain. No nausea, vomiting, or hematemesis; No diarrhea or constipation. No melena or hematochezia.  GENITOURINARY: No dysuria, frequency or hematuria. No pyuria  NEUROLOGICAL: +Chronic Rt shoulder pain. No numbness or weakness  SKIN: No itching, rashes REVIEW OF SYSTEMS:    CONSTITUTIONAL: +fatigue, malaise, weakness. No fevers or chills  EYES/ENT: No visual changes;  No vertigo or throat pain   NECK: No pain or stiffness  RESPIRATORY: No cough, wheezing, hemoptysis; No shortness of breath  CARDIOVASCULAR: No chest pain or palpitations  GASTROINTESTINAL: No abdominal or epigastric pain. No nausea, vomiting, or hematemesis; No diarrhea or constipation. No melena or hematochezia.  GENITOURINARY: No dysuria, frequency or hematuria. No pyuria  NEUROLOGICAL: +Chronic Rt shoulder pain. No numbness or weakness.  Haem- No bleeding diathesis  Endo- No polyuria, No polydipsia   SKIN: No itching, rashes
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
## 2022-05-23 NOTE — ED ADULT NURSE NOTE - CAS DISCH BELONGINGS RETURNED
Pt bladder scanned for 281cc, was unable to void on the bedpan, got her up with assist of 2 and she voided 220cc clear yellow urine Not applicable
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
## 2022-05-23 NOTE — ED PROVIDER NOTE - NS ED ROS FT
Gen: Denies fever, weight loss; +generalized weakness, +lethargy  HEENT: Denies vision changes, ear pain, epistaxis, sore throat  CV: Denies chest pain, palpitations  Skin: Denies rash, erythema, color changes  Resp: Denies SOB, cough  Endo: Denies sensitivity to heat, cold, increased urination  GI: Denies abdominal pain, constipation, nausea, vomiting, diarrhea  Msk: Denies back pain, LE swelling, extremity pain; +myalgias  : Denies dysuria, increased frequency  Neuro: Denies LOC, weakness, numbness, tingling  Psych: Denies hx of psych, hallucinations  ROS statement: all other ROS negative except as per HPI

## 2022-05-23 NOTE — ED ADULT NURSE NOTE - ED CARDIAC RATE
Allergies:  No Known Allergies      Diet: Regular    Activity: as tolerated    Follow up with    1. PMD in 2 weeks    2. Psych in 2 weeks    3.    Follow up for abnormal labs/tests    1.    Extra Instructions:      Flu Vaccine given  Yes_____         No______      Diagnosis:  Chemical Dependency   Maintain sobriety  refrain from all use      Patient Signature___________________________________________  Date_________________      Nurse Signature_____________________________________________Date_________________ Ot wants to leave AMA. No S/H ideation, A&O x3, no psych consult. Pt understands all risks    AMA        Allergies:  No Known Allergies      Diet: Regular    Activity: as tolerated    Follow up with    1. PMD in 2 weeks    2. Psych in 2 weeks    3.    Follow up for abnormal labs/tests    1.    Extra Instructions:      Flu Vaccine given  Yes_____         No______      Diagnosis:  Chemical Dependency   Maintain sobriety  refrain from all use      Patient Signature___________________________________________  Date_________________      Nurse Signature_____________________________________________Date_________________ normal
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
## 2022-05-23 NOTE — H&P ADULT - NSHPSOCIALHISTORY_GEN_ALL_CORE
Retired   Lives at home with wife and son  Ambulates and completes ADL's at home independently at baseline  Former smoker of 30 pack years, quit 1989  Denies alcohol or recreational drug use

## 2022-05-23 NOTE — H&P ADULT - PROBLEM SELECTOR PLAN 2
SCr 3.41  Baseline SCr  Plan:   - c/w IV hydration, monitor Volume status  - urine lytes- osm, Na, Cl, urea, total Protein/Cr  - urinalysis  - kidney bladder U/S  - renal Doppler U/S i/s/o   - strict I's/ O's  - keep K>4, Mg> 2  - f/u Nephro Reccs    #Hypercalcemia  - f/u ionized CA  - PTH  - c/w IVF

## 2022-05-23 NOTE — H&P ADULT - PROBLEM SELECTOR PLAN 6
#Hx CAD s/p CABG, PCI   #Hx CVA w/ RUE deficits   - c/w Aspirin 81  Diet: Regular diet  Dispo: Pending clinical Improvement  DVT prophylaxis: HEparin subc 5000   DNR/DNI confirmed. MOLST in chart. Continue all treatment measures otherwise.

## 2022-05-23 NOTE — ED PROVIDER NOTE - CLINICAL SUMMARY MEDICAL DECISION MAKING FREE TEXT BOX
82 y/o M, PMH of CKD, CAD s/p CABG 2012 & 4 stents (2 in 2014, 2 in 2015), CHF with BiV ICD (2013), AML (on chemo), prior CVA (mild R sided weakness), HTN, and HLD, sent to ED from [facility] for elevated WBC (96) and 4% blast on outpt labs, c/f leukemia relapse. Endorses weakness/lethargy and myalgias x 2 days.  VSS. Physical exam unremarkable.  Plan to check basic labs, electrolytes, coagulation panel. Discuss case w/ hematology. Likely medicine admission.

## 2022-05-24 NOTE — PROGRESS NOTE ADULT - PROBLEM SELECTOR PLAN 4
- f/u BNP  - judicious IVF hydration  - c/w Home metoprolol 50mg daily  - if Short of breath, Furosemide IV 40mg   - strict I's/ O's

## 2022-05-24 NOTE — PROGRESS NOTE ADULT - SUBJECTIVE AND OBJECTIVE BOX
Scarlet Thompson MD  Internal Medicine PGY1  [facility]: [id number]/ NS: [phone]    DATE OF SERVICE: 22 @ 09:15    Patient is a 81y old  Male who presents with a chief complaint of Blast crisis on outpatient labs. (23 May 2022 18:17)      SUBJECTIVE / OVERNIGHT EVENTS:    MEDICATIONS  (STANDING):  aspirin enteric coated 81 milliGRAM(s) Oral daily  finasteride 5 milliGRAM(s) Oral daily  heparin   Injectable 5000 Unit(s) SubCutaneous every 8 hours  heparin  Lock Flush 100 Units/mL Injectable 500 Unit(s) IV Push once  hydroxyurea 1000 milliGRAM(s) Oral every 12 hours  isosorbide   mononitrate ER Tablet (IMDUR) 60 milliGRAM(s) Oral daily  levoFLOXacin  Tablet 250 milliGRAM(s) Oral every 48 hours  lidocaine 2% Injectable 20 milliLiter(s) Local Injection once  metoprolol succinate ER 50 milliGRAM(s) Oral daily  posaconazole DR Tablet 300 milliGRAM(s) Oral daily  sodium chloride 0.9%. 1000 milliLiter(s) (100 mL/Hr) IV Continuous <Continuous>    MEDICATIONS  (PRN):      Vital Signs Last 24 Hrs  T(C): 37.3 (23 May 2022 22:26), Max: 37.3 (23 May 2022 22:26)  T(F): 99.1 (23 May 2022 22:26), Max: 99.1 (23 May 2022 22:26)  HR: 88 (24 May 2022 05:18) (86 - 96)  BP: 137/68 (24 May 2022 05:18) (137/66 - 160/73)  BP(mean): --  RR: 18 (24 May 2022 05:18) (17 - 21)  SpO2: 97% (24 May 2022 05:18) (95% - 98%)  CAPILLARY BLOOD GLUCOSE        I&O's Summary    23 May 2022 07:01  -  24 May 2022 07:00  --------------------------------------------------------  IN: 0 mL / OUT: 300 mL / NET: -300 mL      PHYSICAL EXAM:  GENERAL: NAD, lying in bed supine comfortably, Rt PIC, (recently treated for clotting as an outpatient)  HEENT: NC/AT, EOMI, PERRL  NECK: Supple, No JVD  CHEST/LUNG: CTAB, no increased WOB  HEART: RRR, no m/r/g  ABDOMEN: +dullness to percussion LT side to the midline, Spleen palpable. +midline laparotomy scar, No CVA tenderness, soft, NT, ND, BS+  EXTREMITIES:  2+ peripheral pulses, no clubbing, no edema  NERVOUS SYSTEM:  A&Ox2, primarily Hungarian speaking, RUE weakness s/p mild CVA ()  MSK: +3/5 strength LE's, equally bilaterally. FROM all 4 extremities, full and equal strength  SKIN: No rashes or lesions    LABS:                        11.6   102.90 )-----------( 177      ( 24 May 2022 06:54 )             33.7     05-24    136  |  98  |  30<H>  ----------------------------<  121<H>  4.6   |  24  |  3.48<H>    Ca    11.3<H>      24 May 2022 06:54  Phos  2.9     05-24  Mg     1.8     05-24    TPro  6.8  /  Alb  3.4  /  TBili  0.2  /  DBili  x   /  AST  35  /  ALT  14  /  AlkPhos  94  05-24    PT/INR - ( 23 May 2022 12:12 )   PT: 15.3 sec;   INR: 1.32 ratio         PTT - ( 23 May 2022 12:12 )  PTT:28.6 sec      Urinalysis Basic - ( 24 May 2022 04:24 )    Color: Light Yellow / Appearance: Clear / S.016 / pH: x  Gluc: x / Ketone: Negative  / Bili: Negative / Urobili: Negative   Blood: x / Protein: 100 / Nitrite: Negative   Leuk Esterase: Negative / RBC: 2 /hpf / WBC 4 /HPF   Sq Epi: x / Non Sq Epi: 5 /hpf / Bacteria: Negative        RADIOLOGY & ADDITIONAL TESTS:    Imaging Personally Reviewed:    Consultant(s) Notes Reviewed:      Care Discussed with Consultants/Other Providers:   Scarlet Thompson MD  Internal Medicine PGY1  [facility]: [id number]/ NS: [phone]    DATE OF SERVICE: 22 @ 09:15    Patient is a 81y old  Male who presents with a chief complaint of Blast crisis on outpatient labs. (23 May 2022 18:17)      SUBJECTIVE / OVERNIGHT EVENTS: Feels tired  and weak. afebrile     MEDICATIONS  (STANDING):  aspirin enteric coated 81 milliGRAM(s) Oral daily  finasteride 5 milliGRAM(s) Oral daily  heparin   Injectable 5000 Unit(s) SubCutaneous every 8 hours  heparin  Lock Flush 100 Units/mL Injectable 500 Unit(s) IV Push once  hydroxyurea 1000 milliGRAM(s) Oral every 12 hours  isosorbide   mononitrate ER Tablet (IMDUR) 60 milliGRAM(s) Oral daily  levoFLOXacin  Tablet 250 milliGRAM(s) Oral every 48 hours  lidocaine 2% Injectable 20 milliLiter(s) Local Injection once  metoprolol succinate ER 50 milliGRAM(s) Oral daily  posaconazole DR Tablet 300 milliGRAM(s) Oral daily  sodium chloride 0.9%. 1000 milliLiter(s) (100 mL/Hr) IV Continuous <Continuous>    MEDICATIONS  (PRN):      Vital Signs Last 24 Hrs  T(C): 37.3 (23 May 2022 22:26), Max: 37.3 (23 May 2022 22:26)  T(F): 99.1 (23 May 2022 22:26), Max: 99.1 (23 May 2022 22:26)  HR: 88 (24 May 2022 05:18) (86 - 96)  BP: 137/68 (24 May 2022 05:18) (137/66 - 160/73)  BP(mean): --  RR: 18 (24 May 2022 05:18) (17 - 21)  SpO2: 97% (24 May 2022 05:18) (95% - 98%)  CAPILLARY BLOOD GLUCOSE        I&O's Summary    23 May 2022 07:01  -  24 May 2022 07:00  --------------------------------------------------------  IN: 0 mL / OUT: 300 mL / NET: -300 mL      PHYSICAL EXAM:  GENERAL: NAD, lying in bed supine comfortably, Rt PIC, (recently treated for clotting as an outpatient)  HEENT: NC/AT, EOMI, PERRL  NECK: Supple, No JVD  CHEST/LUNG: CTAB, no increased WOB  HEART: RRR, no m/r/g  ABDOMEN: +dullness to percussion LT side to the midline, Spleen palpable. +midline laparotomy scar, No CVA tenderness, soft, NT, ND, BS+  EXTREMITIES:  2+ peripheral pulses, no clubbing, no edema  NERVOUS SYSTEM:  A&Ox2, primarily Greenlandic speaking, RUE weakness s/p mild CVA ()  MSK: +3/5 strength LE's, equally bilaterally. FROM all 4 extremities, full and equal strength  SKIN: No rashes or lesions    LABS:                        11.6   102.90 )-----------( 177      ( 24 May 2022 06:54 )             33.7     05-    136  |  98  |  30<H>  ----------------------------<  121<H>  4.6   |  24  |  3.48<H>    Ca    11.3<H>      24 May 2022 06:54  Phos  2.9     05-24  Mg     1.8     -24    TPro  6.8  /  Alb  3.4  /  TBili  0.2  /  DBili  x   /  AST  35  /  ALT  14  /  AlkPhos  94  05-24    PT/INR - ( 23 May 2022 12:12 )   PT: 15.3 sec;   INR: 1.32 ratio         PTT - ( 23 May 2022 12:12 )  PTT:28.6 sec      Urinalysis Basic - ( 24 May 2022 04:24 )    Color: Light Yellow / Appearance: Clear / S.016 / pH: x  Gluc: x / Ketone: Negative  / Bili: Negative / Urobili: Negative   Blood: x / Protein: 100 / Nitrite: Negative   Leuk Esterase: Negative / RBC: 2 /hpf / WBC 4 /HPF   Sq Epi: x / Non Sq Epi: 5 /hpf / Bacteria: Negative        RADIOLOGY & ADDITIONAL TESTS:    Imaging Personally Reviewed:    Consultant(s) Notes Reviewed:      Care Discussed with Consultants/Other Providers:   Scarlet Thompson MD  Internal Medicine PGY1  [facility]: [id number]/ NS: [phone]    DATE OF SERVICE: 22 @ 09:15    Patient is a 81y old  Male who presents with a chief complaint of Blast crisis on outpatient labs. (23 May 2022 18:17)      SUBJECTIVE / OVERNIGHT EVENTS: Feels tired  and weak. afebrile   No acute events overnight.   Patient reporting feeling tired, general malaise, reports that pain control for his chronic right shoulder pain would help. And agrees with miralax for a bowel regimen.     MEDICATIONS  (STANDING):  aspirin enteric coated 81 milliGRAM(s) Oral daily  finasteride 5 milliGRAM(s) Oral daily  heparin   Injectable 5000 Unit(s) SubCutaneous every 8 hours  heparin  Lock Flush 100 Units/mL Injectable 500 Unit(s) IV Push once  hydroxyurea 1000 milliGRAM(s) Oral every 12 hours  isosorbide   mononitrate ER Tablet (IMDUR) 60 milliGRAM(s) Oral daily  levoFLOXacin  Tablet 250 milliGRAM(s) Oral every 48 hours  lidocaine 2% Injectable 20 milliLiter(s) Local Injection once  metoprolol succinate ER 50 milliGRAM(s) Oral daily  posaconazole DR Tablet 300 milliGRAM(s) Oral daily  sodium chloride 0.9%. 1000 milliLiter(s) (100 mL/Hr) IV Continuous <Continuous>    MEDICATIONS  (PRN):      Vital Signs Last 24 Hrs  T(C): 37.3 (23 May 2022 22:26), Max: 37.3 (23 May 2022 22:26)  T(F): 99.1 (23 May 2022 22:26), Max: 99.1 (23 May 2022 22:26)  HR: 88 (24 May 2022 05:18) (86 - 96)  BP: 137/68 (24 May 2022 05:18) (137/66 - 160/73)  BP(mean): --  RR: 18 (24 May 2022 05:18) (17 - 21)  SpO2: 97% (24 May 2022 05:18) (95% - 98%)  CAPILLARY BLOOD GLUCOSE        I&O's Summary    23 May 2022 07:01  -  24 May 2022 07:00  --------------------------------------------------------  IN: 0 mL / OUT: 300 mL / NET: -300 mL      PHYSICAL EXAM:  GENERAL: NAD, lying in bed supine comfortably, Rt PIC, (recently treated for clotting as an outpatient)  HEENT: NC/AT, EOMI, PERRL  NECK: Supple, No JVD  CHEST/LUNG: CTAB, no increased WOB  HEART: RRR, no m/r/g  ABDOMEN: +dullness to percussion LT side to the midline, Spleen palpable. +midline laparotomy scar, No CVA tenderness, soft, NT, ND, BS+  EXTREMITIES:  2+ peripheral pulses, no clubbing, no edema  NERVOUS SYSTEM:  A&Ox2, primarily Hong Konger speaking, RUE weakness s/p mild CVA ()  MSK: +3/5 strength LE's, equally bilaterally. FROM all 4 extremities, full and equal strength  SKIN: No rashes or lesions    LABS:                        11.6   102.90 )-----------( 177      ( 24 May 2022 06:54 )             33.7     05-    136  |  98  |  30<H>  ----------------------------<  121<H>  4.6   |  24  |  3.48<H>    Ca    11.3<H>      24 May 2022 06:54  Phos  2.9     -  Mg     1.8         TPro  6.8  /  Alb  3.4  /  TBili  0.2  /  DBili  x   /  AST  35  /  ALT  14  /  AlkPhos  94  05-24    PT/INR - ( 23 May 2022 12:12 )   PT: 15.3 sec;   INR: 1.32 ratio         PTT - ( 23 May 2022 12:12 )  PTT:28.6 sec      Urinalysis Basic - ( 24 May 2022 04:24 )    Color: Light Yellow / Appearance: Clear / S.016 / pH: x  Gluc: x / Ketone: Negative  / Bili: Negative / Urobili: Negative   Blood: x / Protein: 100 / Nitrite: Negative   Leuk Esterase: Negative / RBC: 2 /hpf / WBC 4 /HPF   Sq Epi: x / Non Sq Epi: 5 /hpf / Bacteria: Negative        RADIOLOGY & ADDITIONAL TESTS:    Imaging Personally Reviewed:    Consultant(s) Notes Reviewed:      Care Discussed with Consultants/Other Providers:

## 2022-05-24 NOTE — PROGRESS NOTE ADULT - PROBLEM SELECTOR PLAN 3
IVF  monitor BMP  check PTH IVF  monitor BMP  check PTH, Vit D, D3 IVF NS 100cc/hr  monitor BMP  PTH 50wnl  - f/u Vit D, D3

## 2022-05-24 NOTE — PROGRESS NOTE ADULT - PROBLEM SELECTOR PLAN 2
SCr 3.41  Baseline SCr  Plan:   - c/w IV hydration, monitor Volume status  - urine lytes- osm, Na, Cl, urea, total Protein/Cr  - urinalysis  - kidney bladder U/S  - renal Doppler U/S i/s/o   - strict I's/ O's  - keep K>4, Mg> 2  - f/u Nephro Reccs    #Hypercalcemia  - f/u ionized CA  - PTH  - c/w IVF SCr 3.41  Baseline SCr  Plan:   - c/w IV hydration, monitor Volume status  - urine lytes- osm, Na, Cl, urea, total Protein/Cr  - urinalysis  - kidney bladder U/S  - renal Doppler U/S i/s/o   - strict I's/ O's  - keep K>4, Mg> 2  - f/u Nephro Reccs

## 2022-05-24 NOTE — PROCEDURE NOTE - ADDITIONAL PROCEDURE DETAILS
The bone marrow biopsy was performed on 5/24/22 13:40    Time out was performed to confirm patient identification with two identifiers. The area to be biopsied was located using anatomic landmarks. The right posterior superior iliac crest was sterilely prepped and draped in the usual manner with povidone iodine swabs. Using sterile technique, 2% lidocaine was generously administered to the tissue and periosteum, with confirmation by the patient that we had achieved adequate analgesia. Aspirate needle was inserted, and bone marrow aspirate was obtained with confirmation of adequate spicules. The aspirate needle was withdrawn. Bone marrow biopsy was performed with adequate marrow specimen obtained. Biopsy needle was removed, and there was no significant post-procedural bleeding or immediate complications. The area was covered with gauze and an adhesive bandage. The patient was given post-procedural instructions, including not to shower until tomorrow and, if possible, to lie down with pressure on the biopsy location. The patient tolerated the procedure well. Specimens were sent for pathology examination, flow cytometry, cytogenetics, and Foundation one molecular profile test.

## 2022-05-24 NOTE — PATIENT PROFILE ADULT - FALL HARM RISK - HARM RISK INTERVENTIONS
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
## 2022-05-24 NOTE — PROGRESS NOTE ADULT - SUBJECTIVE AND OBJECTIVE BOX
INTERVAL HPI/OVERNIGHT EVENTS:  Patient S&E at bedside. No o/n events. Denied fever/chills, N/V/D, SOB, worsening body aches, changes with urination and BMs.      VITAL SIGNS:  T(F): 98.4 (22 @ 11:57)  HR: 81 (22 @ 11:57)  BP: 124/52 (22 @ 11:57)  RR: 18 (22 @ 11:57)  SpO2: 98% (22 @ 11:57)  Wt(kg): --    PHYSICAL EXAM:    Constitutional: NAD  Eyes: EOMI, sclera non-icteric  Neck: supple  Respiratory: CTAB, no wheezes or crackles   Cardiovascular: RRR  Gastrointestinal: soft, NTND, + BS  Extremities: no cyanosis, clubbing or edema   Neurological: awake and alert      MEDICATIONS  (STANDING):  aspirin enteric coated 81 milliGRAM(s) Oral daily  finasteride 5 milliGRAM(s) Oral daily  heparin   Injectable 5000 Unit(s) SubCutaneous every 8 hours  heparin  Lock Flush 100 Units/mL Injectable 500 Unit(s) IV Push once  hydroxyurea 1000 milliGRAM(s) Oral every 12 hours  isosorbide   mononitrate ER Tablet (IMDUR) 60 milliGRAM(s) Oral daily  levoFLOXacin  Tablet 250 milliGRAM(s) Oral every 48 hours  lidocaine   4% Patch 1 Patch Transdermal daily  lidocaine 2% Injectable 20 milliLiter(s) Local Injection once  metoprolol succinate ER 50 milliGRAM(s) Oral daily  posaconazole DR Tablet 300 milliGRAM(s) Oral daily  sodium chloride 0.9%. 1000 milliLiter(s) (100 mL/Hr) IV Continuous <Continuous>    MEDICATIONS  (PRN):      Allergies    morphine (Unknown)    Intolerances        LABS:                        11.6   102.90 )-----------( 177      ( 24 May 2022 06:54 )             33.7     05-24    136  |  98  |  30<H>  ----------------------------<  121<H>  4.6   |  24  |  3.48<H>    Ca    11.3<H>      24 May 2022 06:54  Phos  2.9     05-24  Mg     1.8     05-24    TPro  6.8  /  Alb  3.4  /  TBili  0.2  /  DBili  x   /  AST  35  /  ALT  14  /  AlkPhos  94  -    PT/INR - ( 23 May 2022 12:12 )   PT: 15.3 sec;   INR: 1.32 ratio         PTT - ( 23 May 2022 12:12 )  PTT:28.6 sec  Urinalysis Basic - ( 24 May 2022 04:24 )    Color: Light Yellow / Appearance: Clear / S.016 / pH: x  Gluc: x / Ketone: Negative  / Bili: Negative / Urobili: Negative   Blood: x / Protein: 100 / Nitrite: Negative   Leuk Esterase: Negative / RBC: 2 /hpf / WBC 4 /HPF   Sq Epi: x / Non Sq Epi: 5 /hpf / Bacteria: Negative        RADIOLOGY & ADDITIONAL TESTS:  Studies reviewed.

## 2022-05-24 NOTE — PROGRESS NOTE ADULT - PROBLEM SELECTOR PLAN 1
AML diagnosed 6/2021, in remission with current blast crisis  Blasts 4%-22%   wbc >100k   Plan:  - c/w IV hydration 100cc/hr NS 16hrs  - TLS labs, LDH, Uric acid Q8  - CBC w/ diff daily  - Peripheral smear  - f/u Heme onc reccs AML diagnosed 6/2021, in remission with current blast crisis  Blasts 4%-22%   wbc >100k   Plan:  - c/w IV hydration 100cc/hr NS 16hrs  - TLS labs, LDH, Uric acid Q8  - CBC w/ diff daily  - Peripheral smear  - c/w Hydrocyurea 1g Q12  - f/u Heme onc reccs
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
## 2022-05-25 NOTE — PROGRESS NOTE ADULT - SUBJECTIVE AND OBJECTIVE BOX
INTERVAL HPI/OVERNIGHT EVENTS:  Patient S&E at bedside. No o/n events. Denied fever/chills, N/V/D, worsening body pain.     VITAL SIGNS:  T(F): 99.1 (22 @ 08:58)  HR: 98 (22 @ 08:58)  BP: 144/78 (22 @ 08:58)  RR: 18 (22 @ 08:58)  SpO2: 92% (22 @ 08:58)  Wt(kg): --    PHYSICAL EXAM:    Constitutional: NAD  Eyes: EOMI, sclera non-icteric  Neck: supple  Respiratory: CTAB, no wheezes or crackles   Cardiovascular: RRR  Gastrointestinal: soft, NTND, + BS  Extremities: no cyanosis, clubbing or edema   Neurological: awake and alert      MEDICATIONS  (STANDING):  aspirin enteric coated 81 milliGRAM(s) Oral daily  chlorhexidine 2% Cloths 1 Application(s) Topical daily  finasteride 5 milliGRAM(s) Oral daily  heparin   Injectable 5000 Unit(s) SubCutaneous every 8 hours  heparin  Lock Flush 100 Units/mL Injectable 500 Unit(s) IV Push once  hydroxyurea 1000 milliGRAM(s) Oral every 12 hours  isosorbide   mononitrate ER Tablet (IMDUR) 60 milliGRAM(s) Oral daily  levoFLOXacin  Tablet 250 milliGRAM(s) Oral every 48 hours  lidocaine   4% Patch 1 Patch Transdermal daily  lidocaine 2% Injectable 20 milliLiter(s) Local Injection once  metoprolol succinate ER 50 milliGRAM(s) Oral daily  polyethylene glycol 3350 17 Gram(s) Oral two times a day  posaconazole DR Tablet 300 milliGRAM(s) Oral daily  sevelamer carbonate 800 milliGRAM(s) Oral three times a day  sodium bicarbonate  Infusion 0.201 mEq/kG/Hr (100 mL/Hr) IV Continuous <Continuous>    MEDICATIONS  (PRN):      Allergies    morphine (Unknown)    Intolerances        LABS:                        12.0   112.84 )-----------( 151      ( 25 May 2022 06:53 )             36.6         137  |  103  |  39<H>  ----------------------------<  133<H>  5.0   |  18<L>  |  3.48<H>    Ca    10.9<H>      25 May 2022 06:53  Phos  3.8     05-25  Mg     1.9     05-25    TPro  6.8  /  Alb  3.4  /  TBili  0.2  /  DBili  x   /  AST  35  /  ALT  14  /  AlkPhos  94  05-24      Urinalysis Basic - ( 24 May 2022 04:24 )    Color: Light Yellow / Appearance: Clear / S.016 / pH: x  Gluc: x / Ketone: Negative  / Bili: Negative / Urobili: Negative   Blood: x / Protein: 100 / Nitrite: Negative   Leuk Esterase: Negative / RBC: 2 /hpf / WBC 4 /HPF   Sq Epi: x / Non Sq Epi: 5 /hpf / Bacteria: Negative        RADIOLOGY & ADDITIONAL TESTS:  Studies reviewed.

## 2022-05-25 NOTE — PROGRESS NOTE ADULT - PROBLEM SELECTOR PLAN 2
kidney bladder U/S- intrinsic renal disease, bun/cr <10  SCr 3.48  Baseline SCr  Plan:   - c/w IV hydration, monitor Volume status  - renal Doppler U/S i/s/o   - strict I's/ O's  - keep K>4, Mg> 2  - f/u Nephro Reccs kidney bladder U/S- intrinsic renal disease, bun/cr <10  SCr 3.48  Baseline SCr  Plan:   - c/w IV hydration, monitor Volume status  - renal Doppler U/S i/s/o   - strict I's/ O's  - keep K>4, Mg> 2  - f/u Nephro Recs

## 2022-05-25 NOTE — PROGRESS NOTE ADULT - PROBLEM SELECTOR PLAN 1
Pt. with BRISA onC KD stage 4 in the setting of poor oral intake and ? TLS. On review of [facility], it was noted that Scr was 2.37 on 3/30/22. Scr elevated to 3.41 on admission, labs also showed UA of 10.9, serum Ca of 11.8 with LDH of 1147 on on5/23/22. Received rasburicase 3 mg on 5/23. UA with 2 RBC and 4 WBC. Spot urine TP/CR ratio is 1.1 grams. Kidney duplex done, no thrombosis, no hydro. Currently on  cc/hr.     Continue NS @ 100cc/hr. Monitor labs and urine output. Avoid any potential nephrotoxins. Dose medications as per eGFR. Monitor TLS labs. Pt. with BRISA onC KD stage 4 in the setting of poor oral intake and ? TLS. On review of [facility], it was noted that Scr was 2.37 on 3/30/22. Scr elevated to 3.41 on admission, labs also showed UA of 10.9, serum Ca of 11.8 with LDH of 1147 on on5/23/22. Received rasburicase 3 mg on 5/23. UA with 2 RBC and 4 WBC. Spot urine TP/CR ratio is 1.1 grams. Kidney duplex done, no thrombosis, no hydro. Currently on  cc/hr.     Recommend to switch to 150 meq of bicarb drip 100 cc/hr. Monitor labs and urine output. Avoid any potential nephrotoxins. Dose medications as per eGFR. Monitor TLS labs.
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
## 2022-05-25 NOTE — PROGRESS NOTE ADULT - PROBLEM SELECTOR PLAN 1
AML diagnosed 6/2021, in remission with current blast crisis  Blasts 4%-22%   wbc >100k   Plan:  - c/w IV hydration 100cc/hr sodium bicarb 150 meq in d5  - TLS labs, LDH, Uric acid daily  - CBC w/ diff daily  - Peripheral smear  - c/w Hydroxyurea 1g Q12  - f/u Heme onc reccs AML diagnosed 6/2021, in remission with current blast crisis  Blasts 4%-22%   wbc >100k   Plan:  - c/w IV hydration 100cc/hr sodium bicarb 150 meq in d5  - TLS labs, LDH, Uric acid daily  - CBC w/ diff daily  - Peripheral smear  - c/w Hydroxyurea 1g Q12.  - f/u Heme onc reccs

## 2022-05-25 NOTE — PROGRESS NOTE ADULT - SUBJECTIVE AND OBJECTIVE BOX
Scarlet Thompson MD  Internal Medicine PGY1  [facility]: [id number]/ NS: [phone]    DATE OF SERVICE: 22 @ 07:56    Patient is a 81y old  Male who presents with a chief complaint of Blast crisis on outpatient labs. (24 May 2022 14:41)      SUBJECTIVE / OVERNIGHT EVENTS:    MEDICATIONS  (STANDING):  aspirin enteric coated 81 milliGRAM(s) Oral daily  finasteride 5 milliGRAM(s) Oral daily  heparin   Injectable 5000 Unit(s) SubCutaneous every 8 hours  heparin  Lock Flush 100 Units/mL Injectable 500 Unit(s) IV Push once  hydroxyurea 1000 milliGRAM(s) Oral every 12 hours  isosorbide   mononitrate ER Tablet (IMDUR) 60 milliGRAM(s) Oral daily  levoFLOXacin  Tablet 250 milliGRAM(s) Oral every 48 hours  lidocaine   4% Patch 1 Patch Transdermal daily  lidocaine 2% Injectable 20 milliLiter(s) Local Injection once  metoprolol succinate ER 50 milliGRAM(s) Oral daily  posaconazole DR Tablet 300 milliGRAM(s) Oral daily  sodium chloride 0.9%. 1000 milliLiter(s) (100 mL/Hr) IV Continuous <Continuous>    MEDICATIONS  (PRN):      Vital Signs Last 24 Hrs  T(C): 37.4 (25 May 2022 00:03), Max: 37.5 (24 May 2022 15:54)  T(F): 99.3 (25 May 2022 00:03), Max: 99.5 (24 May 2022 15:54)  HR: 101 (25 May 2022 05:34) (81 - 101)  BP: 164/76 (25 May 2022 05:34) (124/52 - 173/75)  BP(mean): --  RR: 18 (25 May 2022 00:03) (18 - 18)  SpO2: 98% (25 May 2022 00:03) (98% - 98%)  CAPILLARY BLOOD GLUCOSE        I&O's Summary    24 May 2022 07:01  -  25 May 2022 07:00  --------------------------------------------------------  IN: 1840 mL / OUT: 600 mL / NET: 1240 mL        PHYSICAL EXAM:  GENERAL: NAD, well-developed  HEAD:  Atraumatic, Normocephalic  EYES: EOMI, PERRLA, conjunctiva and sclera clear  NECK: Supple, No JVD  CHEST/LUNG: Clear to auscultation bilaterally; No wheeze  HEART: Regular rate and rhythm; No murmurs, rubs, or gallops  ABDOMEN: Soft, Nontender, Nondistended; Bowel sounds present  EXTREMITIES:  2+ Peripheral Pulses, No clubbing, cyanosis, or edema  PSYCH: AAOx3  NEUROLOGY: non-focal  SKIN: No rashes or lesions    LABS:                        12.0   x     )-----------( 151      ( 25 May 2022 06:53 )             36.6     05-    137  |  103  |  39<H>  ----------------------------<  133<H>  5.0   |  18<L>  |  3.48<H>    Ca    10.9<H>      25 May 2022 06:53  Phos  3.8     05-  Mg     1.9         TPro  6.8  /  Alb  3.4  /  TBili  0.2  /  DBili  x   /  AST  35  /  ALT  14  /  AlkPhos  94  05-24    PT/INR - ( 23 May 2022 12:12 )   PT: 15.3 sec;   INR: 1.32 ratio         PTT - ( 23 May 2022 12:12 )  PTT:28.6 sec      Urinalysis Basic - ( 24 May 2022 04:24 )    Color: Light Yellow / Appearance: Clear / S.016 / pH: x  Gluc: x / Ketone: Negative  / Bili: Negative / Urobili: Negative   Blood: x / Protein: 100 / Nitrite: Negative   Leuk Esterase: Negative / RBC: 2 /hpf / WBC 4 /HPF   Sq Epi: x / Non Sq Epi: 5 /hpf / Bacteria: Negative        RADIOLOGY & ADDITIONAL TESTS:    Imaging Personally Reviewed:    Consultant(s) Notes Reviewed:      Care Discussed with Consultants/Other Providers:   Scarlet Thompson MD  Internal Medicine PGY1  [facility]: [id number]/ NS: [phone]    DATE OF SERVICE: 22 @ 07:56    Patient is a 81y old  Male who presents with a chief complaint of Blast crisis on outpatient labs. (24 May 2022 14:41)      SUBJECTIVE / OVERNIGHT EVENTS:  Pt resting this am. OVernight, no blood return from Lt UE PICC line.   PT s/p bone marrow biopsy yesterday.   Denies pain, shortness of breath, abdominal pain, though reports no BM since in hospital.   Patient reports he didn't eat last night and doesn't have much of an appetite.   wbc's increased 102--> 112 this am, LDH increased 800s--> 1k this am.   Denies fevers, chills, worsened malaise.        MEDICATIONS  (STANDING):  aspirin enteric coated 81 milliGRAM(s) Oral daily  finasteride 5 milliGRAM(s) Oral daily  heparin   Injectable 5000 Unit(s) SubCutaneous every 8 hours  heparin  Lock Flush 100 Units/mL Injectable 500 Unit(s) IV Push once  hydroxyurea 1000 milliGRAM(s) Oral every 12 hours  isosorbide   mononitrate ER Tablet (IMDUR) 60 milliGRAM(s) Oral daily  levoFLOXacin  Tablet 250 milliGRAM(s) Oral every 48 hours  lidocaine   4% Patch 1 Patch Transdermal daily  lidocaine 2% Injectable 20 milliLiter(s) Local Injection once  metoprolol succinate ER 50 milliGRAM(s) Oral daily  posaconazole DR Tablet 300 milliGRAM(s) Oral daily  sodium chloride 0.9%. 1000 milliLiter(s) (100 mL/Hr) IV Continuous <Continuous>    MEDICATIONS  (PRN):      Vital Signs Last 24 Hrs  T(C): 37.4 (25 May 2022 00:03), Max: 37.5 (24 May 2022 15:54)  T(F): 99.3 (25 May 2022 00:03), Max: 99.5 (24 May 2022 15:54)  HR: 101 (25 May 2022 05:34) (81 - 101)  BP: 164/76 (25 May 2022 05:34) (124/52 - 173/75)  BP(mean): --  RR: 18 (25 May 2022 00:03) (18 - 18)  SpO2: 98% (25 May 2022 00:03) (98% - 98%)  CAPILLARY BLOOD GLUCOSE        I&O's Summary    24 May 2022 07:01  -  25 May 2022 07:00  --------------------------------------------------------  IN: 1840 mL / OUT: 600 mL / NET: 1240 mL          PHYSICAL EXAM:  GENERAL: NAD, lying in bed supine comfortably, Rt PIC, (recently treated for clotting as an outpatient)  HEENT: NC/AT, EOMI, PERRL  NECK: Supple, No JVD  CHEST/LUNG: CTAB, no increased WOB  HEART: RRR, no m/r/g  ABDOMEN: +dullness to percussion LT side to the midline, Spleen palpable. +midline laparotomy scar, No CVA tenderness, soft, NT, ND, BS+  EXTREMITIES:  2+ peripheral pulses, no clubbing, no edema  NERVOUS SYSTEM:  A&Ox2, primarily Green[place]ic speaking, RUE weakness s/p mild CVA ()  MSK: +3/5 strength LE's, equally bilaterally. FROM all 4 extremities, full and equal strength  SKIN: No rashes or lesions    LABS:                        12.0   x     )-----------( 151      ( 25 May 2022 06:53 )             36.6         137  |  103  |  39<H>  ----------------------------<  133<H>  5.0   |  18<L>  |  3.48<H>    Ca    10.9<H>      25 May 2022 06:53  Phos  3.8       Mg     1.9         TPro  6.8  /  Alb  3.4  /  TBili  0.2  /  DBili  x   /  AST  35  /  ALT  14  /  AlkPhos  94  0524    PT/INR - ( 23 May 2022 12:12 )   PT: 15.3 sec;   INR: 1.32 ratio         PTT - ( 23 May 2022 12:12 )  PTT:28.6 sec      Urinalysis Basic - ( 24 May 2022 04:24 )    Color: Light Yellow / Appearance: Clear / S.016 / pH: x  Gluc: x / Ketone: Negative  / Bili: Negative / Urobili: Negative   Blood: x / Protein: 100 / Nitrite: Negative   Leuk Esterase: Negative / RBC: 2 /hpf / WBC 4 /HPF   Sq Epi: x / Non Sq Epi: 5 /hpf / Bacteria: Negative        RADIOLOGY & ADDITIONAL TESTS:    Imaging Personally Reviewed:    Consultant(s) Notes Reviewed:      Care Discussed with Consultants/Other Providers:   Scarlet Thompson MD  Internal Medicine PGY1  [facility]: [id number]/ NS: [phone]    DATE OF SERVICE: 22 @ 07:56    Patient is a 81y old  Male who presents with a chief complaint of Blast crisis on outpatient labs. (24 May 2022 14:41)      SUBJECTIVE / OVERNIGHT EVENTS:  Pt resting this am. OVernight, no blood return from Lt UE PICC line.   PT s/p bone marrow biopsy yesterday.   Denies pain, shortness of breath, abdominal pain, though reports no BM since in hospital.   Patient reports he didn't eat last night and doesn't have much of an appetite.   wbc's increased 102--> 112 this am, LDH increased 800s--> 1k this am.   Denies fevers, chills, worsened malaise.        MEDICATIONS  (STANDING):  aspirin enteric coated 81 milliGRAM(s) Oral daily.  finasteride 5 milliGRAM(s) Oral daily  heparin   Injectable 5000 Unit(s) SubCutaneous every 8 hours  heparin  Lock Flush 100 Units/mL Injectable 500 Unit(s) IV Push once  hydroxyurea 1000 milliGRAM(s) Oral every 12 hours  isosorbide   mononitrate ER Tablet (IMDUR) 60 milliGRAM(s) Oral daily  levoFLOXacin  Tablet 250 milliGRAM(s) Oral every 48 hours  lidocaine   4% Patch 1 Patch Transdermal daily  lidocaine 2% Injectable 20 milliLiter(s) Local Injection once  metoprolol succinate ER 50 milliGRAM(s) Oral daily  posaconazole DR Tablet 300 milliGRAM(s) Oral daily  sodium chloride 0.9%. 1000 milliLiter(s) (100 mL/Hr) IV Continuous <Continuous>    MEDICATIONS  (PRN):      Vital Signs Last 24 Hrs  T(C): 37.4 (25 May 2022 00:03), Max: 37.5 (24 May 2022 15:54)  T(F): 99.3 (25 May 2022 00:03), Max: 99.5 (24 May 2022 15:54)  HR: 101 (25 May 2022 05:34) (81 - 101)  BP: 164/76 (25 May 2022 05:34) (124/52 - 173/75)  BP(mean): --  RR: 18 (25 May 2022 00:03) (18 - 18)  SpO2: 98% (25 May 2022 00:03) (98% - 98%)  CAPILLARY BLOOD GLUCOSE        I&O's Summary    24 May 2022 07:01  -  25 May 2022 07:00  --------------------------------------------------------  IN: 1840 mL / OUT: 600 mL / NET: 1240 mL          PHYSICAL EXAM:  GENERAL: NAD, lying in bed supine comfortably, Rt PIC, (recently treated for clotting as an outpatient)  HEENT: NC/AT, EOMI, PERRL  NECK: Supple, No JVD  CHEST/LUNG: CTAB, no increased WOB  HEART: RRR, no m/r/g.  ABDOMEN: +dullness to percussion LT side to the midline, Spleen palpable. +midline laparotomy scar, No CVA tenderness, soft, NT, ND, BS+  EXTREMITIES:  2+ peripheral pulses, no clubbing, no edema  NERVOUS SYSTEM:  A&Ox2, primarily Austrian speaking, RUE weakness s/p mild CVA ()  MSK: +3/5 strength LE's, equally bilaterally. FROM all 4 extremities, full and equal strength  SKIN: No rashes or lesions    LABS:                        12.0   x     )-----------( 151      ( 25 May 2022 06:53 )             36.6         137  |  103  |  39<H>  ----------------------------<  133<H>  5.0   |  18<L>  |  3.48<H>    Ca    10.9<H>      25 May 2022 06:53  Phos  3.8       Mg     1.9         TPro  6.8  /  Alb  3.4  /  TBili  0.2  /  DBili  x   /  AST  35  /  ALT  14  /  AlkPhos  94  05-24    PT/INR - ( 23 May 2022 12:12 )   PT: 15.3 sec;   INR: 1.32 ratio         PTT - ( 23 May 2022 12:12 )  PTT:28.6 sec      Urinalysis Basic - ( 24 May 2022 04:24 )    Color: Light Yellow / Appearance: Clear / S.016 / pH: x  Gluc: x / Ketone: Negative  / Bili: Negative / Urobili: Negative   Blood: x / Protein: 100 / Nitrite: Negative   Leuk Esterase: Negative / RBC: 2 /hpf / WBC 4 /HPF   Sq Epi: x / Non Sq Epi: 5 /hpf / Bacteria: Negative        RADIOLOGY & ADDITIONAL TESTS:    Imaging Personally Reviewed:    Consultant(s) Notes Reviewed:      Care Discussed with Consultants/Other Providers:

## 2022-05-25 NOTE — PROGRESS NOTE ADULT - PROBLEM SELECTOR PLAN 2
Pt. with hypercalcemia in setting of hyperparatthyroidism?. PTH not appropriately suppressed, of 50. Calcium 10.9 today. Vit D 25 and 1-25 WNL. Continue IV fluids. May need to be started on Sensipar. Monitor serum Calcium level.     If any questions, please feel free to contact me     Inge Varner  Nephrology Fellow  [facility] Pager: [phone]  [facility] Pager: [phone] Pt. with hypercalcemia in setting of hyperparathyroidism?. PTH not appropriately suppressed, of 50. Calcium 10.9 today. Vit D 25 and 1-25 WNL. Continue IV fluids. May need to be started on Sensipar. Monitor serum Calcium level.     If any questions, please feel free to contact me     Inge Varner  Nephrology Fellow  [facility] Pager: [phone]  [facility] Pager: [phone]

## 2022-05-26 NOTE — OCCUPATIONAL THERAPY INITIAL EVALUATION ADULT - NS ASR FOLLOW COMMAND OT EVAL
75% of the time/able to follow single-step instructions Pt [place] and requires /75% of the time/able to follow single-step instructions

## 2022-05-26 NOTE — PROGRESS NOTE ADULT - PROBLEM SELECTOR PLAN 2
kidney bladder U/S- intrinsic renal disease, bun/cr <10  SCr 3.48  Baseline SCr  Plan:   - c/w IV hydration, monitor Volume status  - renal Doppler U/S i/s/o   - strict I's/ O's  - keep K>4, Mg> 2  - f/u Nephro Recs SCr 3.41 on admission  Baseline SCr  2.37 3/30/22'  5/24 renal U/S: No evidence of a significant renal artery stenosis. increased renal cortical echogenicity bilaterally and increased resistive indices in the bilateral kidneys, suggestive of renal parenchymal disease.  - monitor Volume status  - strict I's/ O's  - keep K>4, Mg> 2  - f/u Nephro Recs  5/26  received Lasix 40 mg iv x1 this am and  additional Lasix 40 mg iv x1 per renal

## 2022-05-26 NOTE — OCCUPATIONAL THERAPY INITIAL EVALUATION ADULT - ANTICIPATED DISCHARGE DISPOSITION, OT EVAL
Subacute rehab. However if pt to go home recommend assist/supervision for all functional mobility and ADLs. Pt owns RW,WC cane, shower bench and grab bars.

## 2022-05-26 NOTE — PROGRESS NOTE ADULT - SUBJECTIVE AND OBJECTIVE BOX
Diagnosis: relapsded AML    Protocol/Chemo Regimen: pending   Day: NA     Pt endorsed:    Review of Systems: Patient denied nausea, vomiting, odynophagia, chest pain, cough, dyspnea, abdominal pain, constipation, diarrhea, rash, fatigue, headache      Pain scale:                                        Location:    Diet:     Allergies: morphine (Unknown)      ANTIMICROBIALS  levoFLOXacin  Tablet 250 milliGRAM(s) Oral every 48 hours  posaconazole DR Tablet 300 milliGRAM(s) Oral daily      HEME/ONC MEDICATIONS  aspirin enteric coated 81 milliGRAM(s) Oral daily  heparin   Injectable 5000 Unit(s) SubCutaneous every 8 hours  heparin  Lock Flush 100 Units/mL Injectable 500 Unit(s) IV Push once  hydroxyurea 1000 milliGRAM(s) Oral every 12 hours      STANDING MEDICATIONS  chlorhexidine 2% Cloths 1 Application(s) Topical daily  finasteride 5 milliGRAM(s) Oral daily  isosorbide   mononitrate ER Tablet (IMDUR) 60 milliGRAM(s) Oral daily  lidocaine   4% Patch 1 Patch Transdermal daily  lidocaine 2% Injectable 20 milliLiter(s) Local Injection once  metoprolol succinate ER 50 milliGRAM(s) Oral daily  polyethylene glycol 3350 17 Gram(s) Oral two times a day  sevelamer carbonate 800 milliGRAM(s) Oral three times a day        Vital Signs Last 24 Hrs  T(C): 36.3 (26 May 2022 05:36), Max: 37.3 (25 May 2022 08:58)  T(F): 97.3 (26 May 2022 05:36), Max: 99.1 (25 May 2022 08:58)  HR: 98 (26 May 2022 05:36) (88 - 98)  BP: 165/77 (26 May 2022 05:36) (144/78 - 166/69)  BP(mean): --  RR: 20 (26 May 2022 05:36) (18 - 20)  SpO2: 95% (26 May 2022 05:36) (92% - 95%)      PHYSICAL EXAM  General: adult in NAD  HEENT: clear oropharynx, anicteric sclera  CV: normal S1/S2 RRR  Lungs: positive air movement b/l ant lungs,clear to auscultation, no wheezes, no rales  Abdomen: soft, + BS,  non-tender non-distended,   Ext: no edema  Skin: no rash  Neuro: alert and oriented X 3, no focal deficits  Central Line: PIV CDI      Cultures:     COVID-19 PCR . (05.23.22 @ 12:09)    COVID-19 PCR: NotDetec    RADIOLOGY & ADDITIONAL STUDIES:    < from: Xray Chest 1 View- PORTABLE-Urgent (Xray Chest 1 View- PORTABLE-Urgent .) (05.26.22 @ 05:34) >  IMPRESSION:  Cardiomegaly with mild/moderate pulmonary venous hypertension/pulmonary   edema.  Small bilateral pleural effusions associated bibasilar atelectasis.  ******PRELIMINARY REPORT******   LGEN PALMA MD; Resident Radiologist       Diagnosis: relapsded AML    Protocol/Chemo Regimen: pending   Day: NA      ID [id number] Darlene, [id number] Priyanka   Pt endorsed: feeling "not well" ; right shoulder/arm pain    Review of Systems: Patient denied nausea, vomiting, odynophagia, chest pain, cough, dyspnea, abdominal pain, constipation, diarrhea, rash, fatigue, headache      Pain scale:     7/10                                  Location: right shoulder/arm       Diet: Regular     Allergies: morphine (Unknown)        HEME/ONC MEDICATIONS  aspirin enteric coated 81 milliGRAM(s) Oral daily  heparin   Injectable 5000 Unit(s) SubCutaneous every 8 hours  heparin  Lock Flush 100 Units/mL Injectable 500 Unit(s) IV Push once  hydroxyurea 1000 milliGRAM(s) Oral every 12 hours      STANDING MEDICATIONS  chlorhexidine 2% Cloths 1 Application(s) Topical daily  finasteride 5 milliGRAM(s) Oral daily  isosorbide   mononitrate ER Tablet (IMDUR) 60 milliGRAM(s) Oral daily  lidocaine   4% Patch 1 Patch Transdermal daily  lidocaine 2% Injectable 20 milliLiter(s) Local Injection once  metoprolol succinate ER 50 milliGRAM(s) Oral daily  polyethylene glycol 3350 17 Gram(s) Oral two times a day  sevelamer carbonate 800 milliGRAM(s) Oral three times a day      Vital Signs Last 24 Hrs  T(C): 36.3 (26 May 2022 05:36), Max: 37.3 (25 May 2022 08:58)  T(F): 97.3 (26 May 2022 05:36), Max: 99.1 (25 May 2022 08:58)  HR: 98 (26 May 2022 05:36) (88 - 98)  BP: 165/77 (26 May 2022 05:36) (144/78 - 166/69)  BP(mean): --  RR: 20 (26 May 2022 05:36) (18 - 20)  SpO2: 95% (26 May 2022 05:36) (92% - 95%)      PHYSICAL EXAM  General: adult in NAD  HEENT: clear oropharynx, anicteric sclera  CV: normal S1/S2 RRR  Lungs: positive air movement b/l ant lungs,clear to auscultation, no wheezes, no rales  Abdomen: soft, + BS,  non-tender non-distended,   Ext: no edema  Skin: no rash  Neuro: alert and oriented X 2, RUE decreased motor strength   Central Line: PICC CDI       LABS:                        11.7   97.61 )-----------( 138      ( 26 May 2022 06:48 )             33.9         Mean Cell Volume : 94.2 fl  Mean Cell Hemoglobin : 32.5 pg  Mean Cell Hemoglobin Concentration : 34.5 gm/dL  Auto Neutrophil # : 5.86 K/uL  Auto Lymphocyte # : 15.62 K/uL  Auto Monocyte # : 69.30 K/uL  Auto Eosinophil # : 0.98 K/uL  Auto Basophil # : 0.00 K/uL  Auto Neutrophil % : 5.0 %  Auto Lymphocyte % : 16.0 %  Auto Monocyte % : 60.0 %  Auto Eosinophil % : 1.0 %  Auto Basophil % : 0.0 %      05-26    139  |  98  |  42<H>  ----------------------------<  160<H>  3.6   |  23  |  3.31<H>    Ca    10.8<H>      26 May 2022 06:48  Phos  3.9     05-26  Mg     1.8     05-26      LDH 1134  Uric Acid 5.2      Hematopathology Report (05.24.22 @ 13:30)    Hematopathology Report:   ACCESSION No:  [id number]  Patient:   TAMIKO PEREZ      Accession:                             [id number]    Collected Date/Time:                   5/24/2022 13:30 EDT  Received Date/Time:                    5/24/2022 15:25 EDT    Hematopathology Report - Auth (Verified)    Specimen(s) Submitted  1  Bone marrow biopsy  2  Bone marrow aspirate for Flow    Final Diagnosis  1, 2. 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     Current biopsy is consistent  with relapsed acute myeloid leukemia, 78% blasts/blast equivalent by  differential counts. Please correlate with pending cytogenetic and  molecular studies.  Comprehensive report with results of pending ancillary studies to follow.    Microscopic description:  1. Biopsy (core and clot):  Quality:  Small (0.8 cm and 0.9 cm), adequate cores and adequate clot  sections with   marrow fragments  Cellularity:  90%  Myeloid lineage:  Markedly  decreased  Erythroid lineage:  Rare  Megakaryocytes:  Rare  Blasts:  Increased. Medium size blasts with round  to  convoluted nuclei, fine chromatin and   prominent  nucleoli are forming sheets and replacing the marrow  Lymphocytes  Not increased  Plasma cells:  Not increased    2. Aspirate:  Quality / cellularity:   Spicular and cellular aspirates  Myeloid lineage:  Decreased  Erythroid lineage:  Rare  Megakaryocytes:  Rare  Plasma cells:   Not increased  Blasts:    Markedly increased . Blast are medium to large in size,  increased n/c ratio,      convoluted nuclei, fine chromatin and  prominent  nucleoli, some with  vacuolated, basophilic cytoplasm    Bone Marrow Asp Smear Diff Cell Count:  500 Cells.  Type  Normal* %  Blast  0-3 78%  Promyelocyte  1-8 0%  Myelocyte  10-15 1%  Metamyelocyte  10-15 2%  Band/Neutrophil  12-25  10%  Eosinophil  1-5 0%  Basophil  0-1 0%  Pronormoblast 0-2 0%  Normoblast  15-25 3%  Monocyte  0-2 2%  Lymphocyte  10-15 5%  Plasma cell  0-1 0%  *Adult Range  Peripheral Blood Smear:    WBC:  Marked leukocytosis with increased number of blasts/blast  equivalents  (promonocytes). Granulocytes are decreased, some show abnormal lobation  and granulation  RBC:  Normocytic anemia with mild anisocytosis and mild polychromasia.  nRBC  seen. Few  dacrocytes  present  Platelets:  Normal in number withunremarkable morphology    Ancillary studies  Special stains on bone marrow aspirate:  Iron stain:  Iron stores are increased There is no ring sideroblasts    Special stains on core biopsy:  Iron:  Positive    Immunohistochemical stains:    CD34, , MPO, CD71, E-cad, stains are  performed on block 1A    CD34:  Highlights vascular structures, blasts are negative  :  Highlights scattered masts cells. Blasts are negative  MPO:  Blasts are weakly positive  CD71 : Highlights rare erythroid cells  E-cad:  Highlights rare early erythroid series  CD14:  Majority of blasts are negative    Flow cytometry analysis (36-XO-, peripheral blood, collected  5/23/2022)   _: Monocytes (84.2% of total) positive for CD4 (dim),  CD11b (bright), CD13, CD15 (subset),  CD33 (bright), CD45 (dim), CD56  (partial), CD64 (bright); negative for CD2, CD7, CD34,  with a major  subset showing loss of CD14  Myeloblasts (0.03% of total)   positive for CD13, CD33, CD34, CD38  (decreased)  (decreased), HLA-DR and negative for CD2, CD3, CD4,  CD5, CD7, CD8, CD10, CD11b, CD14, CD15, CD16, CD19, CD20, CD22, CD56,    CD64, TdT  Myeloid population is markedly decreased  Lymphocytes (4% of cells): Heterogeneous population of T-cells (with  normal CD4 to CD8 ratio), natural killer cells, and polytypic B-cells.    Flow cytometry analysis (45-FA-, bone marrow, collected  5/24/2022):  Monocytes (84.6% of total) positive for CD11b (bright), CD13,  CD15 (subset),  CD33 (bright), CD45 (dim), CD56 (partial), CD64 (bright);  negative for CD2, CD7, CD34,  with a major subset showing loss of  CD14  Myeloblasts (0.07% of total) positive for CD13, CD33, CD34, CD38  (decreased,  (decreased), HLA-DR and negative for CD2, CD7, CD10,  CD14, CD15, CD16, CD19  Myeloid population is markedly decreased    Cytogenetics:  Karyotype:  In process, will be reported as an addendum  FISH:   In process, will be reported as an addendum  Verified by: Shanice Hawkins MD  (Electronic Signature)        Cultures:     COVID-19 PCR . (05.23.22 @ 12:09)    [facility]        RADIOLOGY & ADDITIONAL STUDIES:      < from: Xray Chest 1 View- PORTABLE-Urgent (Xray Chest 1 View- PORTABLE-Urgent .) (05.26.22 @ 05:34) >  IMPRESSION:  Cardiomegaly with mild/moderate pulmonary venous hypertension/pulmonary   edema.  Small bilateral pleural effusions associated bibasilar atelectasis.      < from: US Duplex Kidneys (US Doppler Renal) (05.24.22 @ 11:22) >  IMPRESSION:  No evidence of a significant renal artery stenosis.  increased renal cortical echogenicity bilaterally and increased resistive   indices in the bilateral kidneys, suggestive of renal parenchymal disease.

## 2022-05-26 NOTE — PROGRESS NOTE ADULT - PROBLEM SELECTOR PLAN 3
i/s/o likely AML recurrence  PTH 50, Vt D, D3 wnl  Plan  IV fluid hydration  monitor BMP  PTH 50wnl PTH 50, Vt D, D3 wnl  monitor lytes PTH 50, Vt D, D3 wnl  monitor lytes  Continue sensipar per renal
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
## 2022-05-26 NOTE — CHART NOTE - NSCHARTNOTEFT_GEN_A_CORE
LABS:                          10.4   90.99 )-----------( 127      ( 26 May 2022 17:58 )             30.3         Mean Cell Volume : 94.7 fl  Mean Cell Hemoglobin : 32.5 pg  Mean Cell Hemoglobin Concentration : 34.3 gm/dL  Auto Neutrophil # : x  Auto Lymphocyte # : x  Auto Monocyte # : x  Auto Eosinophil # : x  Auto Basophil # : x  Auto Neutrophil % : x  Auto Lymphocyte % : x  Auto Monocyte % : x  Auto Eosinophil % : x  Auto Basophil % : x      05-26    139  |  99  |  45<H>  ----------------------------<  138<H>  3.5   |  28  |  3.49<H>    Ca    10.4      26 May 2022 17:58  Phos  3.6     05-26  Mg     2.2     05-26    TPro  6.4  /  Alb  3.1<L>  /  TBili  0.4  /  DBili  x   /  AST  30  /  ALT  13  /  AlkPhos  85  05-26          PT/INR - ( 26 May 2022 17:58 )   PT: 16.7 sec;   INR: 1.45 ratio         PTT - ( 26 May 2022 17:58 )  PTT:35.2 sec      Uric Acid 5.6    hypokalemia, Cr 3.49, KCL 20 meq po x1  hypercalcemia, corrected Ca++ 11.1-encourage po intake

## 2022-05-26 NOTE — OCCUPATIONAL THERAPY INITIAL EVALUATION ADULT - DIAGNOSIS, OT EVAL
Pt demonstrated decreased RUE ROM, deficits in balance, deficits in cognition , and decreased RUE strength impairing functional mobility and ADLs.
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
## 2022-05-26 NOTE — PROGRESS NOTE ADULT - PROBLEM SELECTOR PLAN 1
AML diagnosed 6/2021, in remission with current blast crisis  Blasts 4%-22%   wbc >100k   Plan:  - c/w IV hydration 100cc/hr sodium bicarb 150 meq in d5  - TLS labs, LDH, Uric acid daily  - CBC w/ diff daily  - Peripheral smear  - c/w Hydroxyurea 1g Q12. 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  To discuss with family re [facility]
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
## 2022-05-26 NOTE — PROGRESS NOTE ADULT - PROBLEM SELECTOR PLAN 1
Pt. with BRISA onC KD stage 4 in the setting of poor oral intake and ? TLS. On review of [facility], it was noted that Scr was 2.37 on 3/30/22. Scr elevated to 3.41 on admission, labs also showed UA of 10.9, serum Ca of 11.8 with LDH of 1147 on on5/23/22. Received rasburicase 3 mg on 5/23. UA with 2 RBC and 4 WBC. Spot urine TP/CR ratio is 1.1 grams. Kidney duplex done, no thrombosis, no hydro. Off IV fluids. Scr stable 3.31 today. Appears volume overloaded, BNP 74217. Received 1 dose of Lasix 40 mg IVP this morning.     Would give another dose of Lasix 40 mg IVP stat. Monitor labs and urine output. Avoid any potential nephrotoxins. Dose medications as per eGFR. Monitor TLS labs.

## 2022-05-26 NOTE — DISCHARGE NOTE PROVIDER - NSDCMRMEDTOKEN_GEN_ALL_CORE_FT
aluminum hydroxide-magnesium hydroxide 200 mg-200 mg/5 mL oral suspension: 30 milliliter(s) orally every 4 hours, As needed, Dyspepsia  aspirin 81 mg oral delayed release tablet: 1 tab(s) orally once a day  decitabine 50 mg intravenous injection: days 1-5, every 28 days  finasteride 5 mg oral tablet: 1 tab(s) orally once a day  folic acid 1 mg oral tablet: 1 tab(s) orally once a day  Imdur 60 mg oral tablet, extended release: 1 tab(s) orally once a day (in the morning)  Lasix 40 mg oral tablet: 1 tab(s) orally once a day  levoFLOXacin 500 mg oral tablet: 1 tab(s) orally every 24 hours  Metoprolol Succinate ER 50 mg oral tablet, extended release: 1 tab(s) orally once a day  posaconazole 100 mg oral delayed release tablet: 3 tab(s) orally once a day  Venclexta 100 mg oral tablet: 1 tab(s) orally once a day  Vitamin D3: 1 tab(s) orally once a day  Zofran 4 mg oral tablet: 1 tab(s) orally every 8 hours, As Needed -for muscle spasm - for nausea
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
## 2022-05-26 NOTE — PROGRESS NOTE ADULT - PROBLEM SELECTOR PLAN 2
Pt. with hypercalcemia in setting of hyperparathyroidism?. PTH not appropriately suppressed, of 50. Calcium 10.9 today. Vit D 25 and 1-25 WNL. May need to be started on Sensipar. Monitor serum Calcium level.     If any questions, please feel free to contact me     Inge Varner  Nephrology Fellow  [facility] Pager: [phone]  [facility] Pager: [phone]

## 2022-05-26 NOTE — PROGRESS NOTE ADULT - PROBLEM SELECTOR PLAN 4
- f/u BNP  - judicious IVF hydration  - c/w Home metoprolol 50mg daily  - if Short of breath, Furosemide IV 40mg   - strict I's/ O's HFr EF 27  - f/u BNP, 22,593 today   - c/w Home metoprolol 50mg daily  - if Short of breath, Furosemide IV 40mg   - strict I's/ O's  repeat TTE

## 2022-05-26 NOTE — PROGRESS NOTE ADULT - PROBLEM SELECTOR PLAN 5
2/2 Chronic Rt shoulder pain s/p CVA w/t RUE weakness  - Lidocaine patch to affected area 2/2 Chronic Rt shoulder pain s/p CVA w/t RUE weakness  - Lidocaine patch to affected area  OT consult

## 2022-05-26 NOTE — PROGRESS NOTE ADULT - PROBLEM SELECTOR PLAN 6
DVT prophylaxis: Heparin subc 5000   DNR/DNI confirmed. MOLST in chart. Continue all treatment measures otherwise. DVT prophylaxis: Heparin subc 5000, DC when PLT <50K  DNR/DNI confirmed. MOLST in chart. Continue all treatment measures otherwise.
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
## 2022-05-26 NOTE — OCCUPATIONAL THERAPY INITIAL EVALUATION ADULT - PRECAUTIONS/LIMITATIONS, REHAB EVAL
Patient was diagnosed with AML in 6/2021, started on chemo at the time, and has completed monthly cycles of chemo.PT was last in normal state of health 2 weeks ago, when son started noticing fatigue, "pain all over" w/ worsened chronic rt shoulder pain, taking extra time to rest at home.US kidneys:increased renal cortical echogenicity bilaterally and increased resistive indices in the bilateral kidneys, suggestive of renal parenchymal disease.Xray chest:Cardiomegaly with mild/moderate pulmonary venous hypertension/pulmonary edema./fall precautions

## 2022-05-27 NOTE — CONSULT NOTE ADULT - CONVERSATION DETAILS
Discussed patient's diagnosis of relapsed AML with him as well as his wishes given his serious illness. Patient was very focused on expressing that he wants to go back to [place]. When we discussed our concern that he may die in the hospital or on his way there, he reiterated that he just wants to go back to [place] and if he dies there or on the way, "it's God's will." We were able to discuss that given his wish for no further medical interventions and his focus on going back to [place], it would be in his best interest to be DNR and DNI and he agreed that he would want to die naturally and not be connected to any machines. He also agreed to hospice referral in order to try and get him discharged from the hospital safely. We agreed to address further GOC with him once his son arrived later in the day.

## 2022-05-27 NOTE — PHYSICAL THERAPY INITIAL EVALUATION ADULT - PRECAUTIONS/LIMITATIONS, REHAB EVAL
Pt. was diagnosed with AML in 6/2021, started on chemo at the time, and has completed monthly cycles of chemo (was on cycle 12 per recent heme- onc note, though non- compliant with a few days of Dacogen of his last few cycles). PT was last in normal state of health 2 weeks ago, when son started noticing fatigue, "pain all over" w/ worsened chronic rt shoulder pain, taking extra time to rest at home. Admitted for AML recurrence. Hospital course c/b BRISA on CKD, s/p bone marrow bx (5/24). Bone marrow bx confirmed relapsed AML. Pt has leukocytosis, anemia and thrombocytopenia due to disease. US Renal doppler (5/24): No evidence of a significant renal artery stenosis.Increased renal cortical echogenicity bilaterally and increased resistive indices in the bilateral kidneys, suggestive of renal parenchymal disease. Chest Xray (5/26): Cardiomegaly with mild/moderate pulmonary venous hypertension/pulmonary edema. Small bilateral pleural effusions associated bibasilar atelectasis./no known precautions/limitations
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
## 2022-05-27 NOTE — PHYSICAL THERAPY INITIAL EVALUATION ADULT - ACTIVE RANGE OF MOTION EXAMINATION, REHAB EVAL
R shoulder flexion limited 2/2 pain/bilateral upper extremity Active ROM was WFL (within functional limits)/bilateral  lower extremity Active ROM was WFL (within functional limits)
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
## 2022-05-27 NOTE — PROGRESS NOTE ADULT - PROBLEM SELECTOR PLAN 2
SCr 3.41 on admission  Baseline SCr  2.37 3/30/22'  5/24 renal U/S: No evidence of a significant renal artery stenosis. increased renal cortical echogenicity bilaterally and increased resistive indices in the bilateral kidneys, suggestive of renal parenchymal disease.  - monitor Volume status  - strict I's/ O's  - keep K>4, Mg> 2  - f/u Nephro Recs  5/26  received Lasix 40 mg iv x1 this am and  additional Lasix 40 mg iv x1 per renal SCr 3.41 on admission. Baseline SCr  2.37 3/30/22'  5/24 renal U/S: No evidence of a significant renal artery stenosis. increased renal cortical echogenicity bilaterally and increased resistive indices in the bilateral kidneys, suggestive of renal parenchymal disease.  - monitor Volume status, strict I's/ O's  - keep K>4, Mg> 2  - f/u Nephro Recs  5/27  Lasix 80 mg iv x1

## 2022-05-27 NOTE — PROGRESS NOTE ADULT - PROBLEM SELECTOR PLAN 1
Pt. with BRISA onC KD stage 4 in the setting of poor oral intake and ? TLS. On review of [facility], it was noted that Scr was 2.37 on 3/30/22. Scr elevated to 3.41 on admission, labs also showed UA of 10.9, serum Ca of 11.8 with LDH of 1147 on on5/23/22. Received rasburicase 3 mg on 5/23. UA with 2 RBC and 4 WBC. Spot urine TP/CR ratio is 1.1 grams. Kidney duplex done, no thrombosis, no hydro. Off IV fluids. Scr increased to 3.7 today. Appears volume overloaded, BNP 69810 today.     Would give another dose of Lasix 80 mg IVP stat. May need HD if kidney failure/volume status, pending C discussion with family. Continue current dose phos binders. Monitor labs and urine output. Avoid any potential nephrotoxins. Dose medications as per eGFR. Monitor TLS labs.

## 2022-05-27 NOTE — PHYSICAL THERAPY INITIAL EVALUATION ADULT - MANUAL MUSCLE TESTING RESULTS, REHAB EVAL
L UE and b/l LE at least 3/5 ag gravity. RUE shoulder flexion ~3-/5 2/2 pain MMT assessment limited 2/2 confusion, [place]raini speaking/grossly assessed due to

## 2022-05-27 NOTE — PHYSICAL THERAPY INITIAL EVALUATION ADULT - ADDITIONAL COMMENTS
Pt. lives in a 6th floor apartment with an elevator. Apt. has ~6-8 steps to enter the building. Pt. reports his son helps him change his clothing and bathe him. Ambulates with walker and owns a wheelchair. Pt reports son helps him to ambulate, however his son works during the day. Pt. is a poor historian.

## 2022-05-27 NOTE — PROGRESS NOTE ADULT - PROBLEM SELECTOR PLAN 1
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  5/27 Given pt has poor prognosis with days to week until end of life, Palliative care consulted, discuss with pt and family re GOC. Hospice referral made. 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    5/27 Given pt has poor prognosis with days to week until end of life, Palliative care consulted, discuss with pt and family re GOC. Hospice referral made. Possible candidate for PCU. Elitek 3mg IV given for rising uric acid

## 2022-05-27 NOTE — PHYSICAL THERAPY INITIAL EVALUATION ADULT - BED MOBILITY LIMITATIONS, REHAB EVAL
2/2 RUE shoulder pain/decreased ability to use arms for pushing/pulling/impaired ability to control trunk for mobility
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
## 2022-05-27 NOTE — CONSULT NOTE ADULT - PROBLEM SELECTOR RECOMMENDATION 9
AML last chemo 4/2022 with relapse, bone marrow on admission showing high blasts. Concern for TLS.  - Patient expressing wish to go to [place] and get no further treatment  - GOC ongoing but patient in agreement to hospice referral and DNR/DNI
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
## 2022-05-27 NOTE — PROGRESS NOTE ADULT - SUBJECTIVE AND OBJECTIVE BOX
Diagnosis: relapsded AML    Protocol/Chemo Regimen: pending   Day: NA         Pt endorsed:     Review of Systems:       Pain scale:     7/10                                  Location: right shoulder/arm       Diet: Regular     Allergies: morphine (Unknown)      MEDICATIONS  (STANDING):  aspirin enteric coated 81 milliGRAM(s) Oral daily  Biotene Dry Mouth Oral Rinse 15 milliLiter(s) Swish and Spit four times a day  chlorhexidine 2% Cloths 1 Application(s) Topical daily  finasteride 5 milliGRAM(s) Oral daily  heparin   Injectable 5000 Unit(s) SubCutaneous every 8 hours  heparin  Lock Flush 100 Units/mL Injectable 500 Unit(s) IV Push once  hydroxyurea 1000 milliGRAM(s) Oral every 12 hours  isosorbide   mononitrate ER Tablet (IMDUR) 60 milliGRAM(s) Oral daily  lidocaine   4% Patch 1 Patch Transdermal daily  lidocaine 2% Injectable 20 milliLiter(s) Local Injection once  metoprolol succinate ER 50 milliGRAM(s) Oral daily  polyethylene glycol 3350 17 Gram(s) Oral two times a day  sevelamer carbonate 800 milliGRAM(s) Oral three times a day    MEDICATIONS  (PRN):  acetaminophen     Tablet .. 650 milliGRAM(s) Oral every 6 hours PRN Temp greater or equal to 38C (100.4F), Mild Pain (1 - 3)      Vital Signs Last 24 Hrs  T(C): 36.8 (27 May 2022 05:16), Max: 37.7 (26 May 2022 13:10)  T(F): 98.2 (27 May 2022 05:16), Max: 99.8 (26 May 2022 13:10)  HR: 93 (27 May 2022 05:16) (85 - 108)  BP: 155/67 (27 May 2022 05:16) (128/68 - 165/69)  BP(mean): --  RR: 16 (27 May 2022 05:16) (16 - 20)  SpO2: 94% (27 May 2022 05:16) (93% - 99%)    I&O's Summary    26 May 2022 07:01  -  27 May 2022 07:00  --------------------------------------------------------  IN: 484 mL / OUT: 400 mL / NET: 84 mL      PHYSICAL EXAM  General: NAD  HEENT: clear oropharynx, anicteric sclera  CV: normal S1/S2 RRR  Lungs: ,clear to auscultation, no wheezes, no rales  Abdomen: soft, + BS,  non-tender non-distended,   Ext: no edema  Skin: no rash  Neuro: alert and oriented X 2, RUE decreased motor strength   Central Line: PICC CDI       LABS:             ------------      Hematopathology Report (05.24.22 @ 13:30)    Hematopathology Report:   ACCESSION No:  [id number]  Patient:   TAMIKO PEREZ      Accession:                             [id number]    Collected Date/Time:                   5/24/2022 13:30 EDT  Received Date/Time:                    5/24/2022 15:25 EDT    Hematopathology Report - Auth (Verified)    Specimen(s) Submitted  1  Bone marrow biopsy  2  Bone marrow aspirate for Flow    Final Diagnosis  1, 2. 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     Current biopsy is consistent  with relapsed acute myeloid leukemia, 78% blasts/blast equivalent by  differential counts. Please correlate with pending cytogenetic and  molecular studies.  Comprehensive report with results of pending ancillary studies to follow.    Microscopic description:  1. Biopsy (core and clot):  Quality:  Small (0.8 cm and 0.9 cm), adequate cores and adequate clot  sections with   marrow fragments  Cellularity:  90%  Myeloid lineage:  Markedly  decreased  Erythroid lineage:  Rare  Megakaryocytes:  Rare  Blasts:  Increased. Medium size blasts with round  to  convoluted nuclei, fine chromatin and   prominent  nucleoli are forming sheets and replacing the marrow  Lymphocytes  Not increased  Plasma cells:  Not increased    2. Aspirate:  Quality / cellularity:   Spicular and cellular aspirates  Myeloid lineage:  Decreased  Erythroid lineage:  Rare  Megakaryocytes:  Rare  Plasma cells:   Not increased  Blasts:    Markedly increased . Blast are medium to large in size,  increased n/c ratio,      convoluted nuclei, fine chromatin and  prominent  nucleoli, some with  vacuolated, basophilic cytoplasm    Bone Marrow Asp Smear Diff Cell Count:  500 Cells.  Type  Normal* %  Blast  0-3 78%  Promyelocyte  1-8 0%  Myelocyte  10-15 1%  Metamyelocyte  10-15 2%  Band/Neutrophil  12-25  10%  Eosinophil  1-5 0%  Basophil  0-1 0%  Pronormoblast 0-2 0%  Normoblast  15-25 3%  Monocyte  0-2 2%  Lymphocyte  10-15 5%  Plasma cell  0-1 0%  *Adult Range  Peripheral Blood Smear:    WBC:  Marked leukocytosis with increased number of blasts/blast  equivalents  (promonocytes). Granulocytes are decreased, some show abnormal lobation  and granulation  RBC:  Normocytic anemia with mild anisocytosis and mild polychromasia.  nRBC  seen. Few  dacrocytes  present  Platelets:  Normal in number with unremarkable morphology    Ancillary studies  Special stains on bone marrow aspirate:  Iron stain:  Iron stores are increased There is no ring sideroblasts    Special stains on core biopsy:  Iron:  Positive    Immunohistochemical stains:    CD34, , MPO, CD71, E-cad, stains are  performed on block 1A    CD34:  Highlights vascular structures, blasts are negative  :  Highlights scattered masts cells. Blasts are negative  MPO:  Blasts are weakly positive  CD71 : Highlights rare erythroid cells  E-cad:  Highlights rare early erythroid series  CD14:  Majority of blasts are negative    Flow cytometry analysis ([facility]-, peripheral blood, collected  5/23/2022)   _: Monocytes (84.2% of total) positive for CD4 (dim),  CD11b (bright), CD13, CD15 (subset),  CD33 (bright), CD45 (dim), CD56  (partial), CD64 (bright); negative for CD2, CD7, CD34,  with a major  subset showing loss of CD14  Myeloblasts (0.03% of total)   positive for CD13, CD33, CD34, CD38  (decreased)  (decreased), HLA-DR and negative for CD2, CD3, CD4,  CD5, CD7, CD8, CD10, CD11b, CD14, CD15, CD16, CD19, CD20, CD22, CD56,    CD64, TdT  Myeloid population is markedly decreased  Lymphocytes (4% of cells): Heterogeneous population of T-cells (with  normal CD4 to CD8 ratio), natural killer cells, and polytypic B-cells.    Flow cytometry analysis (47-[facility]-, bone marrow, collected  5/24/2022):  Monocytes (84.6% of total) positive for CD11b (bright), CD13,  CD15 (subset),  CD33 (bright), CD45 (dim), CD56 (partial), CD64 (bright);  negative for CD2, CD7, CD34,  with a major subset showing loss of  CD14  Myeloblasts (0.07% of total) positive for CD13, CD33, CD34, CD38  (decreased,  (decreased), HLA-DR and negative for CD2, CD7, CD10,  CD14, CD15, CD16, CD19  Myeloid population is markedly decreased    Cytogenetics:  Karyotype:  In process, will be reported as an addendum  FISH:   In process, will be reported as an addendum  Verified by: Shanice Hawkins MD  (Electronic Signature)        Cultures:     COVID-19 PCR . (05.23.22 @ 12:09)    COVID-19 PCR: NotDete        RADIOLOGY & ADDITIONAL STUDIES:    < from: Xray Chest 1 View- PORTABLE-Urgent (Xray Chest 1 View- PORTABLE-Urgent .) (05.26.22 @ 05:34) >  IMPRESSION:  Cardiomegaly with mild/moderate pulmonary venous hypertension/pulmonary   edema.  Small bilateral pleural effusions associated bibasilar atelectasis.      < from: US Duplex Kidneys (US Doppler Renal) (05.24.22 @ 11:22) >  IMPRESSION:  No evidence of a significant renal artery stenosis.  increased renal cortical echogenicity bilaterally and increased resistive   indices in the bilateral kidneys, suggestive of renal parenchymal disease.   Diagnosis: relapsded AML    Protocol/Chemo Regimen: pending   Day: NA       Pt endorsed: No overnight events, afebrile, unable to work with PT due to shoulder pain     Review of Systems: Denies SOB, CP, n/v, HA's or dizziness      Pain scale: 5/10                            Location: right shoulder/arm       Diet: Regular     Allergies: morphine (Unknown)      MEDICATIONS  (STANDING):  aspirin enteric coated 81 milliGRAM(s) Oral daily  Biotene Dry Mouth Oral Rinse 15 milliLiter(s) Swish and Spit four times a day  chlorhexidine 2% Cloths 1 Application(s) Topical daily  finasteride 5 milliGRAM(s) Oral daily  heparin   Injectable 5000 Unit(s) SubCutaneous every 8 hours  heparin  Lock Flush 100 Units/mL Injectable 500 Unit(s) IV Push once  hydroxyurea 1000 milliGRAM(s) Oral every 12 hours  isosorbide   mononitrate ER Tablet (IMDUR) 60 milliGRAM(s) Oral daily  lidocaine   4% Patch 1 Patch Transdermal daily  lidocaine 2% Injectable 20 milliLiter(s) Local Injection once  metoprolol succinate ER 50 milliGRAM(s) Oral daily  polyethylene glycol 3350 17 Gram(s) Oral two times a day  sevelamer carbonate 800 milliGRAM(s) Oral three times a day    MEDICATIONS  (PRN):  acetaminophen     Tablet .. 650 milliGRAM(s) Oral every 6 hours PRN Temp greater or equal to 38C (100.4F), Mild Pain (1 - 3)      Vital Signs Last 24 Hrs  T(C): 36.8 (27 May 2022 05:16), Max: 37.7 (26 May 2022 13:10)  T(F): 98.2 (27 May 2022 05:16), Max: 99.8 (26 May 2022 13:10)  HR: 93 (27 May 2022 05:16) (85 - 108)  BP: 155/67 (27 May 2022 05:16) (128/68 - 165/69)  BP(mean): --  RR: 16 (27 May 2022 05:16) (16 - 20)  SpO2: 94% (27 May 2022 05:16) (93% - 99%)    I&O's Summary    26 May 2022 07:01  -  27 May 2022 07:00  --------------------------------------------------------  IN: 484 mL / OUT: 400 mL / NET: 84 mL      PHYSICAL EXAM  General: Sitting up in bed NAD  HEENT: clear oropharynx, anicteric sclera  CV: normal S1/S2, reg  Lungs: Decreased at bases, L sided wheeze  Abdomen: soft, + BS,  non-tender non-distended,   Ext: no edema BLE's  Skin: no rashes or ecchymosis  Neuro: alert and oriented X 2, RUE decreased motor strength   Central Line: L PICC CDI       LABS:             CARDIAC MARKERS ( 26 May 2022 06:48 )  x     / x     / 135 U/L / x     / 2.1 ng/mL                            11.1   81.08 )-----------( 105      ( 27 May 2022 06:59 )             33.0     27 May 2022 06:59    137    |  98     |  53     ----------------------------<  127    4.0     |  24     |  3.74     Ca    11.0       27 May 2022 06:59  Phos  4.3       27 May 2022 06:59  Mg     2.3       27 May 2022 06:59    TPro  6.3    /  Alb  3.1    /  TBili  0.4    /  DBili  x      /  AST  37     /  ALT  15     /  AlkPhos  87     27 May 2022 06:59    PT/INR - ( 26 May 2022 17:58 )   PT: 16.7 sec;   INR: 1.45 ratio    PTT - ( 26 May 2022 17:58 )  PTT:35.2 sec    LIVER FUNCTIONS - ( 27 May 2022 06:59 )  Alb: 3.1 g/dL / Pro: 6.3 g/dL / ALK PHOS: 87 U/L / ALT: 15 U/L / AST: 37 U/L / GGT: x               Hematopathology Report (05.24.22 @ 13:30)    Hematopathology Report:   ACCESSION No:  [id number]  Patient:   TAMIKO PEREZ      Accession:                             [id number]    Collected Date/Time:                   5/24/2022 13:30 EDT  Received Date/Time:                    5/24/2022 15:25 EDT    Hematopathology Report - Auth (Verified)    Specimen(s) Submitted  1  Bone marrow biopsy  2  Bone marrow aspirate for Flow    Final Diagnosis  1, 2. 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     Current biopsy is consistent  with relapsed acute myeloid leukemia, 78% blasts/blast equivalent by  differential counts. Please correlate with pending cytogenetic and  molecular studies.  Comprehensive report with results of pending ancillary studies to follow.    Microscopic description:  1. Biopsy (core and clot):  Quality:  Small (0.8 cm and 0.9 cm), adequate cores and adequate clot  sections with   marrow fragments  Cellularity:  90%  Myeloid lineage:  Markedly  decreased  Erythroid lineage:  Rare  Megakaryocytes:  Rare  Blasts:  Increased. Medium size blasts with round  to  convoluted nuclei, fine chromatin and   prominent  nucleoli are forming sheets and replacing the marrow  Lymphocytes  Not increased  Plasma cells:  Not increased    2. Aspirate:  Quality / cellularity:   Spicular and cellular aspirates  Myeloid lineage:  Decreased  Erythroid lineage:  Rare  Megakaryocytes:  Rare  Plasma cells:   Not increased  Blasts:    Markedly increased . Blast are medium to large in size,  increased n/c ratio,      convoluted nuclei, fine chromatin and  prominent  nucleoli, some with  vacuolated, basophilic cytoplasm    Bone Marrow Asp Smear Diff Cell Count:  500 Cells.  Type  Normal* %  Blast  0-3 78%  Promyelocyte  1-8 0%  Myelocyte  10-15 1%  Metamyelocyte  10-15 2%  Band/Neutrophil  12-25  10%  Eosinophil  1-5 0%  Basophil  0-1 0%  Pronormoblast 0-2 0%  Normoblast  15-25 3%  Monocyte  0-2 2%  Lymphocyte  10-15 5%  Plasma cell  0-1 0%  *Adult Range  Peripheral Blood Smear:    WBC:  Marked leukocytosis with increased number of blasts/blast  equivalents  (promonocytes). Granulocytes are decreased, some show abnormal lobation  and granulation  RBC:  Normocytic anemia with mild anisocytosis and mild polychromasia.  nRBC  seen. Few  dacrocytes  present  Platelets:  Normal in number with unremarkable morphology    Ancillary studies  Special stains on bone marrow aspirate:  Iron stain:  Iron stores are increased There is no ring sideroblasts    Special stains on core biopsy:  Iron:  Positive    Immunohistochemical stains:    CD34, , MPO, CD71, E-cad, stains are  performed on block 1A    CD34:  Highlights vascular structures, blasts are negative  :  Highlights scattered masts cells. Blasts are negative  MPO:  Blasts are weakly positive  CD71 : Highlights rare erythroid cells  E-cad:  Highlights rare early erythroid series  CD14:  Majority of blasts are negative    Flow cytometry analysis ([facility]-, peripheral blood, collected  5/23/2022)   _: Monocytes (84.2% of total) positive for CD4 (dim),  CD11b (bright), CD13, CD15 (subset),  CD33 (bright), CD45 (dim), CD56  (partial), CD64 (bright); negative for CD2, CD7, CD34,  with a major  subset showing loss of CD14  Myeloblasts (0.03% of total)   positive for CD13, CD33, CD34, CD38  (decreased)  (decreased), HLA-DR and negative for CD2, CD3, CD4,  CD5, CD7, CD8, CD10, CD11b, CD14, CD15, CD16, CD19, CD20, CD22, CD56,    CD64, TdT  Myeloid population is markedly decreased  Lymphocytes (4% of cells): Heterogeneous population of T-cells (with  normal CD4 to CD8 ratio), natural killer cells, and polytypic B-cells.    Flow cytometry analysis ([facility]-, bone marrow, collected  5/24/2022):  Monocytes (84.6% of total) positive for CD11b (bright), CD13,  CD15 (subset),  CD33 (bright), CD45 (dim), CD56 (partial), CD64 (bright);  negative for CD2, CD7, CD34,  with a major subset showing loss of  CD14  Myeloblasts (0.07% of total) positive for CD13, CD33, CD34, CD38  (decreased,  (decreased), HLA-DR and negative for CD2, CD7, CD10,  CD14, CD15, CD16, CD19  Myeloid population is markedly decreased    Cytogenetics:  Karyotype:  In process, will be reported as an addendum  FISH:   In process, will be reported as an addendum  Verified by: Shanice Hawkins MD  (Electronic Signature)      Cultures:     COVID-19 PCR . (05.23.22 @ 12:09)    COVID-19 PCR: NotDetec        RADIOLOGY & ADDITIONAL STUDIES:    < from: Xray Chest 1 View- PORTABLE-Urgent (Xray Chest 1 View- PORTABLE-Urgent .) (05.26.22 @ 05:34) >  IMPRESSION:  Cardiomegaly with mild/moderate pulmonary venous hypertension/pulmonary   edema.  Small bilateral pleural effusions associated bibasilar atelectasis.      < from: US Duplex Kidneys (US Doppler Renal) (05.24.22 @ 11:22) >  IMPRESSION:  No evidence of a significant renal artery stenosis.  increased renal cortical echogenicity bilaterally and increased resistive   indices in the bilateral kidneys, suggestive of renal parenchymal disease.   Diagnosis: relapsded AML    Protocol/Chemo Regimen: pending   Day: NA       Pt endorsed: No overnight events, afebrile, unable to work with PT due to shoulder pain     Review of Systems: Denies SOB, CP, n/v, HA's or dizziness      Pain scale: 5/10                            Location: right shoulder/arm       Diet: Regular     Allergies: morphine (Unknown)      MEDICATIONS  (STANDING):  aspirin enteric coated 81 milliGRAM(s) Oral daily  Biotene Dry Mouth Oral Rinse 15 milliLiter(s) Swish and Spit four times a day  chlorhexidine 2% Cloths 1 Application(s) Topical daily  finasteride 5 milliGRAM(s) Oral daily  heparin   Injectable 5000 Unit(s) SubCutaneous every 8 hours  heparin  Lock Flush 100 Units/mL Injectable 500 Unit(s) IV Push once  hydroxyurea 1000 milliGRAM(s) Oral every 12 hours  isosorbide   mononitrate ER Tablet (IMDUR) 60 milliGRAM(s) Oral daily  lidocaine   4% Patch 1 Patch Transdermal daily  lidocaine 2% Injectable 20 milliLiter(s) Local Injection once  metoprolol succinate ER 50 milliGRAM(s) Oral daily  polyethylene glycol 3350 17 Gram(s) Oral two times a day  sevelamer carbonate 800 milliGRAM(s) Oral three times a day    MEDICATIONS  (PRN):  acetaminophen     Tablet .. 650 milliGRAM(s) Oral every 6 hours PRN Temp greater or equal to 38C (100.4F), Mild Pain (1 - 3)      Vital Signs Last 24 Hrs  T(C): 36.8 (27 May 2022 05:16), Max: 37.7 (26 May 2022 13:10)  T(F): 98.2 (27 May 2022 05:16), Max: 99.8 (26 May 2022 13:10)  HR: 93 (27 May 2022 05:16) (85 - 108)  BP: 155/67 (27 May 2022 05:16) (128/68 - 165/69)  BP(mean): --  RR: 16 (27 May 2022 05:16) (16 - 20)  SpO2: 94% (27 May 2022 05:16) (93% - 99%)    I&O's Summary    26 May 2022 07:01  -  27 May 2022 07:00  --------------------------------------------------------  IN: 484 mL / OUT: 400 mL / NET: 84 mL      PHYSICAL EXAM  General: Sitting up in bed NAD  HEENT: clear oropharynx, anicteric sclera  CV: normal S1/S2, reg  Lungs: Decreased at bases, L sided wheeze  Abdomen: soft, + BS,  non-tender non-distended,   Ext: no edema BLE's  Skin: no rashes or ecchymosis  Neuro: alert and oriented X 2, RUE decreased motor strength   Central Line: L PICC CDI       LABS:             CARDIAC MARKERS ( 26 May 2022 06:48 )  x     / x     / 135 U/L / x     / 2.1 ng/mL                            11.1   81.08 )-----------( 105      ( 27 May 2022 06:59 )             33.0     27 May 2022 06:59    137    |  98     |  53     ----------------------------<  127    4.0     |  24     |  3.74     Ca    11.0       27 May 2022 06:59  Phos  4.3       27 May 2022 06:59  Mg     2.3       27 May 2022 06:59    TPro  6.3    /  Alb  3.1    /  TBili  0.4    /  DBili  x      /  AST  37     /  ALT  15     /  AlkPhos  87     27 May 2022 06:59    PT/INR - ( 26 May 2022 17:58 )   PT: 16.7 sec;   INR: 1.45 ratio    PTT - ( 26 May 2022 17:58 )  PTT:35.2 sec    LIVER FUNCTIONS - ( 27 May 2022 06:59 )  Alb: 3.1 g/dL / Pro: 6.3 g/dL / ALK PHOS: 87 U/L / ALT: 15 U/L / AST: 37 U/L / GGT: x               Hematopathology Report (05.24.22 @ 13:30)    Hematopathology Report:   ACCESSION No:  10 [id number]  Patient:   TAMIKO PEREZ      Accession:                             [id number]    Collected Date/Time:                   5/24/2022 13:30 EDT  Received Date/Time:                    5/24/2022 15:25 EDT    Hematopathology Report - Auth (Verified)    Specimen(s) Submitted  1  Bone marrow biopsy  2  Bone marrow aspirate for Flow    Final Diagnosis  1, 2. 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     Current biopsy is consistent  with relapsed acute myeloid leukemia, 78% blasts/blast equivalent by  differential counts. Please correlate with pending cytogenetic and  molecular studies.  Comprehensive report with results of pending ancillary studies to follow.    Microscopic description:  1. Biopsy (core and clot):  Quality:  Small (0.8 cm and 0.9 cm), adequate cores and adequate clot  sections with   marrow fragments  Cellularity:  90%  Myeloid lineage:  Markedly  decreased  Erythroid lineage:  Rare  Megakaryocytes:  Rare  Blasts:  Increased. Medium size blasts with round  to  convoluted nuclei, fine chromatin and   prominent  nucleoli are forming sheets and replacing the marrow  Lymphocytes  Not increased  Plasma cells:  Not increased    2. Aspirate:  Quality / cellularity:   Spicular and cellular aspirates  Myeloid lineage:  Decreased  Erythroid lineage:  Rare  Megakaryocytes:  Rare  Plasma cells:   Not increased  Blasts:    Markedly increased . Blast are medium to large in size,  increased n/c ratio,      convoluted nuclei, fine chromatin and  prominent  nucleoli, some with  vacuolated, basophilic cytoplasm    Bone Marrow Asp Smear Diff Cell Count:  500 Cells.  Type  Normal* %  Blast  0-3 78%  Promyelocyte  1-8 0%  Myelocyte  10-15 1%  Metamyelocyte  10-15 2%  Band/Neutrophil  12-25  10%  Eosinophil  1-5 0%  Basophil  0-1 0%  Pronormoblast 0-2 0%  Normoblast  15-25 3%  Monocyte  0-2 2%  Lymphocyte  10-15 5%  Plasma cell  0-1 0%  *Adult Range  Peripheral Blood Smear:    WBC:  Marked leukocytosis with increased number of blasts/blast  equivalents  (promonocytes). Granulocytes are decreased, some show abnormal lobation  and granulation  RBC:  Normocytic anemia with mild anisocytosis and mild polychromasia.  nRBC  seen. Few  dacrocytes  present  Platelets:  Normal in number with unremarkable morphology    Ancillary studies  Special stains on bone marrow aspirate:  Iron stain:  Iron stores are increased There is no ring sideroblasts    Special stains on core biopsy:  Iron:  Positive    Immunohistochemical stains:    CD34, , MPO, CD71, E-cad, stains are  performed on block 1A    CD34:  Highlights vascular structures, blasts are negative  :  Highlights scattered masts cells. Blasts are negative  MPO:  Blasts are weakly positive  CD71 : Highlights rare erythroid cells  E-cad:  Highlights rare early erythroid series  CD14:  Majority of blasts are negative    Flow cytometry analysis ([facility]-, peripheral blood, collected  5/23/2022)   _: Monocytes (84.2% of total) positive for CD4 (dim),  CD11b (bright), CD13, CD15 (subset),  CD33 (bright), CD45 (dim), CD56  (partial), CD64 (bright); negative for CD2, CD7, CD34,  with a major  subset showing loss of CD14  Myeloblasts (0.03% of total)   positive for CD13, CD33, CD34, CD38  (decreased)  (decreased), HLA-DR and negative for CD2, CD3, CD4,  CD5, CD7, CD8, CD10, CD11b, CD14, CD15, CD16, CD19, CD20, CD22, CD56,    CD64, TdT  Myeloid population is markedly decreased  Lymphocytes (4% of cells): Heterogeneous population of T-cells (with  normal CD4 to CD8 ratio), natural killer cells, and polytypic B-cells.    Flow cytometry analysis ([facility]-, bone marrow, collected  5/24/2022):  Monocytes (84.6% of total) positive for CD11b (bright), CD13,  CD15 (subset),  CD33 (bright), CD45 (dim), CD56 (partial), CD64 (bright);  negative for CD2, CD7, CD34,  with a major subset showing loss of  CD14  Myeloblasts (0.07% of total) positive for CD13, CD33, CD34, CD38  (decreased,  (decreased), HLA-DR and negative for CD2, CD7, CD10,  CD14, CD15, CD16, CD19  Myeloid population is markedly decreased    Cytogenetics:  Karyotype:  In process, will be reported as an addendum  FISH:   In process, will be reported as an addendum  Verified by: Shanice Hawkins MD  (Electronic Signature)      Cultures:     COVID-19 PCR . (05.23.22 @ 12:09)    COVID-19 PCR: NotDetec    RADIOLOGY & ADDITIONAL STUDIES:    < from: Xray Chest 1 View- PORTABLE-Urgent (Xray Chest 1 View- PORTABLE-Urgent .) (05.26.22 @ 05:34) >  IMPRESSION:  Cardiomegaly with mild/moderate pulmonary venous hypertension/pulmonary   edema.  Small bilateral pleural effusions associated bibasilar atelectasis.      < from: US Duplex Kidneys (US Doppler Renal) (05.24.22 @ 11:22) >  IMPRESSION:  No evidence of a significant renal artery stenosis.  increased renal cortical echogenicity bilaterally and increased resistive   indices in the bilateral kidneys, suggestive of renal parenchymal disease.

## 2022-05-27 NOTE — PROGRESS NOTE ADULT - PROBLEM SELECTOR PLAN 6
DVT prophylaxis: Heparin subc 5000, DC when PLT <50K  DNR/DNI confirmed. MOLST in chart. Continue all treatment measures otherwise.
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
## 2022-05-27 NOTE — PHYSICAL THERAPY INITIAL EVALUATION ADULT - BALANCE TRAINING, PT EVAL
Goal: Pt. will improve dynamic/static standing balance by 1 grade in 4 weeks to improve stability and decrease fall risk.

## 2022-05-27 NOTE — PROGRESS NOTE ADULT - PROBLEM SELECTOR PLAN 2
Pt. with hypercalcemia in setting of hyperparathyroidism?. PTH not appropriately suppressed, of 50. Calcium 11 today. Vit D 25 and 1-25 WNL. May need to be started on Sensipar. Monitor serum Calcium level.     If any questions, please feel free to contact me     Inge Varner  Nephrology Fellow  [facility] Pager: [phone]  [facility] Pager: [phone]

## 2022-05-27 NOTE — PROGRESS NOTE ADULT - PROBLEM SELECTOR PLAN 4
HFr EF 27  - f/u BNP, 22,593 today   - c/w Home metoprolol 50mg daily  - if Short of breath, Furosemide IV 40mg   - strict I's/ O's  repeat TTE HFr EF 27  - f/u BNP, 22,593 today   - c/w Home metoprolol 50mg daily  - if Short of breath, Furosemide IV 40mg   - strict I's/ O's  repeat TTE, EF unavailable

## 2022-05-27 NOTE — PROGRESS NOTE ADULT - PROBLEM SELECTOR PLAN 5
2/2 Chronic Rt shoulder pain s/p CVA w/t RUE weakness  - Lidocaine patch to affected area  OT consult

## 2022-05-27 NOTE — CONSULT NOTE ADULT - PROBLEM SELECTOR RECOMMENDATION 4
Seeing patient for GOC.    Kiki Galeano MD  Palliative Fellow, PGY5  [facility]

## 2022-05-28 NOTE — PROVIDER CONTACT NOTE (CRITICAL VALUE NOTIFICATION) - ACTION/TREATMENT ORDERED:
no new orders at this time
none at this time
Pt on hydrea
no new order at this time
no new orders at this time
no new orders at this time

## 2022-05-28 NOTE — CHART NOTE - NSCHARTNOTEFT_GEN_A_CORE
Constipation  Medication(s):   dulcolax suppository qd, prn  senna 17.2mg qhs  miralax 17mg qd    Pain  Medication  IV dilaudid 0.2mg q2H PRN  Special instructions      Nausea or vomiting  Medication  IV reglan 5 mg q6H PRN  Special instructions:      Dyspnea  Medication  IV dilaudid 0.2mg q2H PRN  Special instructions:      Agitation  Medication:  IV ativan 0.25 mg q6H prn    Anxiety  Medication  IV ativan 0.25 mg q6H prn  Special instructions: may be given for opioid refractory dyspnea    Seizure  Medication:  IV ativan 1mg q15 min PRN for seizure  Special instructions: Stop after two doses
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
## 2022-05-28 NOTE — PROGRESS NOTE ADULT - PROBLEM SELECTOR PLAN 1
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    5/27 Given pt has poor prognosis with days to week until end of life, Palliative care consulted, discuss with pt and family re GOC. Hospice referral made. Possible candidate for PCU. 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  5/27 Given pt has poor prognosis with days to week until end of life, Palliative care consulted, discuss with pt and family re GOC. Hospice referral made. Possible candidate for PCU. Elitek 3mg IV given for rising uric acid

## 2022-05-28 NOTE — PROGRESS NOTE ADULT - PROBLEM SELECTOR PLAN 5
HFr EF 27  - f/u BNP, 22,593 today   - c/w Home metoprolol 50mg daily  - if Short of breath, Furosemide IV 40mg   - strict I's/ O's  repeat TTE, EF unavailable HFr EF 27,  Elevated BNP's    - c/w Home metoprolol 50mg daily  - if Short of breath, Furosemide IV 40-80mg   - strict I's/ O's  5/26 repeat TTE w diffuse hypokinesis, EF unavailable

## 2022-05-28 NOTE — PROGRESS NOTE ADULT - SUBJECTIVE AND OBJECTIVE BOX
Diagnosis: relapsded AML    Protocol/Chemo Regimen: pending   Day: NA       Pt endorsed: +febrile,     Review of Systems:       Pain scale: 5/10                            Location: right shoulder/arm       Diet: Regular     Allergies: morphine (Unknown)      MEDICATIONS  (STANDING):  aspirin enteric coated 81 milliGRAM(s) Oral daily  Biotene Dry Mouth Oral Rinse 15 milliLiter(s) Swish and Spit four times a day  caspofungin IVPB 50 milliGRAM(s) IV Intermittent every 24 hours  cefepime   IVPB 1000 milliGRAM(s) IV Intermittent every 12 hours  cefepime   IVPB      chlorhexidine 2% Cloths 1 Application(s) Topical daily  finasteride 5 milliGRAM(s) Oral daily  heparin   Injectable 5000 Unit(s) SubCutaneous every 8 hours  heparin  Lock Flush 100 Units/mL Injectable 500 Unit(s) IV Push once  hydroxyurea 1000 milliGRAM(s) Oral every 12 hours  isosorbide   mononitrate ER Tablet (IMDUR) 60 milliGRAM(s) Oral daily  lidocaine   4% Patch 1 Patch Transdermal daily  metoprolol succinate ER 50 milliGRAM(s) Oral daily  polyethylene glycol 3350 17 Gram(s) Oral two times a day  sevelamer carbonate 800 milliGRAM(s) Oral three times a day    MEDICATIONS  (PRN):  acetaminophen     Tablet .. 650 milliGRAM(s) Oral every 6 hours PRN Temp greater or equal to 38C (100.4F), Mild Pain (1 - 3)        Vital Signs Last 24 Hrs  T(C): 37.1 (28 May 2022 09:16), Max: 38.4 (27 May 2022 20:19)  T(F): 98.7 (28 May 2022 09:16), Max: 101.2 (27 May 2022 20:19)  HR: 100 (28 May 2022 09:16) (86 - 100)  BP: 140/61 (28 May 2022 09:16) (108/69 - 174/64)  BP(mean): --  RR: 20 (28 May 2022 09:16) (16 - 20)  SpO2: 97% (28 May 2022 09:16) (96% - 99%)    I&O's Summary    27 May 2022 07:01  -  28 May 2022 07:00  --------------------------------------------------------  IN: 397 mL / OUT: 400 mL / NET: -3 mL    28 May 2022 07:01  -  28 May 2022 09:51  --------------------------------------------------------  IN: 60 mL / OUT: 0 mL / NET: 60 mL      PHYSICAL EXAM  General: Sitting up in bed NAD  HEENT: clear oropharynx, anicteric sclera  CV: normal S1/S2, reg  Lungs: Decreased at bases, L sided wheeze  Abdomen: soft, + BS,  non-tender non-distended,   Ext: no edema BLE's  Skin: no rashes or ecchymosis  Neuro: alert and oriented X 2, RUE decreased motor strength   Central Line: L PICC CDI       LABS:                                       10.5   40.93 )-----------( 84       ( 28 May 2022 07:09 )             31.1     28 May 2022 07:09    136    |  97     |  73     ----------------------------<  148    3.7     |  23     |  4.03     Ca    10.6       28 May 2022 07:09  Phos  4.9       28 May 2022 07:09  Mg     2.4       28 May 2022 07:09    TPro  6.3    /  Alb  3.0    /  TBili  0.4    /  DBili  x      /  AST  29     /  ALT  15     /  AlkPhos  92     28 May 2022 07:09    PT/INR - ( 26 May 2022 17:58 )   PT: 16.7 sec;   INR: 1.45 ratio         PTT - ( 26 May 2022 17:58 )  PTT:35.2 sec  CAPILLARY BLOOD GLUCOSE        LIVER FUNCTIONS - ( 28 May 2022 07:09 )  Alb: 3.0 g/dL / Pro: 6.3 g/dL / ALK PHOS: 92 U/L / ALT: 15 U/L / AST: 29 U/L / GGT: x           Urinalysis Basic - ( 28 May 2022 01:10 )    Color: Yellow / Appearance: Slightly Turbid / S.018 / pH: x  Gluc: x / Ketone: Negative  / Bili: Negative / Urobili: Negative   Blood: x / Protein: 100 / Nitrite: Negative   Leuk Esterase: Negative / RBC: 2 /hpf / WBC 8 /HPF   Sq Epi: x / Non Sq Epi: 9 /hpf / Bacteria: Negative            Hematopathology Report (22 @ 13:30)    Hematopathology Report:   ACCESSION No:  [id number]  Patient:   TAMIKO PEREZ      Accession:                             [id number]    Collected Date/Time:                   2022 13:30 EDT  Received Date/Time:                    2022 15:25 EDT    Hematopathology Report - Auth (Verified)    Specimen(s) Submitted  1  Bone marrow biopsy  2  Bone marrow aspirate for Flow    Final Diagnosis  1, 2. 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     Current biopsy is consistent  with relapsed acute myeloid leukemia, 78% blasts/blast equivalent by  differential counts. Please correlate with pending cytogenetic and  molecular studies.  Comprehensive report with results of pending ancillary studies to follow.    Microscopic description:  1. Biopsy (core and clot):  Quality:  Small (0.8 cm and 0.9 cm), adequate cores and adequate clot  sections with   marrow fragments  Cellularity:  90%  Myeloid lineage:  Markedly  decreased  Erythroid lineage:  Rare  Megakaryocytes:  Rare  Blasts:  Increased. Medium size blasts with round  to  convoluted nuclei, fine chromatin and   prominent  nucleoli are forming sheets and replacing the marrow  Lymphocytes  Not increased  Plasma cells:  Not increased    2. Aspirate:  Quality / cellularity:   Spicular and cellular aspirates  Myeloid lineage:  Decreased  Erythroid lineage:  Rare  Megakaryocytes:  Rare  Plasma cells:   Not increased  Blasts:    Markedly increased . Blast are medium to large in size,  increased n/c ratio,      convoluted nuclei, fine chromatin and  prominent  nucleoli, some with  vacuolated, basophilic cytoplasm    Bone Marrow Asp Smear Diff Cell Count:  500 Cells.  Type  Normal* %  Blast  0-3 78%  Promyelocyte  1-8 0%  Myelocyte  10-15 1%  Metamyelocyte  10-15 2%  Band/Neutrophil  12-25  10%  Eosinophil  1-5 0%  Basophil  0-1 0%  Pronormoblast 0-2 0%  Normoblast  15-25 3%  Monocyte  0-2 2%  Lymphocyte  10-15 5%  Plasma cell  0-1 0%  *Adult Range  Peripheral Blood Smear:    WBC:  Marked leukocytosis with increased number of blasts/blast  equivalents  (promonocytes). Granulocytes are decreased, some show abnormal lobation  and granulation  RBC:  Normocytic anemia with mild anisocytosis and mild polychromasia.  nRBC  seen. Few  dacrocytes  present  Platelets:  Normal in number with unremarkable morphology    Ancillary studies  Special stains on bone marrow aspirate:  Iron stain:  Iron stores are increased There is no ring sideroblasts    Special stains on core biopsy:  Iron:  Positive    Immunohistochemical stains:    CD34, , MPO, CD71, E-cad, stains are  performed on block 1A    CD34:  Highlights vascular structures, blasts are negative  :  Highlights scattered masts cells. Blasts are negative  MPO:  Blasts are weakly positive  CD71 : Highlights rare erythroid cells  E-cad:  Highlights rare early erythroid series  CD14:  Majority of blasts are negative    Flow cytometry analysis (66-DN-, peripheral blood, collected  2022)   _: Monocytes (84.2% of total) positive for CD4 (dim),  CD11b (bright), CD13, CD15 (subset),  CD33 (bright), CD45 (dim), CD56  (partial), CD64 (bright); negative for CD2, CD7, CD34,  with a major  subset showing loss of CD14  Myeloblasts (0.03% of total)   positive for CD13, CD33, CD34, CD38  (decreased)  (decreased), HLA-DR and negative for CD2, CD3, CD4,  CD5, CD7, CD8, CD10, CD11b, CD14, CD15, CD16, CD19, CD20, CD22, CD56,    CD64, TdT  Myeloid population is markedly decreased  Lymphocytes (4% of cells): Heterogeneous population of T-cells (with  normal CD4 to CD8 ratio), natural killer cells, and polytypic B-cells.    Flow cytometry analysis (98-PD-, bone marrow, collected  2022):  Monocytes (84.6% of total) positive for CD11b (bright), CD13,  CD15 (subset),  CD33 (bright), CD45 (dim), CD56 (partial), CD64 (bright);  negative for CD2, CD7, CD34,  with a major subset showing loss of  CD14  Myeloblasts (0.07% of total) positive for CD13, CD33, CD34, CD38  (decreased,  (decreased), HLA-DR and negative for CD2, CD7, CD10,  CD14, CD15, CD16, CD19  Myeloid population is markedly decreased    Cytogenetics:  Karyotype:  In process, will be reported as an addendum  FISH:   In process, will be reported as an addendum  Verified by: Shanice Hawkins MD  (Electronic Signature)      Cultures:     COVID-19 PCR . (22 @ 12:09)    COVID-19 PCR: [facility]    RADIOLOGY & ADDITIONAL STUDIES:    < from: Xray Chest 1 View- PORTABLE-Urgent (Xray Chest 1 View- PORTABLE-Urgent .) (22 @ 05:34) >  IMPRESSION:  Cardiomegaly with mild/moderate pulmonary venous hypertension/pulmonary   edema.  Small bilateral pleural effusions associated bibasilar atelectasis.      < from: US Duplex Kidneys (US Doppler Renal) (22 @ 11:22) >  IMPRESSION:  No evidence of a significant renal artery stenosis.  increased renal cortical echogenicity bilaterally and increased resistive   indices in the bilateral kidneys, suggestive of renal parenchymal disease.   Diagnosis: relapsded AML    Protocol/Chemo Regimen: pending   Day: NA       Pt endorsed: +febrile overnight, blood cx's obtained     Review of Systems: Denies CP, SOB      Pain scale: 5/10                            Location: right shoulder/arm       Diet: Regular     Allergies: morphine (Unknown)      MEDICATIONS  (STANDING):  aspirin enteric coated 81 milliGRAM(s) Oral daily  Biotene Dry Mouth Oral Rinse 15 milliLiter(s) Swish and Spit four times a day  caspofungin IVPB 50 milliGRAM(s) IV Intermittent every 24 hours  cefepime   IVPB 1000 milliGRAM(s) IV Intermittent every 12 hours  cefepime   IVPB      chlorhexidine 2% Cloths 1 Application(s) Topical daily  finasteride 5 milliGRAM(s) Oral daily  heparin   Injectable 5000 Unit(s) SubCutaneous every 8 hours  heparin  Lock Flush 100 Units/mL Injectable 500 Unit(s) IV Push once  hydroxyurea 1000 milliGRAM(s) Oral every 12 hours  isosorbide   mononitrate ER Tablet (IMDUR) 60 milliGRAM(s) Oral daily  lidocaine   4% Patch 1 Patch Transdermal daily  metoprolol succinate ER 50 milliGRAM(s) Oral daily  polyethylene glycol 3350 17 Gram(s) Oral two times a day  sevelamer carbonate 800 milliGRAM(s) Oral three times a day    MEDICATIONS  (PRN):  acetaminophen     Tablet .. 650 milliGRAM(s) Oral every 6 hours PRN Temp greater or equal to 38C (100.4F), Mild Pain (1 - 3)        Vital Signs Last 24 Hrs  T(C): 37.1 (28 May 2022 09:16), Max: 38.4 (27 May 2022 20:19)  T(F): 98.7 (28 May 2022 09:16), Max: 101.2 (27 May 2022 20:19)  HR: 100 (28 May 2022 09:16) (86 - 100)  BP: 140/61 (28 May 2022 09:16) (108/69 - 174/64)  BP(mean): --  RR: 20 (28 May 2022 09:16) (16 - 20)  SpO2: 97% (28 May 2022 09:16) (96% - 99%)    I&O's Summary    27 May 2022 07:01  -  28 May 2022 07:00  --------------------------------------------------------  IN: 397 mL / OUT: 400 mL / NET: -3 mL    28 May 2022 07:01  -  28 May 2022 09:51  --------------------------------------------------------  IN: 60 mL / OUT: 0 mL / NET: 60 mL      PHYSICAL EXAM  General: Sitting up in bed NAD  HEENT: clear oropharynx, anicteric sclera  CV: normal S1/S2, reg  Lungs: Decreased at bases, L sided wheeze  Abdomen: soft, + BS,  non-tender non-distended,   Ext: no edema BLE's  Skin: no rashes or ecchymosis  Neuro: alert and oriented X 2, RUE decreased motor strength   Central Line: L PICC CDI       LABS:                          10.5   40.93 )-----------( 84       ( 28 May 2022 07:09 )             31.1     28 May 2022 07:09    136    |  97     |  73     ----------------------------<  148    3.7     |  23     |  4.03     Ca    10.6       28 May 2022 07:09  Phos  4.9       28 May 2022 07:09  Mg     2.4       28 May 2022 07:09    TPro  6.3    /  Alb  3.0    /  TBili  0.4    /  DBili  x      /  AST  29     /  ALT  15     /  AlkPhos  92     28 May 2022 07:09    PT/INR - ( 26 May 2022 17:58 )   PT: 16.7 sec;   INR: 1.45 ratio    PTT - ( 26 May 2022 17:58 )  PTT:35.2 sec      LIVER FUNCTIONS - ( 28 May 2022 07:09 )  Alb: 3.0 g/dL / Pro: 6.3 g/dL / ALK PHOS: 92 U/L / ALT: 15 U/L / AST: 29 U/L / GGT: x           Urinalysis Basic - ( 28 May 2022 01:10 )    Color: Yellow / Appearance: Slightly Turbid / S.018 / pH: x  Gluc: x / Ketone: Negative  / Bili: Negative / Urobili: Negative   Blood: x / Protein: 100 / Nitrite: Negative   Leuk Esterase: Negative / RBC: 2 /hpf / WBC 8 /HPF   Sq Epi: x / Non Sq Epi: 9 /hpf / Bacteria: Negative            Hematopathology Report (22 @ 13:30)    Hematopathology Report:   ACCESSION No:  [id number]  Patient:   TAMIKO PEREZ      Accession:                             [id number]    Collected Date/Time:                   2022 13:30 EDT  Received Date/Time:                    2022 15:25 EDT    Hematopathology Report - Auth (Verified)    Specimen(s) Submitted  1  Bone marrow biopsy  2  Bone marrow aspirate for Flow    Final Diagnosis  1, 2. 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     Current biopsy is consistent  with relapsed acute myeloid leukemia, 78% blasts/blast equivalent by  differential counts. Please correlate with pending cytogenetic and  molecular studies.  Comprehensive report with results of pending ancillary studies to follow.    Microscopic description:  1. Biopsy (core and clot):  Quality:  Small (0.8 cm and 0.9 cm), adequate cores and adequate clot  sections with   marrow fragments  Cellularity:  90%  Myeloid lineage:  Markedly  decreased  Erythroid lineage:  Rare  Megakaryocytes:  Rare  Blasts:  Increased. Medium size blasts with round  to  convoluted nuclei, fine chromatin and   prominent  nucleoli are forming sheets and replacing the marrow  Lymphocytes  Not increased  Plasma cells:  Not increased    2. Aspirate:  Quality / cellularity:   Spicular and cellular aspirates  Myeloid lineage:  Decreased  Erythroid lineage:  Rare  Megakaryocytes:  Rare  Plasma cells:   Not increased  Blasts:    Markedly increased . Blast are medium to large in size,  increased n/c ratio,      convoluted nuclei, fine chromatin and  prominent  nucleoli, some with  vacuolated, basophilic cytoplasm    Bone Marrow Asp Smear Diff Cell Count:  500 Cells.  Type  Normal* %  Blast  0-3 78%  Promyelocyte  1-8 0%  Myelocyte  10-15 1%  Metamyelocyte  10-15 2%  Band/Neutrophil  12-25  10%  Eosinophil  1-5 0%  Basophil  0-1 0%  Pronormoblast 0-2 0%  Normoblast  15-25 3%  Monocyte  0-2 2%  Lymphocyte  10-15 5%  Plasma cell  0-1 0%  *Adult Range  Peripheral Blood Smear:    WBC:  Marked leukocytosis with increased number of blasts/blast  equivalents  (promonocytes). Granulocytes are decreased, some show abnormal lobation  and granulation  RBC:  Normocytic anemia with mild anisocytosis and mild polychromasia.  nRBC  seen. Few  dacrocytes  present  Platelets:  Normal in number with unremarkable morphology    Ancillary studies  Special stains on bone marrow aspirate:  Iron stain:  Iron stores are increased There is no ring sideroblasts    Special stains on core biopsy:  Iron:  Positive    Immunohistochemical stains:    CD34, , MPO, CD71, E-cad, stains are  performed on block 1A    CD34:  Highlights vascular structures, blasts are negative  :  Highlights scattered masts cells. Blasts are negative  MPO:  Blasts are weakly positive  CD71 : Highlights rare erythroid cells  E-cad:  Highlights rare early erythroid series  CD14:  Majority of blasts are negative    Flow cytometry analysis (10-LC-, peripheral blood, collected  2022)   _: Monocytes (84.2% of total) positive for CD4 (dim),  CD11b (bright), CD13, CD15 (subset),  CD33 (bright), CD45 (dim), CD56  (partial), CD64 (bright); negative for CD2, CD7, CD34,  with a major  subset showing loss of CD14  Myeloblasts (0.03% of total)   positive for CD13, CD33, CD34, CD38  (decreased)  (decreased), HLA-DR and negative for CD2, CD3, CD4,  CD5, CD7, CD8, CD10, CD11b, CD14, CD15, CD16, CD19, CD20, CD22, CD56,    CD64, TdT  Myeloid population is markedly decreased  Lymphocytes (4% of cells): Heterogeneous population of T-cells (with  normal CD4 to CD8 ratio), natural killer cells, and polytypic B-cells.    Flow cytometry analysis (13-QL-, bone marrow, collected  2022):  Monocytes (84.6% of total) positive for CD11b (bright), CD13,  CD15 (subset),  CD33 (bright), CD45 (dim), CD56 (partial), CD64 (bright);  negative for CD2, CD7, CD34,  with a major subset showing loss of  CD14  Myeloblasts (0.07% of total) positive for CD13, CD33, CD34, CD38  (decreased,  (decreased), HLA-DR and negative for CD2, CD7, CD10,  CD14, CD15, CD16, CD19  Myeloid population is markedly decreased    Cytogenetics:  Karyotype:  In process, will be reported as an addendum  FISH:   In process, will be reported as an addendum  Verified by: Shanice Hawkins MD  (Electronic Signature)      Cultures:     COVID-19 PCR . (22 @ 12:09)    COVID-19 PCR: [facility]    RADIOLOGY & ADDITIONAL STUDIES:    < from: Xray Chest 1 View- PORTABLE-Urgent (Xray Chest 1 View- PORTABLE-Urgent .) (22 @ 10:40) >  IMPRESSION:  Unchanged mild/moderate pulmonary venous hypertension/pulmonary edema.  Unchanged small bilateral pleural effusions with associated bibasilar   atelectasis      < from: Xray Chest 1 View- PORTABLE-Urgent (Xray Chest 1 View- PORTABLE-Urgent .) (22 @ 05:34) >  IMPRESSION:  Cardiomegaly with mild/moderate pulmonary venous hypertension/pulmonary   edema.  Small bilateral pleural effusions associated bibasilar atelectasis.      < from: US Duplex Kidneys (US Doppler Renal) (22 @ 11:22) >  IMPRESSION:  No evidence of a significant renal artery stenosis.  increased renal cortical echogenicity bilaterally and increased resistive   indices in the bilateral kidneys, suggestive of renal parenchymal disease.
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
## 2022-05-28 NOTE — PROGRESS NOTE ADULT - PROBLEM SELECTOR PLAN 2
SCr 3.41 on admission. Baseline SCr  2.37 3/30/22'  5/24 renal U/S: No evidence of a significant renal artery stenosis. increased renal cortical echogenicity bilaterally and increased resistive indices in the bilateral kidneys, suggestive of renal parenchymal disease.  - monitor Volume status, strict I's/ O's  - keep K>4, Mg> 2  - f/u Nephro Recs  5/27  Lasix 80 mg iv x1
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
## 2022-05-29 NOTE — PROGRESS NOTE ADULT - PROBLEM SELECTOR PLAN 5
HFr EF 27,  Elevated BNP's    - c/w Home metoprolol 50mg daily  - if Short of breath, Furosemide IV 40-80mg   - strict I's/ O's  5/26 repeat TTE w diffuse hypokinesis, EF unavailable

## 2022-05-29 NOTE — CHART NOTE - NSCHARTNOTEFT_GEN_A_CORE
Pt seen this AM  PANIAD 0  RDOS 0  Pt did not recall prior conversations about treatments and goals  Seems confused today
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
## 2022-05-29 NOTE — PROGRESS NOTE ADULT - SUBJECTIVE AND OBJECTIVE BOX
GAP TEAM PALLIATIVE CARE UNIT PROGRESS NOTE:      [  ] Patient on hospice program.    INDICATION FOR PALLIATIVE CARE UNIT SERVICES/Interval HPI:    OVERNIGHT EVENTS:    DNR on chart: Yes  Yes      Allergies    morphine (Unknown)    Intolerances    MEDICATIONS  (STANDING):  caspofungin IVPB 50 milliGRAM(s) IV Intermittent every 24 hours  cefepime   IVPB 1000 milliGRAM(s) IV Intermittent every 12 hours  cefepime   IVPB      hydroxyurea 1000 milliGRAM(s) Oral every 12 hours  polyethylene glycol 3350 17 Gram(s) Oral two times a day    MEDICATIONS  (PRN):  acetaminophen     Tablet .. 650 milliGRAM(s) Oral every 6 hours PRN Temp greater or equal to 38C (100.4F), Mild Pain (1 - 3)  bisacodyl Suppository 10 milliGRAM(s) Rectal daily PRN Constipation  HYDROmorphone  Injectable 0.2 milliGRAM(s) IV Push every 1 hour PRN Moderate Pain (4 - 6)  HYDROmorphone  Injectable 0.5 milliGRAM(s) IV Push every 1 hour PRN Severe Pain (7 - 10)  HYDROmorphone  Injectable 0.2 milliGRAM(s) IV Push every 1 hour PRN Moderate Pain (4 - 6)  LORazepam   Injectable 0.25 milliGRAM(s) IV Push every 1 hour PRN Agitation  LORazepam   Injectable 1 milliGRAM(s) IV Push every 15 minutes PRN SEIZURES  metoclopramide Injectable 5 milliGRAM(s) IV Push every 6 hours PRN nausea/vomiting    ITEMS UNCHECKED ARE NOT PRESENT    PRESENT SYMPTOMS: [ ]Unable to self-report  [ ]PAINADs [ ]RDOS  Source if other than patient:  [ ]Family   [ ]Team     Pain: [ ] yes [ ] no  QOL impact -   Location -                    Aggravating factors -  Quality -  Radiation -  Timing-  Severity (0-10 scale):  Minimal acceptable level (0-10 scale):     PAINAD Score:  [email or web address] (Ctrl +  left click to view)    Dyspnea:                           [ ]Mild [ ]Moderate [ ]Severe    RDOS:  0 to 2  minimal or no respiratory distress   3  mild distress  4 to 6 moderate distress  >7 severe distress  https://[email or web address]/handouts/hen/Respiratory_Distress_Observation_Scale.pdf (Ctrl +  left click to view)     Anxiety:                             [ ]Mild [ ]Moderate [ ]Severe  Fatigue:                             [ ]Mild [ ]Moderate [ ]Severe  Nausea:                             [ ]Mild [ ]Moderate [ ]Severe  Loss of appetite:              [ ]Mild [ ]Moderate [ ]Severe  Constipation:                    [ ]Mild [ ]Moderate [ ]Severe  		  Other Symptoms:  [ ]All other review of systems negative     Palliative Performance Status Version 2:         %         http://[email or web address].org/files/news/palliative_performance_scale_[email or web address].pdf  PHYSICAL EXAM:   Vital Signs Last 24 Hrs  T(C): 36.8 (29 May 2022 10:54), Max: 37.8 (28 May 2022 13:30)  T(F): 98.2 (29 May 2022 10:54), Max: 100 (28 May 2022 13:30)  HR: 96 (29 May 2022 10:54) (86 - 102)  BP: 169/67 (29 May 2022 10:54) (115/72 - 169/67)  BP(mean): --  RR: 20 (29 May 2022 10:54) (20 - 20)  SpO2: 93% (29 May 2022 10:54) (93% - 98%) I&O's Summary    28 May 2022 07:01  -  29 May 2022 07:00  --------------------------------------------------------  IN: 1084 mL / OUT: 700 mL / NET: 384 mL      GENERAL: [ ] Cachexia  [ ]Alert  [ ]Oriented x   [ ]Lethargic  [ ]Unarousable  [ ]Verbal  [ ]Non-Verbal  Behavioral:   [ ] Anxiety  [ ] Delirium [ ] Agitation [ ] Other  HEENT:  [ ]Normal   [ ]Dry mouth   [ ]ET Tube/Trach  [ ]Oral lesions  PULMONARY:   [ ]Clear [ ]Tachypnea  [ ]Audible excessive secretions   [ ]Rhonchi        [ ]Right [ ]Left [ ]Bilateral  [ ]Crackles        [ ]Right [ ]Left [ ]Bilateral  [ ]Wheezing     [ ]Right [ ]Left [ ]Bilateral  [ ]Diminished BS [ ]Right [ ]Left [ ]Bilateral    CARDIOVASCULAR:    [ ]Regular [ ]Irregular [ ]Tachy  [ ]Kike [ ]Murmur [ ]Other  GASTROINTESTINAL:  [ ]Soft  [ ]Distended   [ ]+BS  [ ]Non tender [ ]Tender  [ ]Other [ ]PEG [ ]OGT/ NGT   Last BM:    GENITOURINARY:  [ ]Normal [ ] Incontinent   [ ]Oliguria/Anuria   [ ]Levy  MUSCULOSKELETAL:   [ ]Normal   [ ]Weakness  [ ]Bed/Wheelchair bound [ ]Edema  NEUROLOGIC:   [ ]No focal deficits  [ ] Cognitive impairment  [ ] Dysphagia [ ]Dysarthria [ ] Paresis [ ]Other   SKIN:   [ ]Normal  [ ]Rash  [ ]Other  [ ]Pressure ulcer(s)  [ ]y [ ]n  Present on admission      CRITICAL CARE:  [ ] Shock Present  [ ]Septic [ ]Cardiogenic [ ]Neurologic [ ]Hypovolemic  [ ]  Vasopressors [ ]  Inotropes   [ ] Respiratory failure present [ ] Mechanical Ventilation [ ] Non-invasive ventilatory support [ ] High-Flow  [ ] Acute  [ ] Chronic [ ] Hypoxic  [ ] Hypercarbic [ ] Other  [ ] Other organ failure     LABS:                        10.6   30.47 )-----------( 74       ( 29 May 2022 06:55 )             31.7       135  |  99  |  81<H>  ----------------------------<  151<H>  3.7   |  21<L>  |  3.93<H>    Ca    10.6<H>      29 May 2022 06:55  Phos  4.5       Mg     2.6         TPro  6.4  /  Alb  2.8<L>  /  TBili  0.4  /  DBili  x   /  AST  30  /  ALT  22  /  AlkPhos  90        Urinalysis Basic - ( 28 May 2022 11:23 )    Color: Light Yellow / Appearance: Slightly Turbid / S.017 / pH: x  Gluc: x / Ketone: Negative  / Bili: Negative / Urobili: Negative   Blood: x / Protein: 100 / Nitrite: Negative   Leuk Esterase: Negative / RBC: 3 /hpf / WBC 7 /HPF   Sq Epi: x / Non Sq Epi: 11 /hpf / Bacteria: Negative      RADIOLOGY & ADDITIONAL STUDIES:    PROTEIN CALORIE MALNUTRITION: [ ] mild [ ] moderate [ ] severe  [ ] underweight [ ] morbid obesity    https://www.andeal.org/vault/9627/web/files/ONC/Table_Clinical%20Characteristics%20to%20Document%20Malnutrition-White%20JV%20et%20al%2020.pdf    Height (cm): 167.6 (22 @ 18:42), 164 (22 @ 09:05), 167.6 (21 @ 13:38)  Weight (kg): 74.8 (22 @ 18:42), 74.1 (22 @ 09:05), 74.8 (21 @ 13:38)  BMI (kg/m2): 26.6 (22 @ 18:42), 27.6 (22 @ 09:05), 26.6 (21 @ :38)    [ ] PPSV2 < or = 30% [ ] significant weight loss [ ] poor nutritional intake [ ] anasarca   Artificial Nutrition [ ]     REFERRALS:   [ ]Chaplaincy  [ ] Hospice  [ ]Child Life  [ ]Social Work  [ ]Case management [ ]Holistic Therapy [ ] Physical Therapy [ ] Dietary   Goals of Care Document: SERGIO Garcia (21 @ 23:47)  Goals of Care Conversation:   Participants:  · Participants  Family; Staff  · Spouse  Honey Nair  · Child(mahendra)  Kevin  · Provider  Dr. Stahl and Dr. Garcia    Advance Directives:  · Does patient have Advance Directive  No  · Caregiver:  no    Conversation Discussion:  · Conversation  Diagnosis; MOLST Discussed  · Conversation Details  Met with patient's son and spouse. Family mentions Eduardos clinical status has changed since starting chemotherapy. They felt with chemotherapy sessions, Kevin became weaker and weaker. They even mention that they ere certain he would pass away based on how he looked. Today they mention he looks better since it has been several days since the last chemo session. They also mention that they were unaware of these symptoms develops and they asked us how long these symptoms would last for. They also wonder how many more sessions he has left.    We posed the question to Kevin's family if they have ever discussed code status. They state that their loved one would never want to talk about this. However, they feel he is suffering at the moment because of daily blood draws and the fact that he is in the hospital. Thus, they know that if Kevin's heart were to stop or if his lungs were to fail, he would not want resusitation or intubation as being on machines would cause him suffering.    GOC discussed with primary team. All questions/concerns addressed.    Personal Advance Directives Treatment Guidelines:   Treatment Guidelines:  · Treatment Guidelines  DNR Order    MOLST:  · Completed  2021      Electronic Signatures:  Jermain Garcia)  (Signed 2021 23:58)  	Authored: Goals of Care Conversation, Personal Advance Directives Treatment Guidelines      Last Updated: 2021 23:58 by Jermain Garcia)        Please do not hesitate to reach out to us either via Microsoft Teams or pager [phone] for further details or queries.    Kiana Nolasco PGY-5  Geriatric and Palliative Care Medicine Fellow

## 2022-05-29 NOTE — PROGRESS NOTE ADULT - SUBJECTIVE AND OBJECTIVE BOX
Diagnosis: relapsded AML    Protocol/Chemo Regimen: pending   Day: NA       Pt endorsed: NO acute complaints     Review of Systems: Patient denies  nausea, vomiting, diarrhea, abdominal pain, SOB, chest pain and headache.        Pain scale: 5/10                            Location: right shoulder/arm       Diet: Regular     Allergies: morphine (Unknown)      MEDICATIONS  (STANDING):  aspirin enteric coated 81 milliGRAM(s) Oral daily  Biotene Dry Mouth Oral Rinse 15 milliLiter(s) Swish and Spit four times a day  caspofungin IVPB 50 milliGRAM(s) IV Intermittent every 24 hours  cefepime   IVPB 1000 milliGRAM(s) IV Intermittent every 12 hours  cefepime   IVPB      chlorhexidine 2% Cloths 1 Application(s) Topical daily  finasteride 5 milliGRAM(s) Oral daily  heparin   Injectable 5000 Unit(s) SubCutaneous every 8 hours  heparin  Lock Flush 100 Units/mL Injectable 500 Unit(s) IV Push once  hydroxyurea 1000 milliGRAM(s) Oral every 12 hours  isosorbide   mononitrate ER Tablet (IMDUR) 60 milliGRAM(s) Oral daily  lidocaine   4% Patch 1 Patch Transdermal daily  metoprolol succinate ER 50 milliGRAM(s) Oral daily  polyethylene glycol 3350 17 Gram(s) Oral two times a day  sevelamer carbonate 800 milliGRAM(s) Oral three times a day    MEDICATIONS  (PRN):  acetaminophen     Tablet .. 650 milliGRAM(s) Oral every 6 hours PRN Temp greater or equal to 38C (100.4F), Mild Pain (1 - 3)        Vital Signs Last 24 Hrs  T(C): 36.9 (29 May 2022 06:45), Max: 37.8 (28 May 2022 13:30)  T(F): 98.4 (29 May 2022 06:45), Max: 100 (28 May 2022 13:30)  HR: 102 (29 May 2022 06:45) (86 - 102)  BP: 122/61 (29 May 2022 06:45) (115/72 - 150/76)  BP(mean): --  RR: 20 (29 May 2022 06:45) (20 - 20)  SpO2: 95% (29 May 2022 06:45) (93% - 98%)      PHYSICAL EXAM  General:  NAD  HEENT: clear oropharynx, anicteric sclera  CV: normal S1/S2, reg  Lungs: Decreased at bases, L sided wheeze  Abdomen: soft, + BS,  non-tender non-distended,   Ext: no edema BLE's  Skin: no rashes or ecchymosis  Neuro: alert and oriented X 2, RUE decreased motor strength   Central Line: L PICC CDI       LABS:                          10.6   30.47 )-----------( 74       ( 29 May 2022 06:55 )             31.7         Mean Cell Volume : 96.1 fl  Mean Cell Hemoglobin : 32.1 pg  Mean Cell Hemoglobin Concentration : 33.4 gm/dL  Auto Neutrophil # : 0.27 K/uL  Auto Lymphocyte # : 2.47 K/uL  Auto Monocyte # : 25.81 K/uL  Auto Eosinophil # : 0.00 K/uL  Auto Basophil # : 0.00 K/uL  Auto Neutrophil % : 0.9 %  Auto Lymphocyte % : 8.1 %  Auto Monocyte % : 84.7 %  Auto Eosinophil % : 0.0 %  Auto Basophil % : 0.0 %      05-29    135  |  99  |  81<H>  ----------------------------<  151<H>  3.7   |  21<L>  |  3.93<H>    Ca    10.6<H>      29 May 2022 06:55  Phos  4.5     05-29  Mg     2.6     05-29    TPro  6.4  /  Alb  2.8<L>  /  TBili  0.4  /  DBili  x   /  AST  30  /  ALT  22  /  AlkPhos  90  05-29            Uric Acid 3.0            Hematopathology Report (05.24.22 @ 13:30)    Hematopathology Report:   ACCESSION No:  [id number]  Patient:   TAMIKO PEREZ      Accession:                             [id number]    Collected Date/Time:                   5/24/2022 13:30 EDT  Received Date/Time:                    5/24/2022 15:25 EDT    Hematopathology Report - Auth (Verified)    Specimen(s) Submitted  1  Bone marrow biopsy  2  Bone marrow aspirate for Flow    Final Diagnosis  1, 2. 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     Current biopsy is consistent  with relapsed acute myeloid leukemia, 78% blasts/blast equivalent by  differential counts. Please correlate with pending cytogenetic and  molecular studies.  Comprehensive report with results of pending ancillary studies to follow.    Microscopic description:  1. Biopsy (core and clot):  Quality:  Small (0.8 cm and 0.9 cm), adequate cores and adequate clot  sections with   marrow fragments  Cellularity:  90%  Myeloid lineage:  Markedly  decreased  Erythroid lineage:  Rare  Megakaryocytes:  Rare  Blasts:  Increased. Medium size blasts with round  to  convoluted nuclei, fine chromatin and   prominent  nucleoli are forming sheets and replacing the marrow  Lymphocytes  Not increased  Plasma cells:  Not increased    2. Aspirate:  Quality / cellularity:   Spicular and cellular aspirates  Myeloid lineage:  Decreased  Erythroid lineage:  Rare  Megakaryocytes:  Rare  Plasma cells:   Not increased  Blasts:    Markedly increased . Blast are medium to large in size,  increased n/c ratio,      convoluted nuclei, fine chromatin and  prominent  nucleoli, some with  vacuolated, basophilic cytoplasm    Bone Marrow Asp Smear Diff Cell Count:  500 Cells.  Type  Normal* %  Blast  0-3 78%  Promyelocyte  1-8 0%  Myelocyte  10-15 1%  Metamyelocyte  10-15 2%  Band/Neutrophil  12-25  10%  Eosinophil  1-5 0%  Basophil  0-1 0%  Pronormoblast 0-2 0%  Normoblast  15-25 3%  Monocyte  0-2 2%  Lymphocyte  10-15 5%  Plasma cell  0-1 0%  *Adult Range  Peripheral Blood Smear:    WBC:  Marked leukocytosis with increased number of blasts/blast  equivalents  (promonocytes). Granulocytes are decreased, some show abnormal lobation  and granulation  RBC:  Normocytic anemia with mild anisocytosis and mild polychromasia.  nRBC  seen. Few  dacrocytes  present  Platelets:  Normal in number with unremarkable morphology    Ancillary studies  Special stains on bone marrow aspirate:  Iron stain:  Iron stores are increased There is no ring sideroblasts    Special stains on core biopsy:  Iron:  Positive    Immunohistochemical stains:    CD34, , MPO, CD71, E-cad, stains are  performed on block 1A    CD34:  Highlights vascular structures, blasts are negative  :  Highlights scattered masts cells. Blasts are negative  MPO:  Blasts are weakly positive  CD71 : Highlights rare erythroid cells  E-cad:  Highlights rare early erythroid series  CD14:  Majority of blasts are negative    Flow cytometry analysis (72-RT-, peripheral blood, collected  5/23/2022)   _: Monocytes (84.2% of total) positive for CD4 (dim),  CD11b (bright), CD13, CD15 (subset),  CD33 (bright), CD45 (dim), CD56  (partial), CD64 (bright); negative for CD2, CD7, CD34,  with a major  subset showing loss of CD14  Myeloblasts (0.03% of total)   positive for CD13, CD33, CD34, CD38  (decreased)  (decreased), HLA-DR and negative for CD2, CD3, CD4,  CD5, CD7, CD8, CD10, CD11b, CD14, CD15, CD16, CD19, CD20, CD22, CD56,    CD64, TdT  Myeloid population is markedly decreased  Lymphocytes (4% of cells): Heterogeneous population of T-cells (with  normal CD4 to CD8 ratio), natural killer cells, and polytypic B-cells.    Flow cytometry analysis (96-GZ-, bone marrow, collected  5/24/2022):  Monocytes (84.6% of total) positive for CD11b (bright), CD13,  CD15 (subset),  CD33 (bright), CD45 (dim), CD56 (partial), CD64 (bright);  negative for CD2, CD7, CD34,  with a major subset showing loss of  CD14  Myeloblasts (0.07% of total) positive for CD13, CD33, CD34, CD38  (decreased,  (decreased), HLA-DR and negative for CD2, CD7, CD10,  CD14, CD15, CD16, CD19  Myeloid population is markedly decreased    Cytogenetics:  Karyotype:  In process, will be reported as an addendum  FISH:   In process, will be reported as an addendum  Verified by: Shanice Hawkins MD  (Electronic Signature)      Cultures:     COVID-19 PCR . (05.23.22 @ 12:09)    COVID-19 PCR: NotDete    RADIOLOGY & ADDITIONAL STUDIES:    < from: Xray Chest 1 View- PORTABLE-Urgent (Xray Chest 1 View- PORTABLE-Urgent .) (05.28.22 @ 10:40) >  IMPRESSION:  Unchanged mild/moderate pulmonary venous hypertension/pulmonary edema.  Unchanged small bilateral pleural effusions with associated bibasilar   atelectasis      < from: Xray Chest 1 View- PORTABLE-Urgent (Xray Chest 1 View- PORTABLE-Urgent .) (05.26.22 @ 05:34) >  IMPRESSION:  Cardiomegaly with mild/moderate pulmonary venous hypertension/pulmonary   edema.  Small bilateral pleural effusions associated bibasilar atelectasis.      < from: US Duplex Kidneys (US Doppler Renal) (05.24.22 @ 11:22) >  IMPRESSION:  No evidence of a significant renal artery stenosis.  increased renal cortical echogenicity bilaterally and increased resistive   indices in the bilateral kidneys, suggestive of renal parenchymal disease.

## 2022-05-29 NOTE — PROGRESS NOTE ADULT - PROBLEM SELECTOR PLAN 1
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  5/27 Given pt has poor prognosis with days to week until end of life, Palliative care consulted, discuss with pt and family re GOC. Hospice referral made. Possible candidate for PCU. Elitek 3mg IV given for rising uric acid  5/29  Transferred to PCU
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
## 2022-05-30 NOTE — PROGRESS NOTE ADULT - PROBLEM SELECTOR PLAN 1
resume lidocaine patch to right shoulder  c/w dilaudid IV 0.2-0.5 mg q1H PRN for moderate to severe pain  bowel regimen while on opioids

## 2022-05-30 NOTE — PROGRESS NOTE ADULT - SUBJECTIVE AND OBJECTIVE BOX
GAP TEAM PALLIATIVE CARE UNIT PROGRESS NOTE:      [  ] Patient on hospice program.    INDICATION FOR PALLIATIVE CARE UNIT SERVICES/Interval HPI:    OVERNIGHT EVENTS:    DNR on chart: Yes  Yes      Allergies    morphine (Unknown)    Intolerances    MEDICATIONS  (STANDING):  caspofungin IVPB 50 milliGRAM(s) IV Intermittent every 24 hours  cefepime   IVPB 1000 milliGRAM(s) IV Intermittent every 12 hours  cefepime   IVPB      lidocaine   4% Patch 1 Patch Transdermal daily  polyethylene glycol 3350 17 Gram(s) Oral two times a day    MEDICATIONS  (PRN):  acetaminophen     Tablet .. 650 milliGRAM(s) Oral every 6 hours PRN Temp greater or equal to 38C (100.4F), Mild Pain (1 - 3)  bisacodyl Suppository 10 milliGRAM(s) Rectal daily PRN Constipation  glycopyrrolate Injectable 0.4 milliGRAM(s) IV Push every 6 hours PRN excessive secretions  HYDROmorphone  Injectable 0.2 milliGRAM(s) IV Push every 1 hour PRN dyspnea  HYDROmorphone  Injectable 0.5 milliGRAM(s) IV Push every 1 hour PRN Severe Pain (7 - 10)  HYDROmorphone  Injectable 0.2 milliGRAM(s) IV Push every 1 hour PRN Moderate Pain (4 - 6)  LORazepam   Injectable 0.25 milliGRAM(s) IV Push every 1 hour PRN Agitation  LORazepam   Injectable 1 milliGRAM(s) IV Push every 15 minutes PRN SEIZURES  metoclopramide Injectable 5 milliGRAM(s) IV Push every 6 hours PRN nausea/vomiting    ITEMS UNCHECKED ARE NOT PRESENT    PRESENT SYMPTOMS: [ ]Unable to self-report  [ ]PAINADs [ ]RDOS  Source if other than patient:  [ ]Family   [ ]Team     Pain: [ ] yes [ ] no  QOL impact -   Location -                    Aggravating factors -  Quality -  Radiation -  Timing-  Severity (0-10 scale):  Minimal acceptable level (0-10 scale):     PAINAD Score:  http://geriatrictoolkit.[employer].edu/cog/painad.pdf (Ctrl +  left click to view)    Dyspnea:                           [ ]Mild [ ]Moderate [ ]Severe    RDOS:  0 to 2  minimal or no respiratory distress   3  mild distress  4 to 6 moderate distress  >7 severe distress  [email or web address]://[email or web address]/Respiratory_Distress_Observation_Scale.pdf (Ctrl +  left click to view)     Anxiety:                             [ ]Mild [ ]Moderate [ ]Severe  Fatigue:                             [ ]Mild [ ]Moderate [ ]Severe  Nausea:                             [ ]Mild [ ]Moderate [ ]Severe  Loss of appetite:              [ ]Mild [ ]Moderate [ ]Severe  Constipation:                    [ ]Mild [ ]Moderate [ ]Severe  		  Other Symptoms:  [ ]All other review of systems negative     Palliative Performance Status Version 2:         %         [email or web address]_[email or web address]  PHYSICAL EXAM:   Vital Signs Last 24 Hrs  T(C): 36.8 (30 May 2022 09:01), Max: 36.8 (30 May 2022 09:01)  T(F): 98.2 (30 May 2022 09:01), Max: 98.2 (30 May 2022 09:)  HR: 89 (30 May 2022 09:) (89 - 89)  BP: 116/62 (30 May 2022 09:01) (116/62 - 116/62)  BP(mean): --  RR: 20 (30 May 2022 09:) (20 - 20)  SpO2: 90% (30 May 2022 09:01) (90% - 90%) I&O's Summary    GENERAL: [ ] Cachexia  [ ]Alert  [ ]Oriented x   [ ]Lethargic  [ ]Unarousable  [ ]Verbal  [ ]Non-Verbal  Behavioral:   [ ] Anxiety  [ ] Delirium [ ] Agitation [ ] Other  HEENT:  [ ]Normal   [ ]Dry mouth   [ ]ET Tube/Trach  [ ]Oral lesions  PULMONARY:   [ ]Clear [ ]Tachypnea  [ ]Audible excessive secretions   [ ]Rhonchi        [ ]Right [ ]Left [ ]Bilateral  [ ]Crackles        [ ]Right [ ]Left [ ]Bilateral  [ ]Wheezing     [ ]Right [ ]Left [ ]Bilateral  [ ]Diminished BS [ ]Right [ ]Left [ ]Bilateral    CARDIOVASCULAR:    [ ]Regular [ ]Irregular [ ]Tachy  [ ]Kike [ ]Murmur [ ]Other  GASTROINTESTINAL:  [ ]Soft  [ ]Distended   [ ]+BS  [ ]Non tender [ ]Tender  [ ]Other [ ]PEG [ ]OGT/ NGT   Last BM:    GENITOURINARY:  [ ]Normal [ ] Incontinent   [ ]Oliguria/Anuria   [ ]Levy  MUSCULOSKELETAL:   [ ]Normal   [ ]Weakness  [ ]Bed/Wheelchair bound [ ]Edema  NEUROLOGIC:   [ ]No focal deficits  [ ] Cognitive impairment  [ ] Dysphagia [ ]Dysarthria [ ] Paresis [ ]Other   SKIN:   [ ]Normal  [ ]Rash  [ ]Other  [ ]Pressure ulcer(s)  [ ]y [ ]n  Present on admission      CRITICAL CARE:  [ ] Shock Present  [ ]Septic [ ]Cardiogenic [ ]Neurologic [ ]Hypovolemic  [ ]  Vasopressors [ ]  Inotropes   [ ] Respiratory failure present [ ] Mechanical Ventilation [ ] Non-invasive ventilatory support [ ] High-Flow  [ ] Acute  [ ] Chronic [ ] Hypoxic  [ ] Hypercarbic [ ] Other  [ ] Other organ failure     LABS:                        10.6   30.47 )-----------( 74       ( 29 May 2022 06:55 )             31.7       135  |  99  |  81<H>  ----------------------------<  151<H>  3.7   |  21<L>  |  3.93<H>    Ca    10.6<H>      29 May 2022 06:55  Phos  4.5       Mg     2.6         TPro  6.4  /  Alb  2.8<L>  /  TBili  0.4  /  DBili  x   /  AST  30  /  ALT  22  /  AlkPhos  90        Urinalysis Basic - ( 28 May 2022 11:23 )    Color: Light Yellow / Appearance: Slightly Turbid / S.017 / pH: x  Gluc: x / Ketone: Negative  / Bili: Negative / Urobili: Negative   Blood: x / Protein: 100 / Nitrite: Negative   Leuk Esterase: Negative / RBC: 3 /hpf / WBC 7 /HPF   Sq Epi: x / Non Sq Epi: 11 /hpf / Bacteria: Negative      RADIOLOGY & ADDITIONAL STUDIES:    PROTEIN CALORIE MALNUTRITION: [ ] mild [ ] moderate [ ] severe  [ ] underweight [ ] morbid obesity    https://www.andeal.org/vault/8220/web/files/ONC/Table_Clinical%20Characteristics%20to%20Document%20Malnutrition-White%20JV%20et%20al%2020.pdf    Height (cm): 167.6 (22 @ 18:42), 164 (22 @ 09:05), 167.6 (21 @ 13:38)  Weight (kg): 74.8 (22 @ 18:42), 74.1 (22 @ 09:05), 74.8 (21 @ 13:38)  BMI (kg/m2): 26.6 (22 @ 18:42), 27.6 (22 @ 09:05), 26.6 (21 @ 13:38)    [ ] PPSV2 < or = 30% [ ] significant weight loss [ ] poor nutritional intake [ ] anasarca   Artificial Nutrition [ ]     REFERRALS:   [ ]Chaplaincy  [ ] Hospice  [ ]Child Life  [ ]Social Work  [ ]Case management [ ]Holistic Therapy [ ] Physical Therapy [ ] Dietary   Goals of Care Document: SERGIO Garcia (21 @ 23:47)  Goals of Care Conversation:   Participants:  · Participants  Family; Staff  · Spouse  Honey Nair  · Child(mahendra)  Kevin  · Provider  Dr. Stahl and Dr. Garcia    Advance Directives:  · Does patient have Advance Directive  No  · Caregiver:  no    Conversation Discussion:  · Conversation  Diagnosis; MOLST Discussed  · Conversation Details  Met with patient's son and spouse. Family mentions Kevin's clinical status has changed since starting chemotherapy. They felt with chemotherapy sessions, Kevin became weaker and weaker. They even mention that they ere certain he would pass away based on how he looked. Today they mention he looks better since it has been several days since the last chemo session. They also mention that they were unaware of these symptoms develops and they asked us how long these symptoms would last for. They also wonder how many more sessions he has left.    We posed the question to Kevin's family if they have ever discussed code status. They state that their loved one would never want to talk about this. However, they feel he is suffering at the moment because of daily blood draws and the fact that he is in the hospital. Thus, they know that if Kevin's heart were to stop or if his lungs were to fail, he would not want resusitation or intubation as being on machines would cause him suffering.    GOC discussed with primary team. All questions/concerns addressed.    Personal Advance Directives Treatment Guidelines:   Treatment Guidelines:  · Treatment Guidelines  DNR Order    MOLST:  · Completed  2021      Electronic Signatures:  Jermain Garcia)  (Signed 2021 23:58)  	Authored: Goals of Care Conversation, Personal Advance Directives Treatment Guidelines      Last Updated: 2021 23:58 by Jermain Garcia)        Please do not hesitate to reach out to us either via Microsoft Teams or pager [phone] for further details or queries.    Kiana Nolasco PGY-5  Geriatric and Palliative Care Medicine Fellow   GAP TEAM PALLIATIVE CARE UNIT PROGRESS NOTE:      [  ] Patient on hospice program.    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 Pt asking for food this AM, chewing on a small piece of Hungarian toast. Fixated on food, unable to answer other questions for us. Asking us to help him. No distress noted.    DNR on chart: Yes  Yes      Allergies    morphine (Unknown)    Intolerances    MEDICATIONS  (STANDING):  caspofungin IVPB 50 milliGRAM(s) IV Intermittent every 24 hours  cefepime   IVPB 1000 milliGRAM(s) IV Intermittent every 12 hours  cefepime   IVPB      lidocaine   4% Patch 1 Patch Transdermal daily  polyethylene glycol 3350 17 Gram(s) Oral two times a day    MEDICATIONS  (PRN):  acetaminophen     Tablet .. 650 milliGRAM(s) Oral every 6 hours PRN Temp greater or equal to 38C (100.4F), Mild Pain (1 - 3)  bisacodyl Suppository 10 milliGRAM(s) Rectal daily PRN Constipation  glycopyrrolate Injectable 0.4 milliGRAM(s) IV Push every 6 hours PRN excessive secretions  HYDROmorphone  Injectable 0.2 milliGRAM(s) IV Push every 1 hour PRN dyspnea  HYDROmorphone  Injectable 0.5 milliGRAM(s) IV Push every 1 hour PRN Severe Pain (7 - 10)  HYDROmorphone  Injectable 0.2 milliGRAM(s) IV Push every 1 hour PRN Moderate Pain (4 - 6)  LORazepam   Injectable 0.25 milliGRAM(s) IV Push every 1 hour PRN Agitation  LORazepam   Injectable 1 milliGRAM(s) IV Push every 15 minutes PRN SEIZURES  metoclopramide Injectable 5 milliGRAM(s) IV Push every 6 hours PRN nausea/vomiting    ITEMS UNCHECKED ARE NOT PRESENT    PRESENT SYMPTOMS: [x ]Unable to self-report  [x ]PAINADs [x ]RDOS  Source if other than patient:  [ ]Family   [x ]Team     Pain: [x ] yes [ ] no  QOL impact -   Location -                    Aggravating factors -  Quality -  Radiation -  Timing-  Severity (0-10 scale):  Minimal acceptable level (0-10 scale):     PAINAD Score: 0  [email or web address]/painad.pdf (Ctrl +  left click to view)    Dyspnea:                           [ ]Mild [ ]Moderate [ ]Severe    RDOS: 0  0 to 2  minimal or no respiratory distress   3  mild distress  4 to 6 moderate distress  >7 severe distress  [email or web address]_Distress_Observation_Scale.pdf (Ctrl +  left click to view)     Anxiety:                             [x ]Mild [ ]Moderate [ ]Severe  Fatigue:                             [ ]Mild [ ]Moderate [ ]Severe  Nausea:                             [ ]Mild [ ]Moderate [ ]Severe  Loss of appetite:              [ ]Mild [ ]Moderate [ ]Severe  Constipation:                    [ ]Mild [ ]Moderate [ ]Severe  		  Other Symptoms:  [ ]All other review of systems negative     Palliative Performance Status Version 2:  40       %         [email or web address]/[email or web address]_performance_[email or web address]_[email or web address]  PHYSICAL EXAM:   Vital Signs Last 24 Hrs  T(C): 36.8 (30 May 2022 09:01), Max: 36.8 (30 May 2022 09:01)  T(F): 98.2 (30 May 2022 09:01), Max: 98.2 (30 May 2022 09:01)  HR: 89 (30 May 2022 09:01) (89 - 89)  BP: 116/62 (30 May 2022 09:01) (116/62 - 116/62)  BP(mean): --  RR: 20 (30 May 2022 09:01) (20 - 20)  SpO2: 90% (30 May 2022 09:01) (90% - 90%) I&O's Summary    GENERAL: [ ] Cachexia  [x ]Alert  [x ]Oriented x  1 [ ]Lethargic  [ ]Unarousable  [ x]Verbal  [ ]Non-Verbal  Behavioral:   [ x] Anxiety  [ ] Delirium [ ] Agitation [ ] Other  HEENT:  [ ]Normal   [ ]Dry mouth   [ ]ET Tube/Trach  [ ]Oral lesions  PULMONARY:   [ ]Clear [ ]Tachypnea  [ ]Audible excessive secretions   [ ]Rhonchi        [ ]Right [ ]Left [ ]Bilateral  [ ]Crackles        [ ]Right [ ]Left [ ]Bilateral  [ ]Wheezing     [ ]Right [ ]Left [ ]Bilateral  [x ]Diminished BS [ ]Right [ ]Left [x ]Bilateral    CARDIOVASCULAR:    [ x]Regular [ ]Irregular [ ]Tachy  [ ]Kike [ ]Murmur [ ]Other  GASTROINTESTINAL:  [ x]Soft  [ ]Distended   [ x]+BS  [x ]Non tender [ ]Tender  [ ]Other [ ]PEG [ ]OGT/ NGT   Last BM:  22  GENITOURINARY:  [ ]Normal [ ] Incontinent   [ ]Oliguria/Anuria   [ ]Levy  MUSCULOSKELETAL:   [ ]Normal   [ ]Weakness  [x ]Bed/Wheelchair bound [ ]Edema  NEUROLOGIC:   [x ]No focal deficits  [ ] Cognitive impairment  [ ] Dysphagia [ ]Dysarthria [ ] Paresis [ ]Other   SKIN:   [ ]Normal  [ ]Rash  [ ]Other  [ ]Pressure ulcer(s)  [ ]y [ ]n  Present on admission      CRITICAL CARE:  [ ] Shock Present  [ ]Septic [ ]Cardiogenic [ ]Neurologic [ ]Hypovolemic  [ ]  Vasopressors [ ]  Inotropes   [ ] Respiratory failure present [ ] Mechanical Ventilation [ ] Non-invasive ventilatory support [ ] High-Flow  [ ] Acute  [ ] Chronic [ ] Hypoxic  [ ] Hypercarbic [ ] Other  [ ] Other organ failure     LABS:                        10.6   30.47 )-----------( 74       ( 29 May 2022 06:55 )             31.7       135  |  99  |  81<H>  ----------------------------<  151<H>  3.7   |  21<L>  |  3.93<H>    Ca    10.6<H>      29 May 2022 06:55  Phos  4.5       Mg     2.6         TPro  6.4  /  Alb  2.8<L>  /  TBili  0.4  /  DBili  x   /  AST  30  /  ALT  22  /  AlkPhos  90        Urinalysis Basic - ( 28 May 2022 11:23 )    Color: Light Yellow / Appearance: Slightly Turbid / S.017 / pH: x  Gluc: x / Ketone: Negative  / Bili: Negative / Urobili: Negative   Blood: x / Protein: 100 / Nitrite: Negative   Leuk Esterase: Negative / RBC: 3 /hpf / WBC 7 /HPF   Sq Epi: x / Non Sq Epi: 11 /hpf / Bacteria: Negative      RADIOLOGY & ADDITIONAL STUDIES: none new    PROTEIN CALORIE MALNUTRITION: [ ] mild [ ] moderate [ ] severe  [ ] underweight [ ] morbid obesity    https://www.andeal.org/vault/3670/web/files/ONC/Table_Clinical%20Characteristics%20to%20Document%20Malnutrition-White%20JV%20et%20al%2020.pdf    Height (cm): 167.6 (22 @ 18:42), 164 (22 @ 09:05), 167.6 (21 @ 13:38)  Weight (kg): 74.8 (22 @ 18:42), 74.1 (22 @ 09:05), 74.8 (21 @ :38)  BMI (kg/m2): 26.6 (22 @ 18:42), 27.6 (22 @ 09:05), 26.6 (21 @ :38)    [ ] PPSV2 < or = 30% [ ] significant weight loss [x ] poor nutritional intake [ ] anasarca   Artificial Nutrition [ ]     REFERRALS:   [ x]Chaplaincy  [ ] Hospice  [ ]Child Life  [x ]Social Work  [ ]Case management [ ]Holistic Therapy [ ] Physical Therapy [ ] Dietary   Goals of Care Document: SERGIO Garcia (21 @ 23:47)  Goals of Care Conversation:   Participants:  · Participants  Family; Staff  · Spouse  Honey Nair  · Child(mahendra)  Kevin  · Provider  Dr. Stahl and Dr. Garcia    Advance Directives:  · Does patient have Advance Directive  No  · Caregiver:  no    Conversation Discussion:  · Conversation  Diagnosis; MOLST Discussed  · Conversation Details  Met with patient's son and spouse. Family mentions Kevin's clinical status has changed since starting chemotherapy. They felt with chemotherapy sessions, Kevin became weaker and weaker. They even mention that they ere certain he would pass away based on how he looked. Today they mention he looks better since it has been several days since the last chemo session. They also mention that they were unaware of these symptoms develops and they asked us how long these symptoms would last for. They also wonder how many more sessions he has left.    We posed the question to Kevin's family if they have ever discussed code status. They state that their loved one would never want to talk about this. However, they feel he is suffering at the moment because of daily blood draws and the fact that he is in the hospital. Thus, they know that if Kevin's heart were to stop or if his lungs were to fail, he would not want resusitation or intubation as being on machines would cause him suffering.    GOC discussed with primary team. All questions/concerns addressed.    Personal Advance Directives Treatment Guidelines:   Treatment Guidelines:  · Treatment Guidelines  DNR Order    MOLST:  · Completed  2021      Electronic Signatures:  Jermain Garcia)  (Signed 2021 23:58)  	Authored: Goals of Care Conversation, Personal Advance Directives Treatment Guidelines      Last Updated: 2021 23:58 by Jermain Garcia)
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
## 2022-05-30 NOTE — PROGRESS NOTE ADULT - PROBLEM SELECTOR PLAN 5
emotional support provided to son via phone  plan to meet with family or speak via phone later today  continue care in PCU

## 2022-05-30 NOTE — PROGRESS NOTE ADULT - PROBLEM SELECTOR PLAN 2
AML last chemo 4/2022 with relapse, bone marrow on admission showing high blasts. Concern for TLS.  - Patient expressing wish to go to [place] and get no further treatment  - GOC ongoing but patient in agreement to hospice referral and DNR/DNI  - since oral route concerning for high risk of aspiration, will discontinue hydroxyurea AML last chemo 4/2022 with relapse, bone marrow on admission showing high blasts. Concern for TLS.  - GOC ongoing but patient in agreement to hospice referral and DNR/DNI  - since oral route concerning for high risk of aspiration, will discontinue hydroxyurea

## 2022-05-30 NOTE — PROGRESS NOTE ADULT - NSPROGADDITIONALINFOA_GEN_ALL_CORE
Please do not hesitate to reach out to us either via Microsoft Teams or pager [phone] for further details or queries.    Kiana Nolasco PGY-5  Geriatric and Palliative Care Medicine Fellow

## 2022-05-30 NOTE — PROGRESS NOTE ADULT - PROBLEM SELECTOR PLAN 4
DNR/I  will speak with wife and son today regarding antibiotics and align with GOC  dispo pending clinical course  hospice referral active

## 2022-05-31 NOTE — PROGRESS NOTE ADULT - PROBLEM SELECTOR PLAN 1
Patient with oral route. Will change IV meds to PO  - Dilaudid 1mg solution q2hr PRN for moderate pain  - Dilaudid 2.5mg solution q2hr PRN for severe pain  - Continue with lidocaine patch to right shoulder  - Bowel regimen while on opioids

## 2022-05-31 NOTE — PROGRESS NOTE ADULT - SUBJECTIVE AND OBJECTIVE BOX
GAP TEAM PALLIATIVE CARE UNIT PROGRESS NOTE:      [  ] Patient on hospice program.    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 The patient is seen and examined at the bedside. He required PRN Dilaudid 0.2mg IV X1 for dyspnea and PRN Ativan 0.25mg IV X2 within a 24hr period 8am-8am. Video  used. ID #[id number]  name: Renny.     DNR on chart: Yes  Yes      Allergies    morphine (Unknown)    Intolerances    MEDICATIONS  (STANDING):  lidocaine   4% Patch 1 Patch Transdermal daily  polyethylene glycol 3350 17 Gram(s) Oral two times a day    MEDICATIONS  (PRN):  acetaminophen     Tablet .. 650 milliGRAM(s) Oral every 6 hours PRN Temp greater or equal to 38C (100.4F), Mild Pain (1 - 3)  bisacodyl Suppository 10 milliGRAM(s) Rectal daily PRN Constipation  glycopyrrolate Injectable 0.4 milliGRAM(s) IV Push every 6 hours PRN excessive secretions  HYDROmorphone   Solution 1 milliGRAM(s) Oral every 2 hours PRN Moderate Pain (4 - 6)  HYDROmorphone   Solution 2.5 milliGRAM(s) Oral every 2 hours PRN dyspnea  HYDROmorphone   Solution 2.5 milliGRAM(s) Oral every 2 hours PRN Severe Pain (7 - 10)  LORazepam     Tablet 0.5 milliGRAM(s) Oral every 2 hours PRN Agitation  LORazepam   Injectable 1 milliGRAM(s) IV Push every 15 minutes PRN SEIZURES  metoclopramide Injectable 5 milliGRAM(s) IV Push every 6 hours PRN nausea/vomiting    ITEMS UNCHECKED ARE NOT PRESENT    PRESENT SYMPTOMS: [ ]Unable to self-report  [ ]PAINADs [ ]RDOS  Source if other than patient:  [ ]Family   [ ]Team     Pain: [ ] yes [ X] no  QOL impact -   Location -                    Aggravating factors -  Quality -  Radiation -  Timing-  Severity (0-10 scale):  Minimal acceptable level (0-10 scale):     PAINAD Score: 0  [email or web address] (Ctrl +  left click to view)    Dyspnea:                           [ ]Mild [ ]Moderate [ ]Severe    RDOS: 1  0 to 2  minimal or no respiratory distress   3  mild distress  4 to 6 moderate distress  >7 severe distress  [email or web address]://[email or web address]/handouts/hen/Respiratory_Distress_Observation_Scale.pdf (Ctrl +  left click to view)     Anxiety:                             [ ]Mild [ ]Moderate [ ]Severe  Fatigue:                             [ ]Mild [ ]Moderate [ ]Severe  Nausea:                             [ ]Mild [ ]Moderate [ ]Severe  Loss of appetite:              [ ]Mild [ ]Moderate [ ]Severe  Constipation:                    [ ]Mild [ ]Moderate [ ]Severe  		  Other Symptoms:  [ ]All other review of systems negative- unable to assess     Palliative Performance Status Version 2:  30%         [email or web address]_performance_scale_[email or web address]  PHYSICAL EXAM:   Vital Signs Last 24 Hrs  T(C): 36.8 (31 May 2022 09:28), Max: 36.8 (31 May 2022 09:28)  T(F): 98.3 (31 May 2022 09:28), Max: 98.3 (31 May 2022 09:28)  HR: 98 (31 May 2022 09:28) (98 - 98)  BP: 117/70 (31 May 2022 09:28) (117/70 - 117/70)  BP(mean): --  RR: 20 (31 May 2022 09:28) (20 - 20)  SpO2: 95% (31 May 2022 09:28) (95% - 95%) I&O's Summary    GENERAL: [ ] Cachexia  [X ]Alert  [ ]Oriented x   [ ]Lethargic  [ ]Unarousable  [ ]Verbal  [ ]Non-Verbal  Behavioral:   [ ] Anxiety  [ ] Delirium [ ] Agitation [ ] Other  HEENT:  [ X]Normal   [ ]Dry mouth   [ ]ET Tube/Trach  [ ]Oral lesions  PULMONARY:   [ ]Clear [ ]Tachypnea  [ ]Audible excessive secretions   [ ]Rhonchi        [ ]Right [ ]Left [ ]Bilateral  [ ]Crackles        [ ]Right [ ]Left [ ]Bilateral  [ ]Wheezing     [ ]Right [ ]Left [ ]Bilateral  [X ]Diminished BS [ ]Right [ ]Left [ X]Bilateral    CARDIOVASCULAR:    [X ]Regular [ ]Irregular [ ]Tachy  [ ]Kike [ ]Murmur [ ]Other  GASTROINTESTINAL:  [ X]Soft  [ ]Distended   [X ]+BS  [ X]Non tender [ ]Tender  [ ]Other [ ]PEG [ ]OGT/ NGT   Last BM:  5/30  GENITOURINARY:  [ ]Normal [X ] Incontinent   [ ]Oliguria/Anuria   [ ]Levy  MUSCULOSKELETAL:   [ ]Normal   [X ]Weakness  [ ]Bed/Wheelchair bound [ ]Edema  NEUROLOGIC:   [ ]No focal deficits  [ X] Cognitive impairment  [ ] Dysphagia [ ]Dysarthria [ ] Paresis [ ]Other   SKIN:   [X ]Normal  [ ]Rash  [ ]Other  [ ]Pressure ulcer(s)  [ ]y [ ]n  Present on admission      CRITICAL CARE:  [ ] Shock Present  [ ]Septic [ ]Cardiogenic [ ]Neurologic [ ]Hypovolemic  [ ]  Vasopressors [ ]  Inotropes   [ ] Respiratory failure present [ ] Mechanical Ventilation [ ] Non-invasive ventilatory support [ ] High-Flow  [ ] Acute  [ ] Chronic [ ] Hypoxic  [ ] Hypercarbic [ ] Other  [X ] Other organ failure- kidneys    LABS: Reviewed    RADIOLOGY & ADDITIONAL STUDIES: Reviewed    PROTEIN CALORIE MALNUTRITION: [ ] mild [ ] moderate [ ] severe  [ ] underweight [ ] morbid obesity    https://www.andeal.org/vault/2440/web/files/ONC/Table_Clinical%20Characteristics%20to%20Document%20Malnutrition-White%20JV%20et%20al%842703.pdf    Height (cm): 167.6 (05-23-22 @ 18:42), 164 (03-29-22 @ 09:05), 167.6 (08-27-21 @ 13:38)  Weight (kg): 74.8 (05-23-22 @ 18:42), 74.1 (03-29-22 @ 09:05), 74.8 (08-27-21 @ 13:38)  BMI (kg/m2): 26.6 (05-23-22 @ 18:42), 27.6 (03-29-22 @ 09:05), 26.6 (08-27-21 @ 13:38)    [ X] PPSV2 < or = 30% [ ] significant weight loss [ ] poor nutritional intake [ ] anasarca   Artificial Nutrition [ ]     REFERRALS:   [ ]Chaplaincy  [X ] Hospice  [ ]Child Life  [X ]Social Work  [ ]Case management [ ]Holistic Therapy [ ] Physical Therapy [ ] Dietary   Goals of Care Document: SERGIO Garcia (06-25-21 @ 23:47)  Goals of Care Conversation:   Participants:  · Participants  Family; Staff  · Spouse  Honey Nair  · Child(mahendra)  Kevin  · Provider  Dr. tSahl and Dr. Garcia    Advance Directives:  · Does patient have Advance Directive  No  · Caregiver:  no    Conversation Discussion:  · Conversation  Diagnosis; MOLST Discussed  · Conversation Details  Met with patient's son and spouse. Family mentions Kevin's clinical status has changed since starting chemotherapy. They felt with chemotherapy sessions, Kevin became weaker and weaker. They even mention that they ere certain he would pass away based on how he looked. Today they mention he looks better since it has been several days since the last chemo session. They also mention that they were unaware of these symptoms develops and they asked us how long these symptoms would last for. They also wonder how many more sessions he has left.    We posed the question to Kevin's family if they have ever discussed code status. They state that their loved one would never want to talk about this. However, they feel he is suffering at the moment because of daily blood draws and the fact that he is in the hospital. Thus, they know that if Kevin's heart were to stop or if his lungs were to fail, he would not want resusitation or intubation as being on machines would cause him suffering.    C discussed with primary team. All questions/concerns addressed.    Personal Advance Directives Treatment Guidelines:   Treatment Guidelines:  · Treatment Guidelines  DNR Order    MOLST:  · Completed  25-Jun-2021      Electronic Signatures:  Jermain Garcia)  (Signed 25-Jun-2021 23:58)  	Authored: Goals of Care Conversation, Personal Advance Directives Treatment Guidelines      Last Updated: 25-Jun-2021 23:58 by Jermain Garcia)

## 2022-05-31 NOTE — DIETITIAN INITIAL EVALUATION ADULT - REASON INDICATOR FOR ASSESSMENT
Pt seen for length of stay   Source: Medical record, provider, and attempted interview with pt however refused to participate, noted as confused (Lithuanian speaking,  ID #[id number] Elizabeth)

## 2022-05-31 NOTE — PROGRESS NOTE ADULT - PROBLEM SELECTOR PLAN 3
- PPSV: 30% The patient requires nursing assistance with all ADLs  - Supportive care  - Turn and position  - Continue with good skin care

## 2022-05-31 NOTE — PROGRESS NOTE ADULT - PROBLEM SELECTOR PLAN 5
Received a called from Carri DOUGLAS) aleena. A Hospice referral was made last week for home with hospice. Patient is accepted.  The  and I called the patient's spouse, Honey, and she gave the phone to her son, Kevin who helps with decision making. As per Kevin, he was not aware of the plan for home with hospice. He was under the impression that his father would remain in the PCU until his passing. Explained to him that this is an acute care facility and if we are able to stabilize his symptoms we need to have a plan for discharge. Reassured him that we would never discharge him if it was not safe, if he was unstable or his symptoms were not stabilized. I also explained to him that home hospice was family driven. Kevin reports that his family does not have the resources to care for his father at home. He expressed frustration about the discharge planning. The son will be coming this afternoon. We agreed to continue with this conversation later and as a team to come up with the best plan for his father. He is in agreement with this plan.  Will continue with care in the PCU. Received a called from Carri DOUGLAS) aleena. A Hospice referral was made last week for home with hospice. Patient is accepted.  The  and I called the patient's spouse, Honey, and she gave the phone to her son, Kevin who helps with decision making. As per Kevin, he was not aware of the plan for home with hospice. He was under the impression that his father would remain in the PCU until his passing. Explained to him that this is an acute care facility and if we are able to stabilize his symptoms we need to have a plan for discharge. Reassured him that we would never discharge him if it was not safe, if he was unstable or his symptoms were not stabilized. I also explained to him that home hospice was family driven. Kevin reports that his family does not have the resources to care for his father at home. He expressed frustration about the discharge planning. The son will be coming this afternoon. We agreed to continue with this conversation later and as a team to come up with the best plan for his father. He is in agreement with this plan.   Will continue with care in the PCU.

## 2022-05-31 NOTE — DIETITIAN INITIAL EVALUATION ADULT - PERTINENT MEDS FT
MEDICATIONS  (STANDING):  lidocaine   4% Patch 1 Patch Transdermal daily  polyethylene glycol 3350 17 Gram(s) Oral two times a day    MEDICATIONS  (PRN):  acetaminophen     Tablet .. 650 milliGRAM(s) Oral every 6 hours PRN Temp greater or equal to 38C (100.4F), Mild Pain (1 - 3)  bisacodyl Suppository 10 milliGRAM(s) Rectal daily PRN Constipation  glycopyrrolate Injectable 0.4 milliGRAM(s) IV Push every 6 hours PRN excessive secretions  HYDROmorphone  Injectable 0.2 milliGRAM(s) IV Push every 1 hour PRN dyspnea  HYDROmorphone  Injectable 0.5 milliGRAM(s) IV Push every 1 hour PRN Severe Pain (7 - 10)  HYDROmorphone  Injectable 0.2 milliGRAM(s) IV Push every 1 hour PRN Moderate Pain (4 - 6)  LORazepam   Injectable 0.25 milliGRAM(s) IV Push every 1 hour PRN Agitation  LORazepam   Injectable 1 milliGRAM(s) IV Push every 15 minutes PRN SEIZURES  metoclopramide Injectable 5 milliGRAM(s) IV Push every 6 hours PRN nausea/vomiting
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
## 2022-05-31 NOTE — DIETITIAN INITIAL EVALUATION ADULT - OTHER INFO
RD attempted to interview pt to obtain food preferences as pt being followed by palliative pending hospice evaluation.
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
## 2022-06-01 NOTE — PROGRESS NOTE ADULT - PROBLEM SELECTOR PLAN 1
Patient with unreliable oral route. change medications back to IV  - Dilaudid 0.2mg IV q1hr PRN for moderate pain  - Dilaudid 0.5mg IV q1hr PRN for severe pain  - Continue with lidocaine patch to right shoulder  - Bowel regimen while on opioids

## 2022-06-01 NOTE — PROGRESS NOTE ADULT - PROBLEM SELECTOR PLAN 5
Dispo- The  and I spoke with the patient's son, Kevin, yesterday. The family is not able to take the patient home. We agreed to continue to monitor the patient and readdress hospice later during the week. Explained that if the patient is symptomatic and requires IV medication, we can explore inpatient hospice. Kevin asked if the patient was to go to inpatient, he would like a facility close to his home.  Will continue to monitor
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
## 2022-06-01 NOTE — PROGRESS NOTE ADULT - SUBJECTIVE AND OBJECTIVE BOX
GAP TEAM PALLIATIVE CARE UNIT PROGRESS NOTE:      [  ] Patient on hospice program.    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 The patient is seen and examined at the bedside. He required PRN Dilaudid 1mg solution for moderate pain within a 24hr period 8am-8am.    DNR on chart: Yes  Yes      Allergies    morphine (Unknown)    Intolerances    MEDICATIONS  (STANDING):  chlorhexidine 2% Cloths 1 Application(s) Topical daily  lidocaine   4% Patch 1 Patch Transdermal daily    MEDICATIONS  (PRN):  acetaminophen     Tablet .. 650 milliGRAM(s) Oral every 6 hours PRN Temp greater or equal to 38C (100.4F), Mild Pain (1 - 3)  bisacodyl Suppository 10 milliGRAM(s) Rectal daily PRN Constipation  glycopyrrolate Injectable 0.4 milliGRAM(s) IV Push every 6 hours PRN excessive secretions  HYDROmorphone  Injectable 0.2 milliGRAM(s) IV Push every 1 hour PRN Moderate Pain (4 - 6)  HYDROmorphone  Injectable 0.5 milliGRAM(s) IV Push every 1 hour PRN Severe Pain (7 - 10)  HYDROmorphone  Injectable 0.5 milliGRAM(s) IV Push every 1 hour PRN dyspnea  LORazepam   Injectable 0.5 milliGRAM(s) IV Push every 1 hour PRN Agitation  LORazepam   Injectable 1 milliGRAM(s) IV Push every 15 minutes PRN SEIZURES  metoclopramide Injectable 5 milliGRAM(s) IV Push every 6 hours PRN nausea/vomiting  polyethylene glycol 3350 17 Gram(s) Oral two times a day PRN Constipation    ITEMS UNCHECKED ARE NOT PRESENT    PRESENT SYMPTOMS: [ ]Unable to self-report  [ ]PAINADs [ ]RDOS  Source if other than patient:  [ ]Family   [ ]Team     Pain: [ X] yes [ ] no  QOL impact - ADLs  Location - right shoulder                    Aggravating factors - movement   Quality -  Radiation -  Timing-  Severity (0-10 scale): 8  Minimal acceptable level (0-10 scale): does not answer    PAINAD Score:  [email or web address]/[email or web address] (Ctrl +  left click to view)    Dyspnea:                           [ ]Mild [ ]Moderate [ ]Severe    RDOS: 2  0 to 2  minimal or no respiratory distress   3  mild distress  4 to 6 moderate distress  >7 severe distress  [email or web address]://[email or web address]/handouts/hen/Respiratory_Distress_Observation_Scale.pdf (Ctrl +  left click to view)     Anxiety:                             [ ]Mild [ ]Moderate [ ]Severe  Fatigue:                             [ ]Mild [ ]Moderate [ ]Severe  Nausea:                             [ ]Mild [ ]Moderate [ ]Severe  Loss of appetite:              [ ]Mild [ ]Moderate [ ]Severe  Constipation:                    [ ]Mild [ ]Moderate [ ]Severe  		  Other Symptoms:  [X ]All other review of systems negative     Palliative Performance Status Version 2: 20 %         [email or web address]  PHYSICAL EXAM:   Vital Signs Last 24 Hrs  T(C): 36.9 (01 Jun 2022 09:23), Max: 36.9 (01 Jun 2022 09:23)  T(F): 98.4 (01 Jun 2022 09:23), Max: 98.4 (01 Jun 2022 09:23)  HR: 101 (01 Jun 2022 09:23) (101 - 101)  BP: 157/93 (01 Jun 2022 09:23) (157/93 - 157/93)  BP(mean): --  RR: 20 (01 Jun 2022 09:23) (20 - 20)  SpO2: 98% (01 Jun 2022 09:23) (98% - 98%) I&O's Summary    GENERAL: [ ] Cachexia  [X ]Alert  [ ]Oriented x   [ ]Lethargic  [ ]Unarousable  [X ]Verbal  [ ]Non-Verbal  Behavioral:   [ ] Anxiety  [ ] Delirium [ ] Agitation [ ] Other  HEENT:  [ X]Normal   [ ]Dry mouth   [ ]ET Tube/Trach  [ ]Oral lesions  PULMONARY:   [ ]Clear [ ]Tachypnea  [ ]Audible excessive secretions   [ ]Rhonchi        [ ]Right [ ]Left [ ]Bilateral  [ ]Crackles        [ ]Right [ ]Left [ ]Bilateral  [ ]Wheezing     [ ]Right [ ]Left [ ]Bilateral  [X ]Diminished BS [ ]Right [ ]Left [ X]Bilateral    CARDIOVASCULAR:    [ ]Regular [ ]Irregular [ X]Tachy  [ ]Kike [ ]Murmur [ ]Other  GASTROINTESTINAL:  [ X]Soft  [ ]Distended   [X ]+BS  [ X]Non tender [ ]Tender  [ ]Other [ ]PEG [ ]OGT/ NGT   Last BM:  5/30  GENITOURINARY:  [ ]Normal [X ] Incontinent   [ ]Oliguria/Anuria   [ ]Levy  MUSCULOSKELETAL:   [ ]Normal   [X ]Weakness  [ ]Bed/Wheelchair bound [ ]Edema  NEUROLOGIC:   [ ]No focal deficits  [ X] Cognitive impairment  [ ] Dysphagia [ ]Dysarthria [ ] Paresis [ ]Other   SKIN:   [X ]Normal  [ ]Rash  [ ]Other  [ ]Pressure ulcer(s)  [ ]y [ ]n  Present on admission      CRITICAL CARE:  [ ] Shock Present  [ ]Septic [ ]Cardiogenic [ ]Neurologic [ ]Hypovolemic  [ ]  Vasopressors [ ]  Inotropes   [ ] Respiratory failure present [ ] Mechanical Ventilation [ ] Non-invasive ventilatory support [ ] High-Flow  [ ] Acute  [ ] Chronic [ ] Hypoxic  [ ] Hypercarbic [ ] Other  [X ] Other organ failure- kidneys    LABS: Reviewed    RADIOLOGY & ADDITIONAL STUDIES: Reviewed    PROTEIN CALORIE MALNUTRITION: [ ] mild [ ] moderate [ ] severe  [ ] underweight [ ] morbid obesity    https://www.andeal.org/vault/2440/web/files/ONC/Table_Clinical%20Characteristics%20to%20Document%20Malnutrition-White%20JV%20et%20al%650641.pdf    Height (cm): 167.6 (05-23-22 @ 18:42), 164 (03-29-22 @ 09:05), 167.6 (08-27-21 @ 13:38)  Weight (kg): 74.8 (05-23-22 @ 18:42), 74.1 (03-29-22 @ 09:05), 74.8 (08-27-21 @ 13:38)  BMI (kg/m2): 26.6 (05-23-22 @ 18:42), 27.6 (03-29-22 @ 09:05), 26.6 (08-27-21 @ 13:38)    [X ] PPSV2 < or = 30% [ ] significant weight loss [ X] poor nutritional intake [ ] anasarca   Artificial Nutrition [ ]     REFERRALS:   [ X]Chaplaincy  [ ] Hospice  [ ]Child Life  [X ]Social Work  [ ]Case management [ ]Holistic Therapy [ ] Physical Therapy [ ] Dietary   Goals of Care Document: SERGIO Garcia (06-25-21 @ 23:47)  Goals of Care Conversation:   Participants:  · Participants  Family; Staff  · Spouse  Honey Nair  · Child(mahendra)  Kevin  · Provider  Dr. Stahl and Dr. Garcia    Advance Directives:  · Does patient have Advance Directive  No  · Caregiver:  no    Conversation Discussion:  · Conversation  Diagnosis; MOLST Discussed  · Conversation Details  Met with patient's son and spouse. Family mentions Kevin's clinical status has changed since starting chemotherapy. They felt with chemotherapy sessions, Kevin became weaker and weaker. They even mention that they ere certain he would pass away based on how he looked. Today they mention he looks better since it has been several days since the last chemo session. They also mention that they were unaware of these symptoms develops and they asked us how long these symptoms would last for. They also wonder how many more sessions he has left.    We posed the question to Kevin's family if they have ever discussed code status. They state that their loved one would never want to talk about this. However, they feel he is suffering at the moment because of daily blood draws and the fact that he is in the hospital. Thus, they know that if Kevin's heart were to stop or if his lungs were to fail, he would not want resusitation or intubation as being on machines would cause him suffering.    C discussed with primary team. All questions/concerns addressed.    Personal Advance Directives Treatment Guidelines:   Treatment Guidelines:  · Treatment Guidelines  DNR Order    MOLST:  · Completed  25-Jun-2021      Electronic Signatures:  Jermain Garcia)  (Signed 25-Jun-2021 23:58)  	Authored: Goals of Care Conversation, Personal Advance Directives Treatment Guidelines      Last Updated: 25-Jun-2021 23:58 by Jermain Garcia)
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
## 2022-06-01 NOTE — PROGRESS NOTE ADULT - PROBLEM SELECTOR PLAN 3
- PPSV: 20% The patient requires nursing assistance with all ADLs  - Supportive care  - Turn and position  - Continue with good skin care

## 2022-06-02 NOTE — PROGRESS NOTE ADULT - PROBLEM SELECTOR PLAN 5
- AML last chemo 4/2022 with relapse, bone marrow on admission showing high blasts. Concern for TLS.

## 2022-06-02 NOTE — PROGRESS NOTE ADULT - PROBLEM SELECTOR PLAN 1
The patient required a one time dose of Acetaminophen 1gram IVPB yesterday afternoon for right shoulder pain within relief.  - Continue with Dilaudid 0.2mg IV q1hr PRN for moderate pain  - Continue with Dilaudid 0.5mg IV q1hr PRN for severe pain  - Continue with lidocaine patch to right shoulder  - Acetaminophen 1gram IVPB X1 dose ordered for today   - Bowel regimen while on opioids

## 2022-06-02 NOTE — PROGRESS NOTE ADULT - PROBLEM SELECTOR PLAN 6
- Called and spoke to the patient's son Kevin who is making decisions with the patient's wife. Update provided.  - Questions answered.  Emotional support provided.   - Kevin will be visiting the patient this afternoon.  - Disposition planning will be guided by clinical course.

## 2022-06-02 NOTE — PROGRESS NOTE ADULT - NS ATTEND OPT1 GEN_ALL_CORE

## 2022-06-02 NOTE — PROGRESS NOTE ADULT - SUBJECTIVE AND OBJECTIVE BOX
GAP TEAM PALLIATIVE CARE UNIT PROGRESS NOTE:      [  ] Patient on hospice program.    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 The patient is seen and examined at the bedside. He required PRN Dilaudid 0.5mg IV X1 for dyspnea, PRN Ativan 0.5mg IV X1 and a one time dose of Acetaminophen 1gram IVPB for right shoulder pain within a 24hr period 8am-8am.    DNR on chart: Yes  Yes      Allergies    morphine (Unknown)    Intolerances    MEDICATIONS  (STANDING):  chlorhexidine 2% Cloths 1 Application(s) Topical daily  lidocaine   4% Patch 1 Patch Transdermal daily    MEDICATIONS  (PRN):  acetaminophen     Tablet .. 650 milliGRAM(s) Oral every 6 hours PRN Temp greater or equal to 38C (100.4F), Mild Pain (1 - 3)  bisacodyl Suppository 10 milliGRAM(s) Rectal daily PRN Constipation  glycopyrrolate Injectable 0.4 milliGRAM(s) IV Push every 6 hours PRN excessive secretions  HYDROmorphone  Injectable 0.2 milliGRAM(s) IV Push every 1 hour PRN Moderate Pain (4 - 6)  HYDROmorphone  Injectable 0.5 milliGRAM(s) IV Push every 1 hour PRN Severe Pain (7 - 10)  HYDROmorphone  Injectable 0.5 milliGRAM(s) IV Push every 1 hour PRN dyspnea  LORazepam   Injectable 0.5 milliGRAM(s) IV Push every 1 hour PRN Agitation  LORazepam   Injectable 1 milliGRAM(s) IV Push every 15 minutes PRN SEIZURES  metoclopramide Injectable 5 milliGRAM(s) IV Push every 6 hours PRN nausea/vomiting  polyethylene glycol 3350 17 Gram(s) Oral two times a day PRN Constipation    ITEMS UNCHECKED ARE NOT PRESENT    PRESENT SYMPTOMS: [ ]Unable to self-report  [ ]PAINADs [ ]RDOS  Source if other than patient:  [ ]Family   [ ]Team     Pain: [X ] yes [ ] no  QOL impact - ADL  Location - right shoulder                 Aggravating factors - movement  Quality -  Radiation -  Timing-  Severity (0-10 scale): does not answer   Minimal acceptable level (0-10 scale): does not answer    PAINAD Score:  [email or web address].[email or web address]/cog/painad.pdf (Ctrl +  left click to view)    Dyspnea:                           [ ]Mild [ ]Moderate [ ]Severe    RDOS: 2  0 to 2  minimal or no respiratory distress   3  mild distress  4 to 6 moderate distress  >7 severe distress  https://[email or web address]/handouts/hen/Respiratory_Distress_Observation_Scale.pdf (Ctrl +  left click to view)     Anxiety:                             [ ]Mild [ ]Moderate [ ]Severe  Fatigue:                             [ ]Mild [ ]Moderate [ ]Severe  Nausea:                             [ ]Mild [ ]Moderate [ ]Severe  Loss of appetite:              [ ]Mild [ ]Moderate [ ]Severe  Constipation:                    [ ]Mild [ ]Moderate [ ]Severe  		  Other Symptoms:  [ ]All other review of systems negative- unable to assess     Palliative Performance Status Version 2:  20%         http://npcrc.org/files/news/palliative_performance_scale_ppsv2.pdf  PHYSICAL EXAM:   Vital Signs Last 24 Hrs  T(C): 37.3 (02 Jun 2022 09:13), Max: 37.3 (02 Jun 2022 09:13)  T(F): 99.2 (02 Jun 2022 09:13), Max: 99.2 (02 Jun 2022 09:13)  HR: 107 (02 Jun 2022 09:13) (107 - 107)  BP: 169/60 (02 Jun 2022 09:13) (169/60 - 169/60)  BP(mean): --  RR: 20 (02 Jun 2022 09:13) (20 - 20)  SpO2: 94% (02 Jun 2022 09:13) (94% - 94%) I&O's Summary    GENERAL: [ ] Cachexia  [X ]Alert  [ ]Oriented x   [ ]Lethargic  [ ]Unarousable  [X ]Verbal  [ ]Non-Verbal  Behavioral:   [ ] Anxiety  [ ] Delirium [ ] Agitation [ ] Other  HEENT:  [ X]Normal   [ ]Dry mouth   [ ]ET Tube/Trach  [ ]Oral lesions  PULMONARY:   [ ]Clear [ ]Tachypnea  [ ]Audible excessive secretions   [ ]Rhonchi        [ ]Right [ ]Left [ ]Bilateral  [ ]Crackles        [ ]Right [ ]Left [ ]Bilateral  [ ]Wheezing     [ ]Right [ ]Left [ ]Bilateral  [X ]Diminished BS [ ]Right [ ]Left [ X]Bilateral    CARDIOVASCULAR:    [ ]Regular [ ]Irregular [ X]Tachy  [ ]Kike [ ]Murmur [ ]Other  GASTROINTESTINAL:  [ X]Soft  [ ]Distended   [X ]+BS  [ X]Non tender [ ]Tender  [ ]Other [ ]PEG [ ]OGT/ NGT   Last BM:  5/30  GENITOURINARY:  [ ]Normal [X ] Incontinent   [ ]Oliguria/Anuria   [ ]Levy  MUSCULOSKELETAL:   [ ]Normal   [X ]Weakness  [X ]Bed/Wheelchair bound [ ]Edema  NEUROLOGIC:   [ ]No focal deficits  [ X] Cognitive impairment  [ ] Dysphagia [ ]Dysarthria [ ] Paresis [ ]Other   SKIN:   [X ]Normal  [ ]Rash  [ ]Other  [ ]Pressure ulcer(s)  [ ]y [ ]n  Present on admission      CRITICAL CARE:  [ ] Shock Present  [ ]Septic [ ]Cardiogenic [ ]Neurologic [ ]Hypovolemic  [ ]  Vasopressors [ ]  Inotropes   [ ] Respiratory failure present [ ] Mechanical Ventilation [ ] Non-invasive ventilatory support [ ] High-Flow  [ ] Acute  [ ] Chronic [ ] Hypoxic  [ ] Hypercarbic [ ] Other  [X ] Other organ failure- kidneys    LABS: None new    RADIOLOGY & ADDITIONAL STUDIES: None new    PROTEIN CALORIE MALNUTRITION: [ ] mild [ ] moderate [ ] severe  [ ] underweight [ ] morbid obesity    https://www.andeal.org/vault/2440/web/files/ONC/Table_Clinical%20Characteristics%20to%20Document%20Malnutrition-White%20JV%20et%20al%356868.pdf    Height (cm): 167.6 (05-23-22 @ 18:42), 164 (03-29-22 @ 09:05), 167.6 (08-27-21 @ 13:38)  Weight (kg): 74.8 (05-23-22 @ 18:42), 74.1 (03-29-22 @ 09:05), 74.8 (08-27-21 @ 13:38)  BMI (kg/m2): 26.6 (05-23-22 @ 18:42), 27.6 (03-29-22 @ 09:05), 26.6 (08-27-21 @ 13:38)    [X ] PPSV2 < or = 30% [ ] significant weight loss [ ] poor nutritional intake [ ] anasarca   Artificial Nutrition [ ]     REFERRALS:   [ ]Chaplaincy  [ ] Hospice  [ ]Child Life  [X ]Social Work  [ ]Case management [ ]Holistic Therapy [ ] Physical Therapy [ ] Dietary   Goals of Care Document: SERGIO Garcia (06-25-21 @ 23:47)  Goals of Care Conversation:   Participants:  · Participants  Family; Staff  · Spouse  Honey Nair  · Child(mahendra)  Kevin  · Provider  Dr. Stahl and Dr. Garcia    Advance Directives:  · Does patient have Advance Directive  No  · Caregiver:  no    Conversation Discussion:  · Conversation  Diagnosis; MOLST Discussed  · Conversation Details  Met with patient's son and spouse. Family mentions Kevin's clinical status has changed since starting chemotherapy. They felt with chemotherapy sessions, Kevin became weaker and weaker. They even mention that they ere certain he would pass away based on how he looked. Today they mention he looks better since it has been several days since the last chemo session. They also mention that they were unaware of these symptoms develops and they asked us how long these symptoms would last for. They also wonder how many more sessions he has left.    We posed the question to Kevin's family if they have ever discussed code status. They state that their loved one would never want to talk about this. However, they feel he is suffering at the moment because of daily blood draws and the fact that he is in the hospital. Thus, they know that if Kevin's heart were to stop or if his lungs were to fail, he would not want resusitation or intubation as being on machines would cause him suffering.    GOC discussed with primary team. All questions/concerns addressed.    Personal Advance Directives Treatment Guidelines:   Treatment Guidelines:  · Treatment Guidelines  DNR Order    MOLST:  · Completed  25-Jun-2021      Electronic Signatures:  Jermain Garcia)  (Signed 25-Jun-2021 23:58)  	Authored: Goals of Care Conversation, Personal Advance Directives Treatment Guidelines      Last Updated: 25-Jun-2021 23:58 by Jermain Garcia)

## 2022-06-02 NOTE — PROGRESS NOTE ADULT - NS ATTEST RISK GEN_ALL_CORE
Risk Statement (NON-critical care)

## 2022-06-02 NOTE — PROGRESS NOTE ADULT - PROBLEM SELECTOR PLAN 3
- Continue with Ativan 0.5mg IV q1hr PRN (1 required within a 24hr period 8am-8am)  - Monitor for constipation, urinary retention, pain, hunger, thirst, etc.  Promote sleep wake cycle and reorientation as indicated.
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
## 2022-06-02 NOTE — PROGRESS NOTE ADULT - PROBLEM SELECTOR PLAN 2
- Continue with Dilaudid 0.5mg IV q1hr PRN for dyspnea ( 1 required within the last 24hr period 8am-8am)  - Bowel regimen while on opioids

## 2022-06-02 NOTE — PROGRESS NOTE ADULT - NS ATTEST RISK PROBLEM GEN_ALL_CORE FT
Closely monitoring Ca/ TLS labs / and for decompensation
Closely monitoring Ca/ TLS labs / and for decompensation
In the setting of parenteral controlled substance administration, clinical monitoring required for side effects such as respiratory depression, constipation and opioid induced neurotoxicity.  This patient is at high risk due to loss of oral route - requiring parenteral administration.  AML, organ failure.
Closely monitoring Ca/ TLS labs / and for decompensation
In the setting of parenteral controlled substance administration, clinical monitoring required for side effects such as respiratory depression, constipation and opioid induced neurotoxicity.  This patient is at high risk due to loss of oral route - requiring parenteral administration.  AML, organ failure.
Closely monitoring Ca/ TLS labs / and for decompensation
Closely monitoring Ca/ TLS labs / and for decompensation
parenteral opioids
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
## 2022-06-03 NOTE — PROGRESS NOTE ADULT - PROBLEM SELECTOR PLAN 2
The patient required PRN Dilaudid 0.5mg IV X3 for dyspnea.  - Increase PRN Dilaudid to 1mg IV q1hr PRN for dyspnea  - Dilaudid 0.5mg IV q6hr ATC ordered  - Bowel regimen while on opioids

## 2022-06-03 NOTE — PROVIDER CONTACT NOTE (OTHER) - SITUATION
No blood return to patients PICC line after administration of Cathflo
pt SOB and complaining of chest pain
pt bp 174/64
pt temp 101.2
Patient without spontaneous respirations or pulse

## 2022-06-03 NOTE — PROGRESS NOTE ADULT - PROBLEM SELECTOR PLAN 3
- Increase PRN Dilaudid from 0.3 to 0.5mg IV q1hr PRN for moderate pain  - Increase PRN Dilaudid from 0.5 to 1mg IV q1hr PRN for severe pain  - Tylenol 1gram IVPB X1 dose ordered  - Bowel regimen while on opioids

## 2022-06-03 NOTE — PROGRESS NOTE ADULT - PROBLEM SELECTOR PLAN 8
no BM x3 days per pt  Added Lactulose 20 gm po x1  Continue Miralax BID
The patient is actively dying and is not stable for transport.   Called and spoke to the patient's son Kevin. Educated as to what to expect.  Questions answered.  Emotional support provided.   Will continue with care in the PCU

## 2022-06-03 NOTE — PROVIDER CONTACT NOTE (OTHER) - RECOMMENDATIONS
MD states she will endorse to day team
Patient pronounced dead at 2238, family at bedside and no autopsy at this time.

## 2022-06-03 NOTE — PROGRESS NOTE ADULT - PROBLEM SELECTOR PROBLEM 7
Constipation
Healthcare maintenance
AML (acute myeloid leukemia)
Healthcare maintenance
Constipation

## 2022-06-03 NOTE — PROVIDER CONTACT NOTE (OTHER) - ASSESSMENT
patient A&OX3, no distress noted
No response to external stimuli  No spontaneous respirations  No apical heart rate  Negative papillary response to light

## 2022-06-03 NOTE — PROGRESS NOTE ADULT - REASON FOR ADMISSION
Blast crisis on outpatient labs.
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
## 2022-06-03 NOTE — PROGRESS NOTE ADULT - PROBLEM SELECTOR PROBLEM 5
Encounter for palliative care
Chronic right shoulder pain
Agitation
Encounter for palliative care
Chronic heart failure
Chronic right shoulder pain
Chronic right shoulder pain
AML (acute myeloid leukemia)
Chronic heart failure
Encounter for palliative care
Chronic right shoulder pain
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
## 2022-06-03 NOTE — PROVIDER CONTACT NOTE (OTHER) - REASON
pt SOB and complaining of chest pain
No blood return to patients PICC line after administration of Cathflo
pt bp 174/64
pt temp 101.2
Expiration

## 2022-06-03 NOTE — PROGRESS NOTE ADULT - SUBJECTIVE AND OBJECTIVE BOX
GAP TEAM PALLIATIVE CARE UNIT PROGRESS NOTE:      [  ] Patient on hospice program.    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 The patient is seen and examined at the bedside. The patient is actively dying. He is clammy, hypoxic ,hypotensive and febrile this morning. A phone call was made to the patient's son. I gave him an update and emotional support was provided. In the last 24hr, the patient required PRN Ativan 0.5mg IV X1, PRN Robinul 0.4mg IV X2 and PRN Dilaudid 0.5mg IV X3 for dyspnea.    DNR on chart: Yes  Yes      Allergies    morphine (Unknown)    Intolerances    MEDICATIONS  (STANDING):  chlorhexidine 2% Cloths 1 Application(s) Topical daily  HYDROmorphone  Injectable 0.5 milliGRAM(s) IV Push every 6 hours  lidocaine   4% Patch 1 Patch Transdermal daily    MEDICATIONS  (PRN):  acetaminophen     Tablet .. 650 milliGRAM(s) Oral every 6 hours PRN Temp greater or equal to 38C (100.4F), Mild Pain (1 - 3)  bisacodyl Suppository 10 milliGRAM(s) Rectal daily PRN Constipation  glycopyrrolate Injectable 0.4 milliGRAM(s) IV Push every 6 hours PRN excessive secretions  HYDROmorphone  Injectable 0.5 milliGRAM(s) IV Push every 1 hour PRN Moderate Pain (4 - 6)  HYDROmorphone  Injectable 1 milliGRAM(s) IV Push every 1 hour PRN dyspnea  HYDROmorphone  Injectable 1 milliGRAM(s) IV Push every 1 hour PRN Severe Pain (7 - 10)  LORazepam   Injectable 0.5 milliGRAM(s) IV Push every 1 hour PRN Agitation  LORazepam   Injectable 1 milliGRAM(s) IV Push every 15 minutes PRN SEIZURES  metoclopramide Injectable 5 milliGRAM(s) IV Push every 6 hours PRN nausea/vomiting  polyethylene glycol 3350 17 Gram(s) Oral two times a day PRN Constipation    ITEMS UNCHECKED ARE NOT PRESENT    PRESENT SYMPTOMS: [X ]Unable to self-report  [ X]PAINADs [ X]RDOS  Source if other than patient:  [ ]Family   [ X]Team     Pain: [ ] yes [ X] no  QOL impact -   Location -                    Aggravating factors -  Quality -  Radiation -  Timing-  Severity (0-10 scale):  Minimal acceptable level (0-10 scale):     PAINAD Score: 0  [email or web address].[email or web address]/cog/painad.[email or web address] (Ctrl +  left click to view)    Dyspnea:                           [ ]Mild [ ]Moderate [ ]Severe    RDOS: 2  0 to 2  minimal or no respiratory distress   3  mild distress  4 to 6 moderate distress  >7 severe distress  [email or web address]://[email or web address]/hen/Respiratory_Distress_Observation_Scale.pdf (Ctrl +  left click to view)     Anxiety:                             [ ]Mild [ ]Moderate [ ]Severe  Fatigue:                             [ ]Mild [ ]Moderate [ ]Severe  Nausea:                             [ ]Mild [ ]Moderate [ ]Severe  Loss of appetite:              [ ]Mild [ ]Moderate [ ]Severe  Constipation:                    [ ]Mild [ ]Moderate [ ]Severe  		  Other Symptoms:  [ ]All other review of systems negative- unable to assess    Palliative Performance Status Version 2: 10%         http://[email or web address].org/files/news/palliative_performance_scale_[email or web address].pdf  PHYSICAL EXAM:   Vital Signs Last 24 Hrs  T(C): 38.5 (03 Jun 2022 08:29), Max: 38.5 (03 Jun 2022 08:29)  T(F): 101.3 (03 Jun 2022 08:29), Max: 101.3 (03 Jun 2022 08:29)  HR: 51 (03 Jun 2022 08:29) (51 - 107)  BP: 77/32 (03 Jun 2022 08:29) (77/32 - 169/60)  BP(mean): --  RR: 26 (03 Jun 2022 08:29) (20 - 26)  SpO2: 91% (03 Jun 2022 08:29) (91% - 94%) I&O's Summary    GENERAL: [ ] Cachexia  [ ]Alert  [ ]Oriented x   [ ]Lethargic  [X ]Unarousable  [ ]Verbal  [X ]Non-Verbal  Behavioral:   [ ] Anxiety  [ ] Delirium [ ] Agitation [ ] Other  HEENT:  [ ]Normal   [ X]Dry mouth   [ ]ET Tube/Trach  [ ]Oral lesions  PULMONARY:   [ ]Clear [ ]Tachypnea  [X ]Audible excessive secretions   [ ]Rhonchi        [ ]Right [ ]Left [ ]Bilateral  [ ]Crackles        [ ]Right [ ]Left [ ]Bilateral  [ ]Wheezing     [ ]Right [ ]Left [ ]Bilateral  [ ]Diminished BS [ ]Right [ ]Left [ ]Bilateral    CARDIOVASCULAR:    [ ]Regular [ ]Irregular [ ]Tachy  [X ]Kike [ ]Murmur [ ]Other  GASTROINTESTINAL:  [ X]Soft  [ ]Distended   [X ]+BS  [ X]Non tender [ ]Tender  [ ]Other [ ]PEG [ ]OGT/ NGT   Last BM:  5/30  GENITOURINARY:  [ ]Normal [X ] Incontinent   [ ]Oliguria/Anuria   [ ]Levy  MUSCULOSKELETAL:   [ ]Normal   [X ]Weakness  [X ]Bed/Wheelchair bound [ ]Edema  NEUROLOGIC:   [ ]No focal deficits  [ X] Cognitive impairment  [ ] Dysphagia [ ]Dysarthria [ ] Paresis [ ]Other   SKIN:   [X ]Normal  [ ]Rash  [ ]Other  [ ]Pressure ulcer(s)  [ ]y [ ]n  Present on admission      CRITICAL CARE:  [ ] Shock Present  [ ]Septic [ ]Cardiogenic [ ]Neurologic [ ]Hypovolemic  [ ]  Vasopressors [ ]  Inotropes   [ ] Respiratory failure present [ ] Mechanical Ventilation [ ] Non-invasive ventilatory support [ ] High-Flow  [ ] Acute  [ ] Chronic [ ] Hypoxic  [ ] Hypercarbic [ ] Other  [X ] Other organ failure- kidneys    LABS: None new    RADIOLOGY & ADDITIONAL STUDIES: None new    PROTEIN CALORIE MALNUTRITION: [ ] mild [ ] moderate [ ] severe  [ ] underweight [ ] morbid obesity    https://www.andeal.org/vault/2440/web/files/ONC/Table_Clinical%20Characteristics%20to%20Document%20Malnutrition-White%20JV%20et%20al%637238.pdf    Height (cm): 167.6 (05-23-22 @ 18:42), 164 (03-29-22 @ 09:05), 167.6 (08-27-21 @ 13:38)  Weight (kg): 74.8 (05-23-22 @ 18:42), 74.1 (03-29-22 @ 09:05), 74.8 (08-27-21 @ 13:38)  BMI (kg/m2): 26.6 (05-23-22 @ 18:42), 27.6 (03-29-22 @ 09:05), 26.6 (08-27-21 @ 13:38)    [X ] PPSV2 < or = 30% [ ] significant weight loss [ ] poor nutritional intake [ ] anasarca   Artificial Nutrition [ ]     REFERRALS:   [X ]Chaplaincy  [ ] Hospice  [ ]Child Life  [ X]Social Work  [ ]Case management [ ]Holistic Therapy [ ] Physical Therapy [ ] Dietary   Goals of Care Document: SERGIO Garcia (06-25-21 @ 23:47)  Goals of Care Conversation:   Participants:  · Participants  Family; Staff  · Spouse  Honey Nair  · Child(mahendra)  Kevin  · Provider  Dr. Stahl and Dr. Garcia    Advance Directives:  · Does patient have Advance Directive  No  · Caregiver:  no    Conversation Discussion:  · Conversation  Diagnosis; MOLST Discussed  · Conversation Details  Met with patient's son and spouse. Family mentions Eduardos clinical status has changed since starting chemotherapy. They felt with chemotherapy sessions, Kevin became weaker and weaker. They even mention that they ere certain he would pass away based on how he looked. Today they mention he looks better since it has been several days since the last chemo session. They also mention that they were unaware of these symptoms develops and they asked us how long these symptoms would last for. They also wonder how many more sessions he has left.    We posed the question to Kevin's family if they have ever discussed code status. They state that their loved one would never want to talk about this. However, they feel he is suffering at the moment because of daily blood draws and the fact that he is in the hospital. Thus, they know that if Eduardos heart were to stop or if his lungs were to fail, he would not want resusitation or intubation as being on machines would cause him suffering.    GOC discussed with primary team. All questions/concerns addressed.    Personal Advance Directives Treatment Guidelines:   Treatment Guidelines:  · Treatment Guidelines  DNR Order    MOLST:  · Completed  25-Jun-2021      Electronic Signatures:  Jermain Garcia)  (Signed 25-Jun-2021 23:58)  	Authored: Goals of Care Conversation, Personal Advance Directives Treatment Guidelines      Last Updated: 25-Jun-2021 23:58 by Jermain Garcia)

## 2022-06-03 NOTE — PROGRESS NOTE ADULT - PROVIDER SPECIALTY LIST ADULT
Palliative Care
Internal Medicine
Internal Medicine
Heme/Onc
Nephrology
Heme/Onc
Palliative Care
Palliative Care
Nephrology
Nephrology
Palliative Care
Palliative Care
Heme/Onc
Palliative Care
Heme/Onc

## 2022-06-03 NOTE — PROGRESS NOTE ADULT - PROBLEM SELECTOR PLAN 1
- Patient with audible secretions  - Continue with Robinul 0.4mg IV q6hr PRN ( 2 required within a 24hr period 8am-8am)  - Turn and position

## 2022-06-03 NOTE — PROGRESS NOTE ADULT - PROBLEM SELECTOR PROBLEM 6
Chronic right shoulder pain
Encounter for palliative care
Healthcare maintenance
Functional quadriplegia
Chronic right shoulder pain

## 2022-06-03 NOTE — PROVIDER CONTACT NOTE (OTHER) - ACTION/TREATMENT ORDERED:
Lasix 40mg IV push, EKG, Chest x-ray, VBG lab, Enzymes, BNP ordered
650mg po tylenol, Blood cultures x2, UA ordered.
No orders as in yet
See patient expiration note
No new orders at this time

## 2022-06-03 NOTE — PROGRESS NOTE ADULT - PROBLEM SELECTOR PLAN 7
DVT prophylaxis: Heparin subc 5000, DC when PLT <50K  DNR/DNI confirmed. MOLST in chart. Continue all treatment measures otherwise.
DVT prophylaxis: Heparin subc 5000, DC when PLT <50K  DNR/DNI confirmed. MOLST in chart. Continue all treatment measures otherwise.
no BM x3 days per pt  Added Lactulose 20 gm po x1  Continue Miralax BID
- AML last chemo 4/2022 with relapse, bone marrow on admission showing high blasts. Concern for TLS.
no BM x3 days per pt  Added Lactulose 20 gm po x1  Continue Miralax BID

## 2022-06-20 LAB — ONKOSIGHT MYELOID SEQUENCE: SIGNIFICANT CHANGE UP

## 2022-07-20 ENCOUNTER — APPOINTMENT (OUTPATIENT)
Dept: HEMATOLOGY ONCOLOGY | Facility: CLINIC | Age: 82
End: 2022-07-20

## 2022-11-21 NOTE — PROGRESS NOTE ADULT - PROBLEM SELECTOR PLAN 6
Nephrology following-No indication for HD at this time.   6/15 Normal renal ultrasound  monitor for TLS.  follow ionized calcium daily as per nephrology  6/24 on Sensipar for hypercalcemia   follow up Ionized calcium levels daily  Vitamin D 2000 daily.  Renal is following Quality 110: Preventive Care And Screening: Influenza Immunization: Influenza Immunization Administered during Influenza season Quality 431: Preventive Care And Screening: Unhealthy Alcohol Use - Screening: Patient screened for unhealthy alcohol use using a single question and scores less than 2 times per year Quality 226: Preventive Care And Screening: Tobacco Use: Screening And Cessation Intervention: Patient screened for tobacco use and is an ex/non-smoker Detail Level: Detailed Quality 130: Documentation Of Current Medications In The Medical Record: Current Medications Documented Vaginal

## 2023-02-14 NOTE — PROGRESS NOTE ADULT - NS ATTEND BILL GEN_ALL_CORE
Attending to bill
Negative

## 2023-03-19 NOTE — PROGRESS NOTE ADULT - PROBLEM SELECTOR PLAN 5
Monitor fingersticks closely   Continue ISS   Consistent carbohydrate diet  Endocrine consult for hypercalcemia-no acute intervention 6/21 Yes

## 2023-03-31 NOTE — DISCHARGE NOTE PROVIDER - NSDCCPCAREPLAN_GEN_ALL_CORE_FT
Statement Selected
PRINCIPAL DISCHARGE DIAGNOSIS  Diagnosis: AML (acute myeloid leukemia)  Assessment and Plan of Treatment: Notify MD or report to ER for fever greater or equal to 100.4, persistent nausea, vomiting, diarrhea, bleeding.

## 2023-05-08 NOTE — ED ADULT NURSE REASSESSMENT NOTE - NS ED NURSE REASSESS COMMENT FT1
Report received from [place]YENNE Sol. Pt. received with 20 gauge IV in right ac. VS as noted. Respirations are even and unlabored on room air. Will continue to monitor. no

## 2023-11-01 NOTE — PATIENT PROFILE ADULT - NSPROEXTENSIONSOFSELF_GEN_A_NUR
pt with likely viral [place], no red flags, pt stable for dc a fu with dermatology as outpatient
none

## 2024-02-07 NOTE — PROGRESS NOTE ADULT - SUBJECTIVE AND OBJECTIVE BOX
Please advise Patient is a 80y old  Male who presents with a chief complaint of CHF exacerbation (29 Aug 2021 13:44)      SUBJECTIVE / OVERNIGHT EVENTS: Pt in bed, reports significant improvement in dyspnea. Son by bedside.     Vital Signs Last 24 Hrs  T(C): 36.6 (30 Aug 2021 08:33), Max: 36.8 (29 Aug 2021 15:47)  T(F): 97.8 (30 Aug 2021 08:33), Max: 98.2 (29 Aug 2021 15:47)  HR: 73 (30 Aug 2021 08:33) (73 - 77)  BP: 152/64 (30 Aug 2021 08:33) (152/62 - 153/69)  BP(mean): --  RR: 18 (30 Aug 2021 08:33) (18 - 18)  SpO2: 99% (30 Aug 2021 08:33) (93% - 99%)    MEDICATIONS  (STANDING):  aspirin enteric coated 81 milliGRAM(s) Oral daily  chlorhexidine 4% Liquid 1 Application(s) Topical <User Schedule>  enoxaparin Injectable 40 milliGRAM(s) SubCutaneous daily  finasteride 5 milliGRAM(s) Oral daily  folic acid 1 milliGRAM(s) Oral daily  hydrALAZINE 25 milliGRAM(s) Oral three times a day  isosorbide   mononitrate ER Tablet (IMDUR) 60 milliGRAM(s) Oral daily  levoFLOXacin  Tablet 250 milliGRAM(s) Oral every 24 hours  metoprolol succinate ER 50 milliGRAM(s) Oral daily  posaconazole Suspension 200 milliGRAM(s) Oral every 8 hours    MEDICATIONS  (PRN):  acetaminophen   Tablet .. 650 milliGRAM(s) Oral every 6 hours PRN Temp greater or equal to 38.5C (101.3F), Mild Pain (1 - 3)  aluminum hydroxide/magnesium hydroxide/simethicone Suspension 30 milliLiter(s) Oral every 4 hours PRN Dyspepsia  melatonin 3 milliGRAM(s) Oral at bedtime PRN Insomnia  ondansetron Injectable 4 milliGRAM(s) IV Push every 8 hours PRN Nausea and/or Vomiting        CAPILLARY BLOOD GLUCOSE        I&O's Summary    29 Aug 2021 07:01  -  30 Aug 2021 07:00  --------------------------------------------------------  IN: 240 mL / OUT: 300 mL / NET: -60 mL    30 Aug 2021 07:01  -  30 Aug 2021 14:43  --------------------------------------------------------  IN: 0 mL / OUT: 600 mL / NET: -600 mL        PHYSICAL EXAM:  GENERAL: NAD, well-developed  HEAD:  Atraumatic, Normocephalic  EYES: EOMI, PERRLA, conjunctiva and sclera clear  NECK: Supple, No JVD  CHEST/LUNG: Decreased breath sounds at bases  HEART: Regular rate and rhythm; No murmurs, rubs, or gallops  ABDOMEN: Soft, Nontender, Nondistended; Bowel sounds present  EXTREMITIES:  2+ Peripheral Pulses, No clubbing, cyanosis, or edema  PSYCH: AAOx3  NEUROLOGY: non-focal  SKIN: No rashes or lesions    LABS:                        9.2    4.44  )-----------( 379      ( 29 Aug 2021 08:49 )             29.1     08-    138  |  103  |  22  ----------------------------<  110<H>  3.9   |  25  |  2.12<H>    Ca    9.5      29 Aug 2021 08:49  Phos  3.5       Mg     2.1         TPro  6.8  /  Alb  2.9<L>  /  TBili  0.7  /  DBili  x   /  AST  13  /  ALT  8<L>  /  AlkPhos  93            Urinalysis Basic - ( 28 Aug 2021 15:06 )    Color: Colorless / Appearance: Clear / S.010 / pH: x  Gluc: x / Ketone: Negative  / Bili: Negative / Urobili: Negative   Blood: x / Protein: Negative / Nitrite: Negative   Leuk Esterase: Negative / RBC: x / WBC x   Sq Epi: x / Non Sq Epi: x / Bacteria: x        RADIOLOGY & ADDITIONAL TESTS:    Imaging Personally Reviewed:    Consultant(s) Notes Reviewed:      Care Discussed with Consultants/Other Providers: NP

## 2024-03-11 NOTE — PROGRESS NOTE ADULT - PROBLEM SELECTOR PLAN 3
Patient waxes and wanes from lethargic and agitated-but more consistently confused  CT head- 6/21 no acute changes. Sinusitis possible. MR if worsening concerns Patient

## 2024-03-27 NOTE — PROGRESS NOTE ADULT - PROBLEM SELECTOR PLAN 1
FLT3 ITD (-) AML   Dacogen held on day 3 6/21 due to acute change in mental status now back to baseline. Course will go to day 6 to complete 5 days   Monitor daily CBC's and transfuse as needed, Monitor daily CMP and replete electrolytes as needed   Anemia PRBC x 1  Thrombocytopenia - Platelet x 1 unit   Strict I's/O's, daily weights, mouth care, anti emetics.   s/p Dacogen 20 mg/m2 x 5 days + Venetoclax. (s/p Venetoclax escalation, now on 100 mg oral daily - while on posaconazole)  Day 21 BM bx on 7/9, follow up results  Pt is DNR FLT3 ITD (-) AML   Dacogen held on day 3 6/21 due to acute change in mental status now back to baseline. Course completed on day 6 (to complete 5 days)   Monitor daily CBC's and transfuse as needed, Monitor daily CMP and replete electrolytes as needed   Strict I's/O's, daily weights, mouth care, anti emetics.   s/p Dacogen 20 mg/m2 x 5 days + Venetoclax. (s/p Venetoclax escalation, now on 100 mg oral daily - while on posaconazole)  Day 21 BM bx on 7/9, follow up results  Pt is DNR No

## 2024-07-02 NOTE — PROGRESS NOTE ADULT - PROBLEM SELECTOR PLAN 3
Continue with the xtandi    See you again in 1 month   Patient waxes and wanes from lethargic and agitated-but more consistently confused   CT head- 6/21 no acute changes. Sinusitis possible. MR if worsening concerns.

## 2024-11-10 NOTE — PROGRESS NOTE ADULT - PROBLEM SELECTOR PLAN 9
s/p 5vCABG 2012 (LIMA to LAD, SVG to Diag and OM, SVG PDA and RPL), PCI (2 in 2014, 2 in 2015 [facility]), HFrEF s/p CRT-D (2013),  No DAPT or AC given thrombocytopenia. see MDM